# Patient Record
Sex: MALE | Race: WHITE | Employment: OTHER | URBAN - METROPOLITAN AREA
[De-identification: names, ages, dates, MRNs, and addresses within clinical notes are randomized per-mention and may not be internally consistent; named-entity substitution may affect disease eponyms.]

---

## 2017-02-10 ENCOUNTER — HOSPITAL ENCOUNTER (OUTPATIENT)
Dept: PAIN MANAGEMENT | Age: 57
Discharge: HOME | End: 2017-02-10
Attending: ANESTHESIOLOGY | Admitting: ANESTHESIOLOGY
Payer: MEDICARE

## 2017-02-10 VITALS
SYSTOLIC BLOOD PRESSURE: 147 MMHG | TEMPERATURE: 97.9 F | DIASTOLIC BLOOD PRESSURE: 78 MMHG | RESPIRATION RATE: 16 BRPM | HEART RATE: 81 BPM

## 2017-02-10 DIAGNOSIS — R52 PAIN DISORDER: ICD-10-CM

## 2017-02-10 DIAGNOSIS — R52 PAIN: ICD-10-CM

## 2017-02-10 DIAGNOSIS — M51.26 DISPLACEMENT OF LUMBAR INTERVERTEBRAL DISC WITHOUT MYELOPATHY: ICD-10-CM

## 2017-02-10 DIAGNOSIS — M48.02 CERVICAL STENOSIS OF SPINE: ICD-10-CM

## 2017-02-10 DIAGNOSIS — M54.2 CERVICALGIA: ICD-10-CM

## 2017-02-10 PROCEDURE — 99214 OFFICE O/P EST MOD 30 MIN: CPT

## 2017-02-10 RX ORDER — OXYCODONE HYDROCHLORIDE 5 MG/1
5 TABLET ORAL EVERY 8 HOURS PRN
Qty: 90 TABLET | Refills: 0 | Status: SHIPPED | OUTPATIENT
Start: 2017-02-12 | End: 2017-03-07 | Stop reason: SDUPTHER

## 2017-02-10 RX ORDER — MORPHINE SULFATE 30 MG/1
30 TABLET, FILM COATED, EXTENDED RELEASE ORAL EVERY 12 HOURS SCHEDULED
Qty: 60 TABLET | Refills: 0 | Status: SHIPPED | OUTPATIENT
Start: 2017-02-12 | End: 2017-03-07 | Stop reason: SDUPTHER

## 2017-02-10 RX ORDER — METRONIDAZOLE 500 MG/1
500 TABLET ORAL 3 TIMES DAILY
COMMUNITY
End: 2017-04-25 | Stop reason: ALTCHOICE

## 2017-02-10 RX ORDER — TIZANIDINE 4 MG/1
4 TABLET ORAL 2 TIMES DAILY PRN
Qty: 60 TABLET | Refills: 0 | Status: SHIPPED | OUTPATIENT
Start: 2017-02-12 | End: 2017-03-07 | Stop reason: SDUPTHER

## 2017-02-10 RX ORDER — CIPROFLOXACIN 500 MG/1
500 TABLET, FILM COATED ORAL 2 TIMES DAILY
COMMUNITY
End: 2017-07-06 | Stop reason: ALTCHOICE

## 2017-02-10 RX ORDER — GABAPENTIN 800 MG/1
800 TABLET ORAL EVERY 8 HOURS SCHEDULED
Qty: 90 TABLET | Refills: 0 | Status: SHIPPED | OUTPATIENT
Start: 2017-02-12 | End: 2017-03-07 | Stop reason: SDUPTHER

## 2017-02-10 ASSESSMENT — PAIN DESCRIPTION - ORIENTATION: ORIENTATION: RIGHT;LEFT

## 2017-02-10 ASSESSMENT — PAIN SCALES - GENERAL: PAINLEVEL_OUTOF10: 7

## 2017-02-10 ASSESSMENT — PAIN DESCRIPTION - LOCATION: LOCATION: BACK;HIP;SHOULDER;NECK

## 2017-02-10 ASSESSMENT — PAIN DESCRIPTION - PROGRESSION: CLINICAL_PROGRESSION: GRADUALLY WORSENING

## 2017-02-22 ENCOUNTER — HOSPITAL ENCOUNTER (OUTPATIENT)
Dept: NEUROLOGY | Age: 57
Discharge: HOME | End: 2017-02-22
Attending: FAMILY MEDICINE | Admitting: FAMILY MEDICINE
Payer: MEDICARE

## 2017-02-22 DIAGNOSIS — M25.532 LEFT WRIST PAIN: ICD-10-CM

## 2017-02-22 PROCEDURE — 95908 NRV CNDJ TST 3-4 STUDIES: CPT | Performed by: PHYSICAL MEDICINE & REHABILITATION

## 2017-02-22 PROCEDURE — 95886 MUSC TEST DONE W/N TEST COMP: CPT | Performed by: PHYSICAL MEDICINE & REHABILITATION

## 2017-03-01 DIAGNOSIS — G56.02 CARPAL TUNNEL SYNDROME OF LEFT WRIST: Primary | ICD-10-CM

## 2017-03-07 ENCOUNTER — HOSPITAL ENCOUNTER (OUTPATIENT)
Dept: PAIN MANAGEMENT | Age: 57
Discharge: HOME | End: 2017-03-07
Payer: MEDICARE

## 2017-03-07 ENCOUNTER — TELEPHONE (OUTPATIENT)
Dept: FAMILY MEDICINE CLINIC | Facility: CLINIC | Age: 57
End: 2017-03-07

## 2017-03-07 VITALS
TEMPERATURE: 97.8 F | SYSTOLIC BLOOD PRESSURE: 126 MMHG | DIASTOLIC BLOOD PRESSURE: 80 MMHG | RESPIRATION RATE: 16 BRPM | HEART RATE: 82 BPM

## 2017-03-07 DIAGNOSIS — M54.2 CERVICALGIA: ICD-10-CM

## 2017-03-07 DIAGNOSIS — M51.26 DISPLACEMENT OF LUMBAR INTERVERTEBRAL DISC WITHOUT MYELOPATHY: ICD-10-CM

## 2017-03-07 DIAGNOSIS — M48.02 CERVICAL STENOSIS OF SPINE: Primary | ICD-10-CM

## 2017-03-07 DIAGNOSIS — R52 PAIN DISORDER: ICD-10-CM

## 2017-03-07 DIAGNOSIS — M48.02 CERVICAL STENOSIS OF SPINE: ICD-10-CM

## 2017-03-07 DIAGNOSIS — R52 PAIN: ICD-10-CM

## 2017-03-07 PROCEDURE — 99214 OFFICE O/P EST MOD 30 MIN: CPT

## 2017-03-07 RX ORDER — TIZANIDINE 4 MG/1
4 TABLET ORAL 2 TIMES DAILY PRN
Qty: 60 TABLET | Refills: 0 | Status: SHIPPED | OUTPATIENT
Start: 2017-03-14 | End: 2017-04-11 | Stop reason: SDUPTHER

## 2017-03-07 RX ORDER — MORPHINE SULFATE 30 MG/1
30 TABLET, FILM COATED, EXTENDED RELEASE ORAL EVERY 12 HOURS SCHEDULED
Qty: 60 TABLET | Refills: 0 | Status: SHIPPED | OUTPATIENT
Start: 2017-03-14 | End: 2017-04-11 | Stop reason: SDUPTHER

## 2017-03-07 RX ORDER — OXYCODONE HYDROCHLORIDE 5 MG/1
5 TABLET ORAL EVERY 8 HOURS PRN
Qty: 90 TABLET | Refills: 0 | Status: SHIPPED | OUTPATIENT
Start: 2017-03-14 | End: 2017-04-11 | Stop reason: SDUPTHER

## 2017-03-07 RX ORDER — GABAPENTIN 800 MG/1
800 TABLET ORAL EVERY 8 HOURS SCHEDULED
Qty: 90 TABLET | Refills: 0 | Status: SHIPPED | OUTPATIENT
Start: 2017-03-14 | End: 2017-04-11 | Stop reason: SDUPTHER

## 2017-03-07 ASSESSMENT — PAIN DESCRIPTION - ORIENTATION: ORIENTATION: RIGHT;LEFT;POSTERIOR

## 2017-03-07 ASSESSMENT — PAIN DESCRIPTION - FREQUENCY: FREQUENCY: CONTINUOUS

## 2017-03-07 ASSESSMENT — PAIN DESCRIPTION - PAIN TYPE: TYPE: CHRONIC PAIN

## 2017-03-07 ASSESSMENT — PAIN SCALES - GENERAL: PAINLEVEL_OUTOF10: 7

## 2017-03-23 ENCOUNTER — OFFICE VISIT (OUTPATIENT)
Dept: ORTHOPEDIC SURGERY | Age: 57
End: 2017-03-23
Payer: MEDICARE

## 2017-03-23 VITALS — WEIGHT: 215 LBS | HEIGHT: 69 IN | BODY MASS INDEX: 31.84 KG/M2

## 2017-03-23 DIAGNOSIS — G56.02 CARPAL TUNNEL SYNDROME OF LEFT WRIST: ICD-10-CM

## 2017-03-23 DIAGNOSIS — M48.02 CERVICAL STENOSIS OF SPINE: Primary | ICD-10-CM

## 2017-03-23 DIAGNOSIS — M54.2 CERVICALGIA: ICD-10-CM

## 2017-03-23 DIAGNOSIS — M54.12 CERVICAL RADICULOPATHY: ICD-10-CM

## 2017-03-23 PROCEDURE — G8419 CALC BMI OUT NRM PARAM NOF/U: HCPCS | Performed by: ORTHOPAEDIC SURGERY

## 2017-03-23 PROCEDURE — G8427 DOCREV CUR MEDS BY ELIG CLIN: HCPCS | Performed by: ORTHOPAEDIC SURGERY

## 2017-03-23 PROCEDURE — G8484 FLU IMMUNIZE NO ADMIN: HCPCS | Performed by: ORTHOPAEDIC SURGERY

## 2017-03-23 PROCEDURE — 99203 OFFICE O/P NEW LOW 30 MIN: CPT | Performed by: ORTHOPAEDIC SURGERY

## 2017-03-23 PROCEDURE — 4004F PT TOBACCO SCREEN RCVD TLK: CPT | Performed by: ORTHOPAEDIC SURGERY

## 2017-03-23 PROCEDURE — 3017F COLORECTAL CA SCREEN DOC REV: CPT | Performed by: ORTHOPAEDIC SURGERY

## 2017-04-11 ENCOUNTER — HOSPITAL ENCOUNTER (OUTPATIENT)
Dept: PAIN MANAGEMENT | Age: 57
Discharge: HOME | End: 2017-04-11
Payer: MEDICARE

## 2017-04-11 VITALS
SYSTOLIC BLOOD PRESSURE: 128 MMHG | HEART RATE: 69 BPM | RESPIRATION RATE: 16 BRPM | TEMPERATURE: 97.8 F | DIASTOLIC BLOOD PRESSURE: 77 MMHG

## 2017-04-11 DIAGNOSIS — R52 PAIN: ICD-10-CM

## 2017-04-11 DIAGNOSIS — M54.2 CERVICALGIA: ICD-10-CM

## 2017-04-11 DIAGNOSIS — M51.26 DISPLACEMENT OF LUMBAR INTERVERTEBRAL DISC WITHOUT MYELOPATHY: ICD-10-CM

## 2017-04-11 DIAGNOSIS — M48.02 CERVICAL STENOSIS OF SPINE: ICD-10-CM

## 2017-04-11 DIAGNOSIS — R52 PAIN DISORDER: ICD-10-CM

## 2017-04-11 PROCEDURE — 99214 OFFICE O/P EST MOD 30 MIN: CPT

## 2017-04-11 PROCEDURE — 99213 OFFICE O/P EST LOW 20 MIN: CPT

## 2017-04-11 RX ORDER — GABAPENTIN 800 MG/1
800 TABLET ORAL EVERY 8 HOURS SCHEDULED
Qty: 90 TABLET | Refills: 0 | Status: SHIPPED | OUTPATIENT
Start: 2017-04-13 | End: 2017-05-09 | Stop reason: SDUPTHER

## 2017-04-11 RX ORDER — TIZANIDINE 4 MG/1
4 TABLET ORAL 2 TIMES DAILY PRN
Qty: 60 TABLET | Refills: 0 | Status: SHIPPED | OUTPATIENT
Start: 2017-04-13 | End: 2017-05-09 | Stop reason: SDUPTHER

## 2017-04-11 RX ORDER — MORPHINE SULFATE 30 MG/1
30 TABLET, FILM COATED, EXTENDED RELEASE ORAL EVERY 12 HOURS SCHEDULED
Qty: 60 TABLET | Refills: 0 | Status: SHIPPED | OUTPATIENT
Start: 2017-04-13 | End: 2017-05-09 | Stop reason: SDUPTHER

## 2017-04-11 RX ORDER — OXYCODONE HYDROCHLORIDE 5 MG/1
5 TABLET ORAL EVERY 8 HOURS PRN
Qty: 90 TABLET | Refills: 0 | Status: SHIPPED | OUTPATIENT
Start: 2017-04-13 | End: 2017-05-09 | Stop reason: SDUPTHER

## 2017-04-11 ASSESSMENT — PAIN DESCRIPTION - FREQUENCY: FREQUENCY: CONTINUOUS

## 2017-04-11 ASSESSMENT — ENCOUNTER SYMPTOMS
SHORTNESS OF BREATH: 0
COUGH: 0
CONSTIPATION: 0

## 2017-04-11 ASSESSMENT — PAIN DESCRIPTION - DESCRIPTORS: DESCRIPTORS: ACHING;NUMBNESS;CONSTANT

## 2017-04-11 ASSESSMENT — PAIN DESCRIPTION - PROGRESSION: CLINICAL_PROGRESSION: GRADUALLY WORSENING

## 2017-04-11 ASSESSMENT — PAIN SCALES - GENERAL: PAINLEVEL_OUTOF10: 6

## 2017-04-11 ASSESSMENT — PAIN DESCRIPTION - ORIENTATION: ORIENTATION: RIGHT;LEFT

## 2017-04-11 ASSESSMENT — PAIN DESCRIPTION - PAIN TYPE: TYPE: CHRONIC PAIN

## 2017-04-11 ASSESSMENT — PAIN DESCRIPTION - LOCATION: LOCATION: BACK;NECK;HAND

## 2017-04-25 ENCOUNTER — OFFICE VISIT (OUTPATIENT)
Dept: FAMILY MEDICINE CLINIC | Age: 57
End: 2017-04-25
Payer: MEDICARE

## 2017-04-25 VITALS
OXYGEN SATURATION: 98 % | WEIGHT: 211 LBS | HEART RATE: 72 BPM | BODY MASS INDEX: 31.25 KG/M2 | RESPIRATION RATE: 19 BRPM | HEIGHT: 69 IN | TEMPERATURE: 98.4 F | DIASTOLIC BLOOD PRESSURE: 68 MMHG | SYSTOLIC BLOOD PRESSURE: 128 MMHG

## 2017-04-25 DIAGNOSIS — F33.40 RECURRENT MAJOR DEPRESSIVE DISORDER, IN REMISSION (HCC): ICD-10-CM

## 2017-04-25 DIAGNOSIS — Z23 NEED FOR 23-POLYVALENT PNEUMOCOCCAL POLYSACCHARIDE VACCINE: ICD-10-CM

## 2017-04-25 DIAGNOSIS — G56.02 CARPAL TUNNEL SYNDROME OF LEFT WRIST: ICD-10-CM

## 2017-04-25 DIAGNOSIS — M48.02 CERVICAL STENOSIS OF SPINE: Primary | ICD-10-CM

## 2017-04-25 DIAGNOSIS — J44.9 CHRONIC OBSTRUCTIVE PULMONARY DISEASE, UNSPECIFIED COPD TYPE (HCC): ICD-10-CM

## 2017-04-25 PROCEDURE — G0009 ADMIN PNEUMOCOCCAL VACCINE: HCPCS | Performed by: FAMILY MEDICINE

## 2017-04-25 PROCEDURE — G8926 SPIRO NO PERF OR DOC: HCPCS | Performed by: FAMILY MEDICINE

## 2017-04-25 PROCEDURE — 90732 PPSV23 VACC 2 YRS+ SUBQ/IM: CPT | Performed by: FAMILY MEDICINE

## 2017-04-25 PROCEDURE — G8417 CALC BMI ABV UP PARAM F/U: HCPCS | Performed by: FAMILY MEDICINE

## 2017-04-25 PROCEDURE — 3017F COLORECTAL CA SCREEN DOC REV: CPT | Performed by: FAMILY MEDICINE

## 2017-04-25 PROCEDURE — 4004F PT TOBACCO SCREEN RCVD TLK: CPT | Performed by: FAMILY MEDICINE

## 2017-04-25 PROCEDURE — 3023F SPIROM DOC REV: CPT | Performed by: FAMILY MEDICINE

## 2017-04-25 PROCEDURE — 99214 OFFICE O/P EST MOD 30 MIN: CPT | Performed by: FAMILY MEDICINE

## 2017-04-25 PROCEDURE — G8427 DOCREV CUR MEDS BY ELIG CLIN: HCPCS | Performed by: FAMILY MEDICINE

## 2017-04-25 ASSESSMENT — ENCOUNTER SYMPTOMS
COUGH: 0
SHORTNESS OF BREATH: 0
VOMITING: 0
NAUSEA: 0

## 2017-05-09 ENCOUNTER — HOSPITAL ENCOUNTER (OUTPATIENT)
Dept: PAIN MANAGEMENT | Age: 57
Discharge: HOME | End: 2017-05-09
Payer: MEDICARE

## 2017-05-09 VITALS
RESPIRATION RATE: 18 BRPM | TEMPERATURE: 98.4 F | DIASTOLIC BLOOD PRESSURE: 71 MMHG | SYSTOLIC BLOOD PRESSURE: 114 MMHG | HEART RATE: 79 BPM

## 2017-05-09 DIAGNOSIS — R52 PAIN: ICD-10-CM

## 2017-05-09 DIAGNOSIS — M51.26 DISPLACEMENT OF LUMBAR INTERVERTEBRAL DISC WITHOUT MYELOPATHY: ICD-10-CM

## 2017-05-09 DIAGNOSIS — M48.02 CERVICAL STENOSIS OF SPINE: ICD-10-CM

## 2017-05-09 DIAGNOSIS — M54.2 CERVICALGIA: ICD-10-CM

## 2017-05-09 DIAGNOSIS — R52 PAIN DISORDER: ICD-10-CM

## 2017-05-09 PROCEDURE — 80307 DRUG TEST PRSMV CHEM ANLYZR: CPT

## 2017-05-09 PROCEDURE — G0480 DRUG TEST DEF 1-7 CLASSES: HCPCS

## 2017-05-09 PROCEDURE — 99213 OFFICE O/P EST LOW 20 MIN: CPT

## 2017-05-09 RX ORDER — MORPHINE SULFATE 30 MG/1
30 TABLET, FILM COATED, EXTENDED RELEASE ORAL EVERY 12 HOURS SCHEDULED
Qty: 60 TABLET | Refills: 0 | Status: SHIPPED | OUTPATIENT
Start: 2017-05-13 | End: 2017-06-09 | Stop reason: SDUPTHER

## 2017-05-09 RX ORDER — OXYCODONE HYDROCHLORIDE 5 MG/1
5 TABLET ORAL EVERY 8 HOURS PRN
Qty: 90 TABLET | Refills: 0 | Status: SHIPPED | OUTPATIENT
Start: 2017-05-13 | End: 2017-06-09 | Stop reason: SDUPTHER

## 2017-05-09 RX ORDER — TIZANIDINE 4 MG/1
4 TABLET ORAL 2 TIMES DAILY PRN
Qty: 60 TABLET | Refills: 0 | Status: SHIPPED | OUTPATIENT
Start: 2017-05-13 | End: 2017-06-09 | Stop reason: SDUPTHER

## 2017-05-09 RX ORDER — GABAPENTIN 800 MG/1
800 TABLET ORAL EVERY 8 HOURS SCHEDULED
Qty: 90 TABLET | Refills: 0 | Status: SHIPPED | OUTPATIENT
Start: 2017-05-13 | End: 2017-06-09 | Stop reason: SDUPTHER

## 2017-05-09 ASSESSMENT — PAIN DESCRIPTION - LOCATION: LOCATION: BACK;NECK;HAND

## 2017-05-09 ASSESSMENT — ENCOUNTER SYMPTOMS
SHORTNESS OF BREATH: 0
CONSTIPATION: 0
COUGH: 0

## 2017-05-09 ASSESSMENT — PAIN DESCRIPTION - ORIENTATION: ORIENTATION: RIGHT;LEFT

## 2017-05-09 ASSESSMENT — PAIN DESCRIPTION - FREQUENCY: FREQUENCY: CONTINUOUS

## 2017-05-09 ASSESSMENT — PAIN DESCRIPTION - ONSET: ONSET: ON-GOING

## 2017-05-09 ASSESSMENT — PAIN SCALES - GENERAL: PAINLEVEL_OUTOF10: 6

## 2017-05-09 ASSESSMENT — PAIN DESCRIPTION - PAIN TYPE: TYPE: CHRONIC PAIN

## 2017-05-09 ASSESSMENT — PAIN DESCRIPTION - PROGRESSION: CLINICAL_PROGRESSION: GRADUALLY WORSENING

## 2017-05-12 LAB
6-ACETYLMORPHINE, UR: NOT DETECTED
7-AMINOCLONAZEPAM, URINE: NOT DETECTED
ALPHA-OH-ALPRAZ, URINE: NOT DETECTED
ALPRAZOLAM, URINE: NOT DETECTED
AMPHETAMINES, URINE: NOT DETECTED
BARBITURATES, URINE: NOT DETECTED
BENZOYLECGONINE, UR: NOT DETECTED
BUPRENORPHINE URINE: NOT DETECTED
CARISOPRODOL, UR: NOT DETECTED
CLONAZEPAM, URINE: NOT DETECTED
CODEINE, URINE: NOT DETECTED
CREATININE URINE: 193.8 MG/DL (ref 20–400)
DIAZEPAM, URINE: NOT DETECTED
EER PAIN MGT DRUG PANEL, HIGH RES/EMIT U: NORMAL
ETHYL GLUCURONIDE UR: NOT DETECTED
FENTANYL URINE: NOT DETECTED
HYDROCODONE, URINE: NOT DETECTED
HYDROMORPHONE, URINE: NOT DETECTED
LORAZEPAM, URINE: NOT DETECTED
MARIJUANA METAB, UR: NOT DETECTED
MDA, UR: NOT DETECTED
MDEA, EVE, UR: NOT DETECTED
MDMA URINE: NOT DETECTED
MEPERIDINE METAB, UR: NOT DETECTED
METHADONE, URINE: NOT DETECTED
METHAMPHETAMINE, URINE: NOT DETECTED
METHYLPHENIDATE: NOT DETECTED
MIDAZOLAM, URINE: NOT DETECTED
MORPHINE URINE: PRESENT
NORBUPRENORPHINE, URINE: NOT DETECTED
NORDIAZEPAM, URINE: NOT DETECTED
NORFENTANYL, URINE: NOT DETECTED
NORHYDROCODONE, URINE: NOT DETECTED
NOROXYCODONE, URINE: PRESENT
NOROXYMORPHONE, URINE: NOT DETECTED
OXAZEPAM, URINE: NOT DETECTED
OXYCODONE URINE: PRESENT
OXYMORPHONE, URINE: PRESENT
PAIN MGT DRUG PANEL, HI RES, UR: NORMAL
PCP,URINE: NOT DETECTED
PHENTERMINE, UR: NOT DETECTED
PROPOXYPHENE, URINE: NOT DETECTED
TAPENTADOL, URINE: NOT DETECTED
TAPENTADOL-O-SULFATE, URINE: NOT DETECTED
TEMAZEPAM, URINE: NOT DETECTED
TRAMADOL, URINE: NOT DETECTED
ZOLPIDEM, URINE: NOT DETECTED

## 2017-05-13 LAB — GABAPENTIN QNT URINE: >200 UG/ML

## 2017-06-09 ENCOUNTER — HOSPITAL ENCOUNTER (OUTPATIENT)
Dept: PAIN MANAGEMENT | Age: 57
Discharge: HOME | End: 2017-06-09
Payer: MEDICARE

## 2017-06-09 VITALS
RESPIRATION RATE: 18 BRPM | WEIGHT: 220 LBS | DIASTOLIC BLOOD PRESSURE: 80 MMHG | TEMPERATURE: 97.9 F | HEIGHT: 69 IN | BODY MASS INDEX: 32.58 KG/M2 | SYSTOLIC BLOOD PRESSURE: 115 MMHG | HEART RATE: 98 BPM

## 2017-06-09 DIAGNOSIS — M48.02 CERVICAL STENOSIS OF SPINE: ICD-10-CM

## 2017-06-09 DIAGNOSIS — R52 PAIN DISORDER: ICD-10-CM

## 2017-06-09 DIAGNOSIS — M54.2 CERVICALGIA: ICD-10-CM

## 2017-06-09 DIAGNOSIS — R52 PAIN: ICD-10-CM

## 2017-06-09 DIAGNOSIS — M51.26 DISPLACEMENT OF LUMBAR INTERVERTEBRAL DISC WITHOUT MYELOPATHY: ICD-10-CM

## 2017-06-09 PROCEDURE — 99214 OFFICE O/P EST MOD 30 MIN: CPT

## 2017-06-09 RX ORDER — TIZANIDINE 4 MG/1
4 TABLET ORAL 2 TIMES DAILY PRN
Qty: 60 TABLET | Refills: 0 | Status: SHIPPED | OUTPATIENT
Start: 2017-06-12 | End: 2017-07-06 | Stop reason: SDUPTHER

## 2017-06-09 RX ORDER — MORPHINE SULFATE 30 MG/1
30 TABLET, FILM COATED, EXTENDED RELEASE ORAL EVERY 12 HOURS SCHEDULED
Qty: 60 TABLET | Refills: 0 | Status: SHIPPED | OUTPATIENT
Start: 2017-06-12 | End: 2017-07-06 | Stop reason: SDUPTHER

## 2017-06-09 RX ORDER — OXYCODONE HYDROCHLORIDE 5 MG/1
5 TABLET ORAL EVERY 8 HOURS PRN
Qty: 90 TABLET | Refills: 0 | Status: SHIPPED | OUTPATIENT
Start: 2017-06-12 | End: 2017-07-06 | Stop reason: SDUPTHER

## 2017-06-09 RX ORDER — GABAPENTIN 800 MG/1
800 TABLET ORAL EVERY 8 HOURS SCHEDULED
Qty: 90 TABLET | Refills: 0 | Status: SHIPPED | OUTPATIENT
Start: 2017-06-12 | End: 2017-07-06 | Stop reason: SDUPTHER

## 2017-06-09 ASSESSMENT — ENCOUNTER SYMPTOMS
BACK PAIN: 1
SHORTNESS OF BREATH: 0
CONSTIPATION: 0
COUGH: 0

## 2017-06-09 ASSESSMENT — PAIN DESCRIPTION - PROGRESSION: CLINICAL_PROGRESSION: NOT CHANGED

## 2017-06-09 ASSESSMENT — PAIN DESCRIPTION - LOCATION: LOCATION: BACK;BUTTOCKS;GROIN

## 2017-06-09 ASSESSMENT — PAIN DESCRIPTION - PAIN TYPE: TYPE: CHRONIC PAIN

## 2017-06-09 ASSESSMENT — PAIN DESCRIPTION - FREQUENCY: FREQUENCY: CONTINUOUS

## 2017-06-09 ASSESSMENT — PAIN DESCRIPTION - ORIENTATION: ORIENTATION: LEFT

## 2017-06-09 ASSESSMENT — PAIN SCALES - GENERAL: PAINLEVEL_OUTOF10: 6

## 2017-07-06 ENCOUNTER — HOSPITAL ENCOUNTER (OUTPATIENT)
Dept: PAIN MANAGEMENT | Age: 57
Discharge: HOME | End: 2017-07-06
Payer: MEDICARE

## 2017-07-06 VITALS
TEMPERATURE: 98 F | WEIGHT: 220 LBS | SYSTOLIC BLOOD PRESSURE: 136 MMHG | HEIGHT: 69 IN | BODY MASS INDEX: 32.58 KG/M2 | RESPIRATION RATE: 18 BRPM | DIASTOLIC BLOOD PRESSURE: 65 MMHG | HEART RATE: 73 BPM

## 2017-07-06 DIAGNOSIS — F32.A MILD DEPRESSION: ICD-10-CM

## 2017-07-06 DIAGNOSIS — M51.26 DISPLACEMENT OF LUMBAR INTERVERTEBRAL DISC WITHOUT MYELOPATHY: ICD-10-CM

## 2017-07-06 DIAGNOSIS — R52 PAIN: ICD-10-CM

## 2017-07-06 DIAGNOSIS — M54.2 CERVICALGIA: ICD-10-CM

## 2017-07-06 DIAGNOSIS — M48.02 CERVICAL STENOSIS OF SPINE: ICD-10-CM

## 2017-07-06 DIAGNOSIS — R52 PAIN DISORDER: ICD-10-CM

## 2017-07-06 PROCEDURE — 99214 OFFICE O/P EST MOD 30 MIN: CPT

## 2017-07-06 RX ORDER — OXYCODONE HYDROCHLORIDE 5 MG/1
5 TABLET ORAL EVERY 8 HOURS PRN
Qty: 90 TABLET | Refills: 0 | Status: SHIPPED | OUTPATIENT
Start: 2017-07-12 | End: 2017-08-07 | Stop reason: SDUPTHER

## 2017-07-06 RX ORDER — TIZANIDINE 4 MG/1
4 TABLET ORAL 2 TIMES DAILY PRN
Qty: 60 TABLET | Refills: 0 | Status: SHIPPED | OUTPATIENT
Start: 2017-07-12 | End: 2017-08-07 | Stop reason: SDUPTHER

## 2017-07-06 RX ORDER — MORPHINE SULFATE 30 MG/1
30 TABLET, FILM COATED, EXTENDED RELEASE ORAL EVERY 12 HOURS SCHEDULED
Qty: 60 TABLET | Refills: 0 | Status: SHIPPED | OUTPATIENT
Start: 2017-07-12 | End: 2017-08-07 | Stop reason: SDUPTHER

## 2017-07-06 RX ORDER — GABAPENTIN 800 MG/1
800 TABLET ORAL EVERY 8 HOURS SCHEDULED
Qty: 90 TABLET | Refills: 0 | Status: SHIPPED | OUTPATIENT
Start: 2017-07-12 | End: 2017-08-07 | Stop reason: SDUPTHER

## 2017-07-06 ASSESSMENT — PAIN DESCRIPTION - FREQUENCY: FREQUENCY: CONTINUOUS

## 2017-07-06 ASSESSMENT — PAIN DESCRIPTION - ONSET: ONSET: ON-GOING

## 2017-07-06 ASSESSMENT — PAIN DESCRIPTION - PAIN TYPE: TYPE: CHRONIC PAIN

## 2017-07-06 ASSESSMENT — PAIN DESCRIPTION - LOCATION: LOCATION: BACK;NECK;SHOULDER;ARM

## 2017-07-06 ASSESSMENT — PAIN DESCRIPTION - DESCRIPTORS: DESCRIPTORS: CONSTANT;ACHING;RADIATING

## 2017-07-06 ASSESSMENT — PAIN DESCRIPTION - PROGRESSION: CLINICAL_PROGRESSION: GRADUALLY IMPROVING

## 2017-07-06 ASSESSMENT — PAIN SCALES - GENERAL: PAINLEVEL_OUTOF10: 7

## 2017-07-06 ASSESSMENT — PAIN DESCRIPTION - ORIENTATION: ORIENTATION: POSTERIOR;LEFT

## 2017-07-26 RX ORDER — SIMVASTATIN 40 MG
40 TABLET ORAL EVERY EVENING
Qty: 30 TABLET | Refills: 3 | Status: SHIPPED | OUTPATIENT
Start: 2017-07-26 | End: 2017-12-09 | Stop reason: SDUPTHER

## 2017-08-02 ENCOUNTER — HOSPITAL ENCOUNTER (OUTPATIENT)
Dept: INTERVENTIONAL RADIOLOGY/VASCULAR | Age: 57
Discharge: HOME | End: 2017-08-02
Payer: MEDICARE

## 2017-08-02 ENCOUNTER — HOSPITAL ENCOUNTER (OUTPATIENT)
Dept: CT IMAGING | Age: 57
Discharge: HOME | End: 2017-08-02
Payer: MEDICARE

## 2017-08-02 VITALS
WEIGHT: 200 LBS | TEMPERATURE: 98.3 F | BODY MASS INDEX: 29.62 KG/M2 | HEIGHT: 69 IN | SYSTOLIC BLOOD PRESSURE: 146 MMHG | OXYGEN SATURATION: 58 % | DIASTOLIC BLOOD PRESSURE: 76 MMHG | RESPIRATION RATE: 18 BRPM | HEART RATE: 70 BPM

## 2017-08-02 DIAGNOSIS — M54.2 NECK PAIN: ICD-10-CM

## 2017-08-02 DIAGNOSIS — G95.9 CERVICAL MYELOPATHY (HCC): ICD-10-CM

## 2017-08-02 PROCEDURE — 94760 N-INVAS EAR/PLS OXIMETRY 1: CPT

## 2017-08-02 PROCEDURE — 7100000031 HC ASPR PHASE II RECOVERY - ADDTL 15 MIN

## 2017-08-02 PROCEDURE — 2500000003 HC RX 250 WO HCPCS: Performed by: RADIOLOGY

## 2017-08-02 PROCEDURE — 7100000030 HC ASPR PHASE II RECOVERY - FIRST 15 MIN

## 2017-08-02 PROCEDURE — 6360000004 HC RX CONTRAST MEDICATION: Performed by: ORTHOPAEDIC SURGERY

## 2017-08-02 PROCEDURE — 72126 CT NECK SPINE W/DYE: CPT

## 2017-08-02 PROCEDURE — 6360000004 HC RX CONTRAST MEDICATION

## 2017-08-02 PROCEDURE — 72240 MYELOGRAPHY NECK SPINE: CPT | Performed by: RADIOLOGY

## 2017-08-02 RX ORDER — SODIUM CHLORIDE 9 MG/ML
INJECTION, SOLUTION INTRAVENOUS CONTINUOUS
Status: DISCONTINUED | OUTPATIENT
Start: 2017-08-02 | End: 2017-08-05 | Stop reason: HOSPADM

## 2017-08-02 RX ORDER — LIDOCAINE HYDROCHLORIDE 10 MG/ML
INJECTION, SOLUTION EPIDURAL; INFILTRATION; INTRACAUDAL; PERINEURAL
Status: COMPLETED | OUTPATIENT
Start: 2017-08-02 | End: 2017-08-02

## 2017-08-02 RX ORDER — ACETAMINOPHEN 325 MG/1
650 TABLET ORAL EVERY 4 HOURS PRN
Status: DISCONTINUED | OUTPATIENT
Start: 2017-08-02 | End: 2017-08-05 | Stop reason: HOSPADM

## 2017-08-02 ASSESSMENT — PAIN DESCRIPTION - ORIENTATION: ORIENTATION: LEFT

## 2017-08-02 ASSESSMENT — PAIN DESCRIPTION - DESCRIPTORS
DESCRIPTORS: CONSTANT;ACHING
DESCRIPTORS: CONSTANT;ACHING
DESCRIPTORS: SHARP;ACHING

## 2017-08-02 ASSESSMENT — PAIN DESCRIPTION - ONSET
ONSET: ON-GOING
ONSET: ON-GOING

## 2017-08-02 ASSESSMENT — PAIN DESCRIPTION - PROGRESSION
CLINICAL_PROGRESSION: GRADUALLY IMPROVING
CLINICAL_PROGRESSION: NOT CHANGED
CLINICAL_PROGRESSION: NOT CHANGED

## 2017-08-02 ASSESSMENT — PAIN DESCRIPTION - LOCATION
LOCATION: NECK;BACK
LOCATION: BACK;NECK

## 2017-08-02 ASSESSMENT — PAIN SCALES - GENERAL
PAINLEVEL_OUTOF10: 5
PAINLEVEL_OUTOF10: 7

## 2017-08-02 ASSESSMENT — PAIN DESCRIPTION - FREQUENCY
FREQUENCY: CONTINUOUS

## 2017-08-02 ASSESSMENT — PAIN - FUNCTIONAL ASSESSMENT: PAIN_FUNCTIONAL_ASSESSMENT: 0-10

## 2017-08-02 ASSESSMENT — PAIN DESCRIPTION - PAIN TYPE
TYPE: CHRONIC PAIN
TYPE: CHRONIC PAIN

## 2017-08-03 PROCEDURE — 6360000004 HC RX CONTRAST MEDICATION: Performed by: ORTHOPAEDIC SURGERY

## 2017-08-03 RX ADMIN — IOHEXOL 20 ML: 180 INJECTION INTRAVENOUS at 11:55

## 2017-08-07 ENCOUNTER — HOSPITAL ENCOUNTER (OUTPATIENT)
Dept: PAIN MANAGEMENT | Age: 57
Discharge: HOME | End: 2017-08-07
Payer: MEDICARE

## 2017-08-07 VITALS
TEMPERATURE: 98 F | HEART RATE: 74 BPM | DIASTOLIC BLOOD PRESSURE: 62 MMHG | RESPIRATION RATE: 20 BRPM | SYSTOLIC BLOOD PRESSURE: 99 MMHG

## 2017-08-07 DIAGNOSIS — Z51.81 MEDICATION MONITORING ENCOUNTER: Primary | ICD-10-CM

## 2017-08-07 DIAGNOSIS — M48.02 CERVICAL STENOSIS OF SPINE: ICD-10-CM

## 2017-08-07 DIAGNOSIS — M51.26 DISPLACEMENT OF LUMBAR INTERVERTEBRAL DISC WITHOUT MYELOPATHY: ICD-10-CM

## 2017-08-07 DIAGNOSIS — M54.2 CERVICALGIA: ICD-10-CM

## 2017-08-07 PROCEDURE — 99214 OFFICE O/P EST MOD 30 MIN: CPT

## 2017-08-07 RX ORDER — MORPHINE SULFATE 30 MG/1
30 TABLET, FILM COATED, EXTENDED RELEASE ORAL EVERY 12 HOURS SCHEDULED
Qty: 60 TABLET | Refills: 0 | Status: SHIPPED | OUTPATIENT
Start: 2017-08-11 | End: 2017-09-07 | Stop reason: SDUPTHER

## 2017-08-07 RX ORDER — OXYCODONE HYDROCHLORIDE 5 MG/1
5 TABLET ORAL EVERY 8 HOURS PRN
Qty: 90 TABLET | Refills: 0 | Status: SHIPPED | OUTPATIENT
Start: 2017-08-11 | End: 2017-09-07 | Stop reason: SDUPTHER

## 2017-08-07 RX ORDER — TIZANIDINE 4 MG/1
4 TABLET ORAL 2 TIMES DAILY PRN
Qty: 60 TABLET | Refills: 0 | Status: SHIPPED | OUTPATIENT
Start: 2017-08-11 | End: 2017-09-07 | Stop reason: SDUPTHER

## 2017-08-07 RX ORDER — GABAPENTIN 800 MG/1
800 TABLET ORAL EVERY 8 HOURS SCHEDULED
Qty: 90 TABLET | Refills: 0 | Status: SHIPPED | OUTPATIENT
Start: 2017-08-11 | End: 2017-09-07 | Stop reason: SDUPTHER

## 2017-08-07 ASSESSMENT — PAIN DESCRIPTION - DESCRIPTORS: DESCRIPTORS: CONSTANT;ACHING

## 2017-08-07 ASSESSMENT — ENCOUNTER SYMPTOMS
SHORTNESS OF BREATH: 0
COUGH: 0
BACK PAIN: 1
CONSTIPATION: 0

## 2017-08-07 ASSESSMENT — PAIN DESCRIPTION - LOCATION: LOCATION: BACK;NECK

## 2017-08-07 ASSESSMENT — PAIN DESCRIPTION - PROGRESSION: CLINICAL_PROGRESSION: NOT CHANGED

## 2017-08-07 ASSESSMENT — PAIN DESCRIPTION - ORIENTATION: ORIENTATION: LOWER

## 2017-08-07 ASSESSMENT — PAIN DESCRIPTION - ONSET: ONSET: ON-GOING

## 2017-08-07 ASSESSMENT — PAIN DESCRIPTION - PAIN TYPE: TYPE: CHRONIC PAIN

## 2017-08-07 ASSESSMENT — PAIN DESCRIPTION - FREQUENCY: FREQUENCY: CONTINUOUS

## 2017-08-07 ASSESSMENT — PAIN SCALES - GENERAL: PAINLEVEL_OUTOF10: 5

## 2017-09-07 ENCOUNTER — HOSPITAL ENCOUNTER (OUTPATIENT)
Dept: PAIN MANAGEMENT | Age: 57
Discharge: HOME | End: 2017-09-07
Payer: MEDICARE

## 2017-09-07 VITALS
DIASTOLIC BLOOD PRESSURE: 75 MMHG | HEART RATE: 77 BPM | TEMPERATURE: 98.5 F | HEIGHT: 69 IN | BODY MASS INDEX: 29.03 KG/M2 | SYSTOLIC BLOOD PRESSURE: 128 MMHG | RESPIRATION RATE: 16 BRPM | WEIGHT: 196 LBS

## 2017-09-07 DIAGNOSIS — K59.03 THERAPEUTIC OPIOID INDUCED CONSTIPATION: ICD-10-CM

## 2017-09-07 DIAGNOSIS — M51.36 DDD (DEGENERATIVE DISC DISEASE), LUMBAR: ICD-10-CM

## 2017-09-07 DIAGNOSIS — T40.2X5A THERAPEUTIC OPIOID INDUCED CONSTIPATION: ICD-10-CM

## 2017-09-07 DIAGNOSIS — M54.12 CERVICAL RADICULOPATHY: ICD-10-CM

## 2017-09-07 DIAGNOSIS — M51.26 DISPLACEMENT OF LUMBAR INTERVERTEBRAL DISC WITHOUT MYELOPATHY: ICD-10-CM

## 2017-09-07 DIAGNOSIS — Z51.81 MEDICATION MONITORING ENCOUNTER: Primary | ICD-10-CM

## 2017-09-07 DIAGNOSIS — M48.02 CERVICAL STENOSIS OF SPINE: ICD-10-CM

## 2017-09-07 PROBLEM — M51.369 DDD (DEGENERATIVE DISC DISEASE), LUMBAR: Status: ACTIVE | Noted: 2017-09-07

## 2017-09-07 PROCEDURE — 99214 OFFICE O/P EST MOD 30 MIN: CPT | Performed by: NURSE PRACTITIONER

## 2017-09-07 PROCEDURE — 99214 OFFICE O/P EST MOD 30 MIN: CPT

## 2017-09-07 RX ORDER — DOCUSATE SODIUM 100 MG/1
100 CAPSULE, LIQUID FILLED ORAL DAILY
Qty: 30 CAPSULE | Refills: 2 | Status: SHIPPED | OUTPATIENT
Start: 2017-09-07 | End: 2018-03-07 | Stop reason: SDUPTHER

## 2017-09-07 RX ORDER — OXYCODONE HYDROCHLORIDE 5 MG/1
5 TABLET ORAL EVERY 8 HOURS PRN
Qty: 90 TABLET | Refills: 0 | Status: SHIPPED | OUTPATIENT
Start: 2017-09-10 | End: 2017-10-04 | Stop reason: SDUPTHER

## 2017-09-07 RX ORDER — GABAPENTIN 800 MG/1
800 TABLET ORAL EVERY 8 HOURS SCHEDULED
Qty: 90 TABLET | Refills: 0 | Status: SHIPPED | OUTPATIENT
Start: 2017-09-10 | End: 2017-10-04 | Stop reason: SDUPTHER

## 2017-09-07 RX ORDER — TIZANIDINE 4 MG/1
4 TABLET ORAL 2 TIMES DAILY PRN
Qty: 60 TABLET | Refills: 0 | Status: SHIPPED | OUTPATIENT
Start: 2017-09-10 | End: 2017-10-04 | Stop reason: SDUPTHER

## 2017-09-07 RX ORDER — MORPHINE SULFATE 30 MG/1
30 TABLET, FILM COATED, EXTENDED RELEASE ORAL EVERY 12 HOURS SCHEDULED
Qty: 60 TABLET | Refills: 0 | Status: SHIPPED | OUTPATIENT
Start: 2017-09-10 | End: 2017-10-04 | Stop reason: SDUPTHER

## 2017-09-07 ASSESSMENT — PAIN DESCRIPTION - PROGRESSION: CLINICAL_PROGRESSION: NOT CHANGED

## 2017-09-07 ASSESSMENT — PAIN DESCRIPTION - LOCATION: LOCATION: NECK;ARM;SHOULDER

## 2017-09-07 ASSESSMENT — ENCOUNTER SYMPTOMS
BACK PAIN: 1
CONSTIPATION: 1
COUGH: 0
SHORTNESS OF BREATH: 0

## 2017-09-07 ASSESSMENT — PAIN SCALES - GENERAL: PAINLEVEL_OUTOF10: 5

## 2017-09-07 ASSESSMENT — PAIN DESCRIPTION - PAIN TYPE: TYPE: CHRONIC PAIN

## 2017-09-07 ASSESSMENT — PAIN DESCRIPTION - ORIENTATION: ORIENTATION: MID;LEFT

## 2017-09-07 ASSESSMENT — PAIN DESCRIPTION - DESCRIPTORS: DESCRIPTORS: CONSTANT;ACHING;NUMBNESS;TINGLING

## 2017-10-04 ENCOUNTER — HOSPITAL ENCOUNTER (OUTPATIENT)
Dept: PAIN MANAGEMENT | Age: 57
Discharge: HOME | End: 2017-10-04
Payer: MEDICARE

## 2017-10-04 VITALS
RESPIRATION RATE: 12 BRPM | HEART RATE: 80 BPM | SYSTOLIC BLOOD PRESSURE: 115 MMHG | TEMPERATURE: 98.2 F | DIASTOLIC BLOOD PRESSURE: 60 MMHG

## 2017-10-04 DIAGNOSIS — M51.26 DISPLACEMENT OF LUMBAR INTERVERTEBRAL DISC WITHOUT MYELOPATHY: ICD-10-CM

## 2017-10-04 DIAGNOSIS — M48.02 CERVICAL STENOSIS OF SPINE: ICD-10-CM

## 2017-10-04 PROCEDURE — 99215 OFFICE O/P EST HI 40 MIN: CPT

## 2017-10-04 PROCEDURE — 99213 OFFICE O/P EST LOW 20 MIN: CPT | Performed by: ANESTHESIOLOGY

## 2017-10-04 PROCEDURE — 80307 DRUG TEST PRSMV CHEM ANLYZR: CPT

## 2017-10-04 RX ORDER — OXYCODONE HYDROCHLORIDE 5 MG/1
5 TABLET ORAL EVERY 8 HOURS PRN
Qty: 90 TABLET | Refills: 0 | Status: SHIPPED | OUTPATIENT
Start: 2017-10-10 | End: 2017-11-08 | Stop reason: SDUPTHER

## 2017-10-04 RX ORDER — TIZANIDINE 4 MG/1
4 TABLET ORAL 2 TIMES DAILY PRN
Qty: 60 TABLET | Refills: 0 | Status: SHIPPED | OUTPATIENT
Start: 2017-10-10 | End: 2017-11-08 | Stop reason: SDUPTHER

## 2017-10-04 RX ORDER — MORPHINE SULFATE 30 MG/1
30 TABLET, FILM COATED, EXTENDED RELEASE ORAL EVERY 12 HOURS SCHEDULED
Qty: 60 TABLET | Refills: 0 | Status: SHIPPED | OUTPATIENT
Start: 2017-10-10 | End: 2017-11-08 | Stop reason: SDUPTHER

## 2017-10-04 RX ORDER — GABAPENTIN 800 MG/1
800 TABLET ORAL EVERY 8 HOURS SCHEDULED
Qty: 90 TABLET | Refills: 0 | Status: SHIPPED | OUTPATIENT
Start: 2017-10-10 | End: 2017-11-08 | Stop reason: SDUPTHER

## 2017-10-04 ASSESSMENT — PAIN DESCRIPTION - ORIENTATION: ORIENTATION: LEFT;RIGHT

## 2017-10-04 ASSESSMENT — PAIN SCALES - GENERAL: PAINLEVEL_OUTOF10: 6

## 2017-10-04 ASSESSMENT — PAIN DESCRIPTION - PAIN TYPE: TYPE: CHRONIC PAIN

## 2017-10-04 ASSESSMENT — PAIN DESCRIPTION - PROGRESSION: CLINICAL_PROGRESSION: GRADUALLY WORSENING

## 2017-10-04 ASSESSMENT — PAIN DESCRIPTION - LOCATION: LOCATION: NECK

## 2017-10-04 NOTE — PROGRESS NOTES
Patient is here today to review medication contract. The patient is a [facility] patient : No     The [facility] allowed levels are: n/a     Pain Assessment  Pain Assessment: 0-10  Pain Level: 6  Pain Type: Chronic pain  Pain Location: Neck  Pain Orientation: Left, Right (left greater than right )  Pain Descriptors: Constant, Burning, Aching, Numbness, Tingling  Clinical Progression: Gradually worsening  Patient's Stated Pain Goal: 3  Pain Intervention(s): Medication (see eMar), Rest (moist heat)    DORYS  Today  Yes    OARRS today  Yes     Result of OARRS - no meds from other souces     Morphine equivalent dose as reported on OARRS: 72.5    Pill count today   Yes  Comments appropriate     Last dose taken : today       ER visits related to Pain? No  Date of ER visit   N/A    Other Hospitalization  No    Reason for Hospitalization      Medication Side Effects:  Constipation  No      Sedation  No    Urinary Retention  No  Other Medication Side Effect     Are you on a Bowel Regime  :  Diet  No   Medication No    Are you having any of these Emotional Issues? normal    Are you seeing a Psychiatrist or Psychologist  Yes green status due April 2018    Have you ever had thoughts of hurting yourself  No    Outpatient Prescriptions Marked as Taking for the 10/4/17 encounter [facility] Encounter) with STV PAIN FLUORO RM 1   Medication Sig Dispense Refill    oxyCODONE (ROXICODONE) 5 MG immediate release tablet Take 1 tablet by mouth every 8 hours as needed for Pain  Take 1 tablet by mouth every 8 hours as needed. Earliest Fill Date: 9/10/17 90 tablet 0    morphine (MS CONTIN) 30 MG extended release tablet Take 1 tablet by mouth every 12 hours .  Earliest Fill Date: 9/10/17 60 tablet 0    gabapentin (NEURONTIN) 800 MG tablet Take 1 tablet by mouth every 8 hours 90 tablet 0    tiZANidine (ZANAFLEX) 4 MG tablet Take 1 tablet by mouth 2 times daily as needed (for muscle spasms) Break break day time tablet in half and take two times a day,

## 2017-10-07 LAB
6-ACETYLMORPHINE, UR: NOT DETECTED
7-AMINOCLONAZEPAM, URINE: NOT DETECTED
ALPHA-OH-ALPRAZ, URINE: NOT DETECTED
ALPRAZOLAM, URINE: NOT DETECTED
AMPHETAMINES, URINE: NOT DETECTED
BARBITURATES, URINE: NOT DETECTED
BENZOYLECGONINE, UR: NOT DETECTED
BUPRENORPHINE URINE: NOT DETECTED
CARISOPRODOL, UR: NOT DETECTED
CLONAZEPAM, URINE: NOT DETECTED
CODEINE, URINE: NOT DETECTED
CREATININE URINE: 93.7 MG/DL (ref 20–400)
DIAZEPAM, URINE: NOT DETECTED
EER PAIN MGT DRUG PANEL, HIGH RES/EMIT U: NORMAL
ETHYL GLUCURONIDE UR: PRESENT
FENTANYL URINE: NOT DETECTED
HYDROCODONE, URINE: NOT DETECTED
HYDROMORPHONE, URINE: NOT DETECTED
LORAZEPAM, URINE: NOT DETECTED
MARIJUANA METAB, UR: NOT DETECTED
MDA, UR: NOT DETECTED
MDEA, EVE, UR: NOT DETECTED
MDMA URINE: NOT DETECTED
MEPERIDINE METAB, UR: NOT DETECTED
METHADONE, URINE: NOT DETECTED
METHAMPHETAMINE, URINE: NOT DETECTED
METHYLPHENIDATE: NOT DETECTED
MIDAZOLAM, URINE: NOT DETECTED
MORPHINE URINE: PRESENT
NORBUPRENORPHINE, URINE: NOT DETECTED
NORDIAZEPAM, URINE: NOT DETECTED
NORFENTANYL, URINE: NOT DETECTED
NORHYDROCODONE, URINE: NOT DETECTED
NOROXYCODONE, URINE: PRESENT
NOROXYMORPHONE, URINE: NOT DETECTED
OXAZEPAM, URINE: NOT DETECTED
OXYCODONE URINE: PRESENT
OXYMORPHONE, URINE: NOT DETECTED
PAIN MGT DRUG PANEL, HI RES, UR: NORMAL
PCP,URINE: NOT DETECTED
PHENTERMINE, UR: NOT DETECTED
PROPOXYPHENE, URINE: NOT DETECTED
TAPENTADOL, URINE: NOT DETECTED
TAPENTADOL-O-SULFATE, URINE: NOT DETECTED
TEMAZEPAM, URINE: NOT DETECTED
TRAMADOL, URINE: NOT DETECTED
ZOLPIDEM, URINE: NOT DETECTED

## 2017-10-13 ENCOUNTER — HOSPITAL ENCOUNTER (OUTPATIENT)
Dept: PAIN MANAGEMENT | Age: 57
Discharge: HOME | End: 2017-10-13
Payer: MEDICARE

## 2017-10-13 VITALS
TEMPERATURE: 98.3 F | RESPIRATION RATE: 14 BRPM | HEART RATE: 84 BPM | SYSTOLIC BLOOD PRESSURE: 118 MMHG | DIASTOLIC BLOOD PRESSURE: 63 MMHG

## 2017-10-13 DIAGNOSIS — M51.36 DDD (DEGENERATIVE DISC DISEASE), LUMBAR: ICD-10-CM

## 2017-10-13 DIAGNOSIS — F32.A MILD DEPRESSION: ICD-10-CM

## 2017-10-13 PROCEDURE — 99214 OFFICE O/P EST MOD 30 MIN: CPT

## 2017-10-13 PROCEDURE — 99213 OFFICE O/P EST LOW 20 MIN: CPT | Performed by: PAIN MEDICINE

## 2017-10-13 RX ORDER — IBUPROFEN 200 MG
200 TABLET ORAL PRN
COMMUNITY
End: 2019-02-05 | Stop reason: ALTCHOICE

## 2017-10-13 ASSESSMENT — ENCOUNTER SYMPTOMS: VISUAL CHANGE: 1

## 2017-11-08 ENCOUNTER — HOSPITAL ENCOUNTER (OUTPATIENT)
Dept: PAIN MANAGEMENT | Age: 57
Discharge: HOME | End: 2017-11-08
Payer: MEDICARE

## 2017-11-08 DIAGNOSIS — M54.12 RADICULOPATHY OF CERVICAL SPINE: ICD-10-CM

## 2017-11-08 DIAGNOSIS — M51.26 DISPLACEMENT OF LUMBAR INTERVERTEBRAL DISC WITHOUT MYELOPATHY: ICD-10-CM

## 2017-11-08 DIAGNOSIS — Z51.81 MEDICATION MONITORING ENCOUNTER: ICD-10-CM

## 2017-11-08 DIAGNOSIS — M48.02 CERVICAL STENOSIS OF SPINE: ICD-10-CM

## 2017-11-08 DIAGNOSIS — M54.12 CERVICAL RADICULOPATHY: Primary | ICD-10-CM

## 2017-11-08 PROCEDURE — 99214 OFFICE O/P EST MOD 30 MIN: CPT

## 2017-11-08 PROCEDURE — 99213 OFFICE O/P EST LOW 20 MIN: CPT | Performed by: NURSE PRACTITIONER

## 2017-11-08 RX ORDER — MORPHINE SULFATE 30 MG/1
30 TABLET, FILM COATED, EXTENDED RELEASE ORAL EVERY 12 HOURS SCHEDULED
Qty: 60 TABLET | Refills: 0 | Status: SHIPPED | OUTPATIENT
Start: 2017-11-09 | End: 2017-12-07 | Stop reason: SDUPTHER

## 2017-11-08 RX ORDER — OXYCODONE HYDROCHLORIDE 5 MG/1
5 TABLET ORAL EVERY 8 HOURS PRN
Qty: 90 TABLET | Refills: 0 | Status: SHIPPED | OUTPATIENT
Start: 2017-11-09 | End: 2017-12-07 | Stop reason: SDUPTHER

## 2017-11-08 RX ORDER — GABAPENTIN 800 MG/1
800 TABLET ORAL EVERY 8 HOURS SCHEDULED
Qty: 90 TABLET | Refills: 0 | Status: SHIPPED | OUTPATIENT
Start: 2017-11-09 | End: 2017-12-07 | Stop reason: SDUPTHER

## 2017-11-08 RX ORDER — TIZANIDINE 4 MG/1
4 TABLET ORAL 2 TIMES DAILY PRN
Qty: 60 TABLET | Refills: 0 | Status: SHIPPED | OUTPATIENT
Start: 2017-11-09 | End: 2017-12-07 | Stop reason: SDUPTHER

## 2017-11-08 ASSESSMENT — ENCOUNTER SYMPTOMS
BACK PAIN: 1
SHORTNESS OF BREATH: 0
COUGH: 0
CONSTIPATION: 0

## 2017-11-08 NOTE — PROGRESS NOTES
Patient is here today to review medication contract. Chief Complaint: neck pain    PMH: Patient complains of neck pain that radiates to arms bilaterally down to fingertips. He has had this pain for many years and it is worsening over time. He had cervical spine surgery in 2012 and the pain has only worsened since that time. He saw Dr Sharron Garcia with no surgery recommended for neck, but told could benefit from CTR. He was evaluated at the pain clinic for cervical injections but states he was told that due to the scar tissue from previous surgery, the injections may not be effective.  Patient then saw Dr. Marcy Warner after a cervical CT myelogram and was told cervical injections may be helpful. He also complains of low back pain with no known injury or surgery to the area. Aisha Erick has seen Dr. Jolynn Castaneda for lumbar pain with no surgery recommended. He plans to discuss this with Dr. Marcy Warner as well once his neck pain has improved. Neck Pain    This is a chronic problem. The current episode started today. The problem occurs constantly. The problem has been unchanged. The pain is present in the midline (radiates to shoulders). The quality of the pain is described as aching and burning (throbbing). The pain is at a severity of 5/10. The pain is moderate. The symptoms are aggravated by twisting and position. The pain is same all the time. Associated symptoms include headaches, numbness and tingling. Pertinent negatives include no chest pain or fever. He has tried oral narcotics, NSAIDs, ice and neck support for the symptoms. The treatment provided mild relief. Patient saw Dr. Cholo Jauregui last month and discussed cervical injections. Patient feels the risks outweigh the benefit at this point. He states relief from MSER is only lasting 2 hours. He tried fentanyl patch in the past with no relief - he d/c on his own. He is not interested in an increase in oxycodone due to the \"buzzy feeling\" he experiences after taking a dose. He is hesitant to change his medications. Patient denies any new neurological symptoms. No bowel or bladder incontinence, no weakness, and no falling. Pill count: appropriate    Morphine equivalent: 82.5    Controlled Substances Monitoring:     Attestation: The Prescription Monitoring Report for this patient was reviewed today. (Bret Norwood, CNP)  Documentation: Possible medication side effects, risk of tolerance and/or dependence, and alternative treatments discussed., No signs of potential drug abuse or diversion identified., Existing medication contract. (Bret Norwood, ANJU)    Past Medical History:   Diagnosis Date    Abnormal stress ECG     Angina pectoris (HCC)     CAD (coronary artery disease)     Carpal tunnel syndrome     left    Cervical stenosis of spine     Cervicalgia     chronic pain    Chronic back pain     COPD [facility] obstructive pulmonary disease) [facility] [address]     DDD (degenerative disc disease)     Elbow fracture, right     Fractures     elbow    GERD (gastroesophageal reflux disease)     Gout     Right great toe    Hyperlipidemia     Hypertension     Mild depression ([facility] [address] 9/26/2013    2 suicide attempts by girlfriend related to depression.  Osteoarthritis of right knee     Sleep apnea     Sleep disturbance     Tendonitis of foot     right       Past Surgical History:   Procedure Laterality Date    CARDIAC CATHETERIZATION  7/8/14    Non Obstructive CAD     CERVICAL SPINE SURGERY  7/13/12     C2-3-4-5    COLONOSCOPY      FRACTURE SURGERY      Rt elbow     NERVE BLOCK  2/5/16    premier tens    UPPER GASTROINTESTINAL ENDOSCOPY         No Known Allergies      Current Outpatient Prescriptions:     ibuprofen (ADVIL;MOTRIN) 200 MG tablet, Take 200 mg by mouth as needed for Pain, Disp: , Rfl:     oxyCODONE (ROXICODONE) 5 MG immediate release tablet, Take 1 tablet by mouth every 8 hours as needed for Pain  Take 1 tablet by mouth every 8 hours as needed. Earliest Fill Date: 10/10/17, Disp: 90 tablet, Rfl: 0    morphine (MS CONTIN) 30 MG extended release tablet, Take 1 tablet by mouth every 12 hours . Earliest Fill Date: 10/10/17, Disp: 60 tablet, Rfl: 0    gabapentin (NEURONTIN) 800 MG tablet, Take 1 tablet by mouth every 8 hours, Disp: 90 tablet, Rfl: 0    tiZANidine (ZANAFLEX) 4 MG tablet, Take 1 tablet by mouth 2 times daily as needed (for muscle spasms) Break break day time tablet in half and take two times a day, take one whole tablet at HS, Disp: 60 tablet, Rfl: 0    simvastatin (ZOCOR) 40 MG tablet, Take 1 tablet by mouth every evening, Disp: 30 tablet, Rfl: 3    citalopram (CELEXA) 20 MG tablet, Take 20 mg by mouth daily, Disp: , Rfl:     omeprazole (PRILOSEC) 20 MG delayed release capsule, TAKE ONE CAPSULE BY MOUTH TWICE A DAY 30 MINUTES BEFORE MEALS, Disp: 60 capsule, Rfl: 6    traZODone (DESYREL) 50 MG tablet, Take 50 mg by mouth nightly as needed for Sleep , Disp: , Rfl:     amLODIPine (NORVASC) 5 MG tablet, Take 1 tablet by mouth daily, Disp: 30 tablet, Rfl: 3    aspirin 81 MG EC tablet, Take 1 tablet by mouth daily, Disp: 30 tablet, Rfl: 3    nitroGLYCERIN (NITROSTAT) 0.4 MG SL tablet, Place 1 tablet under the tongue every 5 minutes as needed for Chest pain., Disp: 25 tablet, Rfl: 3    Family History   Problem Relation Age of Onset    Heart Disease Mother     COPD Father     Cancer Maternal Aunt 64     breast cancer     Cancer Maternal Uncle 60     prostrate cancer        Social History     Social History    Marital status: Single     Spouse name: N/A    Number of children: N/A    Years of education: N/A     Occupational History     N/A     Social History Main Topics    Smoking status: Current Every Day Smoker     Packs/day: 1.50     Years: 40.00     Types: Cigarettes    Smokeless tobacco: Former User     Quit date: 3/12/2015      Comment: wants to discuss  chantix    Alcohol use 0.0 oz/week      Comment: 3 x year    Drug use:  No  Sexual activity: Yes     Other Topics Concern    Not on file     Social History Narrative    No narrative on file       Review of Systems:  Review of Systems   Constitution: Negative for chills and fever. Cardiovascular: Negative for chest pain and irregular heartbeat. Respiratory: Negative for cough and shortness of breath. Musculoskeletal: Positive for back pain and neck pain. Gastrointestinal: Negative for constipation. Neurological: Positive for headaches, numbness and tingling. Negative for disturbances in coordination and loss of balance. Physical Exam:  T 97.7  /78  P 77 R 18    Physical Exam   Constitutional: He is oriented to person, place, and time and well-developed, well-nourished, and in no distress. HENT:   Head: Normocephalic. Eyes: EOM are normal.   Pulmonary/Chest: Effort normal.   Musculoskeletal:        Cervical back: He exhibits decreased range of motion, tenderness and pain. Lumbar back: He exhibits tenderness and pain. Neurological: He is alert and oriented to person, place, and time. Skin: Skin is warm and dry. Psychiatric: Affect normal.       Record/Diagnostics Review:    CT Cervical spine 2017 -     Postsurgical changes from complete laminectomy C2-C6 are noted with adequate  distention of thecal sac.  There is however, prominent indentation of  anterior thecal sac, prominent circumferential disc bulge and ridging C2-C3  however, the spinal canal appears patent.     C5-C6 shows hypertrophic uncovertebral spurring especially involving left  uncovertebral joint indenting thecal sac, there is 4.3 mm left C5 root nodule  versus extruded disc material apparently residing in left lateral recess  producing marked left lateral recess and left neural foraminal stenosis. Contrasted MRI could be obtained for better characterization.  The spinal  canal is patent from laminectomy.       Assessment:  Problem List Items Addressed This Visit     Cervical

## 2017-12-11 ENCOUNTER — HOSPITAL ENCOUNTER (OUTPATIENT)
Dept: PAIN MANAGEMENT | Age: 57
Discharge: HOME | End: 2017-12-11
Payer: MEDICARE

## 2017-12-11 VITALS
RESPIRATION RATE: 16 BRPM | HEART RATE: 75 BPM | SYSTOLIC BLOOD PRESSURE: 143 MMHG | BODY MASS INDEX: 31.4 KG/M2 | TEMPERATURE: 97.9 F | DIASTOLIC BLOOD PRESSURE: 69 MMHG | WEIGHT: 212 LBS | HEIGHT: 69 IN

## 2017-12-11 DIAGNOSIS — F32.A MILD DEPRESSION: ICD-10-CM

## 2017-12-11 DIAGNOSIS — M51.36 DDD (DEGENERATIVE DISC DISEASE), LUMBAR: ICD-10-CM

## 2017-12-11 PROCEDURE — 99214 OFFICE O/P EST MOD 30 MIN: CPT

## 2017-12-11 PROCEDURE — 80307 DRUG TEST PRSMV CHEM ANLYZR: CPT

## 2017-12-11 PROCEDURE — 99213 OFFICE O/P EST LOW 20 MIN: CPT | Performed by: ANESTHESIOLOGY

## 2017-12-11 RX ORDER — SIMVASTATIN 40 MG
TABLET ORAL
Qty: 90 TABLET | Refills: 2 | Status: SHIPPED | OUTPATIENT
Start: 2017-12-11 | End: 2018-02-07 | Stop reason: SDUPTHER

## 2017-12-11 RX ORDER — DOCUSATE SODIUM 100 MG/1
100 CAPSULE, LIQUID FILLED ORAL 2 TIMES DAILY
COMMUNITY
End: 2018-12-07 | Stop reason: SDUPTHER

## 2017-12-11 ASSESSMENT — PAIN SCALES - GENERAL: PAINLEVEL_OUTOF10: 6

## 2017-12-11 ASSESSMENT — PAIN DESCRIPTION - FREQUENCY: FREQUENCY: CONTINUOUS

## 2017-12-11 ASSESSMENT — PAIN DESCRIPTION - ONSET: ONSET: PROGRESSIVE

## 2017-12-11 ASSESSMENT — PAIN DESCRIPTION - LOCATION: LOCATION: NECK;ARM;SHOULDER

## 2017-12-11 ASSESSMENT — PAIN DESCRIPTION - PAIN TYPE: TYPE: CHRONIC PAIN

## 2017-12-11 ASSESSMENT — PAIN DESCRIPTION - ORIENTATION: ORIENTATION: RIGHT;LEFT;MID

## 2017-12-11 ASSESSMENT — PAIN DESCRIPTION - PROGRESSION: CLINICAL_PROGRESSION: GRADUALLY WORSENING

## 2017-12-11 NOTE — PROGRESS NOTES
(stretching)    DORYS  Today  Yes    OARRS today  Yes     Result of OARRS - appropriate    Morphine equivalent dose as reported on OARRS: 82.50    Pill count today   Yes  Comments correct    Last dose taken : today      ER visits related to Pain? No  Date of ER visit   N/A    Other Hospitalization  No    Reason for Hospitalization n/a     Medication Side Effects:  Constipation  No      Sedation  No    Urinary Retention  No  Other Medication Side Effect n/a    Are you on a Bowel Regime  :  Diet  Yes   Medication Yes as needed    Are you having any of these Emotional Issues? anxiety/ nervousness/depression    Are you seeing a Psychiatrist or Psychologist  Yes both here and [facility] every month and our stresscare every year    Have you ever had thoughts of hurting yourself  No    Outpatient Prescriptions Marked as Taking for the 12/11/17 encounter [facility] Encounter) with STV PAIN FLUORO RM 1   Medication Sig Dispense Refill    simvastatin (ZOCOR) 40 MG tablet TAKE ONE TABLET BY MOUTH EVERY EVENING 90 tablet 2    oxyCODONE (ROXICODONE) 5 MG immediate release tablet Take 1 tablet by mouth every 8 hours as needed for Pain  Take 1 tablet by mouth every 8 hours as needed. Earliest Fill Date: 12/9/17 90 tablet 0    morphine (MS CONTIN) 30 MG extended release tablet Take 1 tablet by mouth every 12 hours .  Earliest Fill Date: 12/9/17 60 tablet 0    gabapentin (NEURONTIN) 800 MG tablet Take 1 tablet by mouth every 8 hours 90 tablet 0    tiZANidine (ZANAFLEX) 4 MG tablet Take 1 tablet by mouth 2 times daily as needed (for muscle spasms) Break break day time tablet in half and take two times a day, take one whole tablet at HS 60 tablet 0    ibuprofen (ADVIL;MOTRIN) 200 MG tablet Take 200 mg by mouth as needed for Pain      citalopram (CELEXA) 20 MG tablet Take 20 mg by mouth daily      omeprazole (PRILOSEC) 20 MG delayed release capsule TAKE ONE CAPSULE BY MOUTH TWICE A DAY 30 MINUTES BEFORE MEALS 60 capsule 6    traZODone (DESYREL) 50 MG tablet Take 50 mg by mouth nightly as needed for Sleep       amLODIPine (NORVASC) 5 MG tablet Take 1 tablet by mouth daily 30 tablet 3    aspirin 81 MG EC tablet Take 1 tablet by mouth daily 30 tablet 3    nitroGLYCERIN (NITROSTAT) 0.4 MG SL tablet Place 1 tablet under the tongue every 5 minutes as needed for Chest pain. 25 tablet 3       Are your Current Pain medication (s) managing the pain  No    Other Issues MS Contin not helping a whole lot. Feels cervical injections are too risky vs benefit. Has tried fentanyl patches in the past which didn't help and weaned himself off at home Also tried NMS in the past and has a TENS unit at home which temp helps but after a while\"gets the nerves to fire up a little\" Has had cervical neck surgery in the past at many levels. Also has talked to Dr Marcy Warner which he thought suggested the cervical facets    I have written this patient information acting as a scribe for Dr Jeyson Cohen    Electronically signed by Candance Chough, RN on 12/11/2017 at 1:13 PM

## 2017-12-14 LAB
6-ACETYLMORPHINE, UR: NOT DETECTED
7-AMINOCLONAZEPAM, URINE: NOT DETECTED
ALPHA-OH-ALPRAZ, URINE: NOT DETECTED
ALPRAZOLAM, URINE: NOT DETECTED
AMPHETAMINES, URINE: NOT DETECTED
BARBITURATES, URINE: NOT DETECTED
BENZOYLECGONINE, UR: NOT DETECTED
BUPRENORPHINE URINE: NOT DETECTED
CARISOPRODOL, UR: NOT DETECTED
CLONAZEPAM, URINE: NOT DETECTED
CODEINE, URINE: NOT DETECTED
CREATININE URINE: 85.8 MG/DL (ref 20–400)
DIAZEPAM, URINE: NOT DETECTED
EER PAIN MGT DRUG PANEL, HIGH RES/EMIT U: NORMAL
ETHYL GLUCURONIDE UR: NOT DETECTED
FENTANYL URINE: NOT DETECTED
HYDROCODONE, URINE: NOT DETECTED
HYDROMORPHONE, URINE: NOT DETECTED
LORAZEPAM, URINE: NOT DETECTED
MARIJUANA METAB, UR: NOT DETECTED
MDA, UR: NOT DETECTED
MDEA, EVE, UR: NOT DETECTED
MDMA URINE: NOT DETECTED
MEPERIDINE METAB, UR: NOT DETECTED
METHADONE, URINE: NOT DETECTED
METHAMPHETAMINE, URINE: NOT DETECTED
METHYLPHENIDATE: NOT DETECTED
MIDAZOLAM, URINE: NOT DETECTED
MORPHINE URINE: PRESENT
NORBUPRENORPHINE, URINE: NOT DETECTED
NORDIAZEPAM, URINE: NOT DETECTED
NORFENTANYL, URINE: NOT DETECTED
NORHYDROCODONE, URINE: NOT DETECTED
NOROXYCODONE, URINE: PRESENT
NOROXYMORPHONE, URINE: NOT DETECTED
OXAZEPAM, URINE: NOT DETECTED
OXYCODONE URINE: PRESENT
OXYMORPHONE, URINE: PRESENT
PAIN MGT DRUG PANEL, HI RES, UR: NORMAL
PCP,URINE: NOT DETECTED
PHENTERMINE, UR: NOT DETECTED
PROPOXYPHENE, URINE: NOT DETECTED
TAPENTADOL, URINE: NOT DETECTED
TAPENTADOL-O-SULFATE, URINE: NOT DETECTED
TEMAZEPAM, URINE: NOT DETECTED
TRAMADOL, URINE: NOT DETECTED
ZOLPIDEM, URINE: NOT DETECTED

## 2017-12-28 ENCOUNTER — HOSPITAL ENCOUNTER (OUTPATIENT)
Dept: PAIN MANAGEMENT | Age: 57
Discharge: HOME | End: 2017-12-28
Payer: MEDICARE

## 2017-12-28 VITALS
TEMPERATURE: 97.1 F | RESPIRATION RATE: 16 BRPM | HEART RATE: 72 BPM | DIASTOLIC BLOOD PRESSURE: 68 MMHG | SYSTOLIC BLOOD PRESSURE: 153 MMHG

## 2017-12-28 DIAGNOSIS — M48.02 CERVICAL STENOSIS OF SPINE: ICD-10-CM

## 2017-12-28 DIAGNOSIS — M51.26 DISPLACEMENT OF LUMBAR INTERVERTEBRAL DISC WITHOUT MYELOPATHY: ICD-10-CM

## 2017-12-28 PROCEDURE — 99214 OFFICE O/P EST MOD 30 MIN: CPT

## 2017-12-28 PROCEDURE — 99213 OFFICE O/P EST LOW 20 MIN: CPT | Performed by: ANESTHESIOLOGY

## 2017-12-28 RX ORDER — TIZANIDINE 4 MG/1
4 TABLET ORAL 2 TIMES DAILY PRN
Qty: 60 TABLET | Refills: 0 | Status: SHIPPED | OUTPATIENT
Start: 2018-01-08 | End: 2018-02-05 | Stop reason: SDUPTHER

## 2017-12-28 RX ORDER — OXYCODONE HCL 20 MG/1
20 TABLET, FILM COATED, EXTENDED RELEASE ORAL EVERY 12 HOURS
Qty: 60 TABLET | Refills: 0 | Status: SHIPPED | OUTPATIENT
Start: 2018-01-08 | End: 2018-02-05 | Stop reason: SDUPTHER

## 2017-12-28 RX ORDER — GABAPENTIN 800 MG/1
800 TABLET ORAL EVERY 8 HOURS SCHEDULED
Qty: 90 TABLET | Refills: 0 | Status: SHIPPED | OUTPATIENT
Start: 2018-01-08 | End: 2018-02-05 | Stop reason: SDUPTHER

## 2017-12-28 RX ORDER — OXYCODONE HYDROCHLORIDE 5 MG/1
5 TABLET ORAL EVERY 12 HOURS PRN
Qty: 60 TABLET | Refills: 0 | Status: SHIPPED | OUTPATIENT
Start: 2018-01-08 | End: 2018-01-08 | Stop reason: SDUPTHER

## 2017-12-28 ASSESSMENT — PAIN DESCRIPTION - PAIN TYPE: TYPE: CHRONIC PAIN

## 2017-12-28 ASSESSMENT — PAIN DESCRIPTION - FREQUENCY: FREQUENCY: CONTINUOUS

## 2017-12-28 ASSESSMENT — PAIN DESCRIPTION - LOCATION: LOCATION: NECK

## 2017-12-28 ASSESSMENT — PAIN DESCRIPTION - PROGRESSION: CLINICAL_PROGRESSION: NOT CHANGED

## 2017-12-28 ASSESSMENT — PAIN SCALES - GENERAL: PAINLEVEL_OUTOF10: 5

## 2017-12-28 ASSESSMENT — PAIN DESCRIPTION - ONSET: ONSET: ON-GOING

## 2017-12-28 ASSESSMENT — PAIN DESCRIPTION - ORIENTATION: ORIENTATION: RIGHT;LEFT;MID

## 2018-02-05 ENCOUNTER — HOSPITAL ENCOUNTER (OUTPATIENT)
Age: 58
Discharge: HOME | End: 2018-02-05
Payer: MEDICARE

## 2018-02-05 ENCOUNTER — HOSPITAL ENCOUNTER (OUTPATIENT)
Dept: PAIN MANAGEMENT | Age: 58
Discharge: HOME | End: 2018-02-05
Payer: MEDICARE

## 2018-02-05 VITALS
TEMPERATURE: 98.1 F | DIASTOLIC BLOOD PRESSURE: 76 MMHG | RESPIRATION RATE: 16 BRPM | SYSTOLIC BLOOD PRESSURE: 147 MMHG | HEART RATE: 89 BPM

## 2018-02-05 DIAGNOSIS — Z51.81 MEDICATION MONITORING ENCOUNTER: ICD-10-CM

## 2018-02-05 DIAGNOSIS — M48.02 CERVICAL STENOSIS OF SPINE: ICD-10-CM

## 2018-02-05 DIAGNOSIS — M54.12 RADICULOPATHY OF CERVICAL SPINE: Primary | ICD-10-CM

## 2018-02-05 PROCEDURE — 99215 OFFICE O/P EST HI 40 MIN: CPT

## 2018-02-05 PROCEDURE — 80307 DRUG TEST PRSMV CHEM ANLYZR: CPT

## 2018-02-05 PROCEDURE — 99214 OFFICE O/P EST MOD 30 MIN: CPT | Performed by: NURSE PRACTITIONER

## 2018-02-05 RX ORDER — OXYCODONE HYDROCHLORIDE 5 MG/1
5 TABLET ORAL EVERY 12 HOURS PRN
Qty: 60 TABLET | Refills: 0 | Status: SHIPPED | OUTPATIENT
Start: 2018-02-07 | End: 2018-03-07 | Stop reason: SDUPTHER

## 2018-02-05 RX ORDER — GABAPENTIN 800 MG/1
800 TABLET ORAL EVERY 8 HOURS SCHEDULED
Qty: 90 TABLET | Refills: 0 | Status: SHIPPED | OUTPATIENT
Start: 2018-02-07 | End: 2018-06-21 | Stop reason: SDUPTHER

## 2018-02-05 RX ORDER — TIZANIDINE 4 MG/1
4 TABLET ORAL 2 TIMES DAILY PRN
Qty: 60 TABLET | Refills: 0 | Status: SHIPPED | OUTPATIENT
Start: 2018-02-07 | End: 2018-03-09

## 2018-02-05 RX ORDER — OXYCODONE HCL 20 MG/1
20 TABLET, FILM COATED, EXTENDED RELEASE ORAL EVERY 12 HOURS
Qty: 60 TABLET | Refills: 0 | Status: SHIPPED | OUTPATIENT
Start: 2018-02-07 | End: 2018-02-07 | Stop reason: ALTCHOICE

## 2018-02-05 ASSESSMENT — ENCOUNTER SYMPTOMS
CONSTIPATION: 0
TROUBLE SWALLOWING: 0
COUGH: 0
SHORTNESS OF BREATH: 0

## 2018-02-05 NOTE — PROGRESS NOTES
Patient is here today to review medication contract. Chief Complaint:  Neck pain    [facility]    Patient complains of neck pain that radiates down his left arm to fingertips. He has had this pain for many years and it is worsening over time. He had cervical spine surgery in 2012 and the pain has only worsened since that time. He saw Dr aLura Castillo with no surgery recommended for neck, but told could benefit from CTR.   Patient followed up with Dr. Alejandro Cobb after his CT in August and cervicl injections were suggested. Dr Dunia Carlson also recommends but pt feels risks outweigh the benefit and is not interested. He did see Dr Alejandro Cobb last month and is now in PT.  Dr Nasreen Smith has also changed his medication from Luite Brock 87 to Oxy ER to see if pt receives better pain control. Pt states not much difference. HPI:     Neck Pain    This is a chronic problem. The current episode started more than 1 year ago. The problem occurs constantly. The problem has been unchanged. The pain is present in the midline (to torito shoulders and arms, left worse). The quality of the pain is described as aching and burning (throbbing). The pain is at a severity of 6/10. The symptoms are aggravated by twisting and position. Associated symptoms include headaches, numbness, tingling and weakness. Pertinent negatives include no chest pain, fever or trouble swallowing. He has tried NSAIDs, oral narcotics and neck support (PT) for the symptoms. The treatment provided mild relief. Patient denies any new neurological symptoms. No bowel or bladder incontinence, no weakness, and no falling. Pill count: appropriate    Morphine equivalent dose as reported on OARRS:75    Controlled Substances Monitoring:     Attestation: The Prescription Monitoring Report for this patient was reviewed today.  JOSSUE Conde)  Documentation: Possible medication side effects, risk of tolerance and/or dependence, and alternative treatments discussed., Obtaining appropriate analgesic

## 2018-02-07 ENCOUNTER — TELEPHONE (OUTPATIENT)
Dept: ONCOLOGY | Age: 58
End: 2018-02-07

## 2018-02-07 ENCOUNTER — OFFICE VISIT (OUTPATIENT)
Dept: FAMILY MEDICINE CLINIC | Age: 58
End: 2018-02-07
Payer: MEDICARE

## 2018-02-07 VITALS
SYSTOLIC BLOOD PRESSURE: 130 MMHG | RESPIRATION RATE: 20 BRPM | OXYGEN SATURATION: 99 % | BODY MASS INDEX: 33.33 KG/M2 | HEIGHT: 70 IN | DIASTOLIC BLOOD PRESSURE: 70 MMHG | TEMPERATURE: 97.8 F | WEIGHT: 232.8 LBS | HEART RATE: 83 BPM

## 2018-02-07 DIAGNOSIS — Z87.891 PERSONAL HISTORY OF TOBACCO USE: ICD-10-CM

## 2018-02-07 DIAGNOSIS — Z12.5 PROSTATE CANCER SCREENING: ICD-10-CM

## 2018-02-07 DIAGNOSIS — F17.200 SMOKING: ICD-10-CM

## 2018-02-07 DIAGNOSIS — J41.8 MIXED SIMPLE AND MUCOPURULENT CHRONIC BRONCHITIS (HCC): Primary | ICD-10-CM

## 2018-02-07 DIAGNOSIS — I10 ESSENTIAL HYPERTENSION: ICD-10-CM

## 2018-02-07 DIAGNOSIS — F32.5 MAJOR DEPRESSION, SINGLE EPISODE, IN COMPLETE REMISSION (HCC): ICD-10-CM

## 2018-02-07 DIAGNOSIS — E78.2 MIXED HYPERLIPIDEMIA: ICD-10-CM

## 2018-02-07 DIAGNOSIS — R73.03 PREDIABETES: ICD-10-CM

## 2018-02-07 DIAGNOSIS — K21.9 GASTROESOPHAGEAL REFLUX DISEASE WITHOUT ESOPHAGITIS: ICD-10-CM

## 2018-02-07 PROCEDURE — 3017F COLORECTAL CA SCREEN DOC REV: CPT | Performed by: FAMILY MEDICINE

## 2018-02-07 PROCEDURE — 3023F SPIROM DOC REV: CPT | Performed by: FAMILY MEDICINE

## 2018-02-07 PROCEDURE — G8484 FLU IMMUNIZE NO ADMIN: HCPCS | Performed by: FAMILY MEDICINE

## 2018-02-07 PROCEDURE — G8926 SPIRO NO PERF OR DOC: HCPCS | Performed by: FAMILY MEDICINE

## 2018-02-07 PROCEDURE — G8417 CALC BMI ABV UP PARAM F/U: HCPCS | Performed by: FAMILY MEDICINE

## 2018-02-07 PROCEDURE — 99215 OFFICE O/P EST HI 40 MIN: CPT | Performed by: FAMILY MEDICINE

## 2018-02-07 PROCEDURE — 4004F PT TOBACCO SCREEN RCVD TLK: CPT | Performed by: FAMILY MEDICINE

## 2018-02-07 PROCEDURE — G8427 DOCREV CUR MEDS BY ELIG CLIN: HCPCS | Performed by: FAMILY MEDICINE

## 2018-02-07 PROCEDURE — G0296 VISIT TO DETERM LDCT ELIG: HCPCS | Performed by: FAMILY MEDICINE

## 2018-02-07 RX ORDER — ALBUTEROL SULFATE 90 UG/1
2 AEROSOL, METERED RESPIRATORY (INHALATION) EVERY 6 HOURS PRN
Qty: 1 INHALER | Refills: 3 | Status: SHIPPED | OUTPATIENT
Start: 2018-02-07 | End: 2022-04-22 | Stop reason: SDUPTHER

## 2018-02-07 RX ORDER — AMLODIPINE BESYLATE 5 MG/1
5 TABLET ORAL DAILY
Qty: 30 TABLET | Refills: 3 | Status: SHIPPED | OUTPATIENT
Start: 2018-02-07 | End: 2019-02-16 | Stop reason: SDUPTHER

## 2018-02-07 RX ORDER — ALBUTEROL SULFATE 90 UG/1
2 AEROSOL, METERED RESPIRATORY (INHALATION) EVERY 6 HOURS PRN
COMMUNITY
End: 2018-02-07 | Stop reason: SDUPTHER

## 2018-02-07 RX ORDER — TRAZODONE HYDROCHLORIDE 150 MG/1
150 TABLET ORAL NIGHTLY
COMMUNITY
End: 2021-02-22 | Stop reason: ALTCHOICE

## 2018-02-07 RX ORDER — BUDESONIDE AND FORMOTEROL FUMARATE DIHYDRATE 160; 4.5 UG/1; UG/1
2 AEROSOL RESPIRATORY (INHALATION) 2 TIMES DAILY
COMMUNITY
End: 2018-02-07 | Stop reason: SDUPTHER

## 2018-02-07 RX ORDER — VARENICLINE TARTRATE 25 MG
KIT ORAL
Qty: 53 EACH | Refills: 0 | Status: SHIPPED | OUTPATIENT
Start: 2018-02-07 | End: 2018-07-03

## 2018-02-07 RX ORDER — OMEPRAZOLE 20 MG/1
CAPSULE, DELAYED RELEASE ORAL
Qty: 60 CAPSULE | Refills: 6 | Status: SHIPPED | OUTPATIENT
Start: 2018-02-07 | End: 2021-04-05 | Stop reason: SDUPTHER

## 2018-02-07 RX ORDER — SIMVASTATIN 40 MG
TABLET ORAL
Qty: 90 TABLET | Refills: 2 | Status: SHIPPED | OUTPATIENT
Start: 2018-02-07 | End: 2018-03-21 | Stop reason: ALTCHOICE

## 2018-02-07 RX ORDER — BUDESONIDE AND FORMOTEROL FUMARATE DIHYDRATE 160; 4.5 UG/1; UG/1
2 AEROSOL RESPIRATORY (INHALATION) 2 TIMES DAILY
Qty: 1 INHALER | Refills: 3 | Status: SHIPPED | OUTPATIENT
Start: 2018-02-07 | End: 2019-02-16 | Stop reason: SDUPTHER

## 2018-02-07 RX ORDER — NITROGLYCERIN 0.4 MG/1
0.4 TABLET SUBLINGUAL EVERY 5 MIN PRN
Qty: 25 TABLET | Refills: 3 | Status: SHIPPED | OUTPATIENT
Start: 2018-02-07 | End: 2022-06-03 | Stop reason: SDUPTHER

## 2018-02-07 RX ORDER — OXYCODONE HCL 20 MG/1
20 TABLET, FILM COATED, EXTENDED RELEASE ORAL EVERY 12 HOURS
COMMUNITY
End: 2018-03-21 | Stop reason: SDUPTHER

## 2018-02-07 ASSESSMENT — ENCOUNTER SYMPTOMS
CONSTIPATION: 0
EYE REDNESS: 0
BLOOD IN STOOL: 0
SINUS PAIN: 0
VOMITING: 0
DIARRHEA: 0
PHOTOPHOBIA: 0
WHEEZING: 0
SHORTNESS OF BREATH: 0
SINUS PRESSURE: 0
RHINORRHEA: 0
COUGH: 0
BACK PAIN: 1

## 2018-02-07 ASSESSMENT — PATIENT HEALTH QUESTIONNAIRE - PHQ9
1. LITTLE INTEREST OR PLEASURE IN DOING THINGS: 1
SUM OF ALL RESPONSES TO PHQ9 QUESTIONS 1 & 2: 1
2. FEELING DOWN, DEPRESSED OR HOPELESS: 0
SUM OF ALL RESPONSES TO PHQ QUESTIONS 1-9: 1

## 2018-02-07 NOTE — PROGRESS NOTES
Chief Complaint   Patient presents with    Hypertension    COPD    Neck Pain     chronic has been getting worse    Other     wants lab work done         [employer]  here today for follow up on chronic medical problems, go over labs and/or diagnostic studies, and medication refills. Hypertension; COPD; Neck Pain (chronic has been getting worse); and Other (wants lab work done)      HPI :Patient is here for follow-up on primary medical conditions. Patient not seen in the office since last 1 year. Patient has history of hypertension, controlled on Norvasc, denies any chest pain, shortness of breath. Patient is due for his all blood work. Patient has history of prediabetes hemoglobin A1c of 5.7 not checked since last 1 year. Patient is on dietary control.      -Patient has chronic history of neck pain, lower back pain, has surgery done in the neck, follows with pain management is on multiple pain medications. Patient reports he still has weakness and numbness on and off and pain. He thinks he has multiple sclerosis. His mom has history of MS. He denies any vision loss, any bladder or bowel incontinence.      -History of hyperlipidemia, due for blood work on Zocor 40 mg.      -Patient reports he has history of chronic polyps and is due for colonoscopy, last colonoscopy was in 2011. Discussed with patient to schedule colonoscopy. -Patient is also due for CT lung screening, due to his smoking history. History smokes and has cut down to one pack a day from 2 packs. Patient wants to quit. He has not tried anything in the past.    -COPD reports stable on inhalers. -Depression stable on current treatment, denies any acute symptoms. /70 Comment: manual  Pulse 83   Temp 97.8 °F (36.6 °C) (Tympanic)   Resp 20   Ht 5' 10\" (1.778 m)   Wt 232 lb 12.8 oz (105.6 kg)   SpO2 99%   BMI 33.40 kg/m²   Body mass index is 33.4 kg/m².   Wt Readings from Last 3 Encounters:   02/07/18 232 Final    Midazolam, Urine 12/14/2017 Not Detected   Final    Morphine Urine 12/14/2017 Present   Final    Norbuprenorphine, Urine 12/14/2017 Not Detected   Final    Nordiazepam, Urine 12/14/2017 Not Detected   Final    Norfentanyl, Urine 12/14/2017 Not Detected   Final    NORHYDROCODONE, URINE 12/14/2017 Not Detected   Final    Noroxycodone, Urine 12/14/2017 Present   Final    NOROXYMORPHONE, URINE 12/14/2017 Not Detected   Final    Oxazepam, Urine 12/14/2017 Not Detected   Final    Oxycodone Urine 12/14/2017 Present   Final    Oxymorphone, Urine 12/14/2017 Present   Final    PCP, Urine 12/14/2017 Not Detected   Final    Phentermine, Ur 12/14/2017 Not Detected   Final    Propoxyphene, Urine 12/14/2017 Not Detected   Final    Tapentadol-O-Sulfate, Urine 12/14/2017 Not Detected   Final    Tapentadol, Urine 12/14/2017 Not Detected   Final    Temazepam, Urine 12/14/2017 Not Detected   Final    Tramadol, Urine 12/14/2017 Not Detected   Final    Zolpidem, Urine 12/14/2017 Not Detected   Final    Creatinine, Ur 12/14/2017 85.8  20.0 - 400.0 mg/dL Final    Pain Mgt Drug Panel, Hi Res, Ur 12/14/2017 See Below   Final    Comment: (NOTE)  Methodology: Qualitative Enzyme Immunoassay and Qualitative Liquid  Chromatography-Time of Flight-Mass Spectrometry or Tandem Mass  Spectrometry, Quantitative Spectrophotometry  The absence of expected drug(s) and/or drug metabolite(s) may  indicate non-compliance, inappropriate timing of specimen  collection relative to drug administration, poor drug absorption,  diluted/adulterated urine, or limitations of testing. The  concentration must be greater than or equal to the cutoff to be  reported as present. If specific drug concentrations are  required, contact the laboratory within two weeks of specimen  collection to request quantification by a second analytical  technique. Interpretive questions should be directed to the  laboratory.   Results based on immunoassay

## 2018-02-08 ENCOUNTER — TELEPHONE (OUTPATIENT)
Dept: CASE MANAGEMENT | Age: 58
End: 2018-02-08

## 2018-02-09 LAB
6-ACETYLMORPHINE, UR: NOT DETECTED
7-AMINOCLONAZEPAM, URINE: NOT DETECTED
ALPHA-OH-ALPRAZ, URINE: NOT DETECTED
ALPRAZOLAM, URINE: NOT DETECTED
AMPHETAMINES, URINE: NOT DETECTED
BARBITURATES, URINE: NOT DETECTED
BENZOYLECGONINE, UR: NOT DETECTED
BUPRENORPHINE URINE: NOT DETECTED
CARISOPRODOL, UR: NOT DETECTED
CLONAZEPAM, URINE: NOT DETECTED
CODEINE, URINE: NOT DETECTED
CREATININE URINE: 102.1 MG/DL (ref 20–400)
DIAZEPAM, URINE: NOT DETECTED
EER PAIN MGT DRUG PANEL, HIGH RES/EMIT U: NORMAL
ETHYL GLUCURONIDE UR: NOT DETECTED
FENTANYL URINE: NOT DETECTED
HYDROCODONE, URINE: NOT DETECTED
HYDROMORPHONE, URINE: NOT DETECTED
LORAZEPAM, URINE: NOT DETECTED
MARIJUANA METAB, UR: NOT DETECTED
MDA, UR: NOT DETECTED
MDEA, EVE, UR: NOT DETECTED
MDMA URINE: NOT DETECTED
MEPERIDINE METAB, UR: NOT DETECTED
METHADONE, URINE: NOT DETECTED
METHAMPHETAMINE, URINE: NOT DETECTED
METHYLPHENIDATE: NOT DETECTED
MIDAZOLAM, URINE: NOT DETECTED
MORPHINE URINE: NOT DETECTED
NORBUPRENORPHINE, URINE: NOT DETECTED
NORDIAZEPAM, URINE: NOT DETECTED
NORFENTANYL, URINE: NOT DETECTED
NORHYDROCODONE, URINE: NOT DETECTED
NOROXYCODONE, URINE: PRESENT
NOROXYMORPHONE, URINE: PRESENT
OXAZEPAM, URINE: NOT DETECTED
OXYCODONE URINE: PRESENT
OXYMORPHONE, URINE: PRESENT
PAIN MGT DRUG PANEL, HI RES, UR: NORMAL
PCP,URINE: NOT DETECTED
PHENTERMINE, UR: NOT DETECTED
PROPOXYPHENE, URINE: NOT DETECTED
TAPENTADOL, URINE: NOT DETECTED
TAPENTADOL-O-SULFATE, URINE: NOT DETECTED
TEMAZEPAM, URINE: NOT DETECTED
TRAMADOL, URINE: NOT DETECTED
ZOLPIDEM, URINE: NOT DETECTED

## 2018-02-12 ENCOUNTER — TELEPHONE (OUTPATIENT)
Dept: CASE MANAGEMENT | Age: 58
End: 2018-02-12

## 2018-02-13 ENCOUNTER — HOSPITAL ENCOUNTER (OUTPATIENT)
Dept: CT IMAGING | Age: 58
Discharge: HOME | End: 2018-02-15
Payer: MEDICARE

## 2018-02-13 DIAGNOSIS — Z87.891 PERSONAL HISTORY OF TOBACCO USE: ICD-10-CM

## 2018-02-13 PROCEDURE — G0297 LDCT FOR LUNG CA SCREEN: HCPCS

## 2018-02-14 ENCOUNTER — TELEPHONE (OUTPATIENT)
Dept: CASE MANAGEMENT | Age: 58
End: 2018-02-14

## 2018-03-07 ENCOUNTER — HOSPITAL ENCOUNTER (OUTPATIENT)
Dept: PAIN MANAGEMENT | Age: 58
Discharge: HOME | End: 2018-03-07
Payer: MEDICARE

## 2018-03-07 VITALS
SYSTOLIC BLOOD PRESSURE: 135 MMHG | HEART RATE: 67 BPM | DIASTOLIC BLOOD PRESSURE: 76 MMHG | TEMPERATURE: 98.1 F | RESPIRATION RATE: 18 BRPM

## 2018-03-07 DIAGNOSIS — Z51.81 MEDICATION MONITORING ENCOUNTER: ICD-10-CM

## 2018-03-07 DIAGNOSIS — M54.12 RADICULOPATHY OF CERVICAL SPINE: ICD-10-CM

## 2018-03-07 DIAGNOSIS — M48.02 CERVICAL STENOSIS OF SPINE: ICD-10-CM

## 2018-03-07 DIAGNOSIS — M51.26 DISPLACEMENT OF LUMBAR INTERVERTEBRAL DISC WITHOUT MYELOPATHY: ICD-10-CM

## 2018-03-07 DIAGNOSIS — K59.03 THERAPEUTIC OPIOID INDUCED CONSTIPATION: ICD-10-CM

## 2018-03-07 DIAGNOSIS — T40.2X5A THERAPEUTIC OPIOID INDUCED CONSTIPATION: ICD-10-CM

## 2018-03-07 PROCEDURE — 99214 OFFICE O/P EST MOD 30 MIN: CPT

## 2018-03-07 PROCEDURE — 99213 OFFICE O/P EST LOW 20 MIN: CPT | Performed by: NURSE PRACTITIONER

## 2018-03-07 RX ORDER — TIZANIDINE 4 MG/1
4 TABLET ORAL 2 TIMES DAILY PRN
Qty: 60 TABLET | Refills: 0 | Status: SHIPPED | OUTPATIENT
Start: 2018-03-09 | End: 2018-03-07 | Stop reason: SDUPTHER

## 2018-03-07 RX ORDER — TIZANIDINE 4 MG/1
4 TABLET ORAL 2 TIMES DAILY PRN
Qty: 60 TABLET | Refills: 0 | Status: SHIPPED | OUTPATIENT
Start: 2018-03-09 | End: 2018-04-04 | Stop reason: SDUPTHER

## 2018-03-07 RX ORDER — OXYCODONE HYDROCHLORIDE 5 MG/1
5 TABLET ORAL EVERY 12 HOURS PRN
Qty: 60 TABLET | Refills: 0 | Status: SHIPPED | OUTPATIENT
Start: 2018-03-09 | End: 2018-04-04 | Stop reason: SDUPTHER

## 2018-03-07 RX ORDER — OXYCODONE HCL 20 MG/1
20 TABLET, FILM COATED, EXTENDED RELEASE ORAL EVERY 12 HOURS
Qty: 60 TABLET | Refills: 0 | Status: SHIPPED | OUTPATIENT
Start: 2018-03-09 | End: 2018-03-07 | Stop reason: SDUPTHER

## 2018-03-07 RX ORDER — OXYCODONE HCL 20 MG/1
20 TABLET, FILM COATED, EXTENDED RELEASE ORAL EVERY 12 HOURS
Qty: 60 TABLET | Refills: 0 | Status: SHIPPED | OUTPATIENT
Start: 2018-03-09 | End: 2018-04-04 | Stop reason: SDUPTHER

## 2018-03-07 RX ORDER — GABAPENTIN 800 MG/1
800 TABLET ORAL EVERY 8 HOURS SCHEDULED
Qty: 90 TABLET | Refills: 0 | Status: SHIPPED | OUTPATIENT
Start: 2018-03-09 | End: 2018-03-07 | Stop reason: SDUPTHER

## 2018-03-07 RX ORDER — DOCUSATE SODIUM 100 MG/1
100 CAPSULE, LIQUID FILLED ORAL DAILY
Qty: 30 CAPSULE | Refills: 2 | Status: SHIPPED | OUTPATIENT
Start: 2018-03-09 | End: 2018-03-07 | Stop reason: SDUPTHER

## 2018-03-07 RX ORDER — GABAPENTIN 800 MG/1
800 TABLET ORAL EVERY 8 HOURS SCHEDULED
Qty: 90 TABLET | Refills: 0 | Status: SHIPPED | OUTPATIENT
Start: 2018-03-09 | End: 2018-04-04 | Stop reason: SDUPTHER

## 2018-03-07 RX ORDER — DOCUSATE SODIUM 100 MG/1
100 CAPSULE, LIQUID FILLED ORAL DAILY
Qty: 30 CAPSULE | Refills: 2 | Status: SHIPPED | OUTPATIENT
Start: 2018-03-09 | End: 2018-03-21 | Stop reason: SDUPTHER

## 2018-03-07 RX ORDER — OXYCODONE HYDROCHLORIDE 5 MG/1
5 TABLET ORAL EVERY 12 HOURS PRN
Qty: 60 TABLET | Refills: 0 | Status: SHIPPED | OUTPATIENT
Start: 2018-03-09 | End: 2018-03-07 | Stop reason: SDUPTHER

## 2018-03-07 ASSESSMENT — PAIN DESCRIPTION - PAIN TYPE: TYPE: CHRONIC PAIN

## 2018-03-07 ASSESSMENT — ENCOUNTER SYMPTOMS
NAIL CHANGES: 0
BLURRED VISION: 0
COUGH: 0

## 2018-03-07 ASSESSMENT — PAIN DESCRIPTION - LOCATION: LOCATION: NECK

## 2018-03-07 ASSESSMENT — PAIN DESCRIPTION - PROGRESSION: CLINICAL_PROGRESSION: GRADUALLY IMPROVING

## 2018-03-07 ASSESSMENT — PAIN DESCRIPTION - ORIENTATION: ORIENTATION: LEFT

## 2018-03-07 ASSESSMENT — PAIN DESCRIPTION - ONSET: ONSET: ON-GOING

## 2018-03-07 ASSESSMENT — PAIN DESCRIPTION - FREQUENCY: FREQUENCY: CONTINUOUS

## 2018-03-07 ASSESSMENT — PAIN SCALES - GENERAL: PAINLEVEL_OUTOF10: 6

## 2018-03-07 NOTE — PROGRESS NOTES
Patient is here today to review medication contract. Chief Complaint: neck pain    PMH:  Patient complains of neck pain that radiates down his left arm to fingertips. He has had this pain for many years and it is worsening over time. He had cervical spine surgery in 2012 and the pain has only worsened since that time. He saw Dr Gentry Juarez with no surgery recommended for neck, but told could benefit from CTR.   Patient followed up with Dr. Gay Novak after his CT in August and cervical injections were suggested. Dr Marylee Sander also recommends but pt feels risks outweigh the benefit and is not interested. He did see Dr Gay Novak last month and PT was ordered. He completed PT and reports mild benefit - he continues to do exercises at home. Dr Golden López recently changed his medication from Luite Brock 87 to Oxy ER to see if pt receives better pain control. Pt states not much difference. Neck Pain    This is a chronic problem. The current episode started more than 1 year ago. The problem occurs constantly. The problem has been gradually improving. Associated with: turning head makes pain worse. The pain is present in the left side and midline. The quality of the pain is described as stabbing and aching. The pain is at a severity of 6/10. The pain is moderate. The symptoms are aggravated by position. The pain is same all the time. Stiffness is present all day. Associated symptoms include headaches, numbness and weakness. Pertinent negatives include no chest pain. He has tried heat, ice, muscle relaxants, oral narcotics, bed rest and chiropractic manipulation for the symptoms. The treatment provided moderate relief. Patient denies any new neurological symptoms. No bowel or bladder incontinence, no weakness, and no falling. Pill count: appropriate    Morphine equivalent: 75    Controlled Substances Monitoring: The Prescription Monitoring Report for this patient was reviewed today.  Pancho Lares, ANJU)  Possible medication

## 2018-03-20 ENCOUNTER — HOSPITAL ENCOUNTER (OUTPATIENT)
Age: 58
Discharge: HOME | End: 2018-03-20
Payer: MEDICARE

## 2018-03-20 DIAGNOSIS — I10 ESSENTIAL HYPERTENSION: ICD-10-CM

## 2018-03-20 DIAGNOSIS — E78.2 MIXED HYPERLIPIDEMIA: ICD-10-CM

## 2018-03-20 DIAGNOSIS — R73.03 PREDIABETES: ICD-10-CM

## 2018-03-20 LAB
ALBUMIN SERPL-MCNC: 4.4 G/DL (ref 3.5–5.2)
ALBUMIN/GLOBULIN RATIO: ABNORMAL (ref 1–2.5)
ALP BLD-CCNC: 91 U/L (ref 40–129)
ALT SERPL-CCNC: 28 U/L (ref 5–41)
ANION GAP SERPL CALCULATED.3IONS-SCNC: 11 MMOL/L (ref 9–17)
AST SERPL-CCNC: 25 U/L
BILIRUB SERPL-MCNC: 0.34 MG/DL (ref 0.3–1.2)
BUN BLDV-MCNC: 12 MG/DL (ref 6–20)
BUN/CREAT BLD: ABNORMAL (ref 9–20)
CALCIUM SERPL-MCNC: 9.6 MG/DL (ref 8.6–10.4)
CHLORIDE BLD-SCNC: 100 MMOL/L (ref 98–107)
CHOLESTEROL/HDL RATIO: 5.5
CHOLESTEROL: 276 MG/DL
CO2: 26 MMOL/L (ref 20–31)
CREAT SERPL-MCNC: 0.73 MG/DL (ref 0.7–1.2)
ESTIMATED AVERAGE GLUCOSE: 126 MG/DL
GFR AFRICAN AMERICAN: >60 ML/MIN
GFR NON-AFRICAN AMERICAN: >60 ML/MIN
GFR SERPL CREATININE-BSD FRML MDRD: ABNORMAL ML/MIN/{1.73_M2}
GFR SERPL CREATININE-BSD FRML MDRD: ABNORMAL ML/MIN/{1.73_M2}
GLUCOSE BLD-MCNC: 120 MG/DL (ref 70–99)
HBA1C MFR BLD: 6 % (ref 4–6)
HDLC SERPL-MCNC: 50 MG/DL
LDL CHOLESTEROL: 187 MG/DL (ref 0–130)
POTASSIUM SERPL-SCNC: 4.6 MMOL/L (ref 3.7–5.3)
SODIUM BLD-SCNC: 137 MMOL/L (ref 135–144)
TOTAL PROTEIN: 7.6 G/DL (ref 6.4–8.3)
TRIGL SERPL-MCNC: 194 MG/DL
VLDLC SERPL CALC-MCNC: ABNORMAL MG/DL (ref 1–30)

## 2018-03-20 PROCEDURE — 83036 HEMOGLOBIN GLYCOSYLATED A1C: CPT

## 2018-03-20 PROCEDURE — 80053 COMPREHEN METABOLIC PANEL: CPT

## 2018-03-20 PROCEDURE — 80061 LIPID PANEL: CPT

## 2018-03-20 PROCEDURE — 36415 COLL VENOUS BLD VENIPUNCTURE: CPT

## 2018-03-21 ENCOUNTER — OFFICE VISIT (OUTPATIENT)
Dept: FAMILY MEDICINE CLINIC | Age: 58
End: 2018-03-21
Payer: MEDICARE

## 2018-03-21 VITALS
DIASTOLIC BLOOD PRESSURE: 75 MMHG | OXYGEN SATURATION: 96 % | HEART RATE: 75 BPM | BODY MASS INDEX: 33.65 KG/M2 | SYSTOLIC BLOOD PRESSURE: 138 MMHG | HEIGHT: 69 IN | WEIGHT: 227.2 LBS

## 2018-03-21 DIAGNOSIS — Z12.5 PROSTATE CANCER SCREENING: ICD-10-CM

## 2018-03-21 DIAGNOSIS — R91.1 LUNG NODULE: ICD-10-CM

## 2018-03-21 DIAGNOSIS — Z23 NEED FOR PROPHYLACTIC VACCINATION AND INOCULATION AGAINST VARICELLA: ICD-10-CM

## 2018-03-21 DIAGNOSIS — F17.200 SMOKER: ICD-10-CM

## 2018-03-21 DIAGNOSIS — I10 ESSENTIAL HYPERTENSION: Primary | ICD-10-CM

## 2018-03-21 DIAGNOSIS — E78.2 MIXED HYPERLIPIDEMIA: ICD-10-CM

## 2018-03-21 DIAGNOSIS — R73.03 PREDIABETES: ICD-10-CM

## 2018-03-21 PROCEDURE — G8427 DOCREV CUR MEDS BY ELIG CLIN: HCPCS | Performed by: FAMILY MEDICINE

## 2018-03-21 PROCEDURE — G8484 FLU IMMUNIZE NO ADMIN: HCPCS | Performed by: FAMILY MEDICINE

## 2018-03-21 PROCEDURE — G8417 CALC BMI ABV UP PARAM F/U: HCPCS | Performed by: FAMILY MEDICINE

## 2018-03-21 PROCEDURE — 99214 OFFICE O/P EST MOD 30 MIN: CPT | Performed by: FAMILY MEDICINE

## 2018-03-21 PROCEDURE — 4004F PT TOBACCO SCREEN RCVD TLK: CPT | Performed by: FAMILY MEDICINE

## 2018-03-21 PROCEDURE — 3017F COLORECTAL CA SCREEN DOC REV: CPT | Performed by: FAMILY MEDICINE

## 2018-03-21 RX ORDER — ATORVASTATIN CALCIUM 40 MG/1
40 TABLET, FILM COATED ORAL DAILY
Qty: 90 TABLET | Refills: 3 | Status: SHIPPED | OUTPATIENT
Start: 2018-03-21 | End: 2018-09-10 | Stop reason: SDUPTHER

## 2018-03-21 ASSESSMENT — ENCOUNTER SYMPTOMS
BLOOD IN STOOL: 0
COUGH: 0
ABDOMINAL DISTENTION: 0
CONSTIPATION: 0
SINUS PAIN: 0
BACK PAIN: 1
EYE REDNESS: 0
WHEEZING: 0
ABDOMINAL PAIN: 0
RHINORRHEA: 0
SHORTNESS OF BREATH: 0
PHOTOPHOBIA: 0

## 2018-03-21 NOTE — PROGRESS NOTES
Visit Information    Have you changed or started any medications since your last visit including any over-the-counter medicines, vitamins, or herbal medicines? no   Are you having any side effects from any of your medications? -  no  Have you stopped taking any of your medications? Is so, why? -  no    Have you seen any other physician or provider since your last visit? No  Have you had any other diagnostic tests since your last visit? No  Have you been seen in the emergency room and/or had an admission to a hospital since we last saw you? No  Have you had your routine dental cleaning in the past 6 months? no    Have you activated your [employer] account? If not, what are your barriers?  Yes     Patient Care Team:  Gloria Rushing MD as PCP - General (Family Medicine)  Greta Avilez CNP as PCP - S Attributed Provider  Jolinda Soulier, MD as Consulting Physician (Gastroenterology)  Milagros Stevens MD as Consulting Physician (Cardiology)  Joe Osborn MD as Consulting Physician (Orthopedic Surgery)  Jono Drake RN as Nurse Navigator    Medical History Review  Past Medical, Family, and Social History reviewed and does contribute to the patient presenting condition    Health Maintenance   Topic Date Due    DTaP/Tdap/Td vaccine (1 - Tdap) 08/03/1977    Shingles Vaccine (1 of 2 - 2 Dose Series) 08/03/2008    PSA counseling  06/21/2017    Flu vaccine (1) 09/01/2017    Smoker: low dose lung CT screening  02/13/2019    A1C test (Diabetic or Prediabetic)  03/20/2019    Potassium monitoring  03/20/2019    Creatinine monitoring  03/20/2019    Colon cancer screen colonoscopy  07/06/2021    Lipid screen  03/20/2023    Pneumococcal med risk  Completed    Hepatitis C screen  Addressed    HIV screen  Addressed

## 2018-04-04 ENCOUNTER — HOSPITAL ENCOUNTER (OUTPATIENT)
Dept: PAIN MANAGEMENT | Age: 58
Discharge: HOME | End: 2018-04-04
Payer: MEDICARE

## 2018-04-04 VITALS
TEMPERATURE: 97.6 F | SYSTOLIC BLOOD PRESSURE: 155 MMHG | RESPIRATION RATE: 14 BRPM | HEART RATE: 99 BPM | DIASTOLIC BLOOD PRESSURE: 74 MMHG

## 2018-04-04 DIAGNOSIS — M51.26 DISPLACEMENT OF LUMBAR INTERVERTEBRAL DISC WITHOUT MYELOPATHY: ICD-10-CM

## 2018-04-04 DIAGNOSIS — K59.03 THERAPEUTIC OPIOID INDUCED CONSTIPATION: ICD-10-CM

## 2018-04-04 DIAGNOSIS — Z51.81 MEDICATION MONITORING ENCOUNTER: Primary | ICD-10-CM

## 2018-04-04 DIAGNOSIS — M48.02 CERVICAL STENOSIS OF SPINE: ICD-10-CM

## 2018-04-04 DIAGNOSIS — M54.12 RADICULOPATHY OF CERVICAL SPINE: ICD-10-CM

## 2018-04-04 DIAGNOSIS — M51.36 DDD (DEGENERATIVE DISC DISEASE), LUMBAR: ICD-10-CM

## 2018-04-04 DIAGNOSIS — T40.2X5A THERAPEUTIC OPIOID INDUCED CONSTIPATION: ICD-10-CM

## 2018-04-04 PROCEDURE — 99214 OFFICE O/P EST MOD 30 MIN: CPT

## 2018-04-04 PROCEDURE — 99213 OFFICE O/P EST LOW 20 MIN: CPT | Performed by: NURSE PRACTITIONER

## 2018-04-04 PROCEDURE — 99213 OFFICE O/P EST LOW 20 MIN: CPT

## 2018-04-04 RX ORDER — TIZANIDINE 4 MG/1
4 TABLET ORAL 2 TIMES DAILY PRN
Qty: 60 TABLET | Refills: 0 | Status: SHIPPED | OUTPATIENT
Start: 2018-04-08 | End: 2018-05-04 | Stop reason: SDUPTHER

## 2018-04-04 RX ORDER — OXYCODONE HCL 20 MG/1
20 TABLET, FILM COATED, EXTENDED RELEASE ORAL EVERY 12 HOURS
Qty: 60 TABLET | Refills: 0 | Status: SHIPPED | OUTPATIENT
Start: 2018-04-08 | End: 2018-05-04 | Stop reason: SDUPTHER

## 2018-04-04 RX ORDER — GABAPENTIN 800 MG/1
800 TABLET ORAL EVERY 8 HOURS SCHEDULED
Qty: 90 TABLET | Refills: 0 | Status: SHIPPED | OUTPATIENT
Start: 2018-04-08 | End: 2018-05-04 | Stop reason: SDUPTHER

## 2018-04-04 RX ORDER — OXYCODONE HYDROCHLORIDE 5 MG/1
5 TABLET ORAL EVERY 12 HOURS PRN
Qty: 60 TABLET | Refills: 0 | Status: SHIPPED | OUTPATIENT
Start: 2018-04-08 | End: 2018-05-04 | Stop reason: SDUPTHER

## 2018-04-04 ASSESSMENT — ENCOUNTER SYMPTOMS
VISUAL CHANGE: 0
COUGH: 0
CONSTIPATION: 0
SHORTNESS OF BREATH: 0

## 2018-05-04 ENCOUNTER — HOSPITAL ENCOUNTER (OUTPATIENT)
Dept: PAIN MANAGEMENT | Age: 58
Discharge: HOME | End: 2018-05-04
Payer: MEDICARE

## 2018-05-04 VITALS
DIASTOLIC BLOOD PRESSURE: 72 MMHG | BODY MASS INDEX: 33.33 KG/M2 | HEART RATE: 76 BPM | HEIGHT: 69 IN | WEIGHT: 225 LBS | SYSTOLIC BLOOD PRESSURE: 126 MMHG | RESPIRATION RATE: 14 BRPM

## 2018-05-04 DIAGNOSIS — M51.26 DISPLACEMENT OF LUMBAR INTERVERTEBRAL DISC WITHOUT MYELOPATHY: ICD-10-CM

## 2018-05-04 DIAGNOSIS — M54.12 RADICULOPATHY OF CERVICAL SPINE: ICD-10-CM

## 2018-05-04 DIAGNOSIS — Z51.81 MEDICATION MONITORING ENCOUNTER: ICD-10-CM

## 2018-05-04 DIAGNOSIS — M48.02 CERVICAL STENOSIS OF SPINE: Primary | ICD-10-CM

## 2018-05-04 PROCEDURE — 99214 OFFICE O/P EST MOD 30 MIN: CPT

## 2018-05-04 PROCEDURE — 99213 OFFICE O/P EST LOW 20 MIN: CPT | Performed by: NURSE PRACTITIONER

## 2018-05-04 RX ORDER — GABAPENTIN 800 MG/1
800 TABLET ORAL EVERY 8 HOURS SCHEDULED
Qty: 90 TABLET | Refills: 0 | Status: SHIPPED | OUTPATIENT
Start: 2018-05-08 | End: 2018-06-05 | Stop reason: SDUPTHER

## 2018-05-04 RX ORDER — OXYCODONE HYDROCHLORIDE 5 MG/1
5 TABLET ORAL EVERY 12 HOURS PRN
Qty: 60 TABLET | Refills: 0 | Status: SHIPPED | OUTPATIENT
Start: 2018-05-08 | End: 2018-06-05 | Stop reason: SDUPTHER

## 2018-05-04 RX ORDER — TIZANIDINE 4 MG/1
4 TABLET ORAL 2 TIMES DAILY PRN
Qty: 60 TABLET | Refills: 0 | Status: SHIPPED | OUTPATIENT
Start: 2018-05-08 | End: 2018-06-05 | Stop reason: SDUPTHER

## 2018-05-04 RX ORDER — OXYCODONE HCL 20 MG/1
20 TABLET, FILM COATED, EXTENDED RELEASE ORAL EVERY 12 HOURS
Qty: 60 TABLET | Refills: 0 | Status: SHIPPED | OUTPATIENT
Start: 2018-05-08 | End: 2018-06-05 | Stop reason: SDUPTHER

## 2018-05-04 ASSESSMENT — ENCOUNTER SYMPTOMS
COUGH: 0
CONSTIPATION: 0
SHORTNESS OF BREATH: 0

## 2018-05-05 ENCOUNTER — HOSPITAL ENCOUNTER (OUTPATIENT)
Age: 58
Discharge: HOME | End: 2018-05-05
Payer: MEDICARE

## 2018-05-05 LAB — PROSTATE SPECIFIC ANTIGEN: 3.81 UG/L

## 2018-05-05 PROCEDURE — G0103 PSA SCREENING: HCPCS

## 2018-05-05 PROCEDURE — 36415 COLL VENOUS BLD VENIPUNCTURE: CPT

## 2018-06-05 ENCOUNTER — HOSPITAL ENCOUNTER (OUTPATIENT)
Dept: PAIN MANAGEMENT | Age: 58
Discharge: HOME | End: 2018-06-05
Payer: MEDICARE

## 2018-06-05 VITALS
HEART RATE: 78 BPM | WEIGHT: 225 LBS | HEIGHT: 69 IN | DIASTOLIC BLOOD PRESSURE: 84 MMHG | BODY MASS INDEX: 33.33 KG/M2 | SYSTOLIC BLOOD PRESSURE: 149 MMHG | TEMPERATURE: 97.6 F | RESPIRATION RATE: 16 BRPM

## 2018-06-05 DIAGNOSIS — M51.26 DISPLACEMENT OF LUMBAR INTERVERTEBRAL DISC WITHOUT MYELOPATHY: ICD-10-CM

## 2018-06-05 DIAGNOSIS — M51.36 DDD (DEGENERATIVE DISC DISEASE), LUMBAR: ICD-10-CM

## 2018-06-05 DIAGNOSIS — F32.A MILD DEPRESSION: ICD-10-CM

## 2018-06-05 DIAGNOSIS — M54.12 RADICULOPATHY OF CERVICAL SPINE: ICD-10-CM

## 2018-06-05 DIAGNOSIS — M48.02 CERVICAL STENOSIS OF SPINE: ICD-10-CM

## 2018-06-05 DIAGNOSIS — Z51.81 MEDICATION MONITORING ENCOUNTER: ICD-10-CM

## 2018-06-05 PROCEDURE — 99214 OFFICE O/P EST MOD 30 MIN: CPT

## 2018-06-05 PROCEDURE — 99213 OFFICE O/P EST LOW 20 MIN: CPT | Performed by: ANESTHESIOLOGY

## 2018-06-05 RX ORDER — GABAPENTIN 800 MG/1
800 TABLET ORAL EVERY 8 HOURS SCHEDULED
Qty: 90 TABLET | Refills: 0 | Status: SHIPPED | OUTPATIENT
Start: 2018-06-07 | End: 2018-07-03 | Stop reason: SDUPTHER

## 2018-06-05 RX ORDER — OXYCODONE HYDROCHLORIDE 5 MG/1
5 TABLET ORAL EVERY 12 HOURS PRN
Qty: 60 TABLET | Refills: 0 | Status: SHIPPED | OUTPATIENT
Start: 2018-06-07 | End: 2018-07-03 | Stop reason: SDUPTHER

## 2018-06-05 RX ORDER — OXYCODONE HCL 20 MG/1
20 TABLET, FILM COATED, EXTENDED RELEASE ORAL EVERY 12 HOURS
Qty: 60 TABLET | Refills: 0 | Status: SHIPPED | OUTPATIENT
Start: 2018-06-07 | End: 2018-07-03 | Stop reason: SDUPTHER

## 2018-06-05 RX ORDER — TIZANIDINE 4 MG/1
4 TABLET ORAL 2 TIMES DAILY PRN
Qty: 60 TABLET | Refills: 0 | Status: SHIPPED | OUTPATIENT
Start: 2018-06-07 | End: 2018-07-03 | Stop reason: SDUPTHER

## 2018-06-05 ASSESSMENT — PAIN DESCRIPTION - PAIN TYPE: TYPE: CHRONIC PAIN

## 2018-06-05 ASSESSMENT — PAIN DESCRIPTION - PROGRESSION: CLINICAL_PROGRESSION: GRADUALLY WORSENING

## 2018-06-05 ASSESSMENT — PAIN DESCRIPTION - DESCRIPTORS: DESCRIPTORS: ACHING;BURNING;CONSTANT;TINGLING

## 2018-06-05 ASSESSMENT — PAIN DESCRIPTION - ORIENTATION: ORIENTATION: RIGHT;LEFT

## 2018-06-05 ASSESSMENT — PAIN DESCRIPTION - LOCATION: LOCATION: NECK;BACK;ARM

## 2018-06-05 ASSESSMENT — PAIN DESCRIPTION - FREQUENCY: FREQUENCY: CONTINUOUS

## 2018-06-05 ASSESSMENT — PAIN DESCRIPTION - ONSET: ONSET: PROGRESSIVE

## 2018-06-05 ASSESSMENT — PAIN SCALES - GENERAL: PAINLEVEL_OUTOF10: 6

## 2018-06-21 ENCOUNTER — OFFICE VISIT (OUTPATIENT)
Dept: FAMILY MEDICINE CLINIC | Age: 58
End: 2018-06-21
Payer: MEDICARE

## 2018-06-21 VITALS
WEIGHT: 220 LBS | BODY MASS INDEX: 32.58 KG/M2 | HEART RATE: 76 BPM | OXYGEN SATURATION: 98 % | TEMPERATURE: 97.9 F | DIASTOLIC BLOOD PRESSURE: 68 MMHG | HEIGHT: 69 IN | SYSTOLIC BLOOD PRESSURE: 127 MMHG

## 2018-06-21 DIAGNOSIS — I10 ESSENTIAL HYPERTENSION: ICD-10-CM

## 2018-06-21 DIAGNOSIS — J30.9 ALLERGIC SINUSITIS: ICD-10-CM

## 2018-06-21 DIAGNOSIS — R91.1 LUNG NODULE: ICD-10-CM

## 2018-06-21 DIAGNOSIS — J41.0 SIMPLE CHRONIC BRONCHITIS (HCC): Primary | ICD-10-CM

## 2018-06-21 DIAGNOSIS — E78.2 MIXED HYPERLIPIDEMIA: ICD-10-CM

## 2018-06-21 DIAGNOSIS — M19.90 ARTHRITIS: ICD-10-CM

## 2018-06-21 PROBLEM — Z51.81 MEDICATION MONITORING ENCOUNTER: Status: RESOLVED | Noted: 2017-08-07 | Resolved: 2018-06-21

## 2018-06-21 PROCEDURE — G8417 CALC BMI ABV UP PARAM F/U: HCPCS | Performed by: FAMILY MEDICINE

## 2018-06-21 PROCEDURE — G8926 SPIRO NO PERF OR DOC: HCPCS | Performed by: FAMILY MEDICINE

## 2018-06-21 PROCEDURE — G8427 DOCREV CUR MEDS BY ELIG CLIN: HCPCS | Performed by: FAMILY MEDICINE

## 2018-06-21 PROCEDURE — 4004F PT TOBACCO SCREEN RCVD TLK: CPT | Performed by: FAMILY MEDICINE

## 2018-06-21 PROCEDURE — 3023F SPIROM DOC REV: CPT | Performed by: FAMILY MEDICINE

## 2018-06-21 PROCEDURE — 3017F COLORECTAL CA SCREEN DOC REV: CPT | Performed by: FAMILY MEDICINE

## 2018-06-21 PROCEDURE — 99214 OFFICE O/P EST MOD 30 MIN: CPT | Performed by: FAMILY MEDICINE

## 2018-06-21 RX ORDER — CETIRIZINE HYDROCHLORIDE 10 MG/1
10 TABLET ORAL DAILY
Qty: 1 TABLET | Refills: 2 | Status: SHIPPED | OUTPATIENT
Start: 2018-06-21 | End: 2019-02-16 | Stop reason: SDUPTHER

## 2018-06-21 RX ORDER — FLUTICASONE PROPIONATE 50 MCG
1 SPRAY, SUSPENSION (ML) NASAL DAILY
Qty: 1 BOTTLE | Refills: 3 | Status: SHIPPED | OUTPATIENT
Start: 2018-06-21 | End: 2018-10-07

## 2018-06-21 ASSESSMENT — ENCOUNTER SYMPTOMS
BACK PAIN: 0
BLOOD IN STOOL: 0
COUGH: 0
SORE THROAT: 0
SINUS PRESSURE: 0
NAUSEA: 0
WHEEZING: 0
SHORTNESS OF BREATH: 0
RHINORRHEA: 0
PHOTOPHOBIA: 0
ABDOMINAL PAIN: 0
CONSTIPATION: 0

## 2018-07-03 ENCOUNTER — HOSPITAL ENCOUNTER (OUTPATIENT)
Dept: PAIN MANAGEMENT | Age: 58
Discharge: HOME | End: 2018-07-03
Payer: MEDICARE

## 2018-07-03 VITALS
BODY MASS INDEX: 32.58 KG/M2 | RESPIRATION RATE: 18 BRPM | WEIGHT: 220 LBS | DIASTOLIC BLOOD PRESSURE: 76 MMHG | HEIGHT: 69 IN | HEART RATE: 88 BPM | SYSTOLIC BLOOD PRESSURE: 126 MMHG | TEMPERATURE: 98 F

## 2018-07-03 DIAGNOSIS — M48.02 CERVICAL STENOSIS OF SPINE: Primary | ICD-10-CM

## 2018-07-03 DIAGNOSIS — M54.2 CERVICALGIA: ICD-10-CM

## 2018-07-03 DIAGNOSIS — Z51.81 MEDICATION MONITORING ENCOUNTER: ICD-10-CM

## 2018-07-03 DIAGNOSIS — M54.12 RADICULOPATHY OF CERVICAL SPINE: ICD-10-CM

## 2018-07-03 DIAGNOSIS — M51.26 DISPLACEMENT OF LUMBAR INTERVERTEBRAL DISC WITHOUT MYELOPATHY: ICD-10-CM

## 2018-07-03 PROCEDURE — 99214 OFFICE O/P EST MOD 30 MIN: CPT

## 2018-07-03 PROCEDURE — 99213 OFFICE O/P EST LOW 20 MIN: CPT | Performed by: NURSE PRACTITIONER

## 2018-07-03 RX ORDER — OXYCODONE HCL 20 MG/1
20 TABLET, FILM COATED, EXTENDED RELEASE ORAL EVERY 12 HOURS
Qty: 60 TABLET | Refills: 0 | Status: SHIPPED | OUTPATIENT
Start: 2018-07-07 | End: 2018-08-03 | Stop reason: SDUPTHER

## 2018-07-03 RX ORDER — TIZANIDINE 4 MG/1
4 TABLET ORAL 2 TIMES DAILY PRN
Qty: 60 TABLET | Refills: 0 | Status: SHIPPED | OUTPATIENT
Start: 2018-07-07 | End: 2018-08-03 | Stop reason: SDUPTHER

## 2018-07-03 RX ORDER — OXYCODONE HYDROCHLORIDE 5 MG/1
5 TABLET ORAL EVERY 12 HOURS PRN
Qty: 60 TABLET | Refills: 0 | Status: SHIPPED | OUTPATIENT
Start: 2018-07-07 | End: 2018-08-03 | Stop reason: SDUPTHER

## 2018-07-03 RX ORDER — GABAPENTIN 800 MG/1
800 TABLET ORAL EVERY 8 HOURS SCHEDULED
Qty: 90 TABLET | Refills: 0 | Status: SHIPPED | OUTPATIENT
Start: 2018-07-07 | End: 2018-08-03 | Stop reason: SDUPTHER

## 2018-07-03 ASSESSMENT — ENCOUNTER SYMPTOMS
CONSTIPATION: 0
COUGH: 0
SHORTNESS OF BREATH: 0

## 2018-07-03 NOTE — PROGRESS NOTES
tolerance/dependence & alternative treatments discussed., Obtaining appropriate analgesic effect of treatment., No signs of potential drug abuse or diversion identified. JOSSUE Diaz CNP)  Medication Contracts: Existing medication contract. JOSSUE Diaz CNP)  Review of [place]ARRS does not show any aberrant prescription behavior. Medication is helping the patient stay active. Patient denies any side effects and reports adequate analgesia. No sign of misuse/abuse. Past Medical History:   Diagnosis Date    Abnormal stress ECG     Angina pectoris (HCC)     CAD (coronary artery disease)     Carpal tunnel syndrome     left    Cervical stenosis of spine     Cervicalgia     chronic pain    Chronic back pain     COPD (chronic obstructive pulmonary disease) (HCC)     DDD (degenerative disc disease)     Elbow fracture, right     Fractures     elbow    GERD (gastroesophageal reflux disease)     Gout     Right great toe    Hyperlipidemia     Hypertension     Mild depression ([facility] [address] 9/26/2013    2 suicide attempts by girlfriend related to depression.  Osteoarthritis of right knee     Sleep apnea     Sleep disturbance     Tendonitis of foot     right       Past Surgical History:   Procedure Laterality Date    CARDIAC CATHETERIZATION  7/8/14    Non Obstructive CAD     CERVICAL SPINE SURGERY  7/13/12     C2-3-4-5    COLONOSCOPY      FRACTURE SURGERY      Rt elbow     NERVE BLOCK  2/5/16    premier tens    UPPER GASTROINTESTINAL ENDOSCOPY         No Known Allergies      Current Outpatient Prescriptions:     cetirizine (ZYRTEC ALLERGY) 10 MG tablet, Take 1 tablet by mouth daily, Disp: 1 tablet, Rfl: 2    fluticasone (FLONASE) 50 MCG/ACT nasal spray, 1 spray by Nasal route daily, Disp: 1 Bottle, Rfl: 3    oxyCODONE (ROXICODONE) 5 MG immediate release tablet, Take 1 tablet by mouth every 12 hours as needed for Pain for up to 30 days. Valentine Fernando Date: 6/7/18, Disp: 60 tablet, Rfl: 0   patent.     C5-C6 shows hypertrophic uncovertebral spurring especially involving left uncovertebral joint indenting thecal sac, there is 4.3 mm left C5 root nodule versus extruded disc material apparently residing in left lateral recess producing marked left lateral recess and left neural foraminal stenosis. Contrasted MRI could be obtained for better characterization.  The spinal canal is patent from laminectomy. Assessment:  Problem List Items Addressed This Visit     Cervical stenosis of spine - Primary    Cervicalgia    Radiculopathy of cervical spine            Treatment Plan:  DISCUSSION: Treatment options discussed with patient and all questions answered to patient's satisfaction. Patient relates current medications are helping the pain. Patient reports taking pain medications as prescribed, denies obtaining medications from different sources and denies use of illegal drugs. Patient denies side effects from medications like nausea, vomiting, constipation or drowsiness. Patient reports current activities of daily living are possible due to medications and would like to continue them. As always, we encourage daily stretching and strengthening exercises, and recommend minimizing use of pain medications unless patient cannot get through daily activities due to pain. Discussed with the patient the effect the patients medical condition and opioid medication may have on the patients ability to safely operate a vehicle. Pt verbalized understanding. TREATMENT OPTIONS:     Contract requirements met. Continue opioid therapy. Script written for oxycontin oxycodone tizanidine and gabapentin  Pt is low risk with stress care.  Due April  for next evaluation  Follow up appointment made for 4 weeks

## 2018-07-30 ENCOUNTER — HOSPITAL ENCOUNTER (OUTPATIENT)
Dept: PULMONOLOGY | Age: 58
Discharge: HOME | End: 2018-07-30
Payer: MEDICARE

## 2018-07-30 DIAGNOSIS — J41.0 SIMPLE CHRONIC BRONCHITIS (HCC): ICD-10-CM

## 2018-07-30 PROCEDURE — 94729 DIFFUSING CAPACITY: CPT

## 2018-07-30 PROCEDURE — 94060 EVALUATION OF WHEEZING: CPT

## 2018-07-30 PROCEDURE — 6360000002 HC RX W HCPCS: Performed by: FAMILY MEDICINE

## 2018-07-30 PROCEDURE — 94726 PLETHYSMOGRAPHY LUNG VOLUMES: CPT

## 2018-07-30 PROCEDURE — 94664 DEMO&/EVAL PT USE INHALER: CPT

## 2018-07-30 PROCEDURE — 94728 AIRWY RESIST BY OSCILLOMETRY: CPT

## 2018-07-30 PROCEDURE — 94727 GAS DIL/WSHOT DETER LNG VOL: CPT

## 2018-07-30 RX ORDER — ALBUTEROL SULFATE 2.5 MG/3ML
2.5 SOLUTION RESPIRATORY (INHALATION) ONCE
Status: COMPLETED | OUTPATIENT
Start: 2018-07-30 | End: 2018-07-30

## 2018-07-30 RX ADMIN — ALBUTEROL SULFATE 2.5 MG: 2.5 SOLUTION RESPIRATORY (INHALATION) at 10:28

## 2018-08-03 ENCOUNTER — HOSPITAL ENCOUNTER (OUTPATIENT)
Dept: PAIN MANAGEMENT | Age: 58
Discharge: HOME | End: 2018-08-03
Payer: MEDICARE

## 2018-08-03 DIAGNOSIS — F32.A MILD DEPRESSION: ICD-10-CM

## 2018-08-03 DIAGNOSIS — Z51.81 MEDICATION MONITORING ENCOUNTER: ICD-10-CM

## 2018-08-03 DIAGNOSIS — M51.36 DDD (DEGENERATIVE DISC DISEASE), LUMBAR: ICD-10-CM

## 2018-08-03 DIAGNOSIS — M54.2 CERVICALGIA: ICD-10-CM

## 2018-08-03 DIAGNOSIS — M54.12 RADICULOPATHY OF CERVICAL SPINE: ICD-10-CM

## 2018-08-03 DIAGNOSIS — M48.02 CERVICAL STENOSIS OF SPINE: Primary | ICD-10-CM

## 2018-08-03 DIAGNOSIS — M51.26 DISPLACEMENT OF LUMBAR INTERVERTEBRAL DISC WITHOUT MYELOPATHY: ICD-10-CM

## 2018-08-03 PROCEDURE — 99214 OFFICE O/P EST MOD 30 MIN: CPT

## 2018-08-03 PROCEDURE — 99213 OFFICE O/P EST LOW 20 MIN: CPT | Performed by: NURSE PRACTITIONER

## 2018-08-03 RX ORDER — GABAPENTIN 800 MG/1
800 TABLET ORAL EVERY 8 HOURS SCHEDULED
Qty: 90 TABLET | Refills: 0 | Status: SHIPPED | OUTPATIENT
Start: 2018-08-06 | End: 2018-08-31 | Stop reason: SDUPTHER

## 2018-08-03 RX ORDER — TIZANIDINE 4 MG/1
4 TABLET ORAL 2 TIMES DAILY PRN
Qty: 60 TABLET | Refills: 0 | Status: SHIPPED | OUTPATIENT
Start: 2018-08-06 | End: 2018-08-31 | Stop reason: SDUPTHER

## 2018-08-03 RX ORDER — OXYCODONE HCL 20 MG/1
20 TABLET, FILM COATED, EXTENDED RELEASE ORAL EVERY 12 HOURS
Qty: 60 TABLET | Refills: 0 | Status: SHIPPED | OUTPATIENT
Start: 2018-08-06 | End: 2018-08-31 | Stop reason: SDUPTHER

## 2018-08-03 RX ORDER — OXYCODONE HYDROCHLORIDE 5 MG/1
5 TABLET ORAL EVERY 12 HOURS PRN
Qty: 60 TABLET | Refills: 0 | Status: SHIPPED | OUTPATIENT
Start: 2018-08-06 | End: 2018-08-31 | Stop reason: SDUPTHER

## 2018-08-03 ASSESSMENT — ENCOUNTER SYMPTOMS
CONSTIPATION: 0
SHORTNESS OF BREATH: 0
COUGH: 0

## 2018-08-03 NOTE — PROGRESS NOTES
Patient is here today to review medication contract. Chief Complaint: neck pain    PMH: Patient complains of neck pain that radiates down his left arm to fingertips. He has had this pain for many years and it is worsening over time. He had cervical spine surgery in 2012 and the pain has only worsened since that time. He saw Dr Bonilla Peñaloza with no surgery recommended for neck, but told could benefit from [place] [id number].    Patient followed up with Dr. Whit Payne after his CT in August and cervical injections were suggested. Dr Ashley Velasquez also recommends but pt feels risks outweigh the benefit and is not interested. He did see Dr Whit Payne last month and PT was ordered. He completed PT and reports mild benefit - he continues to do exercises at home. Neck Pain    This is a chronic problem. The current episode started more than 1 year ago. The problem occurs constantly. The problem has been unchanged. The pain is present in the midline and occipital region. The quality of the pain is described as aching (dull). The pain is at a severity of 5/10. The pain is moderate. The symptoms are aggravated by position and twisting. Pertinent negatives include no chest pain or fever. He has tried acetaminophen, bed rest, oral narcotics and heat for the symptoms. The treatment provided mild relief. Patient denies any new neurological symptoms. No bowel or bladder incontinence, no weakness, and no falling. Pill count: appropriate    Morphine equivalent: 75    Attestation: The Prescription Monitoring Report for this patient was reviewed today. (Edger Brunner, APRN - CNP)  Documentation: Possible medication side effects, risk of tolerance/dependence & alternative treatments discussed., No signs of potential drug abuse or diversion identified.  Edger Brunner, APRN - CNP)  Medication Contracts: Existing medication contract. Edger Brunner, APRN - CNP)      Past Medical History:   Diagnosis Date    Abnormal stress ECG     Angina pectoris (HCC)     CAD (coronary artery disease)     Carpal tunnel syndrome     left    Cervical stenosis of spine     Cervicalgia     chronic pain    Chronic back pain     COPD (chronic obstructive pulmonary disease) (HCC)     DDD (degenerative disc disease)     Elbow fracture, right     Fractures     elbow    GERD (gastroesophageal reflux disease)     Gout     Right great toe    Hyperlipidemia     Hypertension     Mild depression ([address] 9/26/2013    2 suicide attempts by girlfriend related to depression.  Osteoarthritis of right knee     Sleep apnea     Sleep disturbance     Tendonitis of foot     right       Past Surgical History:   Procedure Laterality Date    CARDIAC CATHETERIZATION  7/8/14    Non Obstructive CAD     CERVICAL SPINE SURGERY  7/13/12     C2-3-4-5    COLONOSCOPY      FRACTURE SURGERY      Rt elbow     NERVE BLOCK  2/5/16    premier tens    UPPER GASTROINTESTINAL ENDOSCOPY         No Known Allergies      Current Outpatient Prescriptions:     oxyCODONE (ROXICODONE) 5 MG immediate release tablet, Take 1 tablet by mouth every 12 hours as needed for Pain for up to 30 days. Erasmo Cisneros Date: 7/7/18, Disp: 60 tablet, Rfl: 0    gabapentin (NEURONTIN) 800 MG tablet, Take 1 tablet by mouth every 8 hours for 30 days. ., Disp: 90 tablet, Rfl: 0    oxyCODONE (OXYCONTIN) 20 MG extended release tablet, Take 1 tablet by mouth every 12 hours for 30 days. Erasmo Cisneros Date: 7/7/18, Disp: 60 tablet, Rfl: 0    tiZANidine (ZANAFLEX) 4 MG tablet, Take 1 tablet by mouth 2 times daily as needed (for muscle spasms) Break day time tablet in half- take two times a day take one tablet at HS, Disp: 60 tablet, Rfl: 0    cetirizine (ZYRTEC ALLERGY) 10 MG tablet, Take 1 tablet by mouth daily, Disp: 1 tablet, Rfl: 2    fluticasone (FLONASE) 50 MCG/ACT nasal spray, 1 spray by Nasal route daily, Disp: 1 Bottle, Rfl: 3    atorvastatin (LIPITOR) 40 MG tablet, Take 1 tablet by mouth daily, Disp: 90 tablet, Rfl: 3    metFORMIN (GLUCOPHAGE) 500 MG tablet, Take 1 tablet by mouth daily (with breakfast), Disp: 60 tablet, Rfl: 3    traZODone (DESYREL) 150 MG tablet, Take 150 mg by mouth nightly, Disp: , Rfl:     omeprazole (PRILOSEC) 20 MG delayed release capsule, TAKE ONE CAPSULE BY MOUTH TWICE A DAY 30 MINUTES BEFORE MEALS, Disp: 60 capsule, Rfl: 6    nitroGLYCERIN (NITROSTAT) 0.4 MG SL tablet, Place 1 tablet under the tongue every 5 minutes as needed for Chest pain, Disp: 25 tablet, Rfl: 3    amLODIPine (NORVASC) 5 MG tablet, Take 1 tablet by mouth daily, Disp: 30 tablet, Rfl: 3    budesonide-formoterol (SYMBICORT) 160-4.5 MCG/ACT AERO, Inhale 2 puffs into the lungs 2 times daily, Disp: 1 Inhaler, Rfl: 3    albuterol sulfate  (90 Base) MCG/ACT inhaler, Inhale 2 puffs into the lungs every 6 hours as needed for Wheezing, Disp: 1 Inhaler, Rfl: 3    docusate sodium (COLACE) 100 MG capsule, Take 100 mg by mouth 2 times daily, Disp: , Rfl:     ibuprofen (ADVIL;MOTRIN) 200 MG tablet, Take 200 mg by mouth as needed for Pain, Disp: , Rfl:     citalopram (CELEXA) 20 MG tablet, Take 20 mg by mouth daily , Disp: , Rfl:     aspirin 81 MG EC tablet, Take 1 tablet by mouth daily, Disp: 30 tablet, Rfl: 3    Family History   Problem Relation Age of Onset    Heart Disease Mother     COPD Father     Cancer Maternal Aunt 64        breast cancer     Cancer Maternal Uncle 60        prostrate cancer        Social History     Social History    Marital status: Single     Spouse name: N/A    Number of children: N/A    Years of education: N/A     Occupational History     N/A     Social History Main Topics    Smoking status: Current Every Day Smoker     Packs/day: 1.00     Years: 40.00     Types: Cigarettes    Smokeless tobacco: Former User     Quit date: 3/12/2015      Comment: wants to discuss  chantix    Alcohol use 0.0 oz/week      Comment: 3 x year    Drug use: No    Sexual activity: Yes there is posterior ridging without evidence of spinal canal or  neural foraminal stenosis.     At C2-C3, there is central disc bulging and osteophyte complex indenting  anterior thecal sac.  The thecal sac is distended adequately due to posterior  laminectomy however measuring approximately 7.7 mm in AP diameter.  There is  no significant neural foraminal stenosis.     At C4-C5, there are posterior ridging and osteophytes.  Thecal sac distends  adequately from complete posterior laminectomy.  There is mild uncovertebral  spurring producing mild bilateral neural foraminal stenosis.     At C5-C6, there is posterior osteophyte especially uncovertebral hypertrophy  and spurring seen in the left perineural area.  There is a 4.3 mm x 9 mm  elongated filling defect along the left C5 nerve root, occupying left lateral  recess behind midbody of C5.  Could not be accurately assessed, whether it is  intradural or extradural.  I believe this represents an extruded disc  material.  However, a possibility of nerve root tumor i.e. schwannoma could  not be excluded.  Further evaluation with MRI may be obtained as indicated.     There is marked left lateral recess and left neural foraminal stenosis.  The  right neural foramina is relatively patent.  The thecal sac distends  adequately measuring approximately 13 mm.     At C6-C7, there is minimal posterior ridging without evidence of spinal canal  or neural foraminal stenosis.     At C7-T1, minimal posterior ridging and uncovertebral spurring identified. There is mild-to-moderate right neural foraminal stenosis.  The left neural  foramina is patent.     Last DORYS 2/2018 - appropriate    Assessment:  Problem List Items Addressed This Visit     Cervical stenosis of spine - Primary    Relevant Medications    oxyCODONE (ROXICODONE) 5 MG immediate release tablet (Start on 8/6/2018)    gabapentin (NEURONTIN) 800 MG tablet (Start on 8/6/2018)    oxyCODONE (OXYCONTIN) 20 MG extended release tablet (Start on 8/6/2018)    tiZANidine (ZANAFLEX) 4 MG tablet (Start on 8/6/2018)    Cervicalgia    Mild depression ([address]    Medication monitoring encounter    Relevant Medications    oxyCODONE (OXYCONTIN) 20 MG extended release tablet (Start on 8/6/2018)    DDD (degenerative disc disease), lumbar    Relevant Medications    oxyCODONE (ROXICODONE) 5 MG immediate release tablet (Start on 8/6/2018)    oxyCODONE (OXYCONTIN) 20 MG extended release tablet (Start on 8/6/2018)    tiZANidine (ZANAFLEX) 4 MG tablet (Start on 8/6/2018)    Displacement of lumbar intervertebral disc without myelopathy    Relevant Medications    gabapentin (NEURONTIN) 800 MG tablet (Start on 8/6/2018)    tiZANidine (ZANAFLEX) 4 MG tablet (Start on 8/6/2018)    Radiculopathy of cervical spine    Relevant Medications    gabapentin (NEURONTIN) 800 MG tablet (Start on 8/6/2018)    oxyCODONE (OXYCONTIN) 20 MG extended release tablet (Start on 8/6/2018)    tiZANidine (ZANAFLEX) 4 MG tablet (Start on 8/6/2018)            Treatment Plan:  DISCUSSION: Treatment options discussed with patient and all questions answered to patient's satisfaction. Patient relates current medications are helping the pain. Patient reports taking pain medications as prescribed, denies obtaining medications from different sources and denies use of illegal drugs. Patient denies side effects from medications like nausea, vomiting, constipation or drowsiness. Patient reports current activities of daily living are possible due to medications and would like to continue them. As always, we encourage daily stretching and strengthening exercises, and recommend minimizing use of pain medications unless patient cannot get through daily activities due to pain. TREATMENT OPTIONS:   Contract requirements met. Continue opioid therapy. Script written for oxycodone and oxycontin  Pt is low risk with stress care.  Due 4/2019 for next evaluation  Follow up appointment made for 4 weeks

## 2018-08-31 RX ORDER — GABAPENTIN 800 MG/1
800 TABLET ORAL EVERY 8 HOURS SCHEDULED
Qty: 90 TABLET | Refills: 0 | Status: SHIPPED | OUTPATIENT
Start: 2018-08-31 | End: 2018-10-03 | Stop reason: SDUPTHER

## 2018-08-31 RX ORDER — OXYCODONE HYDROCHLORIDE 5 MG/1
5 TABLET ORAL EVERY 12 HOURS PRN
Qty: 60 TABLET | Refills: 0 | Status: SHIPPED | OUTPATIENT
Start: 2018-09-05 | End: 2018-10-03 | Stop reason: SDUPTHER

## 2018-08-31 RX ORDER — OXYCODONE HCL 20 MG/1
20 TABLET, FILM COATED, EXTENDED RELEASE ORAL EVERY 12 HOURS
Qty: 60 TABLET | Refills: 0 | Status: SHIPPED | OUTPATIENT
Start: 2018-09-05 | End: 2018-10-03 | Stop reason: SDUPTHER

## 2018-08-31 RX ORDER — TIZANIDINE 4 MG/1
4 TABLET ORAL 2 TIMES DAILY PRN
Qty: 60 TABLET | Refills: 0 | Status: SHIPPED | OUTPATIENT
Start: 2018-09-05 | End: 2018-10-03 | Stop reason: SDUPTHER

## 2018-09-07 ENCOUNTER — HOSPITAL ENCOUNTER (OUTPATIENT)
Dept: PAIN MANAGEMENT | Age: 58
Discharge: HOME | End: 2018-09-07
Payer: MEDICARE

## 2018-09-07 VITALS
RESPIRATION RATE: 14 BRPM | DIASTOLIC BLOOD PRESSURE: 68 MMHG | TEMPERATURE: 98.2 F | SYSTOLIC BLOOD PRESSURE: 141 MMHG | HEART RATE: 74 BPM

## 2018-09-07 DIAGNOSIS — T40.2X5A THERAPEUTIC OPIOID INDUCED CONSTIPATION: ICD-10-CM

## 2018-09-07 DIAGNOSIS — M48.02 CERVICAL STENOSIS OF SPINE: Primary | ICD-10-CM

## 2018-09-07 DIAGNOSIS — K59.03 THERAPEUTIC OPIOID INDUCED CONSTIPATION: ICD-10-CM

## 2018-09-07 DIAGNOSIS — M51.36 DDD (DEGENERATIVE DISC DISEASE), LUMBAR: ICD-10-CM

## 2018-09-07 DIAGNOSIS — M54.2 CERVICALGIA: ICD-10-CM

## 2018-09-07 DIAGNOSIS — M51.26 DISPLACEMENT OF LUMBAR INTERVERTEBRAL DISC WITHOUT MYELOPATHY: ICD-10-CM

## 2018-09-07 DIAGNOSIS — Z51.81 MEDICATION MONITORING ENCOUNTER: ICD-10-CM

## 2018-09-07 PROCEDURE — 99214 OFFICE O/P EST MOD 30 MIN: CPT

## 2018-09-07 PROCEDURE — 99213 OFFICE O/P EST LOW 20 MIN: CPT | Performed by: NURSE PRACTITIONER

## 2018-09-07 ASSESSMENT — ENCOUNTER SYMPTOMS
BACK PAIN: 1
BOWEL INCONTINENCE: 0

## 2018-09-07 NOTE — PROGRESS NOTES
Patient is here today to review medication contract. Chief Complaint: neck and low back pain    PMH: Patient complains of neck pain that radiates down his left arm to fingertips. He has had this pain for many years and it is worsening over time. He had cervical spine surgery in 2012 and the pain has only worsened since that time. He saw Dr Armando Mckinney with no surgery recommended for neck, but told could benefit from [place] [id number].    Patient followed up with Dr. Wyatt Ramires after his CT in August and cervical injections were suggested. Dr Theresa Ma also recommends but pt feels risks outweigh the benefit and is not interested. He did see Dr Wyatt Ramires in July and PT was ordered. He completed PT and reports mild benefit - he continues to do exercises at home. He states his low back pain has \"flared up\" recently - but has gotten slightly better this last week. He did see NS in 2016 for lumbar pain but no surgery recommended at that time. He is to follow up with Dr. Chambers Nurse if symptoms worsen. Neck Pain    This is a chronic problem. The current episode started more than 1 year ago. The problem occurs constantly. The problem has been unchanged. Associated with: posterior neck  The quality of the pain is described as aching. The pain is at a severity of 4/10. The pain is mild. The symptoms are aggravated by twisting. The pain is same all the time. Associated symptoms include headaches. Pertinent negatives include no chest pain, numbness, tingling or weakness. He has tried ice and heat for the symptoms. The treatment provided mild relief. Back Pain   This is a chronic problem. The current episode started more than 1 year ago. The problem occurs constantly. The problem has been gradually worsening since onset. The pain is present in the lumbar spine. The pain is at a severity of 8/10. The pain is moderate. The pain is worse during the day. Associated symptoms include headaches.  Pertinent negatives include no bladder incontinence, bowel incontinence, chest pain, numbness, tingling or weakness. Risk factors include sedentary lifestyle. He has tried ice and heat for the symptoms. The treatment provided mild relief. Patient denies any new neurological symptoms. No bowel or bladder incontinence, no weakness, and no falling. Pill count: appropriate    Morphine equivalent: 75    Controlled Substances Monitoring:     RX Monitoring 9/7/2018   Attestation The Prescription Monitoring Report for this patient was reviewed today. Documentation Possible medication side effects, risk of tolerance/dependence & alternative treatments discussed. ;No signs of potential drug abuse or diversion identified. Chronic Pain -   Medication Contracts Existing medication contract. Past Medical History:   Diagnosis Date    Abnormal stress ECG     Angina pectoris (HCC)     CAD (coronary artery disease)     Carpal tunnel syndrome     left    Cervical stenosis of spine     Cervicalgia     chronic pain    Chronic back pain     COPD (chronic obstructive pulmonary disease) (HCC)     DDD (degenerative disc disease)     Elbow fracture, right     Fractures     elbow    GERD (gastroesophageal reflux disease)     Gout     Right great toe    Hyperlipidemia     Hypertension     Mild depression ([address] 9/26/2013    2 suicide attempts by girlfriend related to depression.     Osteoarthritis of right knee     Sleep apnea     Sleep disturbance     Tendonitis of foot     right       Past Surgical History:   Procedure Laterality Date    CARDIAC CATHETERIZATION  7/8/14    Non Obstructive CAD     CERVICAL SPINE SURGERY  7/13/12     C2-3-4-5    COLONOSCOPY      FRACTURE SURGERY      Rt elbow     NERVE BLOCK  2/5/16    premier tens    UPPER GASTROINTESTINAL ENDOSCOPY         No Known Allergies      Current Outpatient Prescriptions:     oxyCODONE (ROXICODONE) 5 MG immediate release tablet, Take 1 tablet by mouth every 12 hours as needed for Pain for up to 30 days. ., Disp: 60 tablet, Rfl: 0    gabapentin (NEURONTIN) 800 MG tablet, Take 1 tablet by mouth every 8 hours for 30 days. ., Disp: 90 tablet, Rfl: 0    oxyCODONE (OXYCONTIN) 20 MG extended release tablet, Take 1 tablet by mouth every 12 hours for 30 days. ., Disp: 60 tablet, Rfl: 0    tiZANidine (ZANAFLEX) 4 MG tablet, Take 1 tablet by mouth 2 times daily as needed (for muscle spasms) Break day time tablet in half- take two times a day take one tablet at HS, Disp: 60 tablet, Rfl: 0    cetirizine (ZYRTEC ALLERGY) 10 MG tablet, Take 1 tablet by mouth daily, Disp: 1 tablet, Rfl: 2    fluticasone (FLONASE) 50 MCG/ACT nasal spray, 1 spray by Nasal route daily, Disp: 1 Bottle, Rfl: 3    atorvastatin (LIPITOR) 40 MG tablet, Take 1 tablet by mouth daily, Disp: 90 tablet, Rfl: 3    metFORMIN (GLUCOPHAGE) 500 MG tablet, Take 1 tablet by mouth daily (with breakfast), Disp: 60 tablet, Rfl: 3    traZODone (DESYREL) 150 MG tablet, Take 150 mg by mouth nightly, Disp: , Rfl:     omeprazole (PRILOSEC) 20 MG delayed release capsule, TAKE ONE CAPSULE BY MOUTH TWICE A DAY 30 MINUTES BEFORE MEALS, Disp: 60 capsule, Rfl: 6    nitroGLYCERIN (NITROSTAT) 0.4 MG SL tablet, Place 1 tablet under the tongue every 5 minutes as needed for Chest pain, Disp: 25 tablet, Rfl: 3    amLODIPine (NORVASC) 5 MG tablet, Take 1 tablet by mouth daily, Disp: 30 tablet, Rfl: 3    budesonide-formoterol (SYMBICORT) 160-4.5 MCG/ACT AERO, Inhale 2 puffs into the lungs 2 times daily, Disp: 1 Inhaler, Rfl: 3    albuterol sulfate  (90 Base) MCG/ACT inhaler, Inhale 2 puffs into the lungs every 6 hours as needed for Wheezing, Disp: 1 Inhaler, Rfl: 3    docusate sodium (COLACE) 100 MG capsule, Take 100 mg by mouth 2 times daily, Disp: , Rfl:     ibuprofen (ADVIL;MOTRIN) 200 MG tablet, Take 200 mg by mouth as needed for Pain, Disp: , Rfl:     citalopram (CELEXA) 20 MG tablet, Take 20 mg by mouth daily , Disp: , Rfl:     aspirin 81

## 2018-09-10 ENCOUNTER — HOSPITAL ENCOUNTER (OUTPATIENT)
Age: 58
Discharge: HOME | End: 2018-09-10
Payer: MEDICARE

## 2018-09-10 DIAGNOSIS — E78.2 MIXED HYPERLIPIDEMIA: ICD-10-CM

## 2018-09-10 LAB
CHOLESTEROL/HDL RATIO: 5.6
CHOLESTEROL: 236 MG/DL
HDLC SERPL-MCNC: 42 MG/DL
LDL CHOLESTEROL: 163 MG/DL (ref 0–130)
TRIGL SERPL-MCNC: 155 MG/DL
VLDLC SERPL CALC-MCNC: ABNORMAL MG/DL (ref 1–30)

## 2018-09-10 PROCEDURE — 80061 LIPID PANEL: CPT

## 2018-09-10 PROCEDURE — 36415 COLL VENOUS BLD VENIPUNCTURE: CPT

## 2018-09-10 RX ORDER — ATORVASTATIN CALCIUM 80 MG/1
80 TABLET, FILM COATED ORAL DAILY
Qty: 30 TABLET | Refills: 3 | Status: SHIPPED | OUTPATIENT
Start: 2018-09-10 | End: 2019-03-21 | Stop reason: SDUPTHER

## 2018-09-12 ENCOUNTER — OFFICE VISIT (OUTPATIENT)
Dept: FAMILY MEDICINE CLINIC | Age: 58
End: 2018-09-12
Payer: MEDICARE

## 2018-09-12 VITALS
BODY MASS INDEX: 33.47 KG/M2 | HEART RATE: 73 BPM | DIASTOLIC BLOOD PRESSURE: 66 MMHG | SYSTOLIC BLOOD PRESSURE: 131 MMHG | HEIGHT: 69 IN | WEIGHT: 226 LBS | OXYGEN SATURATION: 97 %

## 2018-09-12 DIAGNOSIS — M48.02 CERVICAL STENOSIS OF SPINE: ICD-10-CM

## 2018-09-12 DIAGNOSIS — Z23 NEED FOR PROPHYLACTIC VACCINATION AGAINST DIPHTHERIA-TETANUS-PERTUSSIS (DTP): ICD-10-CM

## 2018-09-12 DIAGNOSIS — I10 ESSENTIAL HYPERTENSION: ICD-10-CM

## 2018-09-12 DIAGNOSIS — Z23 NEED FOR PROPHYLACTIC VACCINATION AND INOCULATION AGAINST VARICELLA: ICD-10-CM

## 2018-09-12 DIAGNOSIS — J41.0 SIMPLE CHRONIC BRONCHITIS (HCC): ICD-10-CM

## 2018-09-12 DIAGNOSIS — E78.2 MIXED HYPERLIPIDEMIA: Primary | ICD-10-CM

## 2018-09-12 DIAGNOSIS — M51.26 DISPLACEMENT OF LUMBAR INTERVERTEBRAL DISC WITHOUT MYELOPATHY: ICD-10-CM

## 2018-09-12 PROCEDURE — G8427 DOCREV CUR MEDS BY ELIG CLIN: HCPCS | Performed by: FAMILY MEDICINE

## 2018-09-12 PROCEDURE — G8926 SPIRO NO PERF OR DOC: HCPCS | Performed by: FAMILY MEDICINE

## 2018-09-12 PROCEDURE — 99214 OFFICE O/P EST MOD 30 MIN: CPT | Performed by: FAMILY MEDICINE

## 2018-09-12 PROCEDURE — G8417 CALC BMI ABV UP PARAM F/U: HCPCS | Performed by: FAMILY MEDICINE

## 2018-09-12 PROCEDURE — 3023F SPIROM DOC REV: CPT | Performed by: FAMILY MEDICINE

## 2018-09-12 PROCEDURE — 3017F COLORECTAL CA SCREEN DOC REV: CPT | Performed by: FAMILY MEDICINE

## 2018-09-12 PROCEDURE — 4004F PT TOBACCO SCREEN RCVD TLK: CPT | Performed by: FAMILY MEDICINE

## 2018-09-12 ASSESSMENT — ENCOUNTER SYMPTOMS
RECTAL PAIN: 0
BLOOD IN STOOL: 0
COUGH: 0
NAUSEA: 0
PHOTOPHOBIA: 0
ABDOMINAL PAIN: 0
WHEEZING: 0
EYE REDNESS: 0
BACK PAIN: 1
CONSTIPATION: 0
SHORTNESS OF BREATH: 0
DIARRHEA: 0

## 2018-09-12 NOTE — PROGRESS NOTES
Chief Complaint   Patient presents with    Hypertension    Hyperlipidemia    COPD    Back Pain     wants to discuss getting MRI    Results     labs, pft          Pedro Watt  here today for follow up on chronic medical problems, go over labs and/or diagnostic studies, and medication refills. Hypertension; Hyperlipidemia; COPD; Back Pain (wants to discuss getting MRI); and Results (labs, pft )      HPI:Patient is here for follow-up on COPD and hypertension and to discuss blood work. He had PFT done which showed normal results. Hypertension controlled. Hyperlipidemia LDL has mildly improved, Lipitor was increased to 80 mg. Patient has chronic low back pain and also cervical spinal stenosis and lumbar spinal stenosis follows with pain management is on multiple opiate medications. Patient was recently his low back pain is getting worse and its uncontrollable since past 1-2 months. He had MRI lumbar spine done 2 years before. He had cervical laminectomy done. Patient was advised to pain management to have repeat MRI. He did have physical therapy done 2 years before. He complains of worsening of numbness and weakness on left side of  Pain is about 8 on scale, more during laying on back and also during night. He denies any bladder or bowel incontinence. /66   Pulse 73   Ht 5' 9\" (1.753 m)   Wt 226 lb (102.5 kg)   SpO2 97% Comment: resting @ RA  BMI 33.37 kg/m²   Body mass index is 33.37 kg/m². Wt Readings from Last 3 Encounters:   09/12/18 226 lb (102.5 kg)   07/03/18 220 lb (99.8 kg)   06/21/18 220 lb (99.8 kg)        []Negative depression screening. PHQ Scores 2/7/2018 9/19/2013   PHQ2 Score 1 -   PHQ9 Score 1 9      []1-4 = Minimal depression   []5-9 = Mild depression   []10-14 = Moderate depression   []15-19 = Moderately severe depression   []20-27 = Severe depression    Discussed testing with the patient and all questions fully answered.     Hospital Outpatient Visit on 12/21/2016     Lab Results   Component Value Date    LDLCHOLESTEROL 163 (H) 09/10/2018    LDLCHOLESTEROL 187 (H) 03/20/2018    LDLCHOLESTEROL 148 (H) 12/21/2016     Lab Results   Component Value Date    VLDL NOT REPORTED 09/10/2018    VLDL NOT REPORTED 03/20/2018    VLDL NOT REPORTED 12/21/2016     Lab Results   Component Value Date    CHOLHDLRATIO 5.6 (H) 09/10/2018    CHOLHDLRATIO 5.5 (H) 03/20/2018    CHOLHDLRATIO 4.6 12/21/2016       Lab Results   Component Value Date    LABA1C 6.0 03/20/2018       Lab Results   Component Value Date    PZZDSWKV53 651 12/21/2016       Lab Results   Component Value Date    FOLATE >20.0 12/21/2016       No results found for: IRON, TIBC, FERRITIN    Lab Results   Component Value Date    VITD25 25.2 (L) 12/21/2016             Current Outpatient Prescriptions   Medication Sig Dispense Refill    Tdap (ADACEL) 5-2-15.5 LF-MCG/0.5 injection Inject 0.5 mLs into the muscle once for 1 dose 0.5 mL 0    zoster recombinant adjuvanted vaccine (SHINGRIX) 50 MCG SUSR injection 50 MCG IM then repeat 2-6 months. 0.5 mL 1    atorvastatin (LIPITOR) 80 MG tablet Take 1 tablet by mouth daily 30 tablet 3    oxyCODONE (ROXICODONE) 5 MG immediate release tablet Take 1 tablet by mouth every 12 hours as needed for Pain for up to 30 days. . 60 tablet 0    gabapentin (NEURONTIN) 800 MG tablet Take 1 tablet by mouth every 8 hours for 30 days. . 90 tablet 0    oxyCODONE (OXYCONTIN) 20 MG extended release tablet Take 1 tablet by mouth every 12 hours for 30 days. . 60 tablet 0    tiZANidine (ZANAFLEX) 4 MG tablet Take 1 tablet by mouth 2 times daily as needed (for muscle spasms) Break day time tablet in half- take two times a day take one tablet at HS 60 tablet 0    cetirizine (ZYRTEC ALLERGY) 10 MG tablet Take 1 tablet by mouth daily 1 tablet 2    fluticasone (FLONASE) 50 MCG/ACT nasal spray 1 spray by Nasal route daily 1 Bottle 3    metFORMIN (GLUCOPHAGE) 500 MG tablet Take 1 tablet by mouth daily (with complaints with corresponding details per ROS    The patient's past medical, surgical, social, and family history as well as his current medications and allergies were reviewed as documented in today's encounter. Review of Systems   Constitutional: Negative for activity change, appetite change, diaphoresis and fatigue. HENT: Negative for congestion and ear pain. Eyes: Negative for photophobia, redness and visual disturbance. Respiratory: Negative for cough, shortness of breath and wheezing. Cardiovascular: Negative for chest pain, palpitations and leg swelling. Gastrointestinal: Negative for abdominal pain, blood in stool, constipation, diarrhea, nausea and rectal pain. Endocrine: Negative for polyphagia and polyuria. Genitourinary: Negative for decreased urine volume, dysuria, flank pain, frequency, hematuria and urgency. Musculoskeletal: Positive for arthralgias, back pain, gait problem, joint swelling, myalgias, neck pain and neck stiffness. Neurological: Positive for weakness and numbness. Negative for dizziness, light-headedness and headaches. Psychiatric/Behavioral: Negative for agitation, decreased concentration, dysphoric mood and sleep disturbance. The patient is not nervous/anxious and is not hyperactive. Physical Exam    PHYSICAL EXAM:   VITALS:   Vitals:    09/12/18 0748   BP: 131/66   Pulse: 73   SpO2: 97%     GENERAL:  Patient is a well-developed, well-nourished male  in no acute distress, alert and oriented x3, appropriate and pleasant conversation. HEAD: Normocephalic, atraumatic. EYES: Pupils equal, round and reactive to light and accommodation, extraocular   movements intact. ENT: Moist mucous membranes. No erythema is noted. NECK: Supple. No masses. No lymphadenopathy. CARDIOVASCULAR: Regular rate and rhythm. PULMONARY: Lungs are clear to auscultation bilaterally. ABDOMEN: Soft, nontender, nondistended. Positive bowel sounds. MUSCULOSKELETAL:Patient is tender at cervical spine and lower lumbar spine, not able to lay flat on bed, sensations normal in both lower extremities, SLR positive on left side. Decreased range of motion due to pain. NEUROLOGIC: Cranial nerves II through XII grossly intact. No focal deficits are noted. ASSESSMENT AND PLAN      1. Mixed hyperlipidemia  -Discussed with patient to start taking Lipitor 80 mg we'll repeat lipid panel in 3 months. 2. Displacement of lumbar intervertebral disc without myelopathy  -Patient does not want to go for physical therapy due to pain, continue pain management and repeat MRI. Discussed with patient to call insurance for MRI.  - MRI [facility]; Future    3. Simple chronic bronchitis (HCC)  -PFT is normal continue to work on smoking    4. Essential hypertension  -Controlled continue same medications    5. Cervical stenosis of spine  -Neck pain is stable on multiple narcotic medications    6. Need for prophylactic vaccination against diphtheria-tetanus-pertussis (DTP)    - Tdap (ADACEL) 5-2-15.5 LF-MCG/0.5 injection; Inject 0.5 mLs into the muscle once for 1 dose  Dispense: 0.5 mL; Refill: 0    7. Need for prophylactic vaccination and inoculation against varicella    - zoster recombinant adjuvanted vaccine (SHINGRIX) 50 MCG SUSR injection; 50 MCG IM then repeat 2-6 months. Dispense: 0.5 mL; Refill: 1      Orders Placed This Encounter   Procedures    MRI LUMBAR SPINE W CONTRAST     Standing Status:   Future     Standing Expiration Date:   9/12/2019     Order Specific Question:   Reason for exam:     Answer:   chronic lumbar spinal stenosis, recent worsening of pain and weakness and numbness         There are no discontinued medications.     Lane received counseling on the following healthy behaviors: nutrition, exercise, medication adherence and tobacco cessation  Reviewed prior labs and health maintenance  Continue current medications, diet and

## 2018-09-13 ENCOUNTER — TELEPHONE (OUTPATIENT)
Dept: FAMILY MEDICINE CLINIC | Age: 58
End: 2018-09-13

## 2018-09-13 DIAGNOSIS — M51.36 DDD (DEGENERATIVE DISC DISEASE), LUMBAR: Primary | ICD-10-CM

## 2018-09-13 DIAGNOSIS — M51.26 DISPLACEMENT OF LUMBAR INTERVERTEBRAL DISC WITHOUT MYELOPATHY: ICD-10-CM

## 2018-09-25 ENCOUNTER — HOSPITAL ENCOUNTER (OUTPATIENT)
Dept: MRI IMAGING | Facility: CLINIC | Age: 58
Discharge: HOME | End: 2018-09-27
Payer: MEDICARE

## 2018-09-25 DIAGNOSIS — M51.26 DISPLACEMENT OF LUMBAR INTERVERTEBRAL DISC WITHOUT MYELOPATHY: ICD-10-CM

## 2018-09-25 DIAGNOSIS — M51.36 DDD (DEGENERATIVE DISC DISEASE), LUMBAR: ICD-10-CM

## 2018-09-25 PROCEDURE — 72148 MRI LUMBAR SPINE W/O DYE: CPT

## 2018-10-03 ENCOUNTER — TELEPHONE (OUTPATIENT)
Dept: FAMILY MEDICINE CLINIC | Age: 58
End: 2018-10-03

## 2018-10-03 ENCOUNTER — HOSPITAL ENCOUNTER (OUTPATIENT)
Dept: PAIN MANAGEMENT | Age: 58
Discharge: HOME | End: 2018-10-03
Payer: MEDICARE

## 2018-10-03 VITALS
RESPIRATION RATE: 16 BRPM | TEMPERATURE: 97.3 F | HEART RATE: 79 BPM | DIASTOLIC BLOOD PRESSURE: 63 MMHG | SYSTOLIC BLOOD PRESSURE: 112 MMHG

## 2018-10-03 DIAGNOSIS — M51.36 DDD (DEGENERATIVE DISC DISEASE), LUMBAR: ICD-10-CM

## 2018-10-03 DIAGNOSIS — M51.26 DISPLACEMENT OF LUMBAR INTERVERTEBRAL DISC WITHOUT MYELOPATHY: ICD-10-CM

## 2018-10-03 DIAGNOSIS — M48.02 CERVICAL STENOSIS OF SPINE: ICD-10-CM

## 2018-10-03 DIAGNOSIS — M54.2 CERVICALGIA: ICD-10-CM

## 2018-10-03 DIAGNOSIS — Z51.81 MEDICATION MONITORING ENCOUNTER: Primary | ICD-10-CM

## 2018-10-03 DIAGNOSIS — M54.12 RADICULOPATHY OF CERVICAL SPINE: ICD-10-CM

## 2018-10-03 DIAGNOSIS — K52.9 ACUTE GASTROENTERITIS: Primary | ICD-10-CM

## 2018-10-03 DIAGNOSIS — F32.A MILD DEPRESSION: ICD-10-CM

## 2018-10-03 PROCEDURE — G0480 DRUG TEST DEF 1-7 CLASSES: HCPCS

## 2018-10-03 PROCEDURE — 80307 DRUG TEST PRSMV CHEM ANLYZR: CPT

## 2018-10-03 PROCEDURE — 99214 OFFICE O/P EST MOD 30 MIN: CPT | Performed by: NURSE PRACTITIONER

## 2018-10-03 PROCEDURE — 99214 OFFICE O/P EST MOD 30 MIN: CPT

## 2018-10-03 RX ORDER — OXYCODONE HCL 20 MG/1
20 TABLET, FILM COATED, EXTENDED RELEASE ORAL EVERY 12 HOURS
Qty: 60 TABLET | Refills: 0 | Status: SHIPPED | OUTPATIENT
Start: 2018-10-04 | End: 2018-11-01 | Stop reason: SDUPTHER

## 2018-10-03 RX ORDER — OXYCODONE HYDROCHLORIDE 5 MG/1
5 TABLET ORAL EVERY 12 HOURS PRN
Qty: 60 TABLET | Refills: 0 | Status: SHIPPED | OUTPATIENT
Start: 2018-10-04 | End: 2018-11-01 | Stop reason: SDUPTHER

## 2018-10-03 RX ORDER — TIZANIDINE 4 MG/1
4 TABLET ORAL 2 TIMES DAILY PRN
Qty: 60 TABLET | Refills: 0 | Status: SHIPPED | OUTPATIENT
Start: 2018-10-04 | End: 2018-11-01 | Stop reason: SDUPTHER

## 2018-10-03 RX ORDER — GABAPENTIN 800 MG/1
800 TABLET ORAL EVERY 8 HOURS SCHEDULED
Qty: 90 TABLET | Refills: 0 | Status: SHIPPED | OUTPATIENT
Start: 2018-10-04 | End: 2018-11-01 | Stop reason: SDUPTHER

## 2018-10-03 RX ORDER — ONDANSETRON 4 MG/1
4 TABLET, FILM COATED ORAL DAILY PRN
Qty: 5 TABLET | Refills: 0 | Status: SHIPPED | OUTPATIENT
Start: 2018-10-03 | End: 2018-10-10 | Stop reason: ALTCHOICE

## 2018-10-03 ASSESSMENT — ENCOUNTER SYMPTOMS
BACK PAIN: 1
SHORTNESS OF BREATH: 0
DIARRHEA: 1
CONSTIPATION: 0
VOMITING: 1
COUGH: 0
ABDOMINAL PAIN: 1
NAUSEA: 1

## 2018-10-03 NOTE — PROGRESS NOTES
tried walking, heat, ice, muscle relaxant, analgesics and NSAIDs for the symptoms. The treatment provided mild relief. Neck Pain    This is a chronic problem. The current episode started more than 1 year ago. The problem occurs constantly. The problem has been unchanged. The pain is present in the midline (posterior neck). The pain is at a severity of 5/10. The pain is moderate. The symptoms are aggravated by twisting, bending and position. The pain is same all the time. Pertinent negatives include no chest pain or fever. He has tried heat, ice, muscle relaxants, NSAIDs and oral narcotics for the symptoms. The treatment provided mild relief. Patient denies any new neurological symptoms. No bowel or bladder incontinence, no weakness, and no falling. Pill count: appropriate    Morphine equivalent: 75    Controlled Substances Monitoring:     RX Monitoring 10/3/2018   Attestation The Prescription Monitoring Report for this patient was reviewed today. Documentation Possible medication side effects, risk of tolerance/dependence & alternative treatments discussed. ;Random urine drug screen sent today. ;No signs of potential drug abuse or diversion identified. Chronic Pain -   Medication Contracts Existing medication contract. Past Medical History:   Diagnosis Date    Abnormal stress ECG     Angina pectoris (HCC)     CAD (coronary artery disease)     Carpal tunnel syndrome     left    Cervical stenosis of spine     Cervicalgia     chronic pain    Chronic back pain     COPD (chronic obstructive pulmonary disease) (HCC)     DDD (degenerative disc disease)     Elbow fracture, right     Fractures     elbow    GERD (gastroesophageal reflux disease)     Gout     Right great toe    Hyperlipidemia     Hypertension     Mild depression ([address] 9/26/2013    2 suicide attempts by girlfriend related to depression.     Osteoarthritis of right knee     Sleep apnea     Sleep disturbance     Tendonitis of foot     right       Past Surgical History:   Procedure Laterality Date    CARDIAC CATHETERIZATION  7/8/14    Non Obstructive CAD     CERVICAL SPINE SURGERY  7/13/12     C2-3-4-5    COLONOSCOPY      FRACTURE SURGERY      Rt elbow     NERVE BLOCK  2/5/16    premier tens    UPPER GASTROINTESTINAL ENDOSCOPY         No Known Allergies      Current Outpatient Prescriptions:     zoster recombinant adjuvanted vaccine (SHINGRIX) 50 MCG SUSR injection, 50 MCG IM then repeat 2-6 months., Disp: 0.5 mL, Rfl: 1    atorvastatin (LIPITOR) 80 MG tablet, Take 1 tablet by mouth daily, Disp: 30 tablet, Rfl: 3    oxyCODONE (ROXICODONE) 5 MG immediate release tablet, Take 1 tablet by mouth every 12 hours as needed for Pain for up to 30 days. ., Disp: 60 tablet, Rfl: 0    gabapentin (NEURONTIN) 800 MG tablet, Take 1 tablet by mouth every 8 hours for 30 days. ., Disp: 90 tablet, Rfl: 0    oxyCODONE (OXYCONTIN) 20 MG extended release tablet, Take 1 tablet by mouth every 12 hours for 30 days. ., Disp: 60 tablet, Rfl: 0    tiZANidine (ZANAFLEX) 4 MG tablet, Take 1 tablet by mouth 2 times daily as needed (for muscle spasms) Break day time tablet in half- take two times a day take one tablet at HS, Disp: 60 tablet, Rfl: 0    cetirizine (ZYRTEC ALLERGY) 10 MG tablet, Take 1 tablet by mouth daily, Disp: 1 tablet, Rfl: 2    fluticasone (FLONASE) 50 MCG/ACT nasal spray, 1 spray by Nasal route daily, Disp: 1 Bottle, Rfl: 3    metFORMIN (GLUCOPHAGE) 500 MG tablet, Take 1 tablet by mouth daily (with breakfast), Disp: 60 tablet, Rfl: 3    traZODone (DESYREL) 150 MG tablet, Take 150 mg by mouth nightly, Disp: , Rfl:     omeprazole (PRILOSEC) 20 MG delayed release capsule, TAKE ONE CAPSULE BY MOUTH TWICE A DAY 30 MINUTES BEFORE MEALS, Disp: 60 capsule, Rfl: 6    nitroGLYCERIN (NITROSTAT) 0.4 MG SL tablet, Place 1 tablet under the tongue every 5 minutes as needed for Chest pain, Disp: 25 tablet, Rfl: 3    amLODIPine (NORVASC) 5 MG tablet,

## 2018-10-06 LAB

## 2018-10-07 ENCOUNTER — HOSPITAL ENCOUNTER (EMERGENCY)
Age: 58
Discharge: HOME | End: 2018-10-07
Attending: EMERGENCY MEDICINE
Payer: MEDICARE

## 2018-10-07 ENCOUNTER — APPOINTMENT (OUTPATIENT)
Dept: CT IMAGING | Age: 58
End: 2018-10-07
Payer: MEDICARE

## 2018-10-07 VITALS
HEIGHT: 69 IN | RESPIRATION RATE: 14 BRPM | TEMPERATURE: 98.2 F | OXYGEN SATURATION: 96 % | HEART RATE: 71 BPM | SYSTOLIC BLOOD PRESSURE: 125 MMHG | DIASTOLIC BLOOD PRESSURE: 63 MMHG | BODY MASS INDEX: 33.47 KG/M2 | WEIGHT: 226 LBS

## 2018-10-07 DIAGNOSIS — J01.90 ACUTE NON-RECURRENT SINUSITIS, UNSPECIFIED LOCATION: ICD-10-CM

## 2018-10-07 DIAGNOSIS — R51.9 ACUTE NONINTRACTABLE HEADACHE, UNSPECIFIED HEADACHE TYPE: Primary | ICD-10-CM

## 2018-10-07 LAB
ABSOLUTE EOS #: 0.2 K/UL (ref 0–0.4)
ABSOLUTE IMMATURE GRANULOCYTE: ABNORMAL K/UL (ref 0–0.3)
ABSOLUTE LYMPH #: 2.4 K/UL (ref 1–4.8)
ABSOLUTE MONO #: 0.6 K/UL (ref 0.1–1.3)
ALBUMIN SERPL-MCNC: 4.2 G/DL (ref 3.5–5.2)
ALBUMIN/GLOBULIN RATIO: ABNORMAL (ref 1–2.5)
ALP BLD-CCNC: 90 U/L (ref 40–129)
ALT SERPL-CCNC: 34 U/L (ref 5–41)
ANION GAP SERPL CALCULATED.3IONS-SCNC: 10 MMOL/L (ref 9–17)
AST SERPL-CCNC: 25 U/L
BASOPHILS # BLD: 1 % (ref 0–2)
BASOPHILS ABSOLUTE: 0 K/UL (ref 0–0.2)
BILIRUB SERPL-MCNC: 0.21 MG/DL (ref 0.3–1.2)
BUN BLDV-MCNC: 13 MG/DL (ref 8–23)
BUN/CREAT BLD: ABNORMAL (ref 9–20)
C-REACTIVE PROTEIN: 25.5 MG/L (ref 0–5)
CALCIUM SERPL-MCNC: 9.6 MG/DL (ref 8.6–10.4)
CHLORIDE BLD-SCNC: 101 MMOL/L (ref 98–107)
CO2: 24 MMOL/L (ref 20–31)
CREAT SERPL-MCNC: 0.74 MG/DL (ref 0.7–1.2)
DIFFERENTIAL TYPE: ABNORMAL
EOSINOPHILS RELATIVE PERCENT: 2 % (ref 0–4)
GFR AFRICAN AMERICAN: >60 ML/MIN
GFR NON-AFRICAN AMERICAN: >60 ML/MIN
GFR SERPL CREATININE-BSD FRML MDRD: ABNORMAL ML/MIN/{1.73_M2}
GFR SERPL CREATININE-BSD FRML MDRD: ABNORMAL ML/MIN/{1.73_M2}
GLUCOSE BLD-MCNC: 125 MG/DL (ref 70–99)
HCT VFR BLD CALC: 40.2 % (ref 41–53)
HEMOGLOBIN: 13.7 G/DL (ref 13.5–17.5)
IMMATURE GRANULOCYTES: ABNORMAL %
INR BLD: 1
LYMPHOCYTES # BLD: 31 % (ref 24–44)
MCH RBC QN AUTO: 29 PG (ref 26–34)
MCHC RBC AUTO-ENTMCNC: 34.1 G/DL (ref 31–37)
MCV RBC AUTO: 84.9 FL (ref 80–100)
MONOCYTES # BLD: 8 % (ref 1–7)
NRBC AUTOMATED: ABNORMAL PER 100 WBC
PARTIAL THROMBOPLASTIN TIME: 28.8 SEC (ref 23–31)
PDW BLD-RTO: 16 % (ref 11.5–14.9)
PLATELET # BLD: 235 K/UL (ref 150–450)
PLATELET ESTIMATE: ABNORMAL
PMV BLD AUTO: 7.1 FL (ref 6–12)
POTASSIUM SERPL-SCNC: 4.2 MMOL/L (ref 3.7–5.3)
PROTHROMBIN TIME: 10.2 SEC (ref 9.7–12)
RBC # BLD: 4.74 M/UL (ref 4.5–5.9)
RBC # BLD: ABNORMAL 10*6/UL
SEDIMENTATION RATE, ERYTHROCYTE: 17 MM (ref 0–15)
SEG NEUTROPHILS: 58 % (ref 36–66)
SEGMENTED NEUTROPHILS ABSOLUTE COUNT: 4.8 K/UL (ref 1.3–9.1)
SODIUM BLD-SCNC: 135 MMOL/L (ref 135–144)
TOTAL PROTEIN: 7.1 G/DL (ref 6.4–8.3)
WBC # BLD: 8 K/UL (ref 3.5–11)
WBC # BLD: ABNORMAL 10*3/UL

## 2018-10-07 PROCEDURE — 80053 COMPREHEN METABOLIC PANEL: CPT

## 2018-10-07 PROCEDURE — 99284 EMERGENCY DEPT VISIT MOD MDM: CPT

## 2018-10-07 PROCEDURE — 85730 THROMBOPLASTIN TIME PARTIAL: CPT

## 2018-10-07 PROCEDURE — 86140 C-REACTIVE PROTEIN: CPT

## 2018-10-07 PROCEDURE — 36415 COLL VENOUS BLD VENIPUNCTURE: CPT

## 2018-10-07 PROCEDURE — 85651 RBC SED RATE NONAUTOMATED: CPT

## 2018-10-07 PROCEDURE — 2580000003 HC RX 258: Performed by: EMERGENCY MEDICINE

## 2018-10-07 PROCEDURE — 6360000002 HC RX W HCPCS: Performed by: EMERGENCY MEDICINE

## 2018-10-07 PROCEDURE — 70450 CT HEAD/BRAIN W/O DYE: CPT

## 2018-10-07 PROCEDURE — 96375 TX/PRO/DX INJ NEW DRUG ADDON: CPT

## 2018-10-07 PROCEDURE — 6370000000 HC RX 637 (ALT 250 FOR IP): Performed by: EMERGENCY MEDICINE

## 2018-10-07 PROCEDURE — 85025 COMPLETE CBC W/AUTO DIFF WBC: CPT

## 2018-10-07 PROCEDURE — 85610 PROTHROMBIN TIME: CPT

## 2018-10-07 PROCEDURE — 96374 THER/PROPH/DIAG INJ IV PUSH: CPT

## 2018-10-07 RX ORDER — FLUTICASONE PROPIONATE 50 MCG
2 SPRAY, SUSPENSION (ML) NASAL DAILY
Qty: 1 BOTTLE | Refills: 0 | Status: SHIPPED | OUTPATIENT
Start: 2018-10-07 | End: 2019-04-04

## 2018-10-07 RX ORDER — AMOXICILLIN AND CLAVULANATE POTASSIUM 875; 125 MG/1; MG/1
1 TABLET, FILM COATED ORAL ONCE
Status: COMPLETED | OUTPATIENT
Start: 2018-10-07 | End: 2018-10-07

## 2018-10-07 RX ORDER — PROPARACAINE HYDROCHLORIDE 5 MG/ML
1 SOLUTION/ DROPS OPHTHALMIC ONCE
Status: COMPLETED | OUTPATIENT
Start: 2018-10-07 | End: 2018-10-07

## 2018-10-07 RX ORDER — DIPHENHYDRAMINE HCL 25 MG
50 TABLET ORAL ONCE
Status: COMPLETED | OUTPATIENT
Start: 2018-10-07 | End: 2018-10-07

## 2018-10-07 RX ORDER — AMOXICILLIN AND CLAVULANATE POTASSIUM 875; 125 MG/1; MG/1
1 TABLET, FILM COATED ORAL 2 TIMES DAILY
Qty: 20 TABLET | Refills: 0 | Status: SHIPPED | OUTPATIENT
Start: 2018-10-07 | End: 2018-10-17

## 2018-10-07 RX ORDER — KETOROLAC TROMETHAMINE 30 MG/ML
30 INJECTION, SOLUTION INTRAMUSCULAR; INTRAVENOUS ONCE
Status: COMPLETED | OUTPATIENT
Start: 2018-10-07 | End: 2018-10-07

## 2018-10-07 RX ORDER — 0.9 % SODIUM CHLORIDE 0.9 %
1000 INTRAVENOUS SOLUTION INTRAVENOUS ONCE
Status: COMPLETED | OUTPATIENT
Start: 2018-10-07 | End: 2018-10-07

## 2018-10-07 RX ADMIN — FLUORESCEIN SODIUM 1 MG: 1 STRIP OPHTHALMIC at 16:25

## 2018-10-07 RX ADMIN — SODIUM CHLORIDE 1000 ML: 9 INJECTION, SOLUTION INTRAVENOUS at 16:35

## 2018-10-07 RX ADMIN — KETOROLAC TROMETHAMINE 30 MG: 30 INJECTION, SOLUTION INTRAMUSCULAR at 16:25

## 2018-10-07 RX ADMIN — PROPARACAINE HYDROCHLORIDE 1 DROP: 5 SOLUTION/ DROPS OPHTHALMIC at 16:25

## 2018-10-07 RX ADMIN — DIPHENHYDRAMINE HCL 50 MG: 25 TABLET ORAL at 16:25

## 2018-10-07 RX ADMIN — AMOXICILLIN AND CLAVULANATE POTASSIUM 1 TABLET: 875; 125 TABLET, FILM COATED ORAL at 18:25

## 2018-10-07 RX ADMIN — PROCHLORPERAZINE EDISYLATE 10 MG: 5 INJECTION INTRAMUSCULAR; INTRAVENOUS at 17:24

## 2018-10-07 ASSESSMENT — VISUAL ACUITY
OU: 20/20
OS: 20/20
OD: 20/40

## 2018-10-07 ASSESSMENT — ENCOUNTER SYMPTOMS
GASTROINTESTINAL NEGATIVE: 1
RESPIRATORY NEGATIVE: 1
COUGH: 0
EYES NEGATIVE: 1
SHORTNESS OF BREATH: 0
BACK PAIN: 0
ABDOMINAL PAIN: 0

## 2018-10-07 ASSESSMENT — PAIN DESCRIPTION - LOCATION: LOCATION: HEAD

## 2018-10-07 ASSESSMENT — PAIN SCALES - GENERAL
PAINLEVEL_OUTOF10: 6
PAINLEVEL_OUTOF10: 7
PAINLEVEL_OUTOF10: 5

## 2018-10-07 ASSESSMENT — PAIN DESCRIPTION - DESCRIPTORS: DESCRIPTORS: CONSTANT

## 2018-10-07 NOTE — ED PROVIDER NOTES
pain    Chronic back pain     [facility]     DDD (degenerative disc disease)     Elbow fracture, right     Fractures     elbow    GERD (gastroesophageal reflux disease)     Gout     Right great toe    Hyperlipidemia     Hypertension     Mild depression ([address] 9/26/2013    2 suicide attempts by girlfriend related to depression.  Osteoarthritis of right knee     Sleep apnea     Sleep disturbance     Tendonitis of foot     right     SURGICAL HISTORY       Past Surgical History:   Procedure Laterality Date    CARDIAC CATHETERIZATION  7/8/14    Non Obstructive CAD     CERVICAL SPINE SURGERY  7/13/12     C2-3-4-5    COLONOSCOPY      FRACTURE SURGERY      Rt elbow     NERVE BLOCK  2/5/16    premier tens    UPPER GASTROINTESTINAL ENDOSCOPY       CURRENT MEDICATIONS       Previous Medications    ALBUTEROL SULFATE  (90 BASE) MCG/ACT INHALER    Inhale 2 puffs into the lungs every 6 hours as needed for Wheezing    AMLODIPINE (NORVASC) 5 MG TABLET    Take 1 tablet by mouth daily    ASPIRIN 81 MG EC TABLET    Take 1 tablet by mouth daily    ATORVASTATIN (LIPITOR) 80 MG TABLET    Take 1 tablet by mouth daily    BUDESONIDE-FORMOTEROL (SYMBICORT) 160-4.5 MCG/ACT AERO    Inhale 2 puffs into the lungs 2 times daily    CETIRIZINE (ZYRTEC ALLERGY) 10 MG TABLET    Take 1 tablet by mouth daily    CITALOPRAM (CELEXA) 20 MG TABLET    Take 20 mg by mouth daily     DOCUSATE SODIUM (COLACE) 100 MG CAPSULE    Take 100 mg by mouth 2 times daily    GABAPENTIN (NEURONTIN) 800 MG TABLET    Take 1 tablet by mouth every 8 hours for 30 days. .    IBUPROFEN (ADVIL;MOTRIN) 200 MG TABLET    Take 200 mg by mouth as needed for Pain    METFORMIN (GLUCOPHAGE) 500 MG TABLET    Take 1 tablet by mouth daily (with breakfast)    NITROGLYCERIN (NITROSTAT) 0.4 MG SL TABLET    Place 1 tablet under the tongue every 5 minutes as needed for Chest pain    OMEPRAZOLE (PRILOSEC) 20 MG DELAYED RELEASE supple. Cardiovascular: Normal rate, regular rhythm and normal heart sounds. No murmur heard. Pulmonary/Chest: Effort normal and breath sounds normal.   Abdominal: Soft. There is no tenderness. Musculoskeletal: He exhibits no deformity. Neurological: He is alert and oriented to person, place, and time. He has normal strength. No cranial nerve deficit or sensory deficit. Coordination normal. GCS eye subscore is 4. GCS verbal subscore is 5. GCS motor subscore is 6. Skin: Skin is warm and dry. No rash noted. He is not diaphoretic. Nursing note and vitals reviewed. MEDICAL DECISION MAKING:     Visual acuity 20/20 on left. CT negative except for sinusitis. Labs nonacute except for mild elevation of crp and sed rate. Overall, I do not think this is clinically consistent with temporal arteritis. Eye exam is benign, no evidence of infection, injury, angle closure crisis, scleritis or other concerning pathology. Possible migraine or cluster headache. Given migraine cocktail and put on o2. No evidence of hemorrhage or acute changes on ct. I do not suspect ich, meningitis, or other emergent pathology. On reeval, pt feeling much better. We discussed results and disposition. Pt expressed understanding of results and plan. We will hold further workup such as lp, biopsy, etc.  Pt will follow up with his doctor tomorrow. I did provide strict return instructions at the bedside. Otherwise, I will discharge the pt now with nasal steroids and augmentin for the sinusitis. Overall, consistent with cluster headache vs migraine vs sinusitis.      CRITICAL CARE:       PROCEDURES:    Procedures    DIAGNOSTIC RESULTS   EKG: All EKG's are interpreted by the Emergency Department Physician who either signs or Co-signs this chart in the absence of a cardiologist.      RADIOLOGY:All plain film, CT, MRI, and formal ultrasound images (except ED bedside ultrasound) are read by the radiologist, see reports below, Refill:  0    fluticasone (FLONASE) 50 MCG/ACT nasal spray     Si sprays by Each Nare route daily     Dispense:  1 Bottle     Refill:  0     CONSULTS:  None    FINAL IMPRESSION      1. Acute nonintractable headache, unspecified headache type    2. Acute non-recurrent sinusitis, unspecified location          DISPOSITION/PLAN   DISPOSITION Decision To Discharge 10/07/2018 05:59:13 PM      PATIENT REFERRED TO:  Renetta Tellez MD  [address] [facility] [address]  [address] [address]    Call in 1 day      DISCHARGE MEDICATIONS:  New Prescriptions    AMOXICILLIN-CLAVULANATE (AUGMENTIN) 875-125 MG PER TABLET    Take 1 tablet by mouth 2 times daily for 10 days    FLUTICASONE (FLONASE) 50 MCG/ACT NASAL SPRAY    2 sprays by Each Nare route daily     Mark Cramer MD  Attending Emergency Physician  Node1 voice recognition software used in portions of this document.                     Daisy Nelson MD  10/07/18 2611

## 2018-10-07 NOTE — ED NOTES
Pt placed on NRB mask per Dr. Karlos Rivera verbal order for possible cluster migraine.      Renea Low RN  10/07/18 6698

## 2018-10-08 NOTE — PROGRESS NOTES
DORYS negative for prescribed opiates. Patient reported 5 days of N/V/D before appointment. Please repeat DORYS in the near future. Historically compliant with medication contract.

## 2018-10-09 ENCOUNTER — TELEPHONE (OUTPATIENT)
Dept: FAMILY MEDICINE CLINIC | Age: 58
End: 2018-10-09

## 2018-10-10 ENCOUNTER — OFFICE VISIT (OUTPATIENT)
Dept: FAMILY MEDICINE CLINIC | Age: 58
End: 2018-10-10
Payer: MEDICARE

## 2018-10-10 VITALS
HEIGHT: 69 IN | SYSTOLIC BLOOD PRESSURE: 136 MMHG | WEIGHT: 224.6 LBS | DIASTOLIC BLOOD PRESSURE: 77 MMHG | OXYGEN SATURATION: 97 % | HEART RATE: 83 BPM | BODY MASS INDEX: 33.27 KG/M2

## 2018-10-10 DIAGNOSIS — M48.061 SPINAL STENOSIS OF LUMBAR REGION WITHOUT NEUROGENIC CLAUDICATION: Primary | ICD-10-CM

## 2018-10-10 DIAGNOSIS — E78.2 MIXED HYPERLIPIDEMIA: ICD-10-CM

## 2018-10-10 DIAGNOSIS — R51.9 SINUS HEADACHE: ICD-10-CM

## 2018-10-10 DIAGNOSIS — R73.03 PREDIABETES: ICD-10-CM

## 2018-10-10 DIAGNOSIS — J30.9 ALLERGIC SINUSITIS: ICD-10-CM

## 2018-10-10 LAB
CODEINE, URINE: <20 NG/ML
HBA1C MFR BLD: 5.8 %
HYDROCODONE, URINE CONFIRMATION: <20 NG/ML
HYDROMORPHONE, URINE CONFIRMATION: <20 NG/ML
MORPHINE, URINE CONFIRMATION: <20 NG/ML
NORHYDROCODONE, URINE: <20 NG/ML
NOROXYCODONE, URINE: <20 NG/ML
NOROXYMORPHONE, URINE: <20 NG/ML
OPIATE, 6-AM URINE: <10 NG/ML
OXYCODONE, URINE CONFIRMATION: <20 NG/ML
OXYMORPHONE, URINE CONFIRMATION: <20 NG/ML

## 2018-10-10 PROCEDURE — G8427 DOCREV CUR MEDS BY ELIG CLIN: HCPCS | Performed by: FAMILY MEDICINE

## 2018-10-10 PROCEDURE — G8484 FLU IMMUNIZE NO ADMIN: HCPCS | Performed by: FAMILY MEDICINE

## 2018-10-10 PROCEDURE — 3017F COLORECTAL CA SCREEN DOC REV: CPT | Performed by: FAMILY MEDICINE

## 2018-10-10 PROCEDURE — G8417 CALC BMI ABV UP PARAM F/U: HCPCS | Performed by: FAMILY MEDICINE

## 2018-10-10 PROCEDURE — 83036 HEMOGLOBIN GLYCOSYLATED A1C: CPT | Performed by: FAMILY MEDICINE

## 2018-10-10 PROCEDURE — G8510 SCR DEP NEG, NO PLAN REQD: HCPCS | Performed by: FAMILY MEDICINE

## 2018-10-10 PROCEDURE — 4004F PT TOBACCO SCREEN RCVD TLK: CPT | Performed by: FAMILY MEDICINE

## 2018-10-10 PROCEDURE — 99214 OFFICE O/P EST MOD 30 MIN: CPT | Performed by: FAMILY MEDICINE

## 2018-10-10 RX ORDER — AZELASTINE HYDROCHLORIDE 137 UG/1
1 SPRAY, METERED NASAL DAILY
Qty: 1 BOTTLE | Refills: 0 | Status: SHIPPED | OUTPATIENT
Start: 2018-10-10 | End: 2019-04-04

## 2018-10-10 ASSESSMENT — PATIENT HEALTH QUESTIONNAIRE - PHQ9
SUM OF ALL RESPONSES TO PHQ9 QUESTIONS 1 & 2: 0
2. FEELING DOWN, DEPRESSED OR HOPELESS: 0
SUM OF ALL RESPONSES TO PHQ QUESTIONS 1-9: 0
1. LITTLE INTEREST OR PLEASURE IN DOING THINGS: 0
SUM OF ALL RESPONSES TO PHQ QUESTIONS 1-9: 0

## 2018-11-01 ENCOUNTER — HOSPITAL ENCOUNTER (OUTPATIENT)
Age: 58
Discharge: HOME | End: 2018-11-01
Payer: MEDICARE

## 2018-11-01 ENCOUNTER — HOSPITAL ENCOUNTER (OUTPATIENT)
Dept: PAIN MANAGEMENT | Age: 58
Discharge: HOME | End: 2018-11-01
Payer: MEDICARE

## 2018-11-01 VITALS
RESPIRATION RATE: 16 BRPM | DIASTOLIC BLOOD PRESSURE: 74 MMHG | TEMPERATURE: 97.5 F | SYSTOLIC BLOOD PRESSURE: 144 MMHG | BODY MASS INDEX: 33.18 KG/M2 | HEIGHT: 69 IN | HEART RATE: 74 BPM | WEIGHT: 224 LBS

## 2018-11-01 DIAGNOSIS — M48.02 CERVICAL STENOSIS OF SPINE: ICD-10-CM

## 2018-11-01 DIAGNOSIS — M48.061 SPINAL STENOSIS OF LUMBAR REGION WITHOUT NEUROGENIC CLAUDICATION: ICD-10-CM

## 2018-11-01 DIAGNOSIS — M51.36 DDD (DEGENERATIVE DISC DISEASE), LUMBAR: ICD-10-CM

## 2018-11-01 DIAGNOSIS — M51.26 DISPLACEMENT OF LUMBAR INTERVERTEBRAL DISC WITHOUT MYELOPATHY: ICD-10-CM

## 2018-11-01 DIAGNOSIS — M54.5 CHRONIC BILATERAL LOW BACK PAIN, WITH SCIATICA PRESENCE UNSPECIFIED: Primary | ICD-10-CM

## 2018-11-01 DIAGNOSIS — G89.29 CHRONIC BILATERAL LOW BACK PAIN, WITH SCIATICA PRESENCE UNSPECIFIED: ICD-10-CM

## 2018-11-01 DIAGNOSIS — G89.29 CHRONIC BILATERAL LOW BACK PAIN, WITH SCIATICA PRESENCE UNSPECIFIED: Primary | ICD-10-CM

## 2018-11-01 DIAGNOSIS — M54.5 CHRONIC BILATERAL LOW BACK PAIN, WITH SCIATICA PRESENCE UNSPECIFIED: ICD-10-CM

## 2018-11-01 DIAGNOSIS — Z51.81 MEDICATION MONITORING ENCOUNTER: ICD-10-CM

## 2018-11-01 DIAGNOSIS — M54.12 RADICULOPATHY OF CERVICAL SPINE: ICD-10-CM

## 2018-11-01 DIAGNOSIS — F32.A MILD DEPRESSION: ICD-10-CM

## 2018-11-01 LAB
C-REACTIVE PROTEIN: 2.3 MG/L (ref 0–5)
HCT VFR BLD CALC: 40.8 % (ref 41–53)
HEMOGLOBIN: 13.5 G/DL (ref 13.5–17.5)
MCH RBC QN AUTO: 28.2 PG (ref 26–34)
MCHC RBC AUTO-ENTMCNC: 33 G/DL (ref 31–37)
MCV RBC AUTO: 85.4 FL (ref 80–100)
NRBC AUTOMATED: ABNORMAL PER 100 WBC
PDW BLD-RTO: 15.6 % (ref 11.5–14.9)
PLATELET # BLD: 259 K/UL (ref 150–450)
PMV BLD AUTO: 6.9 FL (ref 6–12)
RBC # BLD: 4.78 M/UL (ref 4.5–5.9)
SEDIMENTATION RATE, ERYTHROCYTE: 5 MM (ref 0–15)
WBC # BLD: 8 K/UL (ref 3.5–11)

## 2018-11-01 PROCEDURE — 86140 C-REACTIVE PROTEIN: CPT

## 2018-11-01 PROCEDURE — 99215 OFFICE O/P EST HI 40 MIN: CPT

## 2018-11-01 PROCEDURE — 36415 COLL VENOUS BLD VENIPUNCTURE: CPT

## 2018-11-01 PROCEDURE — 80307 DRUG TEST PRSMV CHEM ANLYZR: CPT

## 2018-11-01 PROCEDURE — 99214 OFFICE O/P EST MOD 30 MIN: CPT | Performed by: PAIN MEDICINE

## 2018-11-01 PROCEDURE — 85027 COMPLETE CBC AUTOMATED: CPT

## 2018-11-01 PROCEDURE — 85651 RBC SED RATE NONAUTOMATED: CPT

## 2018-11-01 RX ORDER — OXYCODONE HCL 20 MG/1
20 TABLET, FILM COATED, EXTENDED RELEASE ORAL EVERY 12 HOURS
Qty: 28 TABLET | Refills: 0 | Status: SHIPPED | OUTPATIENT
Start: 2018-11-03 | End: 2018-11-01 | Stop reason: SDUPTHER

## 2018-11-01 RX ORDER — OXYCODONE HYDROCHLORIDE 5 MG/1
5 TABLET ORAL EVERY 12 HOURS PRN
Qty: 14 TABLET | Refills: 0 | Status: SHIPPED | OUTPATIENT
Start: 2018-11-03 | End: 2018-11-01 | Stop reason: SDUPTHER

## 2018-11-01 RX ORDER — OXYCODONE HYDROCHLORIDE 5 MG/1
5 TABLET ORAL EVERY 12 HOURS PRN
Qty: 14 TABLET | Refills: 0 | Status: SHIPPED | OUTPATIENT
Start: 2018-11-03 | End: 2018-11-08 | Stop reason: SDUPTHER

## 2018-11-01 RX ORDER — OXYCODONE HYDROCHLORIDE 5 MG/1
5 TABLET ORAL EVERY 12 HOURS PRN
Qty: 28 TABLET | Refills: 0 | Status: SHIPPED | OUTPATIENT
Start: 2018-11-03 | End: 2018-11-01 | Stop reason: SDUPTHER

## 2018-11-01 RX ORDER — OXYCODONE HCL 20 MG/1
20 TABLET, FILM COATED, EXTENDED RELEASE ORAL EVERY 12 HOURS
Qty: 14 TABLET | Refills: 0 | Status: SHIPPED | OUTPATIENT
Start: 2018-11-03 | End: 2018-11-01 | Stop reason: SDUPTHER

## 2018-11-01 RX ORDER — TIZANIDINE 4 MG/1
4 TABLET ORAL 2 TIMES DAILY PRN
Qty: 28 TABLET | Refills: 0 | Status: SHIPPED | OUTPATIENT
Start: 2018-11-03 | End: 2018-11-08 | Stop reason: SDUPTHER

## 2018-11-01 RX ORDER — OXYCODONE HCL 20 MG/1
20 TABLET, FILM COATED, EXTENDED RELEASE ORAL EVERY 12 HOURS
Qty: 14 TABLET | Refills: 0 | Status: SHIPPED | OUTPATIENT
Start: 2018-11-03 | End: 2018-11-08 | Stop reason: ALTCHOICE

## 2018-11-01 RX ORDER — GABAPENTIN 800 MG/1
800 TABLET ORAL EVERY 8 HOURS SCHEDULED
Qty: 42 TABLET | Refills: 0 | Status: SHIPPED | OUTPATIENT
Start: 2018-11-03 | End: 2018-11-08 | Stop reason: SDUPTHER

## 2018-11-01 ASSESSMENT — ENCOUNTER SYMPTOMS
BOWEL INCONTINENCE: 0
BACK PAIN: 1

## 2018-11-04 LAB
6-ACETYLMORPHINE, UR: NOT DETECTED
6-ACETYLMORPHINE, UR: NOT DETECTED
7-AMINOCLONAZEPAM, URINE: NOT DETECTED
7-AMINOCLONAZEPAM, URINE: NOT DETECTED
ALPHA-OH-ALPRAZ, URINE: NOT DETECTED
ALPHA-OH-ALPRAZ, URINE: NOT DETECTED
ALPRAZOLAM, URINE: NOT DETECTED
ALPRAZOLAM, URINE: NOT DETECTED
AMPHETAMINES, URINE: NOT DETECTED
AMPHETAMINES, URINE: NOT DETECTED
BARBITURATES, URINE: NOT DETECTED
BARBITURATES, URINE: NOT DETECTED
BENZOYLECGONINE, UR: NOT DETECTED
BENZOYLECGONINE, UR: NOT DETECTED
BUPRENORPHINE URINE: NOT DETECTED
BUPRENORPHINE URINE: NOT DETECTED
CARISOPRODOL, UR: NOT DETECTED
CARISOPRODOL, UR: NOT DETECTED
CLONAZEPAM, URINE: NOT DETECTED
CLONAZEPAM, URINE: NOT DETECTED
CODEINE, URINE: NOT DETECTED
CODEINE, URINE: NOT DETECTED
CREATININE URINE: 119 MG/DL (ref 20–400)
CREATININE URINE: 170.3 MG/DL (ref 20–400)
DIAZEPAM, URINE: NOT DETECTED
DIAZEPAM, URINE: NOT DETECTED
EER PAIN MGT DRUG PANEL, HIGH RES/EMIT U: NORMAL
EER PAIN MGT DRUG PANEL, HIGH RES/EMIT U: NORMAL
ETHYL GLUCURONIDE UR: NOT DETECTED
ETHYL GLUCURONIDE UR: NOT DETECTED
FENTANYL URINE: NOT DETECTED
FENTANYL URINE: NOT DETECTED
HYDROCODONE, URINE: NOT DETECTED
HYDROCODONE, URINE: NOT DETECTED
HYDROMORPHONE, URINE: NOT DETECTED
HYDROMORPHONE, URINE: NOT DETECTED
LORAZEPAM, URINE: NOT DETECTED
LORAZEPAM, URINE: NOT DETECTED
MARIJUANA METAB, UR: NOT DETECTED
MARIJUANA METAB, UR: NOT DETECTED
MDA, UR: NOT DETECTED
MDA, UR: NOT DETECTED
MDEA, EVE, UR: NOT DETECTED
MDEA, EVE, UR: NOT DETECTED
MDMA URINE: NOT DETECTED
MDMA URINE: NOT DETECTED
MEPERIDINE METAB, UR: NOT DETECTED
MEPERIDINE METAB, UR: NOT DETECTED
METHADONE, URINE: NOT DETECTED
METHADONE, URINE: NOT DETECTED
METHAMPHETAMINE, URINE: NOT DETECTED
METHAMPHETAMINE, URINE: NOT DETECTED
METHYLPHENIDATE: NOT DETECTED
METHYLPHENIDATE: NOT DETECTED
MIDAZOLAM, URINE: NOT DETECTED
MIDAZOLAM, URINE: NOT DETECTED
MORPHINE URINE: NOT DETECTED
MORPHINE URINE: NOT DETECTED
NORBUPRENORPHINE, URINE: NOT DETECTED
NORBUPRENORPHINE, URINE: NOT DETECTED
NORDIAZEPAM, URINE: NOT DETECTED
NORDIAZEPAM, URINE: NOT DETECTED
NORFENTANYL, URINE: NOT DETECTED
NORFENTANYL, URINE: NOT DETECTED
NORHYDROCODONE, URINE: NOT DETECTED
NORHYDROCODONE, URINE: NOT DETECTED
NOROXYCODONE, URINE: PRESENT
NOROXYCODONE, URINE: PRESENT
NOROXYMORPHONE, URINE: NOT DETECTED
NOROXYMORPHONE, URINE: PRESENT
OXAZEPAM, URINE: NOT DETECTED
OXAZEPAM, URINE: NOT DETECTED
OXYCODONE URINE: PRESENT
OXYCODONE URINE: PRESENT
OXYMORPHONE, URINE: PRESENT
OXYMORPHONE, URINE: PRESENT
PAIN MGT DRUG PANEL, HI RES, UR: NORMAL
PAIN MGT DRUG PANEL, HI RES, UR: NORMAL
PCP,URINE: NOT DETECTED
PCP,URINE: NOT DETECTED
PHENTERMINE, UR: NOT DETECTED
PHENTERMINE, UR: NOT DETECTED
PROPOXYPHENE, URINE: NOT DETECTED
PROPOXYPHENE, URINE: NOT DETECTED
TAPENTADOL, URINE: NOT DETECTED
TAPENTADOL, URINE: NOT DETECTED
TAPENTADOL-O-SULFATE, URINE: NOT DETECTED
TAPENTADOL-O-SULFATE, URINE: NOT DETECTED
TEMAZEPAM, URINE: NOT DETECTED
TEMAZEPAM, URINE: NOT DETECTED
TRAMADOL, URINE: NOT DETECTED
TRAMADOL, URINE: NOT DETECTED
ZOLPIDEM, URINE: NOT DETECTED
ZOLPIDEM, URINE: NOT DETECTED

## 2018-11-08 ENCOUNTER — HOSPITAL ENCOUNTER (OUTPATIENT)
Dept: PAIN MANAGEMENT | Age: 58
Discharge: HOME | End: 2018-11-08
Payer: MEDICARE

## 2018-11-08 VITALS
SYSTOLIC BLOOD PRESSURE: 136 MMHG | HEART RATE: 83 BPM | HEIGHT: 69 IN | BODY MASS INDEX: 33.18 KG/M2 | RESPIRATION RATE: 16 BRPM | TEMPERATURE: 98.5 F | WEIGHT: 224 LBS | DIASTOLIC BLOOD PRESSURE: 76 MMHG

## 2018-11-08 DIAGNOSIS — F32.A MILD DEPRESSION: ICD-10-CM

## 2018-11-08 DIAGNOSIS — M51.26 DISPLACEMENT OF LUMBAR INTERVERTEBRAL DISC WITHOUT MYELOPATHY: ICD-10-CM

## 2018-11-08 DIAGNOSIS — M48.02 CERVICAL STENOSIS OF SPINE: ICD-10-CM

## 2018-11-08 DIAGNOSIS — M51.36 DDD (DEGENERATIVE DISC DISEASE), LUMBAR: ICD-10-CM

## 2018-11-08 PROCEDURE — 99214 OFFICE O/P EST MOD 30 MIN: CPT | Performed by: PAIN MEDICINE

## 2018-11-08 PROCEDURE — 99214 OFFICE O/P EST MOD 30 MIN: CPT

## 2018-11-08 RX ORDER — OXYCODONE HCL 10 MG/1
10 TABLET, FILM COATED, EXTENDED RELEASE ORAL 2 TIMES DAILY
Qty: 60 TABLET | Refills: 0 | Status: SHIPPED | OUTPATIENT
Start: 2018-11-08 | End: 2018-12-07 | Stop reason: SDUPTHER

## 2018-11-08 RX ORDER — OXYCODONE HYDROCHLORIDE 5 MG/1
5 TABLET ORAL EVERY 12 HOURS PRN
Qty: 60 TABLET | Refills: 0 | Status: SHIPPED | OUTPATIENT
Start: 2018-11-08 | End: 2018-12-07 | Stop reason: SDUPTHER

## 2018-11-08 RX ORDER — GABAPENTIN 800 MG/1
800 TABLET ORAL EVERY 8 HOURS SCHEDULED
Qty: 90 TABLET | Refills: 0 | Status: SHIPPED | OUTPATIENT
Start: 2018-11-08 | End: 2018-12-07 | Stop reason: SDUPTHER

## 2018-11-08 RX ORDER — TIZANIDINE 4 MG/1
4 TABLET ORAL 2 TIMES DAILY PRN
Qty: 60 TABLET | Refills: 0 | Status: SHIPPED | OUTPATIENT
Start: 2018-11-08 | End: 2018-12-07 | Stop reason: SDUPTHER

## 2018-11-08 ASSESSMENT — ENCOUNTER SYMPTOMS
TROUBLE SWALLOWING: 0
ABDOMINAL PAIN: 0
BACK PAIN: 1
BOWEL INCONTINENCE: 0
VISUAL CHANGE: 0

## 2018-11-08 NOTE — PROGRESS NOTES
neurological symptoms. Nobowel or bladder incontinence, no weakness, and no falling. Review of OARRS does not show any aberrant prescription behavior. Medication is helping the patient stay active. Patient denies any side effects and reports adequate analgesia. No sign ofmisuse/abuse. Past Medical History:   Diagnosis Date    Abnormal stress ECG     Angina pectoris (HCC)     CAD (coronary artery disease)     Carpal tunnel syndrome     left    Cervical stenosis of spine     Cervicalgia     chronic pain    Chronic back pain     COPD (chronic obstructive pulmonary disease) (HCC)     DDD (degenerative disc disease)     Elbow fracture, right     Fractures     elbow    GERD (gastroesophageal reflux disease)     Gout     Right great toe    Hyperlipidemia     Hypertension     Mild depression ([facility] 9/26/2013    2 suicide attempts by girlfriend related to depression.  Osteoarthritis of right knee     Sinus headache 10/10/2018    Sleep apnea     Sleep disturbance     Tendonitis of foot     right       Past Surgical History:   Procedure Laterality Date    CARDIAC CATHETERIZATION  7/8/14    Non Obstructive CAD     CERVICAL SPINE SURGERY  7/13/12     C2-3-4-5    COLONOSCOPY      FRACTURE SURGERY      Rt elbow     NERVE BLOCK  2/5/16    premier tens    UPPER GASTROINTESTINAL ENDOSCOPY         No Known Allergies      Current Outpatient Prescriptions:     tiZANidine (ZANAFLEX) 4 MG tablet, Take 1 tablet by mouth 2 times daily as needed (for muscle spasms) Break day time tablet in half- take two times a day take one tablet at HS, Disp: 28 tablet, Rfl: 0    gabapentin (NEURONTIN) 800 MG tablet, Take 1 tablet by mouth every 8 hours for 14 days. ., Disp: 42 tablet, Rfl: 0    oxyCODONE (ROXICODONE) 5 MG immediate release tablet, Take 1 tablet by mouth every 12 hours as needed for Pain for up to 7 days.  FILL 11/03/2018., Disp: 14 tablet, Rfl: 0    oxyCODONE (OXYCONTIN) 20 MG extended release tablet, Maternal Aunt 56        breast cancer     Cancer Maternal Uncle 60        prostrate cancer        Social History     Social History    Marital status: Single     Spouse name: N/A    Number of children: N/A    Years of education: N/A     Occupational History     N/A     Social History Main Topics    Smoking status: Current Every Day Smoker     Packs/day: 1.00     Years: 40.00     Types: Cigarettes    Smokeless tobacco: Former User     Quit date: 3/12/2015      Comment: wants to discuss  chantix    Alcohol use 0.0 oz/week      Comment: 3 x year    Drug use: No    Sexual activity: Yes     Partners: Female     Other Topics Concern    Not on file     Social History Narrative    No narrative on file       Review of Systems:  Review of Systems   Constitution: Positive for weakness. Negative for fever. HENT: Negative for trouble swallowing. Cardiovascular: Negative for chest pain and syncope. Musculoskeletal: Positive for back pain and neck pain. Gastrointestinal: Negative for abdominal pain and bowel incontinence. Genitourinary: Negative for bladder incontinence. Neurological: Positive for headaches, numbness and tingling. Psychiatric/Behavioral: The patient is nervous/anxious. Physical Exam:  /76   Pulse 83   Temp 98.5 °F (36.9 °C) (Oral)   Resp 16   Ht 5' 9\" (1.753 m)   Wt 224 lb (101.6 kg)   BMI 33.08 kg/m²     Physical Exam   Constitutional: He is oriented to person, place, and time. Musculoskeletal:        Lumbar back: He exhibits tenderness. He exhibits no edema and no deformity. Neurological: He is alert and oriented to person, place, and time. He has normal strength and normal reflexes. Gait normal.   Vitals reviewed.       Record/Diagnostics Review:    As above, I did review the imaging    Assessment:  Lumbar spondylosis  Cervical postlaminectomy syndrome  Chronic opioid therapy      Treatment Plan:  DISCUSSION: Treatment options discussed with patient and

## 2018-12-07 ENCOUNTER — HOSPITAL ENCOUNTER (OUTPATIENT)
Dept: PAIN MANAGEMENT | Age: 58
Discharge: HOME | End: 2018-12-07
Payer: MEDICARE

## 2018-12-07 DIAGNOSIS — M51.26 DISPLACEMENT OF LUMBAR INTERVERTEBRAL DISC WITHOUT MYELOPATHY: ICD-10-CM

## 2018-12-07 DIAGNOSIS — M48.02 CERVICAL STENOSIS OF SPINE: ICD-10-CM

## 2018-12-07 DIAGNOSIS — M54.2 CERVICALGIA: ICD-10-CM

## 2018-12-07 DIAGNOSIS — K59.03 THERAPEUTIC OPIOID INDUCED CONSTIPATION: ICD-10-CM

## 2018-12-07 DIAGNOSIS — M51.36 DDD (DEGENERATIVE DISC DISEASE), LUMBAR: Primary | ICD-10-CM

## 2018-12-07 DIAGNOSIS — Z51.81 MEDICATION MONITORING ENCOUNTER: ICD-10-CM

## 2018-12-07 DIAGNOSIS — T40.2X5A THERAPEUTIC OPIOID INDUCED CONSTIPATION: ICD-10-CM

## 2018-12-07 DIAGNOSIS — M48.061 SPINAL STENOSIS OF LUMBAR REGION WITHOUT NEUROGENIC CLAUDICATION: ICD-10-CM

## 2018-12-07 PROCEDURE — 99213 OFFICE O/P EST LOW 20 MIN: CPT | Performed by: NURSE PRACTITIONER

## 2018-12-07 PROCEDURE — 99214 OFFICE O/P EST MOD 30 MIN: CPT

## 2018-12-07 RX ORDER — OXYCODONE HCL 10 MG/1
10 TABLET, FILM COATED, EXTENDED RELEASE ORAL 2 TIMES DAILY
Qty: 60 TABLET | Refills: 0 | Status: SHIPPED | OUTPATIENT
Start: 2018-12-09 | End: 2019-01-04 | Stop reason: SDUPTHER

## 2018-12-07 RX ORDER — DOCUSATE SODIUM 100 MG/1
100 CAPSULE, LIQUID FILLED ORAL 2 TIMES DAILY
Qty: 60 CAPSULE | Refills: 2 | Status: SHIPPED | OUTPATIENT
Start: 2018-12-07 | End: 2019-01-06

## 2018-12-07 RX ORDER — GABAPENTIN 800 MG/1
800 TABLET ORAL EVERY 8 HOURS SCHEDULED
Qty: 57 TABLET | Refills: 0 | Status: SHIPPED | OUTPATIENT
Start: 2018-12-19 | End: 2019-01-04 | Stop reason: SDUPTHER

## 2018-12-07 RX ORDER — OXYCODONE HYDROCHLORIDE 5 MG/1
5 TABLET ORAL EVERY 12 HOURS PRN
Qty: 60 TABLET | Refills: 0 | Status: SHIPPED | OUTPATIENT
Start: 2018-12-09 | End: 2019-01-04 | Stop reason: SDUPTHER

## 2018-12-07 RX ORDER — TIZANIDINE 4 MG/1
4 TABLET ORAL 2 TIMES DAILY PRN
Qty: 60 TABLET | Refills: 0 | Status: SHIPPED | OUTPATIENT
Start: 2018-12-09 | End: 2019-01-04 | Stop reason: SDUPTHER

## 2018-12-07 ASSESSMENT — ENCOUNTER SYMPTOMS
SHORTNESS OF BREATH: 0
COUGH: 0
CONSTIPATION: 0
BACK PAIN: 1

## 2018-12-07 NOTE — PROGRESS NOTES
Tendonitis of foot     right       Past Surgical History:   Procedure Laterality Date    CARDIAC CATHETERIZATION  7/8/14    Non Obstructive CAD     CERVICAL SPINE SURGERY  7/13/12     C2-3-4-5    COLONOSCOPY      FRACTURE SURGERY      Rt elbow     NERVE BLOCK  2/5/16    premier tens    UPPER GASTROINTESTINAL ENDOSCOPY         No Known Allergies      Current Outpatient Prescriptions:     oxyCODONE (ROXICODONE) 5 MG immediate release tablet, Take 1 tablet by mouth every 12 hours as needed for Pain for up to 30 days. Fill11/10/2018., Disp: 60 tablet, Rfl: 0    oxyCODONE (OXYCONTIN) 10 MG extended release tablet, Take 1 tablet by mouth 2 times daily for 30 days. Intended supply: 30 days. , Disp: 60 tablet, Rfl: 0    gabapentin (NEURONTIN) 800 MG tablet, Take 1 tablet by mouth every 8 hours for 30 days. ., Disp: 90 tablet, Rfl: 0    tiZANidine (ZANAFLEX) 4 MG tablet, Take 1 tablet by mouth 2 times daily as needed (for muscle spasms) Break day time tablet in half- take two times a day take one tablet at HS, Disp: 60 tablet, Rfl: 0    Azelastine HCl 137 MCG/SPRAY SOLN, 1 spray by Nasal route daily for 3 days, Disp: 1 Bottle, Rfl: 0    fluticasone (FLONASE) 50 MCG/ACT nasal spray, 2 sprays by Each Nare route daily, Disp: 1 Bottle, Rfl: 0    zoster recombinant adjuvanted vaccine (SHINGRIX) 50 MCG SUSR injection, 50 MCG IM then repeat 2-6 months., Disp: 0.5 mL, Rfl: 1    atorvastatin (LIPITOR) 80 MG tablet, Take 1 tablet by mouth daily, Disp: 30 tablet, Rfl: 3    cetirizine (ZYRTEC ALLERGY) 10 MG tablet, Take 1 tablet by mouth daily, Disp: 1 tablet, Rfl: 2    metFORMIN (GLUCOPHAGE) 500 MG tablet, Take 1 tablet by mouth daily (with breakfast), Disp: 60 tablet, Rfl: 3    traZODone (DESYREL) 150 MG tablet, Take 150 mg by mouth nightly, Disp: , Rfl:     omeprazole (PRILOSEC) 20 MG delayed release capsule, TAKE ONE CAPSULE BY MOUTH TWICE A DAY 30 MINUTES BEFORE MEALS, Disp: 60 capsule, Rfl: 6    nitroGLYCERIN

## 2019-01-04 ENCOUNTER — HOSPITAL ENCOUNTER (OUTPATIENT)
Dept: PAIN MANAGEMENT | Age: 59
Discharge: HOME | End: 2019-01-04
Payer: MEDICARE

## 2019-01-04 VITALS
SYSTOLIC BLOOD PRESSURE: 139 MMHG | TEMPERATURE: 97.8 F | HEART RATE: 88 BPM | RESPIRATION RATE: 18 BRPM | DIASTOLIC BLOOD PRESSURE: 67 MMHG

## 2019-01-04 DIAGNOSIS — F32.A MILD DEPRESSION: ICD-10-CM

## 2019-01-04 DIAGNOSIS — M51.26 DISPLACEMENT OF LUMBAR INTERVERTEBRAL DISC WITHOUT MYELOPATHY: Primary | ICD-10-CM

## 2019-01-04 DIAGNOSIS — M48.02 CERVICAL STENOSIS OF SPINE: ICD-10-CM

## 2019-01-04 DIAGNOSIS — M48.061 SPINAL STENOSIS OF LUMBAR REGION WITHOUT NEUROGENIC CLAUDICATION: ICD-10-CM

## 2019-01-04 DIAGNOSIS — Z51.81 MEDICATION MONITORING ENCOUNTER: ICD-10-CM

## 2019-01-04 DIAGNOSIS — M51.36 DDD (DEGENERATIVE DISC DISEASE), LUMBAR: ICD-10-CM

## 2019-01-04 PROCEDURE — 99213 OFFICE O/P EST LOW 20 MIN: CPT | Performed by: NURSE PRACTITIONER

## 2019-01-04 PROCEDURE — 99214 OFFICE O/P EST MOD 30 MIN: CPT

## 2019-01-04 RX ORDER — OXYCODONE HCL 10 MG/1
10 TABLET, FILM COATED, EXTENDED RELEASE ORAL 2 TIMES DAILY
Qty: 60 TABLET | Refills: 0 | Status: SHIPPED | OUTPATIENT
Start: 2019-01-08 | End: 2019-02-05 | Stop reason: SDUPTHER

## 2019-01-04 RX ORDER — TIZANIDINE 4 MG/1
4 TABLET ORAL 2 TIMES DAILY PRN
Qty: 60 TABLET | Refills: 0 | Status: SHIPPED | OUTPATIENT
Start: 2019-01-08 | End: 2019-02-05 | Stop reason: SDUPTHER

## 2019-01-04 RX ORDER — OXYCODONE HYDROCHLORIDE 5 MG/1
5 TABLET ORAL EVERY 12 HOURS PRN
Qty: 60 TABLET | Refills: 0 | Status: SHIPPED | OUTPATIENT
Start: 2019-01-08 | End: 2019-02-05 | Stop reason: SDUPTHER

## 2019-01-04 RX ORDER — GABAPENTIN 800 MG/1
800 TABLET ORAL EVERY 8 HOURS SCHEDULED
Qty: 90 TABLET | Refills: 0 | Status: SHIPPED | OUTPATIENT
Start: 2019-01-08 | End: 2019-02-05 | Stop reason: SDUPTHER

## 2019-01-04 ASSESSMENT — ENCOUNTER SYMPTOMS
BACK PAIN: 1
CONSTIPATION: 0
SHORTNESS OF BREATH: 0
COUGH: 0

## 2019-01-10 ENCOUNTER — OFFICE VISIT (OUTPATIENT)
Dept: FAMILY MEDICINE CLINIC | Age: 59
End: 2019-01-10
Payer: MEDICARE

## 2019-01-10 VITALS
OXYGEN SATURATION: 94 % | WEIGHT: 235.8 LBS | DIASTOLIC BLOOD PRESSURE: 65 MMHG | BODY MASS INDEX: 34.93 KG/M2 | HEART RATE: 74 BPM | HEIGHT: 69 IN | SYSTOLIC BLOOD PRESSURE: 120 MMHG

## 2019-01-10 DIAGNOSIS — R91.1 LUNG NODULE: ICD-10-CM

## 2019-01-10 DIAGNOSIS — Z23 NEED FOR PROPHYLACTIC VACCINATION AND INOCULATION AGAINST INFLUENZA: ICD-10-CM

## 2019-01-10 DIAGNOSIS — J41.0 SIMPLE CHRONIC BRONCHITIS (HCC): ICD-10-CM

## 2019-01-10 DIAGNOSIS — E78.2 MIXED HYPERLIPIDEMIA: ICD-10-CM

## 2019-01-10 DIAGNOSIS — I10 ESSENTIAL HYPERTENSION: Primary | ICD-10-CM

## 2019-01-10 DIAGNOSIS — F32.A MILD DEPRESSION: ICD-10-CM

## 2019-01-10 DIAGNOSIS — F32.5 MAJOR DEPRESSION, SINGLE EPISODE, IN COMPLETE REMISSION (HCC): ICD-10-CM

## 2019-01-10 DIAGNOSIS — R73.03 PREDIABETES: ICD-10-CM

## 2019-01-10 DIAGNOSIS — Z87.891 PERSONAL HISTORY OF TOBACCO USE: ICD-10-CM

## 2019-01-10 PROBLEM — R51.9 SINUS HEADACHE: Status: RESOLVED | Noted: 2018-10-10 | Resolved: 2019-01-10

## 2019-01-10 LAB — HBA1C MFR BLD: 4.5 %

## 2019-01-10 PROCEDURE — 4004F PT TOBACCO SCREEN RCVD TLK: CPT | Performed by: FAMILY MEDICINE

## 2019-01-10 PROCEDURE — 3017F COLORECTAL CA SCREEN DOC REV: CPT | Performed by: FAMILY MEDICINE

## 2019-01-10 PROCEDURE — G8926 SPIRO NO PERF OR DOC: HCPCS | Performed by: FAMILY MEDICINE

## 2019-01-10 PROCEDURE — G0296 VISIT TO DETERM LDCT ELIG: HCPCS | Performed by: FAMILY MEDICINE

## 2019-01-10 PROCEDURE — 99214 OFFICE O/P EST MOD 30 MIN: CPT | Performed by: FAMILY MEDICINE

## 2019-01-10 PROCEDURE — G8427 DOCREV CUR MEDS BY ELIG CLIN: HCPCS | Performed by: FAMILY MEDICINE

## 2019-01-10 PROCEDURE — G8417 CALC BMI ABV UP PARAM F/U: HCPCS | Performed by: FAMILY MEDICINE

## 2019-01-10 PROCEDURE — 90686 IIV4 VACC NO PRSV 0.5 ML IM: CPT | Performed by: FAMILY MEDICINE

## 2019-01-10 PROCEDURE — 3023F SPIROM DOC REV: CPT | Performed by: FAMILY MEDICINE

## 2019-01-10 PROCEDURE — G0008 ADMIN INFLUENZA VIRUS VAC: HCPCS | Performed by: FAMILY MEDICINE

## 2019-01-10 PROCEDURE — G8482 FLU IMMUNIZE ORDER/ADMIN: HCPCS | Performed by: FAMILY MEDICINE

## 2019-01-10 PROCEDURE — 83036 HEMOGLOBIN GLYCOSYLATED A1C: CPT | Performed by: FAMILY MEDICINE

## 2019-01-10 ASSESSMENT — ENCOUNTER SYMPTOMS
COUGH: 0
ABDOMINAL PAIN: 0
SHORTNESS OF BREATH: 0
CONSTIPATION: 0
BLOOD IN STOOL: 0
ABDOMINAL DISTENTION: 0
DIARRHEA: 0
BACK PAIN: 1
SINUS PRESSURE: 0
RHINORRHEA: 0
SINUS PAIN: 0
CHEST TIGHTNESS: 0
WHEEZING: 0

## 2019-01-18 ENCOUNTER — TELEPHONE (OUTPATIENT)
Dept: ONCOLOGY | Age: 59
End: 2019-01-18

## 2019-02-05 ENCOUNTER — HOSPITAL ENCOUNTER (OUTPATIENT)
Dept: PAIN MANAGEMENT | Age: 59
Discharge: HOME | End: 2019-02-05
Payer: MEDICARE

## 2019-02-05 VITALS
DIASTOLIC BLOOD PRESSURE: 71 MMHG | TEMPERATURE: 98.1 F | HEART RATE: 87 BPM | RESPIRATION RATE: 16 BRPM | SYSTOLIC BLOOD PRESSURE: 141 MMHG

## 2019-02-05 DIAGNOSIS — M51.26 DISPLACEMENT OF LUMBAR INTERVERTEBRAL DISC WITHOUT MYELOPATHY: ICD-10-CM

## 2019-02-05 DIAGNOSIS — F32.A MILD DEPRESSION: ICD-10-CM

## 2019-02-05 DIAGNOSIS — M48.061 SPINAL STENOSIS OF LUMBAR REGION WITHOUT NEUROGENIC CLAUDICATION: ICD-10-CM

## 2019-02-05 DIAGNOSIS — M48.02 CERVICAL STENOSIS OF SPINE: ICD-10-CM

## 2019-02-05 DIAGNOSIS — M51.36 DDD (DEGENERATIVE DISC DISEASE), LUMBAR: ICD-10-CM

## 2019-02-05 DIAGNOSIS — M54.2 CERVICALGIA: Primary | ICD-10-CM

## 2019-02-05 DIAGNOSIS — Z51.81 MEDICATION MONITORING ENCOUNTER: ICD-10-CM

## 2019-02-05 PROCEDURE — 99213 OFFICE O/P EST LOW 20 MIN: CPT | Performed by: NURSE PRACTITIONER

## 2019-02-05 PROCEDURE — 99214 OFFICE O/P EST MOD 30 MIN: CPT

## 2019-02-05 RX ORDER — GABAPENTIN 800 MG/1
800 TABLET ORAL EVERY 8 HOURS SCHEDULED
Qty: 90 TABLET | Refills: 0 | Status: SHIPPED | OUTPATIENT
Start: 2019-02-07 | End: 2019-03-05 | Stop reason: SDUPTHER

## 2019-02-05 RX ORDER — TIZANIDINE 4 MG/1
4 TABLET ORAL 2 TIMES DAILY PRN
Qty: 40 TABLET | Refills: 0 | Status: SHIPPED | OUTPATIENT
Start: 2019-02-07 | End: 2019-02-27

## 2019-02-05 RX ORDER — OXYCODONE HCL 10 MG/1
10 TABLET, FILM COATED, EXTENDED RELEASE ORAL 2 TIMES DAILY
Qty: 60 TABLET | Refills: 0 | Status: SHIPPED | OUTPATIENT
Start: 2019-02-07 | End: 2019-02-07 | Stop reason: SDUPTHER

## 2019-02-05 RX ORDER — OXYCODONE HYDROCHLORIDE 5 MG/1
5 TABLET ORAL EVERY 12 HOURS PRN
Qty: 60 TABLET | Refills: 0 | Status: SHIPPED | OUTPATIENT
Start: 2019-02-07 | End: 2019-02-07 | Stop reason: SDUPTHER

## 2019-02-05 RX ORDER — DICLOFENAC SODIUM 75 MG/1
75 TABLET, DELAYED RELEASE ORAL 2 TIMES DAILY
COMMUNITY
Start: 2019-01-22 | End: 2022-02-24 | Stop reason: ALTCHOICE

## 2019-02-05 ASSESSMENT — ENCOUNTER SYMPTOMS
BACK PAIN: 1
CONSTIPATION: 0
COUGH: 0
SHORTNESS OF BREATH: 0

## 2019-02-07 DIAGNOSIS — M51.36 DDD (DEGENERATIVE DISC DISEASE), LUMBAR: ICD-10-CM

## 2019-02-07 DIAGNOSIS — M48.02 CERVICAL STENOSIS OF SPINE: ICD-10-CM

## 2019-02-07 RX ORDER — OXYCODONE HYDROCHLORIDE 5 MG/1
5 TABLET ORAL EVERY 12 HOURS PRN
Qty: 60 TABLET | Refills: 0 | OUTPATIENT
Start: 2019-02-07 | End: 2019-03-05 | Stop reason: SDUPTHER

## 2019-02-07 RX ORDER — OXYCODONE HYDROCHLORIDE 5 MG/1
5 TABLET ORAL EVERY 12 HOURS PRN
Qty: 60 TABLET | Refills: 0 | OUTPATIENT
Start: 2019-02-07 | End: 2019-02-07 | Stop reason: SDUPTHER

## 2019-02-07 RX ORDER — OXYCODONE HCL 10 MG/1
10 TABLET, FILM COATED, EXTENDED RELEASE ORAL 2 TIMES DAILY
Qty: 60 TABLET | Refills: 0 | Status: SHIPPED | OUTPATIENT
Start: 2019-02-07 | End: 2019-02-21 | Stop reason: SDUPTHER

## 2019-02-16 DIAGNOSIS — J41.8 MIXED SIMPLE AND MUCOPURULENT CHRONIC BRONCHITIS (HCC): ICD-10-CM

## 2019-02-16 DIAGNOSIS — I10 ESSENTIAL HYPERTENSION: ICD-10-CM

## 2019-02-16 DIAGNOSIS — J30.9 ALLERGIC SINUSITIS: ICD-10-CM

## 2019-02-18 RX ORDER — AMLODIPINE BESYLATE 5 MG/1
TABLET ORAL
Qty: 30 TABLET | Refills: 2 | Status: SHIPPED | OUTPATIENT
Start: 2019-02-18 | End: 2019-05-02 | Stop reason: ALTCHOICE

## 2019-02-18 RX ORDER — CETIRIZINE HYDROCHLORIDE 10 MG/1
TABLET ORAL
Qty: 30 TABLET | Refills: 1 | Status: SHIPPED | OUTPATIENT
Start: 2019-02-18 | End: 2022-02-24

## 2019-02-18 RX ORDER — BUDESONIDE AND FORMOTEROL FUMARATE DIHYDRATE 160; 4.5 UG/1; UG/1
AEROSOL RESPIRATORY (INHALATION)
Qty: 1 INHALER | Refills: 2 | Status: SHIPPED | OUTPATIENT
Start: 2019-02-18 | End: 2019-05-21 | Stop reason: SDUPTHER

## 2019-02-21 DIAGNOSIS — M51.36 DDD (DEGENERATIVE DISC DISEASE), LUMBAR: ICD-10-CM

## 2019-02-21 RX ORDER — OXYCODONE HCL 10 MG/1
10 TABLET, FILM COATED, EXTENDED RELEASE ORAL 2 TIMES DAILY
Qty: 60 TABLET | Refills: 0 | Status: SHIPPED | OUTPATIENT
Start: 2019-03-03 | End: 2019-03-05 | Stop reason: ALTCHOICE

## 2019-03-05 ENCOUNTER — HOSPITAL ENCOUNTER (OUTPATIENT)
Dept: PAIN MANAGEMENT | Age: 59
Discharge: HOME | End: 2019-03-05
Payer: MEDICARE

## 2019-03-05 VITALS
HEART RATE: 91 BPM | SYSTOLIC BLOOD PRESSURE: 140 MMHG | TEMPERATURE: 97.9 F | RESPIRATION RATE: 14 BRPM | DIASTOLIC BLOOD PRESSURE: 71 MMHG

## 2019-03-05 DIAGNOSIS — M48.02 CERVICAL STENOSIS OF SPINE: Primary | ICD-10-CM

## 2019-03-05 DIAGNOSIS — Z51.81 MEDICATION MONITORING ENCOUNTER: ICD-10-CM

## 2019-03-05 DIAGNOSIS — M51.26 DISPLACEMENT OF LUMBAR INTERVERTEBRAL DISC WITHOUT MYELOPATHY: ICD-10-CM

## 2019-03-05 DIAGNOSIS — M51.36 DDD (DEGENERATIVE DISC DISEASE), LUMBAR: ICD-10-CM

## 2019-03-05 DIAGNOSIS — M48.061 SPINAL STENOSIS OF LUMBAR REGION WITHOUT NEUROGENIC CLAUDICATION: ICD-10-CM

## 2019-03-05 PROCEDURE — 99214 OFFICE O/P EST MOD 30 MIN: CPT

## 2019-03-05 PROCEDURE — 99214 OFFICE O/P EST MOD 30 MIN: CPT | Performed by: NURSE PRACTITIONER

## 2019-03-05 RX ORDER — TIZANIDINE 4 MG/1
4 TABLET ORAL 2 TIMES DAILY PRN
Qty: 60 TABLET | Refills: 0 | Status: SHIPPED | OUTPATIENT
Start: 2019-03-05 | End: 2019-04-04 | Stop reason: SDUPTHER

## 2019-03-05 RX ORDER — MORPHINE SULFATE 15 MG/1
15 TABLET, FILM COATED, EXTENDED RELEASE ORAL 2 TIMES DAILY
Qty: 60 TABLET | Refills: 0 | Status: SHIPPED | OUTPATIENT
Start: 2019-03-05 | End: 2019-04-04 | Stop reason: SINTOL

## 2019-03-05 RX ORDER — GABAPENTIN 800 MG/1
800 TABLET ORAL EVERY 8 HOURS SCHEDULED
Qty: 90 TABLET | Refills: 0 | Status: SHIPPED | OUTPATIENT
Start: 2019-03-05 | End: 2019-04-04 | Stop reason: SDUPTHER

## 2019-03-05 RX ORDER — OXYCODONE HYDROCHLORIDE 5 MG/1
5 TABLET ORAL EVERY 12 HOURS PRN
Qty: 60 TABLET | Refills: 0 | Status: SHIPPED | OUTPATIENT
Start: 2019-03-05 | End: 2019-04-04 | Stop reason: SDUPTHER

## 2019-03-05 ASSESSMENT — ENCOUNTER SYMPTOMS
COUGH: 0
BOWEL INCONTINENCE: 0
BACK PAIN: 1
CONSTIPATION: 0
SHORTNESS OF BREATH: 0

## 2019-03-21 DIAGNOSIS — R73.03 PREDIABETES: ICD-10-CM

## 2019-03-21 DIAGNOSIS — E78.2 MIXED HYPERLIPIDEMIA: ICD-10-CM

## 2019-03-21 RX ORDER — ATORVASTATIN CALCIUM 80 MG/1
TABLET, FILM COATED ORAL
Qty: 90 TABLET | Refills: 2 | Status: SHIPPED
Start: 2019-03-21 | End: 2020-11-02

## 2019-04-02 ENCOUNTER — HOSPITAL ENCOUNTER (OUTPATIENT)
Dept: PAIN MANAGEMENT | Age: 59
Discharge: HOME | End: 2019-04-02
Payer: MEDICARE

## 2019-04-02 VITALS
SYSTOLIC BLOOD PRESSURE: 142 MMHG | RESPIRATION RATE: 14 BRPM | DIASTOLIC BLOOD PRESSURE: 83 MMHG | HEART RATE: 91 BPM | TEMPERATURE: 97.7 F

## 2019-04-02 DIAGNOSIS — M48.02 CERVICAL STENOSIS OF SPINE: ICD-10-CM

## 2019-04-02 DIAGNOSIS — M48.061 SPINAL STENOSIS OF LUMBAR REGION WITHOUT NEUROGENIC CLAUDICATION: ICD-10-CM

## 2019-04-02 DIAGNOSIS — M54.12 RADICULOPATHY OF CERVICAL SPINE: ICD-10-CM

## 2019-04-02 DIAGNOSIS — M51.26 DISPLACEMENT OF LUMBAR INTERVERTEBRAL DISC WITHOUT MYELOPATHY: ICD-10-CM

## 2019-04-02 DIAGNOSIS — M54.2 CERVICALGIA: ICD-10-CM

## 2019-04-02 DIAGNOSIS — M51.36 DDD (DEGENERATIVE DISC DISEASE), LUMBAR: ICD-10-CM

## 2019-04-02 DIAGNOSIS — Z51.81 MEDICATION MONITORING ENCOUNTER: Primary | ICD-10-CM

## 2019-04-02 PROCEDURE — 99213 OFFICE O/P EST LOW 20 MIN: CPT | Performed by: NURSE PRACTITIONER

## 2019-04-02 PROCEDURE — 80307 DRUG TEST PRSMV CHEM ANLYZR: CPT

## 2019-04-02 PROCEDURE — 99215 OFFICE O/P EST HI 40 MIN: CPT

## 2019-04-02 ASSESSMENT — ENCOUNTER SYMPTOMS
SHORTNESS OF BREATH: 0
BACK PAIN: 1
CONSTIPATION: 0
BOWEL INCONTINENCE: 0
COUGH: 0

## 2019-04-02 NOTE — PROGRESS NOTES
Patient is here today to review medication contract. Chief Complaint: neck and back pain    PMH: Patient complains of neck pain that radiates down his left arm to fingertips. He has had this pain for many years and it is worsening over time. He had cervical spine surgery in 2012 and the pain has only worsened since that time. He saw Dr Caridad Salgado with no surgery recommended for neck, but told could benefit from [place] [id number].    Patient followed up with Dr. Karyn Ulloa after his CT in August and cervical injections were suggested. Dr Librado Forde also recommends but pt feels risks outweigh the benefit and is not interested. He did see Dr Karyn Ulloa in [place] PT was ordered. He completed PT and reports mild benefit - he continues to do exercises at home. [facility] in 2016 for lumbar pain but no surgery recommended at that time. He is to follow up with Dr. Meño Wetzel if symptoms worsen. Diclofenac started by his rheumatologist recently. He was having insurance issues with the oxycontin and wanted to d/c. Paulina MARCUM spoke with Dr. Librado Forde about this at previous visit and he recommended MSER 15 mg. This was started at last visit and pt is complaining of constipation, itching and headache. He would like to d/c morphine. Will schedule OV with Dr. Librado Forde to discuss med changes. Pt is interested in diagnostic lumbar facet injections - wants to schedule after med issue is resolved. Back Pain   This is a chronic problem. The current episode started more than 1 year ago. The problem occurs constantly. The pain is present in the lumbar spine. The quality of the pain is described as aching. The pain is at a severity of 6/10. The pain is moderate. Exacerbated by: walking. Associated symptoms include headaches, numbness and tingling. Pertinent negatives include no bladder incontinence, bowel incontinence, chest pain, fever or weakness. Risk factors include sedentary lifestyle.  He has tried ice, NSAIDs, muscle relaxant and heat for the symptoms. The treatment provided mild relief. Neck Pain    This is a chronic problem. The current episode started more than 1 year ago. The problem occurs constantly. The problem has been gradually worsening. The pain is present in the anterior neck, right side and left side. The quality of the pain is described as aching and stabbing. The pain is at a severity of 6/10. The pain is moderate. The symptoms are aggravated by twisting. Associated symptoms include headaches, numbness and tingling. Pertinent negatives include no chest pain, fever or weakness. He has tried NSAIDs, muscle relaxants, ice and heat for the symptoms. The treatment provided mild relief. Patient denies any new neurological symptoms. No bowel or bladder incontinence, no weakness, and no falling. Pill count: appropriate    Morphine equivalent: 45    Controlled Substances Monitoring:     RX Monitoring 4/2/2019   Attestation The Prescription Monitoring Report for this patient was reviewed today. Acute Pain Prescriptions -   Chronic Pain Routine Monitoring Possible medication side effects, risk of tolerance/dependence & alternative treatments discussed. ;No signs of potential drug abuse or diversion identified: otherwise, see note documentation   Chronic Pain > 80 MEDD Obtained or confirmed a written medication contract was on file. Past Medical History:   Diagnosis Date    Abnormal stress ECG     Angina pectoris (HCC)     CAD (coronary artery disease)     Carpal tunnel syndrome     left    Cervical stenosis of spine     Cervicalgia     chronic pain    Chronic back pain     COPD (chronic obstructive pulmonary disease) (HCC)     DDD (degenerative disc disease)     Elbow fracture, right     Fractures     elbow    GERD (gastroesophageal reflux disease)     Gout     Right great toe    Hyperlipidemia     Hypertension     Mild depression ([address] 9/26/2013    2 suicide attempts by girlfriend related to depression.     Osteoarthritis of right knee     Sinus headache 10/10/2018    Sleep apnea     Sleep disturbance     Tendonitis of foot     right       Past Surgical History:   Procedure Laterality Date    CARDIAC CATHETERIZATION  7/8/14    Non Obstructive CAD     CERVICAL SPINE SURGERY  7/13/12     C2-3-4-5    COLONOSCOPY      FRACTURE SURGERY      Rt elbow     NERVE BLOCK  2/5/16    premier tens    UPPER GASTROINTESTINAL ENDOSCOPY         No Known Allergies      Current Outpatient Medications:     metFORMIN (GLUCOPHAGE) 500 MG tablet, TAKE ONE TABLET BY MOUTH DAILY WITH BREAKFAST, Disp: 60 tablet, Rfl: 2    atorvastatin (LIPITOR) 80 MG tablet, TAKE ONE TABLET BY MOUTH DAILY, Disp: 90 tablet, Rfl: 2    oxyCODONE (ROXICODONE) 5 MG immediate release tablet, Take 1 tablet by mouth every 12 hours as needed for Pain for up to 30 days. , Disp: 60 tablet, Rfl: 0    morphine (MS CONTIN) 15 MG extended release tablet, Take 1 tablet by mouth 2 times daily for 30 days. , Disp: 60 tablet, Rfl: 0    gabapentin (NEURONTIN) 800 MG tablet, Take 1 tablet by mouth every 8 hours for 30 days. , Disp: 90 tablet, Rfl: 0    tiZANidine (ZANAFLEX) 4 MG tablet, Take 1 tablet by mouth 2 times daily as needed (for muscle spasms), Disp: 60 tablet, Rfl: 0    cetirizine (ZYRTEC) 10 MG tablet, TAKE ONE TABLET BY MOUTH DAILY, Disp: 30 tablet, Rfl: 1    SYMBICORT 160-4.5 MCG/ACT AERO, INHALE TWO PUFFS BY MOUTH TWICE A DAY, Disp: 1 Inhaler, Rfl: 2    diclofenac (VOLTAREN) 75 MG EC tablet, Take 75 mg by mouth 2 times daily, Disp: , Rfl:     fluticasone (FLONASE) 50 MCG/ACT nasal spray, 2 sprays by Each Nare route daily, Disp: 1 Bottle, Rfl: 0    zoster recombinant adjuvanted vaccine (SHINGRIX) 50 MCG SUSR injection, 50 MCG IM then repeat 2-6 months., Disp: 0.5 mL, Rfl: 1    traZODone (DESYREL) 150 MG tablet, Take 150 mg by mouth nightly, Disp: , Rfl:     omeprazole (PRILOSEC) 20 MG delayed release capsule, TAKE ONE CAPSULE BY MOUTH TWICE A DAY 30 MINUTES BEFORE MEALS, Disp: 60 capsule, Rfl: 6    nitroGLYCERIN (NITROSTAT) 0.4 MG SL tablet, Place 1 tablet under the tongue every 5 minutes as needed for Chest pain, Disp: 25 tablet, Rfl: 3    albuterol sulfate  (90 Base) MCG/ACT inhaler, Inhale 2 puffs into the lungs every 6 hours as needed for Wheezing, Disp: 1 Inhaler, Rfl: 3    citalopram (CELEXA) 20 MG tablet, Take 20 mg by mouth daily , Disp: , Rfl:     aspirin 81 MG EC tablet, Take 1 tablet by mouth daily, Disp: 30 tablet, Rfl: 3    amLODIPine (NORVASC) 5 MG tablet, TAKE ONE TABLET BY MOUTH DAILY, Disp: 30 tablet, Rfl: 2    Azelastine HCl 137 MCG/SPRAY SOLN, 1 spray by Nasal route daily for 3 days, Disp: 1 Bottle, Rfl: 0    Family History   Problem Relation Age of Onset    Heart Disease Mother     COPD Father     Cancer Maternal Aunt 64        breast cancer     Cancer Maternal Uncle 60        prostrate cancer        Social History     Socioeconomic History    Marital status: Single     Spouse name: Not on file    Number of children: Not on file    Years of education: Not on file    Highest education level: Not on file   Occupational History     Employer: N/A   Social Needs    Financial resource strain: Not on file    Food insecurity:     Worry: Not on file     Inability: Not on file    Transportation needs:     Medical: Not on file     Non-medical: Not on file   Tobacco Use    Smoking status: Current Every Day Smoker     Packs/day: 1.00     Years: 40.00     Pack years: 40.00     Types: Cigarettes    Smokeless tobacco: Former User     Quit date: 3/12/2015    Tobacco comment: wants to discuss  chantix   Substance and Sexual Activity    Alcohol use:  Yes     Alcohol/week: 0.0 oz     Comment: 3 x year    Drug use: No    Sexual activity: Yes     Partners: Female   Lifestyle    Physical activity:     Days per week: Not on file     Minutes per session: Not on file    Stress: Not on file   Relationships    Social connections: Talks on phone: Not on file     Gets together: Not on file     Attends Protestant service: Not on file     Active member of club or organization: Not on file     Attends meetings of clubs or organizations: Not on file     Relationship status: Not on file    Intimate partner violence:     Fear of current or ex partner: Not on file     Emotionally abused: Not on file     Physically abused: Not on file     Forced sexual activity: Not on file   Other Topics Concern    Not on file   Social History Narrative    Not on file       Review of Systems:  Review of Systems   Constitution: Negative for chills and fever. Cardiovascular: Negative for chest pain and irregular heartbeat. Respiratory: Negative for cough and shortness of breath. Musculoskeletal: Positive for back pain and neck pain. Gastrointestinal: Negative for bowel incontinence and constipation. Genitourinary: Negative for bladder incontinence. Neurological: Positive for headaches, numbness and tingling. Negative for disturbances in coordination, loss of balance and weakness. Physical Exam:  BP (!) 142/83   Pulse 91   Temp 97.7 °F (36.5 °C)   Resp 14     Physical Exam   Constitutional: He is oriented to person, place, and time. HENT:   Head: Normocephalic. Eyes: EOM are normal.   Neck: Normal range of motion. Pulmonary/Chest: Effort normal.   Musculoskeletal: Normal range of motion. Cervical back: He exhibits tenderness and pain. Lumbar back: He exhibits tenderness and pain. Neurological: He is alert and oriented to person, place, and time. Skin: Skin is warm and dry.        Record/Diagnostics Review:    Last niru 11/2019 and was appropriate     MRI Lumbar 2018 -         FINDINGS:  BONES/ALIGNMENT:     The lumbar vertebral body heights are unchanged.  Chronic T12 compression  fracture appears unchanged from previous examination.  No new vertebral body  height loss is identified.  No evidence of significant spondylolisthesis. Unchanged T2 signal abnormality within the L2-L3 intervertebral disc space,  without evidence of adjacent endplate edema, or endplate destruction.  This  is likely degenerative.     SPINAL CORD:     The conus terminates normally.  There is a thin fatty filum terminale.     SOFT TISSUES:     The visualized paraspinal structures are unremarkable.     L1-L2: Mild broad disc bulge. No significant spinal canal stenosis, or neural  foraminal stenosis.     L2-L3: Mild broad disc bulge. No significant spinal canal stenosis or neural  foraminal stenosis.     L3-L4: Posterior disc osteophyte complex with central disc protrusion and  bilateral facet degenerative changes.  Moderate spinal canal stenosis.  No  significant neural foraminal stenosis.     L4-L5: Broad disc bulge with bilateral facet degenerative changes.  Mild  spinal canal stenosis.  No significant neural foraminal stenosis.     L5-S1: Broad disc bulge, with bilateral facet degenerative changes.  Small  central disc protrusion with annular fissure.  No significant spinal canal  stenosis.  Mild bilateral neural foraminal stenosis.       CT Cervical 2018 -   FINDINGS:  BONES/ALIGNMENT: The examination demonstrated slight reversal of cervical  lordosis which could be positional. Maria R Yovani is evidence of multilevel  degenerative disc disease associated with the spondylitic changes especially  at mid cervical spine including C4-C5, C5-C6.  There has been laminectomy  from C2-C3 through C6-C7.  The disc spaces are narrowed with anterior  bridging osteophyte at mid cervical spine.  The spinal cord has normal  thickness.  There is indentation of anterior thecal sac along the antral left  lateral aspect of thecal sac at C4-C5 likely from extruded disc material.  Vertebral artery calcification seen on the left side.  There is no  paravertebral soft tissue abnormalities identified.     At C2-C3, there is posterior ridging without evidence of spinal canal or  neural foraminal stenosis.     At C2-C3, there is central disc bulging and osteophyte complex indenting  anterior thecal sac.  The thecal sac is distended adequately due to posterior  laminectomy however measuring approximately 7.7 mm in AP diameter.  There is  no significant neural foraminal stenosis.     At C4-C5, there are posterior ridging and osteophytes.  Thecal sac distends  adequately from complete posterior laminectomy.  There is mild uncovertebral  spurring producing mild bilateral neural foraminal stenosis.     At C5-C6, there is posterior osteophyte especially uncovertebral hypertrophy  and spurring seen in the left perineural area.  There is a 4.3 mm x 9 mm  elongated filling defect along the left C5 nerve root, occupying left lateral  recess behind midbody of C5.  Could not be accurately assessed, whether it is  intradural or extradural.  I believe this represents an extruded disc  material.  However, a possibility of nerve root tumor i.e. schwannoma could  not be excluded.  Further evaluation with MRI may be obtained as indicated.     There is marked left lateral recess and left neural foraminal stenosis.  The  right neural foramina is relatively patent.  The thecal sac distends  adequately measuring approximately 13 mm.     At C6-C7, there is minimal posterior ridging without evidence of spinal canal  or neural foraminal stenosis.     At C7-T1, minimal posterior ridging and uncovertebral spurring identified.   There is mild-to-moderate right neural foraminal stenosis.  The left neural  foramina is patent.        Impression  Postsurgical changes from complete laminectomy C2-C6 are noted with adequate  distention of thecal sac.  There is however, prominent indentation of  anterior thecal sac, prominent circumferential disc bulge and ridging C2-C3  however, the spinal canal appears patent.     C5-C6 shows hypertrophic uncovertebral spurring especially involving left  uncovertebral joint indenting thecal sac, there is 4.3 mm left C5 root nodule  versus extruded disc material apparently residing in left lateral recess  producing marked left lateral recess and left neural foraminal stenosis. Contrasted MRI could be obtained for better characterization.  The spinal  canal is patent from laminectomy.     For better details of the individual levels, refer above. Assessment:  Problem List Items Addressed This Visit     Cervical stenosis of spine    Cervicalgia    Medication monitoring encounter - Primary    Relevant Orders    Pain Management Drug Screen    DDD (degenerative disc disease), lumbar    Displacement of lumbar intervertebral disc without myelopathy    Radiculopathy of cervical spine    Spinal stenosis of lumbar region without neurogenic claudication             Treatment Plan:  Patient relates current medications are helping the pain. Patient reports taking pain medications as prescribed, denies obtaining medications from different sources and denies use of illegal drugs. Patient denies side effects from medications like nausea, vomiting, constipation or drowsiness. Patient reports current activities of daily living are possible due to medications and would like to continue them. As always, we encourage daily stretching and strengthening exercises, and recommend minimizing use of pain medications unless patient cannot get through daily activities due to pain. Contract requirements met.   OV with Dr. Fredis Morillo to discuss med change  Schedule diagnostic lumbar facet injections at next contract update  Follow up appointment made for 4 weeks

## 2019-04-04 ENCOUNTER — HOSPITAL ENCOUNTER (OUTPATIENT)
Dept: PAIN MANAGEMENT | Age: 59
Discharge: HOME | End: 2019-04-04
Payer: MEDICARE

## 2019-04-04 VITALS
HEART RATE: 96 BPM | TEMPERATURE: 98.1 F | DIASTOLIC BLOOD PRESSURE: 73 MMHG | SYSTOLIC BLOOD PRESSURE: 140 MMHG | RESPIRATION RATE: 14 BRPM

## 2019-04-04 DIAGNOSIS — M51.36 DDD (DEGENERATIVE DISC DISEASE), LUMBAR: ICD-10-CM

## 2019-04-04 DIAGNOSIS — M51.26 DISPLACEMENT OF LUMBAR INTERVERTEBRAL DISC WITHOUT MYELOPATHY: ICD-10-CM

## 2019-04-04 DIAGNOSIS — M48.02 CERVICAL STENOSIS OF SPINE: ICD-10-CM

## 2019-04-04 PROCEDURE — 99214 OFFICE O/P EST MOD 30 MIN: CPT | Performed by: PAIN MEDICINE

## 2019-04-04 PROCEDURE — 99214 OFFICE O/P EST MOD 30 MIN: CPT

## 2019-04-04 RX ORDER — TIZANIDINE 4 MG/1
4 TABLET ORAL 2 TIMES DAILY PRN
Qty: 60 TABLET | Refills: 0 | Status: SHIPPED | OUTPATIENT
Start: 2019-04-04 | End: 2019-05-02 | Stop reason: SDUPTHER

## 2019-04-04 RX ORDER — GABAPENTIN 800 MG/1
800 TABLET ORAL EVERY 8 HOURS SCHEDULED
Qty: 90 TABLET | Refills: 0 | Status: SHIPPED | OUTPATIENT
Start: 2019-04-04 | End: 2019-05-02 | Stop reason: SDUPTHER

## 2019-04-04 RX ORDER — OXYCODONE HYDROCHLORIDE 5 MG/1
5 TABLET ORAL EVERY 6 HOURS PRN
Qty: 120 TABLET | Refills: 0 | Status: SHIPPED | OUTPATIENT
Start: 2019-04-04 | End: 2019-05-02 | Stop reason: SDUPTHER

## 2019-04-04 ASSESSMENT — ENCOUNTER SYMPTOMS
BACK PAIN: 1
ABDOMINAL PAIN: 0

## 2019-04-04 ASSESSMENT — PAIN DESCRIPTION - DESCRIPTORS: DESCRIPTORS: ACHING;CONSTANT

## 2019-04-04 ASSESSMENT — PAIN SCALES - GENERAL: PAINLEVEL_OUTOF10: 5

## 2019-04-04 ASSESSMENT — PAIN DESCRIPTION - ONSET: ONSET: ON-GOING

## 2019-04-04 ASSESSMENT — PAIN DESCRIPTION - FREQUENCY: FREQUENCY: CONTINUOUS

## 2019-04-04 ASSESSMENT — PAIN - FUNCTIONAL ASSESSMENT: PAIN_FUNCTIONAL_ASSESSMENT: PREVENTS OR INTERFERES SOME ACTIVE ACTIVITIES AND ADLS

## 2019-04-04 ASSESSMENT — PAIN DESCRIPTION - PROGRESSION: CLINICAL_PROGRESSION: NOT CHANGED

## 2019-04-04 ASSESSMENT — PAIN DESCRIPTION - LOCATION: LOCATION: NECK;BACK

## 2019-04-04 NOTE — PROGRESS NOTES
HPI:     Neck Pain    The pain is at a severity of 5/10. The pain is moderate. The symptoms are aggravated by twisting and position. The pain is worse during the day. Stiffness is present in the morning. Pertinent negatives include no chest pain. He has tried oral narcotics and heat for the symptoms. The treatment provided mild relief. Back Pain   This is a chronic problem. The current episode started more than 1 year ago. The problem occurs constantly. The problem is unchanged. The pain is present in the lumbar spine (radiates to hips also). The quality of the pain is described as aching. The pain radiates to the left knee. The pain is at a severity of 5/10. The pain is moderate. The pain is worse during the day. The symptoms are aggravated by position and standing. Stiffness is present all day. Pertinent negatives include no abdominal pain, bladder incontinence or chest pain. Risk factors include lack of exercise and obesity. He has tried analgesics, heat and ice for the symptoms. The treatment provided mild relief. Chronic neck as well as back pain. He has had previous posterior cervical decompression. Continues to have lingering back pain. MRI with multilevel degenerative changes. He is opioid dependent for pain control. We have been trying different opioid regimens and trying to lower his dose and he's been having side effects with the morphine. Oxycodone with some benefit. Patient denies any new neurological symptoms. Nobowel or bladder incontinence, no weakness, and no falling. Review of OARRS does not show any aberrant prescription behavior.      Past Medical History:   Diagnosis Date    Abnormal stress ECG     Angina pectoris (HCC)     CAD (coronary artery disease)     Carpal tunnel syndrome     left    Cervical stenosis of spine     Cervicalgia     chronic pain    Chronic back pain     COPD (chronic obstructive pulmonary disease) (HCC)     DDD (degenerative disc disease)     Elbow fracture, right     Fractures     elbow    GERD (gastroesophageal reflux disease)     Gout     Right great toe    Hyperlipidemia     Hypertension     Mild depression ([facility] [address] 9/26/2013    2 suicide attempts by girlfriend related to depression.  Osteoarthritis of right knee     Sinus headache 10/10/2018    Sleep apnea     Sleep disturbance     Tendonitis of foot     right       Past Surgical History:   Procedure Laterality Date    CARDIAC CATHETERIZATION  7/8/14    Non Obstructive CAD     CERVICAL SPINE SURGERY  7/13/12     C2-3-4-5    COLONOSCOPY      FRACTURE SURGERY      Rt elbow     NERVE BLOCK  2/5/16    premier tens    UPPER GASTROINTESTINAL ENDOSCOPY         No Known Allergies      Current Outpatient Medications:     metFORMIN (GLUCOPHAGE) 500 MG tablet, TAKE ONE TABLET BY MOUTH DAILY WITH BREAKFAST, Disp: 60 tablet, Rfl: 2    atorvastatin (LIPITOR) 80 MG tablet, TAKE ONE TABLET BY MOUTH DAILY, Disp: 90 tablet, Rfl: 2    oxyCODONE (ROXICODONE) 5 MG immediate release tablet, Take 1 tablet by mouth every 12 hours as needed for Pain for up to 30 days. , Disp: 60 tablet, Rfl: 0    morphine (MS CONTIN) 15 MG extended release tablet, Take 1 tablet by mouth 2 times daily for 30 days. , Disp: 60 tablet, Rfl: 0    gabapentin (NEURONTIN) 800 MG tablet, Take 1 tablet by mouth every 8 hours for 30 days. , Disp: 90 tablet, Rfl: 0    tiZANidine (ZANAFLEX) 4 MG tablet, Take 1 tablet by mouth 2 times daily as needed (for muscle spasms), Disp: 60 tablet, Rfl: 0    cetirizine (ZYRTEC) 10 MG tablet, TAKE ONE TABLET BY MOUTH DAILY, Disp: 30 tablet, Rfl: 1    SYMBICORT 160-4.5 MCG/ACT AERO, INHALE TWO PUFFS BY MOUTH TWICE A DAY, Disp: 1 Inhaler, Rfl: 2    amLODIPine (NORVASC) 5 MG tablet, TAKE ONE TABLET BY MOUTH DAILY, Disp: 30 tablet, Rfl: 2    diclofenac (VOLTAREN) 75 MG EC tablet, Take 75 mg by mouth 2 times daily, Disp: , Rfl:     zoster recombinant adjuvanted vaccine (SHINGRIX) 50 MCG SUSR file   Relationships    Social connections:     Talks on phone: Not on file     Gets together: Not on file     Attends Islam service: Not on file     Active member of club or organization: Not on file     Attends meetings of clubs or organizations: Not on file     Relationship status: Not on file    Intimate partner violence:     Fear of current or ex partner: Not on file     Emotionally abused: Not on file     Physically abused: Not on file     Forced sexual activity: Not on file   Other Topics Concern    Not on file   Social History Narrative    Not on file       Review of Systems:  Review of Systems   HENT: Negative for congestion. Eyes: Negative for visual disturbance. Cardiovascular: Negative for chest pain. Musculoskeletal: Positive for back pain and neck pain. Gastrointestinal: Negative for abdominal pain. Genitourinary: Negative for bladder incontinence. Physical Exam:  BP (!) 140/73   Pulse 96   Temp 98.1 °F (36.7 °C)   Resp 14     Physical Exam   Constitutional: He is oriented to person, place, and time. Musculoskeletal:        Lumbar back: He exhibits tenderness. He exhibits no edema and no deformity. Neurological: He is alert and oriented to person, place, and time. He has normal strength. Gait abnormal.   Vitals reviewed. tenderness over the SI joints  Positive Stephanie bilaterally    Record/Diagnostics Review:    As above, I did review the imaging    Assessment:  Sacroiliitis  Lumbar spondylosis  Chronic opioid therapy      Treatment Plan:  DISCUSSION: Treatment options discussed with patient and allquestions answered to patient's satisfaction. OARRS Review: Reviewed and acceptable for medications prescribed. TREATMENT OPTIONS:     Discussed the importance of continued physical therapy and exercise program as well.   Axial low back pain the worst of complaints, has failed conservative measures and the pain continues, pain over the bilateral sacroiliac joints with positive provocative test, would benefit from bilateral sacroiliac joint injections for both diagnostic and therapeutic purposes. Consider diagnostic facet blocks in the future as well. In regards to his medication management he has been having side effects with the morphine, we'll discontinue this and will change his regimen to oxycodone 5 mg up to 4 times a day as needed for pain. Morphine equivalent dose 30. Goal to continue to lower his opioid regimen. Leonidas Pratt M.D. I have reviewed the chief complaint and history of present illness (including ROS and PFSH) and vital documentation by my staff and I agree with their documentation and have added where applicable.

## 2019-04-06 LAB
6-ACETYLMORPHINE, UR: NOT DETECTED
7-AMINOCLONAZEPAM, URINE: NOT DETECTED
ALPHA-OH-ALPRAZ, URINE: NOT DETECTED
ALPRAZOLAM, URINE: NOT DETECTED
AMPHETAMINES, URINE: NOT DETECTED
BARBITURATES, URINE: NOT DETECTED
BENZOYLECGONINE, UR: NOT DETECTED
BUPRENORPHINE URINE: NOT DETECTED
CARISOPRODOL, UR: NOT DETECTED
CLONAZEPAM, URINE: NOT DETECTED
CODEINE, URINE: NOT DETECTED
CREATININE URINE: 151 MG/DL (ref 20–400)
DIAZEPAM, URINE: NOT DETECTED
EER PAIN MGT DRUG PANEL, HIGH RES/EMIT U: NORMAL
ETHYL GLUCURONIDE UR: NOT DETECTED
FENTANYL URINE: NOT DETECTED
HYDROCODONE, URINE: NOT DETECTED
HYDROMORPHONE, URINE: NOT DETECTED
LORAZEPAM, URINE: NOT DETECTED
MARIJUANA METAB, UR: NOT DETECTED
MDA, UR: NOT DETECTED
MDEA, EVE, UR: NOT DETECTED
MDMA URINE: NOT DETECTED
MEPERIDINE METAB, UR: NOT DETECTED
METHADONE, URINE: NOT DETECTED
METHAMPHETAMINE, URINE: NOT DETECTED
METHYLPHENIDATE: NOT DETECTED
MIDAZOLAM, URINE: NOT DETECTED
MORPHINE URINE: PRESENT
NORBUPRENORPHINE, URINE: NOT DETECTED
NORDIAZEPAM, URINE: NOT DETECTED
NORFENTANYL, URINE: NOT DETECTED
NORHYDROCODONE, URINE: NOT DETECTED
NOROXYCODONE, URINE: PRESENT
NOROXYMORPHONE, URINE: PRESENT
OXAZEPAM, URINE: NOT DETECTED
OXYCODONE URINE: PRESENT
OXYMORPHONE, URINE: NOT DETECTED
PAIN MGT DRUG PANEL, HI RES, UR: NORMAL
PCP,URINE: NOT DETECTED
PHENTERMINE, UR: NOT DETECTED
PROPOXYPHENE, URINE: NOT DETECTED
TAPENTADOL, URINE: NOT DETECTED
TAPENTADOL-O-SULFATE, URINE: NOT DETECTED
TEMAZEPAM, URINE: NOT DETECTED
TRAMADOL, URINE: NOT DETECTED
ZOLPIDEM, URINE: NOT DETECTED

## 2019-05-02 ENCOUNTER — HOSPITAL ENCOUNTER (OUTPATIENT)
Dept: PAIN MANAGEMENT | Age: 59
Discharge: HOME | End: 2019-05-02
Payer: MEDICARE

## 2019-05-02 ENCOUNTER — OFFICE VISIT (OUTPATIENT)
Dept: FAMILY MEDICINE CLINIC | Age: 59
End: 2019-05-02
Payer: MEDICARE

## 2019-05-02 VITALS
HEART RATE: 79 BPM | WEIGHT: 233.6 LBS | HEIGHT: 69 IN | DIASTOLIC BLOOD PRESSURE: 72 MMHG | BODY MASS INDEX: 34.6 KG/M2 | SYSTOLIC BLOOD PRESSURE: 130 MMHG | TEMPERATURE: 98.3 F

## 2019-05-02 VITALS
TEMPERATURE: 97.9 F | HEIGHT: 69 IN | DIASTOLIC BLOOD PRESSURE: 84 MMHG | HEART RATE: 90 BPM | BODY MASS INDEX: 33.18 KG/M2 | RESPIRATION RATE: 16 BRPM | WEIGHT: 224 LBS | SYSTOLIC BLOOD PRESSURE: 141 MMHG

## 2019-05-02 DIAGNOSIS — F17.200 SMOKING: ICD-10-CM

## 2019-05-02 DIAGNOSIS — Z51.81 MEDICATION MONITORING ENCOUNTER: ICD-10-CM

## 2019-05-02 DIAGNOSIS — M48.061 SPINAL STENOSIS OF LUMBAR REGION WITHOUT NEUROGENIC CLAUDICATION: Primary | ICD-10-CM

## 2019-05-02 DIAGNOSIS — I10 ESSENTIAL HYPERTENSION: ICD-10-CM

## 2019-05-02 DIAGNOSIS — M48.02 CERVICAL STENOSIS OF SPINE: Primary | ICD-10-CM

## 2019-05-02 DIAGNOSIS — M51.36 DDD (DEGENERATIVE DISC DISEASE), LUMBAR: ICD-10-CM

## 2019-05-02 DIAGNOSIS — M51.26 DISPLACEMENT OF LUMBAR INTERVERTEBRAL DISC WITHOUT MYELOPATHY: ICD-10-CM

## 2019-05-02 DIAGNOSIS — E78.2 MIXED HYPERLIPIDEMIA: ICD-10-CM

## 2019-05-02 DIAGNOSIS — R73.03 PREDIABETES: ICD-10-CM

## 2019-05-02 DIAGNOSIS — M48.02 CERVICAL STENOSIS OF SPINE: ICD-10-CM

## 2019-05-02 DIAGNOSIS — M54.2 CERVICALGIA: ICD-10-CM

## 2019-05-02 PROCEDURE — 4004F PT TOBACCO SCREEN RCVD TLK: CPT | Performed by: FAMILY MEDICINE

## 2019-05-02 PROCEDURE — 3017F COLORECTAL CA SCREEN DOC REV: CPT | Performed by: FAMILY MEDICINE

## 2019-05-02 PROCEDURE — G8427 DOCREV CUR MEDS BY ELIG CLIN: HCPCS | Performed by: FAMILY MEDICINE

## 2019-05-02 PROCEDURE — G8417 CALC BMI ABV UP PARAM F/U: HCPCS | Performed by: FAMILY MEDICINE

## 2019-05-02 PROCEDURE — 99213 OFFICE O/P EST LOW 20 MIN: CPT | Performed by: FAMILY MEDICINE

## 2019-05-02 PROCEDURE — 99214 OFFICE O/P EST MOD 30 MIN: CPT

## 2019-05-02 PROCEDURE — 99213 OFFICE O/P EST LOW 20 MIN: CPT | Performed by: NURSE PRACTITIONER

## 2019-05-02 RX ORDER — GABAPENTIN 800 MG/1
800 TABLET ORAL EVERY 8 HOURS SCHEDULED
Qty: 90 TABLET | Refills: 0 | Status: SHIPPED | OUTPATIENT
Start: 2019-05-02 | End: 2019-05-30 | Stop reason: SDUPTHER

## 2019-05-02 RX ORDER — OXYCODONE HYDROCHLORIDE 5 MG/1
5 TABLET ORAL EVERY 6 HOURS PRN
Qty: 120 TABLET | Refills: 0 | Status: SHIPPED | OUTPATIENT
Start: 2019-05-04 | End: 2019-05-30 | Stop reason: SDUPTHER

## 2019-05-02 RX ORDER — TIZANIDINE 4 MG/1
4 TABLET ORAL 2 TIMES DAILY PRN
Qty: 60 TABLET | Refills: 0 | Status: SHIPPED | OUTPATIENT
Start: 2019-05-02 | End: 2019-05-30 | Stop reason: SDUPTHER

## 2019-05-02 ASSESSMENT — ENCOUNTER SYMPTOMS
CONSTIPATION: 0
SHORTNESS OF BREATH: 0
COUGH: 0
BACK PAIN: 1

## 2019-05-02 NOTE — PROGRESS NOTES
He appears well-developed and well-nourished. Rigid posture, standing majority of time of office visit for comfort. Visual inspection of the posterior cervical spine shows well healed surgical midline scar from approximately C5 down to T1. Rigid motion with spinal movements noted and extreme calf tightness noted at level of knee and quadriceps. HENT:   Head: Normocephalic and atraumatic. Cardiovascular: Normal rate and regular rhythm. Pulmonary/Chest: Effort normal and breath sounds normal.   Skin: Skin is warm and dry. Psychiatric: He has a normal mood and affect. His behavior is normal.   Nursing note and vitals reviewed. Vitals:    05/02/19 1016   BP: 130/72   Pulse: 79   Temp: 98.3 °F (36.8 °C)           ASSESSMENT AND PLAN      Diagnosis Orders   1. Cervical stenosis of spine   - Under pain management   2. Smoking  - Discussed options to treat, patient declined at this time   3. Essential hypertension  - Patient states he has stopped taking Norvasc for blood pressure control. I urged him to consider the possibility that he may need it in the future. At this time he will hold the medication formally, as per the record, and we will reassess his blood pressure in 4 to 6 weeks. 4. Prediabetes  - Patient is noncompliant with Metformin, have told him based on last A1 C reading he can hold them for the time being and we will reassess in three months   5. Mixed hyperlipidemia  - Have strongly urged and advise patient to continue atorvastatin 80 mg daily for cholesterol maintenance. Recheck four - six weeks. Health maintenance reviewed and up-to-date.       Electronicallysigned by Gildardo Cortez DO on 5/2/2019 at 10:58 AM

## 2019-05-02 NOTE — PROGRESS NOTES
Patient is here today to review medication contract. Chief Complaint:  Neck and back pain    [facility]      Patient complains of neck pain that radiates down his left arm to fingertips. He has had this pain for many years and it is worsening over time. He had cervical spine surgery in 2012 and the pain has only worsened since that time. He saw Dr Luigi Yanes with no surgery recommended for neck, but told could benefit from [id number].    Patient followed up with Dr. Darcie Bashir after his CT last August and cervical injections were suggested. Jose Navarrete He completed PT and reports mild benefit - he continues to do exercises at home. [facility] did see NS in 2016 for lumbar pain but no surgery recommended at that time. He is to follow up with Dr. Jose Norwood if symptoms worsen. Diclofenac started by his rheumatologist recently. Pt is scheduled for bilat Si injections later this week. Trying to control his pain with only oxycodone QID. The ER was discontinued per pt request and he did not tolerate MS. MED is 30 - down from 45      HPI:     Back Pain   This is a chronic problem. The current episode started more than 1 year ago. The problem occurs constantly. The problem has been gradually improving since onset. The pain is present in the sacro-iliac, lumbar spine and gluteal. The quality of the pain is described as stabbing and aching. The pain does not radiate. The pain is at a severity of 6/10. The pain is moderate. The pain is the same all the time. The symptoms are aggravated by bending, twisting, sitting, position and lying down. Stiffness is present at night. Associated symptoms include numbness and paresthesias. Pertinent negatives include no chest pain or fever. He has tried ice, NSAIDs, muscle relaxant, analgesics and home exercises for the symptoms. The treatment provided mild relief. Neck Pain    This is a chronic problem. The current episode started more than 1 year ago. The problem occurs constantly.  The problem has been gradually worsening. The pain is associated with nothing. The pain is present in the midline, left side and occipital region. The quality of the pain is described as aching. The pain is at a severity of 5/10. The pain is moderate. The symptoms are aggravated by sneezing, coughing, bending and twisting. The pain is worse during the night. Stiffness is present in the morning. Associated symptoms include numbness. Pertinent negatives include no chest pain or fever. He has tried heat, muscle relaxants, NSAIDs and home exercises for the symptoms. The treatment provided moderate relief. Patient denies any new neurological symptoms. No bowel or bladder incontinence, no weakness, and no falling. Pill count: not appropriate short 5 tabs warning given    Morphine equivalent:30     Attestation: The Prescription Monitoring Report for this patient was reviewed today. JOSSUE Salcedo CNP)  Chronic Pain Routine Monitoring: Possible medication side effects, risk of tolerance/dependence & alternative treatments discussed., Obtaining appropriate analgesic effect of treatment., No signs of potential drug abuse or diversion identified: otherwise, see note documentation JOSSUE Salcedo CNP)  Acute Pain Prescriptions: Severe pain not adequately treated with lower dose. JOSSUE Salcedo CNP)      Past Medical History:   Diagnosis Date    Abnormal stress ECG     Angina pectoris (HCC)     CAD (coronary artery disease)     Carpal tunnel syndrome     left    Cervical stenosis of spine     Cervicalgia     chronic pain    Chronic back pain     COPD (chronic obstructive pulmonary disease) (HCC)     DDD (degenerative disc disease)     Elbow fracture, right     Fractures     elbow    GERD (gastroesophageal reflux disease)     Gout     Right great toe    Hyperlipidemia     Hypertension     Mild depression ([address] 9/26/2013    2 suicide attempts by girlfriend related to depression.     Osteoarthritis of right knee     Sinus headache 10/10/2018    Sleep apnea     Sleep disturbance     Tendonitis of foot     right       Past Surgical History:   Procedure Laterality Date    CARDIAC CATHETERIZATION  7/8/14    Non Obstructive CAD     CERVICAL SPINE SURGERY  7/13/12     C2-3-4-5    COLONOSCOPY      FRACTURE SURGERY      Rt elbow     NERVE BLOCK  2/5/16    premier tens    UPPER GASTROINTESTINAL ENDOSCOPY         No Known Allergies      Current Outpatient Medications:     [START ON 5/4/2019] oxyCODONE (ROXICODONE) 5 MG immediate release tablet, Take 1 tablet by mouth every 6 hours as needed for Pain (pain) for up to 30 days. , Disp: 120 tablet, Rfl: 0    gabapentin (NEURONTIN) 800 MG tablet, Take 1 tablet by mouth every 8 hours for 30 days. , Disp: 90 tablet, Rfl: 0    tiZANidine (ZANAFLEX) 4 MG tablet, Take 1 tablet by mouth 2 times daily as needed (for muscle spasms), Disp: 60 tablet, Rfl: 0    metFORMIN (GLUCOPHAGE) 500 MG tablet, TAKE ONE TABLET BY MOUTH DAILY WITH BREAKFAST, Disp: 60 tablet, Rfl: 2    atorvastatin (LIPITOR) 80 MG tablet, TAKE ONE TABLET BY MOUTH DAILY, Disp: 90 tablet, Rfl: 2    cetirizine (ZYRTEC) 10 MG tablet, TAKE ONE TABLET BY MOUTH DAILY, Disp: 30 tablet, Rfl: 1    SYMBICORT 160-4.5 MCG/ACT AERO, INHALE TWO PUFFS BY MOUTH TWICE A DAY, Disp: 1 Inhaler, Rfl: 2    amLODIPine (NORVASC) 5 MG tablet, TAKE ONE TABLET BY MOUTH DAILY, Disp: 30 tablet, Rfl: 2    diclofenac (VOLTAREN) 75 MG EC tablet, Take 75 mg by mouth 2 times daily, Disp: , Rfl:     zoster recombinant adjuvanted vaccine (SHINGRIX) 50 MCG SUSR injection, 50 MCG IM then repeat 2-6 months., Disp: 0.5 mL, Rfl: 1    traZODone (DESYREL) 150 MG tablet, Take 150 mg by mouth nightly, Disp: , Rfl:     omeprazole (PRILOSEC) 20 MG delayed release capsule, TAKE ONE CAPSULE BY MOUTH TWICE A DAY 30 MINUTES BEFORE MEALS, Disp: 60 capsule, Rfl: 6    nitroGLYCERIN (NITROSTAT) 0.4 MG SL tablet, Place 1 tablet under the tongue every 5 minutes as needed abused: Not on file     Physically abused: Not on file     Forced sexual activity: Not on file   Other Topics Concern    Not on file   Social History Narrative    Not on file       Review of Systems:  Review of Systems   Constitution: Negative for chills and fever. Cardiovascular: Negative for chest pain. Respiratory: Negative for cough and shortness of breath. Musculoskeletal: Positive for arthritis, back pain, muscle cramps, muscle weakness, neck pain and stiffness. Negative for falls. Gastrointestinal: Negative for constipation. Neurological: Positive for numbness and paresthesias. Physical Exam:  BP (!) 141/84   Pulse 90   Temp 97.9 °F (36.6 °C) (Oral)   Resp 16   Ht 5' 9\" (1.753 m)   Wt 224 lb (101.6 kg)   BMI 33.08 kg/m²     Physical Exam   Constitutional: He is oriented to person, place, and time. Cardiovascular: Normal rate. Pulmonary/Chest: Effort normal.   Musculoskeletal:        Cervical back: He exhibits decreased range of motion and tenderness. Lumbar back: He exhibits tenderness. Neurological: He is alert and oriented to person, place, and time. Skin: Skin is warm and dry. Record/Diagnostics Review:    Last niru April and was appropriate     MRI Lumbar 2018 -         FINDINGS:  BONES/ALIGNMENT:     The lumbar vertebral body heights are unchanged.  Chronic T12 compression  fracture appears unchanged from previous examination.  No new vertebral body  height loss is identified.  No evidence of significant spondylolisthesis. Unchanged T2 signal abnormality within the L2-L3 intervertebral disc space,  without evidence of adjacent endplate edema, or endplate destruction.  This  is likely degenerative.     SPINAL CORD:     The conus terminates normally.  There is a thin fatty filum terminale.     SOFT TISSUES:     The visualized paraspinal structures are unremarkable.     L1-L2: Mild broad disc bulge.  No significant spinal canal stenosis, or neural  foraminal stenosis.     L2-L3: Mild broad disc bulge. No significant spinal canal stenosis or neural  foraminal stenosis.     L3-L4: Posterior disc osteophyte complex with central disc protrusion and  bilateral facet degenerative changes.  Moderate spinal canal stenosis.  No  significant neural foraminal stenosis.     L4-L5: Broad disc bulge with bilateral facet degenerative changes.  Mild  spinal canal stenosis.  No significant neural foraminal stenosis.     L5-S1: Broad disc bulge, with bilateral facet degenerative changes.  Small  central disc protrusion with annular fissure.  No significant spinal canal  stenosis.  Mild bilateral neural foraminal stenosis.       Assessment:  Problem List Items Addressed This Visit     Cervical stenosis of spine    Cervicalgia    Medication monitoring encounter    DDD (degenerative disc disease), lumbar    Relevant Medications    oxyCODONE (ROXICODONE) 5 MG immediate release tablet (Start on 5/4/2019)    gabapentin (NEURONTIN) 800 MG tablet    tiZANidine (ZANAFLEX) 4 MG tablet    Displacement of lumbar intervertebral disc without myelopathy    Relevant Medications    gabapentin (NEURONTIN) 800 MG tablet    tiZANidine (ZANAFLEX) 4 MG tablet    Spinal stenosis of lumbar region without neurogenic claudication - Primary             Treatment Plan:  Patient relates current medications are helping the pain. Patient reports taking pain medications as prescribed, denies obtaining medications from different sources and denies use of illegal drugs. Patient denies side effects from medications like nausea, vomiting, constipation or drowsiness. Patient reports current activities of daily living are possible due to medications and would like to continue them. As always, we encourage daily stretching and strengthening exercises, and recommend minimizing use of pain medications unless patient cannot get through daily activities due to pain. Contract requirements met. Continue opioid therapy. Script written for oxycodone gabapentin and tizandine  Pt to have Bilat SI injection this week  He is working on diet changes and weight loss to help decrease his pain  Does follow with rheumatologist q 3 months  Follow up appointment made for 4 weeks

## 2019-05-03 ASSESSMENT — ENCOUNTER SYMPTOMS
ABDOMINAL PAIN: 0
CHEST TIGHTNESS: 0
COUGH: 1
BACK PAIN: 0
SHORTNESS OF BREATH: 0

## 2019-05-09 ENCOUNTER — ANESTHESIA EVENT (OUTPATIENT)
Dept: OPERATING ROOM | Facility: CLINIC | Age: 59
End: 2019-05-09
Payer: MEDICARE

## 2019-05-09 ENCOUNTER — HOSPITAL ENCOUNTER (OUTPATIENT)
Dept: GENERAL RADIOLOGY | Age: 59
Discharge: HOME | End: 2019-05-11
Payer: MEDICARE

## 2019-05-09 ENCOUNTER — HOSPITAL ENCOUNTER (OUTPATIENT)
Facility: CLINIC | Age: 59
Setting detail: OUTPATIENT SURGERY
Discharge: HOME | End: 2019-05-09
Attending: PAIN MEDICINE | Admitting: PAIN MEDICINE
Payer: MEDICARE

## 2019-05-09 ENCOUNTER — ANESTHESIA (OUTPATIENT)
Dept: OPERATING ROOM | Facility: CLINIC | Age: 59
End: 2019-05-09
Payer: MEDICARE

## 2019-05-09 VITALS
SYSTOLIC BLOOD PRESSURE: 121 MMHG | WEIGHT: 236 LBS | HEART RATE: 84 BPM | TEMPERATURE: 97.3 F | HEIGHT: 70 IN | BODY MASS INDEX: 33.79 KG/M2 | OXYGEN SATURATION: 94 % | DIASTOLIC BLOOD PRESSURE: 63 MMHG | RESPIRATION RATE: 16 BRPM

## 2019-05-09 VITALS
RESPIRATION RATE: 25 BRPM | OXYGEN SATURATION: 93 % | DIASTOLIC BLOOD PRESSURE: 104 MMHG | SYSTOLIC BLOOD PRESSURE: 174 MMHG

## 2019-05-09 DIAGNOSIS — R52 PAIN: ICD-10-CM

## 2019-05-09 PROCEDURE — 3209999900 FLUORO FOR SURGICAL PROCEDURES

## 2019-05-09 PROCEDURE — 2709999900 HC NON-CHARGEABLE SUPPLY: Performed by: PAIN MEDICINE

## 2019-05-09 PROCEDURE — 7100000010 HC PHASE II RECOVERY - FIRST 15 MIN: Performed by: PAIN MEDICINE

## 2019-05-09 PROCEDURE — 2500000003 HC RX 250 WO HCPCS: Performed by: PAIN MEDICINE

## 2019-05-09 PROCEDURE — 7100000011 HC PHASE II RECOVERY - ADDTL 15 MIN: Performed by: PAIN MEDICINE

## 2019-05-09 PROCEDURE — 27096 INJECT SACROILIAC JOINT: CPT | Performed by: PAIN MEDICINE

## 2019-05-09 PROCEDURE — 6360000002 HC RX W HCPCS: Performed by: PAIN MEDICINE

## 2019-05-09 PROCEDURE — 3600000002 HC SURGERY LEVEL 2 BASE: Performed by: PAIN MEDICINE

## 2019-05-09 PROCEDURE — 6360000002 HC RX W HCPCS: Performed by: NURSE ANESTHETIST, CERTIFIED REGISTERED

## 2019-05-09 PROCEDURE — 3700000000 HC ANESTHESIA ATTENDED CARE: Performed by: PAIN MEDICINE

## 2019-05-09 RX ORDER — FENTANYL CITRATE 50 UG/ML
INJECTION, SOLUTION INTRAMUSCULAR; INTRAVENOUS PRN
Status: DISCONTINUED | OUTPATIENT
Start: 2019-05-09 | End: 2019-05-09 | Stop reason: SDUPTHER

## 2019-05-09 RX ORDER — DEXAMETHASONE SODIUM PHOSPHATE 10 MG/ML
INJECTION INTRAMUSCULAR; INTRAVENOUS PRN
Status: DISCONTINUED | OUTPATIENT
Start: 2019-05-09 | End: 2019-05-09 | Stop reason: ALTCHOICE

## 2019-05-09 RX ORDER — SODIUM CHLORIDE 0.9 % (FLUSH) 0.9 %
10 SYRINGE (ML) INJECTION EVERY 12 HOURS SCHEDULED
Status: CANCELLED | OUTPATIENT
Start: 2019-05-09

## 2019-05-09 RX ORDER — BUPIVACAINE HYDROCHLORIDE 2.5 MG/ML
INJECTION, SOLUTION EPIDURAL; INFILTRATION; INTRACAUDAL PRN
Status: DISCONTINUED | OUTPATIENT
Start: 2019-05-09 | End: 2019-05-09 | Stop reason: ALTCHOICE

## 2019-05-09 RX ORDER — SODIUM CHLORIDE 0.9 % (FLUSH) 0.9 %
10 SYRINGE (ML) INJECTION PRN
Status: CANCELLED | OUTPATIENT
Start: 2019-05-09

## 2019-05-09 RX ORDER — MIDAZOLAM HYDROCHLORIDE 1 MG/ML
INJECTION INTRAMUSCULAR; INTRAVENOUS PRN
Status: DISCONTINUED | OUTPATIENT
Start: 2019-05-09 | End: 2019-05-09 | Stop reason: SDUPTHER

## 2019-05-09 RX ADMIN — MIDAZOLAM HYDROCHLORIDE 2 MG: 1 INJECTION, SOLUTION INTRAMUSCULAR; INTRAVENOUS at 12:18

## 2019-05-09 RX ADMIN — FENTANYL CITRATE 100 MCG: 50 INJECTION INTRAMUSCULAR; INTRAVENOUS at 12:18

## 2019-05-09 ASSESSMENT — PAIN DESCRIPTION - DESCRIPTORS
DESCRIPTORS: ACHING
DESCRIPTORS: ACHING;CONSTANT;STABBING

## 2019-05-09 ASSESSMENT — PULMONARY FUNCTION TESTS
PIF_VALUE: 0
PIF_VALUE: 1
PIF_VALUE: 0
PIF_VALUE: 0

## 2019-05-09 ASSESSMENT — PAIN DESCRIPTION - FREQUENCY: FREQUENCY: INTERMITTENT

## 2019-05-09 ASSESSMENT — PAIN DESCRIPTION - LOCATION: LOCATION: BACK

## 2019-05-09 ASSESSMENT — PAIN - FUNCTIONAL ASSESSMENT: PAIN_FUNCTIONAL_ASSESSMENT: 0-10

## 2019-05-09 ASSESSMENT — PAIN DESCRIPTION - ONSET: ONSET: ON-GOING

## 2019-05-09 ASSESSMENT — PAIN DESCRIPTION - PROGRESSION: CLINICAL_PROGRESSION: GRADUALLY IMPROVING

## 2019-05-09 ASSESSMENT — PAIN DESCRIPTION - PAIN TYPE: TYPE: CHRONIC PAIN

## 2019-05-09 ASSESSMENT — PAIN SCALES - GENERAL: PAINLEVEL_OUTOF10: 2

## 2019-05-09 NOTE — H&P
Pain Pre-Op H&P Note    Kylah Fillers Hefzy    HPI: Jim Zhang  presents with low back pain, here for SI joint injections. Past Medical History:   Diagnosis Date    Abnormal stress ECG     Angina pectoris (HCC)     CAD (coronary artery disease)     Carpal tunnel syndrome     left    Cervical stenosis of spine     Cervicalgia     chronic pain    Chronic back pain     COPD (chronic obstructive pulmonary disease) (HCC)     DDD (degenerative disc disease)     Elbow fracture, right     Fractures     elbow    GERD (gastroesophageal reflux disease)     Gout     Right great toe    Hyperlipidemia     Hypertension     Mild depression ([address] 9/26/2013    2 suicide attempts by girlfriend related to depression.  Osteoarthritis of right knee     Sinus headache 10/10/2018    Sleep apnea     Sleep disturbance     Tendonitis of foot     right       Past Surgical History:   Procedure Laterality Date    CARDIAC CATHETERIZATION  7/8/14    Non Obstructive CAD     CERVICAL SPINE SURGERY  7/13/12     C2-3-4-5    COLONOSCOPY      FRACTURE SURGERY      Rt elbow     NERVE BLOCK  2/5/16    prem[facility] tens    UPPER GASTROINTESTINAL ENDOSCOPY         No Known Allergies    No current facility-administered medications for this encounter. Social History     Tobacco Use    Smoking status: Current Every Day Smoker     Packs/day: 1.00     Years: 40.00     Pack years: 40.00     Types: Cigarettes    Smokeless tobacco: Former User     Quit date: 3/12/2015    Tobacco comment: wants to discuss  chantix   Substance Use Topics    Alcohol use: Yes     Alcohol/week: 0.0 oz     Comment: 3 x year       Review of Systems:   Focused review of systems was performed, and negative as pertinent to diagnosis, except as stated in HPI.     Physical Exam:     /75   Pulse 85   Temp 97.7 °F (36.5 °C) (Temporal)   Resp 15   Ht 5' 10\" (1.778 m)   Wt 236 lb (107 kg)   SpO2 98%   BMI 33.86 kg/m²     Physical Exam Constitutional: He is oriented to person, place, and time. Musculoskeletal:        Lumbar back: He exhibits tenderness. He exhibits no edema and no deformity. Neurological: He is alert and oriented to person, place, and time. He has normal strength. Gait normal.   Vitals reviewed. Patient's current physical status, medications, medical history, and HPI have been reviewed and updated as appropriate on this date: 05/09/19    Risk/Benefit(s): The risks, benefits, alternatives, and potential complications havebeen discussed with the patient/family and informed consent has been obtained for the procedure/sedation.     Diagnosis:   Sacroiliitis      Plan: Bilateral SI joint injections        [address]

## 2019-05-09 NOTE — ANESTHESIA PRE PROCEDURE
Department of Anesthesiology  Preprocedure Note       Name:  Jolynn Caballero   Age:  59 y.o.  :  1960                                          MRN:  [id number]         Date:  2019      Surgeon: George Jensen):  Dorna Phoenix, MD    Procedure: INJECTION SPINAL (Bilateral )    Medications prior to admission:   Prior to Admission medications    Medication Sig Start Date End Date Taking? Authorizing Provider   oxyCODONE (ROXICODONE) 5 MG immediate release tablet Take 1 tablet by mouth every 6 hours as needed for Pain (pain) for up to 30 days. 5/4/19 6/3/19 Yes JOSSUE Meyer CNP   gabapentin (NEURONTIN) 800 MG tablet Take 1 tablet by mouth every 8 hours for 30 days.  19 Yes JOSSUE Meyer CNP   tiZANidine (ZANAFLEX) 4 MG tablet Take 1 tablet by mouth 2 times daily as needed (for muscle spasms) 19 Yes JOSSUE Meyer CNP   atorvastatin (LIPITOR) 80 MG tablet TAKE ONE TABLET BY MOUTH DAILY 3/21/19  Yes Marlon Kearns MD   cetirizine (ZYRTEC) 10 MG tablet TAKE ONE TABLET BY MOUTH DAILY 19  Yes Marlon Kearns MD   SYMBICORT 160-4.5 MCG/ACT AERO INHALE TWO PUFFS BY MOUTH TWICE A DAY 19  Yes Marlon Kearns MD   diclofenac (VOLTAREN) 75 MG EC tablet Take 75 mg by mouth 2 times daily 19  Yes Historical Provider, MD   traZODone (DESYREL) 150 MG tablet Take 150 mg by mouth nightly   Yes Historical Provider, MD   omeprazole (PRILOSEC) 20 MG delayed release capsule TAKE ONE CAPSULE BY MOUTH TWICE A DAY 30 MINUTES BEFORE MEALS 18  Yes Marlon Kearns MD   nitroGLYCERIN (NITROSTAT) 0.4 MG SL tablet Place 1 tablet under the tongue every 5 minutes as needed for Chest pain 18  Yes Marlon Kearns MD   albuterol sulfate  (90 Base) MCG/ACT inhaler Inhale 2 puffs into the lungs every 6 hours as needed for Wheezing 18  Yes Marlon Kearns MD   citalopram (CELEXA) 20 MG tablet Take 20 mg by mouth daily    Yes Historical Provider, MD   aspirin 81 MG EC tablet Take 1 tablet  Tendonitis of foot     right       Past Surgical History:        Procedure Laterality Date    CARDIAC CATHETERIZATION  7/8/14    Non Obstructive CAD     CERVICAL SPINE SURGERY  7/13/12     C2-3-4-5    COLONOSCOPY      FRACTURE SURGERY      Rt elbow     NERVE BLOCK  2/5/16    premier tens    UPPER GASTROINTESTINAL ENDOSCOPY         Social History:    Social History     Tobacco Use    Smoking status: Current Every Day Smoker     Packs/day: 1.00     Years: 40.00     Pack years: 40.00     Types: Cigarettes    Smokeless tobacco: Former User     Quit date: 3/12/2015    Tobacco comment: wants to discuss  chantix   Substance Use Topics    Alcohol use: Yes     Alcohol/week: 0.0 oz     Comment: 3 x year                                Ready to quit: Not Answered  Counseling given: Not Answered  Comment: wants to discuss  chantix      Vital Signs (Current):   Vitals:    05/09/19 1114   BP: 131/75   Pulse: 85   Resp: 15   Temp: 97.7 °F (36.5 °C)   TempSrc: Temporal   SpO2: 98%   Weight: 236 lb (107 kg)   Height: 5' 10\" (1.778 m)                                              BP Readings from Last 3 Encounters:   05/09/19 131/75   05/02/19 (!) 141/84   05/02/19 130/72       NPO Status: Time of last liquid consumption: 2030                        Time of last solid consumption: 2020                        Date of last liquid consumption: 05/08/19                        Date of last solid food consumption: 05/08/19    BMI:   Wt Readings from Last 3 Encounters:   05/09/19 236 lb (107 kg)   05/02/19 224 lb (101.6 kg)   05/02/19 233 lb 9.6 oz (106 kg)     Body mass index is 33.86 kg/m².     CBC:   Lab Results   Component Value Date    WBC 8.0 11/01/2018    RBC 4.78 11/01/2018    RBC 4.75 02/10/2012    HGB 13.5 11/01/2018    HCT 40.8 11/01/2018    MCV 85.4 11/01/2018    RDW 15.6 11/01/2018     11/01/2018     02/10/2012       CMP:   Lab Results   Component Value Date     10/07/2018    K 4.2 10/07/2018  10/07/2018    CO2 24 10/07/2018    BUN 13 10/07/2018    CREATININE 0.74 10/07/2018    GFRAA >60 10/07/2018    LABGLOM >60 10/07/2018    GLUCOSE 125 10/07/2018    GLUCOSE 99 02/10/2012    PROT 7.1 10/07/2018    CALCIUM 9.6 10/07/2018    BILITOT 0.21 10/07/2018    ALKPHOS 90 10/07/2018    AST 25 10/07/2018    ALT 34 10/07/2018       POC Tests: No results for input(s): POCGLU, POCNA, POCK, POCCL, POCBUN, POCHEMO, POCHCT in the last 72 hours. Coags:   Lab Results   Component Value Date    PROTIME 10.2 10/07/2018    INR 1.0 10/07/2018    APTT 28.8 10/07/2018       HCG (If Applicable): No results found for: PREGTESTUR, PREGSERUM, HCG, HCGQUANT     ABGs: No results found for: PHART, PO2ART, JGK5WQU, XRP5RUA, BEART, D7DBCYJA     Type & Screen (If Applicable):  No results found for: LABABO, LABRH    Anesthesia Evaluation    Airway: Mallampati: III     Neck ROM: full   Dental: normal exam         Pulmonary:normal exam    (+) COPD:  sleep apnea:                             Cardiovascular:    (+) hypertension:, CAD: non-obstructive,     (-)  angina    ECG reviewed  Rhythm: regular      Stress test reviewed             ROS comment:  NORMAL LEFT MAIN & LAD   LCX OSTIAL 50%   RCS MID 20-30%   LVEF 50%. Neuro/Psych:   (+) neuromuscular disease:, headaches:, psychiatric history:            GI/Hepatic/Renal:   (+) GERD:,           Endo/Other:    (+) : arthritis:., .                 Abdominal:   (+) obese,         Vascular: negative vascular ROS. Anesthesia Plan      MAC     ASA 3       Induction: intravenous. Anesthetic plan and risks discussed with patient. Plan discussed with CRNA.                   Curt Willson MD   5/9/2019

## 2019-05-09 NOTE — OP NOTE
Sacro-Iliac Joint Injection:  SURGEON: Meredith Antony    PRE-OP DIAGNOSIS: Sacroiliitis (M46.1)    POST-OP DIAGNOSIS: Same. Procedure performed: Bilateral Sacroiliac Joint Injection. Physician confirmed and marked the surgical site. EBL: minimal    ASA: 3                  MP: 2    CONSENT: Patient has undergone the educational process with this procedure, is aware and fully understands the risks involved: potential damage to any and all body organs including possible bleeding, infection, and nerve injury, allergic reaction and headache. Patient also understands that the procedure will be undertaken in a safe, controlled and monitored setting. Patient recognizes that the benefits may include relief from pain and reduction in the oral use of medications. Patient agreed to proceed. PREP: The patient's back was prepped with chloroprep and draped appropriately. 5ml of 0.5% lidocaine was used to anesthetize the skin and subcutaneous tissue. PROCEDURE NOTE: A 22 gauge 3.5 inch spinal needle was advanced to the  Bilateral SI joint under fluoroscopic guidance. Aspiration was negative for blood, CSF and producing pain. 2ml of  0.25% bupivacaine was mixed with 5mg Dexamethasone and slowly injected into the joint(s). The needle was withdrawn by the physician and the nurse applied a sterile dressing. The patient tolerated the procedure well. No complications occurred. Patient transferred to the recovery room in satisfactory condition. Appropriate written discharge instructions given to the patient.       [address] [place] [address]

## 2019-05-21 DIAGNOSIS — J41.8 MIXED SIMPLE AND MUCOPURULENT CHRONIC BRONCHITIS (HCC): ICD-10-CM

## 2019-05-21 RX ORDER — BUDESONIDE AND FORMOTEROL FUMARATE DIHYDRATE 160; 4.5 UG/1; UG/1
AEROSOL RESPIRATORY (INHALATION)
Qty: 1 INHALER | Refills: 5 | Status: SHIPPED | OUTPATIENT
Start: 2019-05-21 | End: 2021-04-05 | Stop reason: SDUPTHER

## 2019-05-30 RX ORDER — GABAPENTIN 800 MG/1
800 TABLET ORAL EVERY 8 HOURS SCHEDULED
Qty: 90 TABLET | Refills: 0 | Status: SHIPPED | OUTPATIENT
Start: 2019-05-30 | End: 2019-07-02 | Stop reason: SDUPTHER

## 2019-05-30 RX ORDER — OXYCODONE HYDROCHLORIDE 5 MG/1
5 TABLET ORAL EVERY 6 HOURS PRN
Qty: 28 TABLET | Refills: 0 | Status: SHIPPED | OUTPATIENT
Start: 2019-06-03 | End: 2019-06-04 | Stop reason: SDUPTHER

## 2019-05-30 RX ORDER — TIZANIDINE 4 MG/1
4 TABLET ORAL 2 TIMES DAILY PRN
Qty: 60 TABLET | Refills: 0 | Status: SHIPPED | OUTPATIENT
Start: 2019-05-30 | End: 2019-07-11 | Stop reason: SDUPTHER

## 2019-06-04 ENCOUNTER — HOSPITAL ENCOUNTER (OUTPATIENT)
Dept: PAIN MANAGEMENT | Age: 59
Discharge: HOME | End: 2019-06-04
Payer: MEDICARE

## 2019-06-04 VITALS — TEMPERATURE: 98.2 F | HEART RATE: 91 BPM | SYSTOLIC BLOOD PRESSURE: 188 MMHG | DIASTOLIC BLOOD PRESSURE: 90 MMHG

## 2019-06-04 DIAGNOSIS — M54.2 CERVICALGIA: ICD-10-CM

## 2019-06-04 DIAGNOSIS — M48.061 SPINAL STENOSIS OF LUMBAR REGION WITHOUT NEUROGENIC CLAUDICATION: ICD-10-CM

## 2019-06-04 DIAGNOSIS — Z51.81 MEDICATION MONITORING ENCOUNTER: ICD-10-CM

## 2019-06-04 DIAGNOSIS — M51.36 DDD (DEGENERATIVE DISC DISEASE), LUMBAR: ICD-10-CM

## 2019-06-04 DIAGNOSIS — M51.26 DISPLACEMENT OF LUMBAR INTERVERTEBRAL DISC WITHOUT MYELOPATHY: ICD-10-CM

## 2019-06-04 DIAGNOSIS — M48.02 CERVICAL STENOSIS OF SPINE: Primary | ICD-10-CM

## 2019-06-04 PROCEDURE — 99214 OFFICE O/P EST MOD 30 MIN: CPT

## 2019-06-04 PROCEDURE — 99214 OFFICE O/P EST MOD 30 MIN: CPT | Performed by: NURSE PRACTITIONER

## 2019-06-04 RX ORDER — OXYCODONE HYDROCHLORIDE 5 MG/1
5 TABLET ORAL EVERY 6 HOURS PRN
Qty: 120 TABLET | Refills: 0 | Status: SHIPPED | OUTPATIENT
Start: 2019-06-10 | End: 2019-07-02 | Stop reason: SDUPTHER

## 2019-06-04 ASSESSMENT — ENCOUNTER SYMPTOMS
BACK PAIN: 1
COUGH: 0
ABDOMINAL PAIN: 1
CONSTIPATION: 0
SHORTNESS OF BREATH: 0

## 2019-06-04 NOTE — PROGRESS NOTES
Patient is here today to review medication contract. Chief Complaint:  Neck and back pain    [facility]     Patient complains of neck pain that radiates down his left arm to fingertips. He has had this pain for many years and it is worsening over time. He had cervical spine surgery in 2012 and the pain has only worsened since that time. He saw Dr Laure Hardin with no surgery recommended for neck, but told could benefit from [id number].    Patient followed up with Dr. Denny Correa after his CT last August and cervical injections were suggested. Torres Pablo He completed PT and reports mild benefit - he continues to do exercises at home. Eve Zamora did see NS in 2016 for lumbar pain but no surgery recommended at that time. He is to follow up with Dr. Yeni Locke if symptoms worsen. Diclofenac started by his rheumatologist recently. Pt had bilat Si injections 5/9/19 and reports 80% relief for 8 days and would like to repeat. Back Pain   This is a chronic problem. The current episode started more than 1 year ago. The problem occurs constantly. The problem has been gradually worsening since onset. The pain is present in the lumbar spine, gluteal and sacro-iliac. The quality of the pain is described as stabbing and aching. The pain radiates to the left thigh. The pain is at a severity of 8/10. The pain is moderate. The pain is the same all the time. The symptoms are aggravated by twisting, bending, sitting, standing, lying down and position. Associated symptoms include abdominal pain, headaches, pelvic pain and tingling. Pertinent negatives include no chest pain or fever. Risk factors include obesity and lack of exercise. He has tried muscle relaxant, analgesics and ice for the symptoms. The treatment provided mild relief. Neck Pain    This is a chronic problem. The current episode started more than 1 year ago. The problem occurs constantly. The problem has been unchanged.  The pain is present in the left side, occipital region, right side and midline. The quality of the pain is described as aching. The pain is at a severity of 6/10. The pain is moderate. The symptoms are aggravated by twisting, position, sneezing, coughing and bending. The pain is same all the time. Stiffness is present all day. Associated symptoms include headaches and tingling. Pertinent negatives include no chest pain or fever. He has tried muscle relaxants, heat, oral narcotics and NSAIDs for the symptoms. The treatment provided mild relief. Patient denies any new neurological symptoms. No bowel or bladder incontinence, no weakness, and no falling. Pill count: appropriate    Morphine equivalent: 30    Attestation: The Prescription Monitoring Report for this patient was reviewed today. JOSSUE Plaza CNP)  Chronic Pain Routine Monitoring: Possible medication side effects, risk of tolerance/dependence & alternative treatments discussed., Obtaining appropriate analgesic effect of treatment., No signs of potential drug abuse or diversion identified: otherwise, see note documentation JOSSUE Plaza CNP)      Past Medical History:   Diagnosis Date    Abnormal stress ECG     Angina pectoris ([address]     CAD (coronary artery disease)     Carpal tunnel syndrome     left    Cervical stenosis of spine     Cervicalgia     chronic pain    Chronic back pain     COPD (chronic obstructive pulmonary disease) ([address]     DDD (degenerative disc disease)     Elbow fracture, right     Fractures     elbow    GERD (gastroesophageal reflux disease)     Gout     Right great toe    Hyperlipidemia     Hypertension     Mild depression ([address] 9/26/2013    2 suicide attempts by girlfriend related to depression.     Osteoarthritis of right knee     Sinus headache 10/10/2018    Sleep apnea     Sleep disturbance     Tendonitis of foot     right       Past Surgical History:   Procedure Laterality Date    CARDIAC CATHETERIZATION  7/8/14    Non Obstructive CAD     CERVICAL SPINE SURGERY  7/13/12     C2-3-4-5    COLONOSCOPY      FRACTURE SURGERY      Rt elbow     HC INJECT OTHER PERPHRL NERV Bilateral 5/9/2019    INJECTION SPINAL performed by Melva German MD at [facility]  2/5/16    [facility]    UPPER GASTROINTESTINAL ENDOSCOPY         No Known Allergies      Current Outpatient Medications:     [START ON 6/10/2019] oxyCODONE (ROXICODONE) 5 MG immediate release tablet, Take 1 tablet by mouth every 6 hours as needed for Pain (pain) for up to 30 days. , Disp: 120 tablet, Rfl: 0    gabapentin (NEURONTIN) 800 MG tablet, Take 1 tablet by mouth every 8 hours for 30 days. , Disp: 90 tablet, Rfl: 0    tiZANidine (ZANAFLEX) 4 MG tablet, Take 1 tablet by mouth 2 times daily as needed (for muscle spasms), Disp: 60 tablet, Rfl: 0    SYMBICORT 160-4.5 MCG/ACT AERO, INHALE TWO PUFFS BY MOUTH TWICE A DAY, Disp: 1 Inhaler, Rfl: 5    atorvastatin (LIPITOR) 80 MG tablet, TAKE ONE TABLET BY MOUTH DAILY, Disp: 90 tablet, Rfl: 2    cetirizine (ZYRTEC) 10 MG tablet, TAKE ONE TABLET BY MOUTH DAILY, Disp: 30 tablet, Rfl: 1    diclofenac (VOLTAREN) 75 MG EC tablet, Take 75 mg by mouth 2 times daily, Disp: , Rfl:     zoster recombinant adjuvanted vaccine (SHINGRIX) 50 MCG SUSR injection, 50 MCG IM then repeat 2-6 months., Disp: 0.5 mL, Rfl: 1    traZODone (DESYREL) 150 MG tablet, Take 150 mg by mouth nightly, Disp: , Rfl:     omeprazole (PRILOSEC) 20 MG delayed release capsule, TAKE ONE CAPSULE BY MOUTH TWICE A DAY 30 MINUTES BEFORE MEALS, Disp: 60 capsule, Rfl: 6    albuterol sulfate  (90 Base) MCG/ACT inhaler, Inhale 2 puffs into the lungs every 6 hours as needed for Wheezing, Disp: 1 Inhaler, Rfl: 3    citalopram (CELEXA) 20 MG tablet, Take 20 mg by mouth daily , Disp: , Rfl:     aspirin 81 MG EC tablet, Take 1 tablet by mouth daily, Disp: 30 tablet, Rfl: 3    nitroGLYCERIN (NITROSTAT) 0.4 MG SL tablet, Place 1 tablet under the tongue every 5 minutes as needed for Chest pain, Disp: 25 tablet, Rfl: 3    Family History   Problem Relation Age of Onset    Heart Disease Mother     COPD Father     Cancer Maternal Aunt 64        breast cancer     Cancer Maternal Uncle 60        prostrate cancer        Social History     Socioeconomic History    Marital status: Single     Spouse name: Not on file    Number of children: Not on file    Years of education: Not on file    Highest education level: Not on file   Occupational History     Employer: N/A   Social Needs    Financial resource strain: Not on file    Food insecurity:     Worry: Not on file     Inability: Not on file    Transportation needs:     Medical: Not on file     Non-medical: Not on file   Tobacco Use    Smoking status: Current Every Day Smoker     Packs/day: 1.00     Years: 40.00     Pack years: 40.00     Types: Cigarettes    Smokeless tobacco: Former User     Quit date: 3/12/2015    Tobacco comment: wants to discuss  chantix   Substance and Sexual Activity    Alcohol use: Yes     Alcohol/week: 0.0 oz     Comment: 3 x year    Drug use: No    Sexual activity: Yes     Partners: Female   Lifestyle    Physical activity:     Days per week: Not on file     Minutes per session: Not on file    Stress: Not on file   Relationships    Social connections:     Talks on phone: Not on file     Gets together: Not on file     Attends Hindu service: Not on file     Active member of club or organization: Not on file     Attends meetings of clubs or organizations: Not on file     Relationship status: Not on file    Intimate partner violence:     Fear of current or ex partner: Not on file     Emotionally abused: Not on file     Physically abused: Not on file     Forced sexual activity: Not on file   Other Topics Concern    Not on file   Social History Narrative    Not on file       Review of Systems:  Review of Systems   Constitution: Negative for chills and fever. Cardiovascular: Negative for chest pain.

## 2019-06-11 ENCOUNTER — TELEPHONE (OUTPATIENT)
Dept: PAIN MANAGEMENT | Age: 59
End: 2019-06-11

## 2019-06-11 DIAGNOSIS — M48.061 SPINAL STENOSIS OF LUMBAR REGION WITHOUT NEUROGENIC CLAUDICATION: ICD-10-CM

## 2019-06-11 DIAGNOSIS — M51.26 DISPLACEMENT OF LUMBAR INTERVERTEBRAL DISC WITHOUT MYELOPATHY: Primary | ICD-10-CM

## 2019-06-11 NOTE — PLAN OF CARE
Called pt on 6/11/19 at 1:30 pm. Pt reports his back pain has become severe. He did stop into the pain clinic earlier but was not seen by the medical staff. He left a note for NP to call him. He states it is taking him 20 minutes to walk 20 feet. He is unable to put his pants and socks on by himself due to the pain. He denies weakness in his legs. He denies falling. He denies bladder and bowel incontinence. Will order lumbar MRI. Patient advised to go to ER in pain worsens or he develops weakness in his legs or has trouble controlling his bladder or bowel. He verbalizes understanding and will schedule the MRI today.

## 2019-06-20 ENCOUNTER — HOSPITAL ENCOUNTER (OUTPATIENT)
Dept: MRI IMAGING | Facility: CLINIC | Age: 59
Discharge: HOME | End: 2019-06-22
Payer: MEDICARE

## 2019-06-20 DIAGNOSIS — M51.36 DDD (DEGENERATIVE DISC DISEASE), LUMBAR: ICD-10-CM

## 2019-06-20 DIAGNOSIS — M48.061 SPINAL STENOSIS OF LUMBAR REGION WITHOUT NEUROGENIC CLAUDICATION: ICD-10-CM

## 2019-06-20 DIAGNOSIS — M51.26 DISPLACEMENT OF LUMBAR INTERVERTEBRAL DISC WITHOUT MYELOPATHY: ICD-10-CM

## 2019-06-20 PROCEDURE — 72148 MRI LUMBAR SPINE W/O DYE: CPT

## 2019-07-02 DIAGNOSIS — M51.26 DISPLACEMENT OF LUMBAR INTERVERTEBRAL DISC WITHOUT MYELOPATHY: ICD-10-CM

## 2019-07-02 DIAGNOSIS — M51.36 DDD (DEGENERATIVE DISC DISEASE), LUMBAR: ICD-10-CM

## 2019-07-02 RX ORDER — GABAPENTIN 800 MG/1
800 TABLET ORAL EVERY 8 HOURS SCHEDULED
Qty: 90 TABLET | Refills: 0 | Status: SHIPPED | OUTPATIENT
Start: 2019-07-10 | End: 2019-08-08 | Stop reason: SDUPTHER

## 2019-07-02 RX ORDER — OXYCODONE HYDROCHLORIDE 5 MG/1
5 TABLET ORAL EVERY 6 HOURS PRN
Qty: 120 TABLET | Refills: 0 | Status: SHIPPED | OUTPATIENT
Start: 2019-07-10 | End: 2019-08-08 | Stop reason: SDUPTHER

## 2019-07-10 ENCOUNTER — TELEPHONE (OUTPATIENT)
Dept: PAIN MANAGEMENT | Age: 59
End: 2019-07-10

## 2019-07-11 DIAGNOSIS — M51.26 DISPLACEMENT OF LUMBAR INTERVERTEBRAL DISC WITHOUT MYELOPATHY: ICD-10-CM

## 2019-07-11 RX ORDER — TIZANIDINE 4 MG/1
4 TABLET ORAL 2 TIMES DAILY PRN
Qty: 60 TABLET | Refills: 0 | Status: SHIPPED | OUTPATIENT
Start: 2019-07-11 | End: 2019-08-08 | Stop reason: SDUPTHER

## 2019-07-21 ENCOUNTER — APPOINTMENT (OUTPATIENT)
Dept: GENERAL RADIOLOGY | Age: 59
End: 2019-07-21
Payer: MEDICARE

## 2019-07-21 ENCOUNTER — HOSPITAL ENCOUNTER (EMERGENCY)
Age: 59
Discharge: HOME | End: 2019-07-21
Attending: EMERGENCY MEDICINE
Payer: MEDICARE

## 2019-07-21 VITALS
WEIGHT: 242 LBS | BODY MASS INDEX: 35.84 KG/M2 | OXYGEN SATURATION: 97 % | RESPIRATION RATE: 20 BRPM | HEIGHT: 69 IN | SYSTOLIC BLOOD PRESSURE: 136 MMHG | DIASTOLIC BLOOD PRESSURE: 71 MMHG | TEMPERATURE: 97.8 F | HEART RATE: 82 BPM

## 2019-07-21 DIAGNOSIS — R07.9 CHEST PAIN, UNSPECIFIED TYPE: Primary | ICD-10-CM

## 2019-07-21 LAB
ABSOLUTE EOS #: 0.2 K/UL (ref 0–0.4)
ABSOLUTE IMMATURE GRANULOCYTE: ABNORMAL K/UL (ref 0–0.3)
ABSOLUTE LYMPH #: 2.6 K/UL (ref 1–4.8)
ABSOLUTE MONO #: 0.4 K/UL (ref 0.1–1.3)
ALBUMIN SERPL-MCNC: 4 G/DL (ref 3.5–5.2)
ALBUMIN/GLOBULIN RATIO: ABNORMAL (ref 1–2.5)
ALP BLD-CCNC: 96 U/L (ref 40–129)
ALT SERPL-CCNC: 23 U/L (ref 5–41)
ANION GAP SERPL CALCULATED.3IONS-SCNC: 12 MMOL/L (ref 9–17)
AST SERPL-CCNC: 18 U/L
BASOPHILS # BLD: 1 % (ref 0–2)
BASOPHILS ABSOLUTE: 0.1 K/UL (ref 0–0.2)
BILIRUB SERPL-MCNC: 0.16 MG/DL (ref 0.3–1.2)
BNP INTERPRETATION: NORMAL
BUN BLDV-MCNC: 11 MG/DL (ref 8–23)
BUN/CREAT BLD: ABNORMAL (ref 9–20)
CALCIUM SERPL-MCNC: 9.3 MG/DL (ref 8.6–10.4)
CHLORIDE BLD-SCNC: 102 MMOL/L (ref 98–107)
CO2: 24 MMOL/L (ref 20–31)
CREAT SERPL-MCNC: 0.79 MG/DL (ref 0.7–1.2)
DIFFERENTIAL TYPE: ABNORMAL
EOSINOPHILS RELATIVE PERCENT: 3 % (ref 0–4)
GFR AFRICAN AMERICAN: >60 ML/MIN
GFR NON-AFRICAN AMERICAN: >60 ML/MIN
GFR SERPL CREATININE-BSD FRML MDRD: ABNORMAL ML/MIN/{1.73_M2}
GFR SERPL CREATININE-BSD FRML MDRD: ABNORMAL ML/MIN/{1.73_M2}
GLUCOSE BLD-MCNC: 111 MG/DL (ref 70–99)
HCT VFR BLD CALC: 41.8 % (ref 41–53)
HEMOGLOBIN: 14.1 G/DL (ref 13.5–17.5)
IMMATURE GRANULOCYTES: ABNORMAL %
INR BLD: 0.9
LYMPHOCYTES # BLD: 41 % (ref 24–44)
MCH RBC QN AUTO: 28.6 PG (ref 26–34)
MCHC RBC AUTO-ENTMCNC: 33.6 G/DL (ref 31–37)
MCV RBC AUTO: 85.1 FL (ref 80–100)
MONOCYTES # BLD: 7 % (ref 1–7)
NRBC AUTOMATED: ABNORMAL PER 100 WBC
PARTIAL THROMBOPLASTIN TIME: 33.3 SEC (ref 24–36)
PDW BLD-RTO: 15 % (ref 11.5–14.9)
PLATELET # BLD: 257 K/UL (ref 150–450)
PLATELET ESTIMATE: ABNORMAL
PMV BLD AUTO: 7 FL (ref 6–12)
POTASSIUM SERPL-SCNC: 3.9 MMOL/L (ref 3.7–5.3)
PRO-BNP: 32 PG/ML
PROTHROMBIN TIME: 12.3 SEC (ref 11.8–14.6)
RBC # BLD: 4.92 M/UL (ref 4.5–5.9)
RBC # BLD: ABNORMAL 10*6/UL
SEG NEUTROPHILS: 48 % (ref 36–66)
SEGMENTED NEUTROPHILS ABSOLUTE COUNT: 3.1 K/UL (ref 1.3–9.1)
SODIUM BLD-SCNC: 138 MMOL/L (ref 135–144)
TOTAL PROTEIN: 7.2 G/DL (ref 6.4–8.3)
TROPONIN INTERP: NORMAL
TROPONIN INTERP: NORMAL
TROPONIN T: NORMAL NG/ML
TROPONIN T: NORMAL NG/ML
TROPONIN, HIGH SENSITIVITY: 9 NG/L (ref 0–22)
TROPONIN, HIGH SENSITIVITY: 9 NG/L (ref 0–22)
WBC # BLD: 6.4 K/UL (ref 3.5–11)
WBC # BLD: ABNORMAL 10*3/UL

## 2019-07-21 PROCEDURE — 6370000000 HC RX 637 (ALT 250 FOR IP): Performed by: EMERGENCY MEDICINE

## 2019-07-21 PROCEDURE — 84484 ASSAY OF TROPONIN QUANT: CPT

## 2019-07-21 PROCEDURE — 80053 COMPREHEN METABOLIC PANEL: CPT

## 2019-07-21 PROCEDURE — 83880 ASSAY OF NATRIURETIC PEPTIDE: CPT

## 2019-07-21 PROCEDURE — 71045 X-RAY EXAM CHEST 1 VIEW: CPT

## 2019-07-21 PROCEDURE — 93005 ELECTROCARDIOGRAM TRACING: CPT | Performed by: EMERGENCY MEDICINE

## 2019-07-21 PROCEDURE — 99285 EMERGENCY DEPT VISIT HI MDM: CPT

## 2019-07-21 PROCEDURE — 36415 COLL VENOUS BLD VENIPUNCTURE: CPT

## 2019-07-21 PROCEDURE — 85025 COMPLETE CBC W/AUTO DIFF WBC: CPT

## 2019-07-21 PROCEDURE — 85610 PROTHROMBIN TIME: CPT

## 2019-07-21 PROCEDURE — 85730 THROMBOPLASTIN TIME PARTIAL: CPT

## 2019-07-21 RX ORDER — OXYCODONE HYDROCHLORIDE AND ACETAMINOPHEN 5; 325 MG/1; MG/1
1 TABLET ORAL ONCE
Status: COMPLETED | OUTPATIENT
Start: 2019-07-21 | End: 2019-07-21

## 2019-07-21 RX ADMIN — OXYCODONE HYDROCHLORIDE AND ACETAMINOPHEN 1 TABLET: 5; 325 TABLET ORAL at 21:56

## 2019-07-21 ASSESSMENT — PAIN SCALES - GENERAL
PAINLEVEL_OUTOF10: 2
PAINLEVEL_OUTOF10: 3

## 2019-07-22 ENCOUNTER — TELEPHONE (OUTPATIENT)
Dept: FAMILY MEDICINE CLINIC | Age: 59
End: 2019-07-22

## 2019-07-22 LAB
EKG ATRIAL RATE: 82 BPM
EKG P AXIS: 44 DEGREES
EKG P-R INTERVAL: 156 MS
EKG Q-T INTERVAL: 366 MS
EKG QRS DURATION: 98 MS
EKG QTC CALCULATION (BAZETT): 427 MS
EKG R AXIS: 52 DEGREES
EKG T AXIS: 56 DEGREES
EKG VENTRICULAR RATE: 82 BPM

## 2019-07-22 PROCEDURE — 93010 ELECTROCARDIOGRAM REPORT: CPT | Performed by: INTERNAL MEDICINE

## 2019-07-22 NOTE — ED PROVIDER NOTES
OXYCODONE (ROXICODONE) 5 MG IMMEDIATE RELEASE TABLET    Take 1 tablet by mouth every 6 hours as needed for Pain (pain) for up to 30 days. SYMBICORT 160-4.5 MCG/ACT AERO    INHALE TWO PUFFS BY MOUTH TWICE A DAY    TIZANIDINE (ZANAFLEX) 4 MG TABLET    Take 1 tablet by mouth 2 times daily as needed (for muscle spasms)    TRAZODONE (DESYREL) 150 MG TABLET    Take 150 mg by mouth nightly    ZOSTER RECOMBINANT ADJUVANTED VACCINE (SHINGRIX) 50 MCG SUSR INJECTION    50 MCG IM then repeat 2-6 months. ALLERGIES     has No Known Allergies. FAMILY HISTORY     He indicated that his mother is . He indicated that his father is . He indicated that the status of his maternal aunt is unknown. He indicated that the status of his maternal uncle is unknown.      SOCIAL HISTORY       Social History     Tobacco Use    Smoking status: Current Every Day Smoker     Packs/day: 1.50     Years: 40.00     Pack years: 60.00     Types: Cigarettes    Smokeless tobacco: Former User     Quit date: 3/12/2015    Tobacco comment: wants to discuss  chantix   Substance Use Topics    Alcohol use: Not Currently     Alcohol/week: 0.0 standard drinks     Comment: 3 x year    Drug use: No     PHYSICAL EXAM     INITIAL VITALS: /71   Pulse 82   Temp 97.8 °F (36.6 °C) (Oral)   Resp 20   Ht 5' 9\" (1.753 m)   Wt 242 lb (109.8 kg)   SpO2 97%   BMI 35.74 kg/m²    Physical Exam    MEDICAL DECISION MAKING:            Labs Reviewed   CBC WITH AUTO DIFFERENTIAL - Abnormal; Notable for the following components:       Result Value    RDW 15.0 (*)     All other components within normal limits   COMPREHENSIVE METABOLIC PANEL W/ REFLEX TO MG FOR LOW K - Abnormal; Notable for the following components:    Glucose 111 (*)     Total Bilirubin 0.16 (*)     All other components within normal limits   BRAIN NATRIURETIC PEPTIDE   PROTIME-INR   APTT   TROPONIN   TROPONIN     EMERGENCY DEPARTMENTCOURSE:         Vitals:    Vitals:    19 2026 07/21/19 2044 07/21/19 2100 07/21/19 2215   BP: (!) 161/84  117/61 136/71   Pulse: 86  82 82   Resp: 18  17 20   Temp:  97.8 °F (36.6 °C)     TempSrc:  Oral     SpO2: 98%  98% 97%   Weight:       Height:           The patient was given the following medications while in the emergency department:  Orders Placed This Encounter   Medications    oxyCODONE-acetaminophen (PERCOCET) 5-325 MG per tablet 1 tablet     CONSULTS:  None    FINAL IMPRESSION      1. Chest pain, unspecified type          DISPOSITION/PLAN   DISPOSITION Decision To Discharge 07/21/2019 10:30:28 PM      PATIENT REFERRED TO:  No follow-up provider specified.   DISCHARGE MEDICATIONS:  New Prescriptions    No medications on file     Ortiz Terry MD  Attending Emergency Physician                   Leena Asencio MD  07/21/19 [id number]

## 2019-08-08 ENCOUNTER — OFFICE VISIT (OUTPATIENT)
Dept: PAIN MANAGEMENT | Age: 59
End: 2019-08-08
Payer: MEDICARE

## 2019-08-08 VITALS
HEART RATE: 97 BPM | DIASTOLIC BLOOD PRESSURE: 76 MMHG | BODY MASS INDEX: 35.4 KG/M2 | WEIGHT: 239 LBS | SYSTOLIC BLOOD PRESSURE: 124 MMHG | HEIGHT: 69 IN

## 2019-08-08 DIAGNOSIS — M54.2 CERVICALGIA: ICD-10-CM

## 2019-08-08 DIAGNOSIS — M48.02 CERVICAL STENOSIS OF SPINE: Primary | ICD-10-CM

## 2019-08-08 DIAGNOSIS — Z51.81 MEDICATION MONITORING ENCOUNTER: ICD-10-CM

## 2019-08-08 DIAGNOSIS — M51.36 DDD (DEGENERATIVE DISC DISEASE), LUMBAR: ICD-10-CM

## 2019-08-08 DIAGNOSIS — M51.26 DISPLACEMENT OF LUMBAR INTERVERTEBRAL DISC WITHOUT MYELOPATHY: ICD-10-CM

## 2019-08-08 DIAGNOSIS — T40.2X5A THERAPEUTIC OPIOID INDUCED CONSTIPATION: ICD-10-CM

## 2019-08-08 DIAGNOSIS — K59.03 THERAPEUTIC OPIOID INDUCED CONSTIPATION: ICD-10-CM

## 2019-08-08 DIAGNOSIS — M54.12 RADICULOPATHY OF CERVICAL SPINE: ICD-10-CM

## 2019-08-08 DIAGNOSIS — M48.061 SPINAL STENOSIS OF LUMBAR REGION WITHOUT NEUROGENIC CLAUDICATION: ICD-10-CM

## 2019-08-08 PROCEDURE — 99214 OFFICE O/P EST MOD 30 MIN: CPT | Performed by: NURSE PRACTITIONER

## 2019-08-08 RX ORDER — OXYCODONE HYDROCHLORIDE 5 MG/1
5 TABLET ORAL EVERY 6 HOURS PRN
Qty: 120 TABLET | Refills: 0 | Status: SHIPPED | OUTPATIENT
Start: 2019-08-08 | End: 2019-09-03 | Stop reason: SDUPTHER

## 2019-08-08 RX ORDER — TIZANIDINE 4 MG/1
4 TABLET ORAL 2 TIMES DAILY PRN
Qty: 60 TABLET | Refills: 0 | Status: SHIPPED | OUTPATIENT
Start: 2019-08-08 | End: 2019-09-03 | Stop reason: SDUPTHER

## 2019-08-08 RX ORDER — GABAPENTIN 800 MG/1
800 TABLET ORAL EVERY 8 HOURS SCHEDULED
Qty: 90 TABLET | Refills: 0 | Status: SHIPPED | OUTPATIENT
Start: 2019-08-08 | End: 2019-09-03 | Stop reason: SDUPTHER

## 2019-08-08 ASSESSMENT — ENCOUNTER SYMPTOMS
SHORTNESS OF BREATH: 0
BACK PAIN: 1
CONSTIPATION: 0
COUGH: 0

## 2019-08-15 ENCOUNTER — APPOINTMENT (OUTPATIENT)
Dept: GENERAL RADIOLOGY | Age: 59
End: 2019-08-15
Attending: PAIN MEDICINE
Payer: MEDICARE

## 2019-08-15 ENCOUNTER — HOSPITAL ENCOUNTER (OUTPATIENT)
Facility: CLINIC | Age: 59
Setting detail: OUTPATIENT SURGERY
Discharge: HOME | End: 2019-08-15
Attending: PAIN MEDICINE | Admitting: PAIN MEDICINE
Payer: MEDICARE

## 2019-08-15 ENCOUNTER — ANESTHESIA (OUTPATIENT)
Dept: OPERATING ROOM | Facility: CLINIC | Age: 59
End: 2019-08-15
Payer: MEDICARE

## 2019-08-15 ENCOUNTER — ANESTHESIA EVENT (OUTPATIENT)
Dept: OPERATING ROOM | Facility: CLINIC | Age: 59
End: 2019-08-15
Payer: MEDICARE

## 2019-08-15 VITALS
TEMPERATURE: 97.7 F | WEIGHT: 240 LBS | OXYGEN SATURATION: 94 % | HEART RATE: 81 BPM | DIASTOLIC BLOOD PRESSURE: 77 MMHG | HEIGHT: 69 IN | RESPIRATION RATE: 13 BRPM | BODY MASS INDEX: 35.55 KG/M2 | SYSTOLIC BLOOD PRESSURE: 130 MMHG

## 2019-08-15 VITALS — DIASTOLIC BLOOD PRESSURE: 99 MMHG | SYSTOLIC BLOOD PRESSURE: 171 MMHG | OXYGEN SATURATION: 95 %

## 2019-08-15 PROCEDURE — 6360000002 HC RX W HCPCS: Performed by: NURSE ANESTHETIST, CERTIFIED REGISTERED

## 2019-08-15 PROCEDURE — 3209999900 FLUORO FOR SURGICAL PROCEDURES

## 2019-08-15 PROCEDURE — 7100000011 HC PHASE II RECOVERY - ADDTL 15 MIN: Performed by: PAIN MEDICINE

## 2019-08-15 PROCEDURE — 3700000000 HC ANESTHESIA ATTENDED CARE: Performed by: PAIN MEDICINE

## 2019-08-15 PROCEDURE — 7100000010 HC PHASE II RECOVERY - FIRST 15 MIN: Performed by: PAIN MEDICINE

## 2019-08-15 PROCEDURE — 3600000002 HC SURGERY LEVEL 2 BASE: Performed by: PAIN MEDICINE

## 2019-08-15 PROCEDURE — 27096 INJECT SACROILIAC JOINT: CPT | Performed by: PAIN MEDICINE

## 2019-08-15 PROCEDURE — 2709999900 HC NON-CHARGEABLE SUPPLY: Performed by: PAIN MEDICINE

## 2019-08-15 RX ORDER — MIDAZOLAM HYDROCHLORIDE 1 MG/ML
2 INJECTION INTRAMUSCULAR; INTRAVENOUS
Status: CANCELLED | OUTPATIENT
Start: 2019-08-15 | End: 2019-08-15

## 2019-08-15 RX ORDER — FENTANYL CITRATE 50 UG/ML
25 INJECTION, SOLUTION INTRAMUSCULAR; INTRAVENOUS EVERY 5 MIN PRN
Status: DISCONTINUED | OUTPATIENT
Start: 2019-08-15 | End: 2019-08-15 | Stop reason: HOSPADM

## 2019-08-15 RX ORDER — SODIUM CHLORIDE 0.9 % (FLUSH) 0.9 %
10 SYRINGE (ML) INJECTION EVERY 12 HOURS SCHEDULED
Status: CANCELLED | OUTPATIENT
Start: 2019-08-15

## 2019-08-15 RX ORDER — SODIUM CHLORIDE 0.9 % (FLUSH) 0.9 %
10 SYRINGE (ML) INJECTION PRN
Status: CANCELLED | OUTPATIENT
Start: 2019-08-15

## 2019-08-15 RX ORDER — PROPOFOL 10 MG/ML
INJECTION, EMULSION INTRAVENOUS PRN
Status: DISCONTINUED | OUTPATIENT
Start: 2019-08-15 | End: 2019-08-15 | Stop reason: SDUPTHER

## 2019-08-15 RX ORDER — ONDANSETRON 2 MG/ML
4 INJECTION INTRAMUSCULAR; INTRAVENOUS ONCE
Status: CANCELLED | OUTPATIENT
Start: 2019-08-15 | End: 2019-08-15

## 2019-08-15 RX ADMIN — PROPOFOL 30 MG: 10 INJECTION, EMULSION INTRAVENOUS at 12:39

## 2019-08-15 RX ADMIN — PROPOFOL 20 MG: 10 INJECTION, EMULSION INTRAVENOUS at 12:41

## 2019-08-15 ASSESSMENT — PAIN - FUNCTIONAL ASSESSMENT: PAIN_FUNCTIONAL_ASSESSMENT: 0-10

## 2019-08-15 ASSESSMENT — PULMONARY FUNCTION TESTS
PIF_VALUE: 0
PIF_VALUE: 1
PIF_VALUE: 0

## 2019-08-15 ASSESSMENT — PAIN SCALES - GENERAL: PAINLEVEL_OUTOF10: 2

## 2019-08-15 ASSESSMENT — PAIN DESCRIPTION - LOCATION: LOCATION: NECK

## 2019-08-15 NOTE — H&P
mild relief. Neck Pain    This is a chronic problem. The current episode started more than 1 year ago. The problem occurs constantly. The problem has been unchanged. The pain is present in the midline, left side and right side. The quality of the pain is described as aching. The pain is at a severity of 6/10. The pain is moderate. The symptoms are aggravated by position and twisting. Associated symptoms include weakness. Pertinent negatives include no chest pain or fever. He has tried oral narcotics and home exercises for the symptoms. The treatment provided mild relief.      Patient denies any new neurological symptoms. No bowel or bladder incontinence, no weakness, and no falling.     Pill count: appropriate     Morphine equivalent: 30         Controlled Substance Monitoring:     Acute and Chronic Pain Monitoring:   RX Monitoring 8/8/2019   Attestation -   Acute Pain Prescriptions -   Periodic Controlled Substance Monitoring Possible medication side effects, risk of tolerance/dependence & alternative treatments discussed. ;No signs of potential drug abuse or diversion identified. ;Assessed functional status. ;Obtaining appropriate analgesic effect of treatment. Chronic Pain > 80 MEDD -               Past Medical History   Past Medical History:   Diagnosis Date    Abnormal stress ECG      Angina pectoris (HCC)      CAD (coronary artery disease)      Carpal tunnel syndrome       left    Cervical stenosis of spine      Cervicalgia       chronic pain    Chronic back pain      COPD (chronic obstructive pulmonary disease) (HCC)      DDD (degenerative disc disease)      Elbow fracture, right      Fractures       elbow    GERD (gastroesophageal reflux disease)      Gout       Right great toe    Hyperlipidemia      Hypertension      Mild depression ([address] 9/26/2013     2 suicide attempts by girlfriend related to depression.     Osteoarthritis of right knee      Sinus headache 10/10/2018    Sleep apnea    Sleep disturbance      Tendonitis of foot       right            Past Surgical History         Past Surgical History:   Procedure Laterality Date    CARDIAC CATHETERIZATION   7/8/14     Non Obstructive CAD     CERVICAL SPINE SURGERY   7/13/12      C2-3-4-5    COLONOSCOPY        FRACTURE SURGERY         Rt elbow     HC INJECT OTHER PERPHRL NERV Bilateral 5/9/2019     INJECTION SPINAL performed by [facility]ia [facility] at [facility]   2/5/16     [facility]    UPPER GASTROINTESTINAL ENDOSCOPY                No Known Allergies       Current Medication      Current Outpatient Medications:     tiZANidine (ZANAFLEX) 4 MG tablet, Take 1 tablet by mouth 2 times daily as needed (for muscle spasms), Disp: 60 tablet, Rfl: 0    oxyCODONE (ROXICODONE) 5 MG immediate release tablet, Take 1 tablet by mouth every 6 hours as needed for Pain (pain) for up to 30 days. , Disp: 120 tablet, Rfl: 0    gabapentin (NEURONTIN) 800 MG tablet, Take 1 tablet by mouth every 8 hours for 30 days. , Disp: 90 tablet, Rfl: 0    SYMBICORT 160-4.5 MCG/ACT AERO, INHALE TWO PUFFS BY MOUTH TWICE A DAY, Disp: 1 Inhaler, Rfl: 5    atorvastatin (LIPITOR) 80 MG tablet, TAKE ONE TABLET BY MOUTH DAILY, Disp: 90 tablet, Rfl: 2    cetirizine (ZYRTEC) 10 MG tablet, TAKE ONE TABLET BY MOUTH DAILY, Disp: 30 tablet, Rfl: 1    diclofenac (VOLTAREN) 75 MG EC tablet, Take 75 mg by mouth 2 times daily, Disp: , Rfl:     zoster recombinant adjuvanted vaccine (SHINGRIX) 50 MCG SUSR injection, 50 MCG IM then repeat 2-6 months., Disp: 0.5 mL, Rfl: 1    traZODone (DESYREL) 150 MG tablet, Take 150 mg by mouth nightly, Disp: , Rfl:     omeprazole (PRILOSEC) 20 MG delayed release capsule, TAKE ONE CAPSULE BY MOUTH TWICE A DAY 30 MINUTES BEFORE MEALS, Disp: 60 capsule, Rfl: 6    nitroGLYCERIN (NITROSTAT) 0.4 MG SL tablet, Place 1 tablet under the tongue every 5 minutes as needed for Chest pain, Disp: 25 tablet, Rfl: 3    albuterol sulfate  (90 Base) MCG/ACT inhaler, Inhale 2 puffs into the lungs every 6 hours as needed for Wheezing, Disp: 1 Inhaler, Rfl: 3    citalopram (CELEXA) 20 MG tablet, Take 20 mg by mouth daily , Disp: , Rfl:     aspirin 81 MG EC tablet, Take 1 tablet by mouth daily, Disp: 30 tablet, Rfl: 3        Family History         Family History   Problem Relation Age of Onset    Heart Disease Mother      COPD Father      Cancer Maternal Aunt 64         breast cancer     Cancer Maternal Uncle 60         prostrate cancer             Social History               Socioeconomic History    Marital status: Single       Spouse name: Not on file    Number of children: Not on file    Years of education: Not on file    Highest education level: Not on file   Occupational History       Employer: N/A   Social Needs    Financial resource strain: Not on file    Food insecurity:       Worry: Not on file       Inability: Not on file    Transportation needs:       Medical: Not on file       Non-medical: Not on file   Tobacco Use    Smoking status: Current Every Day Smoker       Packs/day: 1.50       Years: 40.00       Pack years: 60.00       Types: Cigarettes    Smokeless tobacco: Former User       Quit date: 3/12/2015    Tobacco comment: wants to discuss  chantix   Substance and Sexual Activity    Alcohol use: Not Currently       Alcohol/week: 0.0 standard drinks       Comment: 3 x year    Drug use: No    Sexual activity: Yes       Partners: Female   Lifestyle    Physical activity:       Days per week: Not on file       Minutes per session: Not on file    Stress: Not on file   Relationships    Social connections:       Talks on phone: Not on file       Gets together: Not on file       Attends Druze service: Not on file       Active member of club or organization: Not on file       Attends meetings of clubs or organizations: Not on file       Relationship status: Not on file    Intimate partner violence:

## 2019-08-15 NOTE — OP NOTE
Sacro-Iliac Joint Injection:  SURGEON: Meredith Antony    PRE-OP DIAGNOSIS: Sacroiliitis (M46.1)    POST-OP DIAGNOSIS: Same. Procedure performed: Bilateral Sacroiliac Joint Injection. Physician confirmed and marked the surgical site. ASA: 3                  MP: 2    EBL: minimal    CONSENT: Patient has undergone the educational process with this procedure, is aware and fully understands the risks involved: potential damage to any and all body organs including possible bleeding, infection, and nerve injury, allergic reaction and headache. Patient also understands that the procedure will be undertaken in a safe, controlled and monitored setting. Patient recognizes that the benefits may include relief from pain and reduction in the oral use of medications. Patient agreed to proceed. PREP: The patient's back was prepped with chloroprep and draped appropriately. 5ml of 0.5% lidocaine was used to anesthetize the skin and subcutaneous tissue. PROCEDURE NOTE: A 22 gauge 3.5 inch spinal needle was advanced to the  Bilateral SI joint under fluoroscopic guidance. Aspiration was negative for blood, CSF and producing pain. 2ml of  0.25% bupivacaine was mixed with 5mg Dexamethasone and slowly injected into the joint(s). The needle was withdrawn by the physician and the nurse applied a sterile dressing. The patient tolerated the procedure well. No complications occurred. Patient transferred to the recovery room in satisfactory condition. Appropriate written discharge instructions given to the patient.       [address] [place] [address]

## 2019-08-15 NOTE — ANESTHESIA PRE PROCEDURE
10/10/2018    Sleep apnea     Sleep disturbance     Tendonitis of foot     right       Past Surgical History:        Procedure Laterality Date    CARDIAC CATHETERIZATION  7/8/14    Non Obstructive CAD     CERVICAL SPINE SURGERY  7/13/12     C2-3-4-5    COLONOSCOPY      FRACTURE SURGERY      Rt elbow     HC INJECT OTHER PERPHRL NERV Bilateral 5/9/2019    INJECTION SPINAL performed by [facility] Lara MD at [facility]  2/5/16    [facility]    UPPER GASTROINTESTINAL ENDOSCOPY         Social History:    Social History     Tobacco Use    Smoking status: Current Every Day Smoker     Packs/day: 1.50     Years: 40.00     Pack years: 60.00     Types: Cigarettes    Smokeless tobacco: Former User     Quit date: 3/12/2015    Tobacco comment: wants to discuss  chantix   Substance Use Topics    Alcohol use: Not Currently     Alcohol/week: 0.0 standard drinks     Comment: 3 x year                                Ready to quit: Not Answered  Counseling given: Not Answered  Comment: wants to discuss  chantix      Vital Signs (Current): There were no vitals filed for this visit.                                            BP Readings from Last 3 Encounters:   08/15/19 136/74   08/08/19 124/76   07/21/19 136/71       NPO Status:                                                                                 BMI:   Wt Readings from Last 3 Encounters:   08/15/19 240 lb (108.9 kg)   08/08/19 239 lb (108.4 kg)   07/21/19 242 lb (109.8 kg)     There is no height or weight on file to calculate BMI.    CBC:   Lab Results   Component Value Date    WBC 6.4 07/21/2019    RBC 4.92 07/21/2019    RBC 4.75 02/10/2012    HGB 14.1 07/21/2019    HCT 41.8 07/21/2019    MCV 85.1 07/21/2019    RDW 15.0 07/21/2019     07/21/2019     02/10/2012       CMP:   Lab Results   Component Value Date     07/21/2019    K 3.9 07/21/2019     07/21/2019    CO2 24 07/21/2019    BUN 11 07/21/2019

## 2019-09-03 DIAGNOSIS — M51.26 DISPLACEMENT OF LUMBAR INTERVERTEBRAL DISC WITHOUT MYELOPATHY: ICD-10-CM

## 2019-09-03 DIAGNOSIS — M51.36 DDD (DEGENERATIVE DISC DISEASE), LUMBAR: ICD-10-CM

## 2019-09-03 RX ORDER — GABAPENTIN 800 MG/1
800 TABLET ORAL EVERY 8 HOURS SCHEDULED
Qty: 90 TABLET | Refills: 0 | Status: SHIPPED | OUTPATIENT
Start: 2019-09-07 | End: 2019-10-01 | Stop reason: SDUPTHER

## 2019-09-03 RX ORDER — TIZANIDINE 4 MG/1
4 TABLET ORAL 2 TIMES DAILY PRN
Qty: 60 TABLET | Refills: 0 | Status: SHIPPED | OUTPATIENT
Start: 2019-09-07 | End: 2019-10-01 | Stop reason: SDUPTHER

## 2019-09-03 RX ORDER — OXYCODONE HYDROCHLORIDE 5 MG/1
5 TABLET ORAL EVERY 6 HOURS PRN
Qty: 120 TABLET | Refills: 0 | Status: SHIPPED | OUTPATIENT
Start: 2019-09-07 | End: 2019-10-01 | Stop reason: SDUPTHER

## 2019-10-01 ENCOUNTER — OFFICE VISIT (OUTPATIENT)
Dept: PAIN MANAGEMENT | Age: 59
End: 2019-10-01
Payer: MEDICARE

## 2019-10-01 ENCOUNTER — HOSPITAL ENCOUNTER (OUTPATIENT)
Age: 59
Setting detail: SPECIMEN
Discharge: HOME | End: 2019-10-01
Payer: MEDICARE

## 2019-10-01 VITALS
SYSTOLIC BLOOD PRESSURE: 151 MMHG | WEIGHT: 232.8 LBS | DIASTOLIC BLOOD PRESSURE: 86 MMHG | BODY MASS INDEX: 34.38 KG/M2 | HEART RATE: 82 BPM

## 2019-10-01 DIAGNOSIS — Z51.81 MEDICATION MONITORING ENCOUNTER: ICD-10-CM

## 2019-10-01 DIAGNOSIS — T40.2X5A THERAPEUTIC OPIOID INDUCED CONSTIPATION: ICD-10-CM

## 2019-10-01 DIAGNOSIS — M51.26 DISPLACEMENT OF LUMBAR INTERVERTEBRAL DISC WITHOUT MYELOPATHY: ICD-10-CM

## 2019-10-01 DIAGNOSIS — K59.03 THERAPEUTIC OPIOID INDUCED CONSTIPATION: ICD-10-CM

## 2019-10-01 DIAGNOSIS — M51.36 DDD (DEGENERATIVE DISC DISEASE), LUMBAR: ICD-10-CM

## 2019-10-01 DIAGNOSIS — M54.12 RADICULOPATHY OF CERVICAL SPINE: ICD-10-CM

## 2019-10-01 DIAGNOSIS — M51.36 DDD (DEGENERATIVE DISC DISEASE), LUMBAR: Primary | ICD-10-CM

## 2019-10-01 PROCEDURE — 99214 OFFICE O/P EST MOD 30 MIN: CPT | Performed by: NURSE PRACTITIONER

## 2019-10-01 RX ORDER — GABAPENTIN 800 MG/1
800 TABLET ORAL EVERY 8 HOURS SCHEDULED
Qty: 90 TABLET | Refills: 0 | Status: SHIPPED | OUTPATIENT
Start: 2019-10-04 | End: 2019-10-31 | Stop reason: SDUPTHER

## 2019-10-01 RX ORDER — TIZANIDINE 4 MG/1
4 TABLET ORAL 2 TIMES DAILY PRN
Qty: 60 TABLET | Refills: 0 | Status: SHIPPED | OUTPATIENT
Start: 2019-10-04 | End: 2019-10-31 | Stop reason: SDUPTHER

## 2019-10-01 RX ORDER — OXYCODONE HYDROCHLORIDE 5 MG/1
5 TABLET ORAL EVERY 6 HOURS PRN
Qty: 120 TABLET | Refills: 0 | Status: SHIPPED | OUTPATIENT
Start: 2019-10-04 | End: 2019-10-31 | Stop reason: SDUPTHER

## 2019-10-01 ASSESSMENT — ENCOUNTER SYMPTOMS
BACK PAIN: 1
CONSTIPATION: 0
COUGH: 0
SHORTNESS OF BREATH: 0

## 2019-10-23 LAB
6-ACETYLMORPHINE, UR: NOT DETECTED
7-AMINOCLONAZEPAM, URINE: NOT DETECTED
ALPHA-OH-ALPRAZ, URINE: NOT DETECTED
ALPRAZOLAM, URINE: NOT DETECTED
AMPHETAMINES, URINE: NOT DETECTED
BARBITURATES, URINE: NOT DETECTED
BENZOYLECGONINE, UR: NOT DETECTED
BUPRENORPHINE URINE: NOT DETECTED
CARISOPRODOL, UR: NOT DETECTED
CLONAZEPAM, URINE: NOT DETECTED
CODEINE, URINE: NOT DETECTED
CREATININE URINE: 199.4 MG/DL (ref 20–400)
DIAZEPAM, URINE: NOT DETECTED
DRUGS EXPECTED, UR: NORMAL
EER HI RES INTERP UR: NORMAL
ETHYL GLUCURONIDE UR: NOT DETECTED
FENTANYL URINE: NOT DETECTED
HYDROCODONE, URINE: NOT DETECTED
HYDROMORPHONE, URINE: NOT DETECTED
LORAZEPAM, URINE: NOT DETECTED
MARIJUANA METAB, UR: NOT DETECTED
MDA, UR: NOT DETECTED
MDEA, EVE, UR: NOT DETECTED
MDMA URINE: NOT DETECTED
MEPERIDINE METAB, UR: NOT DETECTED
METHADONE, URINE: NOT DETECTED
METHAMPHETAMINE, URINE: NOT DETECTED
METHYLPHENIDATE: NOT DETECTED
MIDAZOLAM, URINE: NOT DETECTED
MORPHINE URINE: NOT DETECTED
NORBUPRENORPHINE, URINE: NOT DETECTED
NORDIAZEPAM, URINE: NOT DETECTED
NORFENTANYL, URINE: NOT DETECTED
NORHYDROCODONE, URINE: NOT DETECTED
NOROXYCODONE, URINE: PRESENT
NOROXYMORPHONE, URINE: PRESENT
OXAZEPAM, URINE: NOT DETECTED
OXYCODONE URINE: PRESENT
OXYMORPHONE, URINE: NOT DETECTED
PAIN MANAGEMENT DRUG PANEL INTERP, URINE: NORMAL
PAIN MGT DRUG PANEL, HI RES, UR: NORMAL
PCP,URINE: NOT DETECTED
PHENTERMINE, UR: NOT DETECTED
PROPOXYPHENE, URINE: NOT DETECTED
TAPENTADOL, URINE: NOT DETECTED
TAPENTADOL-O-SULFATE, URINE: NOT DETECTED
TEMAZEPAM, URINE: NOT DETECTED
TRAMADOL, URINE: NOT DETECTED
ZOLPIDEM, URINE: NOT DETECTED

## 2019-10-29 ENCOUNTER — TELEPHONE (OUTPATIENT)
Dept: PAIN MANAGEMENT | Age: 59
End: 2019-10-29

## 2019-10-31 RX ORDER — OXYCODONE HYDROCHLORIDE 5 MG/1
5 TABLET ORAL EVERY 6 HOURS PRN
Qty: 12 TABLET | Refills: 0 | Status: SHIPPED | OUTPATIENT
Start: 2019-11-04 | End: 2019-11-07

## 2019-10-31 RX ORDER — GABAPENTIN 800 MG/1
800 TABLET ORAL EVERY 8 HOURS SCHEDULED
Qty: 9 TABLET | Refills: 0 | Status: SHIPPED | OUTPATIENT
Start: 2019-11-04 | End: 2019-12-03 | Stop reason: SDUPTHER

## 2019-10-31 RX ORDER — TIZANIDINE 4 MG/1
4 TABLET ORAL 2 TIMES DAILY PRN
Qty: 6 TABLET | Refills: 0 | Status: SHIPPED | OUTPATIENT
Start: 2019-11-04 | End: 2019-11-07

## 2019-11-07 ENCOUNTER — OFFICE VISIT (OUTPATIENT)
Dept: PAIN MANAGEMENT | Age: 59
End: 2019-11-07
Payer: MEDICARE

## 2019-11-07 VITALS
SYSTOLIC BLOOD PRESSURE: 142 MMHG | WEIGHT: 239 LBS | DIASTOLIC BLOOD PRESSURE: 78 MMHG | HEIGHT: 70 IN | HEART RATE: 72 BPM | BODY MASS INDEX: 34.22 KG/M2

## 2019-11-07 DIAGNOSIS — Z51.81 MEDICATION MONITORING ENCOUNTER: ICD-10-CM

## 2019-11-07 DIAGNOSIS — M51.26 DISPLACEMENT OF LUMBAR INTERVERTEBRAL DISC WITHOUT MYELOPATHY: ICD-10-CM

## 2019-11-07 DIAGNOSIS — M54.12 RADICULOPATHY OF CERVICAL SPINE: ICD-10-CM

## 2019-11-07 DIAGNOSIS — M51.36 DDD (DEGENERATIVE DISC DISEASE), LUMBAR: Primary | ICD-10-CM

## 2019-11-07 PROCEDURE — 99213 OFFICE O/P EST LOW 20 MIN: CPT | Performed by: NURSE PRACTITIONER

## 2019-11-07 RX ORDER — TIZANIDINE 4 MG/1
4 TABLET ORAL 2 TIMES DAILY PRN
Qty: 60 TABLET | Refills: 0 | Status: SHIPPED | OUTPATIENT
Start: 2019-11-07 | End: 2019-12-03 | Stop reason: SDUPTHER

## 2019-11-07 RX ORDER — OXYCODONE HYDROCHLORIDE 5 MG/1
5 TABLET ORAL EVERY 6 HOURS PRN
Qty: 120 TABLET | Refills: 0 | Status: SHIPPED | OUTPATIENT
Start: 2019-11-07 | End: 2019-12-03 | Stop reason: SDUPTHER

## 2019-11-07 RX ORDER — GABAPENTIN 800 MG/1
800 TABLET ORAL EVERY 8 HOURS SCHEDULED
Qty: 90 TABLET | Refills: 0 | Status: SHIPPED | OUTPATIENT
Start: 2019-11-07 | End: 2019-12-03

## 2019-11-07 ASSESSMENT — ENCOUNTER SYMPTOMS
SHORTNESS OF BREATH: 0
COUGH: 0
BACK PAIN: 1
CONSTIPATION: 0

## 2019-11-18 ENCOUNTER — TELEPHONE (OUTPATIENT)
Dept: FAMILY MEDICINE CLINIC | Age: 59
End: 2019-11-18

## 2019-12-03 ENCOUNTER — OFFICE VISIT (OUTPATIENT)
Dept: PAIN MANAGEMENT | Age: 59
End: 2019-12-03
Payer: MEDICARE

## 2019-12-03 VITALS
SYSTOLIC BLOOD PRESSURE: 138 MMHG | DIASTOLIC BLOOD PRESSURE: 72 MMHG | HEIGHT: 70 IN | HEART RATE: 90 BPM | BODY MASS INDEX: 34.36 KG/M2 | WEIGHT: 240 LBS

## 2019-12-03 DIAGNOSIS — T40.2X5A THERAPEUTIC OPIOID INDUCED CONSTIPATION: ICD-10-CM

## 2019-12-03 DIAGNOSIS — K59.03 THERAPEUTIC OPIOID INDUCED CONSTIPATION: ICD-10-CM

## 2019-12-03 DIAGNOSIS — M51.36 DDD (DEGENERATIVE DISC DISEASE), LUMBAR: Primary | ICD-10-CM

## 2019-12-03 DIAGNOSIS — Z51.81 MEDICATION MONITORING ENCOUNTER: ICD-10-CM

## 2019-12-03 DIAGNOSIS — M54.12 RADICULOPATHY OF CERVICAL SPINE: ICD-10-CM

## 2019-12-03 DIAGNOSIS — M51.26 DISPLACEMENT OF LUMBAR INTERVERTEBRAL DISC WITHOUT MYELOPATHY: ICD-10-CM

## 2019-12-03 PROCEDURE — 99213 OFFICE O/P EST LOW 20 MIN: CPT | Performed by: NURSE PRACTITIONER

## 2019-12-03 RX ORDER — GABAPENTIN 800 MG/1
800 TABLET ORAL EVERY 8 HOURS SCHEDULED
Qty: 9 TABLET | Refills: 0 | Status: SHIPPED | OUTPATIENT
Start: 2019-12-07 | End: 2019-12-10 | Stop reason: SDUPTHER

## 2019-12-03 RX ORDER — TIZANIDINE 4 MG/1
4 TABLET ORAL 2 TIMES DAILY PRN
Qty: 60 TABLET | Refills: 0 | Status: SHIPPED | OUTPATIENT
Start: 2019-12-07 | End: 2020-01-02 | Stop reason: SDUPTHER

## 2019-12-03 RX ORDER — OXYCODONE HYDROCHLORIDE 5 MG/1
5 TABLET ORAL EVERY 6 HOURS PRN
Qty: 120 TABLET | Refills: 0 | Status: SHIPPED | OUTPATIENT
Start: 2019-12-07 | End: 2020-01-02 | Stop reason: SDUPTHER

## 2019-12-03 ASSESSMENT — ENCOUNTER SYMPTOMS
CONSTIPATION: 0
SHORTNESS OF BREATH: 0
COUGH: 0
BACK PAIN: 1

## 2019-12-09 DIAGNOSIS — M51.26 DISPLACEMENT OF LUMBAR INTERVERTEBRAL DISC WITHOUT MYELOPATHY: ICD-10-CM

## 2019-12-09 DIAGNOSIS — M51.36 DDD (DEGENERATIVE DISC DISEASE), LUMBAR: ICD-10-CM

## 2019-12-10 DIAGNOSIS — M51.36 DDD (DEGENERATIVE DISC DISEASE), LUMBAR: ICD-10-CM

## 2019-12-10 DIAGNOSIS — M51.26 DISPLACEMENT OF LUMBAR INTERVERTEBRAL DISC WITHOUT MYELOPATHY: ICD-10-CM

## 2019-12-10 RX ORDER — GABAPENTIN 800 MG/1
800 TABLET ORAL EVERY 8 HOURS SCHEDULED
Qty: 90 TABLET | Refills: 0 | Status: SHIPPED | OUTPATIENT
Start: 2019-12-10 | End: 2019-12-10 | Stop reason: SDUPTHER

## 2019-12-10 RX ORDER — GABAPENTIN 800 MG/1
800 TABLET ORAL EVERY 8 HOURS SCHEDULED
Qty: 81 TABLET | Refills: 0 | OUTPATIENT
Start: 2019-12-10 | End: 2020-01-06

## 2020-01-03 RX ORDER — GABAPENTIN 800 MG/1
800 TABLET ORAL EVERY 8 HOURS
Qty: 15 TABLET | Refills: 0 | OUTPATIENT
Start: 2020-01-03 | End: 2020-01-08

## 2020-01-03 RX ORDER — TIZANIDINE 4 MG/1
4 TABLET ORAL 2 TIMES DAILY PRN
Qty: 10 TABLET | Refills: 0 | Status: SHIPPED | OUTPATIENT
Start: 2020-01-03 | End: 2020-01-08

## 2020-01-03 RX ORDER — OXYCODONE HYDROCHLORIDE 5 MG/1
5 TABLET ORAL EVERY 6 HOURS PRN
Qty: 16 TABLET | Refills: 0 | Status: SHIPPED | OUTPATIENT
Start: 2020-01-06 | End: 2020-01-10

## 2020-01-10 ENCOUNTER — OFFICE VISIT (OUTPATIENT)
Dept: PAIN MANAGEMENT | Age: 60
End: 2020-01-10
Payer: MEDICARE

## 2020-01-10 VITALS
DIASTOLIC BLOOD PRESSURE: 78 MMHG | HEIGHT: 69 IN | SYSTOLIC BLOOD PRESSURE: 148 MMHG | WEIGHT: 243 LBS | BODY MASS INDEX: 35.99 KG/M2 | HEART RATE: 81 BPM

## 2020-01-10 PROCEDURE — 99214 OFFICE O/P EST MOD 30 MIN: CPT | Performed by: PAIN MEDICINE

## 2020-01-10 RX ORDER — OXYCODONE HYDROCHLORIDE 5 MG/1
5 TABLET ORAL EVERY 6 HOURS PRN
Qty: 120 TABLET | Refills: 0 | Status: SHIPPED | OUTPATIENT
Start: 2020-01-10 | End: 2020-02-05 | Stop reason: SDUPTHER

## 2020-01-10 RX ORDER — GABAPENTIN 800 MG/1
800 TABLET ORAL EVERY 8 HOURS SCHEDULED
Qty: 90 TABLET | Refills: 0 | Status: SHIPPED | OUTPATIENT
Start: 2020-01-10 | End: 2020-02-05 | Stop reason: SDUPTHER

## 2020-01-10 RX ORDER — TIZANIDINE 4 MG/1
4 TABLET ORAL 2 TIMES DAILY PRN
Qty: 60 TABLET | Refills: 0 | Status: SHIPPED | OUTPATIENT
Start: 2020-01-10 | End: 2020-02-05 | Stop reason: SDUPTHER

## 2020-01-10 ASSESSMENT — ENCOUNTER SYMPTOMS
WHEEZING: 1
BACK PAIN: 1
COUGH: 1
ABDOMINAL PAIN: 1
TROUBLE SWALLOWING: 0
PHOTOPHOBIA: 0
VISUAL CHANGE: 0

## 2020-01-10 NOTE — PROGRESS NOTES
HPI: Back Pain   This is a chronic problem. The current episode started more than 1 year ago. The problem occurs constantly. The problem has been gradually improving since onset. The pain is present in the lumbar spine. The quality of the pain is described as aching and stabbing. The pain radiates to the left thigh. The pain is at a severity of 3/10. The pain is mild. The pain is the same all the time. The symptoms are aggravated by twisting, standing and sitting. Stiffness is present in the morning. Associated symptoms include abdominal pain, leg pain, numbness and tingling. Pertinent negatives include no bladder incontinence, chest pain, fever, headaches, paresis, weakness or weight loss. He has tried home exercises, chiropractic manipulation, heat, muscle relaxant, walking and ice for the symptoms. The treatment provided mild relief. Neck Pain    This is a chronic problem. The current episode started more than 1 year ago. The problem occurs constantly. The problem has been gradually worsening. The pain is associated with an unknown factor. The pain is present in the left side. The quality of the pain is described as stabbing and aching. The pain is at a severity of 6/10. The pain is moderate. The symptoms are aggravated by coughing, sneezing, twisting, position and bending. The pain is same all the time. Stiffness is present all day. Associated symptoms include leg pain, numbness and tingling. Pertinent negatives include no chest pain, fever, headaches, pain with swallowing, paresis, photophobia, syncope, trouble swallowing, visual change, weakness or weight loss. He has tried heat, muscle relaxants, oral narcotics, home exercises and chiropractic manipulation for the symptoms. Multiple pain complaints. At this point low back is the worst of his complaints but MRI multilevel degenerative changes. Physical therapy in the past of benefit. He is opioid dependent for pain control.   Oxycodone up to 4 times a day.  Morphine: Dose 30. He has had SI joint  x2 with excellent temporary benefit. Patient denies any new neurological symptoms. Nobowel or bladder incontinence, no weakness, and no falling. Review of OARRS does not show any aberrant prescription behavior. Medication is helping the patient stay active. Patient denies any side effects and reports adequate analgesia. No sign of misuse/abuse. Past Medical History:   Diagnosis Date    Abnormal stress ECG     Angina pectoris (HCC)     CAD (coronary artery disease)     Carpal tunnel syndrome     left    Cervical stenosis of spine     Cervicalgia     chronic pain    Chronic back pain     COPD (chronic obstructive pulmonary disease) (HCC)     DDD (degenerative disc disease)     Elbow fracture, right     Fractures     elbow    GERD (gastroesophageal reflux disease)     Gout     Right great toe    Hyperlipidemia     Hypertension     Mild depression ([facility] [address] 9/26/2013    2 suicide attempts by girlfriend related to depression.     Osteoarthritis of right knee     Sinus headache 10/10/2018    Sleep apnea     Sleep disturbance     Tendonitis of foot     right       Past Surgical History:   Procedure Laterality Date    CARDIAC CATHETERIZATION  7/8/14    Non Obstructive CAD     CERVICAL SPINE SURGERY  7/13/12     C2-3-4-5    COLONOSCOPY      FRACTURE SURGERY      Rt elbow     HC INJECT OTHER PERPHRL NERV Bilateral 5/9/2019    INJECTION SPINAL performed by Nahun Hernandez MD at [address] [place] [address] Bilateral 8/15/2019    SACROILIAC JOINT INJECTION performed by Nahun Hernandez MD at [facility]  2/5/16    premier tens    OTHER SURGICAL HISTORY  08/15/2019    sacroiliac joint injection    UPPER GASTROINTESTINAL ENDOSCOPY         No Known Allergies      Current Outpatient Medications:     oxyCODONE (ROXICODONE) 5 MG immediate release tablet, Take 1 tablet by mouth every 6 hours as environmental allergies. Physical Exam:  BP (!) 148/78   Pulse 81   Ht 5' 9\" (1.753 m)   Wt 243 lb (110.2 kg)   BMI 35.88 kg/m²     Physical Exam  Vitals signs reviewed. Musculoskeletal:      Lumbar back: He exhibits tenderness. He exhibits no edema and no deformity. Neurological:      Mental Status: He is alert and oriented to person, place, and time. Gait: Gait normal.      Deep Tendon Reflexes: Reflexes are normal and symmetric. Record/Diagnostics Review:    As above, I did review the imaging    Orders:    Orders Placed This Encounter   Medications    oxyCODONE (ROXICODONE) 5 MG immediate release tablet     Sig: Take 1 tablet by mouth every 6 hours as needed for Pain (pain) for up to 30 days. Dispense:  120 tablet     Refill:  0     Reduce doses taken as pain becomes manageable    tiZANidine (ZANAFLEX) 4 MG tablet     Sig: Take 1 tablet by mouth 2 times daily as needed (for muscle spasms)     Dispense:  60 tablet     Refill:  0    gabapentin (NEURONTIN) 800 MG tablet     Sig: Take 1 tablet by mouth every 8 hours for 30 days. Dispense:  90 tablet     Refill:  0       Assessment:  1. Lumbosacral spondylosis without myelopathy    2. DDD (degenerative disc disease), lumbar    3. Displacement of lumbar intervertebral disc without myelopathy    4. Sacroiliitis (HCC)    5. Other chronic pain    6. Encounter for long-term opiate analgesic use        Treatment Plan:  DISCUSSION: Treatment options discussed with patient and all questions answered to patient's satisfaction. OARRS Review: Reviewed and acceptable for medications prescribed. TREATMENT OPTIONS:   Discussed the importance of continued physical therapy and exercise program as well. Has had excellent temporary benefit with SI joint injection x2, I think it is reasonable to go ahead with sacroiliac joint radiofrequency ablation for attempted longer-term benefit.   Continue current medication management, has been stable and

## 2020-01-31 ENCOUNTER — ANESTHESIA EVENT (OUTPATIENT)
Dept: OPERATING ROOM | Age: 60
End: 2020-01-31
Payer: MEDICARE

## 2020-02-05 ENCOUNTER — OFFICE VISIT (OUTPATIENT)
Dept: PAIN MANAGEMENT | Age: 60
End: 2020-02-05
Payer: MEDICARE

## 2020-02-05 VITALS
HEART RATE: 103 BPM | WEIGHT: 237 LBS | SYSTOLIC BLOOD PRESSURE: 111 MMHG | DIASTOLIC BLOOD PRESSURE: 75 MMHG | HEIGHT: 70 IN | BODY MASS INDEX: 33.93 KG/M2

## 2020-02-05 PROCEDURE — 99213 OFFICE O/P EST LOW 20 MIN: CPT | Performed by: NURSE PRACTITIONER

## 2020-02-05 RX ORDER — OXYCODONE HYDROCHLORIDE 5 MG/1
5 TABLET ORAL EVERY 6 HOURS PRN
Qty: 120 TABLET | Refills: 0 | Status: SHIPPED | OUTPATIENT
Start: 2020-02-09 | End: 2020-03-04 | Stop reason: SDUPTHER

## 2020-02-05 RX ORDER — GABAPENTIN 800 MG/1
800 TABLET ORAL EVERY 8 HOURS SCHEDULED
Qty: 90 TABLET | Refills: 0 | Status: SHIPPED | OUTPATIENT
Start: 2020-02-09 | End: 2020-03-04 | Stop reason: SDUPTHER

## 2020-02-05 RX ORDER — OXYCODONE HYDROCHLORIDE 5 MG/1
5 TABLET ORAL EVERY 6 HOURS PRN
Qty: 120 TABLET | Refills: 0 | Status: SHIPPED | OUTPATIENT
Start: 2020-02-09 | End: 2020-02-05 | Stop reason: SDUPTHER

## 2020-02-05 RX ORDER — TIZANIDINE 4 MG/1
4 TABLET ORAL 2 TIMES DAILY PRN
Qty: 60 TABLET | Refills: 0 | Status: SHIPPED | OUTPATIENT
Start: 2020-02-09 | End: 2020-03-04 | Stop reason: SDUPTHER

## 2020-02-05 ASSESSMENT — ENCOUNTER SYMPTOMS
BACK PAIN: 1
COUGH: 0
SHORTNESS OF BREATH: 0
CONSTIPATION: 0

## 2020-02-05 NOTE — PROGRESS NOTES
Patient is here today to review medication contract. Chief Complaint: back and neck pain    PMH: Patient complains of neck pain that radiates down his left arm to fingertips. He has had this pain for many years and it is worsening over time. He had cervical spine surgery in 2012 and the pain has only worsened since that time. He saw Dr Low Cuevas with no surgery recommended for neck, but told could benefit from [place] [id number].    Patient followed up with Dr. Nyasia Hameed after his CT last August and cervical injections were suggested. He did see NS in 2016 for lumbar pain but no surgery recommended at that time. Diclofenac started by his rheumatologist. Pt had bilat SI injections 5/9/19 and reported 80% relief for 8 days. Repeated 8/15/19 with 80% relief. He is scheduled for RFA Friday. Back Pain   This is a chronic problem. The current episode started more than 1 year ago. The problem occurs constantly. The problem is unchanged. The pain is present in the lumbar spine. The quality of the pain is described as aching. Radiates to: into left hip. The pain is at a severity of 3/10. The pain is mild. The symptoms are aggravated by position and standing (walking). Associated symptoms include numbness. Pertinent negatives include no chest pain or fever. He has tried analgesics and bed rest for the symptoms. The treatment provided mild relief. Neck Pain    This is a chronic problem. The current episode started more than 1 year ago. The problem occurs constantly. The problem has been unchanged. The pain is present in the left side, midline and right side. The pain is at a severity of 5/10. The pain is moderate. The symptoms are aggravated by position and twisting. Associated symptoms include numbness. Pertinent negatives include no chest pain or fever. He has tried oral narcotics for the symptoms. The treatment provided mild relief. Patient denies any new neurological symptoms.  No bowel or bladder incontinence, no weakness, and no falling. Pill count: appropriate    Morphine equivalent: 30     Periodic Controlled Substance Monitoring: Possible medication side effects, risk of tolerance/dependence & alternative treatments discussed., No signs of potential drug abuse or diversion identified. , Assessed functional status., Obtaining appropriate analgesic effect of treatment. (Catarina Lundborg, APRN - CNP)      Past Medical History:   Diagnosis Date    Abnormal stress ECG     Angina pectoris (HCC)     CAD (coronary artery disease)     Carpal tunnel syndrome     left    Cervical stenosis of spine     Cervicalgia     chronic pain    Chronic back pain     COPD (chronic obstructive pulmonary disease) ([facility] [address]     DDD (degenerative disc disease)     Elbow fracture, right     Fractures     elbow    GERD (gastroesophageal reflux disease)     Gout     Right great toe    Hyperlipidemia     Hypertension     Mild depression ([facility] [address] 9/26/2013    2 suicide attempts by girlfriend related to depression.     Osteoarthritis of right knee     Sinus headache 10/10/2018    Sleep apnea     Sleep disturbance     Tendonitis of foot     right       Past Surgical History:   Procedure Laterality Date    CARDIAC CATHETERIZATION  7/8/14    Non Obstructive CAD     CERVICAL SPINE SURGERY  7/13/12     C2-3-4-5    COLONOSCOPY      FRACTURE SURGERY      Rt elbow     HC INJECT OTHER PERPHRL NERV Bilateral 5/9/2019    INJECTION SPINAL performed by Mirta Linares MD at [address] [place] [address] Bilateral 8/15/2019    SACROILIAC JOINT INJECTION performed by Mirta Linares MD at [facility]  2/5/16    premier tens    OTHER SURGICAL HISTORY  08/15/2019    sacroiliac joint injection    UPPER GASTROINTESTINAL ENDOSCOPY         No Known Allergies      Current Outpatient Medications:     [START ON 2/9/2020] oxyCODONE (ROXICODONE) 5 MG immediate release tablet, Take 1 tablet Not on file    Food insecurity:     Worry: Not on file     Inability: Not on file    Transportation needs:     Medical: Not on file     Non-medical: Not on file   Tobacco Use    Smoking status: Current Every Day Smoker     Packs/day: 1.50     Years: 40.00     Pack years: 60.00     Types: Cigarettes    Smokeless tobacco: Former User     Quit date: 3/12/2015    Tobacco comment: wants to discuss  chantix   Substance and Sexual Activity    Alcohol use: Not Currently     Alcohol/week: 0.0 standard drinks     Comment: 3 x year    Drug use: No    Sexual activity: Yes     Partners: Female   Lifestyle    Physical activity:     Days per week: Not on file     Minutes per session: Not on file    Stress: Not on file   Relationships    Social connections:     Talks on phone: Not on file     Gets together: Not on file     Attends Religion service: Not on file     Active member of club or organization: Not on file     Attends meetings of clubs or organizations: Not on file     Relationship status: Not on file    Intimate partner violence:     Fear of current or ex partner: Not on file     Emotionally abused: Not on file     Physically abused: Not on file     Forced sexual activity: Not on file   Other Topics Concern    Not on file   Social History Narrative    Not on file       Review of Systems:  Review of Systems   Constitution: Negative for chills and fever. Cardiovascular: Negative for chest pain and palpitations. Respiratory: Negative for cough and shortness of breath. Musculoskeletal: Positive for back pain and neck pain. Gastrointestinal: Negative for constipation. Neurological: Positive for numbness. Negative for disturbances in coordination and loss of balance. Physical Exam:  /75   Pulse 103   Ht 5' 10\" (1.778 m)   Wt 237 lb (107.5 kg)   BMI 34.01 kg/m²     Physical Exam  HENT:      Head: Normocephalic. Neck:      Musculoskeletal: Normal range of motion.    Pulmonary: Effort: Pulmonary effort is normal.   Musculoskeletal: Normal range of motion. Cervical back: He exhibits tenderness and pain. Lumbar back: He exhibits tenderness and pain. Skin:     General: Skin is warm and dry. Neurological:      Mental Status: He is alert and oriented to person, place, and time. Record/Diagnostics Review:    Last niru 10/2019 and was appropriate     Assessment:  Problem List Items Addressed This Visit     Medication monitoring encounter    DDD (degenerative disc disease), lumbar - Primary    Relevant Medications    oxyCODONE (ROXICODONE) 5 MG immediate release tablet (Start on 2/9/2020)    tiZANidine (ZANAFLEX) 4 MG tablet (Start on 2/9/2020)    gabapentin (NEURONTIN) 800 MG tablet (Start on 2/9/2020)    Displacement of lumbar intervertebral disc without myelopathy    Relevant Medications    tiZANidine (ZANAFLEX) 4 MG tablet (Start on 2/9/2020)    gabapentin (NEURONTIN) 800 MG tablet (Start on 2/9/2020)    Spinal stenosis of lumbar region without neurogenic claudication             Treatment Plan:  Patient relates current medications are helping the pain. Patient reports taking pain medications as prescribed, denies obtaining medications from different sources and denies use of illegal drugs. Patient denies side effects from medications like nausea, vomiting, constipation or drowsiness. Patient reports current activities of daily living are possible due to medications and would like to continue them. As always, we encourage daily stretching and strengthening exercises, and recommend minimizing use of pain medications unless patient cannot get through daily activities due to pain. Contract requirements met. Continue opioid therapy.  Script written for oxycodone  Follow up appointment made for 4 weeks

## 2020-02-07 ENCOUNTER — APPOINTMENT (OUTPATIENT)
Dept: GENERAL RADIOLOGY | Age: 60
End: 2020-02-07
Attending: PAIN MEDICINE
Payer: MEDICARE

## 2020-02-07 ENCOUNTER — HOSPITAL ENCOUNTER (OUTPATIENT)
Age: 60
Setting detail: OUTPATIENT SURGERY
Discharge: HOME | End: 2020-02-07
Attending: PAIN MEDICINE | Admitting: PAIN MEDICINE
Payer: MEDICARE

## 2020-02-07 ENCOUNTER — ANESTHESIA (OUTPATIENT)
Dept: OPERATING ROOM | Age: 60
End: 2020-02-07
Payer: MEDICARE

## 2020-02-07 VITALS
SYSTOLIC BLOOD PRESSURE: 138 MMHG | RESPIRATION RATE: 12 BRPM | DIASTOLIC BLOOD PRESSURE: 87 MMHG | WEIGHT: 239 LBS | HEART RATE: 81 BPM | HEIGHT: 69 IN | BODY MASS INDEX: 35.4 KG/M2 | TEMPERATURE: 98.5 F | OXYGEN SATURATION: 97 %

## 2020-02-07 VITALS
SYSTOLIC BLOOD PRESSURE: 174 MMHG | DIASTOLIC BLOOD PRESSURE: 79 MMHG | RESPIRATION RATE: 20 BRPM | OXYGEN SATURATION: 100 %

## 2020-02-07 PROCEDURE — 3600000012 HC SURGERY LEVEL 2 ADDTL 15MIN: Performed by: PAIN MEDICINE

## 2020-02-07 PROCEDURE — 3700000000 HC ANESTHESIA ATTENDED CARE: Performed by: PAIN MEDICINE

## 2020-02-07 PROCEDURE — 2709999900 HC NON-CHARGEABLE SUPPLY: Performed by: PAIN MEDICINE

## 2020-02-07 PROCEDURE — 3600000002 HC SURGERY LEVEL 2 BASE: Performed by: PAIN MEDICINE

## 2020-02-07 PROCEDURE — 3209999900 FLUORO FOR SURGICAL PROCEDURES

## 2020-02-07 PROCEDURE — 7100000011 HC PHASE II RECOVERY - ADDTL 15 MIN: Performed by: PAIN MEDICINE

## 2020-02-07 PROCEDURE — 2500000003 HC RX 250 WO HCPCS: Performed by: PAIN MEDICINE

## 2020-02-07 PROCEDURE — 6360000002 HC RX W HCPCS: Performed by: ANESTHESIOLOGY

## 2020-02-07 PROCEDURE — 64625 RF ABLTJ NRV NRVTG SI JT: CPT | Performed by: PAIN MEDICINE

## 2020-02-07 PROCEDURE — 3700000001 HC ADD 15 MINUTES (ANESTHESIA): Performed by: PAIN MEDICINE

## 2020-02-07 PROCEDURE — 7100000010 HC PHASE II RECOVERY - FIRST 15 MIN: Performed by: PAIN MEDICINE

## 2020-02-07 RX ORDER — FENTANYL CITRATE 50 UG/ML
INJECTION, SOLUTION INTRAMUSCULAR; INTRAVENOUS PRN
Status: DISCONTINUED | OUTPATIENT
Start: 2020-02-07 | End: 2020-02-07 | Stop reason: SDUPTHER

## 2020-02-07 RX ORDER — ONDANSETRON 2 MG/ML
4 INJECTION INTRAMUSCULAR; INTRAVENOUS
Status: DISCONTINUED | OUTPATIENT
Start: 2020-02-07 | End: 2020-02-07 | Stop reason: HOSPADM

## 2020-02-07 RX ORDER — ACETAMINOPHEN 325 MG/1
650 TABLET ORAL NIGHTLY
Status: ON HOLD | COMMUNITY
End: 2022-09-17 | Stop reason: SDUPTHER

## 2020-02-07 RX ORDER — FENTANYL CITRATE 50 UG/ML
50 INJECTION, SOLUTION INTRAMUSCULAR; INTRAVENOUS EVERY 5 MIN PRN
Status: DISCONTINUED | OUTPATIENT
Start: 2020-02-07 | End: 2020-02-07 | Stop reason: HOSPADM

## 2020-02-07 RX ORDER — MIDAZOLAM HYDROCHLORIDE 1 MG/ML
INJECTION INTRAMUSCULAR; INTRAVENOUS PRN
Status: DISCONTINUED | OUTPATIENT
Start: 2020-02-07 | End: 2020-02-07 | Stop reason: SDUPTHER

## 2020-02-07 RX ORDER — FENTANYL CITRATE 50 UG/ML
25 INJECTION, SOLUTION INTRAMUSCULAR; INTRAVENOUS EVERY 5 MIN PRN
Status: DISCONTINUED | OUTPATIENT
Start: 2020-02-07 | End: 2020-02-07 | Stop reason: HOSPADM

## 2020-02-07 RX ORDER — METOCLOPRAMIDE HYDROCHLORIDE 5 MG/ML
10 INJECTION INTRAMUSCULAR; INTRAVENOUS
Status: DISCONTINUED | OUTPATIENT
Start: 2020-02-07 | End: 2020-02-07 | Stop reason: HOSPADM

## 2020-02-07 RX ORDER — SODIUM CHLORIDE 0.9 % (FLUSH) 0.9 %
10 SYRINGE (ML) INJECTION EVERY 12 HOURS SCHEDULED
Status: DISCONTINUED | OUTPATIENT
Start: 2020-02-07 | End: 2020-02-07 | Stop reason: HOSPADM

## 2020-02-07 RX ORDER — PROPOFOL 10 MG/ML
INJECTION, EMULSION INTRAVENOUS PRN
Status: DISCONTINUED | OUTPATIENT
Start: 2020-02-07 | End: 2020-02-07 | Stop reason: SDUPTHER

## 2020-02-07 RX ORDER — HYDRALAZINE HYDROCHLORIDE 20 MG/ML
5 INJECTION INTRAMUSCULAR; INTRAVENOUS EVERY 10 MIN PRN
Status: DISCONTINUED | OUTPATIENT
Start: 2020-02-07 | End: 2020-02-07 | Stop reason: HOSPADM

## 2020-02-07 RX ORDER — BUPIVACAINE HYDROCHLORIDE 2.5 MG/ML
INJECTION, SOLUTION EPIDURAL; INFILTRATION; INTRACAUDAL PRN
Status: DISCONTINUED | OUTPATIENT
Start: 2020-02-07 | End: 2020-02-07 | Stop reason: ALTCHOICE

## 2020-02-07 RX ORDER — SODIUM CHLORIDE 0.9 % (FLUSH) 0.9 %
10 SYRINGE (ML) INJECTION PRN
Status: DISCONTINUED | OUTPATIENT
Start: 2020-02-07 | End: 2020-02-07 | Stop reason: HOSPADM

## 2020-02-07 RX ADMIN — FENTANYL CITRATE 100 MCG: 50 INJECTION INTRAMUSCULAR; INTRAVENOUS at 15:22

## 2020-02-07 RX ADMIN — MIDAZOLAM HYDROCHLORIDE 2 MG: 1 INJECTION, SOLUTION INTRAMUSCULAR; INTRAVENOUS at 15:22

## 2020-02-07 RX ADMIN — PROPOFOL 20 MG: 10 INJECTION, EMULSION INTRAVENOUS at 15:31

## 2020-02-07 RX ADMIN — PROPOFOL 30 MG: 10 INJECTION, EMULSION INTRAVENOUS at 15:26

## 2020-02-07 ASSESSMENT — PULMONARY FUNCTION TESTS
PIF_VALUE: 0

## 2020-02-07 ASSESSMENT — COPD QUESTIONNAIRES: CAT_SEVERITY: MODERATE

## 2020-02-07 ASSESSMENT — LIFESTYLE VARIABLES: SMOKING_STATUS: 1

## 2020-02-07 ASSESSMENT — PAIN SCALES - GENERAL
PAINLEVEL_OUTOF10: 0
PAINLEVEL_OUTOF10: 0

## 2020-02-07 ASSESSMENT — PAIN DESCRIPTION - DESCRIPTORS: DESCRIPTORS: ACHING

## 2020-02-07 ASSESSMENT — PAIN - FUNCTIONAL ASSESSMENT: PAIN_FUNCTIONAL_ASSESSMENT: 0-10

## 2020-02-07 NOTE — ANESTHESIA PRE PROCEDURE
Department of Anesthesiology  Preprocedure Note       Name:  Sunni Tavera   Age:  62 y.o.  :  1960                                          MRN:  [id number]         Date:  2020      Surgeon: Natali Echeverria):  Thais Camejo MD    Procedure: NERVE RADIOFREQUENCY ABLATION - SACROILIAC (Left )    Medications prior to admission:   Prior to Admission medications    Medication Sig Start Date End Date Taking? Authorizing Provider   tiZANidine (ZANAFLEX) 4 MG tablet Take 1 tablet by mouth 2 times daily as needed (for muscle spasms) 2/9/20 3/10/20  JOSSUE Paula CNP   gabapentin (NEURONTIN) 800 MG tablet Take 1 tablet by mouth every 8 hours for 30 days. 2/9/20 3/10/20  JOSSUE Paula CNP   oxyCODONE (ROXICODONE) 5 MG immediate release tablet Take 1 tablet by mouth every 6 hours as needed for Pain (pain) for up to 30 days.  2/9/20 3/10/20  JOSSUE Paula CNP   SYMBICORT 160-4.5 MCG/ACT AERO INHALE TWO PUFFS BY MOUTH TWICE A DAY 19   Amanda Cardenas MD   atorvastatin (LIPITOR) 80 MG tablet TAKE ONE TABLET BY MOUTH DAILY 3/21/19   Amanda Cardenas MD   cetirizine (ZYRTEC) 10 MG tablet TAKE ONE TABLET BY MOUTH DAILY 19   Amanda Cardenas MD   diclofenac (VOLTAREN) 75 MG EC tablet Take 75 mg by mouth 2 times daily 19   Historical Provider, MD   traZODone (DESYREL) 150 MG tablet Take 150 mg by mouth nightly    Historical Provider, MD   omeprazole (PRILOSEC) 20 MG delayed release capsule TAKE ONE CAPSULE BY MOUTH TWICE A DAY 30 MINUTES BEFORE MEALS 18   Amanda Cardenas MD   nitroGLYCERIN (NITROSTAT) 0.4 MG SL tablet Place 1 tablet under the tongue every 5 minutes as needed for Chest pain 18   Amanda Cardenas MD   albuterol sulfate  (90 Base) MCG/ACT inhaler Inhale 2 puffs into the lungs every 6 hours as needed for Wheezing 18   Amanda Cardenas MD   citalopram (CELEXA) 20 MG tablet Take 20 mg by mouth daily     Historical Provider, MD   aspirin 81 MG EC tablet Take 1 tablet by mouth daily 3/9/16   Michelle Severin, MD       Current medications:    No current facility-administered medications for this encounter. Allergies:  No Known Allergies    Problem List:    Patient Active Problem List   Diagnosis Code    Cervical stenosis of spine M48.02    Cervicalgia M54.2    COPD (chronic obstructive pulmonary disease) ([facility]) J44.9    Smoker F17.200    Mild depression ([facility]) F32.0    GERD (gastroesophageal reflux disease) K21.9    Mixed hyperlipidemia E78.2    Carpal tunnel syndrome of left wrist G56.02    Medication monitoring encounter Z51.81    DDD (degenerative disc disease), lumbar M51.36    Displacement of lumbar intervertebral disc without myelopathy M51.26    Therapeutic opioid induced constipation K59.03, T40.2X5A    Radiculopathy of cervical spine M54.12    Essential hypertension I10    Major depression, single episode, in complete remission ([facility] [address] F32.5    Prediabetes R73.03    Lung nodule R91.1    Allergic sinusitis J30.9    Arthritis M19.90    Spinal stenosis of lumbar region without neurogenic claudication M48.061       Past Medical History:        Diagnosis Date    Abnormal stress ECG     Angina pectoris ([facility])     CAD (coronary artery disease)     Carpal tunnel syndrome     left    Cervical stenosis of spine     Cervicalgia     chronic pain    Chronic back pain     COPD (chronic obstructive pulmonary disease) ([facility])     DDD (degenerative disc disease)     Elbow fracture, right     Fractures     elbow    GERD (gastroesophageal reflux disease)     Gout     Right great toe    Hyperlipidemia     Hypertension     Mild depression ([facility] [address] 9/26/2013    2 suicide attempts by girlfriend related to depression.     Osteoarthritis of right knee     Sinus headache 10/10/2018    Sleep apnea     Sleep disturbance     Tendonitis of foot     right       Past Surgical History:        Procedure Laterality Date    CARDIAC CATHETERIZATION CO2 24 07/21/2019    BUN 11 07/21/2019    CREATININE 0.79 07/21/2019    GFRAA >60 07/21/2019    LABGLOM >60 07/21/2019    GLUCOSE 111 07/21/2019    GLUCOSE 99 02/10/2012    PROT 7.2 07/21/2019    CALCIUM 9.3 07/21/2019    BILITOT 0.16 07/21/2019    ALKPHOS 96 07/21/2019    AST 18 07/21/2019    ALT 23 07/21/2019       POC Tests: No results for input(s): POCGLU, POCNA, POCK, POCCL, POCBUN, POCHEMO, POCHCT in the last 72 hours. Coags:   Lab Results   Component Value Date    PROTIME 12.3 07/21/2019    INR 0.9 07/21/2019    APTT 33.3 07/21/2019       HCG (If Applicable): No results found for: PREGTESTUR, PREGSERUM, HCG, HCGQUANT     ABGs: No results found for: PHART, PO2ART, MYD1POP, UCY3PWU, BEART, Z5DCCNPD     Type & Screen (If Applicable):  No results found for: [facility]    Anesthesia Evaluation  Patient summary reviewed and Nursing notes reviewed  Airway: Mallampati: III  TM distance: >3 FB   Neck ROM: limited  Mouth opening: > = 3 FB Dental:    (+) caps      Pulmonary: breath sounds clear to auscultation  (+) COPD: moderate,  sleep apnea:  current smoker          Patient smoked on day of surgery. ROS comment: H/o tobacco abuse 60py   Cardiovascular:  Exercise tolerance: poor (<4 METS),   (+) hypertension: moderate, CAD: non-obstructive, hyperlipidemia      ECG reviewed  Rhythm: regular  Rate: normal           Beta Blocker:  Not on Beta Blocker         Neuro/Psych:   (+) psychiatric history: stable with treatmentdepression/anxiety              ROS comment: Spina stenosis - cervical/lumbar with neuropathy GI/Hepatic/Renal:   (+) GERD: well controlled,           Endo/Other:    (+) : arthritis: OA., .                 Abdominal:           Vascular: negative vascular ROS. Anesthesia Plan      MAC     ASA 3       Induction: intravenous. Anesthetic plan and risks discussed with patient. Plan discussed with CRNA.     Attending anesthesiologist reviewed and

## 2020-02-07 NOTE — ANESTHESIA POSTPROCEDURE EVALUATION
POST- ANESTHESIA EVALUATION       Pt Name: Sammi Hoffman  MRN: [id number]  YOB: 1960  Date of evaluation: 2/7/2020  Time:  3:43 PM      BP (!) 168/95   Pulse 91   Temp 97.3 °F (36.3 °C) (Temporal)   Resp 18   Ht 5' 9\" (1.753 m)   Wt 239 lb (108.4 kg)   SpO2 97%   BMI 35.29 kg/m²      Consciousness Level  Awake  Cardiopulmonary Status  Stable  Pain Adequately Treated YES  Nausea / Vomiting  NO  Adequate Hydration  YES  Anesthesia Related Complications NONE      Electronically signed by Dasia Chen MD on 2/7/2020 at 3:43 PM       Department of Anesthesiology  Postprocedure Note    Patient: Sammi Hoffman  MRN: [id number]  YOB: 1960  Date of evaluation: 2/7/2020  Time:  3:43 PM     Procedure Summary     Date:  02/07/20 Room / Location:  [address] [place] [address] / [address] [place] [address]    Anesthesia Start:  3097 Anesthesia Stop:  7640    Procedure:  [address] (Left ) Diagnosis:  (SPONDYLOSIS WITHOUT MYELOPATHY)    Surgeon:  Jero Escobedo MD Responsible Provider:  Dasia Chen MD    Anesthesia Type:  MAC ASA Status:  3          Anesthesia Type: MAC    Hilary Phase I:      Hilary Phase II:      Last vitals: Reviewed and per EMR flowsheets.        Anesthesia Post Evaluation

## 2020-02-07 NOTE — OP NOTE
guidance. Sensory and motor testing once again performed and appropriate as above. This was also confirmed in AP and lateral views. Aspiration was negative. After administration of 1ml of 0.25% bupivacaine at each site to anesthetize the area radiofrequency ablation was delivered at 80° temperature for 90 seconds. The patient tolerated the procedure well, there where no complications noted the needles were withdrawn. Vital signs remained stable. The patient was then taken to the recovery room in satisfactory conditions. Discharge and follow-up instructions were given to the patient.     [address] [place] [address]

## 2020-02-07 NOTE — H&P
H&P Update    Patient's History and Physical from January 10, 2020 was reviewed. Patient examined. There has been no change. Andrew Castrejon        HPI: Back Pain   This is a chronic problem. The current episode started more than 1 year ago. The problem occurs constantly. The problem has been gradually improving since onset. The pain is present in the lumbar spine. The quality of the pain is described as aching and stabbing. The pain radiates to the left thigh. The pain is at a severity of 3/10. The pain is mild. The pain is the same all the time. The symptoms are aggravated by twisting, standing and sitting. Stiffness is present in the morning. Associated symptoms include abdominal pain, leg pain, numbness and tingling. Pertinent negatives include no bladder incontinence, chest pain, fever, headaches, paresis, weakness or weight loss. He has tried home exercises, chiropractic manipulation, heat, muscle relaxant, walking and ice for the symptoms. The treatment provided mild relief. Neck Pain    This is a chronic problem. The current episode started more than 1 year ago. The problem occurs constantly. The problem has been gradually worsening. The pain is associated with an unknown factor. The pain is present in the left side. The quality of the pain is described as stabbing and aching. The pain is at a severity of 6/10. The pain is moderate. The symptoms are aggravated by coughing, sneezing, twisting, position and bending. The pain is same all the time. Stiffness is present all day. Associated symptoms include leg pain, numbness and tingling. Pertinent negatives include no chest pain, fever, headaches, pain with swallowing, paresis, photophobia, syncope, trouble swallowing, visual change, weakness or weight loss. He has tried heat, muscle relaxants, oral narcotics, home exercises and chiropractic manipulation for the symptoms.      Multiple pain complaints.   At this point low back is the worst of his complaints but MRI multilevel degenerative changes. Physical therapy in the past of benefit. He is opioid dependent for pain control. Oxycodone up to 4 times a day. Morphine: Dose 30. He has had SI joint  x2 with excellent temporary benefit.     Patient denies any new neurological symptoms. Nobowel or bladder incontinence, no weakness, and no falling. Review of OARRS does not show any aberrant prescription behavior. Medication is helping the patient stay active. Patient denies any side effects and reports adequate analgesia. No sign of misuse/abuse.     Past Medical History        Past Medical History:   Diagnosis Date    Abnormal stress ECG      Angina pectoris (HCC)      CAD (coronary artery disease)      Carpal tunnel syndrome       left    Cervical stenosis of spine      Cervicalgia       chronic pain    Chronic back pain      COPD (chronic obstructive pulmonary disease) (HCC)      DDD (degenerative disc disease)      Elbow fracture, right      Fractures       elbow    GERD (gastroesophageal reflux disease)      Gout       Right great toe    Hyperlipidemia      Hypertension      Mild depression ([address] 9/26/2013     2 suicide attempts by girlfriend related to depression.     Osteoarthritis of right knee      Sinus headache 10/10/2018    Sleep apnea      Sleep disturbance      Tendonitis of foot       right            Past Surgical History         Past Surgical History:   Procedure Laterality Date    CARDIAC CATHETERIZATION   7/8/14     Non Obstructive CAD     CERVICAL SPINE SURGERY   7/13/12      C2-3-4-5    COLONOSCOPY        FRACTURE SURGERY         Rt elbow     HC INJECT OTHER PERPHRL NERV Bilateral 5/9/2019     INJECTION SPINAL performed by Laya Sinclair MD at [address] [place] [address] Bilateral 8/15/2019     SACROILIAC JOINT INJECTION performed by Laya Sinclair MD at [facility]   2/5/16     [facility]   [facility] OTHER SURGICAL HISTORY   08/15/2019     sacroiliac joint injection    UPPER GASTROINTESTINAL ENDOSCOPY                No Known Allergies       Current Medication      Current Outpatient Medications:     oxyCODONE (ROXICODONE) 5 MG immediate release tablet, Take 1 tablet by mouth every 6 hours as needed for Pain (pain) for up to 30 days. , Disp: 120 tablet, Rfl: 0    tiZANidine (ZANAFLEX) 4 MG tablet, Take 1 tablet by mouth 2 times daily as needed (for muscle spasms), Disp: 60 tablet, Rfl: 0    gabapentin (NEURONTIN) 800 MG tablet, Take 1 tablet by mouth every 8 hours for 30 days. , Disp: 90 tablet, Rfl: 0    SYMBICORT 160-4.5 MCG/ACT AERO, INHALE TWO PUFFS BY MOUTH TWICE A DAY, Disp: 1 Inhaler, Rfl: 5    atorvastatin (LIPITOR) 80 MG tablet, TAKE ONE TABLET BY MOUTH DAILY, Disp: 90 tablet, Rfl: 2    cetirizine (ZYRTEC) 10 MG tablet, TAKE ONE TABLET BY MOUTH DAILY, Disp: 30 tablet, Rfl: 1    diclofenac (VOLTAREN) 75 MG EC tablet, Take 75 mg by mouth 2 times daily, Disp: , Rfl:     traZODone (DESYREL) 150 MG tablet, Take 150 mg by mouth nightly, Disp: , Rfl:     omeprazole (PRILOSEC) 20 MG delayed release capsule, TAKE ONE CAPSULE BY MOUTH TWICE A DAY 30 MINUTES BEFORE MEALS, Disp: 60 capsule, Rfl: 6    nitroGLYCERIN (NITROSTAT) 0.4 MG SL tablet, Place 1 tablet under the tongue every 5 minutes as needed for Chest pain, Disp: 25 tablet, Rfl: 3    albuterol sulfate  (90 Base) MCG/ACT inhaler, Inhale 2 puffs into the lungs every 6 hours as needed for Wheezing, Disp: 1 Inhaler, Rfl: 3    citalopram (CELEXA) 20 MG tablet, Take 20 mg by mouth daily , Disp: , Rfl:     aspirin 81 MG EC tablet, Take 1 tablet by mouth daily, Disp: 30 tablet, Rfl: 3        Family History         Family History   Problem Relation Age of Onset    Heart Disease Mother      COPD Father      Cancer Maternal Aunt 64         breast cancer     Cancer Maternal Uncle 61         prostrate cancer             Social History answered to patient's satisfaction.     OARRS Review: Reviewed and acceptable for medications prescribed. TREATMENT OPTIONS:   Discussed the importance of continued physical therapy and exercise program as well. Has had excellent temporary benefit with SI joint injection x2, I think it is reasonable to go ahead with sacroiliac joint radiofrequency ablation for attempted longer-term benefit. Continue current medication management, has been stable and compliant. Due to the high risk nature of this patient's pain medication close monitoring is required. Medication risk and benefits discussed.   UDS today        Alonso Barajas M.D.

## 2020-03-04 ENCOUNTER — OFFICE VISIT (OUTPATIENT)
Dept: PAIN MANAGEMENT | Age: 60
End: 2020-03-04
Payer: MEDICARE

## 2020-03-04 VITALS
HEIGHT: 69 IN | HEART RATE: 99 BPM | WEIGHT: 239 LBS | SYSTOLIC BLOOD PRESSURE: 139 MMHG | DIASTOLIC BLOOD PRESSURE: 80 MMHG | BODY MASS INDEX: 35.4 KG/M2

## 2020-03-04 PROCEDURE — 99213 OFFICE O/P EST LOW 20 MIN: CPT | Performed by: NURSE PRACTITIONER

## 2020-03-04 RX ORDER — TIZANIDINE 4 MG/1
4 TABLET ORAL 2 TIMES DAILY PRN
Qty: 60 TABLET | Refills: 0 | Status: SHIPPED | OUTPATIENT
Start: 2020-03-10 | End: 2020-04-07 | Stop reason: SDUPTHER

## 2020-03-04 RX ORDER — OXYCODONE HYDROCHLORIDE 5 MG/1
5 TABLET ORAL EVERY 6 HOURS PRN
Qty: 120 TABLET | Refills: 0 | Status: SHIPPED | OUTPATIENT
Start: 2020-03-10 | End: 2020-04-07 | Stop reason: SDUPTHER

## 2020-03-04 RX ORDER — GABAPENTIN 800 MG/1
800 TABLET ORAL EVERY 8 HOURS SCHEDULED
Qty: 90 TABLET | Refills: 0 | Status: SHIPPED | OUTPATIENT
Start: 2020-03-10 | End: 2020-04-07 | Stop reason: SDUPTHER

## 2020-03-04 ASSESSMENT — ENCOUNTER SYMPTOMS
CONSTIPATION: 0
BACK PAIN: 1
COUGH: 0
SHORTNESS OF BREATH: 0

## 2020-03-04 NOTE — PROGRESS NOTES
provided mild relief. Patient denies any new neurological symptoms. No bowel or bladder incontinence, no weakness, and no falling. Pill count: appropriate    Morphine equivalent: 30    Periodic Controlled Substance Monitoring: Possible medication side effects, risk of tolerance/dependence & alternative treatments discussed., No signs of potential drug abuse or diversion identified. , Assessed functional status., Obtaining appropriate analgesic effect of treatment. (Mame Lesli, APRN - CNP)      Past Medical History:   Diagnosis Date    Abnormal stress ECG     Angina pectoris (HCC)     CAD (coronary artery disease)     Carpal tunnel syndrome     left    Cervical stenosis of spine     Cervicalgia     chronic pain    Chronic back pain     [facility]) [facility]     DDD (degenerative disc disease)     Elbow fracture, right     Fractures     elbow    GERD (gastroesophageal reflux disease)     Gout     Right great toe    Hyperlipidemia     Hypertension     Mild depression ([facility] 9/26/2013    2 suicide attempts by girlfriend related to depression.     Osteoarthritis of right knee     Sinus headache 10/10/2018    Sleep apnea     Sleep disturbance     Smoker     Tendonitis of foot     right       Past Surgical History:   Procedure Laterality Date    CARDIAC CATHETERIZATION  7/8/14    Non Obstructive CAD     CERVICAL SPINE SURGERY  7/13/12     C2-3-4-5    COLONOSCOPY      FRACTURE SURGERY      Rt elbow     HC INJECT OTHER PERPHRL NERV Bilateral 5/9/2019    INJECTION SPINAL performed by Ayesha Caceres MD at [address] [place] [address] Bilateral 8/15/2019    SACROILIAC JOINT INJECTION performed by Ayesha Caceres MD at [facility]  2/5/16    [facility]    NERVE BLOCK Left 02/07/2020    RFA left side    NERVE BLOCK Left 02/07/2020     NERVE RADIOFREQUENCY ABLATION - SACROILIAC (Left )    Mother     COPD Father     Cancer Maternal Aunt 64        breast cancer     Cancer Maternal Uncle 60        prostrate cancer        Social History     Socioeconomic History    Marital status: Single     Spouse name: Not on file    Number of children: Not on file    Years of education: Not on file    Highest education level: Not on file   Occupational History     Employer: N/A   Social Needs    Financial resource strain: Not on file    Food insecurity:     Worry: Not on file     Inability: Not on file    Transportation needs:     Medical: Not on file     Non-medical: Not on file   Tobacco Use    Smoking status: Current Every Day Smoker     Packs/day: 1.50     Years: 40.00     Pack years: 60.00     Types: Cigarettes    Smokeless tobacco: Former User     Quit date: 3/12/2015    Tobacco comment: wants to discuss  chantix   Substance and Sexual Activity    Alcohol use: Not Currently     Alcohol/week: 0.0 standard drinks     Comment: 3 x year    Drug use: No    Sexual activity: Yes     Partners: Female   Lifestyle    Physical activity:     Days per week: Not on file     Minutes per session: Not on file    Stress: Not on file   Relationships    Social connections:     Talks on phone: Not on file     Gets together: Not on file     Attends [employer]ism service: Not on file     Active member of club or organization: Not on file     Attends meetings of clubs or organizations: Not on file     Relationship status: Not on file    Intimate partner violence:     Fear of current or ex partner: Not on file     Emotionally abused: Not on file     Physically abused: Not on file     Forced sexual activity: Not on file   Other Topics Concern    Not on file   Social History Narrative    Not on file       Review of Systems:  Review of Systems   Constitution: Negative for chills and fever. Cardiovascular: Negative for chest pain and palpitations. Respiratory: Negative for cough and shortness of breath. Musculoskeletal: Positive for back pain and neck pain. Gastrointestinal: Negative for constipation. Neurological: Positive for headaches. Negative for disturbances in coordination and loss of balance. Physical Exam:  /80   Pulse 99   Ht 5' 9\" (1.753 m)   Wt 239 lb (108.4 kg)   BMI 35.29 kg/m²     Physical Exam  HENT:      Head: Normocephalic. Neck:      Musculoskeletal: Normal range of motion. Pulmonary:      Effort: Pulmonary effort is normal.   Musculoskeletal: Normal range of motion. Cervical back: He exhibits tenderness and pain. Lumbar back: He exhibits tenderness and pain. Skin:     General: Skin is warm and dry. Neurological:      Mental Status: He is alert and oriented to person, place, and time. Record/Diagnostics Review:    Last niru 10/2019 and was appropriate     Assessment:  Problem List Items Addressed This Visit     Medication monitoring encounter    DDD (degenerative disc disease), lumbar - Primary    Relevant Medications    oxyCODONE (ROXICODONE) 5 MG immediate release tablet (Start on 3/10/2020)    gabapentin (NEURONTIN) 800 MG tablet (Start on 3/10/2020)    tiZANidine (ZANAFLEX) 4 MG tablet (Start on 3/10/2020)    Displacement of lumbar intervertebral disc without myelopathy    Relevant Medications    gabapentin (NEURONTIN) 800 MG tablet (Start on 3/10/2020)    tiZANidine (ZANAFLEX) 4 MG tablet (Start on 3/10/2020)    Radiculopathy of cervical spine    Relevant Medications    gabapentin (NEURONTIN) 800 MG tablet (Start on 3/10/2020)    tiZANidine (ZANAFLEX) 4 MG tablet (Start on 3/10/2020)    Spinal stenosis of lumbar region without neurogenic claudication           Treatment Plan:  Patient relates current medications are helping the pain. Patient reports taking pain medications as prescribed, denies obtaining medications from different sources and denies use of illegal drugs.  Patient denies side effects from medications like nausea, vomiting,

## 2020-04-07 ENCOUNTER — TELEMEDICINE (OUTPATIENT)
Dept: PAIN MANAGEMENT | Age: 60
End: 2020-04-07
Payer: MEDICARE

## 2020-04-07 VITALS — WEIGHT: 238 LBS | HEIGHT: 70 IN | BODY MASS INDEX: 34.07 KG/M2

## 2020-04-07 PROCEDURE — 99213 OFFICE O/P EST LOW 20 MIN: CPT | Performed by: NURSE PRACTITIONER

## 2020-04-07 RX ORDER — OXYCODONE HYDROCHLORIDE 5 MG/1
5 TABLET ORAL EVERY 6 HOURS PRN
Qty: 120 TABLET | Refills: 0 | Status: SHIPPED | OUTPATIENT
Start: 2020-04-09 | End: 2020-05-05 | Stop reason: SDUPTHER

## 2020-04-07 RX ORDER — GABAPENTIN 800 MG/1
800 TABLET ORAL EVERY 8 HOURS SCHEDULED
Qty: 90 TABLET | Refills: 2 | Status: SHIPPED | OUTPATIENT
Start: 2020-04-09 | End: 2020-05-05 | Stop reason: SDUPTHER

## 2020-04-07 RX ORDER — TIZANIDINE 4 MG/1
4 TABLET ORAL 2 TIMES DAILY PRN
Qty: 60 TABLET | Refills: 2 | Status: SHIPPED | OUTPATIENT
Start: 2020-04-07 | End: 2020-05-05 | Stop reason: SDUPTHER

## 2020-04-07 ASSESSMENT — ENCOUNTER SYMPTOMS
BACK PAIN: 1
COUGH: 0
SHORTNESS OF BREATH: 0
CONSTIPATION: 0

## 2020-04-07 NOTE — PROGRESS NOTES
Patient is completing a video visit to review medication contract. Chief Complaint: neck and back pain    PMH: Patient complains of neck pain that radiates down his left arm to fingertips. He has had this pain for many years and it is worsening over time. He had cervical spine surgery in 2012 and the pain has only worsened since that time. He saw Dr Annabel Gooden with no surgery recommended for neck, but told could benefit from [place] [id number].    Patient followed up with Dr. Veronica Phan after his CT last August and cervical injections were suggested. He did see NS in 2016 for lumbar pain but no surgery recommended at that time. Diclofenac started by his rheumatologist. He had Left SI Joint Radiofrequency Ablation via denervation of Left dorsal ramus L5 and lateral branches S1 S2 S3 on 2/7/20 and reports significant relief. He states he has not had any shooting pain since the RFA. Neck Pain    This is a chronic problem. The current episode started more than 1 year ago. The problem occurs constantly. The problem has been unchanged. The pain is present in the left side, midline and right side. The quality of the pain is described as aching. The pain is at a severity of 4/10. The pain is mild. The symptoms are aggravated by position and twisting. Pertinent negatives include no chest pain or fever. He has tried bed rest and oral narcotics for the symptoms. The treatment provided mild relief. Back Pain   This is a chronic problem. The current episode started more than 1 year ago. The problem occurs constantly. The problem is unchanged. The pain is present in the lumbar spine. The quality of the pain is described as aching. The pain does not radiate. The pain is at a severity of 3/10. The pain is moderate. The symptoms are aggravated by position and standing. Pertinent negatives include no chest pain or fever. He has tried analgesics and bed rest for the symptoms. The treatment provided mild relief.         Patient denies any History   Problem Relation Age of Onset    Heart Disease Mother     COPD Father     Cancer Maternal Aunt 64        breast cancer     Cancer Maternal Uncle 60        prostrate cancer        Social History     Socioeconomic History    Marital status: Single     Spouse name: Not on file    Number of children: Not on file    Years of education: Not on file    Highest education level: Not on file   Occupational History     Employer: N/A   Social Needs    Financial resource strain: Not on file    Food insecurity     Worry: Not on file     Inability: Not on file    Transportation needs     Medical: Not on file     Non-medical: Not on file   Tobacco Use    Smoking status: Current Every Day Smoker     Packs/day: 1.50     Years: 40.00     Pack years: 60.00     Types: Cigarettes    Smokeless tobacco: Former User     Quit date: 3/12/2015    Tobacco comment: wants to discuss  chantix   Substance and Sexual Activity    Alcohol use: Not Currently     Alcohol/week: 0.0 standard drinks     Comment: 3 x year    Drug use: No    Sexual activity: Yes     Partners: Female   Lifestyle    Physical activity     Days per week: Not on file     Minutes per session: Not on file    Stress: Not on file   Relationships    Social connections     Talks on phone: Not on file     Gets together: Not on file     Attends Religion service: Not on file     Active member of club or organization: Not on file     Attends meetings of clubs or organizations: Not on file     Relationship status: Not on file    Intimate partner violence     Fear of current or ex partner: Not on file     Emotionally abused: Not on file     Physically abused: Not on file     Forced sexual activity: Not on file   Other Topics Concern    Not on file   Social History Narrative    Not on file       Review of Systems:  Review of Systems   Constitution: Negative for chills and fever. Cardiovascular: Negative for chest pain and palpitations.    Respiratory: Negative for cough and shortness of breath. Musculoskeletal: Positive for back pain and neck pain. Gastrointestinal: Negative for constipation. Neurological: Negative for disturbances in coordination and loss of balance. Physical Exam:  Ht 5' 10\" (1.778 m)   Wt 238 lb (108 kg)   BMI 34.15 kg/m²     Physical Exam  Constitutional:       Appearance: Normal appearance. HENT:      Head: Normocephalic. Pulmonary:      Effort: Pulmonary effort is normal.   Neurological:      Mental Status: He is alert. Psychiatric:         Mood and Affect: Mood normal.         Behavior: Behavior normal.         Record/Diagnostics Review:    Last niru 10/2019 and was appropriate     Assessment:  Problem List Items Addressed This Visit     Cervical stenosis of spine    Cervicalgia - Primary    Medication monitoring encounter    DDD (degenerative disc disease), lumbar    Relevant Medications    tiZANidine (ZANAFLEX) 4 MG tablet    oxyCODONE (ROXICODONE) 5 MG immediate release tablet (Start on 4/9/2020)    gabapentin (NEURONTIN) 800 MG tablet (Start on 4/9/2020)    Displacement of lumbar intervertebral disc without myelopathy    Relevant Medications    tiZANidine (ZANAFLEX) 4 MG tablet    gabapentin (NEURONTIN) 800 MG tablet (Start on 4/9/2020)    Spinal stenosis of lumbar region without neurogenic claudication             Treatment Plan:  Patient relates current medications are helping the pain. Patient reports taking pain medications as prescribed, denies obtaining medications from different sources and denies use of illegal drugs. Patient denies side effects from medications like nausea, vomiting, constipation or drowsiness. Patient reports current activities of daily living are possible due to medications and would like to continue them.      As always, we encourage daily stretching and strengthening exercises, and recommend minimizing use of pain medications unless patient cannot get through daily activities due to pain.     Contract requirements met. Continue opioid therapy. Script written for oxycodone  Follow up appointment made for 4 weeks    Chevy Craig is a 62 y.o. male being evaluated by a Virtual Visit (video visit) encounter to address concerns as mentioned above. A caregiver was present when appropriate. Due to this being a TeleHealth encounter (During [facility] public health emergency), evaluation of the following organ systems was limited: Vitals/Constitutional/EENT/Resp/CV/GI//MS/Neuro/Skin/Heme-Lymph-Imm. Pursuant to the emergency declaration under the [address] [place] [address], [address] [place] [address] authority and the [employer] and [employer] Act, this Virtual Visit was conducted with patient's (and/or legal guardian's) consent, to reduce the patient's risk of exposure to COVID-19 and provide necessary medical care. The patient (and/or legal guardian) has also been advised to contact this office for worsening conditions or problems, and seek emergency medical treatment and/or call 911 if deemed necessary. Services were provided through a video synchronous discussion virtually to substitute for in-person clinic visit. Patient and provider were located at their individual homes. --JOSSUE Palacio CNP on 4/7/2020 at 9:12 AM    An electronic signature was used to authenticate this note.

## 2020-04-15 ENCOUNTER — TELEPHONE (OUTPATIENT)
Dept: FAMILY MEDICINE CLINIC | Age: 60
End: 2020-04-15

## 2020-04-15 NOTE — TELEPHONE ENCOUNTER
Left message for patient to call office to schedule a AWV via video. Patient will need to update email to send link for mychart visit.

## 2020-05-05 ENCOUNTER — TELEMEDICINE (OUTPATIENT)
Dept: PAIN MANAGEMENT | Age: 60
End: 2020-05-05
Payer: MEDICARE

## 2020-05-05 PROCEDURE — 99213 OFFICE O/P EST LOW 20 MIN: CPT | Performed by: NURSE PRACTITIONER

## 2020-05-05 RX ORDER — TIZANIDINE 4 MG/1
4 TABLET ORAL 2 TIMES DAILY PRN
Qty: 60 TABLET | Refills: 2 | Status: SHIPPED | OUTPATIENT
Start: 2020-05-09 | End: 2020-05-28 | Stop reason: SDUPTHER

## 2020-05-05 RX ORDER — OXYCODONE HYDROCHLORIDE 5 MG/1
5 TABLET ORAL EVERY 6 HOURS PRN
Qty: 120 TABLET | Refills: 0 | Status: SHIPPED | OUTPATIENT
Start: 2020-05-09 | End: 2020-05-28 | Stop reason: SDUPTHER

## 2020-05-05 RX ORDER — GABAPENTIN 800 MG/1
800 TABLET ORAL EVERY 8 HOURS SCHEDULED
Qty: 90 TABLET | Refills: 2 | Status: SHIPPED | OUTPATIENT
Start: 2020-05-09 | End: 2020-05-28 | Stop reason: SDUPTHER

## 2020-05-05 ASSESSMENT — ENCOUNTER SYMPTOMS
CONSTIPATION: 0
COUGH: 0
BACK PAIN: 1
SHORTNESS OF BREATH: 0

## 2020-05-05 NOTE — PROGRESS NOTES
Patient completed a video visit today to review medication contract. Chief Complaint: neck and back pain    PMH: Patient complains of neck pain that radiates down his left arm to fingertips. He has had this pain for many years and it is worsening over time. He had cervical spine surgery in 2012 and the pain has only worsened since that time. He saw Dr Jarrett Cobian with no surgery recommended for neck, but told could benefit from [place] [id number].    Patient followed up with Dr. Otila Lesches after his CT last August and cervical injections were suggested. He did see NS in 2016 for lumbar pain but no surgery recommended at that time. Diclofenac started by his rheumatologist. He had Left SI Joint Radiofrequency Ablation via denervation of Left dorsal ramus L5 and lateral branches S1 S2 S3 on 2/7/20 and reports significant relief. He states he has not had any shooting pain since the RFA.           Back Pain   This is a chronic problem. The current episode started more than 1 year ago. The problem occurs constantly. The problem is unchanged. The pain is present in the lumbar spine. The quality of the pain is described as aching. The pain does not radiate. The pain is at a severity of 3/10. The pain is mild. The symptoms are aggravated by position and standing (walking). Associated symptoms include headaches. Pertinent negatives include no chest pain or fever. He has tried analgesics and bed rest for the symptoms. The treatment provided mild relief. Neck Pain    This is a chronic problem. The current episode started more than 1 year ago. The problem occurs constantly. The problem has been unchanged. The pain is present in the midline. The quality of the pain is described as aching. The pain is at a severity of 5/10. The symptoms are aggravated by position and twisting. Associated symptoms include headaches. Pertinent negatives include no chest pain or fever. He has tried oral narcotics, bed rest and heat for the symptoms.  The treatment

## 2020-05-27 ASSESSMENT — ENCOUNTER SYMPTOMS: BOWEL INCONTINENCE: 0

## 2020-05-27 NOTE — PROGRESS NOTES
 Elbow fracture, right     Fractures     elbow    GERD (gastroesophageal reflux disease)     Gout     Right great toe    Hyperlipidemia     Hypertension     Mild depression ([facility] 9/26/2013    2 suicide attempts by girlfriend related to depression.  Osteoarthritis of right knee     Sinus headache 10/10/2018    Sleep apnea     Sleep disturbance     Smoker     Tendonitis of foot     right       Past Surgical History:   Procedure Laterality Date    CARDIAC CATHETERIZATION  7/8/14    Non Obstructive CAD     CERVICAL SPINE SURGERY  7/13/12     C2-3-4-5    COLONOSCOPY      FRACTURE SURGERY      Rt elbow     HC INJECT OTHER PERPHRL NERV Bilateral 5/9/2019    INJECTION SPINAL performed by Dara Burnett MD at [address] [place] [address] Bilateral 8/15/2019    SACROILIAC JOINT INJECTION performed by [facility]ett MD at [facility]  2/5/16    premier tens    NERVE BLOCK Left 02/07/2020    RFA left side    NERVE BLOCK Left 02/07/2020     NERVE RADIOFREQUENCY ABLATION - SACROILIAC (Left )    OTHER SURGICAL HISTORY  08/15/2019    sacroiliac joint injection    PAIN MANAGEMENT PROCEDURE Left 2/7/2020    NERVE RADIOFREQUENCY ABLATION - SACROILIAC performed by Dara Burnett MD at [address] [place] [address]         No Known Allergies      Current Outpatient Medications:     tiZANidine (ZANAFLEX) 4 MG tablet, Take 1 tablet by mouth 2 times daily as needed (for muscle spasms), Disp: 60 tablet, Rfl: 2    oxyCODONE (ROXICODONE) 5 MG immediate release tablet, Take 1 tablet by mouth every 6 hours as needed for Pain (pain) for up to 30 days. , Disp: 120 tablet, Rfl: 0    gabapentin (NEURONTIN) 800 MG tablet, Take 1 tablet by mouth every 8 hours for 30 days. , Disp: 90 tablet, Rfl: 2    acetaminophen (TYLENOL) 325 MG tablet, Take 650 mg by mouth nightly, Disp: , Rfl:     SYMBICORT 160-4.5 MCG/ACT AERO, INHALE TWO

## 2020-05-28 ENCOUNTER — VIRTUAL VISIT (OUTPATIENT)
Dept: PAIN MANAGEMENT | Age: 60
End: 2020-05-28
Payer: MEDICARE

## 2020-05-28 VITALS — WEIGHT: 238 LBS | BODY MASS INDEX: 35.25 KG/M2 | HEIGHT: 69 IN

## 2020-05-28 PROCEDURE — 99213 OFFICE O/P EST LOW 20 MIN: CPT | Performed by: PAIN MEDICINE

## 2020-05-28 RX ORDER — TIZANIDINE 4 MG/1
4 TABLET ORAL 2 TIMES DAILY PRN
Qty: 60 TABLET | Refills: 2 | Status: SHIPPED | OUTPATIENT
Start: 2020-05-28 | End: 2020-06-25 | Stop reason: SDUPTHER

## 2020-05-28 RX ORDER — GABAPENTIN 800 MG/1
800 TABLET ORAL EVERY 8 HOURS SCHEDULED
Qty: 90 TABLET | Refills: 2 | Status: SHIPPED | OUTPATIENT
Start: 2020-05-28 | End: 2020-06-25 | Stop reason: SDUPTHER

## 2020-05-28 RX ORDER — OXYCODONE HYDROCHLORIDE 5 MG/1
5 TABLET ORAL EVERY 6 HOURS PRN
Qty: 120 TABLET | Refills: 0 | Status: SHIPPED | OUTPATIENT
Start: 2020-05-28 | End: 2020-06-25 | Stop reason: SDUPTHER

## 2020-05-29 ENCOUNTER — HOSPITAL ENCOUNTER (OUTPATIENT)
Age: 60
Setting detail: SPECIMEN
Discharge: HOME | End: 2020-05-29
Payer: MEDICARE

## 2020-06-24 ASSESSMENT — ENCOUNTER SYMPTOMS
ABDOMINAL PAIN: 0
SHORTNESS OF BREATH: 1
BOWEL INCONTINENCE: 0
COUGH: 1
BACK PAIN: 1
SORE THROAT: 1
WHEEZING: 1
EYES NEGATIVE: 1

## 2020-06-24 NOTE — PROGRESS NOTES
suicide attempts by girlfriend related to depression.  Osteoarthritis of right knee     Sinus headache 10/10/2018    Sleep apnea     Sleep disturbance     Smoker     Tendonitis of foot     right       Past Surgical History:   Procedure Laterality Date    CARDIAC CATHETERIZATION  7/8/14    Non Obstructive CAD     CERVICAL SPINE SURGERY  7/13/12     C2-3-4-5    COLONOSCOPY      FRACTURE SURGERY      Rt elbow     HC INJECT OTHER PERPHRL NERV Bilateral 5/9/2019    INJECTION SPINAL performed by Kristy Betts MD at [address] [place] [address] Bilateral 8/15/2019    SACROILIAC JOINT INJECTION performed by Kristy Betts MD at [facility]  2/5/16    premier tens    NERVE BLOCK Left 02/07/2020    RFA left side    NERVE BLOCK Left 02/07/2020     NERVE RADIOFREQUENCY ABLATION - SACROILIAC (Left )    OTHER SURGICAL HISTORY  08/15/2019    sacroiliac joint injection    PAIN MANAGEMENT PROCEDURE Left 2/7/2020    NERVE RADIOFREQUENCY ABLATION - SACROILIAC performed by Kristy Betts MD at [facility]         No Known Allergies      Current Outpatient Medications:     tiZANidine (ZANAFLEX) 4 MG tablet, Take 1 tablet by mouth 2 times daily as needed (for muscle spasms), Disp: 60 tablet, Rfl: 2    oxyCODONE (ROXICODONE) 5 MG immediate release tablet, Take 1 tablet by mouth every 6 hours as needed for Pain (pain) for up to 30 days. , Disp: 120 tablet, Rfl: 0    gabapentin (NEURONTIN) 800 MG tablet, Take 1 tablet by mouth every 8 hours for 30 days. , Disp: 90 tablet, Rfl: 2    acetaminophen (TYLENOL) 325 MG tablet, Take 650 mg by mouth nightly, Disp: , Rfl:     SYMBICORT 160-4.5 MCG/ACT AERO, INHALE TWO PUFFS BY MOUTH TWICE A DAY, Disp: 1 Inhaler, Rfl: 5    atorvastatin (LIPITOR) 80 MG tablet, TAKE ONE TABLET BY MOUTH DAILY, Disp: 90 tablet, Rfl: 2    cetirizine (ZYRTEC) 10 MG tablet, TAKE ONE TABLET BY

## 2020-06-25 ENCOUNTER — VIRTUAL VISIT (OUTPATIENT)
Dept: PAIN MANAGEMENT | Age: 60
End: 2020-06-25
Payer: MEDICARE

## 2020-06-25 PROCEDURE — 99213 OFFICE O/P EST LOW 20 MIN: CPT | Performed by: PAIN MEDICINE

## 2020-06-25 RX ORDER — TIZANIDINE 4 MG/1
4 TABLET ORAL 2 TIMES DAILY PRN
Qty: 60 TABLET | Refills: 2 | Status: SHIPPED | OUTPATIENT
Start: 2020-06-25 | End: 2020-07-23 | Stop reason: SDUPTHER

## 2020-06-25 RX ORDER — OXYCODONE HYDROCHLORIDE 5 MG/1
5 TABLET ORAL EVERY 6 HOURS PRN
Qty: 120 TABLET | Refills: 0 | Status: SHIPPED | OUTPATIENT
Start: 2020-06-25 | End: 2020-07-23 | Stop reason: SDUPTHER

## 2020-06-25 RX ORDER — GABAPENTIN 800 MG/1
800 TABLET ORAL EVERY 8 HOURS SCHEDULED
Qty: 90 TABLET | Refills: 2 | Status: SHIPPED | OUTPATIENT
Start: 2020-06-25 | End: 2020-07-23 | Stop reason: SDUPTHER

## 2020-07-13 ENCOUNTER — HOSPITAL ENCOUNTER (OUTPATIENT)
Age: 60
Setting detail: SPECIMEN
Discharge: HOME | End: 2020-07-13
Payer: MEDICARE

## 2020-07-13 LAB
AMPHETAMINE SCREEN URINE: NEGATIVE
BARBITURATE SCREEN URINE: NEGATIVE
BENZODIAZEPINE SCREEN, URINE: NEGATIVE
BUPRENORPHINE URINE: ABNORMAL
CANNABINOID SCREEN URINE: NEGATIVE
COCAINE METABOLITE, URINE: NEGATIVE
MDMA URINE: ABNORMAL
METHADONE SCREEN, URINE: NEGATIVE
METHAMPHETAMINE, URINE: ABNORMAL
OPIATES, URINE: NEGATIVE
OXYCODONE SCREEN URINE: POSITIVE
PHENCYCLIDINE, URINE: NEGATIVE
PROPOXYPHENE, URINE: ABNORMAL
TEST INFORMATION: ABNORMAL
TRICYCLIC ANTIDEPRESSANTS, UR: ABNORMAL

## 2020-07-23 ENCOUNTER — VIRTUAL VISIT (OUTPATIENT)
Dept: PAIN MANAGEMENT | Age: 60
End: 2020-07-23
Payer: MEDICARE

## 2020-07-23 PROCEDURE — 99213 OFFICE O/P EST LOW 20 MIN: CPT | Performed by: NURSE PRACTITIONER

## 2020-07-23 RX ORDER — TIZANIDINE 4 MG/1
4 TABLET ORAL 2 TIMES DAILY PRN
Qty: 60 TABLET | Refills: 2 | Status: SHIPPED | OUTPATIENT
Start: 2020-07-23 | End: 2020-08-20 | Stop reason: SDUPTHER

## 2020-07-23 RX ORDER — GABAPENTIN 800 MG/1
800 TABLET ORAL EVERY 8 HOURS SCHEDULED
Qty: 90 TABLET | Refills: 2 | Status: SHIPPED | OUTPATIENT
Start: 2020-07-23 | End: 2020-08-20 | Stop reason: SDUPTHER

## 2020-07-23 RX ORDER — OXYCODONE HYDROCHLORIDE 5 MG/1
5 TABLET ORAL EVERY 6 HOURS PRN
Qty: 120 TABLET | Refills: 0 | Status: SHIPPED | OUTPATIENT
Start: 2020-08-04 | End: 2020-08-20 | Stop reason: SDUPTHER

## 2020-07-23 ASSESSMENT — ENCOUNTER SYMPTOMS
ABDOMINAL PAIN: 0
COUGH: 1
BACK PAIN: 1
BOWEL INCONTINENCE: 0

## 2020-07-23 NOTE — PROGRESS NOTES
Patient completed a video visit today to review medication contract. PMH: Patient complains of neck pain that radiates down his left arm to fingertips. He has had this pain for many years and it is worsening over time. He had cervical spine surgery in 2012 and the pain has only worsened since that time. He saw Dr Jose Eubanks with no surgery recommended for neck, but told could benefit from [place] [id number].    Patient followed up with Dr. Yvon Vargas after his CT last August and cervical injections were suggested. He did see NS in 2016 for lumbar pain but no surgery recommended at that time. Diclofenac started by his rheumatologist. He had Left SI Joint Radiofrequency Ablation via denervation of Left dorsal ramus L5 and lateral branches S1 S2 S3 on 2/7/20 and reports significant relief. He states he has not had any shooting pain since the RFA.            HPI: Back Pain   This is a chronic problem. The current episode started more than 1 year ago. The problem occurs constantly. The problem is unchanged. The pain is present in the lumbar spine. The quality of the pain is described as aching. The pain is at a severity of 6/10. The pain is moderate. The pain is the same all the time. The symptoms are aggravated by bending, standing, sitting, twisting and position. Stiffness is present all day. Associated symptoms include headaches, tingling and weakness. Pertinent negatives include no abdominal pain, bladder incontinence, bowel incontinence, chest pain, dysuria, fever, leg pain, numbness, paresis, paresthesias, pelvic pain, perianal numbness or weight loss. He has tried ice, heat and muscle relaxant for the symptoms. The treatment provided mild relief. Patient denies any new neurological symptoms. Nobowel or bladder incontinence, no weakness, and no falling. Review of [place] does not show any aberrant prescription behavior. Medication is helping the patient stay active.  Patient denies any side effects and reports adequate analgesia. No sign of misuse/abuse. Controlled Substance Monitoring:    Acute and Chronic Pain Monitoring:   RX Monitoring 7/23/2020   Attestation -   Acute Pain Prescriptions -   Periodic Controlled Substance Monitoring Possible medication side effects, risk of tolerance/dependence & alternative treatments discussed. ;No signs of potential drug abuse or diversion identified.;Obtaining appropriate analgesic effect of treatment. Chronic Pain > 80 MEDD -         Past Medical History:   Diagnosis Date    Abnormal stress ECG     Angina pectoris (HCC)     CAD (coronary artery disease)     Carpal tunnel syndrome     left    Cervical stenosis of spine     Cervicalgia     chronic pain    Chronic back pain     COPD (chronic obstructive pulmonary disease) (HCC)     DDD (degenerative disc disease)     Elbow fracture, right     Fractures     elbow    GERD (gastroesophageal reflux disease)     Gout     Right great toe    Hyperlipidemia     Hypertension     Mild depression ([address] 9/26/2013    2 suicide attempts by girlfriend related to depression.     Osteoarthritis of right knee     Sinus headache 10/10/2018    Sleep apnea     Sleep disturbance     Smoker     Tendonitis of foot     right       Past Surgical History:   Procedure Laterality Date    CARDIAC CATHETERIZATION  7/8/14    Non Obstructive CAD     CERVICAL SPINE SURGERY  7/13/12     C2-3-4-5    COLONOSCOPY      FRACTURE SURGERY      Rt elbow     HC INJECT OTHER PERPHRL NERV Bilateral 5/9/2019    INJECTION SPINAL performed by Mayito Velásquez MD at [address] [place] [address] Bilateral 8/15/2019    SACROILIAC JOINT INJECTION performed by Mayito Velásquez MD at [facility]  2/5/16    premier tens    NERVE BLOCK Left 02/07/2020    RFA left side    NERVE BLOCK Left 02/07/2020     NERVE RADIOFREQUENCY ABLATION - SACROILIAC (Left )    OTHER SURGICAL HISTORY  08/15/2019    sacroiliac joint injection    PAIN MANAGEMENT PROCEDURE Left 2/7/2020    NERVE RADIOFREQUENCY ABLATION - SACROILIAC performed by Eduardo Oro MD at [address] [facility]         No Known Allergies      Current Outpatient Medications:     tiZANidine (ZANAFLEX) 4 MG tablet, Take 1 tablet by mouth 2 times daily as needed (for muscle spasms), Disp: 60 tablet, Rfl: 2    oxyCODONE (ROXICODONE) 5 MG immediate release tablet, Take 1 tablet by mouth every 6 hours as needed for Pain (pain) for up to 30 days. , Disp: 120 tablet, Rfl: 0    gabapentin (NEURONTIN) 800 MG tablet, Take 1 tablet by mouth every 8 hours for 30 days. , Disp: 90 tablet, Rfl: 2    acetaminophen (TYLENOL) 325 MG tablet, Take 650 mg by mouth nightly, Disp: , Rfl:     SYMBICORT 160-4.5 MCG/ACT AERO, INHALE TWO PUFFS BY MOUTH TWICE A DAY, Disp: 1 Inhaler, Rfl: 5    atorvastatin (LIPITOR) 80 MG tablet, TAKE ONE TABLET BY MOUTH DAILY, Disp: 90 tablet, Rfl: 2    cetirizine (ZYRTEC) 10 MG tablet, TAKE ONE TABLET BY MOUTH DAILY, Disp: 30 tablet, Rfl: 1    diclofenac (VOLTAREN) 75 MG EC tablet, Take 75 mg by mouth 2 times daily, Disp: , Rfl:     traZODone (DESYREL) 150 MG tablet, Take 150 mg by mouth nightly, Disp: , Rfl:     omeprazole (PRILOSEC) 20 MG delayed release capsule, TAKE ONE CAPSULE BY MOUTH TWICE A DAY 30 MINUTES BEFORE MEALS, Disp: 60 capsule, Rfl: 6    nitroGLYCERIN (NITROSTAT) 0.4 MG SL tablet, Place 1 tablet under the tongue every 5 minutes as needed for Chest pain, Disp: 25 tablet, Rfl: 3    albuterol sulfate  (90 Base) MCG/ACT inhaler, Inhale 2 puffs into the lungs every 6 hours as needed for Wheezing, Disp: 1 Inhaler, Rfl: 3    citalopram (CELEXA) 20 MG tablet, Take 20 mg by mouth daily , Disp: , Rfl:     aspirin 81 MG EC tablet, Take 1 tablet by mouth daily, Disp: 30 tablet, Rfl: 3    Family History   Problem Relation Age of Onset    Heart Disease Mother     COPD Father     Cancer Maternal Aunt 56        breast cancer     Cancer Maternal Uncle 60        prostrate cancer        Social History     Socioeconomic History    Marital status: Single     Spouse name: Not on file    Number of children: Not on file    Years of education: Not on file    Highest education level: Not on file   Occupational History     Employer: N/A   Social Needs    Financial resource strain: Not on file    Food insecurity     Worry: Not on file     Inability: Not on file    Transportation needs     Medical: Not on file     Non-medical: Not on file   Tobacco Use    Smoking status: Current Every Day Smoker     Packs/day: 1.50     Years: 40.00     Pack years: 60.00     Types: Cigarettes    Smokeless tobacco: Former User     Quit date: 3/12/2015    Tobacco comment: wants to discuss  chantix   Substance and Sexual Activity    Alcohol use: Not Currently     Alcohol/week: 0.0 standard drinks     Comment: 3 x year    Drug use: No    Sexual activity: Yes     Partners: Female   Lifestyle    Physical activity     Days per week: Not on file     Minutes per session: Not on file    Stress: Not on file   Relationships    Social connections     Talks on phone: Not on file     Gets together: Not on file     Attends Gnosticism service: Not on file     Active member of club or organization: Not on file     Attends meetings of clubs or organizations: Not on file     Relationship status: Not on file    Intimate partner violence     Fear of current or ex partner: Not on file     Emotionally abused: Not on file     Physically abused: Not on file     Forced sexual activity: Not on file   Other Topics Concern    Not on file   Social History Narrative    Not on file       Review of Systems:  Review of Systems   Constitution: Negative for fever and weight loss. Cardiovascular: Negative for chest pain. Respiratory: Positive for cough. Musculoskeletal: Positive for back pain and neck pain.    Gastrointestinal: Negative for abdominal pain and bowel incontinence. Genitourinary: Negative for bladder incontinence, dysuria and pelvic pain. Neurological: Positive for headaches, tingling and weakness. Negative for numbness and paresthesias. Physical Exam:  There were no vitals taken for this visit. Physical Exam  HENT:      Head: Normocephalic. Pulmonary:      Effort: Pulmonary effort is normal.   Neurological:      Mental Status: He is alert. Psychiatric:         Mood and Affect: Mood normal.         Behavior: Behavior normal.         Record/Diagnostics Review:    Last UDS 7/2020 and appropriate      Assessment:  1. Cervical stenosis of spine    2. DDD (degenerative disc disease), lumbar    3. Lumbosacral spondylosis without myelopathy    4. Cervicalgia    5. Medication monitoring encounter    6. Therapeutic opioid induced constipation    7. Displacement of lumbar intervertebral disc without myelopathy    8. Spinal stenosis of lumbar region without neurogenic claudication        Treatment Plan:  DISCUSSION: Treatment options discussed with patient and all questions answered to patient's satisfaction. TREATMENT OPTIONS:   Patient relates current medications are helping the pain. Patient reports taking pain medications as prescribed, denies obtaining medications from different sources and denies use of illegal drugs. Patient denies side effects from medications like nausea, vomiting, constipation or drowsiness. Patient reports current activities of daily living are possible due to medications and would like to continue them. As always, we encourage daily stretching and strengthening exercises, and recommend minimizing use of pain medications unless patient cannot get through daily activities due to pain. Discussed with the patient the effect the patients medical condition and opioid medication may have on the patients ability to safely operate a vehicle. Pt verbalized understanding. Contract requirements met.   Continue opioid therapy. Script written for oxycodone  Discussed different treatment options including continued conservative care such as physical therapy, chiropractic care, acupuncture. Discussed different interventional options such as epidural steroids or medial branch blocks. Also discussed surgical evaluation. Wishes to continue current mgmt. Concerned about covid risk.     Follow up appointment made for 4 weeks

## 2020-08-20 ENCOUNTER — VIRTUAL VISIT (OUTPATIENT)
Dept: PAIN MANAGEMENT | Age: 60
End: 2020-08-20
Payer: MEDICARE

## 2020-08-20 PROCEDURE — 99213 OFFICE O/P EST LOW 20 MIN: CPT | Performed by: NURSE PRACTITIONER

## 2020-08-20 RX ORDER — TIZANIDINE 4 MG/1
4 TABLET ORAL 2 TIMES DAILY PRN
Qty: 60 TABLET | Refills: 2 | Status: SHIPPED | OUTPATIENT
Start: 2020-08-20 | End: 2020-10-01 | Stop reason: SDUPTHER

## 2020-08-20 RX ORDER — OXYCODONE HYDROCHLORIDE 5 MG/1
5 TABLET ORAL EVERY 6 HOURS PRN
Qty: 120 TABLET | Refills: 0 | Status: SHIPPED | OUTPATIENT
Start: 2020-09-03 | End: 2020-10-01 | Stop reason: SDUPTHER

## 2020-08-20 RX ORDER — GABAPENTIN 800 MG/1
800 TABLET ORAL EVERY 8 HOURS SCHEDULED
Qty: 90 TABLET | Refills: 2 | Status: SHIPPED | OUTPATIENT
Start: 2020-08-20 | End: 2020-10-01 | Stop reason: SDUPTHER

## 2020-08-20 ASSESSMENT — ENCOUNTER SYMPTOMS
CONSTIPATION: 0
COUGH: 0
BACK PAIN: 1
SHORTNESS OF BREATH: 0

## 2020-08-20 NOTE — PROGRESS NOTES
----- Message from ERLIN Dougherty sent at 1/5/2017 10:30 AM CST -----  Rosa -   Please call patient an tell him that the radiologist did see an issue near his knee cap and would recommend that he sees orthopedic doctor.  I already put the service to ortho in.    Thanks,   ERLIN Dougherty     Patient completed a video visit today to review medication contract. Chief Complaint: neck and back pain    [facility] Patient complains of neck pain that radiates down his left arm to fingertips. He has had this pain for many years and it is worsening over time. He had cervical spine surgery in 2012 and the pain has only worsened since that time. He saw Dr Cristina Hernandez with no surgery recommended for neck, but told could benefit from CTR. Patient followed up with Dr. Mathew Tavarez after his CT last August and cervical injections were suggested. He is not a good candidate for cervical injections due to previous neck surgery. He did see NS in 2016 for lumbar pain but no surgery recommended at that time. Diclofenac started by his rheumatologist. He had Left SI Joint Radiofrequency Ablation via denervation of Left dorsal ramus L5 and lateral branches S1 S2 S3 on 2/7/20 and reports significant relief. He states he has not had any shooting pain since the RFA.           Back Pain   This is a chronic problem. The current episode started more than 1 year ago. The problem occurs constantly. The problem is unchanged. The pain is present in the lumbar spine. The quality of the pain is described as aching. The pain does not radiate. The pain is at a severity of 3/10. The pain is mild. The symptoms are aggravated by position and standing (walking). Associated symptoms include headaches. Pertinent negatives include no chest pain, fever, numbness or tingling. He has tried analgesics and bed rest (RFA) for the symptoms. The treatment provided moderate relief. Neck Pain    This is a chronic problem. The current episode started more than 1 year ago. The problem occurs constantly. The problem has been unchanged. The pain is present in the left side, midline and right side. The quality of the pain is described as aching. The pain is at a severity of 4/10. The pain is mild. The symptoms are aggravated by position and twisting.  Associated Bilateral 8/15/2019    SACROILIAC JOINT INJECTION performed by Daiana Valdes MD at [facility]  2/5/16    premier tens    NERVE BLOCK Left 02/07/2020    RFA left side    NERVE BLOCK Left 02/07/2020     NERVE RADIOFREQUENCY ABLATION - SACROILIAC (Left )    OTHER SURGICAL HISTORY  08/15/2019    sacroiliac joint injection    PAIN MANAGEMENT PROCEDURE Left 2/7/2020    NERVE RADIOFREQUENCY ABLATION - SACROILIAC performed by Daiana Valdes MD at [address] [place] [address]         No Known Allergies      Current Outpatient Medications:     oxyCODONE (ROXICODONE) 5 MG immediate release tablet, Take 1 tablet by mouth every 6 hours as needed for Pain (pain) for up to 30 days. , Disp: 120 tablet, Rfl: 0    tiZANidine (ZANAFLEX) 4 MG tablet, Take 1 tablet by mouth 2 times daily as needed (for muscle spasms), Disp: 60 tablet, Rfl: 2    gabapentin (NEURONTIN) 800 MG tablet, Take 1 tablet by mouth every 8 hours for 30 days. , Disp: 90 tablet, Rfl: 2    acetaminophen (TYLENOL) 325 MG tablet, Take 650 mg by mouth nightly, Disp: , Rfl:     SYMBICORT 160-4.5 MCG/ACT AERO, INHALE TWO PUFFS BY MOUTH TWICE A DAY, Disp: 1 Inhaler, Rfl: 5    atorvastatin (LIPITOR) 80 MG tablet, TAKE ONE TABLET BY MOUTH DAILY, Disp: 90 tablet, Rfl: 2    cetirizine (ZYRTEC) 10 MG tablet, TAKE ONE TABLET BY MOUTH DAILY, Disp: 30 tablet, Rfl: 1    diclofenac (VOLTAREN) 75 MG EC tablet, Take 75 mg by mouth 2 times daily, Disp: , Rfl:     traZODone (DESYREL) 150 MG tablet, Take 150 mg by mouth nightly, Disp: , Rfl:     omeprazole (PRILOSEC) 20 MG delayed release capsule, TAKE ONE CAPSULE BY MOUTH TWICE A DAY 30 MINUTES BEFORE MEALS, Disp: 60 capsule, Rfl: 6    nitroGLYCERIN (NITROSTAT) 0.4 MG SL tablet, Place 1 tablet under the tongue every 5 minutes as needed for Chest pain, Disp: 25 tablet, Rfl: 3    albuterol sulfate  (90 Base) MCG/ACT inhaler, Inhale 2 puffs into the lungs every 6 hours as needed for Wheezing, Disp: 1 Inhaler, Rfl: 3    citalopram (CELEXA) 20 MG tablet, Take 20 mg by mouth daily , Disp: , Rfl:     aspirin 81 MG EC tablet, Take 1 tablet by mouth daily, Disp: 30 tablet, Rfl: 3    Family History   Problem Relation Age of Onset    Heart Disease Mother     COPD Father     Cancer Maternal Aunt 64        breast cancer     Cancer Maternal Uncle 60        prostrate cancer        Social History     Socioeconomic History    Marital status: Single     Spouse name: Not on file    Number of children: Not on file    Years of education: Not on file    Highest education level: Not on file   Occupational History     Employer: N/A   Social Needs    Financial resource strain: Not on file    Food insecurity     Worry: Not on file     Inability: Not on file    Transportation needs     Medical: Not on file     Non-medical: Not on file   Tobacco Use    Smoking status: Current Every Day Smoker     Packs/day: 1.50     Years: 40.00     Pack years: 60.00     Types: Cigarettes    Smokeless tobacco: Former User     Quit date: 3/12/2015    Tobacco comment: wants to discuss  chantix   Substance and Sexual Activity    Alcohol use: Not Currently     Alcohol/week: 0.0 standard drinks     Comment: 3 x year    Drug use: No    Sexual activity: Yes     Partners: Female   Lifestyle    Physical activity     Days per week: Not on file     Minutes per session: Not on file    Stress: Not on file   Relationships    Social connections     Talks on phone: Not on file     Gets together: Not on file     Attends Sabianism service: Not on file     Active member of club or organization: Not on file     Attends meetings of clubs or organizations: Not on file     Relationship status: Not on file    Intimate partner violence     Fear of current or ex partner: Not on file     Emotionally abused: Not on file     Physically abused: Not on file     Forced sexual activity: Not on file   Other Topics Concern    Not on file   Social History Narrative    Not on file       Review of Systems:  Review of Systems   Constitution: Negative for chills and fever. Cardiovascular: Negative for chest pain and palpitations. Respiratory: Negative for cough and shortness of breath. Musculoskeletal: Positive for back pain and neck pain. Gastrointestinal: Negative for constipation. Neurological: Positive for headaches. Negative for disturbances in coordination, loss of balance, numbness and tingling. Physical Exam:  There were no vitals taken for this visit. Physical Exam  HENT:      Head: Normocephalic. Pulmonary:      Effort: Pulmonary effort is normal.   Neurological:      Mental Status: He is alert. Psychiatric:         Mood and Affect: Mood normal.         Behavior: Behavior normal.         Record/Diagnostics Review:    Last niru 7/2020 and was appropriate     Assessment:  Problem List Items Addressed This Visit     Cervical stenosis of spine    Medication monitoring encounter    DDD (degenerative disc disease), lumbar    Relevant Medications    oxyCODONE (ROXICODONE) 5 MG immediate release tablet (Start on 9/3/2020)    gabapentin (NEURONTIN) 800 MG tablet    tiZANidine (ZANAFLEX) 4 MG tablet    Displacement of lumbar intervertebral disc without myelopathy    Relevant Medications    gabapentin (NEURONTIN) 800 MG tablet    tiZANidine (ZANAFLEX) 4 MG tablet    Radiculopathy of cervical spine - Primary    Relevant Medications    gabapentin (NEURONTIN) 800 MG tablet    tiZANidine (ZANAFLEX) 4 MG tablet      Other Visit Diagnoses     Lumbosacral spondylosis without myelopathy        Relevant Medications    oxyCODONE (ROXICODONE) 5 MG immediate release tablet (Start on 9/3/2020)    gabapentin (NEURONTIN) 800 MG tablet    tiZANidine (ZANAFLEX) 4 MG tablet             Treatment Plan:  Patient relates current medications are helping the pain.  Patient reports taking pain medications as prescribed, denies obtaining medications from different sources and denies use of illegal drugs. Patient denies side effects from medications like nausea, vomiting, constipation or drowsiness. Patient reports current activities of daily living are possible due to medications and would like to continue them. As always, we encourage daily stretching and strengthening exercises, and recommend minimizing use of pain medications unless patient cannot get through daily activities due to pain. Contract requirements met. Continue opioid therapy. Script written for oxycodone  Discussed different treatment options including continued conservative care such as physical therapy, chiropractic care, acupuncture. Discussed different interventional options such as repeating lumbar RFA  Also discussed surgical evaluation. Wishes to continue current mgmt. Concerned about covid risk  Follow up appointment made for 4 weeks    Kris Waterman is a 56 y.o. male being evaluated by a Virtual Visit (video visit) encounter to address concerns as mentioned above. A caregiver was present when appropriate. Due to this being a TeleHealth encounter (During BLKIP-34 public health emergency), evaluation of the following organ systems was limited: Vitals/Constitutional/EENT/Resp/CV/GI//MS/Neuro/Skin/Heme-Lymph-Imm. Pursuant to the emergency declaration under the [facility], [address] [place] [address] authority and the [employer] Act, this Virtual Visit was conducted with patient's (and/or legal guardian's) consent, to reduce the patient's risk of exposure to COVID-19 and provide necessary medical care. The patient (and/or legal guardian) has also been advised to contact this office for worsening conditions or problems, and seek emergency medical treatment and/or call 911 if deemed necessary.      Services were provided through a video synchronous discussion virtually to substitute for

## 2020-10-01 ENCOUNTER — VIRTUAL VISIT (OUTPATIENT)
Dept: PAIN MANAGEMENT | Age: 60
End: 2020-10-01
Payer: MEDICARE

## 2020-10-01 PROCEDURE — 99213 OFFICE O/P EST LOW 20 MIN: CPT | Performed by: NURSE PRACTITIONER

## 2020-10-01 RX ORDER — TIZANIDINE 4 MG/1
4 TABLET ORAL 2 TIMES DAILY PRN
Qty: 60 TABLET | Refills: 2 | Status: SHIPPED | OUTPATIENT
Start: 2020-10-01 | End: 2020-11-02 | Stop reason: SDUPTHER

## 2020-10-01 RX ORDER — OXYCODONE HYDROCHLORIDE 5 MG/1
5 TABLET ORAL EVERY 6 HOURS PRN
Qty: 120 TABLET | Refills: 0 | Status: SHIPPED | OUTPATIENT
Start: 2020-10-03 | End: 2020-11-02 | Stop reason: SDUPTHER

## 2020-10-01 RX ORDER — GABAPENTIN 800 MG/1
800 TABLET ORAL EVERY 8 HOURS SCHEDULED
Qty: 90 TABLET | Refills: 2 | Status: SHIPPED | OUTPATIENT
Start: 2020-10-01 | End: 2020-11-02 | Stop reason: SDUPTHER

## 2020-10-01 ASSESSMENT — ENCOUNTER SYMPTOMS
BACK PAIN: 1
SHORTNESS OF BREATH: 0
CONSTIPATION: 0
COUGH: 0

## 2020-10-01 NOTE — PROGRESS NOTES
Patient completed a video visit today to review medication contract. Chief Complaint: neck and back pain    [facility]  Patient complains of neck pain that radiates down his left arm to fingertips. He has had this pain for many years and it is worsening over time. He had cervical spine surgery in 2012 and the pain has only worsened since that time. He saw Dr Rasheed Silverio with no surgery recommended for neck, but told could benefit from CTR. Patient followed up with Dr. David Quinteros after his CT last August and cervical injections were suggested. He is not a good candidate for cervical injections due to previous neck surgery. He did see NS in 2016 for lumbar pain but no surgery recommended at that time. Diclofenac started by his rheumatologist. He had Left SI Joint Radiofrequency Ablation via denervation of Left dorsal ramus L5 and lateral branches S1 S2 S3 on 2/7/20 and reports significant relief. He states he has not had any shooting pain since the RFA.            Back Pain   This is a chronic problem. The current episode started more than 1 year ago. The problem occurs constantly. The problem is unchanged. The pain is present in the lumbar spine. The quality of the pain is described as aching. The pain does not radiate. The pain is at a severity of 6/10. The pain is moderate. The symptoms are aggravated by position and standing (walking). Associated symptoms include headaches and weakness. Pertinent negatives include no chest pain, fever, numbness, paresthesias or tingling. He has tried analgesics and bed rest for the symptoms. The treatment provided mild relief. Neck Pain    This is a chronic problem. The current episode started more than 1 year ago. The problem occurs constantly. The problem has been unchanged. The pain is present in the left side, midline and right side. The quality of the pain is described as aching. The pain is at a severity of 5/10. The pain is moderate.  The symptoms are aggravated by position and twisting. Associated symptoms include headaches and weakness. Pertinent negatives include no chest pain, fever, numbness or tingling. He has tried bed rest and oral narcotics for the symptoms. The treatment provided mild relief. Patient denies any new neurological symptoms. No bowel or bladder incontinence, no weakness, and no falling. Pill count: appropriate due 10/3    Morphine equivalent: 30    Controlled Substance Monitoring:    Acute and Chronic Pain Monitoring:   RX Monitoring 10/1/2020   Attestation -   Acute Pain Prescriptions -   Periodic Controlled Substance Monitoring Possible medication side effects, risk of tolerance/dependence & alternative treatments discussed. ;No signs of potential drug abuse or diversion identified.;Obtaining appropriate analgesic effect of treatment. Chronic Pain > 80 MEDD -           Past Medical History:   Diagnosis Date    Abnormal stress ECG     Angina pectoris (HCC)     CAD (coronary artery disease)     Carpal tunnel syndrome     left    Cervical stenosis of spine     Cervicalgia     chronic pain    Chronic back pain     COPD (chronic obstructive pulmonary disease) (HCC)     DDD (degenerative disc disease)     Elbow fracture, right     Fractures     elbow    GERD (gastroesophageal reflux disease)     Gout     Right great toe    Hyperlipidemia     Hypertension     Mild depression ([address] 9/26/2013    2 suicide attempts by girlfriend related to depression.     Osteoarthritis of right knee     Sinus headache 10/10/2018    Sleep apnea     Sleep disturbance     Smoker     Tendonitis of foot     right       Past Surgical History:   Procedure Laterality Date    CARDIAC CATHETERIZATION  7/8/14    Non Obstructive CAD     CERVICAL SPINE SURGERY  7/13/12     C2-3-4-5    COLONOSCOPY      FRACTURE SURGERY      Rt elbow     HC INJECT OTHER PERPHRL NERV Bilateral 5/9/2019    INJECTION SPINAL performed by Kimberly Chavarria MD at [address] [place] [address] INJECTION PROCEDURE FOR SACROILIAC JOINT Bilateral 8/15/2019    SACROILIAC JOINT INJECTION performed by Jarred Bautista MD at [facility]  2/5/16    premier tens    NERVE BLOCK Left 02/07/2020    RFA left side    NERVE BLOCK Left 02/07/2020     NERVE RADIOFREQUENCY ABLATION - SACROILIAC (Left )    OTHER SURGICAL HISTORY  08/15/2019    sacroiliac joint injection    PAIN MANAGEMENT PROCEDURE Left 2/7/2020    NERVE RADIOFREQUENCY ABLATION - SACROILIAC performed by Jarred Bautista MD at [address] [place] [address]         No Known Allergies      Current Outpatient Medications:     oxyCODONE (ROXICODONE) 5 MG immediate release tablet, Take 1 tablet by mouth every 6 hours as needed for Pain (pain) for up to 30 days. , Disp: 120 tablet, Rfl: 0    gabapentin (NEURONTIN) 800 MG tablet, Take 1 tablet by mouth every 8 hours for 30 days. , Disp: 90 tablet, Rfl: 2    acetaminophen (TYLENOL) 325 MG tablet, Take 650 mg by mouth nightly, Disp: , Rfl:     SYMBICORT 160-4.5 MCG/ACT AERO, INHALE TWO PUFFS BY MOUTH TWICE A DAY, Disp: 1 Inhaler, Rfl: 5    atorvastatin (LIPITOR) 80 MG tablet, TAKE ONE TABLET BY MOUTH DAILY, Disp: 90 tablet, Rfl: 2    cetirizine (ZYRTEC) 10 MG tablet, TAKE ONE TABLET BY MOUTH DAILY, Disp: 30 tablet, Rfl: 1    diclofenac (VOLTAREN) 75 MG EC tablet, Take 75 mg by mouth 2 times daily, Disp: , Rfl:     traZODone (DESYREL) 150 MG tablet, Take 150 mg by mouth nightly, Disp: , Rfl:     omeprazole (PRILOSEC) 20 MG delayed release capsule, TAKE ONE CAPSULE BY MOUTH TWICE A DAY 30 MINUTES BEFORE MEALS, Disp: 60 capsule, Rfl: 6    nitroGLYCERIN (NITROSTAT) 0.4 MG SL tablet, Place 1 tablet under the tongue every 5 minutes as needed for Chest pain, Disp: 25 tablet, Rfl: 3    albuterol sulfate  (90 Base) MCG/ACT inhaler, Inhale 2 puffs into the lungs every 6 hours as needed for Wheezing, Disp: 1 Inhaler, Rfl: 3    citalopram (CELEXA) 20 MG tablet, Take 20 mg by mouth daily , Disp: , Rfl:     aspirin 81 MG EC tablet, Take 1 tablet by mouth daily, Disp: 30 tablet, Rfl: 3    Family History   Problem Relation Age of Onset    Heart Disease Mother     COPD Father     Cancer Maternal Aunt 64        breast cancer     Cancer Maternal Uncle 60        prostrate cancer        Social History     Socioeconomic History    Marital status: Single     Spouse name: Not on file    Number of children: Not on file    Years of education: Not on file    Highest education level: Not on file   Occupational History     Employer: N/A   Social Needs    Financial resource strain: Not on file    Food insecurity     Worry: Not on file     Inability: Not on file    Transportation needs     Medical: Not on file     Non-medical: Not on file   Tobacco Use    Smoking status: Current Every Day Smoker     Packs/day: 1.50     Years: 40.00     Pack years: 60.00     Types: Cigarettes    Smokeless tobacco: Former User     Quit date: 3/12/2015    Tobacco comment: wants to discuss  chantix   Substance and Sexual Activity    Alcohol use: Not Currently     Alcohol/week: 0.0 standard drinks     Comment: 3 x year    Drug use: No    Sexual activity: Yes     Partners: Female   Lifestyle    Physical activity     Days per week: Not on file     Minutes per session: Not on file    Stress: Not on file   Relationships    Social connections     Talks on phone: Not on file     Gets together: Not on file     Attends [employer] service: Not on file     Active member of club or organization: Not on file     Attends meetings of clubs or organizations: Not on file     Relationship status: Not on file    Intimate partner violence     Fear of current or ex partner: Not on file     Emotionally abused: Not on file     Physically abused: Not on file     Forced sexual activity: Not on file   Other Topics Concern    Not on file   Social History Narrative    Not on file       Review of Systems:  Review of Systems   Constitution: Negative for chills and fever. Cardiovascular: Negative for chest pain and palpitations. Respiratory: Negative for cough and shortness of breath. Musculoskeletal: Positive for back pain and neck pain. Gastrointestinal: Negative for constipation. Neurological: Positive for headaches and weakness. Negative for disturbances in coordination, loss of balance, numbness, paresthesias and tingling. Physical Exam:  There were no vitals taken for this visit. Physical Exam  HENT:      Head: Normocephalic. Pulmonary:      Effort: Pulmonary effort is normal.   Neurological:      Mental Status: He is alert. Psychiatric:         Mood and Affect: Mood normal.         Behavior: Behavior normal.         Record/Diagnostics Review:    Last niru 7/2020 and was appropriate     Assessment:  Problem List Items Addressed This Visit     Cervical stenosis of spine    Cervicalgia    Medication monitoring encounter    DDD (degenerative disc disease), lumbar    Relevant Medications    oxyCODONE (ROXICODONE) 5 MG immediate release tablet (Start on 10/3/2020)    gabapentin (NEURONTIN) 800 MG tablet    tiZANidine (ZANAFLEX) 4 MG tablet    Displacement of lumbar intervertebral disc without myelopathy    Relevant Medications    gabapentin (NEURONTIN) 800 MG tablet    tiZANidine (ZANAFLEX) 4 MG tablet    Radiculopathy of cervical spine - Primary    Relevant Medications    gabapentin (NEURONTIN) 800 MG tablet    tiZANidine (ZANAFLEX) 4 MG tablet    Spinal stenosis of lumbar region without neurogenic claudication      Other Visit Diagnoses     Lumbosacral spondylosis without myelopathy        Relevant Medications    oxyCODONE (ROXICODONE) 5 MG immediate release tablet (Start on 10/3/2020)    gabapentin (NEURONTIN) 800 MG tablet    tiZANidine (ZANAFLEX) 4 MG tablet             Treatment Plan:  Patient relates current medications are helping the pain.  Patient reports taking pain medications as prescribed, denies obtaining medications from different sources and denies use of illegal drugs. Patient denies side effects from medications like nausea, vomiting, constipation or drowsiness. Patient reports current activities of daily living are possible due to medications and would like to continue them. As always, we encourage daily stretching and strengthening exercises, and recommend minimizing use of pain medications unless patient cannot get through daily activities due to pain. Contract requirements met. Continue opioid therapy. Script written for oxycodone  Discussed different treatment options including continued conservative care such as physical therapy, chiropractic care, acupuncture. Discussed different interventional options such as repeating SI RFA - he would like to hold off a couple more months  Also discussed surgical evaluation. Follow up appointment made for 4 weeks    Debra Olivares is a 56 y.o. male being evaluated by a Virtual Visit (video visit) encounter to address concerns as mentioned above. A caregiver was present when appropriate. Due to this being a TeleHealth encounter (During [place] [address] public [facility] emergency), evaluation of the following organ systems was limited: Vitals/Constitutional/EENT/Resp/CV/GI//MS/Neuro/Skin/Heme-Lymph-Imm. Pursuant to the emergency declaration under the [facility], [address] [place] [address] authority and the [employer] and [employer] Act, this Virtual Visit was conducted with patient's (and/or legal guardian's) consent, to reduce the patient's risk of exposure to COVID-19 and provide necessary medical care. The patient (and/or legal guardian) has also been advised to contact this office for worsening conditions or problems, and seek emergency medical treatment and/or call 911 if deemed necessary.      Patient identification was verified at the start of the visit: Yes    Total time spent for this encounter: Not billed by time    Services were provided through a video synchronous discussion virtually to substitute for in-person clinic visit. Patient and provider were located at their individual homes. --JOSSUE Yu CNP on 10/1/2020 at 9:09 AM    An electronic signature was used to authenticate this note.

## 2020-10-30 ENCOUNTER — TELEMEDICINE (OUTPATIENT)
Dept: FAMILY MEDICINE CLINIC | Age: 60
End: 2020-10-30
Payer: MEDICARE

## 2020-10-30 PROBLEM — E66.01 MORBIDLY OBESE (HCC): Status: ACTIVE | Noted: 2020-10-30

## 2020-10-30 PROCEDURE — 99442 PR PHYS/QHP TELEPHONE EVALUATION 11-20 MIN: CPT | Performed by: FAMILY MEDICINE

## 2020-10-30 NOTE — PROGRESS NOTES
Discuss Labs (needs labs)    Video Visit    Consent: patient has previously opted for and currently agrees to a TeleHealth encounter, in place of a face-to-face ambulatory visit and is informed that the encounter will be billed as such accordingly. Location:     Patient / off site location: [facility] Peterson   - Location: home     Physician: Danielle Aranda. Jared Johnson DO - Location:  office    Length of time: 14 minutes      Discuss - FM  Labs - wants to update       There were no vitals taken for this visit. Review of Systems      Physical Exam      ASSESSMENT AND PLAN      Diagnosis Orders   1. Mixed hyperlipidemia  Lipid Panel    Basic Metabolic Panel    Hepatic Function Panel   2. Stenosis of left carotid artery  [address]76 - HI Duplex Scan Extracranial, Lg   3. Simple chronic bronchitis ([address]     4. Major depression, single episode, in complete remission ([address]     5. Morbidly obese ([facility]     6. Encounter for colorectal cancer screening  Cologuard (For External Results Only)   7. TSH (thyroid-stimulating hormone deficiency)  TSH With Reflex Ft4   8. Anemia, unspecified type  CBC With Auto Differential   9. Other fatigue  TSH With Reflex Ft4    Basic Metabolic Panel     Complete test as noted above. Interest in steroids (advised to contact Lawanda Paulson to discuss this.)  Health maintenance recommended.     Electronicallysigned by Margaret Bang DO on 10/30/2020 at 3:50 PM

## 2020-11-16 ENCOUNTER — TELEPHONE (OUTPATIENT)
Dept: FAMILY MEDICINE CLINIC | Age: 60
End: 2020-11-16

## 2020-11-16 NOTE — TELEPHONE ENCOUNTER
[facility] scheduling calling. Carotid scan was ordered on 10/30/20. Please reorder using [id number]. Previous order was for in-office imaging.

## 2020-11-17 ENCOUNTER — HOSPITAL ENCOUNTER (OUTPATIENT)
Age: 60
Discharge: HOME | End: 2020-11-17
Payer: MEDICARE

## 2020-11-17 LAB
ABSOLUTE EOS #: 0.2 K/UL (ref 0–0.4)
ABSOLUTE IMMATURE GRANULOCYTE: NORMAL K/UL (ref 0–0.3)
ABSOLUTE LYMPH #: 2.4 K/UL (ref 1–4.8)
ABSOLUTE MONO #: 0.4 K/UL (ref 0.1–1.3)
ALBUMIN SERPL-MCNC: 4.2 G/DL (ref 3.5–5.2)
ALBUMIN/GLOBULIN RATIO: ABNORMAL (ref 1–2.5)
ALP BLD-CCNC: 99 U/L (ref 40–129)
ALT SERPL-CCNC: 21 U/L (ref 5–41)
ANION GAP SERPL CALCULATED.3IONS-SCNC: 10 MMOL/L (ref 9–17)
AST SERPL-CCNC: 21 U/L
BASOPHILS # BLD: 1 % (ref 0–2)
BASOPHILS ABSOLUTE: 0 K/UL (ref 0–0.2)
BILIRUB SERPL-MCNC: 0.26 MG/DL (ref 0.3–1.2)
BILIRUBIN DIRECT: <0.08 MG/DL
BILIRUBIN, INDIRECT: ABNORMAL MG/DL (ref 0–1)
BUN BLDV-MCNC: 9 MG/DL (ref 8–23)
BUN/CREAT BLD: ABNORMAL (ref 9–20)
CALCIUM SERPL-MCNC: 10 MG/DL (ref 8.6–10.4)
CHLORIDE BLD-SCNC: 102 MMOL/L (ref 98–107)
CHOLESTEROL/HDL RATIO: 7.1
CHOLESTEROL: 261 MG/DL
CO2: 28 MMOL/L (ref 20–31)
CREAT SERPL-MCNC: 0.77 MG/DL (ref 0.7–1.2)
DIFFERENTIAL TYPE: NORMAL
EOSINOPHILS RELATIVE PERCENT: 3 % (ref 0–4)
GFR AFRICAN AMERICAN: >60 ML/MIN
GFR NON-AFRICAN AMERICAN: >60 ML/MIN
GFR SERPL CREATININE-BSD FRML MDRD: ABNORMAL ML/MIN/{1.73_M2}
GFR SERPL CREATININE-BSD FRML MDRD: ABNORMAL ML/MIN/{1.73_M2}
GLOBULIN: ABNORMAL G/DL (ref 1.5–3.8)
GLUCOSE BLD-MCNC: 126 MG/DL (ref 70–99)
HCT VFR BLD CALC: 42.7 % (ref 41–53)
HDLC SERPL-MCNC: 37 MG/DL
HEMOGLOBIN: 14.4 G/DL (ref 13.5–17.5)
IMMATURE GRANULOCYTES: NORMAL %
LDL CHOLESTEROL: 169 MG/DL (ref 0–130)
LYMPHOCYTES # BLD: 35 % (ref 24–44)
MCH RBC QN AUTO: 29 PG (ref 26–34)
MCHC RBC AUTO-ENTMCNC: 33.8 G/DL (ref 31–37)
MCV RBC AUTO: 85.9 FL (ref 80–100)
MONOCYTES # BLD: 6 % (ref 1–7)
NRBC AUTOMATED: NORMAL PER 100 WBC
PDW BLD-RTO: 14.9 % (ref 11.5–14.9)
PLATELET # BLD: 310 K/UL (ref 150–450)
PLATELET ESTIMATE: NORMAL
PMV BLD AUTO: 6.5 FL (ref 6–12)
POTASSIUM SERPL-SCNC: 5.2 MMOL/L (ref 3.7–5.3)
RBC # BLD: 4.97 M/UL (ref 4.5–5.9)
RBC # BLD: NORMAL 10*6/UL
SEG NEUTROPHILS: 55 % (ref 36–66)
SEGMENTED NEUTROPHILS ABSOLUTE COUNT: 3.9 K/UL (ref 1.3–9.1)
SODIUM BLD-SCNC: 140 MMOL/L (ref 135–144)
TOTAL PROTEIN: 7.1 G/DL (ref 6.4–8.3)
TRIGL SERPL-MCNC: 275 MG/DL
TSH SERPL DL<=0.05 MIU/L-ACNC: 0.87 MIU/L (ref 0.3–5)
VLDLC SERPL CALC-MCNC: ABNORMAL MG/DL (ref 1–30)
WBC # BLD: 7 K/UL (ref 3.5–11)
WBC # BLD: NORMAL 10*3/UL

## 2020-11-17 PROCEDURE — 80048 BASIC METABOLIC PNL TOTAL CA: CPT

## 2020-11-17 PROCEDURE — 80307 DRUG TEST PRSMV CHEM ANLYZR: CPT

## 2020-11-17 PROCEDURE — 80076 HEPATIC FUNCTION PANEL: CPT

## 2020-11-17 PROCEDURE — 84443 ASSAY THYROID STIM HORMONE: CPT

## 2020-11-17 PROCEDURE — 36415 COLL VENOUS BLD VENIPUNCTURE: CPT

## 2020-11-17 PROCEDURE — 85025 COMPLETE CBC W/AUTO DIFF WBC: CPT

## 2020-11-17 PROCEDURE — 80061 LIPID PANEL: CPT

## 2020-11-20 LAB
6-ACETYLMORPHINE, UR: NOT DETECTED
7-AMINOCLONAZEPAM, URINE: NOT DETECTED
ALPHA-OH-ALPRAZ, URINE: NOT DETECTED
ALPRAZOLAM, URINE: NOT DETECTED
AMPHETAMINES, URINE: NOT DETECTED
BARBITURATES, URINE: NOT DETECTED
BENZOYLECGONINE, UR: NOT DETECTED
BUPRENORPHINE URINE: NOT DETECTED
CARISOPRODOL, UR: NOT DETECTED
CLONAZEPAM, URINE: NOT DETECTED
CODEINE, URINE: NOT DETECTED
CREATININE URINE: 125.1 MG/DL (ref 20–400)
DIAZEPAM, URINE: NOT DETECTED
DRUGS EXPECTED, UR: NORMAL
EER HI RES INTERP UR: NORMAL
ETHYL GLUCURONIDE UR: NOT DETECTED
FENTANYL URINE: NOT DETECTED
HYDROCODONE, URINE: NOT DETECTED
HYDROMORPHONE, URINE: NOT DETECTED
LORAZEPAM, URINE: NOT DETECTED
MARIJUANA METAB, UR: PRESENT
MDA, UR: NOT DETECTED
MDEA, EVE, UR: NOT DETECTED
MDMA URINE: NOT DETECTED
MEPERIDINE METAB, UR: NOT DETECTED
METHADONE, URINE: NOT DETECTED
METHAMPHETAMINE, URINE: NOT DETECTED
METHYLPHENIDATE: NOT DETECTED
MIDAZOLAM, URINE: NOT DETECTED
MORPHINE URINE: NOT DETECTED
NORBUPRENORPHINE, URINE: NOT DETECTED
NORDIAZEPAM, URINE: NOT DETECTED
NORFENTANYL, URINE: NOT DETECTED
NORHYDROCODONE, URINE: NOT DETECTED
NOROXYCODONE, URINE: PRESENT
NOROXYMORPHONE, URINE: NOT DETECTED
OXAZEPAM, URINE: NOT DETECTED
OXYCODONE URINE: PRESENT
OXYMORPHONE, URINE: NOT DETECTED
PAIN MANAGEMENT DRUG PANEL INTERP, URINE: NORMAL
PAIN MGT DRUG PANEL, HI RES, UR: NORMAL
PCP,URINE: NOT DETECTED
PHENTERMINE, UR: NOT DETECTED
PROPOXYPHENE, URINE: NOT DETECTED
TAPENTADOL, URINE: NOT DETECTED
TAPENTADOL-O-SULFATE, URINE: NOT DETECTED
TEMAZEPAM, URINE: NOT DETECTED
TRAMADOL, URINE: NOT DETECTED
ZOLPIDEM, URINE: NOT DETECTED

## 2020-11-23 ENCOUNTER — HOSPITAL ENCOUNTER (OUTPATIENT)
Dept: VASCULAR LAB | Age: 60
Discharge: HOME | End: 2020-11-23
Payer: MEDICARE

## 2020-11-23 ENCOUNTER — TELEPHONE (OUTPATIENT)
Dept: VASCULAR SURGERY | Age: 60
End: 2020-11-23

## 2020-11-23 ENCOUNTER — TELEPHONE (OUTPATIENT)
Dept: FAMILY MEDICINE CLINIC | Age: 60
End: 2020-11-23

## 2020-11-23 PROCEDURE — 93880 EXTRACRANIAL BILAT STUDY: CPT

## 2020-11-23 NOTE — TELEPHONE ENCOUNTER
Call from [facility]:    80-90 % stenosis - RCA  50-69 % - LCA    Plan - Vascular Sx Consult

## 2020-11-23 NOTE — PROGRESS NOTES
Left message of significant carotid stenosis with Ling at Dr. Naman Martinez office asking that he please review study ASAP.

## 2020-11-23 NOTE — TELEPHONE ENCOUNTER
----- Message from Anna Carney MD sent at 11/23/2020 12:13 PM EST -----  Regarding: RE: carotid results/concern  Its ok, sounds like he is asymptomatic and either I or some vascular surgeon can evaluate it in the office  ----- Message -----  From: Nina Palmer MA  Sent: 11/23/2020  11:48 AM EST  To: Anna Carney MD, #  Subject: carotid results/concern                          Jeannette Rey from [address] [place] [address] at [address] [place] [address] called about the patient's carotid duplex results. She said the ordering office told her to send the patient home and they will call the patient later but Jeannette Rey didn't feel comfortable with that and wanted you to look at the results. She said left velocities 540/204 and right was really low. Please advise.     Jeannette Rey: [phone]

## 2020-11-23 NOTE — TELEPHONE ENCOUNTER
PC from [facility] stating she has abnormal results and wishes to speak to pt PCP, informed that MD is not in clinic asked to LM in chart, voiced she would rather speak to MD due to findings, voiced ok    Writer asked attendings in office if they would feel comfortable taking call due to pt being a [facility] pt, advised writer to give Jude Strickland office contact info to her in order for it to be addressed by correct department     Writer gave contact info to Celso who voiced thanks     No additional needs at this time

## 2020-11-30 ENCOUNTER — VIRTUAL VISIT (OUTPATIENT)
Dept: PAIN MANAGEMENT | Age: 60
End: 2020-11-30
Payer: MEDICARE

## 2020-11-30 PROCEDURE — 99214 OFFICE O/P EST MOD 30 MIN: CPT | Performed by: NURSE PRACTITIONER

## 2020-11-30 RX ORDER — OXYCODONE HYDROCHLORIDE 5 MG/1
5 TABLET ORAL EVERY 6 HOURS PRN
Qty: 120 TABLET | Refills: 0 | Status: CANCELLED | OUTPATIENT
Start: 2020-11-30 | End: 2020-12-30

## 2020-11-30 RX ORDER — GABAPENTIN 800 MG/1
800 TABLET ORAL EVERY 8 HOURS SCHEDULED
Qty: 90 TABLET | Refills: 2 | Status: SHIPPED | OUTPATIENT
Start: 2020-11-30 | End: 2020-12-21 | Stop reason: SDUPTHER

## 2020-11-30 RX ORDER — TIZANIDINE 4 MG/1
4 TABLET ORAL 2 TIMES DAILY PRN
Qty: 60 TABLET | Refills: 2 | Status: SHIPPED | OUTPATIENT
Start: 2020-11-30 | End: 2021-01-18 | Stop reason: SDUPTHER

## 2020-11-30 ASSESSMENT — ENCOUNTER SYMPTOMS
BACK PAIN: 1
COUGH: 0
CONSTIPATION: 0
SHORTNESS OF BREATH: 0

## 2020-11-30 NOTE — PROGRESS NOTES
This is a chronic problem. The current episode started more than 1 year ago. The problem occurs constantly. The problem is unchanged. The pain is present in the lumbar spine. The quality of the pain is described as aching and stabbing. Radiates to: into hips. The pain is at a severity of 6/10. The pain is moderate. The symptoms are aggravated by position. Associated symptoms include headaches. Pertinent negatives include no chest pain or fever. He has tried analgesics and bed rest for the symptoms. The treatment provided mild relief. Patient denies any new neurological symptoms. No bowel or bladder incontinence, no weakness, and no falling. Pill count: appropriate due 12/2    Morphine equivalent: 30    Controlled Substance Monitoring:    Acute and Chronic Pain Monitoring:   RX Monitoring 11/30/2020   Attestation -   Acute Pain Prescriptions -   Periodic Controlled Substance Monitoring Possible medication side effects, risk of tolerance/dependence & alternative treatments discussed. ;No signs of potential drug abuse or diversion identified.;Obtaining appropriate analgesic effect of treatment. Chronic Pain > 80 MEDD -           Past Medical History:   Diagnosis Date    Abnormal stress ECG     Angina pectoris (HCC)     CAD (coronary artery disease)     Carpal tunnel syndrome     left    Cervical stenosis of spine     Cervicalgia     chronic pain    Chronic back pain     COPD (chronic obstructive pulmonary disease) (HCC)     DDD (degenerative disc disease)     Elbow fracture, right     Fractures     elbow    GERD (gastroesophageal reflux disease)     Gout     Right great toe    Hyperlipidemia     Hypertension     Mild depression ([address] 9/26/2013    2 suicide attempts by girlfriend related to depression.     Morbidly obese ([address] 10/30/2020    Osteoarthritis of right knee     Sinus headache 10/10/2018    Sleep apnea     Sleep disturbance     Smoker     Tendonitis of foot     right   omeprazole (PRILOSEC) 20 MG delayed release capsule, TAKE ONE CAPSULE BY MOUTH TWICE A DAY 30 MINUTES BEFORE MEALS, Disp: 60 capsule, Rfl: 6    nitroGLYCERIN (NITROSTAT) 0.4 MG SL tablet, Place 1 tablet under the tongue every 5 minutes as needed for Chest pain, Disp: 25 tablet, Rfl: 3    albuterol sulfate  (90 Base) MCG/ACT inhaler, Inhale 2 puffs into the lungs every 6 hours as needed for Wheezing, Disp: 1 Inhaler, Rfl: 3    aspirin 81 MG EC tablet, Take 1 tablet by mouth daily, Disp: 30 tablet, Rfl: 3    Family History   Problem Relation Age of Onset    Heart Disease Mother     COPD Father     Cancer Maternal Aunt 64        breast cancer     Cancer Maternal Uncle 60        prostrate cancer        Social History     Socioeconomic History    Marital status: Single     Spouse name: Not on file    Number of children: Not on file    Years of education: Not on file    Highest education level: Not on file   Occupational History     Employer: N/A   Social Needs    Financial resource strain: Not on file    Food insecurity     Worry: Not on file     Inability: Not on file    Transportation needs     Medical: Not on file     Non-medical: Not on file   Tobacco Use    Smoking status: Current Every Day Smoker     Packs/day: 1.50     Years: 40.00     Pack years: 60.00     Types: Cigarettes    Smokeless tobacco: Former User     Quit date: 3/12/2015    Tobacco comment: wants to discuss  chantix   Substance and Sexual Activity    Alcohol use: Not Currently     Alcohol/week: 0.0 standard drinks     Comment: 3 x year    Drug use: No    Sexual activity: Yes     Partners: Female   Lifestyle    Physical activity     Days per week: Not on file     Minutes per session: Not on file    Stress: Not on file   Relationships    Social connections     Talks on phone: Not on file     Gets together: Not on file     Attends [employer] service: Not on file     Active member of club or organization: Not on file Attends meetings of clubs or organizations: Not on file     Relationship status: Not on file    Intimate partner violence     Fear of current or ex partner: Not on file     Emotionally abused: Not on file     Physically abused: Not on file     Forced sexual activity: Not on file   Other Topics Concern    Not on file   Social History Narrative    Not on file       Review of Systems:  Review of Systems   Constitution: Negative for chills and fever. Cardiovascular: Negative for chest pain and palpitations. Respiratory: Negative for cough and shortness of breath. Musculoskeletal: Positive for back pain and neck pain. Gastrointestinal: Negative for constipation. Neurological: Positive for headaches. Negative for disturbances in coordination and loss of balance. Physical Exam:  There were no vitals taken for this visit. Physical Exam  HENT:      Head: Normocephalic. Pulmonary:      Effort: Pulmonary effort is normal.   Neurological:      Mental Status: He is alert.    Psychiatric:         Mood and Affect: Mood normal.         Behavior: Behavior normal.         Record/Diagnostics Review:    Last niru 11/20/2020 and was NOT appropriate - +THC    Assessment:  Problem List Items Addressed This Visit     Cervical stenosis of spine - Primary    Cervicalgia    Medication monitoring encounter    DDD (degenerative disc disease), lumbar    Displacement of lumbar intervertebral disc without myelopathy    Spinal stenosis of lumbar region without neurogenic claudication      Other Visit Diagnoses     Lumbosacral spondylosis without myelopathy                 Treatment Plan:  UDS not appropriate - +THC and patient admits to using relative's medical marijuana  He would like to repeat SI joint RFA - would like to schedule after he sees vascular surgeon  Will touch base with Dr. Tyler Rice regarding medication contract  Follow up in four weeks Yudy Amaya is a 56 y.o. male being evaluated by a Virtual Visit (video visit) encounter to address concerns as mentioned above. A caregiver was present when appropriate. Due to this being a TeleHealth encounter (During [facility]-05 public health emergency), evaluation of the following organ systems was limited: Vitals/Constitutional/EENT/Resp/CV/GI//MS/Neuro/Skin/Heme-Lymph-Imm. Pursuant to the emergency declaration under the [address] [place] [address] and the [employer] and [employer] Act, this Virtual Visit was conducted with patient's (and/or legal guardian's) consent, to reduce the patient's risk of exposure to COVID-19 and provide necessary medical care. The patient (and/or legal guardian) has also been advised to contact this office for worsening conditions or problems, and seek emergency medical treatment and/or call 911 if deemed necessary. Patient identification was verified at the start of the visit: Yes    Total time spent for this encounter: Not billed by time    Services were provided through a video synchronous discussion virtually to substitute for in-person clinic visit. Patient and provider were located at their individual homes. --JOSSUE Wilder CNP on 11/30/2020 at 3:32 PM    An electronic signature was used to authenticate this note.

## 2020-12-01 ENCOUNTER — TELEPHONE (OUTPATIENT)
Dept: PAIN MANAGEMENT | Age: 60
End: 2020-12-01

## 2020-12-01 NOTE — TELEPHONE ENCOUNTER
Patient called stating that he would like to speak to you regarding his medication, had a VV yesterday 11/30.

## 2020-12-07 ENCOUNTER — TELEMEDICINE (OUTPATIENT)
Dept: FAMILY MEDICINE CLINIC | Age: 60
End: 2020-12-07
Payer: MEDICARE

## 2020-12-07 PROCEDURE — 99442 PR PHYS/QHP TELEPHONE EVALUATION 11-20 MIN: CPT | Performed by: FAMILY MEDICINE

## 2020-12-07 RX ORDER — DULOXETIN HYDROCHLORIDE 60 MG/1
60 CAPSULE, DELAYED RELEASE ORAL
COMMUNITY
Start: 2020-11-24 | End: 2022-09-14

## 2020-12-07 RX ORDER — SIMVASTATIN 80 MG
80 TABLET ORAL EVERY EVENING
Qty: 30 TABLET | Refills: 11 | Status: SHIPPED | OUTPATIENT
Start: 2020-12-07 | End: 2021-04-05 | Stop reason: SDUPTHER

## 2020-12-07 ASSESSMENT — ENCOUNTER SYMPTOMS
CHEST TIGHTNESS: 0
ABDOMINAL PAIN: 0
CONSTIPATION: 0
COUGH: 0
SHORTNESS OF BREATH: 0
BACK PAIN: 1

## 2020-12-07 NOTE — PROGRESS NOTES
Discuss Labs    Telephone Visit (Audio Only)    Consent: patient has previously opted for and currently agrees to a TeleHealth (Telephone Visit) encounter, in place of a face-to-face ambulatory visit and is informed that the encounter will be billed as such accordingly. Location:     Patient / off site location: [place]   - Location: home     Physician: Luann Navarro. Carry Rings, DO - Location: [facility] office    Length of time: 11 minutes    Ivet Hernandez is a 69-year-old male who is opting for a Tele visit rather than a face-to-face visit at this time due to Covid. Chart has been reviewed his chart has been reviewed and he is providing me details today regarding his recent pain management visit with his pain medicine care physician    We are discussing the results of his recent carotid Doppler scan. Results are on file. He is aware that he has significant stenosis in the right common carotid measuring approximately 80 to 99%    Has appt with Dr Judy Lemos - Vascular specialist to discuss goals of care whether they become interventional or strictly medical at this time. Still see's Pain [facility] does admit that he opted to use marijuana while using opioid medications and this was discovered on a routine urine drug screen. [facility] states he was aware that he would probably get identified as per the results of the drug screen but did not want top hide it. Reported MJ (medical) and Pain Mgr. decided to stop opioids, willing to only  Rx gabapentin and tizanidine. PN reviewed - 12/03/2020 -       CHOL - 261 (November 17, 2020)  Patient agrees to address hyperlipidemia. There were no vitals taken for this visit. Review of Systems   Constitutional: Positive for fatigue. Negative for activity change. Respiratory: Negative for cough, chest tightness and shortness of breath. Cardiovascular: Negative for chest pain. Gastrointestinal: Negative for abdominal pain and constipation.    Musculoskeletal: Positive for arthralgias, back pain, gait problem, myalgias, neck pain and neck stiffness. Skin: Negative for rash. Physical Exam    TV - none done    ASSESSMENT AND PLAN      Diagnosis Orders   1. Mixed hyperlipidemia  simvastatin (ZOCOR) 80 MG tablet   2. Carotid artery calcification, bilateral  simvastatin (ZOCOR) 80 MG tablet       Martir chol /lipids  in 3 mo  Rec-  flu vax - received one already [employer])  Get bp chked when able to - home monitor is fine    Follow up with myself for primary care in 3 to 6 months.     Electronicallysigned by Nathaly Steven DO on 12/7/2020 at 11:35 AM

## 2020-12-14 ENCOUNTER — INITIAL CONSULT (OUTPATIENT)
Dept: VASCULAR SURGERY | Age: 60
End: 2020-12-14
Payer: MEDICARE

## 2020-12-14 VITALS
SYSTOLIC BLOOD PRESSURE: 160 MMHG | OXYGEN SATURATION: 99 % | HEIGHT: 69 IN | DIASTOLIC BLOOD PRESSURE: 84 MMHG | RESPIRATION RATE: 18 BRPM | BODY MASS INDEX: 36.73 KG/M2 | TEMPERATURE: 98.1 F | HEART RATE: 81 BPM | WEIGHT: 248 LBS

## 2020-12-14 PROCEDURE — G8427 DOCREV CUR MEDS BY ELIG CLIN: HCPCS | Performed by: SURGERY

## 2020-12-14 PROCEDURE — 99204 OFFICE O/P NEW MOD 45 MIN: CPT | Performed by: SURGERY

## 2020-12-14 PROCEDURE — G8482 FLU IMMUNIZE ORDER/ADMIN: HCPCS | Performed by: SURGERY

## 2020-12-14 PROCEDURE — G8417 CALC BMI ABV UP PARAM F/U: HCPCS | Performed by: SURGERY

## 2020-12-14 ASSESSMENT — ENCOUNTER SYMPTOMS
CHEST TIGHTNESS: 0
ALLERGIC/IMMUNOLOGIC NEGATIVE: 1
COLOR CHANGE: 0
COUGH: 1
ABDOMINAL PAIN: 0
SHORTNESS OF BREATH: 1

## 2020-12-14 NOTE — PROGRESS NOTES
SURGERY  7/13/12     C2-3-4-5    COLONOSCOPY      FRACTURE SURGERY      Rt elbow     HC INJECT OTHER PERPHRL NERV Bilateral 5/9/2019    INJECTION SPINAL performed by Radha Delatorre MD at [facility] Bilateral 8/15/2019    SACROILIAC JOINT INJECTION performed by Radha Delatorre MD at [facility]  2/5/16    premier tens    NERVE BLOCK Left 02/07/2020    RFA left side    NERVE BLOCK Left 02/07/2020     NERVE RADIOFREQUENCY ABLATION - SACROILIAC (Left )    OTHER SURGICAL HISTORY  08/15/2019    sacroiliac joint injection    PAIN MANAGEMENT PROCEDURE Left 2/7/2020    NERVE RADIOFREQUENCY ABLATION - SACROILIAC performed by Radha Delatorre MD at [facility]         Family History:     Family History   Problem Relation Age of Onset    Heart Disease Mother     COPD Father     Cancer Maternal Aunt 64        breast cancer     Cancer Maternal Uncle 60        prostrate cancer        Allergies:       Patient has no known allergies. Medications:      Current Outpatient Medications   Medication Sig Dispense Refill    DULoxetine (CYMBALTA) 60 MG extended release capsule Take 60 mg by mouth      simvastatin (ZOCOR) 80 MG tablet Take 1 tablet by mouth every evening 30 tablet 11    gabapentin (NEURONTIN) 800 MG tablet Take 1 tablet by mouth every 8 hours for 30 days.  90 tablet 2    tiZANidine (ZANAFLEX) 4 MG tablet Take 1 tablet by mouth 2 times daily as needed (for muscle spasms) 60 tablet 2    acetaminophen (TYLENOL) 325 MG tablet Take 650 mg by mouth nightly      SYMBICORT 160-4.5 MCG/ACT AERO INHALE TWO PUFFS BY MOUTH TWICE A DAY 1 Inhaler 5    cetirizine (ZYRTEC) 10 MG tablet TAKE ONE TABLET BY MOUTH DAILY 30 tablet 1    diclofenac (VOLTAREN) 75 MG EC tablet Take 75 mg by mouth 2 times daily      traZODone (DESYREL) 150 MG tablet Take 150 mg by mouth nightly      omeprazole (PRILOSEC) 20 MG delayed release capsule TAKE ONE CAPSULE BY MOUTH TWICE A DAY 30 MINUTES BEFORE MEALS 60 capsule 6    nitroGLYCERIN (NITROSTAT) 0.4 MG SL tablet Place 1 tablet under the tongue every 5 minutes as needed for Chest pain 25 tablet 3    albuterol sulfate  (90 Base) MCG/ACT inhaler Inhale 2 puffs into the lungs every 6 hours as needed for Wheezing 1 Inhaler 3    aspirin 81 MG EC tablet Take 1 tablet by mouth daily 30 tablet 3     No current facility-administered medications for this visit. Social History:     Tobacco:    reports that he has been smoking cigarettes. He has a 60.00 pack-year smoking history. He quit smokeless tobacco use about 5 years ago. Alcohol:      reports previous alcohol use. Drug Use:  reports no history of drug use. Review of Systems:     Review of Systems   Constitutional: Negative for chills and fever. HENT: Negative for congestion. Eyes: Negative for visual disturbance. Respiratory: Positive for cough and shortness of breath. Negative for chest tightness. Cardiovascular: Negative for chest pain and leg swelling. Gastrointestinal: Negative for abdominal pain. Endocrine: Negative. Genitourinary: Negative. Musculoskeletal: Positive for arthralgias, neck pain and neck stiffness. Skin: Negative for color change and wound. Allergic/Immunologic: Negative. Neurological: Positive for numbness and headaches. Negative for facial asymmetry, speech difficulty and weakness. Hematological: Negative. Psychiatric/Behavioral: Negative. Physical Exam:     Vitals:  BP (!) 160/84 (Site: Left Upper Arm, Position: Sitting, Cuff Size: Large Adult)   Pulse 81   Temp 98.1 °F (36.7 °C) (Temporal)   Resp 18   Ht 5' 9\" (1.753 m)   Wt 248 lb (112.5 kg)   SpO2 99%   BMI 36.62 kg/m²     Physical Exam  Constitutional:       Appearance: He is well-developed and well-groomed. Eyes:      Extraocular Movements: Extraocular movements intact. Conjunctiva/sclera: Conjunctivae normal.   Neck:      Musculoskeletal: Decreased range of motion. Pain with movement present. Vascular: Carotid bruit present. Cardiovascular:      Rate and Rhythm: Normal rate and regular rhythm. Pulses:           Carotid pulses are on the right side with bruit. Radial pulses are 2+ on the right side and 2+ on the left side. Pulmonary:      Effort: Pulmonary effort is normal. No respiratory distress. Abdominal:      Palpations: Abdomen is soft. Tenderness: There is no abdominal tenderness. Musculoskeletal:      Right lower leg: No edema. Left lower leg: No edema. Skin:     General: Skin is warm. Capillary Refill: Capillary refill takes less than 2 seconds. Neurological:      Mental Status: He is alert and oriented to person, place, and time. GCS: GCS eye subscore is 4. GCS verbal subscore is 5. GCS motor subscore is 6. Sensory: Sensation is intact. Motor: Motor function is intact. Psychiatric:         Mood and Affect: Mood normal.         Speech: Speech normal.         Behavior: Behavior normal.       Imaging/Labs:         Assessment and Plan:     Bilateral carotid artery stenosis, asymptomatic (right 80-99%, left 50-69%)  · Continue optimal medical therapy with aspirin and statins  · BP needs to be controlled much better for optimization  · Needs to stop smoking as well 1-2 packs a day is too much and he knows this, he will work on this as well  · Daily exercise to improve overall cardiovascular health  · Will hold off on intervention at this time until he is better medically optimized  · Will repeat carotid duplex in 6 months for re-evaluation  · Educated on symptoms of TIA and stroke, and advised to seek medical attention immediately    Optimal medical management is an important part of overall treatment of all patients with carotid bifurcation disease, regardless of the degree of stenosis or the plan for intervention. This therapy is directed both at the reduction of stroke and overall cardiovascular events, including cardiovascular-related mortality. Clinical guidelines issued by the American Heart Association and the American Stroke Association recommends:    I. Lowering blood pressure to a target 140/90 mm Hg by lifestyle interventions and antihypertensive treatment is recommended in individuals who have hypertension with asymptomatic carotid atherosclerosis. II. Glucose control to nearly normoglycemic levels (target hemoglobin A1C 7%) is recommended among diabetic patients to reduce microvascular complications and, with lesser certainty, macrovascular complications other than stroke. III. Patients with known atherosclerosis have demonstrated reduced stroke rates when treated with lipid-lowering therapy. And continued low dose statin will help stabilize plaque and decrease the inflammatory response of atherosclerosis. IV. Smoking nearly doubles the risk of stroke and with cessation there is a reduction in the risk of stroke. V. Antiplatelet therapy in asymptomatic patients with carotid atherosclerosis is recommended to reduce overall cardiovascular morbidity although it has not been shown to be effective in the primary prevention of stroke. Electronically signed by Tatiana Bangura MD on 12/14/20 at 10:57 AM [address]  Office: [phone]  Cell: [phone]  Email: [email or web address]

## 2020-12-16 ENCOUNTER — TELEPHONE (OUTPATIENT)
Dept: FAMILY MEDICINE CLINIC | Age: 60
End: 2020-12-16

## 2020-12-16 NOTE — TELEPHONE ENCOUNTER
Patient would like a call back from PCP or clinical about a blood pressure medication. He saw Edenilson Devries, and Dr. Chapin Smith said he was going to talk to PCP about the medication. Wondering status of this. Please advise.

## 2020-12-17 RX ORDER — AMLODIPINE BESYLATE 5 MG/1
5 TABLET ORAL DAILY
Qty: 30 TABLET | Refills: 5 | Status: SHIPPED | OUTPATIENT
Start: 2020-12-17 | End: 2021-04-05 | Stop reason: SDUPTHER

## 2020-12-17 NOTE — TELEPHONE ENCOUNTER
Patient does self monitoring of BP  1st BP: 183/77  2nd BP: 185/78  3rd BP: 179/77  He did take medication this AM

## 2020-12-17 NOTE — TELEPHONE ENCOUNTER
BP medication initiation - recorded home readings are elevated to Stage 2-3. Initiation of HTN Rx and need to continue home monitoring.

## 2020-12-21 ENCOUNTER — VIRTUAL VISIT (OUTPATIENT)
Dept: PAIN MANAGEMENT | Age: 60
End: 2020-12-21
Payer: MEDICARE

## 2020-12-21 PROCEDURE — 99213 OFFICE O/P EST LOW 20 MIN: CPT | Performed by: NURSE PRACTITIONER

## 2020-12-21 RX ORDER — GABAPENTIN 800 MG/1
800 TABLET ORAL EVERY 8 HOURS SCHEDULED
Qty: 90 TABLET | Refills: 2 | Status: SHIPPED | OUTPATIENT
Start: 2020-12-21 | End: 2021-01-28 | Stop reason: SDUPTHER

## 2020-12-21 ASSESSMENT — ENCOUNTER SYMPTOMS
RESPIRATORY NEGATIVE: 1
BOWEL INCONTINENCE: 0
BACK PAIN: 1

## 2020-12-21 NOTE — PROGRESS NOTES
Patient completed a video visit today to review medication contract. Chief Complaint: neck and back pain    [facility] Patient complains of neck pain that radiates down his left arm to fingertips. He has had this pain for many years and it is worsening over time. He had cervical spine surgery in 2012 and the pain has only worsened since that time. He saw Dr Flora Hernandez with no surgery recommended for neck, but told could benefit from CTR. Patient followed up with Dr. Devonda Severin after his CT last August and cervical injections were suggested. He is not a good candidate for cervical injections due to previous neck surgery. He did see NS in 2016 for lumbar pain but no surgery recommended at that time. Diclofenac started by his rheumatologist. He had Left SI Joint Radiofrequency Ablation via denervation of Left dorsal ramus L5 and lateral branches S1 S2 S3 on 2/7/20 and reports significant relief. He states he has not had any shooting pain since the RFA.        UDS was positive for THC. Patient admits to using relative's \"medical marijuana\". Medication contract d/c last visit.     Recent carotid study showed significant stenosis of right carotid artery - he is following with vascular surgeon. HPI:     Back Pain  This is a chronic problem. The current episode started more than 1 year ago. The problem occurs constantly. The problem has been gradually worsening since onset. The pain is present in the lumbar spine and gluteal. The quality of the pain is described as aching. The pain does not radiate. The pain is at a severity of 4/10. The pain is mild. The pain is the same all the time. The symptoms are aggravated by bending, stress and standing. Stiffness is present all day. Associated symptoms include numbness and tingling. Pertinent negatives include no bladder incontinence, bowel incontinence, chest pain or weakness. The treatment provided moderate relief.    Neck Pain This is a chronic problem. The current episode started more than 1 year ago. The problem occurs constantly. The problem has been unchanged. The pain is present in the left side, midline and right side. The quality of the pain is described as aching and shooting. The pain is at a severity of 6/10. The pain is moderate. The symptoms are aggravated by position and twisting. Associated symptoms include numbness and tingling. Pertinent negatives include no chest pain or weakness. He has tried bed rest for the symptoms. The treatment provided mild relief. Patient denies any new neurological symptoms. No bowel or bladder incontinence, no weakness, and no falling. Past Medical History:   Diagnosis Date    Abnormal stress ECG     Angina pectoris (HCC)     CAD (coronary artery disease)     Carpal tunnel syndrome     left    Cervical stenosis of spine     Cervicalgia     chronic pain    Chronic back pain     COPD (chronic obstructive pulmonary disease) (HCC)     DDD (degenerative disc disease)     Elbow fracture, right     Fractures     elbow    GERD (gastroesophageal reflux disease)     Gout     Right great toe    Hyperlipidemia     Hypertension     Mild depression ([address] 9/26/2013    2 suicide attempts by girlfriend related to depression.     Morbidly obese ([address] 10/30/2020    Osteoarthritis of right knee     Sinus headache 10/10/2018    Sleep apnea     Sleep disturbance     Smoker     Tendonitis of foot     right       Past Surgical History:   Procedure Laterality Date    CARDIAC CATHETERIZATION  7/8/14    Non Obstructive CAD     CERVICAL SPINE SURGERY  7/13/12     C2-3-4-5    COLONOSCOPY      FRACTURE SURGERY      Rt elbow     HC INJECT OTHER PERPHRL NERV Bilateral 5/9/2019    INJECTION SPINAL performed by Javier Gonzalez MD at [facility] JOINT Bilateral 8/15/2019   albuterol sulfate  (90 Base) MCG/ACT inhaler, Inhale 2 puffs into the lungs every 6 hours as needed for Wheezing, Disp: 1 Inhaler, Rfl: 3    aspirin 81 MG EC tablet, Take 1 tablet by mouth daily, Disp: 30 tablet, Rfl: 3    Family History   Problem Relation Age of Onset    Heart Disease Mother     COPD Father     Cancer Maternal Aunt 64        breast cancer     Cancer Maternal Uncle 60        prostrate cancer        Social History     Socioeconomic History    Marital status: Single     Spouse name: Not on file    Number of children: Not on file    Years of education: Not on file    Highest education level: Not on file   Occupational History     Employer: N/A   Social Needs    Financial resource strain: Not on file    Food insecurity     Worry: Not on file     Inability: Not on file    Transportation needs     Medical: Not on file     Non-medical: Not on file   Tobacco Use    Smoking status: Current Every Day Smoker     Packs/day: 1.50     Years: 40.00     Pack years: 60.00     Types: Cigarettes    Smokeless tobacco: Former User     Quit date: 3/12/2015    Tobacco comment: wants to discuss  chantix   Substance and Sexual Activity    Alcohol use: Not Currently     Alcohol/week: 0.0 standard drinks     Comment: 3 x year    Drug use: No    Sexual activity: Yes     Partners: Female   Lifestyle    Physical activity     Days per week: Not on file     Minutes per session: Not on file    Stress: Not on file   Relationships    Social connections     Talks on phone: Not on file     Gets together: Not on file     Attends Congregational service: Not on file     Active member of club or organization: Not on file     Attends meetings of clubs or organizations: Not on file     Relationship status: Not on file    Intimate partner violence     Fear of current or ex partner: Not on file     Emotionally abused: Not on file     Physically abused: Not on file     Forced sexual activity: Not on file Other Topics Concern    Not on file   Social History Narrative    Not on file       Review of Systems:  Review of Systems   Constitution: Negative. Cardiovascular: Negative. Negative for chest pain and palpitations. Respiratory: Negative. Musculoskeletal: Positive for back pain and neck pain. Gastrointestinal: Negative for bowel incontinence. Genitourinary: Negative for bladder incontinence. Neurological: Positive for numbness and tingling. Negative for disturbances in coordination, loss of balance and weakness. Physical Exam:  There were no vitals taken for this visit. Physical Exam  HENT:      Head: Normocephalic. Pulmonary:      Effort: Pulmonary effort is normal.   Neurological:      Mental Status: He is alert. Psychiatric:         Mood and Affect: Mood normal.         Behavior: Behavior normal.         Record/Diagnostics Review:    1. Degenerative changes contribute to moderate spinal canal stenosis at   L3-L4.  Mild spinal canal stenosis at L1-L2 and L4-L5. 2. Scattered neural foraminal narrowing is seen as above. 3. No spondylolisthesis. 4. There is unchanged fluid signal within the L2-L3 disc space. Assessment:  Problem List Items Addressed This Visit     Cervical stenosis of spine - Primary    Cervicalgia    DDD (degenerative disc disease), lumbar    Relevant Medications    gabapentin (NEURONTIN) 800 MG tablet    Displacement of lumbar intervertebral disc without myelopathy    Relevant Medications    gabapentin (NEURONTIN) 800 MG tablet    Spinal stenosis of lumbar region without neurogenic claudication      Other Visit Diagnoses     Lumbosacral spondylosis without myelopathy        Relevant Medications    gabapentin (NEURONTIN) 800 MG tablet             Treatment Plan:  Increased neck pain following d/c of medication contract. States he cannot have injections in the neck - tried in the past but too difficult due to previous surgeries.   Continue gabapentin

## 2021-01-18 ENCOUNTER — VIRTUAL VISIT (OUTPATIENT)
Dept: PAIN MANAGEMENT | Age: 61
End: 2021-01-18
Payer: MEDICARE

## 2021-01-18 DIAGNOSIS — Z51.81 MEDICATION MONITORING ENCOUNTER: ICD-10-CM

## 2021-01-18 DIAGNOSIS — M48.061 SPINAL STENOSIS OF LUMBAR REGION WITHOUT NEUROGENIC CLAUDICATION: ICD-10-CM

## 2021-01-18 DIAGNOSIS — M54.2 CERVICALGIA: ICD-10-CM

## 2021-01-18 DIAGNOSIS — M51.26 DISPLACEMENT OF LUMBAR INTERVERTEBRAL DISC WITHOUT MYELOPATHY: ICD-10-CM

## 2021-01-18 DIAGNOSIS — M47.817 LUMBOSACRAL SPONDYLOSIS WITHOUT MYELOPATHY: ICD-10-CM

## 2021-01-18 DIAGNOSIS — M51.36 DDD (DEGENERATIVE DISC DISEASE), LUMBAR: ICD-10-CM

## 2021-01-18 DIAGNOSIS — M48.02 CERVICAL STENOSIS OF SPINE: Primary | ICD-10-CM

## 2021-01-18 DIAGNOSIS — M54.12 RADICULOPATHY OF CERVICAL SPINE: ICD-10-CM

## 2021-01-18 PROCEDURE — 99213 OFFICE O/P EST LOW 20 MIN: CPT | Performed by: NURSE PRACTITIONER

## 2021-01-18 RX ORDER — TIZANIDINE 4 MG/1
4 TABLET ORAL 2 TIMES DAILY PRN
Qty: 60 TABLET | Refills: 2 | Status: SHIPPED | OUTPATIENT
Start: 2021-01-18 | End: 2021-01-28 | Stop reason: SDUPTHER

## 2021-01-18 ASSESSMENT — ENCOUNTER SYMPTOMS
BOWEL INCONTINENCE: 0
CONSTIPATION: 0
BACK PAIN: 1
RESPIRATORY NEGATIVE: 1

## 2021-01-18 NOTE — PROGRESS NOTES
Patient completed a video visit today     Chief Complaint: neck and back pain    [facility]   Patient complains of neck pain that radiates down his left arm to fingertips. He has had this pain for many years and it is worsening over time. He had cervical spine surgery in 2012 and the pain has only worsened since that time. He saw Dr Ely Grier with no surgery recommended for neck, but told could benefit from CTR. Patient followed up with Dr. Krista Kasper after his CT last August and cervical injections were suggested. He is not a good candidate for cervical injections due to previous neck surgery. He did see NS in 2016 for lumbar pain but no surgery recommended at that time. Diclofenac started by his rheumatologist. He had Left SI Joint Radiofrequency Ablation via denervation of Left dorsal ramus L5 and lateral branches S1 S2 S3 on 2/7/20 and reports significant relief. He states he has not had any shooting pain since the RFA.        UDS was positive for THC. Patient admits to using relative's \"medical marijuana\". Medication contract d/c 12/2020     Recent carotid study showed significant stenosis of right carotid artery - he is following with vascular surgeon. Back Pain  This is a chronic problem. The current episode started more than 1 year ago. The problem occurs constantly. The problem has been gradually worsening since onset. The pain is present in the lumbar spine. The quality of the pain is described as burning and stabbing. The pain radiates to the left foot, left thigh and left knee. The pain is at a severity of 5/10. The pain is moderate. The pain is the same all the time. The symptoms are aggravated by twisting and bending. Stiffness is present all day. Associated symptoms include headaches and numbness. Pertinent negatives include no bladder incontinence, bowel incontinence, chest pain, tingling or weakness. The treatment provided moderate relief.    Neck Pain This is a chronic problem. The current episode started more than 1 year ago. The problem occurs constantly. The problem has been unchanged. The pain is present in the left side, midline and right side. The quality of the pain is described as aching and stabbing. The pain is at a severity of 7/10. The pain is moderate. The symptoms are aggravated by position and twisting. Associated symptoms include headaches and numbness. Pertinent negatives include no chest pain, tingling or weakness. He has tried bed rest for the symptoms. The treatment provided mild relief. Patient denies any new neurological symptoms. No bowel or bladder incontinence, no weakness, and no falling. Past Medical History:   Diagnosis Date    Abnormal stress ECG     Angina pectoris (HCC)     CAD (coronary artery disease)     Carpal tunnel syndrome     left    Cervical stenosis of spine     Cervicalgia     chronic pain    Chronic back pain     COPD (chronic obstructive pulmonary disease) (HCC)     DDD (degenerative disc disease)     Elbow fracture, right     Fractures     elbow    GERD (gastroesophageal reflux disease)     Gout     Right great toe    Hyperlipidemia     Hypertension     Mild depression ([address] 9/26/2013    2 suicide attempts by girlfriend related to depression.     Morbidly obese ([address] 10/30/2020    Osteoarthritis of right knee     Sinus headache 10/10/2018    Sleep apnea     Sleep disturbance     Smoker     Tendonitis of foot     right       Past Surgical History:   Procedure Laterality Date    CARDIAC CATHETERIZATION  7/8/14    Non Obstructive CAD     CERVICAL SPINE SURGERY  7/13/12     C2-3-4-5    COLONOSCOPY      FRACTURE SURGERY      Rt elbow     HC INJECT OTHER PERPHRL NERV Bilateral 5/9/2019    INJECTION SPINAL performed by Jacqueline Aleman MD at [facility] JOINT Bilateral 8/15/2019 SACROILIAC JOINT INJECTION performed by Caren Shen MD at [facility]  2/5/16    premier tens    NERVE BLOCK Left 02/07/2020    RFA left side    NERVE BLOCK Left 02/07/2020     NERVE RADIOFREQUENCY ABLATION - SACROILIAC (Left )    OTHER SURGICAL HISTORY  08/15/2019    sacroiliac joint injection    PAIN MANAGEMENT PROCEDURE Left 2/7/2020    NERVE RADIOFREQUENCY ABLATION - SACROILIAC performed by Caren Shen MD at [address] [place] [address]         No Known Allergies      Current Outpatient Medications:     gabapentin (NEURONTIN) 800 MG tablet, Take 1 tablet by mouth every 8 hours for 30 days. , Disp: 90 tablet, Rfl: 2    amLODIPine (NORVASC) 5 MG tablet, Take 1 tablet by mouth daily, Disp: 30 tablet, Rfl: 5    DULoxetine (CYMBALTA) 60 MG extended release capsule, Take 60 mg by mouth, Disp: , Rfl:     simvastatin (ZOCOR) 80 MG tablet, Take 1 tablet by mouth every evening, Disp: 30 tablet, Rfl: 11    acetaminophen (TYLENOL) 325 MG tablet, Take 650 mg by mouth nightly, Disp: , Rfl:     SYMBICORT 160-4.5 MCG/ACT AERO, INHALE TWO PUFFS BY MOUTH TWICE A DAY, Disp: 1 Inhaler, Rfl: 5    cetirizine (ZYRTEC) 10 MG tablet, TAKE ONE TABLET BY MOUTH DAILY, Disp: 30 tablet, Rfl: 1    diclofenac (VOLTAREN) 75 MG EC tablet, Take 75 mg by mouth 2 times daily, Disp: , Rfl:     traZODone (DESYREL) 150 MG tablet, Take 150 mg by mouth nightly, Disp: , Rfl:     omeprazole (PRILOSEC) 20 MG delayed release capsule, TAKE ONE CAPSULE BY MOUTH TWICE A DAY 30 MINUTES BEFORE MEALS, Disp: 60 capsule, Rfl: 6    nitroGLYCERIN (NITROSTAT) 0.4 MG SL tablet, Place 1 tablet under the tongue every 5 minutes as needed for Chest pain, Disp: 25 tablet, Rfl: 3    albuterol sulfate  (90 Base) MCG/ACT inhaler, Inhale 2 puffs into the lungs every 6 hours as needed for Wheezing, Disp: 1 Inhaler, Rfl: 3   aspirin 81 MG EC tablet, Take 1 tablet by mouth daily, Disp: 30 tablet, Rfl: 3    Family History   Problem Relation Age of Onset    Heart Disease Mother     COPD Father     Cancer Maternal Aunt 64        breast cancer     Cancer Maternal Uncle 60        prostrate cancer        Social History     Socioeconomic History    Marital status: Single     Spouse name: Not on file    Number of children: Not on file    Years of education: Not on file    Highest education level: Not on file   Occupational History     Employer: N/A   Social Needs    Financial resource strain: Not on file    Food insecurity     Worry: Not on file     Inability: Not on file    Transportation needs     Medical: Not on file     Non-medical: Not on file   Tobacco Use    Smoking status: Current Every Day Smoker     Packs/day: 1.50     Years: 40.00     Pack years: 60.00     Types: Cigarettes    Smokeless tobacco: Former User     Quit date: 3/12/2015    Tobacco comment: wants to discuss  chantix   Substance and Sexual Activity    Alcohol use: Not Currently     Alcohol/week: 0.0 standard drinks     Comment: 3 x year    Drug use: No    Sexual activity: Yes     Partners: Female   Lifestyle    Physical activity     Days per week: Not on file     Minutes per session: Not on file    Stress: Not on file   Relationships    Social connections     Talks on phone: Not on file     Gets together: Not on file     Attends Faith service: Not on file     Active member of club or organization: Not on file     Attends meetings of clubs or organizations: Not on file     Relationship status: Not on file    Intimate partner violence     Fear of current or ex partner: Not on file     Emotionally abused: Not on file     Physically abused: Not on file     Forced sexual activity: Not on file   Other Topics Concern    Not on file   Social History Narrative    Not on file       Review of Systems:  Review of Systems   Constitution: Negative. Cardiovascular: Negative for chest pain and palpitations. Respiratory: Negative. Musculoskeletal: Positive for back pain and neck pain. Gastrointestinal: Negative for bowel incontinence and constipation. Genitourinary: Negative for bladder incontinence. Neurological: Positive for headaches and numbness. Negative for disturbances in coordination, loss of balance, tingling and weakness. Physical Exam:  There were no vitals taken for this visit. Physical Exam  HENT:      Head: Normocephalic. Pulmonary:      Effort: Pulmonary effort is normal.   Neurological:      Mental Status: He is alert. Psychiatric:         Mood and Affect: Mood normal.         Behavior: Behavior normal.         Record/Diagnostics Review:    Last niru 11/2020 and was appropriate     Assessment:  Problem List Items Addressed This Visit     Cervical stenosis of spine - Primary    Cervicalgia    Medication monitoring encounter    DDD (degenerative disc disease), lumbar    Relevant Medications    tiZANidine (ZANAFLEX) 4 MG tablet    Displacement of lumbar intervertebral disc without myelopathy    Relevant Medications    tiZANidine (ZANAFLEX) 4 MG tablet    Radiculopathy of cervical spine    Relevant Medications    tiZANidine (ZANAFLEX) 4 MG tablet    Spinal stenosis of lumbar region without neurogenic claudication      Other Visit Diagnoses     Lumbosacral spondylosis without myelopathy        Relevant Medications    tiZANidine (ZANAFLEX) 4 MG tablet             Treatment Plan:    Excellent benefit from left SI joint RFA in the past.  Pain over SI joint that radiates into left thigh has returned to baseline and he is requesting to repeat procedure. Left SI Joint Radiofrequency Ablation x1  Pre-procedural instructions are reviewed. Continue zanaflex and gabapentin.   Follow up 4 weeks Ciara Rodrigues is a 56 y.o. male being evaluated by a Virtual Visit (video visit) encounter to address concerns as mentioned above. A caregiver was present when appropriate. Due to this being a TeleHealth encounter (During [facility]QQ[facility]-86 public health emergency), evaluation of the following organ systems was limited: Vitals/Constitutional/EENT/Resp/CV/GI//MS/Neuro/Skin/Heme-Lymph-Imm. Pursuant to the emergency declaration under the [address] [place] [address] and the [employer] and [employer] Act, this Virtual Visit was conducted with patient's (and/or legal guardian's) consent, to reduce the patient's risk of exposure to COVID-19 and provide necessary medical care. The patient (and/or legal guardian) has also been advised to contact this office for worsening conditions or problems, and seek emergency medical treatment and/or call 911 if deemed necessary. Patient identification was verified at the start of the visit: Yes    Total time spent for this encounter: Not billed by time    Services were provided through a video synchronous discussion virtually to substitute for in-person clinic visit. Patient and provider were located at their individual homes. --JOSSUE Walden CNP on 1/18/2021 at 8:32 AM    An electronic signature was used to authenticate this note.

## 2021-01-28 ENCOUNTER — VIRTUAL VISIT (OUTPATIENT)
Dept: PAIN MANAGEMENT | Age: 61
End: 2021-01-28
Payer: MEDICARE

## 2021-01-28 DIAGNOSIS — M51.36 DDD (DEGENERATIVE DISC DISEASE), LUMBAR: ICD-10-CM

## 2021-01-28 DIAGNOSIS — M46.1 SACROILIITIS (HCC): Primary | ICD-10-CM

## 2021-01-28 DIAGNOSIS — G89.29 OTHER CHRONIC PAIN: ICD-10-CM

## 2021-01-28 DIAGNOSIS — M47.817 LUMBOSACRAL SPONDYLOSIS WITHOUT MYELOPATHY: ICD-10-CM

## 2021-01-28 PROCEDURE — 3017F COLORECTAL CA SCREEN DOC REV: CPT | Performed by: PAIN MEDICINE

## 2021-01-28 PROCEDURE — G8428 CUR MEDS NOT DOCUMENT: HCPCS | Performed by: PAIN MEDICINE

## 2021-01-28 PROCEDURE — 99214 OFFICE O/P EST MOD 30 MIN: CPT | Performed by: PAIN MEDICINE

## 2021-01-28 RX ORDER — GABAPENTIN 800 MG/1
800 TABLET ORAL EVERY 8 HOURS SCHEDULED
Qty: 90 TABLET | Refills: 2 | Status: SHIPPED | OUTPATIENT
Start: 2021-01-28 | End: 2021-04-19 | Stop reason: SDUPTHER

## 2021-01-28 RX ORDER — TIZANIDINE 4 MG/1
4 TABLET ORAL 2 TIMES DAILY PRN
Qty: 60 TABLET | Refills: 2 | Status: SHIPPED | OUTPATIENT
Start: 2021-01-28 | End: 2021-04-19 | Stop reason: SDUPTHER

## 2021-01-28 ASSESSMENT — ENCOUNTER SYMPTOMS
BACK PAIN: 1
BOWEL INCONTINENCE: 0
COUGH: 0

## 2021-01-28 NOTE — PROGRESS NOTES
HPI:     Back Pain  This is a chronic problem. The current episode started more than 1 year ago. The problem occurs constantly. The problem has been gradually worsening since onset. The pain is present in the lumbar spine and sacro-iliac. The quality of the pain is described as aching. The pain is moderate. The pain is the same all the time. The symptoms are aggravated by position. Stiffness is present all day. Pertinent negatives include no bowel incontinence, chest pain or fever. Chronic low back pain. MRI with multilevel degenerative changes. He has done physical therapy in the past with benefit. Previous sacroiliac joint radiofrequency ablation with 80% improvement for more than 6 months. Feels the pain is starting to return. Was opioid dependent but did have urine drug screen positive for marijuana. Opioid contract was discontinued. He is on gabapentin and muscle relaxant with some benefit. Patient denies any new neurological symptoms. Nobowel or bladder incontinence, no weakness, and no falling. Review of OARRS does not show any aberrant prescription behavior. Medication is helping the patient stay active. Patient denies any side effects and reports adequate analgesia. No sign of misuse/abuse. Past Medical History:   Diagnosis Date    Abnormal stress ECG     Angina pectoris (HCC)     CAD (coronary artery disease)     Carpal tunnel syndrome     left    Cervical stenosis of spine     Cervicalgia     chronic pain    Chronic back pain     COPD (chronic obstructive pulmonary disease) (HCC)     DDD (degenerative disc disease)     Elbow fracture, right     Fractures     elbow    GERD (gastroesophageal reflux disease)     Gout     Right great toe    Hyperlipidemia     Hypertension     Mild depression ([address] 9/26/2013    2 suicide attempts by girlfriend related to depression.     Morbidly obese ([address] 10/30/2020    Osteoarthritis of right knee     Sinus headache 10/10/2018  Sleep apnea     Sleep disturbance     Smoker     Tendonitis of foot     right       Past Surgical History:   Procedure Laterality Date    CARDIAC CATHETERIZATION  7/8/14    Non Obstructive CAD     CERVICAL SPINE SURGERY  7/13/12     C2-3-4-5    COLONOSCOPY      FRACTURE SURGERY      Rt elbow     HC INJECT OTHER PERPHRL NERV Bilateral 5/9/2019    INJECTION SPINAL performed by Xavier Reed MD at [facility] Bilateral 8/15/2019    SACROILIAC JOINT INJECTION performed by [facility] MD at [facility]  2/5/16    premier tens    NERVE BLOCK Left 02/07/2020    RFA left side    NERVE BLOCK Left 02/07/2020     NERVE RADIOFREQUENCY ABLATION - SACROILIAC (Left )    OTHER SURGICAL HISTORY  08/15/2019    sacroiliac joint injection    PAIN MANAGEMENT PROCEDURE Left 2/7/2020    NERVE RADIOFREQUENCY ABLATION - SACROILIAC performed by Xavier Reed MD at [address] [place] [address]         No Known Allergies      Current Outpatient Medications:     tiZANidine (ZANAFLEX) 4 MG tablet, Take 1 tablet by mouth 2 times daily as needed (for muscle spasms), Disp: 60 tablet, Rfl: 2    gabapentin (NEURONTIN) 800 MG tablet, Take 1 tablet by mouth every 8 hours for 30 days. , Disp: 90 tablet, Rfl: 2    amLODIPine (NORVASC) 5 MG tablet, Take 1 tablet by mouth daily, Disp: 30 tablet, Rfl: 5    DULoxetine (CYMBALTA) 60 MG extended release capsule, Take 60 mg by mouth, Disp: , Rfl:     simvastatin (ZOCOR) 80 MG tablet, Take 1 tablet by mouth every evening, Disp: 30 tablet, Rfl: 11    acetaminophen (TYLENOL) 325 MG tablet, Take 650 mg by mouth nightly, Disp: , Rfl:     SYMBICORT 160-4.5 MCG/ACT AERO, INHALE TWO PUFFS BY MOUTH TWICE A DAY, Disp: 1 Inhaler, Rfl: 5    cetirizine (ZYRTEC) 10 MG tablet, TAKE ONE TABLET BY MOUTH DAILY, Disp: 30 tablet, Rfl: 1   diclofenac (VOLTAREN) 75 MG EC tablet, Take 75 mg by mouth 2 times daily, Disp: , Rfl:     traZODone (DESYREL) 150 MG tablet, Take 150 mg by mouth nightly, Disp: , Rfl:     omeprazole (PRILOSEC) 20 MG delayed release capsule, TAKE ONE CAPSULE BY MOUTH TWICE A DAY 30 MINUTES BEFORE MEALS, Disp: 60 capsule, Rfl: 6    nitroGLYCERIN (NITROSTAT) 0.4 MG SL tablet, Place 1 tablet under the tongue every 5 minutes as needed for Chest pain, Disp: 25 tablet, Rfl: 3    albuterol sulfate  (90 Base) MCG/ACT inhaler, Inhale 2 puffs into the lungs every 6 hours as needed for Wheezing, Disp: 1 Inhaler, Rfl: 3    aspirin 81 MG EC tablet, Take 1 tablet by mouth daily, Disp: 30 tablet, Rfl: 3    Family History   Problem Relation Age of Onset    Heart Disease Mother     COPD Father     Cancer Maternal Aunt 64        breast cancer     Cancer Maternal Uncle 60        prostrate cancer        Social History     Socioeconomic History    Marital status: Single     Spouse name: Not on file    Number of children: Not on file    Years of education: Not on file    Highest education level: Not on file   Occupational History     Employer: N/A   Social Needs    Financial resource strain: Not on file    Food insecurity     Worry: Not on file     Inability: Not on file    Transportation needs     Medical: Not on file     Non-medical: Not on file   Tobacco Use    Smoking status: Current Every Day Smoker     Packs/day: 1.50     Years: 40.00     Pack years: 60.00     Types: Cigarettes    Smokeless tobacco: Former User     Quit date: 3/12/2015    Tobacco comment: wants to discuss  chantix   Substance and Sexual Activity    Alcohol use: Not Currently     Alcohol/week: 0.0 standard drinks     Comment: 3 x year    Drug use: No    Sexual activity: Yes     Partners: Female   Lifestyle    Physical activity     Days per week: Not on file     Minutes per session: Not on file    Stress: Not on file   Relationships Worsening low back pain    Discussed different treatment options including continued conservative care such as physical therapy, chiropractic care, acupuncture. Discussed different interventional options such as epidural steroids or medial branch blocks. Also discussed neuromodulation in the form of spinal cord stimulation. Also discussed surgical evaluation. New Neurontin and Zanaflex. 80% improvement for more than 6 months with previous SI RFA. He is requesting repeat, I think that is quite reasonable, will go ahead with left SI RFA followed by right side after that. Is opioid dependent however had a urine drug screen positive for marijuana and opioid contract was discontinued. He is requesting to restart this, will review, states that was his only violation. Samm Melendez M.D. Amee Burgess is a 56 y.o. male being evaluated by a Virtual Visit (video visit) encounter to address concerns as mentioned above. A caregiver was present when appropriate. Due to this being a TeleHealth encounter (During [facility]- public health emergency), evaluation of the following organ systems was limited: Vitals/Constitutional/EENT/Resp/CV/GI//MS/Neuro/Skin/Heme-Lymph-Imm. Pursuant to the emergency declaration under the [facility], [address] [place] [address] [facility] authority and the [employer] and [employer] Act, this Virtual Visit was conducted with patient's (and/or legal guardian's) consent, to reduce the patient's risk of exposure to COVID-19 and provide necessary medical care. The patient (and/or legal guardian) has also been advised to contact this office for worsening conditions or problems, and seek emergency medical treatment and/or call 911 if deemed necessary.      Patient identification was verified at the start of the visit: Yes    Total time spent for this encounter: Not billed by time Services were provided through a video synchronous discussion virtually to substitute for in-person clinic visit. Patient and provider were located at their individual homes. --Denver Gave, MD on 1/28/2021 at 8:38 AM    An electronic signature was used to authenticate this note. I have reviewed the chief complaint and history of present illness (including ROS and PFSH) and vital documentation by my staff and I agree with their documentation and have added where applicable.

## 2021-02-04 DIAGNOSIS — M51.36 DDD (DEGENERATIVE DISC DISEASE), LUMBAR: ICD-10-CM

## 2021-02-04 NOTE — TELEPHONE ENCOUNTER
Patient had VV appt. With Dr Ari Merlos on 1/28/21 . He was told that Dr Ari Merlos would talk to you about pain medication for him. Patient states his in a lot of pain . Can you please review and let patient know?  Thank you

## 2021-02-08 ENCOUNTER — HOSPITAL ENCOUNTER (OUTPATIENT)
Dept: LAB | Age: 61
Setting detail: SPECIMEN
Discharge: HOME | End: 2021-02-08
Payer: MEDICARE

## 2021-02-08 DIAGNOSIS — Z20.822 COVID-19 RULED OUT BY LABORATORY TESTING: Primary | ICD-10-CM

## 2021-02-08 PROCEDURE — U0005 INFEC AGEN DETEC AMPLI PROBE: HCPCS

## 2021-02-08 PROCEDURE — U0003 INFECTIOUS AGENT DETECTION BY NUCLEIC ACID (DNA OR RNA); SEVERE ACUTE RESPIRATORY SYNDROME CORONAVIRUS 2 (SARS-COV-2) (CORONAVIRUS DISEASE [COVID-19]), AMPLIFIED PROBE TECHNIQUE, MAKING USE OF HIGH THROUGHPUT TECHNOLOGIES AS DESCRIBED BY CMS-2020-01-R: HCPCS

## 2021-02-09 LAB
SARS-COV-2, RAPID: NORMAL
SARS-COV-2: NORMAL
SARS-COV-2: NOT DETECTED
SOURCE: NORMAL

## 2021-02-09 RX ORDER — OXYCODONE HYDROCHLORIDE 5 MG/1
5 TABLET ORAL EVERY 6 HOURS PRN
Qty: 120 TABLET | Refills: 0 | Status: CANCELLED | OUTPATIENT
Start: 2021-02-09 | End: 2021-03-11

## 2021-02-09 NOTE — PROGRESS NOTES
[facility] is requesting a refill on the following medications:   Requested Prescriptions     Pending Prescriptions Disp Refills    oxyCODONE (ROXICODONE) 5 MG immediate release tablet 120 tablet 0     Sig: Take 1 tablet by mouth every 6 hours as needed for Pain (pain) for up to 30 days. Last OV 1/28/21    Future Appointments   Date Time Provider Lucie Su   2/15/2021  9:20 AM SCHEDULE, [facility] COVID SCREENING Shayy Peña [address]   2/22/2021  8:00 AM JOSSUE Garcia - CNP [facility]   6/14/2021  9:00 AM Vivienne Middleton MD [facility]       OARRS report sent to Dr. Villa France through alternative route for review.

## 2021-02-10 ENCOUNTER — ANESTHESIA EVENT (OUTPATIENT)
Dept: OPERATING ROOM | Age: 61
End: 2021-02-10
Payer: MEDICARE

## 2021-02-12 ENCOUNTER — ANESTHESIA (OUTPATIENT)
Dept: OPERATING ROOM | Age: 61
End: 2021-02-12
Payer: MEDICARE

## 2021-02-12 ENCOUNTER — APPOINTMENT (OUTPATIENT)
Dept: GENERAL RADIOLOGY | Age: 61
End: 2021-02-12
Attending: PAIN MEDICINE
Payer: MEDICARE

## 2021-02-12 ENCOUNTER — HOSPITAL ENCOUNTER (OUTPATIENT)
Age: 61
Setting detail: OUTPATIENT SURGERY
Discharge: HOME | End: 2021-02-12
Attending: PAIN MEDICINE | Admitting: PAIN MEDICINE
Payer: MEDICARE

## 2021-02-12 VITALS
HEIGHT: 69 IN | TEMPERATURE: 98.2 F | HEART RATE: 90 BPM | WEIGHT: 255 LBS | BODY MASS INDEX: 37.77 KG/M2 | DIASTOLIC BLOOD PRESSURE: 70 MMHG | RESPIRATION RATE: 16 BRPM | OXYGEN SATURATION: 95 % | SYSTOLIC BLOOD PRESSURE: 142 MMHG

## 2021-02-12 VITALS
SYSTOLIC BLOOD PRESSURE: 168 MMHG | OXYGEN SATURATION: 99 % | RESPIRATION RATE: 15 BRPM | DIASTOLIC BLOOD PRESSURE: 82 MMHG

## 2021-02-12 PROCEDURE — U0003 INFECTIOUS AGENT DETECTION BY NUCLEIC ACID (DNA OR RNA); SEVERE ACUTE RESPIRATORY SYNDROME CORONAVIRUS 2 (SARS-COV-2) (CORONAVIRUS DISEASE [COVID-19]), AMPLIFIED PROBE TECHNIQUE, MAKING USE OF HIGH THROUGHPUT TECHNOLOGIES AS DESCRIBED BY CMS-2020-01-R: HCPCS

## 2021-02-12 PROCEDURE — 7100000010 HC PHASE II RECOVERY - FIRST 15 MIN: Performed by: PAIN MEDICINE

## 2021-02-12 PROCEDURE — 3600000012 HC SURGERY LEVEL 2 ADDTL 15MIN: Performed by: PAIN MEDICINE

## 2021-02-12 PROCEDURE — 64625 RF ABLTJ NRV NRVTG SI JT: CPT | Performed by: PAIN MEDICINE

## 2021-02-12 PROCEDURE — 2500000003 HC RX 250 WO HCPCS: Performed by: PAIN MEDICINE

## 2021-02-12 PROCEDURE — 6360000002 HC RX W HCPCS: Performed by: NURSE ANESTHETIST, CERTIFIED REGISTERED

## 2021-02-12 PROCEDURE — 3700000001 HC ADD 15 MINUTES (ANESTHESIA): Performed by: PAIN MEDICINE

## 2021-02-12 PROCEDURE — 2580000003 HC RX 258: Performed by: ANESTHESIOLOGY

## 2021-02-12 PROCEDURE — 3600000002 HC SURGERY LEVEL 2 BASE: Performed by: PAIN MEDICINE

## 2021-02-12 PROCEDURE — 3209999900 FLUORO FOR SURGICAL PROCEDURES

## 2021-02-12 PROCEDURE — 2709999900 HC NON-CHARGEABLE SUPPLY: Performed by: PAIN MEDICINE

## 2021-02-12 PROCEDURE — 7100000011 HC PHASE II RECOVERY - ADDTL 15 MIN: Performed by: PAIN MEDICINE

## 2021-02-12 PROCEDURE — U0005 INFEC AGEN DETEC AMPLI PROBE: HCPCS

## 2021-02-12 PROCEDURE — 3700000000 HC ANESTHESIA ATTENDED CARE: Performed by: PAIN MEDICINE

## 2021-02-12 RX ORDER — BUPIVACAINE HYDROCHLORIDE 2.5 MG/ML
INJECTION, SOLUTION EPIDURAL; INFILTRATION; INTRACAUDAL PRN
Status: DISCONTINUED | OUTPATIENT
Start: 2021-02-12 | End: 2021-02-12 | Stop reason: ALTCHOICE

## 2021-02-12 RX ORDER — MEPERIDINE HYDROCHLORIDE 50 MG/ML
12.5 INJECTION INTRAMUSCULAR; INTRAVENOUS; SUBCUTANEOUS EVERY 5 MIN PRN
Status: DISCONTINUED | OUTPATIENT
Start: 2021-02-12 | End: 2021-02-12 | Stop reason: HOSPADM

## 2021-02-12 RX ORDER — FENTANYL CITRATE 50 UG/ML
25 INJECTION, SOLUTION INTRAMUSCULAR; INTRAVENOUS EVERY 5 MIN PRN
Status: DISCONTINUED | OUTPATIENT
Start: 2021-02-12 | End: 2021-02-12 | Stop reason: HOSPADM

## 2021-02-12 RX ORDER — MIDAZOLAM HYDROCHLORIDE 1 MG/ML
INJECTION INTRAMUSCULAR; INTRAVENOUS PRN
Status: DISCONTINUED | OUTPATIENT
Start: 2021-02-12 | End: 2021-02-12 | Stop reason: SDUPTHER

## 2021-02-12 RX ORDER — ONDANSETRON 2 MG/ML
4 INJECTION INTRAMUSCULAR; INTRAVENOUS
Status: DISCONTINUED | OUTPATIENT
Start: 2021-02-12 | End: 2021-02-12 | Stop reason: HOSPADM

## 2021-02-12 RX ORDER — HYDRALAZINE HYDROCHLORIDE 20 MG/ML
5 INJECTION INTRAMUSCULAR; INTRAVENOUS EVERY 10 MIN PRN
Status: DISCONTINUED | OUTPATIENT
Start: 2021-02-12 | End: 2021-02-12 | Stop reason: HOSPADM

## 2021-02-12 RX ORDER — SODIUM CHLORIDE 0.9 % (FLUSH) 0.9 %
10 SYRINGE (ML) INJECTION EVERY 12 HOURS SCHEDULED
Status: DISCONTINUED | OUTPATIENT
Start: 2021-02-12 | End: 2021-02-12 | Stop reason: HOSPADM

## 2021-02-12 RX ORDER — SODIUM CHLORIDE 9 MG/ML
INJECTION, SOLUTION INTRAVENOUS CONTINUOUS
Status: DISCONTINUED | OUTPATIENT
Start: 2021-02-12 | End: 2021-02-12 | Stop reason: HOSPADM

## 2021-02-12 RX ORDER — FENTANYL CITRATE 50 UG/ML
INJECTION, SOLUTION INTRAMUSCULAR; INTRAVENOUS PRN
Status: DISCONTINUED | OUTPATIENT
Start: 2021-02-12 | End: 2021-02-12 | Stop reason: SDUPTHER

## 2021-02-12 RX ORDER — MORPHINE SULFATE 1 MG/ML
1 INJECTION, SOLUTION EPIDURAL; INTRATHECAL; INTRAVENOUS EVERY 5 MIN PRN
Status: DISCONTINUED | OUTPATIENT
Start: 2021-02-12 | End: 2021-02-12 | Stop reason: HOSPADM

## 2021-02-12 RX ORDER — DIPHENHYDRAMINE HYDROCHLORIDE 50 MG/ML
12.5 INJECTION INTRAMUSCULAR; INTRAVENOUS
Status: DISCONTINUED | OUTPATIENT
Start: 2021-02-12 | End: 2021-02-12 | Stop reason: HOSPADM

## 2021-02-12 RX ORDER — HYDROCODONE BITARTRATE AND ACETAMINOPHEN 5; 325 MG/1; MG/1
1 TABLET ORAL PRN
Status: DISCONTINUED | OUTPATIENT
Start: 2021-02-12 | End: 2021-02-12 | Stop reason: HOSPADM

## 2021-02-12 RX ORDER — SODIUM CHLORIDE 0.9 % (FLUSH) 0.9 %
10 SYRINGE (ML) INJECTION PRN
Status: DISCONTINUED | OUTPATIENT
Start: 2021-02-12 | End: 2021-02-12 | Stop reason: HOSPADM

## 2021-02-12 RX ORDER — HYDROCODONE BITARTRATE AND ACETAMINOPHEN 5; 325 MG/1; MG/1
2 TABLET ORAL PRN
Status: DISCONTINUED | OUTPATIENT
Start: 2021-02-12 | End: 2021-02-12 | Stop reason: HOSPADM

## 2021-02-12 RX ORDER — OXYCODONE HYDROCHLORIDE 5 MG/1
5 TABLET ORAL EVERY 6 HOURS PRN
Qty: 120 TABLET | Refills: 0 | OUTPATIENT
Start: 2021-02-12 | End: 2021-03-14

## 2021-02-12 RX ORDER — SODIUM CHLORIDE, SODIUM LACTATE, POTASSIUM CHLORIDE, CALCIUM CHLORIDE 600; 310; 30; 20 MG/100ML; MG/100ML; MG/100ML; MG/100ML
INJECTION, SOLUTION INTRAVENOUS CONTINUOUS
Status: DISCONTINUED | OUTPATIENT
Start: 2021-02-12 | End: 2021-02-12 | Stop reason: HOSPADM

## 2021-02-12 RX ORDER — PROMETHAZINE HYDROCHLORIDE 25 MG/ML
6.25 INJECTION, SOLUTION INTRAMUSCULAR; INTRAVENOUS
Status: DISCONTINUED | OUTPATIENT
Start: 2021-02-12 | End: 2021-02-12 | Stop reason: HOSPADM

## 2021-02-12 RX ADMIN — FENTANYL CITRATE 100 MCG: 50 INJECTION, SOLUTION INTRAMUSCULAR; INTRAVENOUS at 14:46

## 2021-02-12 RX ADMIN — MIDAZOLAM HYDROCHLORIDE 2 MG: 1 INJECTION, SOLUTION INTRAMUSCULAR; INTRAVENOUS at 14:46

## 2021-02-12 RX ADMIN — Medication 5 ML: at 14:46

## 2021-02-12 ASSESSMENT — COPD QUESTIONNAIRES: CAT_SEVERITY: NO INTERVAL CHANGE

## 2021-02-12 ASSESSMENT — PULMONARY FUNCTION TESTS
PIF_VALUE: 1

## 2021-02-12 ASSESSMENT — LIFESTYLE VARIABLES: SMOKING_STATUS: 1

## 2021-02-12 NOTE — ANESTHESIA POSTPROCEDURE EVALUATION
POST- ANESTHESIA EVALUATION       Pt Name: Fer Barreto  MRN: [id number]  YOB: 1960  Date of evaluation: 2/12/2021  Time:  3:06 PM      /69   Pulse 94   Temp 98.2 °F (36.8 °C) (Infrared)   Resp 16   Ht 5' 9\" (1.753 m)   Wt 255 lb (115.7 kg)   SpO2 95%   BMI 37.66 kg/m²      Consciousness Level  Awake  Cardiopulmonary Status  Stable  Pain Adequately Treated YES  Nausea / Vomiting  NO  Adequate Hydration  YES  Anesthesia Related Complications NONE      Electronically signed by Kelle Magallon MD on 2/12/2021 at 3:06 PM       Department of Anesthesiology  Postprocedure Note    Patient: Fer Barreto  MRN: [id number]  YOB: 1960  Date of evaluation: 2/12/2021  Time:  3:06 PM     Procedure Summary     Date: 02/12/21 Room / Location: [address] [place] [address] / [address] [place] [address]    Anesthesia Start: 3412 Anesthesia Stop: 2119    Procedure: NERVE RADIOFREQUENCY ABLATION SI (Left ) Diagnosis: (M46.1)    Surgeons: Vincenzo Quiles MD Responsible Provider: Kelle Magallon MD    Anesthesia Type: MAC ASA Status: 3          Anesthesia Type: MAC    Hilary Phase I: Hilary Score: 10    Hilary Phase II:      Last vitals: Reviewed and per EMR flowsheets.        Anesthesia Post Evaluation

## 2021-02-12 NOTE — ANESTHESIA PRE PROCEDURE
Department of Anesthesiology  Preprocedure Note       Name:  Christina Sierra   Age:  56 y.o.  :  1960                                          MRN:  [id number]         Date:  2021      Surgeon: Cornelio Ramírez):  José Souza MD    Procedure: Procedure(s):  NERVE RADIOFREQUENCY ABLATION SI    Medications prior to admission:   Prior to Admission medications    Medication Sig Start Date End Date Taking? Authorizing Provider   tiZANidine (ZANAFLEX) 4 MG tablet Take 1 tablet by mouth 2 times daily as needed (for muscle spasms) 21 Yes José Souza MD   gabapentin (NEURONTIN) 800 MG tablet Take 1 tablet by mouth every 8 hours for 30 days.  21 Yes José Souza MD   amLODIPine (NORVASC) 5 MG tablet Take 1 tablet by mouth daily 20  Yes Dia Carvajal DO   DULoxetine (CYMBALTA) 60 MG extended release capsule Take 60 mg by mouth 20  Yes Historical Provider, MD   simvastatin (ZOCOR) 80 MG tablet Take 1 tablet by mouth every evening 20  Yes Dia Carvajal DO   acetaminophen (TYLENOL) 325 MG tablet Take 650 mg by mouth nightly   Yes Historical Provider, MD   cetirizine (ZYRTEC) 10 MG tablet TAKE ONE TABLET BY MOUTH DAILY 19  Yes Beny Garcia MD   traZODone (DESYREL) 150 MG tablet Take 150 mg by mouth nightly   Yes Historical Provider, MD   SYMBICORT 160-4.5 MCG/ACT AERO INHALE TWO PUFFS BY MOUTH TWICE A DAY 19   Beny Garcia MD   diclofenac (VOLTAREN) 75 MG EC tablet Take 75 mg by mouth 2 times daily 19   Historical Provider, MD   omeprazole (PRILOSEC) 20 MG delayed release capsule TAKE ONE CAPSULE BY MOUTH TWICE A DAY 30 MINUTES BEFORE MEALS 18   Beny Garcia MD   nitroGLYCERIN (NITROSTAT) 0.4 MG SL tablet Place 1 tablet under the tongue every 5 minutes as needed for Chest pain 18   Beny Garcia MD   albuterol sulfate  (90 Base) MCG/ACT inhaler Inhale 2 puffs into the lungs every 6 hours as needed for Wheezing 18 Bunny Vargas MD   aspirin 81 MG EC tablet Take 1 tablet by mouth daily 3/9/16   Jones Dunbar MD       Current medications:    No current facility-administered medications for this encounter. Allergies:  No Known Allergies    Problem List:    Patient Active Problem List   Diagnosis Code    Cervical stenosis of spine M48.02    Cervicalgia M54.2    COPD (chronic obstructive pulmonary disease) ([facility] J44.9    Smoker F17.200    Mild depression ([facility]) F32.0    GERD (gastroesophageal reflux disease) K21.9    Mixed hyperlipidemia E78.2    Carpal tunnel syndrome of left wrist G56.02    Medication monitoring encounter Z51.81    DDD (degenerative disc disease), lumbar M51.36    Displacement of lumbar intervertebral disc without myelopathy M51.26    Therapeutic opioid induced constipation K59.03, T40.2X5A    Radiculopathy of cervical spine M54.12    Essential hypertension I10    Major depression, single episode, in complete remission ([address] F32.5    Prediabetes R73.03    Lung nodule R91.1    Allergic sinusitis J30.9    Arthritis M19.90    Spinal stenosis of lumbar region without neurogenic claudication M48.061    Morbidly obese ([address] E66.01       Past Medical History:        Diagnosis Date    Abnormal stress ECG     Angina pectoris ([facility])     CAD (coronary artery disease)     Carpal tunnel syndrome     left    Cervical stenosis of spine     Cervicalgia     chronic pain    Chronic back pain     COPD (chronic obstructive pulmonary disease) ([facility])     DDD (degenerative disc disease)     Elbow fracture, right     Fractures     elbow    GERD (gastroesophageal reflux disease)     Gout     Right great toe    Hyperlipidemia     Hypertension     Mild depression ([address] 9/26/2013    2 suicide attempts by girlfriend related to depression.     Morbidly obese ([address] 10/30/2020    Osteoarthritis of right knee     Sinus headache 10/10/2018    Sleep apnea     Sleep disturbance     Smoker     Tendonitis of foot     right       Past Surgical History:        Procedure Laterality Date    CARDIAC CATHETERIZATION  7/8/14    Non Obstructive CAD     CERVICAL SPINE SURGERY  7/13/12     C2-3-4-5    COLONOSCOPY      FRACTURE SURGERY      Rt elbow     HC INJECT OTHER PERPHRL NERV Bilateral 5/9/2019    INJECTION SPINAL performed by Annika Mas MD at [address] [place] [address] Bilateral 8/15/2019    SACROILIAC JOINT INJECTION performed by Annika Mas MD at [facility]  2/5/16    premier tens    NERVE BLOCK Left 02/07/2020    RFA left side    NERVE BLOCK Left 02/07/2020     NERVE RADIOFREQUENCY ABLATION - SACROILIAC (Left )    OTHER SURGICAL HISTORY  08/15/2019    sacroiliac joint injection    PAIN MANAGEMENT PROCEDURE Left 2/7/2020    NERVE RADIOFREQUENCY ABLATION - SACROILIAC performed by Annika Mas MD at [address] [place] [address]         Social History:    Social History     Tobacco Use    Smoking status: Current Every Day Smoker     Packs/day: 1.50     Years: 40.00     Pack years: 60.00     Types: Cigarettes    Smokeless tobacco: Former User     Quit date: 3/12/2015    Tobacco comment: wants to discuss  chantix   Substance Use Topics    Alcohol use: Not Currently     Alcohol/week: 0.0 standard drinks     Comment: 3 x year                                Ready to quit: Not Answered  Counseling given: Not Answered  Comment: wants to discuss  chantix      Vital Signs (Current): There were no vitals filed for this visit.                                            BP Readings from Last 3 Encounters:   12/14/20 (!) 160/84   03/04/20 139/80   02/07/20 (!) 174/79       NPO Status:                                                                                 BMI:   Wt Readings from Last 3 Encounters:   12/14/20 248 lb (112.5 kg)   05/28/20 238 lb (108 kg)   04/07/20 238 lb (108 kg)     There is no height or weight on file to calculate BMI.    CBC:   Lab Results   Component Value Date    WBC 7.0 11/17/2020    RBC 4.97 11/17/2020    RBC 4.75 02/10/2012    HGB 14.4 11/17/2020    HCT 42.7 11/17/2020    MCV 85.9 11/17/2020    RDW 14.9 11/17/2020     11/17/2020     02/10/2012       CMP:   Lab Results   Component Value Date     11/17/2020    K 5.2 11/17/2020     11/17/2020    CO2 28 11/17/2020    BUN 9 11/17/2020    CREATININE 0.77 11/17/2020    GFRAA >60 11/17/2020    LABGLOM >60 11/17/2020    GLUCOSE 126 11/17/2020    GLUCOSE 99 02/10/2012    PROT 7.1 11/17/2020    CALCIUM 10.0 11/17/2020    BILITOT 0.26 11/17/2020    ALKPHOS 99 11/17/2020    AST 21 11/17/2020    ALT 21 11/17/2020       POC Tests: No results for input(s): POCGLU, POCNA, POCK, POCCL, POCBUN, POCHEMO, POCHCT in the last 72 hours.     Coags:   Lab Results   Component Value Date    PROTIME 12.3 07/21/2019    INR 0.9 07/21/2019    APTT 33.3 07/21/2019       HCG (If Applicable): No results found for: PREGTESTUR, PREGSERUM, HCG, HCGQUANT     ABGs: No results found for: PHART, PO2ART, SMQ4SQZ, NPW0HNO, BEART, P9LCRZGH     Type & Screen (If Applicable):  No results found for: LABABO, LABRH    Drug/Infectious Status (If Applicable):  No results found for: HIV, HEPCAB    COVID-19 Screening (If Applicable):   Lab Results   Component Value Date    COVID19 Not Detected 02/08/2021         Anesthesia Evaluation  Patient summary reviewed and Nursing notes reviewed no history of anesthetic complications:   Airway: Mallampati: III  TM distance: >3 FB   Neck ROM: full  Mouth opening: > = 3 FB Dental: normal exam         Pulmonary:normal exam  breath sounds clear to auscultation  (+) COPD: no interval change,  sleep apnea:  current smoker                           Cardiovascular:    (+) hypertension: no interval change, angina: no interval change, CAD: no interval change, hyperlipidemia        Rhythm: regular  Rate: normal Neuro/Psych:   (+) neuromuscular disease:, headaches:, psychiatric history: stable with treatmentdepression/anxiety              ROS comment: Cervical stenosis of spine GI/Hepatic/Renal:   (+) GERD: no interval change,          ROS comment: Severe obesity. Endo/Other:    (+) : arthritis: OA., .                 Abdominal:   (+) obese,     Abdomen: soft. Vascular: negative vascular ROS. Anesthesia Plan      MAC     ASA 3             Anesthetic plan and risks discussed with patient. Plan discussed with CRNA.                   Annmarie Ellis MD   2/12/2021

## 2021-02-12 NOTE — OP NOTE
Sacroiliac Radiofrequency Ablation:  SURGEON: Meredith Antony    PRE-OP DIAGNOSIS: Sacroiliitis (M46.1), Lumbosacral Spondylosis (m47.817), Low Back Pain (M54.5)    POST-OP DIAGNOSIS: Same. PRECEDURE PRERFORMED: Left SI Joint Radiofrequency Ablation via denervation of left dorsal ramus L5 and lateral branches S1 S2 S3. EBL: minimal    INDICATION: Satisfactory relief with SI joint injections/previous SI joint RFA presents with recurrent pain. INFORMED CONSENT: The risks, benefits and options were explained to the patient, including the risks of injection, nerve damage resulting in weakness of the lower extremities or increased pain, no relief, worse back pain, infection, or other risks. The patient understood the risks and consented to the procedure. The patient was counseled at length about the risks of irasema Covid-19 during their perioperative period and any recovery window from their procedure. The patient was made aware that irasema Covid-19  may worsen their prognosis for recovering from their procedure  and lend to a higher morbidity and/or mortality risk. All material risks, benefits, and reasonable alternatives including postponing the procedure were discussed. The patient does wish to proceed with the procedure at this time. SEDATION: Yes, the patient received IV sedation and Monitors including pulse oximetry, blood pressure cuff, and other necessary monitors were applied. The patients level of consciousness and physiological status were monitored throughout the case. PREP: Timeout was performed. The patient was placed prone and sacrum prepped with chloroprep and draped appropriately. 10ml of 0.5% lidocaine was used to anesthetize the skin and subcutaneous tissue. PROCEDURE NOTE:  A 20 gauge x 10 cm x 10 mm active tip  Needle was advanced to the appropriate location of the dorsal ramus of L5 on the Left under fluoro guidance.  Afterwards 20-gauge, 10 cm, 10 mm active tip

## 2021-02-12 NOTE — H&P
Pain Pre-Op H&P Note    Vanessa Antony    HPI: Rafael Ferrell  presents with back pain. Past Medical History:   Diagnosis Date    Abnormal stress ECG     Angina pectoris (HCC)     CAD (coronary artery disease)     Carpal tunnel syndrome     left    Cervical stenosis of spine     Cervicalgia     chronic pain    Chronic back pain     COPD (chronic obstructive pulmonary disease) (HCC)     DDD (degenerative disc disease)     Elbow fracture, right     Fractures     elbow    GERD (gastroesophageal reflux disease)     Gout     Right great toe    Hyperlipidemia     Hypertension     Mild depression ([address] 9/26/2013    2 suicide attempts by girlfriend related to depression.     Morbidly obese ([facility] 10/30/2020    Osteoarthritis of right knee     Sinus headache 10/10/2018    Sleep apnea     Sleep disturbance     Smoker     Tendonitis of foot     right       Past Surgical History:   Procedure Laterality Date    CARDIAC CATHETERIZATION  7/8/14    Non Obstructive CAD     CERVICAL SPINE SURGERY  7/13/12     C2-3-4-5    COLONOSCOPY      FRACTURE SURGERY      Rt elbow     HC INJECT OTHER PERPHRL NERV Bilateral 5/9/2019    INJECTION SPINAL performed by Josefa Rubinstein, MD at [address] [place] [address] Bilateral 8/15/2019    SACROILIAC JOINT INJECTION performed by Josefa [facility]instein, MD at [facility]  2/5/16    premier tens    NERVE BLOCK Left 02/07/2020    RFA left side    NERVE BLOCK Left 02/07/2020     NERVE RADIOFREQUENCY ABLATION - SACROILIAC (Left )    OTHER SURGICAL HISTORY  08/15/2019    sacroiliac joint injection    PAIN MANAGEMENT PROCEDURE Left 2/7/2020    NERVE RADIOFREQUENCY ABLATION - SACROILIAC performed by Josefa Rubinstein, MD at [address] [place] [address]         Family History   Problem Relation Age of Onset    Heart Disease Mother     COPD Father     Cancer Maternal Aunt 64        breast cancer     Cancer Maternal Uncle 60        prostrate cancer        No Known Allergies    No current facility-administered medications for this encounter. Social History     Tobacco Use    Smoking status: Current Every Day Smoker     Packs/day: 1.50     Years: 40.00     Pack years: 60.00     Types: Cigarettes    Smokeless tobacco: Former User     Quit date: 3/12/2015    Tobacco comment: wants to discuss  chantix   Substance Use Topics    Alcohol use: Not Currently     Alcohol/week: 0.0 standard drinks     Comment: 3 x year       Review of Systems:   Focused review of systems was performed, and negative as pertinent to diagnosis, except as stated in HPI. Physical Exam  Constitutional:       Appearance: Normal appearance. Pulmonary:      Effort: Pulmonary effort is normal.   Neurological:      Mental Status: He is alert. Psychiatric:         Attention and Perception: Attention and perception normal.         Mood and Affect: Mood and affect normal.         Patient's current physical status, medications, medical history, and HPI have been reviewed and updated as appropriate on this date: 02/12/21    Risk/Benefit(s): The risks, benefits, alternatives, and potential complications have been discussed with the patient/family and informed consent has been obtained for the procedure/sedation.     Diagnosis:   M46.1      Plan: Arabella 24

## 2021-02-15 ENCOUNTER — HOSPITAL ENCOUNTER (OUTPATIENT)
Dept: LAB | Age: 61
Setting detail: SPECIMEN
Discharge: HOME | End: 2021-02-15
Payer: MEDICARE

## 2021-02-15 DIAGNOSIS — Z01.818 PRE-OP TESTING: Primary | ICD-10-CM

## 2021-02-16 LAB
SARS-COV-2: NORMAL
SARS-COV-2: NOT DETECTED
SOURCE: NORMAL

## 2021-02-18 ENCOUNTER — ANESTHESIA EVENT (OUTPATIENT)
Dept: OPERATING ROOM | Age: 61
End: 2021-02-18
Payer: MEDICARE

## 2021-02-19 ENCOUNTER — ANESTHESIA (OUTPATIENT)
Dept: OPERATING ROOM | Age: 61
End: 2021-02-19
Payer: MEDICARE

## 2021-02-19 ENCOUNTER — HOSPITAL ENCOUNTER (OUTPATIENT)
Age: 61
Setting detail: OUTPATIENT SURGERY
Discharge: HOME | End: 2021-02-19
Attending: PAIN MEDICINE | Admitting: PAIN MEDICINE
Payer: MEDICARE

## 2021-02-19 ENCOUNTER — APPOINTMENT (OUTPATIENT)
Dept: GENERAL RADIOLOGY | Age: 61
End: 2021-02-19
Attending: PAIN MEDICINE
Payer: MEDICARE

## 2021-02-19 VITALS
RESPIRATION RATE: 19 BRPM | HEART RATE: 87 BPM | OXYGEN SATURATION: 96 % | TEMPERATURE: 97.9 F | WEIGHT: 259 LBS | DIASTOLIC BLOOD PRESSURE: 87 MMHG | BODY MASS INDEX: 37.08 KG/M2 | HEIGHT: 70 IN | SYSTOLIC BLOOD PRESSURE: 133 MMHG

## 2021-02-19 VITALS
SYSTOLIC BLOOD PRESSURE: 176 MMHG | DIASTOLIC BLOOD PRESSURE: 96 MMHG | OXYGEN SATURATION: 99 % | RESPIRATION RATE: 19 BRPM

## 2021-02-19 PROCEDURE — 2709999900 HC NON-CHARGEABLE SUPPLY: Performed by: PAIN MEDICINE

## 2021-02-19 PROCEDURE — 7100000011 HC PHASE II RECOVERY - ADDTL 15 MIN: Performed by: PAIN MEDICINE

## 2021-02-19 PROCEDURE — 3700000000 HC ANESTHESIA ATTENDED CARE: Performed by: PAIN MEDICINE

## 2021-02-19 PROCEDURE — 3209999900 FLUORO FOR SURGICAL PROCEDURES

## 2021-02-19 PROCEDURE — 64625 RF ABLTJ NRV NRVTG SI JT: CPT | Performed by: PAIN MEDICINE

## 2021-02-19 PROCEDURE — 2500000003 HC RX 250 WO HCPCS: Performed by: PAIN MEDICINE

## 2021-02-19 PROCEDURE — 3700000001 HC ADD 15 MINUTES (ANESTHESIA): Performed by: PAIN MEDICINE

## 2021-02-19 PROCEDURE — 7100000010 HC PHASE II RECOVERY - FIRST 15 MIN: Performed by: PAIN MEDICINE

## 2021-02-19 PROCEDURE — 3600000002 HC SURGERY LEVEL 2 BASE: Performed by: PAIN MEDICINE

## 2021-02-19 PROCEDURE — 6360000002 HC RX W HCPCS: Performed by: NURSE ANESTHETIST, CERTIFIED REGISTERED

## 2021-02-19 PROCEDURE — 3600000012 HC SURGERY LEVEL 2 ADDTL 15MIN: Performed by: PAIN MEDICINE

## 2021-02-19 RX ORDER — MORPHINE SULFATE 2 MG/ML
2 INJECTION, SOLUTION INTRAMUSCULAR; INTRAVENOUS EVERY 5 MIN PRN
Status: DISCONTINUED | OUTPATIENT
Start: 2021-02-19 | End: 2021-02-19 | Stop reason: HOSPADM

## 2021-02-19 RX ORDER — OXYCODONE HYDROCHLORIDE AND ACETAMINOPHEN 5; 325 MG/1; MG/1
2 TABLET ORAL PRN
Status: DISCONTINUED | OUTPATIENT
Start: 2021-02-19 | End: 2021-02-19 | Stop reason: HOSPADM

## 2021-02-19 RX ORDER — SODIUM CHLORIDE, SODIUM LACTATE, POTASSIUM CHLORIDE, CALCIUM CHLORIDE 600; 310; 30; 20 MG/100ML; MG/100ML; MG/100ML; MG/100ML
INJECTION, SOLUTION INTRAVENOUS CONTINUOUS
Status: DISCONTINUED | OUTPATIENT
Start: 2021-02-19 | End: 2021-02-19 | Stop reason: HOSPADM

## 2021-02-19 RX ORDER — OXYCODONE HYDROCHLORIDE AND ACETAMINOPHEN 5; 325 MG/1; MG/1
1 TABLET ORAL PRN
Status: DISCONTINUED | OUTPATIENT
Start: 2021-02-19 | End: 2021-02-19 | Stop reason: HOSPADM

## 2021-02-19 RX ORDER — 0.9 % SODIUM CHLORIDE 0.9 %
500 INTRAVENOUS SOLUTION INTRAVENOUS
Status: DISCONTINUED | OUTPATIENT
Start: 2021-02-19 | End: 2021-02-19 | Stop reason: HOSPADM

## 2021-02-19 RX ORDER — DIPHENHYDRAMINE HYDROCHLORIDE 50 MG/ML
12.5 INJECTION INTRAMUSCULAR; INTRAVENOUS
Status: DISCONTINUED | OUTPATIENT
Start: 2021-02-19 | End: 2021-02-19 | Stop reason: HOSPADM

## 2021-02-19 RX ORDER — BUPIVACAINE HYDROCHLORIDE 2.5 MG/ML
INJECTION, SOLUTION EPIDURAL; INFILTRATION; INTRACAUDAL PRN
Status: DISCONTINUED | OUTPATIENT
Start: 2021-02-19 | End: 2021-02-19 | Stop reason: ALTCHOICE

## 2021-02-19 RX ORDER — SODIUM CHLORIDE 0.9 % (FLUSH) 0.9 %
10 SYRINGE (ML) INJECTION EVERY 12 HOURS SCHEDULED
Status: DISCONTINUED | OUTPATIENT
Start: 2021-02-19 | End: 2021-02-19 | Stop reason: HOSPADM

## 2021-02-19 RX ORDER — LIDOCAINE HYDROCHLORIDE 10 MG/ML
1 INJECTION, SOLUTION EPIDURAL; INFILTRATION; INTRACAUDAL; PERINEURAL
Status: DISCONTINUED | OUTPATIENT
Start: 2021-02-19 | End: 2021-02-19 | Stop reason: HOSPADM

## 2021-02-19 RX ORDER — FENTANYL CITRATE 50 UG/ML
INJECTION, SOLUTION INTRAMUSCULAR; INTRAVENOUS PRN
Status: DISCONTINUED | OUTPATIENT
Start: 2021-02-19 | End: 2021-02-19 | Stop reason: SDUPTHER

## 2021-02-19 RX ORDER — PROMETHAZINE HYDROCHLORIDE 25 MG/ML
12.5 INJECTION, SOLUTION INTRAMUSCULAR; INTRAVENOUS
Status: DISCONTINUED | OUTPATIENT
Start: 2021-02-19 | End: 2021-02-19 | Stop reason: HOSPADM

## 2021-02-19 RX ORDER — ONDANSETRON 2 MG/ML
4 INJECTION INTRAMUSCULAR; INTRAVENOUS
Status: DISCONTINUED | OUTPATIENT
Start: 2021-02-19 | End: 2021-02-19 | Stop reason: HOSPADM

## 2021-02-19 RX ORDER — MEPERIDINE HYDROCHLORIDE 50 MG/ML
12.5 INJECTION INTRAMUSCULAR; INTRAVENOUS; SUBCUTANEOUS EVERY 5 MIN PRN
Status: DISCONTINUED | OUTPATIENT
Start: 2021-02-19 | End: 2021-02-19 | Stop reason: HOSPADM

## 2021-02-19 RX ORDER — LABETALOL 20 MG/4 ML (5 MG/ML) INTRAVENOUS SYRINGE
5 EVERY 10 MIN PRN
Status: DISCONTINUED | OUTPATIENT
Start: 2021-02-19 | End: 2021-02-19 | Stop reason: HOSPADM

## 2021-02-19 RX ORDER — SODIUM CHLORIDE 0.9 % (FLUSH) 0.9 %
10 SYRINGE (ML) INJECTION PRN
Status: DISCONTINUED | OUTPATIENT
Start: 2021-02-19 | End: 2021-02-19 | Stop reason: HOSPADM

## 2021-02-19 RX ORDER — MIDAZOLAM HYDROCHLORIDE 1 MG/ML
INJECTION INTRAMUSCULAR; INTRAVENOUS PRN
Status: DISCONTINUED | OUTPATIENT
Start: 2021-02-19 | End: 2021-02-19 | Stop reason: SDUPTHER

## 2021-02-19 RX ADMIN — MIDAZOLAM HYDROCHLORIDE 2 MG: 1 INJECTION, SOLUTION INTRAMUSCULAR; INTRAVENOUS at 12:40

## 2021-02-19 RX ADMIN — FENTANYL CITRATE 50 MCG: 50 INJECTION, SOLUTION INTRAMUSCULAR; INTRAVENOUS at 12:42

## 2021-02-19 RX ADMIN — FENTANYL CITRATE 50 MCG: 50 INJECTION, SOLUTION INTRAMUSCULAR; INTRAVENOUS at 12:40

## 2021-02-19 ASSESSMENT — PULMONARY FUNCTION TESTS
PIF_VALUE: 0
PIF_VALUE: 1
PIF_VALUE: 0
PIF_VALUE: 1
PIF_VALUE: 0

## 2021-02-19 ASSESSMENT — PAIN SCALES - GENERAL
PAINLEVEL_OUTOF10: 4
PAINLEVEL_OUTOF10: 4

## 2021-02-19 ASSESSMENT — PAIN DESCRIPTION - INTENSITY: RATING_2: 4

## 2021-02-19 ASSESSMENT — LIFESTYLE VARIABLES: SMOKING_STATUS: 1

## 2021-02-19 ASSESSMENT — PAIN - FUNCTIONAL ASSESSMENT: PAIN_FUNCTIONAL_ASSESSMENT: 0-10

## 2021-02-19 NOTE — OP NOTE
S1, S2, S3 foramen under fluoro guidance. Motor testing performed at 2hz up to 3V with no radicular symptoms. This was confirmed in AP, Lateral, and Oblique views. Aspiration was negative. After administration of 1ml of 0.25% bupivacaine at each site to anesthetize the area radiofrequency ablation was delivered at 80° temperature for 90 seconds. The patient tolerated the procedure well, there where no complications noted the needles were withdrawn. Vital signs remained stable. The patient was then taken to the recovery room in satisfactory conditions. Discharge and follow-up instructions were given to the patient.     [address] [place] [address]

## 2021-02-19 NOTE — H&P
Pain Pre-Op H&P Note    Felipe Antony    HPI: Wallace Ocampo  presents with back pain. Past Medical History:   Diagnosis Date    Abnormal stress ECG     Angina pectoris (HCC)     CAD (coronary artery disease)     Carpal tunnel syndrome     left    Cervical stenosis of spine     Cervicalgia     chronic pain    Chronic back pain     COPD (chronic obstructive pulmonary disease) (HCC)     DDD (degenerative disc disease)     Elbow fracture, right     Fractures     elbow    GERD (gastroesophageal reflux disease)     Gout     Right great toe    Hyperlipidemia     Hypertension     Mild depression ([address] 9/26/2013    2 suicide attempts by girlfriend related to depression.     Morbidly obese ([address] 10/30/2020    Osteoarthritis of right knee     Sinus headache 10/10/2018    Sleep apnea     Sleep disturbance     Smoker     Tendonitis of foot     right       Past Surgical History:   Procedure Laterality Date    CARDIAC CATHETERIZATION  7/8/14    Non Obstructive CAD     CERVICAL SPINE SURGERY  7/13/12     C2-3-4-5    COLONOSCOPY      FRACTURE SURGERY      Rt elbow     HC INJECT OTHER PERPHRL NERV Bilateral 5/9/2019    INJECTION SPINAL performed by Chema Vaz MD at [address] [place] [address] Bilateral 8/15/2019    SACROILIAC JOINT INJECTION performed by [facility]ma Vaz MD at [facility]  2/5/16    premier tens    NERVE BLOCK Left 02/07/2020    RFA left side    NERVE BLOCK Left 02/07/2020     NERVE RADIOFREQUENCY ABLATION - SACROILIAC (Left )    NERVE BLOCK  02/12/2021    NERVE RADIOFREQUENCY ABLATION SI (Left     OTHER SURGICAL HISTORY  08/15/2019    sacroiliac joint injection    PAIN MANAGEMENT PROCEDURE Left 2/7/2020    NERVE RADIOFREQUENCY ABLATION - SACROILIAC performed by Chema Vaz MD at [address] [place] [address] Left 2/12/2021    NERVE RADIOFREQUENCY ABLATION SI performed by Felipe Jameson Moreno Navarrete MD at [address] [place] [address]         Family History   Problem Relation Age of Onset    Heart Disease Mother     COPD Father     Cancer Maternal Aunt 64        breast cancer     Cancer Maternal Uncle 60        prostrate cancer        No Known Allergies      Current Facility-Administered Medications:     lactated ringers infusion, , Intravenous, Continuous, Eugene Chase MD    sodium chloride flush 0.9 % injection 10 mL, 10 mL, Intravenous, 2 times per day, Eugene Chase MD    sodium chloride flush 0.9 % injection 10 mL, 10 mL, Intravenous, PRN, Eugene Chase MD    lidocaine PF 1 % injection 1 mL, 1 mL, Intradermal, Once PRN, Eugene Chase MD    bupivacaine (PF) (MARCAINE) 0.25 % injection, , , PRN, Varsha Headley MD, 10 mL at 02/19/21 1121    meperidine (DEMEROL) injection 12.5 mg, 12.5 mg, Intravenous, Q5 Min PRN, Eugene Chase MD    morphine (PF) injection 2 mg, 2 mg, Intravenous, Q5 Min PRN, Eugene Chase MD    HYDROmorphone (DILAUDID) injection 0.5 mg, 0.5 mg, Intravenous, Q5 Min PRN, Eugene Chase MD    HYDROmorphone (DILAUDID) injection 0.25 mg, 0.25 mg, Intravenous, Q5 Min PRN, Eugene Chase MD    HYDROmorphone (DILAUDID) injection 0.5 mg, 0.5 mg, Intravenous, Q5 Min PRN, Eugene Chase MD    oxyCODONE-acetaminophen (PERCOCET) 5-325 MG per tablet 1 tablet, 1 tablet, Oral, PRN **OR** oxyCODONE-acetaminophen (PERCOCET) 5-325 MG per tablet 2 tablet, 2 tablet, Oral, PRN, Eugene Chase MD    ondansetron (ZOFRAN) injection 4 mg, 4 mg, Intravenous, Once PRN, Eugene Chase MD    promethazine (PHENERGAN) injection 12.5 mg, 12.5 mg, Intravenous, Q15 Min PRN, Eugene Chase MD    0.9 % sodium chloride bolus, 500 mL, Intravenous, Once PRN, Eugene Chase MD    diphenhydrAMINE (BENADRYL) injection 12.5 mg, 12.5 mg, Intravenous, Once PRN, Eugene Chase MD    labetalol (NORMODYNE;TRANDATE) injection syringe 5 mg, 5 mg, Intravenous, Q10 Min PRN, Romaine Mallory MD    Social History     Tobacco Use    Smoking status: Current Every Day Smoker     Packs/day: 1.50     Years: 40.00     Pack years: 60.00     Types: Cigarettes    Smokeless tobacco: Former User     Quit date: 3/12/2015    Tobacco comment: wants to discuss  chantix   Substance Use Topics    Alcohol use: Not Currently     Alcohol/week: 0.0 standard drinks     Comment: 3 x year       Review of Systems:   Focused review of systems was performed, and negative as pertinent to diagnosis, except as stated in HPI. Physical Exam  Constitutional:       Appearance: Normal appearance. Pulmonary:      Effort: Pulmonary effort is normal.   Neurological:      Mental Status: He is alert. Psychiatric:         Attention and Perception: Attention and perception normal.         Mood and Affect: Mood and affect normal.         Patient's current physical status, medications, medical history, and HPI have been reviewed and updated as appropriate on this date: 02/19/21    Risk/Benefit(s): The risks, benefits, alternatives, and potential complications have been discussed with the patient/family and informed consent has been obtained for the procedure/sedation.     Diagnosis:   M54.5, G89.29 LOW BACK PAIN  M46.1 SACROILITIS  M47.817 SPONDYLOSIS WITHOUT MYELOPATHY      Plan: LORETTA Velasquez

## 2021-02-19 NOTE — ANESTHESIA PRE PROCEDURE
Department of Anesthesiology  Preprocedure Note       Name:  Sydney Gordon   Age:  56 y.o.  :  1960                                          MRN:  [id number]         Date:  2021      Surgeon: Valeria Moran):  Xavier Reed MD    Procedure: Procedure(s):  NERVE RADIOFREQUENCY ABLATION SI JOINT    Medications prior to admission:   Prior to Admission medications    Medication Sig Start Date End Date Taking? Authorizing Provider   tiZANidine (ZANAFLEX) 4 MG tablet Take 1 tablet by mouth 2 times daily as needed (for muscle spasms) 21 Yes Xavier Reed MD   gabapentin (NEURONTIN) 800 MG tablet Take 1 tablet by mouth every 8 hours for 30 days.  21 Yes Xavier Reed MD   amLODIPine (NORVASC) 5 MG tablet Take 1 tablet by mouth daily 20  Yes Everardo Grief, DO   DULoxetine (CYMBALTA) 60 MG extended release capsule Take 60 mg by mouth 20  Yes Historical Provider, MD   simvastatin (ZOCOR) 80 MG tablet Take 1 tablet by mouth every evening 20  Yes Everardo Beasleyief, DO   acetaminophen (TYLENOL) 325 MG tablet Take 650 mg by mouth nightly   Yes Historical Provider, MD   omeprazole (PRILOSEC) 20 MG delayed release capsule TAKE ONE CAPSULE BY MOUTH TWICE A DAY 30 MINUTES BEFORE MEALS 18  Yes Hoa Watkins MD   aspirin 81 MG EC tablet Take 1 tablet by mouth daily 3/9/16  Yes Kassy Morales MD   SYMBICORT 160-4.5 MCG/ACT AERO INHALE TWO PUFFS BY MOUTH TWICE A DAY 19   Hoa Watkins MD   cetirizine (ZYRTEC) 10 MG tablet TAKE ONE TABLET BY MOUTH DAILY 19   Hoa Watkins MD   diclofenac (VOLTAREN) 75 MG EC tablet Take 75 mg by mouth 2 times daily 19   Historical Provider, MD   traZODone (DESYREL) 150 MG tablet Take 150 mg by mouth nightly    Historical Provider, MD   nitroGLYCERIN (NITROSTAT) 0.4 MG SL tablet Place 1 tablet under the tongue every 5 minutes as needed for Chest pain 18   Hoa Watkins MD   albuterol sulfate  (90 Base) MCG/ACT inhaler Inhale 2 puffs into the lungs every 6 hours as needed for Wheezing 2/7/18   Jayla Garcia MD       Current medications:    Current Facility-Administered Medications   Medication Dose Route Frequency Provider Last Rate Last Admin    lactated ringers infusion   Intravenous Continuous Va Mcgovern MD        sodium chloride flush 0.9 % injection 10 mL  10 mL Intravenous 2 times per day Va Mcgovern MD        sodium chloride flush 0.9 % injection 10 mL  10 mL Intravenous PRN Va Mcgovern MD        lidocaine PF 1 % injection 1 mL  1 mL Intradermal Once PRN Va Mcgovern MD        bupivacaine (PF) (MARCAINE) 0.25 % injection     [address]str[address], MD   10 mL at 02/19/21 1121       Allergies:  No Known Allergies    Problem List:    Patient Active Problem List   Diagnosis Code    Cervical stenosis of spine M48.02    Cervicalgia M54.2    COPD (chronic obstructive pulmonary disease) ([facility] J44.9    Smoker F17.200    Mild depression ([facility]) F32.0    GERD (gastroesophageal reflux disease) K21.9    Mixed hyperlipidemia E78.2    Carpal tunnel syndrome of left wrist G56.02    Medication monitoring encounter Z51.81    DDD (degenerative disc disease), lumbar M51.36    Displacement of lumbar intervertebral disc without myelopathy M51.26    Therapeutic opioid induced constipation K59.03, T40.2X5A    Radiculopathy of cervical spine M54.12    Essential hypertension I10    Major depression, single episode, in complete remission ([facility] [address] F32.5    Prediabetes R73.03    Lung nodule R91.1    Allergic sinusitis J30.9    Arthritis M19.90    Spinal stenosis of lumbar region without neurogenic claudication M48.061    Morbidly obese ([facility] [address] E66.01       Past Medical History:        Diagnosis Date    Abnormal stress ECG     Angina pectoris ([facility])     CAD (coronary artery disease)     Carpal tunnel syndrome     left    Cervical stenosis of spine     Cervicalgia     chronic pain    Tobacco comment: wants to discuss  chantix   Substance Use Topics    Alcohol use: Not Currently     Alcohol/week: 0.0 standard drinks     Comment: 3 x year                                Ready to quit: Not Answered  Counseling given: Not Answered  Comment: wants to discuss  chantix      Vital Signs (Current):   Vitals:    02/19/21 0957   BP: (!) 163/75   Pulse: 85   Resp: 17   Temp: 97.5 °F (36.4 °C)   TempSrc: Temporal   SpO2: 97%   Weight: 259 lb (117.5 kg)   Height: 5' 10\" (1.778 m)                                              BP Readings from Last 3 Encounters:   02/19/21 (!) 163/75   02/12/21 (!) 168/82   02/12/21 (!) 142/70       NPO Status: Time of last liquid consumption: 0430(sip water)                                                 Date of last liquid consumption: 02/19/21                        Date of last solid food consumption: 02/18/21    BMI:   Wt Readings from Last 3 Encounters:   02/19/21 259 lb (117.5 kg)   02/12/21 255 lb (115.7 kg)   12/14/20 248 lb (112.5 kg)     Body mass index is 37.16 kg/m². CBC:   Lab Results   Component Value Date    WBC 7.0 11/17/2020    RBC 4.97 11/17/2020    RBC 4.75 02/10/2012    HGB 14.4 11/17/2020    HCT 42.7 11/17/2020    MCV 85.9 11/17/2020    RDW 14.9 11/17/2020     11/17/2020     02/10/2012       CMP:   Lab Results   Component Value Date     11/17/2020    K 5.2 11/17/2020     11/17/2020    CO2 28 11/17/2020    BUN 9 11/17/2020    CREATININE 0.77 11/17/2020    GFRAA >60 11/17/2020    LABGLOM >60 11/17/2020    GLUCOSE 126 11/17/2020    GLUCOSE 99 02/10/2012    PROT 7.1 11/17/2020    CALCIUM 10.0 11/17/2020    BILITOT 0.26 11/17/2020    ALKPHOS 99 11/17/2020    AST 21 11/17/2020    ALT 21 11/17/2020       POC Tests: No results for input(s): POCGLU, POCNA, POCK, POCCL, POCBUN, POCHEMO, POCHCT in the last 72 hours.     Coags:   Lab Results   Component Value Date    PROTIME 12.3 07/21/2019    INR 0.9 07/21/2019    APTT 33.3 07/21/2019 HCG (If Applicable): No results found for: PREGTESTUR, PREGSERUM, HCG, HCGQUANT     ABGs: No results found for: PHART, PO2ART, KHM4TGI, GZY2GHN, BEART, D5TGKGRD     Type & Screen (If Applicable):  No results found for: LABABO, LABRH    Drug/Infectious Status (If Applicable):  No results found for: HIV, HEPCAB    COVID-19 Screening (If Applicable):   Lab Results   Component Value Date    COVID19 Not Detected 02/15/2021         Anesthesia Evaluation  Patient summary reviewed and Nursing notes reviewed  Airway: Mallampati: IV  TM distance: >3 FB   Neck ROM: full  Comment: -SMALL MOUTH OPENING  Mouth opening: > = 3 FB Dental:      Comment: -MISSING SOME UPPER TEETH BILATERALLY  -UPPER LEFT LOOSE TOOTH    Pulmonary:normal exam    (+) COPD:  sleep apnea: on noncompliant,  asthma: current smoker                          ROS comment: -SMOKES 1 PPD FOR 45 PACK YEARS  -PRODUCTIVE COUGH   Cardiovascular:    (+) hypertension:, CAD:,                   Neuro/Psych:                ROS comment: -S/P NECK SURGERY - POOR NECK MOBILITY  -WEAKNESS BILATERAL ARMS AND LEGS  -NUMBNESS TINGLING BILATERAL ARMS AND LEGS  -CERVICAL DDD  -SPINAL STENOSIS GI/Hepatic/Renal:   (+) GERD: well controlled, morbid obesity          Endo/Other:    (+) : arthritis:., .                  ROS comment: -NPO AFTER MIDNIGHT  -NKDA Abdominal:           Vascular: negative vascular ROS. Anesthesia Plan      MAC     ASA 3       Induction: intravenous. MIPS: Postoperative opioids intended and Prophylactic antiemetics administered. Anesthetic plan and risks discussed with patient. Plan discussed with CRNA.     Attending anesthesiologist reviewed and agrees with Pre Eval content              Pramod Rock MD   2/19/2021

## 2021-02-22 ENCOUNTER — VIRTUAL VISIT (OUTPATIENT)
Dept: PAIN MANAGEMENT | Age: 61
End: 2021-02-22
Payer: MEDICARE

## 2021-02-22 DIAGNOSIS — M54.2 CERVICALGIA: Primary | ICD-10-CM

## 2021-02-22 DIAGNOSIS — M51.36 DDD (DEGENERATIVE DISC DISEASE), LUMBAR: ICD-10-CM

## 2021-02-22 DIAGNOSIS — Z51.81 MEDICATION MONITORING ENCOUNTER: ICD-10-CM

## 2021-02-22 DIAGNOSIS — M48.02 CERVICAL STENOSIS OF SPINE: ICD-10-CM

## 2021-02-22 DIAGNOSIS — M48.061 SPINAL STENOSIS OF LUMBAR REGION WITHOUT NEUROGENIC CLAUDICATION: ICD-10-CM

## 2021-02-22 DIAGNOSIS — M51.26 DISPLACEMENT OF LUMBAR INTERVERTEBRAL DISC WITHOUT MYELOPATHY: ICD-10-CM

## 2021-02-22 DIAGNOSIS — M54.12 RADICULOPATHY OF CERVICAL SPINE: ICD-10-CM

## 2021-02-22 PROCEDURE — 99212 OFFICE O/P EST SF 10 MIN: CPT | Performed by: NURSE PRACTITIONER

## 2021-02-22 RX ORDER — OXYCODONE HYDROCHLORIDE 5 MG/1
5 TABLET ORAL 2 TIMES DAILY PRN
Qty: 60 TABLET | Refills: 0 | Status: SHIPPED | OUTPATIENT
Start: 2021-02-22 | End: 2021-03-15 | Stop reason: SDUPTHER

## 2021-02-22 ASSESSMENT — ENCOUNTER SYMPTOMS
BOWEL INCONTINENCE: 0
VISUAL CHANGE: 0
BACK PAIN: 1
TROUBLE SWALLOWING: 1
PHOTOPHOBIA: 0
SHORTNESS OF BREATH: 1

## 2021-02-22 NOTE — PROGRESS NOTES
Patient completed a video visit today to review medication contract. Chief Complaint   Patient presents with    Back Pain    Follow Up After Procedure         [facility]   Patient complains of neck pain that radiates down his left arm to fingertips. He has had this pain for many years and it is worsening over time. He had cervical spine surgery in 2012 and the pain has only worsened since that time. He saw Dr Macey Marie with no surgery recommended for neck, but told could benefit from CTR. Patient followed up with Dr. Melina Morse after his CT last August and cervical injections were suggested. He is not a good candidate for cervical injections due to previous neck surgery. He did see NS in 2016 for lumbar pain but no surgery recommended at that time. Diclofenac started by his rheumatologist. He had bilateral SI Joint Radiofrequency Ablation earlier this month and reports 50% relief and improving daily. He states he is not getting stabbing pains at this time. Back Pain  This is a chronic problem. The current episode started more than 1 year ago. The problem occurs constantly. The problem is unchanged. The pain is present in the sacro-iliac and lumbar spine. The quality of the pain is described as aching. Radiates to: bilateral legs. The pain is at a severity of 4/10. The pain is mild. The pain is worse during the night. The symptoms are aggravated by position, lying down, coughing, standing, bending, twisting, sitting and stress. Stiffness is present all day. Associated symptoms include headaches, leg pain, numbness and weakness. Pertinent negatives include no bladder incontinence, bowel incontinence, chest pain, dysuria, fever, paresis, tingling or weight loss. He has tried NSAIDs, muscle relaxant and analgesics for the symptoms. The treatment provided mild relief.    Neck Pain This is a chronic problem. The current episode started more than 1 year ago. The problem occurs constantly. The problem has been unchanged. The pain is associated with nothing. The pain is present in the midline, left side, right side and occipital region. The quality of the pain is described as aching. The pain is at a severity of 7/10. The pain is moderate. The symptoms are aggravated by bending and twisting. Associated symptoms include headaches, leg pain, numbness, pain with swallowing, trouble swallowing and weakness. Pertinent negatives include no chest pain, fever, paresis, photophobia, syncope, tingling, visual change or weight loss. He has tried muscle relaxants, NSAIDs, ice, heat, neck support and acetaminophen for the symptoms. The treatment provided mild relief. Patient denies any new neurological symptoms. No bowel or bladder incontinence, no weakness, and no falling. Past Medical History:   Diagnosis Date    Abnormal stress ECG     Angina pectoris ([facility])     CAD (coronary artery disease)     Carpal tunnel syndrome     left    Cervical stenosis of spine     Cervicalgia     chronic pain    Chronic back pain     COPD (chronic obstructive pulmonary disease) (HCC)     DDD (degenerative disc disease)     Elbow fracture, right     Fractures     elbow    GERD (gastroesophageal reflux disease)     Gout     Right great toe    Hyperlipidemia     Hypertension     Mild depression ([address] 9/26/2013    2 suicide attempts by girlfriend related to depression.     Morbidly obese ([address] 10/30/2020    Osteoarthritis of right knee     Sinus headache 10/10/2018    Sleep apnea     Sleep disturbance     Smoker     Tendonitis of foot     right       Past Surgical History:   Procedure Laterality Date    CARDIAC CATHETERIZATION  7/8/14    Non Obstructive CAD     CERVICAL SPINE SURGERY  7/13/12     C2-3-4-5    COLONOSCOPY      FRACTURE SURGERY      Rt elbow  HC INJECT OTHER PERPHRL NERV Bilateral 5/9/2019    INJECTION SPINAL performed by Jane Cortez MD at [address] Bilateral 8/15/2019    SACROILIAC JOINT INJECTION performed by Jane Cortez MD at [facility]  2/5/16    premier tens    NERVE BLOCK Left 02/07/2020    RFA left side    NERVE BLOCK Left 02/07/2020     NERVE RADIOFREQUENCY ABLATION - SACROILIAC (Left )    NERVE BLOCK  02/12/2021    NERVE RADIOFREQUENCY ABLATION SI (Left     NERVE BLOCK Right 02/19/2021    Procedure: NERVE RADIOFREQUENCY ABLATION SI JOINT (Right )    OTHER SURGICAL HISTORY  08/15/2019    sacroiliac joint injection    PAIN MANAGEMENT PROCEDURE Left 2/7/2020    NERVE RADIOFREQUENCY ABLATION - SACROILIAC performed by Jane Cortez MD at [address] [place] [address] Left 2/12/2021    NERVE RADIOFREQUENCY ABLATION SI performed by Jane Cortez MD at [facility]         No Known Allergies      Current Outpatient Medications:     tiZANidine (ZANAFLEX) 4 MG tablet, Take 1 tablet by mouth 2 times daily as needed (for muscle spasms), Disp: 60 tablet, Rfl: 2    gabapentin (NEURONTIN) 800 MG tablet, Take 1 tablet by mouth every 8 hours for 30 days. , Disp: 90 tablet, Rfl: 2    amLODIPine (NORVASC) 5 MG tablet, Take 1 tablet by mouth daily, Disp: 30 tablet, Rfl: 5    DULoxetine (CYMBALTA) 60 MG extended release capsule, Take 60 mg by mouth, Disp: , Rfl:     simvastatin (ZOCOR) 80 MG tablet, Take 1 tablet by mouth every evening, Disp: 30 tablet, Rfl: 11    acetaminophen (TYLENOL) 325 MG tablet, Take 650 mg by mouth nightly, Disp: , Rfl:     SYMBICORT 160-4.5 MCG/ACT AERO, INHALE TWO PUFFS BY MOUTH TWICE A DAY, Disp: 1 Inhaler, Rfl: 5    diclofenac (VOLTAREN) 75 MG EC tablet, Take 75 mg by mouth 2 times daily, Disp: , Rfl:   omeprazole (PRILOSEC) 20 MG delayed release capsule, TAKE ONE CAPSULE BY MOUTH TWICE A DAY 30 MINUTES BEFORE MEALS, Disp: 60 capsule, Rfl: 6    nitroGLYCERIN (NITROSTAT) 0.4 MG SL tablet, Place 1 tablet under the tongue every 5 minutes as needed for Chest pain, Disp: 25 tablet, Rfl: 3    albuterol sulfate  (90 Base) MCG/ACT inhaler, Inhale 2 puffs into the lungs every 6 hours as needed for Wheezing, Disp: 1 Inhaler, Rfl: 3    aspirin 81 MG EC tablet, Take 1 tablet by mouth daily, Disp: 30 tablet, Rfl: 3    cetirizine (ZYRTEC) 10 MG tablet, TAKE ONE TABLET BY MOUTH DAILY (Patient not taking: Reported on 2/22/2021), Disp: 30 tablet, Rfl: 1    Family History   Problem Relation Age of Onset    Heart Disease Mother     COPD Father     Cancer Maternal Aunt 64        breast cancer     Cancer Maternal Uncle 60        prostrate cancer        Social History     Socioeconomic History    Marital status: Single     Spouse name: Not on file    Number of children: Not on file    Years of education: Not on file    Highest education level: Not on file   Occupational History     Employer: N/A   Social Needs    Financial resource strain: Not on file    Food insecurity     Worry: Not on file     Inability: Not on file    Transportation needs     Medical: Not on file     Non-medical: Not on file   Tobacco Use    Smoking status: Current Every Day Smoker     Packs/day: 1.50     Years: 40.00     Pack years: 60.00     Types: Cigarettes    Smokeless tobacco: Former User     Quit date: 3/12/2015    Tobacco comment: wants to discuss  chantix   Substance and Sexual Activity    Alcohol use: Not Currently     Alcohol/week: 0.0 standard drinks     Comment: 3 x year    Drug use: No    Sexual activity: Yes     Partners: Female   Lifestyle    Physical activity     Days per week: Not on file     Minutes per session: Not on file    Stress: Not on file   Relationships    Social connections Talks on phone: Not on file     Gets together: Not on file     Attends [employer] service: Not on file     Active member of club or organization: Not on file     Attends meetings of clubs or organizations: Not on file     Relationship status: Not on file    Intimate partner violence     Fear of current or ex partner: Not on file     Emotionally abused: Not on file     Physically abused: Not on file     Forced sexual activity: Not on file   Other Topics Concern    Not on file   Social History Narrative    Not on file       Review of Systems:  Review of Systems   Constitution: Negative for fever and weight loss. HENT: Positive for trouble swallowing. Eyes: Negative for photophobia. Cardiovascular: Negative for chest pain, palpitations and syncope. Respiratory: Positive for shortness of breath. Musculoskeletal: Positive for back pain, joint pain, muscle weakness and neck pain. Gastrointestinal: Negative for bowel incontinence. Genitourinary: Negative for bladder incontinence and dysuria. Neurological: Positive for dizziness, headaches, numbness and weakness. Negative for disturbances in coordination, loss of balance and tingling. Physical Exam:  There were no vitals taken for this visit. Physical Exam  HENT:      Head: Normocephalic. Pulmonary:      Effort: Pulmonary effort is normal.   Neurological:      Mental Status: He is alert.    Psychiatric:         Mood and Affect: Mood normal.         Behavior: Behavior normal.         Record/Diagnostics Review:    Last niru 11/2020 and was  Not appropriate     Assessment:  Problem List Items Addressed This Visit     Cervical stenosis of spine    Cervicalgia - Primary    Medication monitoring encounter    DDD (degenerative disc disease), lumbar    Displacement of lumbar intervertebral disc without myelopathy    Radiculopathy of cervical spine    Spinal stenosis of lumbar region without neurogenic claudication             Treatment Plan:

## 2021-03-12 ASSESSMENT — ENCOUNTER SYMPTOMS
ABDOMINAL PAIN: 0
PHOTOPHOBIA: 0
VISUAL CHANGE: 0
BOWEL INCONTINENCE: 0
TROUBLE SWALLOWING: 1
BACK PAIN: 1

## 2021-03-12 NOTE — PROGRESS NOTES
Patient completed a video visit today to review medication contract. Chief Complaint   Patient presents with    Back Pain    Neck Pain         [facility] Patient complains of neck pain that radiates down his left arm to fingertips. He has had this pain for many years and it is worsening over time. He had cervical spine surgery in 2012 and the pain has only worsened since that time. He saw Dr Zeyad Kowalski with no surgery recommended for neck, but told could benefit from CTR. Patient followed up with Dr. Judy Barrios after his CT last August and cervical injections were suggested. He is not a good candidate for cervical injections due to previous neck surgery. He did see NS in 2016 for lumbar pain but no surgery recommended at that time. Diclofenac started by his rheumatologist. He had bilateral SI Joint Radiofrequency Ablation 2/2021 and reports 50% relief. He states he is not getting stabbing pains at this time. He is currently sick with cough and sinus pain. Advised to see PCP  - may need Covid screening. Back Pain  This is a chronic problem. The current episode started more than 1 year ago. The problem occurs constantly. The problem has been gradually worsening since onset. The pain is present in the lumbar spine. The quality of the pain is described as aching. The pain radiates to the left thigh, left knee, right knee and right thigh. The pain is at a severity of 8/10. The pain is severe. The pain is worse during the day. The symptoms are aggravated by bending and standing. Stiffness is present all day. Associated symptoms include chest pain, headaches, leg pain, numbness and tingling. Pertinent negatives include no abdominal pain, bladder incontinence, bowel incontinence, dysuria, fever, paresis, paresthesias, pelvic pain, perianal numbness, weakness or weight loss. He has tried heat and ice (injections, PT) for the symptoms. The treatment provided no relief. Neck Pain   This is a chronic problem.  The current episode started more than 1 year ago. The problem occurs constantly. The problem has been gradually worsening. The pain is associated with nothing. The pain is present in the midline. The quality of the pain is described as aching. The pain is at a severity of 7/10. The pain is moderate. The symptoms are aggravated by coughing, sneezing and swallowing. The pain is same all the time. Associated symptoms include chest pain, headaches, leg pain, numbness, pain with swallowing, tingling and trouble swallowing. Pertinent negatives include no fever, paresis, photophobia, syncope, visual change, weakness or weight loss. He has tried heat (PT, injections) for the symptoms. The treatment provided no relief. Patient denies any new neurological symptoms. No bowel or bladder incontinence, no weakness, and no falling. Pill count: appropriate due 3/24    Morphine equivalent: 15    Controlled Substance Monitoring:    Acute and Chronic Pain Monitoring:   RX Monitoring 3/15/2021   Attestation -   Acute Pain Prescriptions -   Periodic Controlled Substance Monitoring Possible medication side effects, risk of tolerance/dependence & alternative treatments discussed. ;No signs of potential drug abuse or diversion identified.;Obtaining appropriate analgesic effect of treatment. Chronic Pain > 80 MEDD -           Past Medical History:   Diagnosis Date    Abnormal stress ECG     Angina pectoris (HCC)     CAD (coronary artery disease)     Carpal tunnel syndrome     left    Cervical stenosis of spine     Cervicalgia     chronic pain    Chronic back pain     COPD (chronic obstructive pulmonary disease) (HCC)     DDD (degenerative disc disease)     Elbow fracture, right     Fractures     elbow    GERD (gastroesophageal reflux disease)     Gout     Right great toe    Hyperlipidemia     Hypertension     Mild depression ([address] 9/26/2013    2 suicide attempts by girlfriend related to depression.     Morbidly obese ([facility] [address] 10/30/2020    Osteoarthritis of right knee     Sinus headache 10/10/2018    Sleep apnea     Sleep disturbance     Smoker     Tendonitis of foot     right       Past Surgical History:   Procedure Laterality Date    CARDIAC CATHETERIZATION  7/8/14    Non Obstructive CAD     CERVICAL SPINE SURGERY  7/13/12     C2-3-4-5    COLONOSCOPY      FRACTURE SURGERY      Rt elbow     HC INJECT OTHER PERPHRL NERV Bilateral 5/9/2019    INJECTION SPINAL performed by Marisol Beasley MD at [address] [place] [address] Bilateral 8/15/2019    SACROILIAC JOINT INJECTION performed by Marisol Beasley MD at [facility]  2/5/16    premier tens    NERVE BLOCK Left 02/07/2020    RFA left side    NERVE BLOCK Left 02/07/2020     NERVE RADIOFREQUENCY ABLATION - SACROILIAC (Left )    NERVE BLOCK  02/12/2021    NERVE RADIOFREQUENCY ABLATION SI (Left     NERVE BLOCK Right 02/19/2021    Procedure: NERVE RADIOFREQUENCY ABLATION SI JOINT (Right )    OTHER SURGICAL HISTORY  08/15/2019    sacroiliac joint injection    PAIN MANAGEMENT PROCEDURE Left 2/7/2020    NERVE RADIOFREQUENCY ABLATION - SACROILIAC performed by Marisol Beasley MD at [address] [place] [address] Left 2/12/2021    NERVE RADIOFREQUENCY ABLATION SI performed by Marisol Beasley MD at [address] [place] [address] Right 2/19/2021    NERVE RADIOFREQUENCY ABLATION SI JOINT performed by Marisol Beasley MD at [address] [place] [address]         No Known Allergies      Current Outpatient Medications:     oxyCODONE (ROXICODONE) 5 MG immediate release tablet, Take 1 tablet by mouth 2 times daily as needed for Pain for up to 30 days. , Disp: 60 tablet, Rfl: 0    gabapentin (NEURONTIN) 800 MG tablet, Take 1 tablet by mouth every 8 hours for 30 days. , Disp: 90 tablet, Rfl: 2    amLODIPine (NORVASC) 5 MG tablet, Take 1 tablet by mouth daily, Disp: 30 tablet, Rfl: 5    DULoxetine (CYMBALTA) 60 MG extended release capsule, Take 60 mg by mouth, Disp: , Rfl:     simvastatin (ZOCOR) 80 MG tablet, Take 1 tablet by mouth every evening, Disp: 30 tablet, Rfl: 11    acetaminophen (TYLENOL) 325 MG tablet, Take 650 mg by mouth nightly, Disp: , Rfl:     SYMBICORT 160-4.5 MCG/ACT AERO, INHALE TWO PUFFS BY MOUTH TWICE A DAY, Disp: 1 Inhaler, Rfl: 5    cetirizine (ZYRTEC) 10 MG tablet, TAKE ONE TABLET BY MOUTH DAILY, Disp: 30 tablet, Rfl: 1    diclofenac (VOLTAREN) 75 MG EC tablet, Take 75 mg by mouth 2 times daily, Disp: , Rfl:     omeprazole (PRILOSEC) 20 MG delayed release capsule, TAKE ONE CAPSULE BY MOUTH TWICE A DAY 30 MINUTES BEFORE MEALS, Disp: 60 capsule, Rfl: 6    albuterol sulfate  (90 Base) MCG/ACT inhaler, Inhale 2 puffs into the lungs every 6 hours as needed for Wheezing, Disp: 1 Inhaler, Rfl: 3    aspirin 81 MG EC tablet, Take 1 tablet by mouth daily, Disp: 30 tablet, Rfl: 3    nitroGLYCERIN (NITROSTAT) 0.4 MG SL tablet, Place 1 tablet under the tongue every 5 minutes as needed for Chest pain (Patient not taking: Reported on 3/12/2021), Disp: 25 tablet, Rfl: 3    Family History   Problem Relation Age of Onset    Heart Disease Mother     COPD Father     Cancer Maternal Aunt 64        breast cancer     Cancer Maternal Uncle 60        prostrate cancer        Social History     Socioeconomic History    Marital status: Single     Spouse name: Not on file    Number of children: Not on file    Years of education: Not on file    Highest education level: Not on file   Occupational History     Employer: N/A   Social Needs    Financial resource strain: Not on file    Food insecurity     Worry: Not on file     Inability: Not on file   [employer] needs     Medical: Not on file     Non-medical: Not on file   Tobacco Use    Smoking status: Current Every Day Smoker     Packs/day: 1.50     Years: 40.00     Pack years: 60.00     Types: Cigarettes    Smokeless tobacco: Former User     Quit date: 3/12/2015    Tobacco comment: wants to discuss  chantix   Substance and Sexual Activity    Alcohol use: Not Currently     Alcohol/week: 0.0 standard drinks     Comment: 3 x year    Drug use: No    Sexual activity: Yes     Partners: Female   Lifestyle    Physical activity     Days per week: Not on file     Minutes per session: Not on file    Stress: Not on file   Relationships    Social connections     Talks on phone: Not on file     Gets together: Not on file     Attends [employer] service: Not on file     Active member of club or organization: Not on file     Attends meetings of clubs or organizations: Not on file     Relationship status: Not on file    Intimate partner violence     Fear of current or ex partner: Not on file     Emotionally abused: Not on file     Physically abused: Not on file     Forced sexual activity: Not on file   Other Topics Concern    Not on file   Social History Narrative    Not on file       Review of Systems:  Review of Systems   Constitution: Negative for fever and weight loss. HENT: Positive for trouble swallowing. Eyes: Negative for photophobia. Cardiovascular: Positive for chest pain. Negative for syncope. Musculoskeletal: Positive for back pain and neck pain. Gastrointestinal: Negative for abdominal pain and bowel incontinence. Genitourinary: Negative for bladder incontinence, dysuria and pelvic pain. Neurological: Positive for headaches, numbness and tingling. Negative for paresthesias and weakness. Physical Exam:  There were no vitals taken for this visit. Physical Exam  Neurological:      Mental Status: He is alert.    Psychiatric:         Mood and Affect: Mood normal.         Record/Diagnostics Review:    Last niru 11/2020 and was appropriate     Assessment:  Problem List Items Addressed This Visit     Cervicalgia    Relevant Medications    oxyCODONE (ROXICODONE) 5 MG immediate release tablet (Start on 3/24/2021)    Medication monitoring encounter - Primary    Relevant Orders    Drug Screen, Pain    DDD (degenerative disc disease), lumbar    Relevant Medications    oxyCODONE (ROXICODONE) 5 MG immediate release tablet (Start on 3/24/2021)    Displacement of lumbar intervertebral disc without myelopathy    Relevant Medications    oxyCODONE (ROXICODONE) 5 MG immediate release tablet (Start on 3/24/2021)    Other Relevant Orders    Drug Screen, Pain    Radiculopathy of cervical spine    Relevant Medications    oxyCODONE (ROXICODONE) 5 MG immediate release tablet (Start on 3/24/2021)             Treatment Plan:  Patient relates current medications are helping the pain. Patient reports taking pain medications as prescribed, denies obtaining medications from different sources and denies use of illegal drugs. Patient denies side effects from medications like nausea, vomiting, constipation or drowsiness. Patient reports current activities of daily living are possible due to medications and would like to continue them. As always, we encourage daily stretching and strengthening exercises, and recommend minimizing use of pain medications unless patient cannot get through daily activities due to pain. Contract requirements met. Continue opioid therapy. Script written for oxycodone  Pt currently has flu symptoms - achy, coughing, sinus pressure - advised to call PCP today  UDS - pt instructed to go to lab  Follow up appointment made for 4 weeks    Susan Saldivar, was evaluated through a synchronous (real-time) audio-video encounter. The patient (or guardian if applicable) is aware that this is a billable service. Verbal consent to proceed has been obtained within the past 12 months. The visit was conducted pursuant to the emergency declaration under the [facility], [address] [place] [address] authority and the [email or web address] General Act. Patient identification was verified, and a caregiver was present when appropriate. The patient was located in a state where the provider was credentialed to provide care. Total time spent for this encounter: 15 minutes    --JOSSUE Gonzalez CNP on 3/15/2021 at 11:21 AM    An electronic signature was used to authenticate this note.

## 2021-03-15 ENCOUNTER — VIRTUAL VISIT (OUTPATIENT)
Dept: PAIN MANAGEMENT | Age: 61
End: 2021-03-15
Payer: MEDICARE

## 2021-03-15 ENCOUNTER — TELEPHONE (OUTPATIENT)
Dept: FAMILY MEDICINE CLINIC | Age: 61
End: 2021-03-15

## 2021-03-15 DIAGNOSIS — M51.26 DISPLACEMENT OF LUMBAR INTERVERTEBRAL DISC WITHOUT MYELOPATHY: ICD-10-CM

## 2021-03-15 DIAGNOSIS — M54.12 RADICULOPATHY OF CERVICAL SPINE: ICD-10-CM

## 2021-03-15 DIAGNOSIS — M51.36 DDD (DEGENERATIVE DISC DISEASE), LUMBAR: ICD-10-CM

## 2021-03-15 DIAGNOSIS — M54.2 CERVICALGIA: ICD-10-CM

## 2021-03-15 DIAGNOSIS — Z51.81 MEDICATION MONITORING ENCOUNTER: Primary | ICD-10-CM

## 2021-03-15 PROCEDURE — 99213 OFFICE O/P EST LOW 20 MIN: CPT | Performed by: NURSE PRACTITIONER

## 2021-03-15 RX ORDER — OXYCODONE HYDROCHLORIDE 5 MG/1
5 TABLET ORAL 2 TIMES DAILY PRN
Qty: 60 TABLET | Refills: 0 | Status: SHIPPED | OUTPATIENT
Start: 2021-03-24 | End: 2021-04-19 | Stop reason: SDUPTHER

## 2021-03-15 NOTE — TELEPHONE ENCOUNTER
Patient stated follow Dr. Mayte Nugent at the [facility] location, he does have a VV on 4-5-2021.

## 2021-03-17 ENCOUNTER — HOSPITAL ENCOUNTER (OUTPATIENT)
Age: 61
Discharge: HOME | End: 2021-03-17
Payer: MEDICARE

## 2021-03-17 DIAGNOSIS — M51.26 DISPLACEMENT OF LUMBAR INTERVERTEBRAL DISC WITHOUT MYELOPATHY: ICD-10-CM

## 2021-03-17 DIAGNOSIS — Z51.81 MEDICATION MONITORING ENCOUNTER: ICD-10-CM

## 2021-03-17 PROCEDURE — 80307 DRUG TEST PRSMV CHEM ANLYZR: CPT

## 2021-03-20 LAB
6-ACETYLMORPHINE, UR: NOT DETECTED
7-AMINOCLONAZEPAM, URINE: NOT DETECTED
ALPHA-OH-ALPRAZ, URINE: NOT DETECTED
ALPHA-OH-MIDAZOLAM, URINE: NOT DETECTED
ALPRAZOLAM, URINE: NOT DETECTED
AMPHETAMINES, URINE: NOT DETECTED
BARBITURATES, URINE: NOT DETECTED
BENZOYLECGONINE, UR: NOT DETECTED
BUPRENORPHINE URINE: NOT DETECTED
CARISOPRODOL, UR: NOT DETECTED
CLONAZEPAM, URINE: NOT DETECTED
CODEINE, URINE: NOT DETECTED
CREATININE URINE: 197.1 MG/DL (ref 20–400)
DIAZEPAM, URINE: NOT DETECTED
DRUGS EXPECTED, UR: NORMAL
EER HI RES INTERP UR: NORMAL
ETHYL GLUCURONIDE UR: NOT DETECTED
FENTANYL URINE: NOT DETECTED
GABAPENTIN: PRESENT
HYDROCODONE, URINE: NOT DETECTED
HYDROMORPHONE, URINE: NOT DETECTED
LORAZEPAM, URINE: NOT DETECTED
MARIJUANA METAB, UR: NOT DETECTED
MDA, UR: NOT DETECTED
MDEA, EVE, UR: NOT DETECTED
MDMA URINE: NOT DETECTED
MEPERIDINE METAB, UR: NOT DETECTED
METHADONE, URINE: NOT DETECTED
METHAMPHETAMINE, URINE: NOT DETECTED
METHYLPHENIDATE: NOT DETECTED
MIDAZOLAM, URINE: NOT DETECTED
MORPHINE URINE: NOT DETECTED
NALOXONE URINE: NOT DETECTED
NORBUPRENORPHINE, URINE: NOT DETECTED
NORDIAZEPAM, URINE: NOT DETECTED
NORFENTANYL, URINE: NOT DETECTED
NORHYDROCODONE, URINE: NOT DETECTED
NOROXYCODONE, URINE: PRESENT
NOROXYMORPHONE, URINE: NOT DETECTED
OXAZEPAM, URINE: NOT DETECTED
OXYCODONE URINE: PRESENT
OXYMORPHONE, URINE: PRESENT
PAIN MANAGEMENT DRUG PANEL INTERP, URINE: NORMAL
PAIN MGT DRUG PANEL, HI RES, UR: NORMAL
PCP,URINE: NOT DETECTED
PHENTERMINE, UR: NOT DETECTED
PREGABALIN: NOT DETECTED
TAPENTADOL, URINE: NOT DETECTED
TAPENTADOL-O-SULFATE, URINE: NOT DETECTED
TEMAZEPAM, URINE: NOT DETECTED
TRAMADOL, URINE: NOT DETECTED
ZOLPIDEM METABOLITE (ZCA), URINE: NOT DETECTED
ZOLPIDEM, URINE: NOT DETECTED

## 2021-04-05 ENCOUNTER — TELEMEDICINE (OUTPATIENT)
Dept: FAMILY MEDICINE CLINIC | Age: 61
End: 2021-04-05
Payer: MEDICARE

## 2021-04-05 DIAGNOSIS — J41.8 MIXED SIMPLE AND MUCOPURULENT CHRONIC BRONCHITIS (HCC): ICD-10-CM

## 2021-04-05 DIAGNOSIS — E78.2 MIXED HYPERLIPIDEMIA: ICD-10-CM

## 2021-04-05 DIAGNOSIS — K21.9 GASTROESOPHAGEAL REFLUX DISEASE WITHOUT ESOPHAGITIS: Primary | ICD-10-CM

## 2021-04-05 DIAGNOSIS — I65.23 CAROTID ARTERY CALCIFICATION, BILATERAL: ICD-10-CM

## 2021-04-05 DIAGNOSIS — J01.11 ACUTE RECURRENT FRONTAL SINUSITIS: ICD-10-CM

## 2021-04-05 DIAGNOSIS — I10 ESSENTIAL HYPERTENSION: ICD-10-CM

## 2021-04-05 PROCEDURE — 99442 PR PHYS/QHP TELEPHONE EVALUATION 11-20 MIN: CPT | Performed by: FAMILY MEDICINE

## 2021-04-05 RX ORDER — BUDESONIDE AND FORMOTEROL FUMARATE DIHYDRATE 160; 4.5 UG/1; UG/1
AEROSOL RESPIRATORY (INHALATION)
Qty: 1 INHALER | Refills: 2 | Status: SHIPPED | OUTPATIENT
Start: 2021-04-05 | End: 2021-07-08

## 2021-04-05 RX ORDER — SIMVASTATIN 80 MG
80 TABLET ORAL EVERY EVENING
Qty: 30 TABLET | Refills: 11 | Status: SHIPPED | OUTPATIENT
Start: 2021-04-05 | End: 2022-04-11

## 2021-04-05 RX ORDER — AMLODIPINE BESYLATE 5 MG/1
5 TABLET ORAL DAILY
Qty: 30 TABLET | Refills: 5 | Status: SHIPPED | OUTPATIENT
Start: 2021-04-05 | End: 2021-12-15

## 2021-04-05 RX ORDER — HYDROXYZINE HYDROCHLORIDE 25 MG/1
25 TABLET, FILM COATED ORAL EVERY 8 HOURS PRN
Qty: 30 TABLET | Refills: 0 | Status: SHIPPED | OUTPATIENT
Start: 2021-04-05 | End: 2021-04-15

## 2021-04-05 RX ORDER — OMEPRAZOLE 20 MG/1
CAPSULE, DELAYED RELEASE ORAL
Qty: 30 CAPSULE | Refills: 3 | Status: SHIPPED | OUTPATIENT
Start: 2021-04-05 | End: 2021-06-07

## 2021-04-05 RX ORDER — FLUTICASONE PROPIONATE 50 MCG
1 SPRAY, SUSPENSION (ML) NASAL DAILY
Qty: 2 BOTTLE | Refills: 1 | Status: SHIPPED | OUTPATIENT
Start: 2021-04-05 | End: 2022-02-24

## 2021-04-05 ASSESSMENT — ENCOUNTER SYMPTOMS
CONSTIPATION: 0
EYE PAIN: 0
VOMITING: 0
SHORTNESS OF BREATH: 0
SORE THROAT: 0
COUGH: 0
RHINORRHEA: 0
BACK PAIN: 0
NAUSEA: 0

## 2021-04-05 NOTE — PROGRESS NOTES
Follow-up (had cold, doing better)    Telephone Visit (Audio Only)    Consent: patient has previously opted for and currently agrees to a TeleHealth (Telephone Visit) encounter, in place of a face-to-face ambulatory visit and is informed that the encounter will be billed as such accordingly. Location:     Patient / off site location: [facility]   - Location: home     Physician: Jayme Castro DO - Location:  office    Length of time: 15 minutes    Hx allergies -Francesca Donovan states lately his sinus congestion has been worsening. He has a longstanding history of sinusitis/rhinitis. He has tried over-the-counter medication and states it is not helping. He would like something prescription at this time. He reports this usually comes out in the springtime. Secondary concern, but he states he has been gaining a lot of weight especially since being in a lockdown due to Covid over the last year. He asked for input regarding weight management strategies and I have advised him I would send him for referral consideration to [facility] weight management but he declined at this time. RX - allergies tx from store  Head and sinus congestion -  Years - spring      Review of Systems   Constitutional: Negative for chills and fever. HENT: Positive for congestion. Negative for postnasal drip, rhinorrhea and sore throat. Eyes: Negative for pain. Respiratory: Negative for cough and shortness of breath. Cardiovascular: Negative for chest pain, palpitations and leg swelling. Gastrointestinal: Negative for constipation, nausea and vomiting. Genitourinary: Negative for dysuria. Musculoskeletal: Negative for back pain and myalgias. Skin: Negative for rash. Neurological: Negative for dizziness and weakness. Hematological: Does not bruise/bleed easily. Psychiatric/Behavioral: Negative for suicidal ideas. The patient is not nervous/anxious.           Physical Exam    No physical examination able to be

## 2021-04-19 ENCOUNTER — VIRTUAL VISIT (OUTPATIENT)
Dept: PAIN MANAGEMENT | Age: 61
End: 2021-04-19
Payer: MEDICARE

## 2021-04-19 DIAGNOSIS — M51.36 DDD (DEGENERATIVE DISC DISEASE), LUMBAR: ICD-10-CM

## 2021-04-19 DIAGNOSIS — M47.817 LUMBOSACRAL SPONDYLOSIS WITHOUT MYELOPATHY: ICD-10-CM

## 2021-04-19 DIAGNOSIS — M54.12 RADICULOPATHY OF CERVICAL SPINE: ICD-10-CM

## 2021-04-19 DIAGNOSIS — M54.2 CERVICALGIA: ICD-10-CM

## 2021-04-19 DIAGNOSIS — M51.26 DISPLACEMENT OF LUMBAR INTERVERTEBRAL DISC WITHOUT MYELOPATHY: ICD-10-CM

## 2021-04-19 DIAGNOSIS — Z79.891 ENCOUNTER FOR LONG-TERM OPIATE ANALGESIC USE: Primary | ICD-10-CM

## 2021-04-19 PROCEDURE — 99422 OL DIG E/M SVC 11-20 MIN: CPT | Performed by: NURSE PRACTITIONER

## 2021-04-19 RX ORDER — OXYCODONE HYDROCHLORIDE 5 MG/1
5 TABLET ORAL 2 TIMES DAILY PRN
Qty: 60 TABLET | Refills: 0 | Status: SHIPPED | OUTPATIENT
Start: 2021-04-23 | End: 2021-05-20 | Stop reason: SDUPTHER

## 2021-04-19 RX ORDER — GABAPENTIN 800 MG/1
800 TABLET ORAL EVERY 8 HOURS SCHEDULED
Qty: 90 TABLET | Refills: 2 | Status: SHIPPED | OUTPATIENT
Start: 2021-04-19 | End: 2021-07-22 | Stop reason: SDUPTHER

## 2021-04-19 RX ORDER — TIZANIDINE 4 MG/1
4 TABLET ORAL 2 TIMES DAILY PRN
Qty: 60 TABLET | Refills: 2 | Status: SHIPPED | OUTPATIENT
Start: 2021-04-19 | End: 2021-07-22 | Stop reason: SDUPTHER

## 2021-04-19 ASSESSMENT — ENCOUNTER SYMPTOMS
CONSTIPATION: 0
BACK PAIN: 1
SHORTNESS OF BREATH: 0
COUGH: 0

## 2021-04-19 NOTE — PROGRESS NOTES
Patient completed a video visit today to review medication contract. Chief Complaint   Patient presents with    Medication Refill   Neck pain  Back pain    [facility]   Patient complains of neck pain that radiates down his left arm to fingertips. He has had this pain for many years and it is worsening over time. He had cervical spine surgery in 2012 and the pain has only worsened since that time. He saw Dr Moriah Chavarria with no surgery recommended for neck, but told could benefit from CTR. Patient followed up with Dr. Teresa Modi after his CT last August and cervical injections were suggested. He is not a good candidate for cervical injections due to previous neck surgery. He did see NS in 2016 for lumbar pain but no surgery recommended at that time. Diclofenac started by his rheumatologist. He had bilateral SI Joint Radiofrequency Ablation 2/2021 and reports 50% relief. He states he is not getting stabbing pains at this time.     Neck Pain   This is a chronic problem. The current episode started more than 1 year ago. The problem occurs constantly. The problem has been gradually worsening. The pain is associated with an unknown factor. The pain is present in the anterior neck, left side and right side. The quality of the pain is described as aching. The pain is at a severity of 6/10. The pain is moderate. Nothing aggravates the symptoms. The pain is worse during the night. Stiffness is present all day. Associated symptoms include numbness, tingling and weakness. Pertinent negatives include no chest pain or fever. He has tried acetaminophen, NSAIDs, oral narcotics, ice, muscle relaxants, neck support, home exercises, heat, bed rest and chiropractic manipulation for the symptoms. The treatment provided no relief. Back Pain  This is a chronic problem. The current episode started more than 1 year ago. The problem occurs constantly. The problem is unchanged. The pain is present in the lumbar spine.  The quality of the pain is described as aching. Radiates to: into hips. The pain is at a severity of 6/10. The pain is moderate. The symptoms are aggravated by position and standing (walking). Associated symptoms include numbness, tingling and weakness. Pertinent negatives include no chest pain or fever. He has tried analgesics and bed rest for the symptoms. The treatment provided mild relief. Patient denies any new neurological symptoms. No bowel or bladder incontinence, no weakness, and no falling. Pill count: appropriate due 4/23    Morphine equivalent: 15    Controlled Substance Monitoring:    Acute and Chronic Pain Monitoring:   RX Monitoring 4/19/2021   Attestation -   Acute Pain Prescriptions -   Periodic Controlled Substance Monitoring Possible medication side effects, risk of tolerance/dependence & alternative treatments discussed. ;No signs of potential drug abuse or diversion identified.;Obtaining appropriate analgesic effect of treatment. Chronic Pain > 80 MEDD -           Past Medical History:   Diagnosis Date    Abnormal stress ECG     Angina pectoris (HCC)     CAD (coronary artery disease)     Carpal tunnel syndrome     left    Cervical stenosis of spine     Cervicalgia     chronic pain    Chronic back pain     COPD (chronic obstructive pulmonary disease) (HCC)     DDD (degenerative disc disease)     Elbow fracture, right     Fractures     elbow    GERD (gastroesophageal reflux disease)     Gout     Right great toe    Hyperlipidemia     Hypertension     Mild depression ([address] 9/26/2013    2 suicide attempts by girlfriend related to depression.     Morbidly obese ([address] 10/30/2020    Osteoarthritis of right knee     Sinus headache 10/10/2018    Sleep apnea     Sleep disturbance     Smoker     Tendonitis of foot     right       Past Surgical History:   Procedure Laterality Date    CARDIAC CATHETERIZATION  7/8/14    Non Obstructive CAD     CERVICAL SPINE SURGERY  7/13/12     C2-3-4-5    COLONOSCOPY  FRACTURE SURGERY      Rt elbow     HC INJECT OTHER PERPHRL NERV Bilateral 5/9/2019    INJECTION SPINAL performed by Michael Jacob MD at [address] [place] [address] Bilateral 8/15/2019    SACROILIAC JOINT INJECTION performed by Michael [facility] MD at [facility]  2/5/16    premier tens    NERVE BLOCK Left 02/07/2020    RFA left side    NERVE BLOCK Left 02/07/2020     NERVE RADIOFREQUENCY ABLATION - SACROILIAC (Left )    NERVE BLOCK  02/12/2021    NERVE RADIOFREQUENCY ABLATION SI (Left     NERVE BLOCK Right 02/19/2021    Procedure: NERVE RADIOFREQUENCY ABLATION SI JOINT (Right )    OTHER SURGICAL HISTORY  08/15/2019    sacroiliac joint injection    PAIN MANAGEMENT PROCEDURE Left 2/7/2020    NERVE RADIOFREQUENCY ABLATION - SACROILIAC performed by Michael Jacob MD at [address] [place] [address] Left 2/12/2021    NERVE RADIOFREQUENCY ABLATION SI performed by Michael Jacob MD at [address] [place] [address] Right 2/19/2021    NERVE RADIOFREQUENCY ABLATION SI JOINT performed by Michael Jacob MD at [address] [place] [address]         No Known Allergies      Current Outpatient Medications:     fluticasone (FLONASE) 50 MCG/ACT nasal spray, 1 spray by Each Nostril route daily, Disp: 2 Bottle, Rfl: 1    amLODIPine (NORVASC) 5 MG tablet, Take 1 tablet by mouth daily, Disp: 30 tablet, Rfl: 5    simvastatin (ZOCOR) 80 MG tablet, Take 1 tablet by mouth every evening, Disp: 30 tablet, Rfl: 11    omeprazole (PRILOSEC) 20 MG delayed release capsule, TAKE ONE CAPSULE BY MOUTH TWICE A DAY 30 MINUTES BEFORE MEALS, Disp: 30 capsule, Rfl: 3    budesonide-formoterol (SYMBICORT) 160-4.5 MCG/ACT AERO, INHALE TWO PUFFS BY MOUTH TWICE A DAY, Disp: 1 Inhaler, Rfl: 2    oxyCODONE (ROXICODONE) 5 MG immediate release tablet, Take 1 tablet by mouth 2 times daily as needed for Pain for up to 30 days. , Disp: 60 tablet, Rfl: 0    gabapentin (NEURONTIN) 800 MG tablet, Take 1 tablet by mouth every 8 hours for 30 days. , Disp: 90 tablet, Rfl: 2    DULoxetine (CYMBALTA) 60 MG extended release capsule, Take 60 mg by mouth, Disp: , Rfl:     acetaminophen (TYLENOL) 325 MG tablet, Take 650 mg by mouth nightly, Disp: , Rfl:     cetirizine (ZYRTEC) 10 MG tablet, TAKE ONE TABLET BY MOUTH DAILY, Disp: 30 tablet, Rfl: 1    diclofenac (VOLTAREN) 75 MG EC tablet, Take 75 mg by mouth 2 times daily, Disp: , Rfl:     nitroGLYCERIN (NITROSTAT) 0.4 MG SL tablet, Place 1 tablet under the tongue every 5 minutes as needed for Chest pain, Disp: 25 tablet, Rfl: 3    albuterol sulfate  (90 Base) MCG/ACT inhaler, Inhale 2 puffs into the lungs every 6 hours as needed for Wheezing, Disp: 1 Inhaler, Rfl: 3    aspirin 81 MG EC tablet, Take 1 tablet by mouth daily, Disp: 30 tablet, Rfl: 3    Family History   Problem Relation Age of Onset    Heart Disease Mother     COPD Father     Cancer Maternal Aunt 64        breast cancer     Cancer Maternal Uncle 60        prostrate cancer        Social History     Socioeconomic History    Marital status: Single     Spouse name: Not on file    Number of children: Not on file    Years of education: Not on file    Highest education level: Not on file   Occupational History     Employer: N/A   Social Needs    Financial resource strain: Not on file    Food insecurity     Worry: Not on file     Inability: Not on file    Transportation needs     Medical: Not on file     Non-medical: Not on file   Tobacco Use    Smoking status: Current Every Day Smoker     Packs/day: 1.50     Years: 40.00     Pack years: 60.00     Types: Cigarettes    Smokeless tobacco: Former User     Quit date: 3/12/2015    Tobacco comment: wants to discuss  chantix   Substance and Sexual Activity    Alcohol use: Not Currently     Alcohol/week: 0.0 standard drinks     Comment: 3 x year    Drug use: No    tiZANidine (ZANAFLEX) 4 MG tablet    Displacement of lumbar intervertebral disc without myelopathy    Relevant Medications    oxyCODONE (ROXICODONE) 5 MG immediate release tablet (Start on 4/23/2021)    gabapentin (NEURONTIN) 800 MG tablet    tiZANidine (ZANAFLEX) 4 MG tablet    Radiculopathy of cervical spine    Relevant Medications    oxyCODONE (ROXICODONE) 5 MG immediate release tablet (Start on 4/23/2021)    gabapentin (NEURONTIN) 800 MG tablet    tiZANidine (ZANAFLEX) 4 MG tablet    Lumbosacral spondylosis without myelopathy    Relevant Medications    oxyCODONE (ROXICODONE) 5 MG immediate release tablet (Start on 4/23/2021)    gabapentin (NEURONTIN) 800 MG tablet    tiZANidine (ZANAFLEX) 4 MG tablet    Encounter for long-term opiate analgesic use - Primary             Treatment Plan:  Patient relates current medications are helping the pain. Patient reports taking pain medications as prescribed, denies obtaining medications from different sources and denies use of illegal drugs. Patient denies side effects from medications like nausea, vomiting, constipation or drowsiness. Patient reports current activities of daily living are possible due to medications and would like to continue them. As always, we encourage daily stretching and strengthening exercises, and recommend minimizing use of pain medications unless patient cannot get through daily activities due to pain. Contract requirements met. Continue opioid therapy. Script written for oxycodone  Discussed different treatment options including continued conservative care such as physical therapy, chiropractic care, acupuncture. Offered to order aquatic therapy but patient states he is still concerned about Covid. He has been vaccinated. Can repeat SI RFA in August 2021 if needed  Follow up appointment made for 4 weeks    Darian Harmon, was evaluated through a synchronous (real-time) audio-video encounter.  The patient (or guardian if applicable) is aware that this is a billable service. Verbal consent to proceed has been obtained within the past 12 months. The visit was conducted pursuant to the emergency declaration under the [address] [place] [address] authority and the [employer] and [place] [employer] Act. Patient identification was verified, and a caregiver was present when appropriate. The patient was located in a state where the provider was credentialed to provide care. Total time spent for this encounter: Not billed by time    --JOSSUE Sales CNP on 4/19/2021 at 9:05 AM    An electronic signature was used to authenticate this note.

## 2021-05-18 ASSESSMENT — ENCOUNTER SYMPTOMS
BACK PAIN: 1
BOWEL INCONTINENCE: 0
TROUBLE SWALLOWING: 1
VISUAL CHANGE: 0

## 2021-05-18 NOTE — PROGRESS NOTES
Patient completed a video visit today to review medication contract. Chief Complaint   Patient presents with    Back Pain    Neck Pain    Medication Refill         [facility] Patient complains of neck pain that radiates down his left arm to fingertips. He has had this pain for many years and it is worsening over time. He had cervical spine surgery in 2012 and the pain has only worsened since that time. He saw Dr Ele Gonzalez with no surgery recommended for neck, but told could benefit from CTR. Patient followed up with Dr. Dany Christiansen after his CT last August and cervical injections were suggested. He is not a good candidate for cervical injections due to previous neck surgery. He did see NS in 2016 for lumbar pain but no surgery recommended at that time. Diclofenac started by his rheumatologist. He had bilateral SI Joint Radiofrequency Ablation 2/2021 and reports 50% relief. He states he is not getting stabbing pains at this time. He is trying to lose weight. Has changed his diet. Back Pain  This is a chronic problem. The current episode started more than 1 year ago. The problem occurs constantly. The problem has been gradually improving since onset. The pain is present in the lumbar spine. The quality of the pain is described as aching and stabbing. The pain radiates to the left thigh, left knee and left foot. The pain is at a severity of 6/10. The symptoms are aggravated by bending and twisting. Associated symptoms include leg pain and weakness. Pertinent negatives include no bladder incontinence, bowel incontinence, chest pain, fever, numbness or tingling. He has tried ice for the symptoms. The treatment provided no relief. Neck Pain   This is a chronic problem. The current episode started more than 1 year ago. The problem occurs constantly. The problem has been unchanged. The pain is associated with nothing. The pain is present in the left side, midline and right side (goes down Left arm).  The quality of the pain is described as aching. The pain is at a severity of 5/10. Associated symptoms include leg pain, pain with swallowing, trouble swallowing and weakness. Pertinent negatives include no chest pain, fever, numbness, tingling or visual change. He has tried ice for the symptoms. The treatment provided no relief. Patient denies any new neurological symptoms. No bowel or bladder incontinence, no weakness, and no falling. Pill count: appropriate due 5/23    Morphine equivalent: 15    Controlled Substance Monitoring:    Acute and Chronic Pain Monitoring:   RX Monitoring 5/20/2021   Attestation -   Acute Pain Prescriptions -   Periodic Controlled Substance Monitoring Possible medication side effects, risk of tolerance/dependence & alternative treatments discussed. ;No signs of potential drug abuse or diversion identified.;Obtaining appropriate analgesic effect of treatment. Chronic Pain > 80 MEDD -           Past Medical History:   Diagnosis Date    Abnormal stress ECG     Angina pectoris (HCC)     CAD (coronary artery disease)     Carpal tunnel syndrome     left    Cervical stenosis of spine     Cervicalgia     chronic pain    Chronic back pain     COPD (chronic obstructive pulmonary disease) (HCC)     DDD (degenerative disc disease)     Elbow fracture, right     Fractures     elbow    GERD (gastroesophageal reflux disease)     Gout     Right great toe    Hyperlipidemia     Hypertension     Mild depression ([address] 9/26/2013    2 suicide attempts by girlfriend related to depression.     Morbidly obese ([address] 10/30/2020    Osteoarthritis of right knee     Sinus headache 10/10/2018    Sleep apnea     Sleep disturbance     Smoker     Tendonitis of foot     right       Past Surgical History:   Procedure Laterality Date    CARDIAC CATHETERIZATION  7/8/14    Non Obstructive CAD     CERVICAL SPINE SURGERY  7/13/12     C2-3-4-5    COLONOSCOPY      FRACTURE SURGERY      Rt elbow     HC INJECT OTHER PERPHRL NERV Bilateral 5/9/2019    INJECTION SPINAL performed by Alexx Guevara MD at [address] Bilateral 8/15/2019    SACROILIAC JOINT INJECTION performed by Alexx Guevara MD at [facility]  2/5/16    premier tens    NERVE BLOCK Left 02/07/2020    RFA left side    NERVE BLOCK Left 02/07/2020     NERVE RADIOFREQUENCY ABLATION - SACROILIAC (Left )    NERVE BLOCK  02/12/2021    NERVE RADIOFREQUENCY ABLATION SI (Left     NERVE BLOCK Right 02/19/2021    Procedure: NERVE RADIOFREQUENCY ABLATION SI JOINT (Right )    OTHER SURGICAL HISTORY  08/15/2019    sacroiliac joint injection    PAIN MANAGEMENT PROCEDURE Left 2/7/2020    NERVE RADIOFREQUENCY ABLATION - SACROILIAC performed by Alexx Guevara MD at [address] [place] [address] Left 2/12/2021    NERVE RADIOFREQUENCY ABLATION SI performed by Alexx Guevara MD at [address] [place] [address] Right 2/19/2021    NERVE RADIOFREQUENCY ABLATION SI JOINT performed by Alexx Guevara MD at [address] [place] [address]         No Known Allergies      Current Outpatient Medications:     oxyCODONE (ROXICODONE) 5 MG immediate release tablet, Take 1 tablet by mouth 2 times daily as needed for Pain for up to 30 days. , Disp: 60 tablet, Rfl: 0    gabapentin (NEURONTIN) 800 MG tablet, Take 1 tablet by mouth every 8 hours for 30 days. , Disp: 90 tablet, Rfl: 2    tiZANidine (ZANAFLEX) 4 MG tablet, Take 1 tablet by mouth 2 times daily as needed (for muscle spasms), Disp: 60 tablet, Rfl: 2    fluticasone (FLONASE) 50 MCG/ACT nasal spray, 1 spray by Each Nostril route daily, Disp: 2 Bottle, Rfl: 1    amLODIPine (NORVASC) 5 MG tablet, Take 1 tablet by mouth daily, Disp: 30 tablet, Rfl: 5    simvastatin (ZOCOR) 80 MG tablet, Take 1 tablet by mouth every evening, Disp: 30 tablet, Rfl: 11    omeprazole (PRILOSEC) 20 MG delayed release capsule, TAKE ONE CAPSULE BY MOUTH TWICE A DAY 30 MINUTES BEFORE MEALS, Disp: 30 capsule, Rfl: 3    budesonide-formoterol (SYMBICORT) 160-4.5 MCG/ACT AERO, INHALE TWO PUFFS BY MOUTH TWICE A DAY, Disp: 1 Inhaler, Rfl: 2    DULoxetine (CYMBALTA) 60 MG extended release capsule, Take 60 mg by mouth, Disp: , Rfl:     acetaminophen (TYLENOL) 325 MG tablet, Take 650 mg by mouth nightly, Disp: , Rfl:     cetirizine (ZYRTEC) 10 MG tablet, TAKE ONE TABLET BY MOUTH DAILY, Disp: 30 tablet, Rfl: 1    diclofenac (VOLTAREN) 75 MG EC tablet, Take 75 mg by mouth 2 times daily, Disp: , Rfl:     nitroGLYCERIN (NITROSTAT) 0.4 MG SL tablet, Place 1 tablet under the tongue every 5 minutes as needed for Chest pain, Disp: 25 tablet, Rfl: 3    albuterol sulfate  (90 Base) MCG/ACT inhaler, Inhale 2 puffs into the lungs every 6 hours as needed for Wheezing, Disp: 1 Inhaler, Rfl: 3    aspirin 81 MG EC tablet, Take 1 tablet by mouth daily, Disp: 30 tablet, Rfl: 3    Family History   Problem Relation Age of Onset    Heart Disease Mother     COPD Father     Cancer Maternal Aunt 64        breast cancer     Cancer Maternal Uncle 60        prostrate cancer        Social History     Socioeconomic History    Marital status: Single     Spouse name: Not on file    Number of children: Not on file    Years of education: Not on file    Highest education level: Not on file   Occupational History     Employer: N/A   Tobacco Use    Smoking status: Current Every Day Smoker     Packs/day: 1.50     Years: 40.00     Pack years: 60.00     Types: Cigarettes    Smokeless tobacco: Former User     Quit date: 3/12/2015    Tobacco comment: wants to discuss  chantix   Vaping Use    Vaping Use: Never used   Substance and Sexual Activity    Alcohol use: Not Currently     Alcohol/week: 0.0 standard drinks     Comment: 3 x year    Drug use: No    Sexual activity: Yes     Partners: Female   Other Topics Concern    Not on file   Social History Narrative    Not on file     Social Determinants of Health     Financial Resource Strain:     Difficulty of Paying Living Expenses:    Food Insecurity:     Worried About Running Out of Food in the Last Year:     [address] in the Last Year:    Transportation Needs:     Lack of Transportation (Medical):  Lack of Transportation (Non-Medical):    Physical Activity:     Days of Exercise per Week:     Minutes of Exercise per Session:    Stress:     Feeling of Stress :    Social Connections:     Frequency of Communication with Friends and Family:     Frequency of Social Gatherings with Friends and Family:     Attends Jew Services:     Active Member of Clubs or Organizations:     Attends Club or Organization Meetings:     Marital Status:    Intimate Partner Violence:     Fear of Current or Ex-Partner:     Emotionally Abused:     Physically Abused:     Sexually Abused:        Review of Systems:  Review of Systems   Constitutional: Negative for fever. HENT: Positive for trouble swallowing. Cardiovascular: Negative for chest pain and palpitations. Musculoskeletal: Positive for back pain and neck pain. Gastrointestinal: Negative for bowel incontinence. Genitourinary: Negative for bladder incontinence. Neurological: Positive for weakness. Negative for disturbances in coordination, loss of balance, numbness and tingling. Physical Exam:  There were no vitals taken for this visit. Physical Exam  HENT:      Head: Normocephalic. Pulmonary:      Effort: Pulmonary effort is normal.   Neurological:      Mental Status: He is alert.    Psychiatric:         Mood and Affect: Mood normal.         Behavior: Behavior normal.         Record/Diagnostics Review:    Last niru 3/2021 and was appropriate     Assessment:  Problem List Items Addressed This Visit     Cervicalgia    Relevant Medications    oxyCODONE (ROXICODONE) 5 MG immediate release tablet (Start on 5/23/2021)    DDD Coronavirus Preparedness and Response Supplemental Appropriations Act. Patient identification was verified, and a caregiver was present when appropriate. The patient was located in a state where the provider was credentialed to provide care. Total time spent for this encounter: Not billed by time    --JOSSUE Schneider CNP on 5/20/2021 at 9:07 AM    An electronic signature was used to authenticate this note.

## 2021-05-20 ENCOUNTER — VIRTUAL VISIT (OUTPATIENT)
Dept: PAIN MANAGEMENT | Age: 61
End: 2021-05-20
Payer: MEDICARE

## 2021-05-20 DIAGNOSIS — M54.12 RADICULOPATHY OF CERVICAL SPINE: ICD-10-CM

## 2021-05-20 DIAGNOSIS — M54.2 CERVICALGIA: ICD-10-CM

## 2021-05-20 DIAGNOSIS — M48.061 SPINAL STENOSIS OF LUMBAR REGION WITHOUT NEUROGENIC CLAUDICATION: Primary | ICD-10-CM

## 2021-05-20 DIAGNOSIS — M51.36 DDD (DEGENERATIVE DISC DISEASE), LUMBAR: ICD-10-CM

## 2021-05-20 DIAGNOSIS — Z79.891 ENCOUNTER FOR LONG-TERM OPIATE ANALGESIC USE: ICD-10-CM

## 2021-05-20 DIAGNOSIS — M51.26 DISPLACEMENT OF LUMBAR INTERVERTEBRAL DISC WITHOUT MYELOPATHY: ICD-10-CM

## 2021-05-20 DIAGNOSIS — M47.817 LUMBOSACRAL SPONDYLOSIS WITHOUT MYELOPATHY: ICD-10-CM

## 2021-05-20 PROCEDURE — 99422 OL DIG E/M SVC 11-20 MIN: CPT | Performed by: NURSE PRACTITIONER

## 2021-05-20 RX ORDER — OXYCODONE HYDROCHLORIDE 5 MG/1
5 TABLET ORAL 2 TIMES DAILY PRN
Qty: 60 TABLET | Refills: 0 | Status: SHIPPED | OUTPATIENT
Start: 2021-05-23 | End: 2021-06-21 | Stop reason: SDUPTHER

## 2021-06-07 DIAGNOSIS — K21.9 GASTROESOPHAGEAL REFLUX DISEASE WITHOUT ESOPHAGITIS: ICD-10-CM

## 2021-06-07 RX ORDER — OMEPRAZOLE 20 MG/1
CAPSULE, DELAYED RELEASE ORAL
Qty: 30 CAPSULE | Refills: 2 | Status: SHIPPED | OUTPATIENT
Start: 2021-06-07 | End: 2021-06-21 | Stop reason: SDUPTHER

## 2021-06-21 ENCOUNTER — TELEPHONE (OUTPATIENT)
Dept: PAIN MANAGEMENT | Age: 61
End: 2021-06-21

## 2021-06-21 DIAGNOSIS — M51.36 DDD (DEGENERATIVE DISC DISEASE), LUMBAR: ICD-10-CM

## 2021-06-21 DIAGNOSIS — K21.9 GASTROESOPHAGEAL REFLUX DISEASE WITHOUT ESOPHAGITIS: ICD-10-CM

## 2021-06-21 DIAGNOSIS — M54.2 CERVICALGIA: ICD-10-CM

## 2021-06-21 DIAGNOSIS — M54.12 RADICULOPATHY OF CERVICAL SPINE: ICD-10-CM

## 2021-06-21 DIAGNOSIS — M51.26 DISPLACEMENT OF LUMBAR INTERVERTEBRAL DISC WITHOUT MYELOPATHY: ICD-10-CM

## 2021-06-21 RX ORDER — OXYCODONE HYDROCHLORIDE 5 MG/1
5 TABLET ORAL 2 TIMES DAILY PRN
Qty: 60 TABLET | Refills: 0 | Status: SHIPPED | OUTPATIENT
Start: 2021-06-22 | End: 2021-07-22 | Stop reason: SDUPTHER

## 2021-06-21 RX ORDER — OMEPRAZOLE 20 MG/1
CAPSULE, DELAYED RELEASE ORAL
Qty: 180 CAPSULE | Refills: 0 | Status: SHIPPED | OUTPATIENT
Start: 2021-06-21 | End: 2022-02-24

## 2021-06-21 NOTE — TELEPHONE ENCOUNTER
Patient left a msg stating that his pain medications are due for a refill, his next appointment is 06/24/2021. Please advise.

## 2021-06-21 NOTE — TELEPHONE ENCOUNTER
Received 90 day supply request from [facility]. Pt requesting 90 day supply of [address] 20 mg to replace current 30 day supply. home

## 2021-06-23 RX ORDER — OXYCODONE HYDROCHLORIDE 5 MG/1
5 TABLET ORAL 2 TIMES DAILY PRN
Qty: 60 TABLET | Refills: 0 | Status: CANCELLED | OUTPATIENT
Start: 2021-06-23 | End: 2021-07-23

## 2021-06-23 ASSESSMENT — ENCOUNTER SYMPTOMS
BACK PAIN: 1
ABDOMINAL PAIN: 0
BOWEL INCONTINENCE: 0

## 2021-06-23 NOTE — PROGRESS NOTES
Patient completed a video visit today to review medication contract. Chief Complaint   Patient presents with    Back Pain    Neck Pain         [facility]  Patient complains of neck pain that radiates down his left arm to fingertips. He has had this pain for many years and it is worsening over time. He had cervical spine surgery in 2012 and the pain has only worsened since that time. He saw Dr Reed Moritz with no surgery recommended for neck, but told could benefit from CTR. Patient followed up with Dr. Twan Mustafa after his CT last August and cervical injections were suggested. He is not a good candidate for cervical injections due to previous neck surgery. He did see NS in 2016 for lumbar pain but no surgery recommended at that time. Diclofenac started by his rheumatologist. He had bilateral SI Joint Radiofrequency Ablation 2/2021 and reports 50% relief. He states he is not getting stabbing pains at this time. He is trying to lose weight. Has changed his diet. Will have another carotid doppler 7/9 and follow up with Dr. En Weldon on 7/14. Back Pain  This is a chronic problem. The current episode started more than 1 year ago. The problem occurs constantly. The problem has been gradually worsening since onset. The pain is present in the lumbar spine. The quality of the pain is described as aching. The pain radiates to the left knee and left thigh. The pain is at a severity of 5/10. The pain is moderate. The pain is the same all the time. The symptoms are aggravated by bending and twisting. Stiffness is present in the morning. Pertinent negatives include no abdominal pain, bladder incontinence, bowel incontinence, chest pain, fever, numbness, tingling or weakness. He has tried analgesics and bed rest (shots) for the symptoms. The treatment provided mild relief. Neck Pain   This is a chronic problem. The current episode started more than 1 year ago. The problem occurs constantly.  The problem has been gradually tablet, Take 1 tablet by mouth every evening, Disp: 30 tablet, Rfl: 11    budesonide-formoterol (SYMBICORT) 160-4.5 MCG/ACT AERO, INHALE TWO PUFFS BY MOUTH TWICE A DAY, Disp: 1 Inhaler, Rfl: 2    DULoxetine (CYMBALTA) 60 MG extended release capsule, Take 60 mg by mouth, Disp: , Rfl:     acetaminophen (TYLENOL) 325 MG tablet, Take 650 mg by mouth nightly, Disp: , Rfl:     cetirizine (ZYRTEC) 10 MG tablet, TAKE ONE TABLET BY MOUTH DAILY, Disp: 30 tablet, Rfl: 1    diclofenac (VOLTAREN) 75 MG EC tablet, Take 75 mg by mouth 2 times daily, Disp: , Rfl:     nitroGLYCERIN (NITROSTAT) 0.4 MG SL tablet, Place 1 tablet under the tongue every 5 minutes as needed for Chest pain, Disp: 25 tablet, Rfl: 3    albuterol sulfate  (90 Base) MCG/ACT inhaler, Inhale 2 puffs into the lungs every 6 hours as needed for Wheezing, Disp: 1 Inhaler, Rfl: 3    aspirin 81 MG EC tablet, Take 1 tablet by mouth daily, Disp: 30 tablet, Rfl: 3    gabapentin (NEURONTIN) 800 MG tablet, Take 1 tablet by mouth every 8 hours for 30 days. , Disp: 90 tablet, Rfl: 2    Family History   Problem Relation Age of Onset    Heart Disease Mother     COPD Father     Cancer Maternal Aunt 64        breast cancer     Cancer Maternal Uncle 60        prostrate cancer        Social History     Socioeconomic History    Marital status: Single     Spouse name: Not on file    Number of children: Not on file    Years of education: Not on file    Highest education level: Not on file   Occupational History     Employer: N/A   Tobacco Use    Smoking status: Current Every Day Smoker     Packs/day: 1.50     Years: 40.00     Pack years: 60.00     Types: Cigarettes    Smokeless tobacco: Former User     Quit date: 3/12/2015    Tobacco comment: wants to discuss  chantix   Vaping Use    Vaping Use: Never used   Substance and Sexual Activity    Alcohol use: Not Currently     Alcohol/week: 0.0 standard drinks     Comment: 3 x year    Drug use: No    Sexual Visit     Cervicalgia    DDD (degenerative disc disease), lumbar    Displacement of lumbar intervertebral disc without myelopathy    Radiculopathy of cervical spine             Treatment Plan:  Patient relates current medications are helping the pain. Patient reports taking pain medications as prescribed, denies obtaining medications from different sources and denies use of illegal drugs. Patient denies side effects from medications like nausea, vomiting, constipation or drowsiness. Patient reports current activities of daily living are possible due to medications and would like to continue them. As always, we encourage daily stretching and strengthening exercises, and recommend minimizing use of pain medications unless patient cannot get through daily activities due to pain. Contract requirements met. Continue opioid therapy. Low back pain gradually worsening  Consider repeating SI joint RFA in August  Follow up appointment made for 4 weeks    Lane Ray, was evaluated through a synchronous (real-time) audio-video encounter. The patient (or guardian if applicable) is aware that this is a billable service. Verbal consent to proceed has been obtained within the past 12 months. The visit was conducted pursuant to the emergency declaration under the [address] [place] [address] authority and the [employer] General Act. Patient identification was verified, and a caregiver was present when appropriate. The patient was located in a state where the provider was credentialed to provide care. Total time spent for this encounter: Not billed by time    --JOSSUE Gee CNP on 6/24/2021 at 12:16 PM    An electronic signature was used to authenticate this note.

## 2021-06-24 ENCOUNTER — TELEMEDICINE (OUTPATIENT)
Dept: PAIN MANAGEMENT | Age: 61
End: 2021-06-24
Payer: MEDICARE

## 2021-06-24 DIAGNOSIS — M54.12 RADICULOPATHY OF CERVICAL SPINE: ICD-10-CM

## 2021-06-24 DIAGNOSIS — M51.36 DDD (DEGENERATIVE DISC DISEASE), LUMBAR: ICD-10-CM

## 2021-06-24 DIAGNOSIS — M54.2 CERVICALGIA: ICD-10-CM

## 2021-06-24 DIAGNOSIS — M51.26 DISPLACEMENT OF LUMBAR INTERVERTEBRAL DISC WITHOUT MYELOPATHY: ICD-10-CM

## 2021-06-24 PROCEDURE — 99213 OFFICE O/P EST LOW 20 MIN: CPT | Performed by: NURSE PRACTITIONER

## 2021-06-24 ASSESSMENT — ENCOUNTER SYMPTOMS
CONSTIPATION: 0
COUGH: 0
SHORTNESS OF BREATH: 0

## 2021-07-06 DIAGNOSIS — J41.8 MIXED SIMPLE AND MUCOPURULENT CHRONIC BRONCHITIS (HCC): ICD-10-CM

## 2021-07-08 RX ORDER — BUDESONIDE AND FORMOTEROL FUMARATE DIHYDRATE 160; 4.5 UG/1; UG/1
AEROSOL RESPIRATORY (INHALATION)
Qty: 10.2 G | Refills: 1 | Status: SHIPPED | OUTPATIENT
Start: 2021-07-08 | End: 2022-09-14

## 2021-07-09 ENCOUNTER — HOSPITAL ENCOUNTER (OUTPATIENT)
Dept: VASCULAR LAB | Age: 61
Discharge: HOME | End: 2021-07-09
Payer: MEDICARE

## 2021-07-09 DIAGNOSIS — I65.23 CAROTID STENOSIS, ASYMPTOMATIC, BILATERAL: ICD-10-CM

## 2021-07-09 PROCEDURE — 93880 EXTRACRANIAL BILAT STUDY: CPT

## 2021-07-12 ENCOUNTER — OFFICE VISIT (OUTPATIENT)
Dept: VASCULAR SURGERY | Age: 61
End: 2021-07-12
Payer: MEDICARE

## 2021-07-12 VITALS
DIASTOLIC BLOOD PRESSURE: 89 MMHG | SYSTOLIC BLOOD PRESSURE: 160 MMHG | RESPIRATION RATE: 18 BRPM | HEIGHT: 69 IN | BODY MASS INDEX: 37.92 KG/M2 | OXYGEN SATURATION: 96 % | WEIGHT: 256 LBS | TEMPERATURE: 98.1 F | HEART RATE: 96 BPM

## 2021-07-12 DIAGNOSIS — I65.23 CAROTID STENOSIS, ASYMPTOMATIC, BILATERAL: Primary | ICD-10-CM

## 2021-07-12 PROCEDURE — G8427 DOCREV CUR MEDS BY ELIG CLIN: HCPCS | Performed by: SURGERY

## 2021-07-12 PROCEDURE — 3017F COLORECTAL CA SCREEN DOC REV: CPT | Performed by: SURGERY

## 2021-07-12 PROCEDURE — G8417 CALC BMI ABV UP PARAM F/U: HCPCS | Performed by: SURGERY

## 2021-07-12 PROCEDURE — 4004F PT TOBACCO SCREEN RCVD TLK: CPT | Performed by: SURGERY

## 2021-07-12 PROCEDURE — 99214 OFFICE O/P EST MOD 30 MIN: CPT | Performed by: SURGERY

## 2021-07-14 ENCOUNTER — VIRTUAL VISIT (OUTPATIENT)
Dept: FAMILY MEDICINE CLINIC | Age: 61
End: 2021-07-14
Payer: MEDICARE

## 2021-07-14 DIAGNOSIS — I65.21 OCCLUSION OF RIGHT CAROTID ARTERY: ICD-10-CM

## 2021-07-14 DIAGNOSIS — I65.22 LEFT CAROTID STENOSIS: ICD-10-CM

## 2021-07-14 DIAGNOSIS — I10 ESSENTIAL HYPERTENSION: Primary | ICD-10-CM

## 2021-07-14 DIAGNOSIS — F32.5 MAJOR DEPRESSION, SINGLE EPISODE, IN COMPLETE REMISSION (HCC): ICD-10-CM

## 2021-07-14 PROCEDURE — 99442 PR PHYS/QHP TELEPHONE EVALUATION 11-20 MIN: CPT | Performed by: FAMILY MEDICINE

## 2021-07-14 NOTE — PROGRESS NOTES
HYPERTENSION visit     BP Readings from Last 3 Encounters:   07/12/21 (!) 160/89   02/19/21 (!) 176/96   02/19/21 133/87       LDL Cholesterol (mg/dL)   Date Value   11/17/2020 169 (H)     HDL (mg/dL)   Date Value   11/17/2020 37 (L)     BUN (mg/dL)   Date Value   11/17/2020 9     CREATININE (mg/dL)   Date Value   11/17/2020 0.77     Glucose (mg/dL)   Date Value   11/17/2020 126 (H)   02/10/2012 99              Have you changed or started any medications since your last visit including any over-the-counter medicines, vitamins, or herbal medicines? no   Have you stopped taking any of your medications? Is so, why? -  no  Are you having any side effects from any of your medications? - no  How often do you miss doses of your medication? rare      Have you seen any other physician or provider since your last visit? yes - [facility]   Have you had any other diagnostic tests since your last visit?  no   Have you been seen in the emergency room and/or had an admission in a hospital since we last saw you?  no   Have you had your routine dental cleaning in the past 6 months?  no     Do you have an active MyChart account? If no, what is the barrier?   Yes    Patient Care Team:  Marnie Verma DO as PCP - General (Family Medicine)  Marnie Verma DO as PCP - [facility] Provider  Yogesh Cisneros MD as Consulting Physician (Gastroenterology)  Gaurav Almeida MD as Consulting Physician (Cardiology)  Mindy Baldwin MD as Consulting Physician (Orthopedic Surgery)  Jesica Sarmiento RN as Nurse Navigator    Medical History Review  Past Medical, Family, and Social History reviewed and does contribute to the patient presenting condition    Health Maintenance   Topic Date Due    DTaP/Tdap/Td vaccine (1 - Tdap) Never done    Low dose CT lung screening  02/13/2019    Annual Wellness Visit (AWV)  Never done    A1C test (Diabetic or Prediabetic)  01/10/2020    Colon cancer screen colonoscopy  08/25/2021    Flu vaccine (1) 09/01/2021    Lipid screen  11/17/2021    Potassium monitoring  11/17/2021    Creatinine monitoring  11/17/2021    Pneumococcal 0-64 years Vaccine (2 of 2 - PPSV23) 08/03/2023    Shingles Vaccine  Completed    COVID-19 Vaccine  Completed    Hepatitis C screen  Addressed    HIV screen  Addressed    Hepatitis A vaccine  Aged Out    Hepatitis B vaccine  Aged Out    Hib vaccine  Aged Out    Meningococcal (ACWY) vaccine  Aged Out

## 2021-07-14 NOTE — PROGRESS NOTES
Hypertension (Follow up) and Gastroesophageal Reflux    Telephone Visit (Audio Only)    Consent: patient has previously opted for and currently agrees to a TeleHealth (Telephone Visit) encounter, in place of a face-to-face ambulatory visit and is informed that the encounter will be billed as such accordingly. Location:     Patient / off site location: [place]   - Location: home     Physician: Valencia Mancia. Antonio Guadarrama DO - Location: [address] [place] [address] office    Length of time: 15 minutes        Check up -     Tobacco quit plan discussed - cold turkey  Seeing Dr Laith Hendricks for vascular issues. Likely to need a procedure done as per vascular in August 23, 2021  Right carotid -      There were no vitals taken for this visit. Review of Systems    neg    Physical Exam    n/a    Summary  Severe 70-99% stenosis of the right internal carotid artery. Moderate 50-69% stenosis of the left internal carotid artery. Patent right vertebral artery with antegrade flow. Left vertebral artery was not visualized. Signature  Electronically signed by Jacoby Harvey(Sonographer) on 07/09/2021 04:00 PM  Electronically signed by Madlyn Kindle Reyes,Arthur(Interpreting physician) on 07/09/2021 09:48 PM  Findings:  Right Impression:  The common carotid artery has an irregular heterogeneous  plaque causing a <50% stenosis based on velocities. The carotid bulb has an irregular calcific plaque causing a  <50% stenosis. The external carotid artery has an irregular heterogeneous  Left Impression:  The common carotid artery has an irregular heterogeneous  plaque causing a <50% stenosis. The carotid bulb has an irregular heterogeneous plaque  causing a <50% stenosis. The external carotid artery has an irregular heterogeneous  Report Status: [facility] [address], [address] [facility] [phone] [address] [facility] [address] [facility] [address] [place]  [place], [address] [place] [address] [phone]  [address] [facility] [address] [facility] [phone]  [place] [address] [phone]  [facility] [address] [place] [address] [phone]  Patient Name:      ASSESSMENT AND PLAN      Diagnosis Orders   1. Essential hypertension     2. Major depression, single episode, in complete remission ([facility] [address]     3. Occlusion of right carotid artery  - under mgt. - Dr. Kristin Gusman         Carotid plaquing - RCA - greater than severe occlusion  No symptoms at this time. Set appointment - TV - Aug 30, afternoon.       Electronicallysigned by Phong Kessler DO on 7/14/2021 at 3:24 PM

## 2021-07-15 ENCOUNTER — TELEPHONE (OUTPATIENT)
Dept: FAMILY MEDICINE CLINIC | Age: 61
End: 2021-07-15

## 2021-07-15 NOTE — TELEPHONE ENCOUNTER
----- Message from Angela Solis sent at 7/14/2021  4:04 PM EDT -----  Subject: Message to Provider    QUESTIONS  Information for Provider? pt states that Patrick Pettit said the appointment on   8/30 @ 245 was supposed to be just a phone call. Pt just wants to confirm   this was put as a call and not an in person appointment.   ---------------------------------------------------------------------------  --------------  Sam DORANTES  What is the best way for the office to contact you? OK to leave message on   voicemail  Preferred Call Back Phone Number? [phone]  ---------------------------------------------------------------------------  --------------  SCRIPT ANSWERS  Relationship to Patient?  Self

## 2021-07-18 ASSESSMENT — ENCOUNTER SYMPTOMS
CHEST TIGHTNESS: 0
COLOR CHANGE: 0
ALLERGIC/IMMUNOLOGIC NEGATIVE: 1
ABDOMINAL PAIN: 0
SHORTNESS OF BREATH: 1

## 2021-07-18 NOTE — PROGRESS NOTES
Division of Vascular Surgery        Follow Up      Chief Complaint:      Carotid stenosis    History of Present Illness:      Sanjana Dave is a 56 y.o. gentleman who presents for follow up regarding his carotid disease. He denies any episodes of unilateral weakness, facial droop, thick/slurred speech or symptoms suggestive of amaurosis fugax. He has chronic numbness/tingling in his hands, has undergone cervical spine surgery and fusion for it many years ago. He has been compliant with his medications, however continues to smoke. Medical History:     Past Medical History:   Diagnosis Date    Abnormal stress ECG     Angina pectoris (HCC)     CAD (coronary artery disease)     Carpal tunnel syndrome     left    Cervical stenosis of spine     Cervicalgia     chronic pain    Chronic back pain     COPD (chronic obstructive pulmonary disease) (HCC)     DDD (degenerative disc disease)     Elbow fracture, right     Fractures     elbow    GERD (gastroesophageal reflux disease)     Gout     Right great toe    Hyperlipidemia     Hypertension     Mild depression ([address] 9/26/2013    2 suicide attempts by girlfriend related to depression.     Morbidly obese ([address] 10/30/2020    Occlusion of right carotid artery 7/14/2021    Osteoarthritis of right knee     Sinus headache 10/10/2018    Sleep apnea     Sleep disturbance     Smoker     Tendonitis of foot     right       Surgical History:     Past Surgical History:   Procedure Laterality Date    CARDIAC CATHETERIZATION  7/8/14    Non Obstructive CAD     CERVICAL SPINE SURGERY  7/13/12     C2-3-4-5    COLONOSCOPY      FRACTURE SURGERY      Rt elbow     HC INJECT OTHER PERPHRL NERV Bilateral 5/9/2019    INJECTION SPINAL performed by Lotus Rhodes MD at [address] [place] [address] Bilateral 8/15/2019    SACROILIAC JOINT INJECTION performed by Lotus Rhodes MD at [facility]  2/5/16 premier tens    NERVE BLOCK Left 02/07/2020    RFA left side    NERVE BLOCK Left 02/07/2020     NERVE RADIOFREQUENCY ABLATION - SACROILIAC (Left )    NERVE BLOCK  02/12/2021    NERVE RADIOFREQUENCY ABLATION SI (Left     NERVE BLOCK Right 02/19/2021    Procedure: NERVE RADIOFREQUENCY ABLATION SI JOINT (Right )    OTHER SURGICAL HISTORY  08/15/2019    sacroiliac joint injection    PAIN MANAGEMENT PROCEDURE Left 2/7/2020    NERVE RADIOFREQUENCY ABLATION - SACROILIAC performed by Dawit Whipple MD at [address] [place] [address] Left 2/12/2021    NERVE RADIOFREQUENCY ABLATION SI performed by Dawit Whipple MD at [address] [place] [address] Right 2/19/2021    NERVE RADIOFREQUENCY ABLATION SI JOINT performed by Dawit Whipple MD at [address] [place] [address]         Family History:     Family History   Problem Relation Age of Onset    Heart Disease Mother     COPD Father     Cancer Maternal Aunt 64        breast cancer     Cancer Maternal Uncle 60        prostrate cancer        Allergies:       Patient has no known allergies. Medications:      Current Outpatient Medications   Medication Sig Dispense Refill    budesonide-formoterol (SYMBICORT) 160-4.5 MCG/ACT AERO INHALE TWO PUFFS BY MOUTH TWICE A DAY 10.2 g 1    omeprazole (PRILOSEC) 20 MG delayed release capsule Take one capsule by mouth twice a day 30 minutes before meals. 180 capsule 0    oxyCODONE (ROXICODONE) 5 MG immediate release tablet Take 1 tablet by mouth 2 times daily as needed for Pain for up to 30 days.  60 tablet 0    fluticasone (FLONASE) 50 MCG/ACT nasal spray 1 spray by Each Nostril route daily 2 Bottle 1    amLODIPine (NORVASC) 5 MG tablet Take 1 tablet by mouth daily 30 tablet 5    simvastatin (ZOCOR) 80 MG tablet Take 1 tablet by mouth every evening 30 tablet 11    DULoxetine (CYMBALTA) 60 MG extended release capsule Take 60 mg by mouth      acetaminophen (TYLENOL) 325 MG tablet Take 650 mg by mouth nightly      cetirizine (ZYRTEC) 10 MG tablet TAKE ONE TABLET BY MOUTH DAILY 30 tablet 1    diclofenac (VOLTAREN) 75 MG EC tablet Take 75 mg by mouth 2 times daily      nitroGLYCERIN (NITROSTAT) 0.4 MG SL tablet Place 1 tablet under the tongue every 5 minutes as needed for Chest pain 25 tablet 3    albuterol sulfate  (90 Base) MCG/ACT inhaler Inhale 2 puffs into the lungs every 6 hours as needed for Wheezing 1 Inhaler 3    aspirin 81 MG EC tablet Take 1 tablet by mouth daily 30 tablet 3    gabapentin (NEURONTIN) 800 MG tablet Take 1 tablet by mouth every 8 hours for 30 days. 90 tablet 2     No current facility-administered medications for this visit. Social History:     Tobacco:    reports that he has been smoking cigarettes. He has a 60.00 pack-year smoking history. He quit smokeless tobacco use about 6 years ago. Alcohol:      reports previous alcohol use. Drug Use:  reports no history of drug use. Review of Systems:     Review of Systems   Constitutional: Negative for chills and fever. HENT: Negative for congestion. Eyes: Negative for visual disturbance. Respiratory: Positive for shortness of breath. Negative for chest tightness. Cardiovascular: Negative for chest pain and leg swelling. Gastrointestinal: Negative for abdominal pain. Endocrine: Negative. Genitourinary: Negative. Musculoskeletal: Negative for arthralgias. Skin: Negative for color change and wound. Allergic/Immunologic: Negative. Neurological: Negative for facial asymmetry, speech difficulty, weakness and numbness. Hematological: Negative. Psychiatric/Behavioral: Negative.       Physical Exam:     Vitals:  BP (!) 160/89 (Site: Left Upper Arm, Position: Sitting, Cuff Size: Large Adult)   Pulse 96   Temp 98.1 °F (36.7 °C) (Temporal)   Resp 18   Ht 5' 9\" (1.753 m)   Wt 256 lb (116.1 kg)   SpO2 96%   BMI 37.80 kg/m²     Physical Exam  Constitutional:       Appearance: He is well-developed and well-groomed. Eyes:      Extraocular Movements: Extraocular movements intact. Conjunctiva/sclera: Conjunctivae normal.   Neck:      Vascular: Carotid bruit present. Cardiovascular:      Rate and Rhythm: Normal rate and regular rhythm. Pulses:           Carotid pulses are on the right side with bruit. Radial pulses are 2+ on the right side and 2+ on the left side. Pulmonary:      Effort: Pulmonary effort is normal. No respiratory distress. Abdominal:      Palpations: Abdomen is soft. Tenderness: There is no abdominal tenderness. Musculoskeletal:      Cervical back: Full passive range of motion without pain. Right lower leg: No swelling or tenderness. No edema. Left lower leg: No swelling or tenderness. No edema. Right foot: Normal capillary refill. No swelling or tenderness. Left foot: Normal capillary refill. No swelling or tenderness. Feet:      Right foot:      Skin integrity: No ulcer or skin breakdown. Left foot:      Skin integrity: No ulcer or skin breakdown. Skin:     General: Skin is warm. Capillary Refill: Capillary refill takes less than 2 seconds. Neurological:      Mental Status: He is alert and oriented to person, place, and time. GCS: GCS eye subscore is 4. GCS verbal subscore is 5. GCS motor subscore is 6. Sensory: Sensation is intact. Motor: Motor function is intact. Psychiatric:         Mood and Affect: Mood normal.         Speech: Speech normal.         Behavior: Behavior normal.         Thought Content:  Thought content normal.       Imaging/Labs:     Carotid duplex (7/9/21) reveals right ICA 70-99% stenosis (384/80), left ICA 50-69% stenosis    Assessment and Plan:     Asymptomatic bilateral carotid artery stenosis  · Continue optimal medical therapy  · Needs to work on smoking cessation  · Daily exercise to improve overall cardiovascular health  Will start him on a walking regimen to improve overall cardiovascular health  Walk 5 days a week for 5 minutes. Once the pain/numbness starts try to walk thru it for 30 seconds to a minute. Then take a break and finish the time allocated. Every week increase by 5 minutes. Goal is to get to 30-45 minutes a day to improve total walking distance. · We discussed surgical endarterectomy briefly and will have him return in 6 weeks to discuss in more detail and try to schedule him for surgery  · This will give him some time to work on smoking cessation    Electronically signed by Sherwin Halsted, MD on 7/18/21 at 5:25 PM EDT      [address] [place] [address],[address]: [phone]  C: [phone]  Email: [email or web address]

## 2021-07-22 ENCOUNTER — VIRTUAL VISIT (OUTPATIENT)
Dept: PAIN MANAGEMENT | Age: 61
End: 2021-07-22
Payer: MEDICARE

## 2021-07-22 DIAGNOSIS — M51.26 DISPLACEMENT OF LUMBAR INTERVERTEBRAL DISC WITHOUT MYELOPATHY: ICD-10-CM

## 2021-07-22 DIAGNOSIS — M54.2 CERVICALGIA: ICD-10-CM

## 2021-07-22 DIAGNOSIS — M47.817 LUMBOSACRAL SPONDYLOSIS WITHOUT MYELOPATHY: ICD-10-CM

## 2021-07-22 DIAGNOSIS — Z79.891 ENCOUNTER FOR LONG-TERM OPIATE ANALGESIC USE: Primary | ICD-10-CM

## 2021-07-22 DIAGNOSIS — M48.02 CERVICAL STENOSIS OF SPINE: ICD-10-CM

## 2021-07-22 DIAGNOSIS — M54.12 RADICULOPATHY OF CERVICAL SPINE: ICD-10-CM

## 2021-07-22 DIAGNOSIS — M51.36 DDD (DEGENERATIVE DISC DISEASE), LUMBAR: ICD-10-CM

## 2021-07-22 DIAGNOSIS — R52 PAIN: ICD-10-CM

## 2021-07-22 PROCEDURE — 99213 OFFICE O/P EST LOW 20 MIN: CPT | Performed by: NURSE PRACTITIONER

## 2021-07-22 RX ORDER — TIZANIDINE 4 MG/1
4 TABLET ORAL 2 TIMES DAILY PRN
Qty: 60 TABLET | Refills: 2 | Status: SHIPPED | OUTPATIENT
Start: 2021-07-22 | End: 2022-02-24 | Stop reason: SDUPTHER

## 2021-07-22 RX ORDER — GABAPENTIN 800 MG/1
800 TABLET ORAL EVERY 8 HOURS SCHEDULED
Qty: 90 TABLET | Refills: 2 | Status: SHIPPED | OUTPATIENT
Start: 2021-07-22 | End: 2021-08-16 | Stop reason: SDUPTHER

## 2021-07-22 RX ORDER — OXYCODONE HYDROCHLORIDE 5 MG/1
5 TABLET ORAL 2 TIMES DAILY PRN
Qty: 60 TABLET | Refills: 0 | Status: SHIPPED | OUTPATIENT
Start: 2021-07-22 | End: 2021-08-16 | Stop reason: SDUPTHER

## 2021-07-22 ASSESSMENT — ENCOUNTER SYMPTOMS
SHORTNESS OF BREATH: 0
CONSTIPATION: 0
BACK PAIN: 1
COUGH: 0

## 2021-07-22 NOTE — PROGRESS NOTES
Patient completed a video visit today to review medication contract. Chief Complaint: neck and back pain    [facility] Patient complains of neck pain that radiates down his left arm to fingertips. He has had this pain for many years and it is worsening over time. He had cervical spine surgery in 2012 and the pain has only worsened since that time. He saw Dr Analia Ochoa with no surgery recommended for neck, but told could benefit from CTR. Patient followed up with Dr. Konrad Calderon after his CT last August and cervical injections were suggested. He is not a good candidate for cervical injections due to previous neck surgery. He did see NS in 2016 for lumbar pain but no surgery recommended at that time. Diclofenac started by his rheumatologist. He had bilateral SI Joint Radiofrequency Ablation 2/2021 and reports 50% relief. He states he is not getting stabbing pains at this time. He is trying to lose weight. Has changed his diet.      Did have another carotid doppler 7/9 and followed up with Dr. Tommy Branham on 7/14. He will most likely have surgery in September. He has cut down on smoking. Neck Pain   This is a chronic problem. The current episode started more than 1 year ago. The problem occurs constantly. The problem has been unchanged. The pain is present in the midline. The quality of the pain is described as aching. The pain is at a severity of 7/10. The pain is moderate. The symptoms are aggravated by position and twisting. Associated symptoms include headaches. Pertinent negatives include no chest pain or fever. He has tried bed rest and oral narcotics for the symptoms. The treatment provided mild relief. Back Pain  This is a chronic problem. The current episode started more than 1 year ago. The problem occurs constantly. The problem is unchanged. The pain is present in the lumbar spine. The quality of the pain is described as aching. Radiates to: into left hip. The pain is at a severity of 5/10.  The pain is moderate. The symptoms are aggravated by position and standing. Associated symptoms include headaches. Pertinent negatives include no chest pain or fever. He has tried analgesics and bed rest for the symptoms. The treatment provided mild relief. Patient denies any new neurological symptoms. No bowel or bladder incontinence, no weakness, and no falling. Pill count: appropriate due today    Morphine equivalent: 15    Controlled Substance Monitoring:    Acute and Chronic Pain Monitoring:   RX Monitoring 7/22/2021   Attestation -   Acute Pain Prescriptions -   Periodic Controlled Substance Monitoring Possible medication side effects, risk of tolerance/dependence & alternative treatments discussed. ;No signs of potential drug abuse or diversion identified.;Obtaining appropriate analgesic effect of treatment. Chronic Pain > 80 MEDD -           Past Medical History:   Diagnosis Date    Abnormal stress ECG     Angina pectoris (HCC)     CAD (coronary artery disease)     Carpal tunnel syndrome     left    Cervical stenosis of spine     Cervicalgia     chronic pain    Chronic back pain     COPD (chronic obstructive pulmonary disease) (HCC)     DDD (degenerative disc disease)     Elbow fracture, right     Fractures     elbow    GERD (gastroesophageal reflux disease)     Gout     Right great toe    Hyperlipidemia     Hypertension     Mild depression ([address] 9/26/2013    2 suicide attempts by girlfriend related to depression.     Morbidly obese ([address] 10/30/2020    Occlusion of right carotid artery 7/14/2021    Osteoarthritis of right knee     Sinus headache 10/10/2018    Sleep apnea     Sleep disturbance     Smoker     Tendonitis of foot     right       Past Surgical History:   Procedure Laterality Date    CARDIAC CATHETERIZATION  7/8/14    Non Obstructive CAD     CERVICAL SPINE SURGERY  7/13/12     C2-3-4-5    COLONOSCOPY      FRACTURE SURGERY      Rt elbow     HC INJECT OTHER PERPHRL NERV Bilateral 5/9/2019    INJECTION SPINAL performed by Irma Barboza MD at [facility] Bilateral 8/15/2019    SACROILIAC JOINT INJECTION performed by Irma Barboza MD at [facility]  2/5/16    premier tens    NERVE BLOCK Left 02/07/2020    RFA left side    NERVE BLOCK Left 02/07/2020     NERVE RADIOFREQUENCY ABLATION - SACROILIAC (Left )    NERVE BLOCK  02/12/2021    NERVE RADIOFREQUENCY ABLATION SI (Left     NERVE BLOCK Right 02/19/2021    Procedure: NERVE RADIOFREQUENCY ABLATION SI JOINT (Right )    OTHER SURGICAL HISTORY  08/15/2019    sacroiliac joint injection    PAIN MANAGEMENT PROCEDURE Left 2/7/2020    NERVE RADIOFREQUENCY ABLATION - SACROILIAC performed by Irma Barboza MD at [address] [place] [address] Left 2/12/2021    NERVE RADIOFREQUENCY ABLATION SI performed by Irma Barboza MD at [address] [place] [address] Right 2/19/2021    NERVE RADIOFREQUENCY ABLATION SI JOINT performed by Irma Barboza MD at [address] [place] [address]         No Known Allergies      Current Outpatient Medications:     budesonide-formoterol (SYMBICORT) 160-4.5 MCG/ACT AERO, INHALE TWO PUFFS BY MOUTH TWICE A DAY, Disp: 10.2 g, Rfl: 1    omeprazole (PRILOSEC) 20 MG delayed release capsule, Take one capsule by mouth twice a day 30 minutes before meals. , Disp: 180 capsule, Rfl: 0    oxyCODONE (ROXICODONE) 5 MG immediate release tablet, Take 1 tablet by mouth 2 times daily as needed for Pain for up to 30 days. , Disp: 60 tablet, Rfl: 0    gabapentin (NEURONTIN) 800 MG tablet, Take 1 tablet by mouth every 8 hours for 30 days. , Disp: 90 tablet, Rfl: 2    fluticasone (FLONASE) 50 MCG/ACT nasal spray, 1 spray by Each Nostril route daily, Disp: 2 Bottle, Rfl: 1    amLODIPine (NORVASC) 5 MG tablet, Take 1 tablet by mouth daily, Disp: 30 tablet, Rfl: 5    simvastatin Food Insecurity:     Worried About Running Out of Food in the Last Year:     [address] in the Last Year:    Transportation Needs:     Lack of Transportation (Medical):  Lack of Transportation (Non-Medical):    Physical Activity:     Days of Exercise per Week:     Minutes of Exercise per Session:    Stress:     Feeling of Stress :    Social Connections:     Frequency of Communication with Friends and Family:     Frequency of Social Gatherings with Friends and Family:     Attends Quaker Services:     Active Member of Clubs or Organizations:     Attends Club or Organization Meetings:     Marital Status:    Intimate Partner Violence:     Fear of Current or Ex-Partner:     Emotionally Abused:     Physically Abused:     Sexually Abused:        Review of Systems:  Review of Systems   Constitutional: Negative for chills and fever. Cardiovascular: Negative for chest pain and palpitations. Respiratory: Negative for cough and shortness of breath. Musculoskeletal: Positive for back pain and neck pain. Gastrointestinal: Negative for constipation. Neurological: Positive for headaches. Negative for disturbances in coordination. Physical Exam:  There were no vitals taken for this visit. Physical Exam  HENT:      Head: Normocephalic. Pulmonary:      Effort: Pulmonary effort is normal.   Neurological:      Mental Status: He is alert.    Psychiatric:         Mood and Affect: Mood normal.         Behavior: Behavior normal.         Record/Diagnostics Review:    Last niru 3/2021 and was appropriate     Assessment:  Problem List Items Addressed This Visit     Cervical stenosis of spine    Relevant Orders    Drug Screen, Pain    Cervicalgia    Relevant Medications    oxyCODONE (ROXICODONE) 5 MG immediate release tablet    DDD (degenerative disc disease), lumbar    Relevant Medications    tiZANidine (ZANAFLEX) 4 MG tablet    oxyCODONE (ROXICODONE) 5 MG immediate release tablet    gabapentin (NEURONTIN) 800 MG tablet    Displacement of lumbar intervertebral disc without myelopathy    Relevant Medications    tiZANidine (ZANAFLEX) 4 MG tablet    oxyCODONE (ROXICODONE) 5 MG immediate release tablet    gabapentin (NEURONTIN) 800 MG tablet    Radiculopathy of cervical spine    Relevant Medications    tiZANidine (ZANAFLEX) 4 MG tablet    oxyCODONE (ROXICODONE) 5 MG immediate release tablet    gabapentin (NEURONTIN) 800 MG tablet    Lumbosacral spondylosis without myelopathy    Relevant Medications    tiZANidine (ZANAFLEX) 4 MG tablet    oxyCODONE (ROXICODONE) 5 MG immediate release tablet    gabapentin (NEURONTIN) 800 MG tablet    Encounter for long-term opiate analgesic use - Primary    Relevant Orders    Drug Screen, Pain      Other Visit Diagnoses     Pain                 Treatment Plan:  Patient relates current medications are helping the pain. Patient reports taking pain medications as prescribed, denies obtaining medications from different sources and denies use of illegal drugs. Patient denies side effects from medications like nausea, vomiting, constipation or drowsiness. Patient reports current activities of daily living are possible due to medications and would like to continue them. As always, we encourage daily stretching and strengthening exercises, and recommend minimizing use of pain medications unless patient cannot get through daily activities due to pain. Contract requirements met. Continue opioid therapy. Script written for oxycodone  Discussed repeating SI joint RFA but he would like to wait until after the carotid endarterectomy he plans to have in September UDS for standard monitoring purposes  Follow up appointment made for 4 weeks    Yosi Zambrano, was evaluated through a synchronous (real-time) audio-video encounter. The patient (or guardian if applicable) is aware that this is a billable service. Verbal consent to proceed has been obtained within the past 12 months.  The visit was conducted pursuant to the emergency declaration under the [facility], [address] [place] [address] authority and the [employer] General Act. Patient identification was verified, and a caregiver was present when appropriate. The patient was located in a state where the provider was credentialed to provide care. Total time spent for this encounter: Not billed by time    --JOSSUE Syed CNP on 7/22/2021 at 3:16 PM    An electronic signature was used to authenticate this note.

## 2021-07-30 ENCOUNTER — HOSPITAL ENCOUNTER (OUTPATIENT)
Age: 61
Discharge: HOME | End: 2021-07-30
Payer: MEDICARE

## 2021-07-30 DIAGNOSIS — Z79.891 ENCOUNTER FOR LONG-TERM OPIATE ANALGESIC USE: ICD-10-CM

## 2021-07-30 DIAGNOSIS — M48.02 CERVICAL STENOSIS OF SPINE: ICD-10-CM

## 2021-07-30 PROCEDURE — 80307 DRUG TEST PRSMV CHEM ANLYZR: CPT

## 2021-08-04 LAB
6-ACETYLMORPHINE, UR: NOT DETECTED
7-AMINOCLONAZEPAM, URINE: NOT DETECTED
ALPHA-OH-ALPRAZ, URINE: NOT DETECTED
ALPHA-OH-MIDAZOLAM, URINE: NOT DETECTED
ALPRAZOLAM, URINE: NOT DETECTED
AMPHETAMINES, URINE: NOT DETECTED
BARBITURATES, URINE: NOT DETECTED
BENZOYLECGONINE, UR: NOT DETECTED
BUPRENORPHINE URINE: NOT DETECTED
CARISOPRODOL, UR: NOT DETECTED
CLONAZEPAM, URINE: NOT DETECTED
CODEINE, URINE: NOT DETECTED
CREATININE URINE: 175.3 MG/DL (ref 20–400)
DIAZEPAM, URINE: NOT DETECTED
DRUGS EXPECTED, UR: NORMAL
EER HI RES INTERP UR: NORMAL
ETHYL GLUCURONIDE UR: NOT DETECTED
FENTANYL URINE: NOT DETECTED
GABAPENTIN: PRESENT
HYDROCODONE, URINE: NOT DETECTED
HYDROMORPHONE, URINE: NOT DETECTED
LORAZEPAM, URINE: NOT DETECTED
MARIJUANA METAB, UR: NOT DETECTED
MDA, UR: NOT DETECTED
MDEA, EVE, UR: NOT DETECTED
MDMA URINE: NOT DETECTED
MEPERIDINE METAB, UR: NOT DETECTED
METHADONE, URINE: NOT DETECTED
METHAMPHETAMINE, URINE: NOT DETECTED
METHYLPHENIDATE: NOT DETECTED
MIDAZOLAM, URINE: NOT DETECTED
MORPHINE URINE: NOT DETECTED
NALOXONE URINE: NOT DETECTED
NORBUPRENORPHINE, URINE: NOT DETECTED
NORDIAZEPAM, URINE: NOT DETECTED
NORFENTANYL, URINE: NOT DETECTED
NORHYDROCODONE, URINE: NOT DETECTED
NOROXYCODONE, URINE: PRESENT
NOROXYMORPHONE, URINE: PRESENT
OXAZEPAM, URINE: NOT DETECTED
OXYCODONE URINE: PRESENT
OXYMORPHONE, URINE: PRESENT
PAIN MANAGEMENT DRUG PANEL INTERP, URINE: NORMAL
PAIN MGT DRUG PANEL, HI RES, UR: NORMAL
PCP,URINE: NOT DETECTED
PHENTERMINE, UR: NOT DETECTED
PREGABALIN: NOT DETECTED
TAPENTADOL, URINE: NOT DETECTED
TAPENTADOL-O-SULFATE, URINE: NOT DETECTED
TEMAZEPAM, URINE: NOT DETECTED
TRAMADOL, URINE: NOT DETECTED
ZOLPIDEM METABOLITE (ZCA), URINE: NOT DETECTED
ZOLPIDEM, URINE: NOT DETECTED

## 2021-08-13 ASSESSMENT — ENCOUNTER SYMPTOMS
BACK PAIN: 1
BOWEL INCONTINENCE: 0

## 2021-08-13 NOTE — PROGRESS NOTES
HPI:     Back Pain  This is a chronic problem. The current episode started more than 1 year ago. The problem occurs constantly. The problem has been gradually improving since onset. The pain is present in the lumbar spine. The quality of the pain is described as aching and stabbing. The pain radiates to the left thigh and left knee. The pain is at a severity of 5/10. The pain is the same all the time. Exacerbated by: walking. Stiffness is present all day. Associated symptoms include leg pain, numbness, tingling and weakness. Pertinent negatives include no bladder incontinence, bowel incontinence, chest pain, fever or headaches. He has tried heat and ice for the symptoms. Neck Pain   This is a chronic problem. The current episode started more than 1 year ago. The problem occurs constantly. The problem has been gradually worsening. The pain is associated with nothing. The pain is present in the left side (down left arm). The quality of the pain is described as aching and burning. The pain is at a severity of 7/10. Exacerbated by: walking. The pain is same all the time. Stiffness is present all day. Associated symptoms include leg pain, numbness, tingling and weakness. Pertinent negatives include no chest pain, fever or headaches. He has tried nothing for the symptoms. Recently quit smoking. Previous cervical decompression. MRI with multilevel degenerative changes present with SI RFA. Opioid dependent for pain control. Being evaluated for carotid stenosis    Patient denies any new neurological symptoms. Nobowel or bladder incontinence, no weakness, and no falling. Review of OARRS does not show any aberrant prescription behavior.      Past Medical History:   Diagnosis Date    Abnormal stress ECG     Angina pectoris (HCC)     CAD (coronary artery disease)     Carpal tunnel syndrome     left    Cervical stenosis of spine     Cervicalgia     chronic pain    Chronic back pain     [facility]) [facility]     DDD (degenerative disc disease)     Elbow fracture, right     Fractures     elbow    GERD (gastroesophageal reflux disease)     Gout     Right great toe    Hyperlipidemia     Hypertension     Mild depression ([facility] 9/26/2013    2 suicide attempts by girlfriend related to depression.     Morbidly obese ([facility] 10/30/2020    Occlusion of right carotid artery 7/14/2021    Osteoarthritis of right knee     Sinus headache 10/10/2018    Sleep apnea     Sleep disturbance     Smoker     Tendonitis of foot     right       Past Surgical History:   Procedure Laterality Date    CARDIAC CATHETERIZATION  7/8/14    Non Obstructive CAD     CERVICAL SPINE SURGERY  7/13/12     C2-3-4-5    COLONOSCOPY      FRACTURE SURGERY      Rt elbow     HC INJECT OTHER PERPHRL NERV Bilateral 5/9/2019    INJECTION SPINAL performed by Gilbert Gonzalez MD at [address] [place] [address] Bilateral 8/15/2019    SACROILIAC JOINT INJECTION performed by Gilbert [facility] MD at [facility]  2/5/16    premier tens    NERVE BLOCK Left 02/07/2020    RFA left side    NERVE BLOCK Left 02/07/2020     NERVE RADIOFREQUENCY ABLATION - SACROILIAC (Left )    NERVE BLOCK  02/12/2021    NERVE RADIOFREQUENCY ABLATION SI (Left     NERVE BLOCK Right 02/19/2021    Procedure: NERVE RADIOFREQUENCY ABLATION SI JOINT (Right )    OTHER SURGICAL HISTORY  08/15/2019    sacroiliac joint injection    PAIN MANAGEMENT PROCEDURE Left 2/7/2020    NERVE RADIOFREQUENCY ABLATION - SACROILIAC performed by Gilbert Gonzalez MD at [address] [place] [address] Left 2/12/2021    NERVE RADIOFREQUENCY ABLATION SI performed by Gilbert Gonzalez MD at [address] [place] [address] Right 2/19/2021    NERVE RADIOFREQUENCY ABLATION SI JOINT performed by Gilbert Gonzalez MD at [address] [place] [address] No Known Allergies      Current Outpatient Medications:     oxyCODONE (ROXICODONE) 5 MG immediate release tablet, Take 1 tablet by mouth 2 times daily as needed for Pain for up to 30 days. , Disp: 60 tablet, Rfl: 0    gabapentin (NEURONTIN) 800 MG tablet, Take 1 tablet by mouth every 8 hours for 30 days. , Disp: 90 tablet, Rfl: 2    tiZANidine (ZANAFLEX) 4 MG tablet, Take 1 tablet by mouth 2 times daily as needed (for muscle spasms), Disp: 60 tablet, Rfl: 2    budesonide-formoterol (SYMBICORT) 160-4.5 MCG/ACT AERO, INHALE TWO PUFFS BY MOUTH TWICE A DAY, Disp: 10.2 g, Rfl: 1    omeprazole (PRILOSEC) 20 MG delayed release capsule, Take one capsule by mouth twice a day 30 minutes before meals. , Disp: 180 capsule, Rfl: 0    fluticasone (FLONASE) 50 MCG/ACT nasal spray, 1 spray by Each Nostril route daily, Disp: 2 Bottle, Rfl: 1    amLODIPine (NORVASC) 5 MG tablet, Take 1 tablet by mouth daily, Disp: 30 tablet, Rfl: 5    simvastatin (ZOCOR) 80 MG tablet, Take 1 tablet by mouth every evening, Disp: 30 tablet, Rfl: 11    DULoxetine (CYMBALTA) 60 MG extended release capsule, Take 60 mg by mouth, Disp: , Rfl:     acetaminophen (TYLENOL) 325 MG tablet, Take 650 mg by mouth nightly, Disp: , Rfl:     cetirizine (ZYRTEC) 10 MG tablet, TAKE ONE TABLET BY MOUTH DAILY, Disp: 30 tablet, Rfl: 1    diclofenac (VOLTAREN) 75 MG EC tablet, Take 75 mg by mouth 2 times daily, Disp: , Rfl:     nitroGLYCERIN (NITROSTAT) 0.4 MG SL tablet, Place 1 tablet under the tongue every 5 minutes as needed for Chest pain, Disp: 25 tablet, Rfl: 3    albuterol sulfate  (90 Base) MCG/ACT inhaler, Inhale 2 puffs into the lungs every 6 hours as needed for Wheezing, Disp: 1 Inhaler, Rfl: 3    aspirin 81 MG EC tablet, Take 1 tablet by mouth daily, Disp: 30 tablet, Rfl: 3    Family History   Problem Relation Age of Onset    Heart Disease Mother     COPD Father     Cancer Maternal Aunt 64        breast cancer     Cancer Maternal Uncle 60        prostrate cancer        Social History     Socioeconomic History    Marital status: Single     Spouse name: Not on file    Number of children: Not on file    Years of education: Not on file    Highest education level: Not on file   Occupational History     Employer: N/A   Tobacco Use    Smoking status: Current Every Day Smoker     Packs/day: 1.50     Years: 40.00     Pack years: 60.00     Types: Cigarettes    Smokeless tobacco: Former User     Quit date: 3/12/2015    Tobacco comment: wants to discuss  chantix   Vaping Use    Vaping Use: Never used   Substance and Sexual Activity    Alcohol use: Not Currently     Alcohol/week: 0.0 standard drinks     Comment: 3 x year    Drug use: No    Sexual activity: Yes     Partners: Female   Other Topics Concern    Not on file   Social History Narrative    Not on file     Social Determinants of Health     Financial Resource Strain:     Difficulty of Paying Living Expenses:    Food Insecurity:     Worried About Running Out of Food in the Last Year:     Ran Out of Food in the Last Year:    Transportation Needs:     Lack of Transportation (Medical):  Lack of Transportation (Non-Medical):    Physical Activity:     Days of Exercise per Week:     Minutes of Exercise per Session:    Stress:     Feeling of Stress :    Social Connections:     Frequency of Communication with Friends and Family:     Frequency of Social Gatherings with Friends and Family:     Attends Hindu Services:     Active Member of Clubs or Organizations:     Attends Club or Organization Meetings:     Marital Status:    Intimate Partner Violence:     Fear of Current or Ex-Partner:     Emotionally Abused:     Physically Abused:     Sexually Abused:        Review of Systems:  Review of Systems   Constitutional: Negative for fever. Cardiovascular: Negative for chest pain. Musculoskeletal: Positive for back pain and neck pain.    Gastrointestinal: Negative for bowel incontinence. Genitourinary: Negative for bladder incontinence. Neurological: Positive for numbness, tingling and weakness. Negative for headaches. Physical Exam:      Physical Exam  Constitutional:       Appearance: Normal appearance. Pulmonary:      Effort: Pulmonary effort is normal.   Neurological:      Mental Status: He is alert. Psychiatric:         Attention and Perception: Attention and perception normal.         Mood and Affect: Mood and affect normal.         Record/Diagnostics Review:    As above, I did review the imaging    Orders:    Orders Placed This Encounter   Medications    oxyCODONE (ROXICODONE) 5 MG immediate release tablet     Sig: Take 1 tablet by mouth 2 times daily as needed for Pain for up to 30 days. Dispense:  60 tablet     Refill:  0     Reduce doses taken as pain becomes manageable    gabapentin (NEURONTIN) 800 MG tablet     Sig: Take 1 tablet by mouth every 8 hours for 30 days. Dispense:  90 tablet     Refill:  2       Assessment:  1. Lumbosacral spondylosis without myelopathy    2. Other chronic pain    3. Encounter for long-term opiate analgesic use    4. Displacement of lumbar intervertebral disc without myelopathy    5. Postlaminectomy syndrome, cervical region        Treatment Plan:  DISCUSSION: Treatment options discussed with patient and all questions answered to patient's satisfaction. OARRS Review: Reviewed and acceptable for medications prescribed. TREATMENT OPTIONS:     Discussed different treatment options including continued conservative care such as physical therapy, chiropractic care, acupuncture. Discussed different interventional options such as epidural steroids or medial branch blocks. Also discussed neuromodulation in the form of spinal cord stimulation. Also discussed surgical evaluation. Continue current medication management, has been stable and compliant.     Has failed conservative measures, including physical therapy and the pain is worsening, I do believe an MRI of the Cervical spine is indicated to further evaluate pathology and guide treatment plan. Consider injection therapies versus surgical evaluation based on imaging results. Repeat SI RFA kolby Vitale M.D. I have reviewed the chief complaint and history of present illness (including ROS and PFSH) and vital documentation by my staff and I agree with their documentation and have added where applicable.

## 2021-08-16 ENCOUNTER — VIRTUAL VISIT (OUTPATIENT)
Dept: PAIN MANAGEMENT | Age: 61
End: 2021-08-16
Payer: MEDICARE

## 2021-08-16 DIAGNOSIS — M51.26 DISPLACEMENT OF LUMBAR INTERVERTEBRAL DISC WITHOUT MYELOPATHY: ICD-10-CM

## 2021-08-16 DIAGNOSIS — M96.1 POSTLAMINECTOMY SYNDROME, CERVICAL REGION: ICD-10-CM

## 2021-08-16 DIAGNOSIS — G89.29 OTHER CHRONIC PAIN: ICD-10-CM

## 2021-08-16 DIAGNOSIS — M47.817 LUMBOSACRAL SPONDYLOSIS WITHOUT MYELOPATHY: Primary | ICD-10-CM

## 2021-08-16 DIAGNOSIS — Z79.891 ENCOUNTER FOR LONG-TERM OPIATE ANALGESIC USE: ICD-10-CM

## 2021-08-16 PROCEDURE — 4004F PT TOBACCO SCREEN RCVD TLK: CPT | Performed by: PAIN MEDICINE

## 2021-08-16 PROCEDURE — G8427 DOCREV CUR MEDS BY ELIG CLIN: HCPCS | Performed by: PAIN MEDICINE

## 2021-08-16 PROCEDURE — 3017F COLORECTAL CA SCREEN DOC REV: CPT | Performed by: PAIN MEDICINE

## 2021-08-16 PROCEDURE — 99214 OFFICE O/P EST MOD 30 MIN: CPT | Performed by: PAIN MEDICINE

## 2021-08-16 PROCEDURE — G8417 CALC BMI ABV UP PARAM F/U: HCPCS | Performed by: PAIN MEDICINE

## 2021-08-16 RX ORDER — GABAPENTIN 800 MG/1
800 TABLET ORAL EVERY 8 HOURS SCHEDULED
Qty: 90 TABLET | Refills: 2 | Status: SHIPPED | OUTPATIENT
Start: 2021-08-16 | End: 2021-09-13 | Stop reason: SDUPTHER

## 2021-08-16 RX ORDER — OXYCODONE HYDROCHLORIDE 5 MG/1
5 TABLET ORAL 2 TIMES DAILY PRN
Qty: 60 TABLET | Refills: 0 | Status: SHIPPED | OUTPATIENT
Start: 2021-08-16 | End: 2021-09-13 | Stop reason: SDUPTHER

## 2021-08-25 ENCOUNTER — TELEPHONE (OUTPATIENT)
Dept: PAIN MANAGEMENT | Age: 61
End: 2021-08-25

## 2021-09-03 ENCOUNTER — TELEPHONE (OUTPATIENT)
Dept: FAMILY MEDICINE CLINIC | Age: 61
End: 2021-09-03

## 2021-09-03 ENCOUNTER — OFFICE VISIT (OUTPATIENT)
Dept: VASCULAR SURGERY | Age: 61
End: 2021-09-03
Payer: MEDICARE

## 2021-09-03 VITALS
DIASTOLIC BLOOD PRESSURE: 67 MMHG | HEIGHT: 69 IN | TEMPERATURE: 96.8 F | WEIGHT: 251 LBS | OXYGEN SATURATION: 97 % | SYSTOLIC BLOOD PRESSURE: 134 MMHG | BODY MASS INDEX: 37.18 KG/M2 | RESPIRATION RATE: 18 BRPM | HEART RATE: 94 BPM

## 2021-09-03 DIAGNOSIS — I65.23 CAROTID STENOSIS, ASYMPTOMATIC, BILATERAL: Primary | ICD-10-CM

## 2021-09-03 PROCEDURE — G8427 DOCREV CUR MEDS BY ELIG CLIN: HCPCS | Performed by: SURGERY

## 2021-09-03 PROCEDURE — 99214 OFFICE O/P EST MOD 30 MIN: CPT | Performed by: SURGERY

## 2021-09-03 PROCEDURE — G8417 CALC BMI ABV UP PARAM F/U: HCPCS | Performed by: SURGERY

## 2021-09-03 PROCEDURE — 4004F PT TOBACCO SCREEN RCVD TLK: CPT | Performed by: SURGERY

## 2021-09-03 PROCEDURE — 3017F COLORECTAL CA SCREEN DOC REV: CPT | Performed by: SURGERY

## 2021-09-03 NOTE — PROGRESS NOTES
Division of Vascular Surgery        Follow Up      Chief Complaint:      Carotid stenosis    History of Present Illness:      Shahida Thomas is a 59 y.o. gentleman who presents with asymptomatic severe bilateral carotid artery stenosis. He denies any unilateral weakness, numbness/tingling, facial droop, thick/slurred speech. He was recently started on aspirin and statins and he has been cutting back on how much he smokes. He has increased his activity as well over the last several weeks. He has chronic numbness/tingling in his hands, has undergone cervical spine surgery and fusion for it many years ago. Medical History:     Past Medical History:   Diagnosis Date    Abnormal stress ECG     Angina pectoris (HCC)     CAD (coronary artery disease)     Carpal tunnel syndrome     left    Cervical stenosis of spine     Cervicalgia     chronic pain    Chronic back pain     COPD (chronic obstructive pulmonary disease) (HCC)     DDD (degenerative disc disease)     Elbow fracture, right     Fractures     elbow    GERD (gastroesophageal reflux disease)     Gout     Right great toe    Hyperlipidemia     Hypertension     Mild depression ([address] 9/26/2013    2 suicide attempts by girlfriend related to depression.     Morbidly obese ([address] 10/30/2020    Occlusion of right carotid artery 7/14/2021    Osteoarthritis of right knee     Sinus headache 10/10/2018    Sleep apnea     Sleep disturbance     Smoker     Tendonitis of foot     right       Surgical History:     Past Surgical History:   Procedure Laterality Date    CARDIAC CATHETERIZATION  7/8/14    Non Obstructive CAD     CERVICAL SPINE SURGERY  7/13/12     C2-3-4-5    COLONOSCOPY      FRACTURE SURGERY      Rt elbow     HC INJECT OTHER PERPHRL NERV Bilateral 5/9/2019    INJECTION SPINAL performed by Rox Villarreal MD at [facility] [address] JOINT Bilateral 8/15/2019    SACROILIAC JOINT INJECTION performed by Daniele Nieves MD at [facility]  2/5/16    premier tens    NERVE BLOCK Left 02/07/2020    RFA left side    NERVE BLOCK Left 02/07/2020     NERVE RADIOFREQUENCY ABLATION - SACROILIAC (Left )    NERVE BLOCK  02/12/2021    NERVE RADIOFREQUENCY ABLATION SI (Left     NERVE BLOCK Right 02/19/2021    Procedure: NERVE RADIOFREQUENCY ABLATION SI JOINT (Right )    OTHER SURGICAL HISTORY  08/15/2019    sacroiliac joint injection    PAIN MANAGEMENT PROCEDURE Left 2/7/2020    NERVE RADIOFREQUENCY ABLATION - SACROILIAC performed by Daniele Nieves MD at [address] [place] [address] Left 2/12/2021    NERVE RADIOFREQUENCY ABLATION SI performed by Daniele Nieves MD at [address] [place] [address] Right 2/19/2021    NERVE RADIOFREQUENCY ABLATION SI JOINT performed by Daniele Nieves MD at [address] [place] [address]         Family History:     Family History   Problem Relation Age of Onset    Heart Disease Mother     COPD Father     Cancer Maternal Aunt 64        breast cancer     Cancer Maternal Uncle 60        prostrate cancer        Allergies:       Patient has no known allergies. Medications:      Current Outpatient Medications   Medication Sig Dispense Refill    oxyCODONE (ROXICODONE) 5 MG immediate release tablet Take 1 tablet by mouth 2 times daily as needed for Pain for up to 30 days. 60 tablet 0    gabapentin (NEURONTIN) 800 MG tablet Take 1 tablet by mouth every 8 hours for 30 days. 90 tablet 2    budesonide-formoterol (SYMBICORT) 160-4.5 MCG/ACT AERO INHALE TWO PUFFS BY MOUTH TWICE A DAY 10.2 g 1    omeprazole (PRILOSEC) 20 MG delayed release capsule Take one capsule by mouth twice a day 30 minutes before meals.  180 capsule 0    fluticasone (FLONASE) 50 MCG/ACT nasal spray 1 spray by Each Nostril route daily 2 Bottle 1    amLODIPine (NORVASC) 5 MG tablet Take 1 tablet by mouth daily 30 tablet 5    simvastatin (ZOCOR) 80 MG tablet Take 1 tablet by mouth every evening 30 tablet 11    DULoxetine (CYMBALTA) 60 MG extended release capsule Take 60 mg by mouth      acetaminophen (TYLENOL) 325 MG tablet Take 650 mg by mouth nightly      cetirizine (ZYRTEC) 10 MG tablet TAKE ONE TABLET BY MOUTH DAILY 30 tablet 1    diclofenac (VOLTAREN) 75 MG EC tablet Take 75 mg by mouth 2 times daily      nitroGLYCERIN (NITROSTAT) 0.4 MG SL tablet Place 1 tablet under the tongue every 5 minutes as needed for Chest pain 25 tablet 3    albuterol sulfate  (90 Base) MCG/ACT inhaler Inhale 2 puffs into the lungs every 6 hours as needed for Wheezing 1 Inhaler 3    aspirin 81 MG EC tablet Take 1 tablet by mouth daily 30 tablet 3     No current facility-administered medications for this visit. Social History:     Tobacco:    reports that he has been smoking cigarettes. He has a 60.00 pack-year smoking history. He quit smokeless tobacco use about 6 years ago. Alcohol:      reports previous alcohol use. Drug Use:  reports no history of drug use. Review of Systems:     Review of Systems   Constitutional: Negative for chills and fever. HENT: Negative for congestion. Eyes: Negative for visual disturbance. Respiratory: Negative for chest tightness and shortness of breath. Cardiovascular: Negative for chest pain and leg swelling. Gastrointestinal: Negative for abdominal pain. Endocrine: Negative. Genitourinary: Negative. Musculoskeletal: Positive for neck pain. Skin: Negative for color change and wound. Allergic/Immunologic: Negative. Neurological: Positive for numbness. Negative for facial asymmetry, speech difficulty and weakness. Hematological: Negative. Psychiatric/Behavioral: Negative.       Physical Exam:     Vitals:  /67 (Site: Right Upper Arm, Position: Sitting, Cuff Size: Large Adult)   Pulse 94   Temp 96.8 °F (36 °C) (Temporal)   Resp 18   Ht 5' 9\" (1.753 m)   Wt 251 lb (113.9 kg)   SpO2 97%   BMI 37.07 kg/m²     Physical Exam  Constitutional:       Appearance: He is well-developed and well-groomed. Eyes:      Extraocular Movements: Extraocular movements intact. Conjunctiva/sclera: Conjunctivae normal.   Neck:      Vascular: No carotid bruit. Cardiovascular:      Rate and Rhythm: Normal rate and regular rhythm. Pulses:           Radial pulses are 2+ on the right side and 2+ on the left side. Pulmonary:      Effort: Pulmonary effort is normal. No respiratory distress. Abdominal:      Palpations: Abdomen is soft. Tenderness: There is no abdominal tenderness. Musculoskeletal:      Cervical back: Full passive range of motion without pain. Right lower leg: No swelling or tenderness. No edema. Left lower leg: No swelling or tenderness. No edema. Right foot: Normal capillary refill. No swelling or tenderness. Left foot: Normal capillary refill. No swelling or tenderness. Feet:      Right foot:      Skin integrity: No ulcer or skin breakdown. Left foot:      Skin integrity: No ulcer or skin breakdown. Skin:     General: Skin is warm. Capillary Refill: Capillary refill takes less than 2 seconds. Neurological:      Mental Status: He is alert and oriented to person, place, and time. GCS: GCS eye subscore is 4. GCS verbal subscore is 5. GCS motor subscore is 6. Sensory: Sensation is intact. Motor: Motor function is intact.    Psychiatric:         Mood and Affect: Mood normal.         Speech: Speech normal.         Behavior: Behavior normal.       Imaging/Labs:     Carotid duplex (7/9/21) reveals right ICA 70-99% stenosis (384/80), left ICA 50-69% stenosis    Assessment and Plan:     Asymptomatic bilateral carotid artery stenosis  · Continue optimal medical therapy with aspirin and statins  · Daily exercise to improve overall cardiovascular health  · Continue to work on completely stooping smoking   · We discussed intervention regarding his carotid disease  · He would like to hold off a bit longer if he can  · Will obtain CTA head and neck on next follow up in 6 months  · Discussed possible stenting due to difficulty that can arise from his cervical fusion    Optimal medical management is an important part of overall treatment of all patients with carotid bifurcation disease, regardless of the degree of stenosis or the plan for intervention. This therapy is directed both at the reduction of stroke and overall cardiovascular events, including cardiovascular-related mortality. Clinical guidelines issued by the American Heart Association and the American Stroke Association recommends:    I. Lowering blood pressure to a target 140/90 mm Hg by lifestyle interventions and antihypertensive treatment is recommended in individuals who have hypertension with asymptomatic carotid atherosclerosis. II. Glucose control to nearly normoglycemic levels (target hemoglobin A1C 7%) is recommended among diabetic patients to reduce microvascular complications and, with lesser certainty, macrovascular complications other than stroke. III. Patients with known atherosclerosis have demonstrated reduced stroke rates when treated with lipid-lowering therapy. And continued low dose statin will help stabilize plaque and decrease the inflammatory response of atherosclerosis. IV. Smoking nearly doubles the risk of stroke and with cessation there is a reduction in the risk of stroke. V. Antiplatelet therapy in asymptomatic patients with carotid atherosclerosis is recommended to reduce overall cardiovascular morbidity although it has not been shown to be effective in the primary prevention of stroke. Electronically signed by Carlos Dee MD on 9/3/21 at 9:53 AM EDT      [address] [place] [address],[address] [facility]: [phone]  C: [phone]  Email: [email or web address]

## 2021-09-07 ENCOUNTER — TELEPHONE (OUTPATIENT)
Dept: VASCULAR SURGERY | Age: 61
End: 2021-09-07

## 2021-09-07 ASSESSMENT — ENCOUNTER SYMPTOMS
CHEST TIGHTNESS: 0
COLOR CHANGE: 0
ABDOMINAL PAIN: 0
ALLERGIC/IMMUNOLOGIC NEGATIVE: 1
SHORTNESS OF BREATH: 0

## 2021-09-07 NOTE — TELEPHONE ENCOUNTER
----- Message from Eli Kapadia MD sent at 9/7/2021 11:28 AM EDT -----  Can we change it to head and neck CT rather then just the neck when he comes for follow up in 6 months. I put the order in.

## 2021-09-09 ENCOUNTER — TELEPHONE (OUTPATIENT)
Dept: FAMILY MEDICINE CLINIC | Age: 61
End: 2021-09-09

## 2021-09-09 NOTE — TELEPHONE ENCOUNTER
Patient is requesting VV appointment for med refills. (8/30/21 appt was rescheduled, and clinic did not have record of 9/3 appointment)    Patient also stated that he got a letter for no shows on 8/30/21 and 9/3/21.  8/30 appointment was canceled due to Dr being our of office. 9/3/21: patient stated that it a vv and nobody called, when he finally got a hold of the office, it was too late due to having another appointment. Patient was upset due to \"I never miss appointment\" and he would like this to be address.

## 2021-09-10 ENCOUNTER — VIRTUAL VISIT (OUTPATIENT)
Dept: FAMILY MEDICINE CLINIC | Age: 61
End: 2021-09-10
Payer: MEDICARE

## 2021-09-10 DIAGNOSIS — Z76.0 MEDICATION REFILL: Primary | ICD-10-CM

## 2021-09-10 PROCEDURE — 3017F COLORECTAL CA SCREEN DOC REV: CPT | Performed by: STUDENT IN AN ORGANIZED HEALTH CARE EDUCATION/TRAINING PROGRAM

## 2021-09-10 PROCEDURE — 99212 OFFICE O/P EST SF 10 MIN: CPT | Performed by: STUDENT IN AN ORGANIZED HEALTH CARE EDUCATION/TRAINING PROGRAM

## 2021-09-10 PROCEDURE — 4004F PT TOBACCO SCREEN RCVD TLK: CPT | Performed by: STUDENT IN AN ORGANIZED HEALTH CARE EDUCATION/TRAINING PROGRAM

## 2021-09-10 PROCEDURE — G8417 CALC BMI ABV UP PARAM F/U: HCPCS | Performed by: STUDENT IN AN ORGANIZED HEALTH CARE EDUCATION/TRAINING PROGRAM

## 2021-09-10 PROCEDURE — G8427 DOCREV CUR MEDS BY ELIG CLIN: HCPCS | Performed by: STUDENT IN AN ORGANIZED HEALTH CARE EDUCATION/TRAINING PROGRAM

## 2021-09-10 NOTE — PROGRESS NOTES
Visit Information    Have you changed or started any medications since your last visit including any over-the-counter medicines, vitamins, or herbal medicines? no   Have you stopped taking any of your medications? Is so, why? -  no  Are you having any side effects from any of your medications? - no    Have you seen any other physician or provider since your last visit? yes - Vascular Surgeon   Have you had any other diagnostic tests since your last visit?  no   Have you been seen in the emergency room and/or had an admission in a hospital since we last saw you?  no   Have you had your routine dental cleaning in the past 6 months?  no     Do you have an active MyChart account? If no, what is the barrier?   Yes    Patient Care Team:  Mia Lovett DO as PCP - General (Family Medicine)  Mia Lovett DO as PCP - [facility]  Diamond Go MD as Consulting Physician (Gastroenterology)  Katty Arias MD as Consulting Physician (Cardiology)  Willi Denise MD as Consulting Physician (Orthopedic Surgery)  Aisha Garcia RN as Nurse Navigator    Medical History Review  Past Medical, Family, and Social History reviewed and does contribute to the patient presenting condition    Health Maintenance   Topic Date Due    DTaP/Tdap/Td vaccine (1 - Tdap) Never done    Low dose CT lung screening  02/13/2019    Annual Wellness Visit (AWV)  Never done    A1C test (Diabetic or Prediabetic)  01/10/2020    Flu vaccine (1) 09/01/2021    Colon cancer screen colonoscopy  08/25/2021    Lipid screen  11/17/2021    Potassium monitoring  11/17/2021    Creatinine monitoring  11/17/2021    Pneumococcal 0-64 years Vaccine (2 of 2 - PPSV23) 08/03/2023    Shingles Vaccine  Completed    COVID-19 Vaccine  Completed    Hepatitis C screen  Addressed    HIV screen  Addressed    Hepatitis A vaccine  Aged Out    Hepatitis B vaccine  Aged Out    Hib vaccine  Aged Out    Meningococcal (ACWY) vaccine  Aged Out

## 2021-09-10 NOTE — PATIENT INSTRUCTIONS
Thank you for letting us take care of you today. We hope all your questions were addressed. If a question was overlooked or something else comes to mind after you return home, please contact a member of your Care Team listed below. Your Care Team at [place] [address] is Team #3  Ja Smith MD (Faculty)  Aurelio Michel MD (Faculty  Jandayanara Pelletier MD (Resident)  Melonie Mckeon (Resident)   Crystal Seymour MD (Resident)  Roseanna Ochoa MD (Resident)  Evie Perkins., VIVEK Mccoy., JEFERSON Myers., Mary Herrera., Yevgeniy Bose ([facility])  Kassy President, [facility] (Clinical Practice Manager)  Susan Lopez [facility] (Clinical Pharmacist)     Office phone number: [phone]    If you need to get in right away due to illness, please be advised we have \"Same Day\" appointments available Monday-Friday. Please call us at [phone] option #3 to schedule your \"Same Day\" appointment.

## 2021-09-13 ENCOUNTER — VIRTUAL VISIT (OUTPATIENT)
Dept: PAIN MANAGEMENT | Age: 61
End: 2021-09-13
Payer: MEDICARE

## 2021-09-13 DIAGNOSIS — M51.26 DISPLACEMENT OF LUMBAR INTERVERTEBRAL DISC WITHOUT MYELOPATHY: ICD-10-CM

## 2021-09-13 DIAGNOSIS — G89.29 OTHER CHRONIC PAIN: ICD-10-CM

## 2021-09-13 DIAGNOSIS — M54.2 CERVICALGIA: Primary | ICD-10-CM

## 2021-09-13 DIAGNOSIS — M46.1 SACROILIITIS (HCC): ICD-10-CM

## 2021-09-13 DIAGNOSIS — M47.817 LUMBOSACRAL SPONDYLOSIS WITHOUT MYELOPATHY: ICD-10-CM

## 2021-09-13 DIAGNOSIS — Z79.891 ENCOUNTER FOR LONG-TERM OPIATE ANALGESIC USE: ICD-10-CM

## 2021-09-13 PROCEDURE — 3017F COLORECTAL CA SCREEN DOC REV: CPT | Performed by: PAIN MEDICINE

## 2021-09-13 PROCEDURE — 99214 OFFICE O/P EST MOD 30 MIN: CPT | Performed by: PAIN MEDICINE

## 2021-09-13 PROCEDURE — G8428 CUR MEDS NOT DOCUMENT: HCPCS | Performed by: PAIN MEDICINE

## 2021-09-13 RX ORDER — OXYCODONE HYDROCHLORIDE 5 MG/1
5 TABLET ORAL 2 TIMES DAILY PRN
Qty: 60 TABLET | Refills: 0 | Status: SHIPPED | OUTPATIENT
Start: 2021-09-13 | End: 2021-10-14 | Stop reason: SDUPTHER

## 2021-09-13 RX ORDER — GABAPENTIN 800 MG/1
800 TABLET ORAL EVERY 8 HOURS SCHEDULED
Qty: 90 TABLET | Refills: 2 | Status: SHIPPED | OUTPATIENT
Start: 2021-09-13 | End: 2021-12-09 | Stop reason: SDUPTHER

## 2021-09-13 ASSESSMENT — ENCOUNTER SYMPTOMS: BACK PAIN: 1

## 2021-09-13 NOTE — PROGRESS NOTES
HPI:     Back Pain  This is a chronic problem. The current episode started more than 1 year ago. The problem occurs constantly. The problem has been gradually worsening since onset. The pain is present in the lumbar spine. The quality of the pain is described as aching. The pain is moderate. The pain is the same all the time. The symptoms are aggravated by position. Stiffness is present all day. Chronic back and neck pain. Has had previous cervical decompression. Continues to have lingering neck pain. In regards to his low back. Imaging with multilevel degenerative changes. He has had SI RFA with more than 50% improvement for 6 months or more. Pain starting to return. Wishes to repeat. He is opioid dependent for pain control. Patient denies any new neurological symptoms. Nobowel or bladder incontinence, no weakness, and no falling. Review of OARRS does not show any aberrant prescription behavior. Medication is helping the patient stay active. Patient denies any side effects and reports adequate analgesia. No sign of misuse/abuse. Past Medical History:   Diagnosis Date    Abnormal stress ECG     Angina pectoris (HCC)     CAD (coronary artery disease)     Carpal tunnel syndrome     left    Cervical stenosis of spine     Cervicalgia     chronic pain    Chronic back pain     COPD (chronic obstructive pulmonary disease) (HCC)     DDD (degenerative disc disease)     Elbow fracture, right     Fractures     elbow    GERD (gastroesophageal reflux disease)     Gout     Right great toe    Hyperlipidemia     Hypertension     Mild depression ([address] 9/26/2013    2 suicide attempts by girlfriend related to depression.     Morbidly obese ([address] 10/30/2020    Occlusion of right carotid artery 7/14/2021    Osteoarthritis of right knee     Sinus headache 10/10/2018    Sleep apnea     Sleep disturbance     Smoker     Tendonitis of foot     right       Past Surgical History:   Procedure Laterality Date    CARDIAC CATHETERIZATION  7/8/14    Non Obstructive CAD     CERVICAL SPINE SURGERY  7/13/12     C2-3-4-5    COLONOSCOPY      FRACTURE SURGERY      Rt elbow     HC INJECT OTHER PERPHRL NERV Bilateral 5/9/2019    INJECTION SPINAL performed by Lexi Lang MD at [address] [place] [address] Bilateral 8/15/2019    SACROILIAC JOINT INJECTION performed by Lexi Lang MD at [facility]  2/5/16    premier tens    NERVE BLOCK Left 02/07/2020    RFA left side    NERVE BLOCK Left 02/07/2020     NERVE RADIOFREQUENCY ABLATION - SACROILIAC (Left )    NERVE BLOCK  02/12/2021    NERVE RADIOFREQUENCY ABLATION SI (Left     NERVE BLOCK Right 02/19/2021    Procedure: NERVE RADIOFREQUENCY ABLATION SI JOINT (Right )    OTHER SURGICAL HISTORY  08/15/2019    sacroiliac joint injection    PAIN MANAGEMENT PROCEDURE Left 2/7/2020    NERVE RADIOFREQUENCY ABLATION - SACROILIAC performed by Lexi Lang MD at [address] [place] [address] Left 2/12/2021    NERVE RADIOFREQUENCY ABLATION SI performed by Lexi Lang MD at [address] [place] [address] Right 2/19/2021    NERVE RADIOFREQUENCY ABLATION SI JOINT performed by Lexi Lang MD at [address] [place] [address]         No Known Allergies      Current Outpatient Medications:     oxyCODONE (ROXICODONE) 5 MG immediate release tablet, Take 1 tablet by mouth 2 times daily as needed for Pain for up to 30 days. , Disp: 60 tablet, Rfl: 0    gabapentin (NEURONTIN) 800 MG tablet, Take 1 tablet by mouth every 8 hours for 30 days. , Disp: 90 tablet, Rfl: 2    budesonide-formoterol (SYMBICORT) 160-4.5 MCG/ACT AERO, INHALE TWO PUFFS BY MOUTH TWICE A DAY, Disp: 10.2 g, Rfl: 1    omeprazole (PRILOSEC) 20 MG delayed release capsule, Take one capsule by mouth twice a day 30 minutes before meals. , Disp: 180 capsule, Rfl: 0   fluticasone (FLONASE) 50 MCG/ACT nasal spray, 1 spray by Each Nostril route daily, Disp: 2 Bottle, Rfl: 1    amLODIPine (NORVASC) 5 MG tablet, Take 1 tablet by mouth daily, Disp: 30 tablet, Rfl: 5    simvastatin (ZOCOR) 80 MG tablet, Take 1 tablet by mouth every evening, Disp: 30 tablet, Rfl: 11    DULoxetine (CYMBALTA) 60 MG extended release capsule, Take 60 mg by mouth, Disp: , Rfl:     acetaminophen (TYLENOL) 325 MG tablet, Take 650 mg by mouth nightly, Disp: , Rfl:     cetirizine (ZYRTEC) 10 MG tablet, TAKE ONE TABLET BY MOUTH DAILY, Disp: 30 tablet, Rfl: 1    diclofenac (VOLTAREN) 75 MG EC tablet, Take 75 mg by mouth 2 times daily, Disp: , Rfl:     nitroGLYCERIN (NITROSTAT) 0.4 MG SL tablet, Place 1 tablet under the tongue every 5 minutes as needed for Chest pain, Disp: 25 tablet, Rfl: 3    albuterol sulfate  (90 Base) MCG/ACT inhaler, Inhale 2 puffs into the lungs every 6 hours as needed for Wheezing, Disp: 1 Inhaler, Rfl: 3    aspirin 81 MG EC tablet, Take 1 tablet by mouth daily, Disp: 30 tablet, Rfl: 3    Family History   Problem Relation Age of Onset    Heart Disease Mother     COPD Father     Cancer Maternal Aunt 64        breast cancer     Cancer Maternal Uncle 60        prostrate cancer        Social History     Socioeconomic History    Marital status: Single     Spouse name: Not on file    Number of children: Not on file    Years of education: Not on file    Highest education level: Not on file   Occupational History     Employer: N/A   Tobacco Use    Smoking status: Current Every Day Smoker     Packs/day: 1.50     Years: 40.00     Pack years: 60.00     Types: Cigarettes    Smokeless tobacco: Former User     Quit date: 3/12/2015    Tobacco comment: wants to discuss  chantix   Vaping Use    Vaping Use: Never used   Substance and Sexual Activity    Alcohol use: Not Currently     Alcohol/week: 0.0 standard drinks     Comment: 3 x year    Drug use: No    Sexual activity: Yes Partners: Female   Other Topics Concern    Not on file   Social History Narrative    Not on file     Social Determinants of Health     Financial Resource Strain:     Difficulty of Paying Living Expenses:    Food Insecurity:     Worried About Running Out of Food in the Last Year:     [address] in the Last Year:    Transportation Needs:     Lack of Transportation (Medical):  Lack of Transportation (Non-Medical):    Physical Activity:     Days of Exercise per Week:     Minutes of Exercise per Session:    Stress:     Feeling of Stress :    Social Connections:     Frequency of Communication with Friends and Family:     Frequency of Social Gatherings with Friends and Family:     Attends [employer] Services:     Active Member of Clubs or Organizations:     Attends Club or Organization Meetings:     Marital Status:    Intimate Partner Violence:     Fear of Current or Ex-Partner:     Emotionally Abused:     Physically Abused:     Sexually Abused:        Review of Systems:  Review of Systems   Musculoskeletal: Positive for back pain. Physical Exam:      Physical Exam  Constitutional:       Appearance: Normal appearance. Pulmonary:      Effort: Pulmonary effort is normal.   Neurological:      Mental Status: He is alert. Psychiatric:         Attention and Perception: Attention and perception normal.         Mood and Affect: Mood and affect normal.         Record/Diagnostics Review:    As above, I did review the imaging    Orders:  Orders Placed This Encounter   Procedures    XR CERVICAL SPINE (2-3 VIEWS)    XR LUMBAR SPINE (2-3 VIEWS)     Orders Placed This Encounter   Medications    oxyCODONE (ROXICODONE) 5 MG immediate release tablet     Sig: Take 1 tablet by mouth 2 times daily as needed for Pain for up to 30 days.      Dispense:  60 tablet     Refill:  0     Reduce doses taken as pain becomes manageable    gabapentin (NEURONTIN) 800 MG tablet     Sig: Take 1 tablet by mouth every 8 hours for 30 days. Dispense:  90 tablet     Refill:  2       Assessment:  1. Cervicalgia    2. Lumbosacral spondylosis without myelopathy    3. Sacroiliitis ([address]    4. Encounter for long-term opiate analgesic use    5. Other chronic pain    6. Displacement of lumbar intervertebral disc without myelopathy        Treatment Plan:  DISCUSSION: Treatment options discussed with patient and all questions answered to patient's satisfaction. OARRS Review: Reviewed and acceptable for medications prescribed. TREATMENT OPTIONS:     Discussed different treatment options including continued conservative care such as physical therapy, chiropractic care, acupuncture. Discussed different interventional options such as epidural steroids or medial branch blocks. Also discussed neuromodulation in the form of spinal cord stimulation. Also discussed surgical evaluation. Due to the high risk nature of this patient's pain medication close monitoring is required. Medication risk and benefits discussed. Continue current medication management, has been stable and compliant. Has done well with previous RFA, more than 50% improvement for 6 months or more, pain starting to return, wishes to repeat, I think it is reasonable. We will go ahead with repeat SI RFA, will start on the right and then go to the left. Adriane Ackerman M.D. I have reviewed the chief complaint and history of present illness (including ROS and PFSH) and vital documentation by my staff and I agree with their documentation and have added where applicable. Maggy Peterson, was evaluated through a synchronous (real-time) audio-video encounter. The patient (or guardian if applicable) is aware that this is a billable service. Verbal consent to proceed has been obtained within the past 12 months.  The visit was conducted pursuant to the emergency declaration under the [facility] waiver authority and the Coronavirus Preparedness and Response Supplemental Appropriations Act. Patient identification was verified, and a caregiver was present when appropriate. The patient was located in a state where the provider was credentialed to provide care. Total time spent for this encounter: Not billed by time    --Leonor Waddell MD on 9/13/2021 at 8:22 AM    An electronic signature was used to authenticate this note.

## 2021-09-27 ENCOUNTER — HOSPITAL ENCOUNTER (OUTPATIENT)
Dept: GENERAL RADIOLOGY | Age: 61
Discharge: HOME | End: 2021-09-29
Payer: MEDICARE

## 2021-09-27 ENCOUNTER — HOSPITAL ENCOUNTER (OUTPATIENT)
Age: 61
Discharge: HOME | End: 2021-09-29
Payer: MEDICARE

## 2021-09-27 DIAGNOSIS — M47.817 LUMBOSACRAL SPONDYLOSIS WITHOUT MYELOPATHY: ICD-10-CM

## 2021-09-27 DIAGNOSIS — M54.2 CERVICALGIA: ICD-10-CM

## 2021-09-27 PROCEDURE — 72100 X-RAY EXAM L-S SPINE 2/3 VWS: CPT

## 2021-09-27 PROCEDURE — 72040 X-RAY EXAM NECK SPINE 2-3 VW: CPT

## 2021-09-29 ENCOUNTER — TELEPHONE (OUTPATIENT)
Dept: FAMILY MEDICINE CLINIC | Age: 61
End: 2021-09-29

## 2021-09-29 NOTE — TELEPHONE ENCOUNTER
Spoke with patient. Patient requesting antibotic for tooth infection. Patient dose not have dentist, but is looking for one. I recommended him calling his insurance for list of dentist covered. He agreed, but still wants use to call something in.

## 2021-09-29 NOTE — TELEPHONE ENCOUNTER
Pt stated he was supposed to have surgery tomorrow, but it has been postponed because of an infection in his tooth. They told him to contact his PCP to get antibiotics in his system for 7 days to clear the infection out before he can have surgery. Pt would like a call as soon as possible.

## 2021-10-08 ENCOUNTER — TELEPHONE (OUTPATIENT)
Dept: FAMILY MEDICINE CLINIC | Age: 61
End: 2021-10-08

## 2021-10-08 DIAGNOSIS — Z12.11 COLON CANCER SCREENING: Primary | ICD-10-CM

## 2021-10-14 ENCOUNTER — TELEPHONE (OUTPATIENT)
Dept: PAIN MANAGEMENT | Age: 61
End: 2021-10-14

## 2021-10-14 ENCOUNTER — VIRTUAL VISIT (OUTPATIENT)
Dept: PAIN MANAGEMENT | Age: 61
End: 2021-10-14
Payer: MEDICARE

## 2021-10-14 DIAGNOSIS — M51.26 DISPLACEMENT OF LUMBAR INTERVERTEBRAL DISC WITHOUT MYELOPATHY: ICD-10-CM

## 2021-10-14 DIAGNOSIS — M51.36 DDD (DEGENERATIVE DISC DISEASE), LUMBAR: ICD-10-CM

## 2021-10-14 DIAGNOSIS — M48.02 CERVICAL STENOSIS OF SPINE: ICD-10-CM

## 2021-10-14 DIAGNOSIS — Z51.81 MEDICATION MONITORING ENCOUNTER: Primary | ICD-10-CM

## 2021-10-14 DIAGNOSIS — M47.817 LUMBOSACRAL SPONDYLOSIS WITHOUT MYELOPATHY: ICD-10-CM

## 2021-10-14 PROCEDURE — 99213 OFFICE O/P EST LOW 20 MIN: CPT | Performed by: NURSE PRACTITIONER

## 2021-10-14 RX ORDER — OXYCODONE HYDROCHLORIDE 5 MG/1
5 TABLET ORAL 2 TIMES DAILY PRN
Qty: 60 TABLET | Refills: 0 | Status: SHIPPED | OUTPATIENT
Start: 2021-10-17 | End: 2021-11-11 | Stop reason: SDUPTHER

## 2021-10-14 ASSESSMENT — ENCOUNTER SYMPTOMS
BACK PAIN: 1
SHORTNESS OF BREATH: 0
COUGH: 0
CONSTIPATION: 0

## 2021-10-14 NOTE — PROGRESS NOTES
Patient completed a video visit today to review medication contract. Chief Complaint: neck and back pain    [facility]   Patient complains of neck pain that radiates down his left arm to fingertips. He has had this pain for many years and it is worsening over time. He had cervical spine surgery in 2012 and the pain has only worsened since that time. He saw Dr Cristal Scott with no surgery recommended for neck, but told could benefit from CTR. Patient followed up with Dr. Vadim Quiles after his CT last August and cervical injections were suggested. He is not a good candidate for cervical injections due to previous neck surgery. He did see NS in 2016 for lumbar pain but no surgery recommended at that time. Diclofenac started by his rheumatologist. He had bilateral SI Joint Radiofrequency Ablation 2/2021 and reports 50% relief. He states he is not getting stabbing pains at this time. He is trying to lose weight. Has changed his diet.      Did have another carotid doppler 7/9 and followed up with Dr. Walt Gtz on 7/14. He does not need surgery at this time - will follow up in six months. He has quit smoking. Neck pain is worsening. MRI was ordered but patient could not complete due to claustrophobia. He did complete XR which shows postoperative and degenerative change cervical spine stable to previous. He is scheduled for SI RFA later this month. Neck Pain   This is a chronic problem. The current episode started more than 1 year ago. The problem occurs constantly. The problem has been unchanged. The pain is present in the midline. The quality of the pain is described as aching. The pain is at a severity of 9/10. The pain is severe. The symptoms are aggravated by position. Pertinent negatives include no chest pain, fever, numbness or tingling. He has tried bed rest and oral narcotics for the symptoms. The treatment provided mild relief. Back Pain  This is a chronic problem.  The current episode started more than 1 year ago. The problem occurs constantly. The problem is unchanged. The pain is present in the lumbar spine. The quality of the pain is described as aching. The pain does not radiate. The pain is moderate. The symptoms are aggravated by position, standing and twisting (walking). Pertinent negatives include no chest pain, fever, numbness, paresthesias or tingling. He has tried analgesics and bed rest (injections, RFA) for the symptoms. The treatment provided moderate relief. Patient denies any new neurological symptoms. No bowel or bladder incontinence, no weakness, and no falling. Pill count: appropriate due 10/17    Morphine equivalent: 15    Controlled Substance Monitoring:    Acute and Chronic Pain Monitoring:   RX Monitoring 10/14/2021   Attestation -   Acute Pain Prescriptions -   Periodic Controlled Substance Monitoring Possible medication side effects, risk of tolerance/dependence & alternative treatments discussed. ;No signs of potential drug abuse or diversion identified.;Obtaining appropriate analgesic effect of treatment. Chronic Pain > 80 MEDD -         Past Medical History:   Diagnosis Date    Abnormal stress ECG     Angina pectoris (HCC)     CAD (coronary artery disease)     Carpal tunnel syndrome     left    Cervical stenosis of spine     Cervicalgia     chronic pain    Chronic back pain     COPD (chronic obstructive pulmonary disease) (HCC)     DDD (degenerative disc disease)     Elbow fracture, right     Fractures     elbow    GERD (gastroesophageal reflux disease)     Gout     Right great toe    Hyperlipidemia     Hypertension     Mild depression ([address] 9/26/2013    2 suicide attempts by girlfriend related to depression.     Morbidly obese ([address] 10/30/2020    Occlusion of right carotid artery 7/14/2021    Osteoarthritis of right knee     Sinus headache 10/10/2018    Sleep apnea     Sleep disturbance     Smoker     Tendonitis of foot     right       Past Surgical History: Procedure Laterality Date    CARDIAC CATHETERIZATION  7/8/14    Non Obstructive CAD     CERVICAL SPINE SURGERY  7/13/12     C2-3-4-5    COLONOSCOPY      FRACTURE SURGERY      Rt elbow     HC INJECT OTHER PERPHRL NERV Bilateral 5/9/2019    INJECTION SPINAL performed by Adama Hinson MD at [address] [place] [address] Bilateral 8/15/2019    SACROILIAC JOINT INJECTION performed by Adama Hinson MD at [facility]  2/5/16    premier tens    NERVE BLOCK Left 02/07/2020    RFA left side    NERVE BLOCK Left 02/07/2020     NERVE RADIOFREQUENCY ABLATION - SACROILIAC (Left )    NERVE BLOCK  02/12/2021    NERVE RADIOFREQUENCY ABLATION SI (Left     NERVE BLOCK Right 02/19/2021    Procedure: NERVE RADIOFREQUENCY ABLATION SI JOINT (Right )    OTHER SURGICAL HISTORY  08/15/2019    sacroiliac joint injection    PAIN MANAGEMENT PROCEDURE Left 2/7/2020    NERVE RADIOFREQUENCY ABLATION - SACROILIAC performed by Adama Hinson MD at [address] [place] [address] Left 2/12/2021    NERVE RADIOFREQUENCY ABLATION SI performed by Adama Hinson MD at [address] [place] [address] Right 2/19/2021    NERVE RADIOFREQUENCY ABLATION SI JOINT performed by Adama Hinson MD at [address] [place] [address]         No Known Allergies      Current Outpatient Medications:     gabapentin (NEURONTIN) 800 MG tablet, Take 1 tablet by mouth every 8 hours for 30 days. , Disp: 90 tablet, Rfl: 2    budesonide-formoterol (SYMBICORT) 160-4.5 MCG/ACT AERO, INHALE TWO PUFFS BY MOUTH TWICE A DAY, Disp: 10.2 g, Rfl: 1    omeprazole (PRILOSEC) 20 MG delayed release capsule, Take one capsule by mouth twice a day 30 minutes before meals. , Disp: 180 capsule, Rfl: 0    fluticasone (FLONASE) 50 MCG/ACT nasal spray, 1 spray by Each Nostril route daily, Disp: 2 Bottle, Rfl: 1    amLODIPine (NORVASC) 5 MG tablet, Take 1 tablet by mouth daily, Disp: 30 tablet, Rfl: 5    simvastatin (ZOCOR) 80 MG tablet, Take 1 tablet by mouth every evening, Disp: 30 tablet, Rfl: 11    DULoxetine (CYMBALTA) 60 MG extended release capsule, Take 60 mg by mouth, Disp: , Rfl:     acetaminophen (TYLENOL) 325 MG tablet, Take 650 mg by mouth nightly, Disp: , Rfl:     cetirizine (ZYRTEC) 10 MG tablet, TAKE ONE TABLET BY MOUTH DAILY, Disp: 30 tablet, Rfl: 1    diclofenac (VOLTAREN) 75 MG EC tablet, Take 75 mg by mouth 2 times daily, Disp: , Rfl:     nitroGLYCERIN (NITROSTAT) 0.4 MG SL tablet, Place 1 tablet under the tongue every 5 minutes as needed for Chest pain, Disp: 25 tablet, Rfl: 3    albuterol sulfate  (90 Base) MCG/ACT inhaler, Inhale 2 puffs into the lungs every 6 hours as needed for Wheezing, Disp: 1 Inhaler, Rfl: 3    aspirin 81 MG EC tablet, Take 1 tablet by mouth daily, Disp: 30 tablet, Rfl: 3    Family History   Problem Relation Age of Onset    Heart Disease Mother     COPD Father     Cancer Maternal Aunt 64        breast cancer     Cancer Maternal Uncle 60        prostrate cancer        Social History     Socioeconomic History    Marital status: Single     Spouse name: Not on file    Number of children: Not on file    Years of education: Not on file    Highest education level: Not on file   Occupational History     Employer: N/A   Tobacco Use    Smoking status: Current Every Day Smoker     Packs/day: 1.50     Years: 40.00     Pack years: 60.00     Types: Cigarettes    Smokeless tobacco: Former User     Quit date: 3/12/2015    Tobacco comment: wants to discuss  chantix   Vaping Use    Vaping Use: Never used   Substance and Sexual Activity    Alcohol use: Not Currently     Alcohol/week: 0.0 standard drinks     Comment: 3 x year    Drug use: No    Sexual activity: Yes     Partners: Female   Other Topics Concern    Not on file   Social History Narrative    Not on file     Social Determinants of Health     Financial Resource Strain:     Difficulty of Paying Living Expenses:    Food Insecurity:     Worried About Running Out of Food in the Last Year:     [address] in the Last Year:    Transportation Needs:     Lack of Transportation (Medical):  Lack of Transportation (Non-Medical):    Physical Activity:     Days of Exercise per Week:     Minutes of Exercise per Session:    Stress:     Feeling of Stress :    Social Connections:     Frequency of Communication with Friends and Family:     Frequency of Social Gatherings with Friends and Family:     Attends [employer]ab[employer] Services:     Active Member of Clubs or Organizations:     Attends Club or Organization Meetings:     Marital Status:    Intimate Partner Violence:     Fear of Current or Ex-Partner:     Emotionally Abused:     Physically Abused:     Sexually Abused:        Review of Systems:  Review of Systems   Constitutional: Negative for chills and fever. Cardiovascular: Negative for chest pain and palpitations. Respiratory: Negative for cough and shortness of breath. Musculoskeletal: Positive for back pain and neck pain. Gastrointestinal: Negative for constipation. Neurological: Negative for disturbances in coordination, loss of balance, numbness, paresthesias and tingling. Physical Exam:  There were no vitals taken for this visit. Physical Exam  HENT:      Head: Normocephalic. Pulmonary:      Effort: Pulmonary effort is normal.   Neurological:      Mental Status: He is alert.    Psychiatric:         Mood and Affect: Mood normal.         Behavior: Behavior normal.         Record/Diagnostics Review:    Last niru 7/2021 and was appropriate     Assessment:  Problem List Items Addressed This Visit     Cervical stenosis of spine    Medication monitoring encounter - Primary    DDD (degenerative disc disease), lumbar    Relevant Medications    oxyCODONE (ROXICODONE) 5 MG immediate release tablet (Start on 10/17/2021)    Displacement of lumbar intervertebral disc without myelopathy    Relevant Medications    oxyCODONE (ROXICODONE) 5 MG immediate release tablet (Start on 10/17/2021)    Lumbosacral spondylosis without myelopathy    Relevant Medications    oxyCODONE (ROXICODONE) 5 MG immediate release tablet (Start on 10/17/2021)             Treatment Plan:  Patient relates current medications are helping the pain. Patient reports taking pain medications as prescribed, denies obtaining medications from different sources and denies use of illegal drugs. Patient denies side effects from medications like nausea, vomiting, constipation or drowsiness. Patient reports current activities of daily living are possible due to medications and would like to continue them. As always, we encourage daily stretching and strengthening exercises, and recommend minimizing use of pain medications unless patient cannot get through daily activities due to pain. Contract requirements met. Continue opioid therapy. Script written for oxycodone  Pt is scheduled for SI RFA next week  He has completed antibiotics for tooth infection  Follow up appointment made for 4 weeks    Amada Vergara, was evaluated through a synchronous (real-time) audio-video encounter. The patient (or guardian if applicable) is aware that this is a billable service. Verbal consent to proceed has been obtained within the past 12 months. The visit was conducted pursuant to the emergency declaration under the [address] [place] [address] authority and the [employer] and [employer] Act. Patient identification was verified, and a caregiver was present when appropriate. The patient was located in a state where the provider was credentialed to provide care.     Total time spent for this encounter: Not billed by time    --JOSSUE Marks CNP on 10/14/2021 at 8:47 AM    An electronic signature was used to authenticate this note.

## 2021-10-14 NOTE — TELEPHONE ENCOUNTER
Called pt to set up next appt, pt did not answer, left msg. **Patinets next appointment is on 11/11/2021 at 8:40 in person with Shon Orellana.

## 2021-10-21 ENCOUNTER — HOSPITAL ENCOUNTER (OUTPATIENT)
Dept: PAIN MANAGEMENT | Facility: CLINIC | Age: 61
Discharge: HOME | End: 2021-10-21
Payer: MEDICARE

## 2021-10-21 VITALS
SYSTOLIC BLOOD PRESSURE: 121 MMHG | RESPIRATION RATE: 15 BRPM | HEIGHT: 69 IN | OXYGEN SATURATION: 96 % | DIASTOLIC BLOOD PRESSURE: 87 MMHG | BODY MASS INDEX: 36.43 KG/M2 | WEIGHT: 246 LBS | TEMPERATURE: 97.6 F | HEART RATE: 85 BPM

## 2021-10-21 DIAGNOSIS — R52 PAIN MANAGEMENT: ICD-10-CM

## 2021-10-21 PROCEDURE — 64635 DESTROY LUMB/SAC FACET JNT: CPT

## 2021-10-21 PROCEDURE — 2500000003 HC RX 250 WO HCPCS: Performed by: PAIN MEDICINE

## 2021-10-21 PROCEDURE — 6360000002 HC RX W HCPCS: Performed by: PAIN MEDICINE

## 2021-10-21 PROCEDURE — 64625 RF ABLTJ NRV NRVTG SI JT: CPT | Performed by: PAIN MEDICINE

## 2021-10-21 RX ORDER — BUPIVACAINE HYDROCHLORIDE 2.5 MG/ML
INJECTION, SOLUTION EPIDURAL; INFILTRATION; INTRACAUDAL
Status: COMPLETED | OUTPATIENT
Start: 2021-10-21 | End: 2021-10-21

## 2021-10-21 RX ORDER — FENTANYL CITRATE 50 UG/ML
INJECTION, SOLUTION INTRAMUSCULAR; INTRAVENOUS
Status: COMPLETED | OUTPATIENT
Start: 2021-10-21 | End: 2021-10-21

## 2021-10-21 RX ORDER — MIDAZOLAM HYDROCHLORIDE 2 MG/2ML
INJECTION, SOLUTION INTRAMUSCULAR; INTRAVENOUS
Status: COMPLETED | OUTPATIENT
Start: 2021-10-21 | End: 2021-10-21

## 2021-10-21 RX ADMIN — MIDAZOLAM HYDROCHLORIDE 2 MG: 1 INJECTION, SOLUTION INTRAMUSCULAR; INTRAVENOUS at 14:29

## 2021-10-21 RX ADMIN — BUPIVACAINE HYDROCHLORIDE 5 ML: 2.5 INJECTION, SOLUTION EPIDURAL; INFILTRATION; INTRACAUDAL; PERINEURAL at 14:34

## 2021-10-21 RX ADMIN — FENTANYL CITRATE 100 MCG: 50 INJECTION INTRAMUSCULAR; INTRAVENOUS at 14:29

## 2021-10-21 ASSESSMENT — PAIN SCALES - GENERAL: PAINLEVEL_OUTOF10: 6

## 2021-10-21 ASSESSMENT — PAIN DESCRIPTION - FREQUENCY: FREQUENCY: CONTINUOUS

## 2021-10-21 ASSESSMENT — PAIN - FUNCTIONAL ASSESSMENT: PAIN_FUNCTIONAL_ASSESSMENT: PREVENTS OR INTERFERES WITH MANY ACTIVE NOT PASSIVE ACTIVITIES

## 2021-10-21 ASSESSMENT — PAIN DESCRIPTION - DESCRIPTORS: DESCRIPTORS: ACHING;SHARP

## 2021-10-21 ASSESSMENT — PAIN DESCRIPTION - PROGRESSION: CLINICAL_PROGRESSION: GRADUALLY WORSENING

## 2021-10-21 ASSESSMENT — PAIN DESCRIPTION - ORIENTATION: ORIENTATION: RIGHT;LEFT;LOWER

## 2021-10-21 ASSESSMENT — PAIN DESCRIPTION - PAIN TYPE: TYPE: CHRONIC PAIN

## 2021-10-21 ASSESSMENT — PAIN DESCRIPTION - LOCATION: LOCATION: BACK

## 2021-10-21 ASSESSMENT — PAIN DESCRIPTION - DIRECTION: RADIATING_TOWARDS: BILAT LEGS AND FEET

## 2021-10-21 ASSESSMENT — PAIN DESCRIPTION - ONSET: ONSET: AWAKENED FROM SLEEP

## 2021-10-21 NOTE — OP NOTE
Sacroiliac Radiofrequency Ablation:  SURGEON: Pérez Calloway MD    PRE-OP DIAGNOSIS: Sacroiliitis (M46.1), Lumbosacral Spondylosis (m47.817), Low Back Pain (M54.5)    POST-OP DIAGNOSIS: Same. PRECEDURE PRERFORMED: Right SI Joint Radiofrequency Ablation via denervation of Right dorsal ramus L5 and lateral branches S1 S2 S3. EBL: minimal    INDICATION: Satisfactory relief of 80% with SI joint injections/previous SI joint RFA presents with recurrent pain. INFORMED CONSENT: The risks, benefits and options were explained to the patient, including the risks of injection, nerve damage resulting in weakness of the lower extremities or increased pain, no relief, worse back pain, infection, or other risks. The patient understood the risks and consented to the procedure. SEDATION: Yes, the patient received IV sedation and Monitors including pulse oximetry, blood pressure cuff, and other necessary monitors were applied. The patients level of consciousness and physiological status were monitored throughout the case. PREP: Timeout was performed. The patient was placed prone and sacrum prepped with chloroprep and draped appropriately. 10ml of 0.5% lidocaine was used to anesthetize the skin and subcutaneous tissue. PROCEDURE NOTE:  A 22 gauge x 10 cm x 5 mm active tip  Needle was advanced to the appropriate location of the dorsal ramus of L5 on the Right under fluoro guidance. Afterwards the appropriate anatomic location overlying the S1, S2, S3 lateral branches were identified lateral to the foramen, and 22-gauge, 10 cm, 5 mm active tip were advanced to the appropriate anatomic locations under fluoro guidance. Motor testing performed at 2Hz and negative for radicular stimulation up to 3V. This was confirmed in AP and lateral views. Aspiration was negative.   After administration of 1ml of 0.25% bupivacaine at each site to anesthetize the area radiofrequency ablation was delivered at 80° temperature for 90 seconds. The patient tolerated the procedure well, there where no complications noted the needles were withdrawn. Vital signs remained stable. The patient was then taken to the recovery room in satisfactory conditions. Discharge and follow-up instructions were given to the patient.     Jeremy Altman MD

## 2021-10-21 NOTE — H&P
Pain Pre-Op H&P Note    Kofi Varghese MD    HPI: Rivas Thompson  presents with back pain. Past Medical History:   Diagnosis Date    Abnormal stress ECG     Angina pectoris (HCC)     CAD (coronary artery disease)     Carpal tunnel syndrome     left    Cervical stenosis of spine     Cervicalgia     chronic pain    Chronic back pain     COPD (chronic obstructive pulmonary disease) (HCC)     DDD (degenerative disc disease)     Elbow fracture, right     Fractures     elbow    GERD (gastroesophageal reflux disease)     Gout     Right great toe    Hyperlipidemia     Hypertension     Mild depression ([address] 9/26/2013    2 suicide attempts by girlfriend related to depression.     Morbidly [facility] ([facility] 10/30/2020    Occlusion of right carotid artery 7/14/2021    Osteoarthritis of right knee     Sinus headache 10/10/2018    Sleep apnea     Sleep disturbance     Smoker     Tendonitis of foot     right       Past Surgical History:   Procedure Laterality Date    CARDIAC CATHETERIZATION  7/8/14    Non Obstructive CAD     CERVICAL SPINE SURGERY  7/13/12     C2-3-4-5    COLONOSCOPY      FRACTURE SURGERY      Rt elbow     HC INJECT OTHER PERPHRL NERV Bilateral 5/9/2019    INJECTION SPINAL performed by Kofi Varghese MD at [address] [place] [address] Bilateral 8/15/2019    SACROILIAC JOINT INJECTION performed by Kofi [facility]arghese MD at [facility]  2/5/16    premier tens    NERVE BLOCK Left 02/07/2020    RFA left side    NERVE BLOCK Left 02/07/2020     NERVE RADIOFREQUENCY ABLATION - SACROILIAC (Left )    NERVE BLOCK  02/12/2021    NERVE RADIOFREQUENCY ABLATION SI (Left     NERVE BLOCK Right 02/19/2021    Procedure: NERVE RADIOFREQUENCY ABLATION SI JOINT (Right )    OTHER SURGICAL HISTORY  08/15/2019    sacroiliac joint injection    PAIN MANAGEMENT PROCEDURE Left 2/7/2020    NERVE RADIOFREQUENCY ABLATION - SACROILIAC performed by Mell Duggan MD at [address] [place] [address] Left 2/12/2021    NERVE RADIOFREQUENCY ABLATION SI performed by Mell Duggan MD at [address] [place] [address] Right 2/19/2021    NERVE RADIOFREQUENCY ABLATION SI JOINT performed by Mell Duggan MD at [address] [place] [address]         Family History   Problem Relation Age of Onset    Heart Disease Mother     COPD Father     Cancer Maternal Aunt 64        breast cancer     Cancer Maternal Uncle 60        prostrate cancer        No Known Allergies      Current Outpatient Medications:     oxyCODONE (ROXICODONE) 5 MG immediate release tablet, Take 1 tablet by mouth 2 times daily as needed for Pain for up to 30 days. , Disp: 60 tablet, Rfl: 0    gabapentin (NEURONTIN) 800 MG tablet, Take 1 tablet by mouth every 8 hours for 30 days. , Disp: 90 tablet, Rfl: 2    budesonide-formoterol (SYMBICORT) 160-4.5 MCG/ACT AERO, INHALE TWO PUFFS BY MOUTH TWICE A DAY, Disp: 10.2 g, Rfl: 1    fluticasone (FLONASE) 50 MCG/ACT nasal spray, 1 spray by Each Nostril route daily, Disp: 2 Bottle, Rfl: 1    amLODIPine (NORVASC) 5 MG tablet, Take 1 tablet by mouth daily, Disp: 30 tablet, Rfl: 5    simvastatin (ZOCOR) 80 MG tablet, Take 1 tablet by mouth every evening, Disp: 30 tablet, Rfl: 11    DULoxetine (CYMBALTA) 60 MG extended release capsule, Take 60 mg by mouth, Disp: , Rfl:     acetaminophen (TYLENOL) 325 MG tablet, Take 650 mg by mouth nightly, Disp: , Rfl:     cetirizine (ZYRTEC) 10 MG tablet, TAKE ONE TABLET BY MOUTH DAILY, Disp: 30 tablet, Rfl: 1    diclofenac (VOLTAREN) 75 MG EC tablet, Take 75 mg by mouth 2 times daily, Disp: , Rfl:     albuterol sulfate  (90 Base) MCG/ACT inhaler, Inhale 2 puffs into the lungs every 6 hours as needed for Wheezing, Disp: 1 Inhaler, Rfl: 3    omeprazole (PRILOSEC) 20 MG delayed release capsule, Take one capsule by mouth twice a day 30 minutes before meals. , Disp: 180 capsule, Rfl: 0    nitroGLYCERIN (NITROSTAT) 0.4 MG SL tablet, Place 1 tablet under the tongue every 5 minutes as needed for Chest pain, Disp: 25 tablet, Rfl: 3    aspirin 81 MG EC tablet, Take 1 tablet by mouth daily, Disp: 30 tablet, Rfl: 3    Social History     Tobacco Use    Smoking status: Current Every Day Smoker     Packs/day: 0.25     Years: 40.00     Pack years: 10.00     Types: Cigarettes    Smokeless tobacco: Former User     Quit date: 3/12/2015    Tobacco comment: wants to discuss  [employer]ix   Substance Use Topics    Alcohol use: Not Currently     Alcohol/week: 0.0 standard drinks     Comment: 3 x year       Review of Systems:   Focused review of systems was performed, and negative as pertinent to diagnosis, except as stated in HPI. Physical Exam  Constitutional:       Appearance: Normal appearance. Pulmonary:      Effort: Pulmonary effort is normal.   Neurological:      Mental Status: He is alert. Psychiatric:         Attention and Perception: Attention and perception normal.         Mood and Affect: Mood and affect normal.         Patient's current physical status, medications, medical history, and HPI have been reviewed and updated as appropriate on this date: 10/21/21    Risk/Benefit(s): The risks, benefits, alternatives, and potential complications have been discussed with the patient/family and informed consent has been obtained for the procedure/sedation.     Diagnosis:   Back pain      Plan: Kari Boss MD

## 2021-11-04 ENCOUNTER — HOSPITAL ENCOUNTER (OUTPATIENT)
Dept: PAIN MANAGEMENT | Facility: CLINIC | Age: 61
Discharge: HOME | End: 2021-11-04
Payer: MEDICARE

## 2021-11-04 VITALS
WEIGHT: 246 LBS | BODY MASS INDEX: 36.43 KG/M2 | HEIGHT: 69 IN | DIASTOLIC BLOOD PRESSURE: 62 MMHG | RESPIRATION RATE: 18 BRPM | HEART RATE: 89 BPM | SYSTOLIC BLOOD PRESSURE: 128 MMHG | OXYGEN SATURATION: 94 % | TEMPERATURE: 98.1 F

## 2021-11-04 DIAGNOSIS — R52 PAIN MANAGEMENT: ICD-10-CM

## 2021-11-04 PROCEDURE — 2500000003 HC RX 250 WO HCPCS: Performed by: PAIN MEDICINE

## 2021-11-04 PROCEDURE — 2580000003 HC RX 258: Performed by: PAIN MEDICINE

## 2021-11-04 PROCEDURE — 64635 DESTROY LUMB/SAC FACET JNT: CPT

## 2021-11-04 PROCEDURE — 6360000002 HC RX W HCPCS: Performed by: PAIN MEDICINE

## 2021-11-04 PROCEDURE — 64625 RF ABLTJ NRV NRVTG SI JT: CPT | Performed by: PAIN MEDICINE

## 2021-11-04 RX ORDER — BUPIVACAINE HYDROCHLORIDE 2.5 MG/ML
INJECTION, SOLUTION EPIDURAL; INFILTRATION; INTRACAUDAL
Status: COMPLETED | OUTPATIENT
Start: 2021-11-04 | End: 2021-11-04

## 2021-11-04 RX ORDER — LIDOCAINE HYDROCHLORIDE 10 MG/ML
INJECTION, SOLUTION EPIDURAL; INFILTRATION; INTRACAUDAL; PERINEURAL
Status: COMPLETED | OUTPATIENT
Start: 2021-11-04 | End: 2021-11-04

## 2021-11-04 RX ORDER — FENTANYL CITRATE 50 UG/ML
INJECTION, SOLUTION INTRAMUSCULAR; INTRAVENOUS
Status: COMPLETED | OUTPATIENT
Start: 2021-11-04 | End: 2021-11-04

## 2021-11-04 RX ORDER — MIDAZOLAM HYDROCHLORIDE 2 MG/2ML
INJECTION, SOLUTION INTRAMUSCULAR; INTRAVENOUS
Status: COMPLETED | OUTPATIENT
Start: 2021-11-04 | End: 2021-11-04

## 2021-11-04 RX ADMIN — WATER 3 ML: 1 INJECTION INTRAMUSCULAR; INTRAVENOUS; SUBCUTANEOUS at 14:13

## 2021-11-04 RX ADMIN — MIDAZOLAM HYDROCHLORIDE 2 MG: 1 INJECTION, SOLUTION INTRAMUSCULAR; INTRAVENOUS at 14:11

## 2021-11-04 RX ADMIN — FENTANYL CITRATE 100 MCG: 50 INJECTION INTRAMUSCULAR; INTRAVENOUS at 14:11

## 2021-11-04 RX ADMIN — BUPIVACAINE HYDROCHLORIDE 3 ML: 2.5 INJECTION, SOLUTION EPIDURAL; INFILTRATION; INTRACAUDAL; PERINEURAL at 14:13

## 2021-11-04 RX ADMIN — LIDOCAINE HYDROCHLORIDE 3 ML: 10 INJECTION, SOLUTION EPIDURAL; INFILTRATION; INTRACAUDAL at 14:13

## 2021-11-04 ASSESSMENT — PAIN - FUNCTIONAL ASSESSMENT
PAIN_FUNCTIONAL_ASSESSMENT: 0-10
PAIN_FUNCTIONAL_ASSESSMENT: 0-10
PAIN_FUNCTIONAL_ASSESSMENT: PREVENTS OR INTERFERES WITH MANY ACTIVE NOT PASSIVE ACTIVITIES

## 2021-11-04 ASSESSMENT — PAIN DESCRIPTION - DESCRIPTORS: DESCRIPTORS: ACHING;SHARP

## 2021-11-04 NOTE — OP NOTE
Sacroiliac Radiofrequency Ablation:  SURGEON: Bao Hernandez MD    PRE-OP DIAGNOSIS: Sacroiliitis (M46.1), Lumbosacral Spondylosis (m47.817), Low Back Pain (M54.5)    POST-OP DIAGNOSIS: Same. PRECEDURE PRERFORMED: Left SI Joint Radiofrequency Ablation via denervation of Left dorsal ramus L5 and lateral branches S1 S2 S3. EBL: minimal    INDICATION: Satisfactory relief of 80% with SI joint injections/previous SI joint RFA presents with recurrent pain. INFORMED CONSENT: The risks, benefits and options were explained to the patient, including the risks of injection, nerve damage resulting in weakness of the lower extremities or increased pain, no relief, worse back pain, infection, or other risks. The patient understood the risks and consented to the procedure. SEDATION: Yes, the patient received IV sedation and Monitors including pulse oximetry, blood pressure cuff, and other necessary monitors were applied. The patients level of consciousness and physiological status were monitored throughout the case. PREP: Timeout was performed. The patient was placed prone and sacrum prepped with chloroprep and draped appropriately. 10ml of 0.5% lidocaine was used to anesthetize the skin and subcutaneous tissue. PROCEDURE NOTE:  A 20 gauge x 10 cm x 10 mm active tip  Needle was advanced to the appropriate location of the dorsal ramus of L5 on the Left under fluoro guidance. Afterwards the appropriate anatomic location overlying the S1, S2, S3 lateral branches were identified lateral to the foramen, and 20-gauge, 10 cm, 10 mm active tip were advanced to the appropriate anatomic locations under fluoro guidance. Motor testing performed at 2Hz and negative for radicular stimulation up to 3V. This was confirmed in AP and lateral views. Aspiration was negative.   After administration of 1ml of 0.25% bupivacaine at each site to anesthetize the area radiofrequency ablation was delivered at 80° temperature for 90 seconds. The patient tolerated the procedure well, there where no complications noted the needles were withdrawn. Vital signs remained stable. The patient was then taken to the recovery room in satisfactory conditions. Discharge and follow-up instructions were given to the patient.     Poli Aguayo MD

## 2021-11-04 NOTE — H&P
Pain Pre-Op H&P Note    Gosia Quiñones MD    HPI: Randall Parnell  presents with back pain. Past Medical History:   Diagnosis Date    Abnormal stress ECG     Angina pectoris (HCC)     CAD (coronary artery disease)     Carpal tunnel syndrome     left    Cervical stenosis of spine     Cervicalgia     chronic pain    Chronic back pain     COPD (chronic obstructive pulmonary disease) (HCC)     DDD (degenerative disc disease)     Elbow fracture, right     Fractures     elbow    GERD (gastroesophageal reflux disease)     Gout     Right great toe    Hyperlipidemia     Hypertension     Mild depression ([address] 9/26/2013    2 suicide attempts by girlfriend related to depression.     Morbidly obese ([facility] [address] [address] 10/30/2020    Occlusion of right carotid artery 7/14/2021    Osteoarthritis of right knee     Sinus headache 10/10/2018    Sleep apnea     Sleep disturbance     Smoker     Tendonitis of foot     right       Past Surgical History:   Procedure Laterality Date    CARDIAC CATHETERIZATION  7/8/14    Non Obstructive CAD     CERVICAL SPINE SURGERY  7/13/12     C2-3-4-5    COLONOSCOPY      FRACTURE SURGERY      Rt elbow     HC INJECT OTHER PERPHRL NERV Bilateral 5/9/2019    INJECTION SPINAL performed by Gosia Quiñones MD at [address] [place] [address] Bilateral 8/15/2019    SACROILIAC JOINT INJECTION performed by Gosia Quiñones MD at [facility]  2/5/16    premier tens    NERVE BLOCK Left 02/07/2020    RFA left side    NERVE BLOCK Left 02/07/2020     NERVE RADIOFREQUENCY ABLATION - SACROILIAC (Left )    NERVE BLOCK  02/12/2021    NERVE RADIOFREQUENCY ABLATION SI (Left     NERVE BLOCK Right 02/19/2021    Procedure: NERVE RADIOFREQUENCY ABLATION SI JOINT (Right )    OTHER SURGICAL HISTORY  08/15/2019    sacroiliac joint injection    PAIN MANAGEMENT PROCEDURE Left 2/7/2020    NERVE RADIOFREQUENCY ABLATION - SACROILIAC performed by Kofi Varghese MD at [address] [place] [address] Left 2/12/2021    NERVE RADIOFREQUENCY ABLATION SI performed by Kofi Varghese MD at [address] [place] [address] Right 2/19/2021    NERVE RADIOFREQUENCY ABLATION SI JOINT performed by Kofi Varghese MD at [address] [place] [address]         Family History   Problem Relation Age of Onset    Heart Disease Mother     COPD Father     Cancer Maternal Aunt 64        breast cancer     Cancer Maternal Uncle 60        prostrate cancer        No Known Allergies      Current Outpatient Medications:     oxyCODONE (ROXICODONE) 5 MG immediate release tablet, Take 1 tablet by mouth 2 times daily as needed for Pain for up to 30 days. , Disp: 60 tablet, Rfl: 0    gabapentin (NEURONTIN) 800 MG tablet, Take 1 tablet by mouth every 8 hours for 30 days. , Disp: 90 tablet, Rfl: 2    budesonide-formoterol (SYMBICORT) 160-4.5 MCG/ACT AERO, INHALE TWO PUFFS BY MOUTH TWICE A DAY, Disp: 10.2 g, Rfl: 1    amLODIPine (NORVASC) 5 MG tablet, Take 1 tablet by mouth daily, Disp: 30 tablet, Rfl: 5    simvastatin (ZOCOR) 80 MG tablet, Take 1 tablet by mouth every evening, Disp: 30 tablet, Rfl: 11    acetaminophen (TYLENOL) 325 MG tablet, Take 650 mg by mouth nightly, Disp: , Rfl:     cetirizine (ZYRTEC) 10 MG tablet, TAKE ONE TABLET BY MOUTH DAILY, Disp: 30 tablet, Rfl: 1    diclofenac (VOLTAREN) 75 MG EC tablet, Take 75 mg by mouth 2 times daily, Disp: , Rfl:     nitroGLYCERIN (NITROSTAT) 0.4 MG SL tablet, Place 1 tablet under the tongue every 5 minutes as needed for Chest pain, Disp: 25 tablet, Rfl: 3    albuterol sulfate  (90 Base) MCG/ACT inhaler, Inhale 2 puffs into the lungs every 6 hours as needed for Wheezing, Disp: 1 Inhaler, Rfl: 3    aspirin 81 MG EC tablet, Take 1 tablet by mouth daily, Disp: 30 tablet, Rfl: 3    omeprazole (PRILOSEC) 20 MG delayed release capsule, Take one capsule by mouth twice a day 30 minutes before meals. , Disp: 180 capsule, Rfl: 0    fluticasone (FLONASE) 50 MCG/ACT nasal spray, 1 spray by Each Nostril route daily, Disp: 2 Bottle, Rfl: 1    DULoxetine (CYMBALTA) 60 MG extended release capsule, Take 60 mg by mouth, Disp: , Rfl:     Social History     Tobacco Use    Smoking status: Current Every Day Smoker     Packs/day: 0.25     Years: 40.00     Pack years: 10.00     Types: Cigarettes    Smokeless tobacco: Former User     Quit date: 3/12/2015    Tobacco comment: wants to discuss  chantix   Substance Use Topics    Alcohol use: Not Currently     Alcohol/week: 0.0 standard drinks     Comment: 3 x year       Review of Systems:   Focused review of systems was performed, and negative as pertinent to diagnosis, except as stated in HPI. Physical Exam  Constitutional:       Appearance: Normal appearance. Pulmonary:      Effort: Pulmonary effort is normal.   Neurological:      Mental Status: alert. Psychiatric:         Attention and Perception: Attention and perception normal.         Mood and Affect: Mood and affect normal.          Patient's current physical status, medications, medical history, and HPI have been reviewed and updated as appropriate on this date: 11/04/21    Risk/Benefit(s): The risks, benefits, alternatives, and potential complications have been discussed with the patient/family and informed consent has been obtained for the procedure/sedation. Diagnosis:   Low back pain.       Plan: Susie Ramirez MD

## 2021-11-05 ENCOUNTER — TELEPHONE (OUTPATIENT)
Dept: FAMILY MEDICINE CLINIC | Age: 61
End: 2021-11-05

## 2021-11-05 NOTE — TELEPHONE ENCOUNTER
----- Message from Arizona Hammans sent at 11/4/2021 11:25 AM EDT -----  Regarding: Handicapped parking sticker  Hi,    Patient's friend Amor Garcia called and said his handicapped parking sticker is expiring and he needs a new order for one. Amor Garcia requested the order be mailed to his home address and would like us to call the patient to let him know it's been done. Patient has an appointment and will be unavailable from 1pm-4pm today.      Thank you,   Celestino Weems 148

## 2021-11-10 NOTE — TELEPHONE ENCOUNTER
Spoke with mtat:     Pt to contact Monday to schedule appt with Marjan Jeffries at [place] [address] due to scheduling.

## 2021-11-11 ENCOUNTER — TELEMEDICINE (OUTPATIENT)
Dept: PAIN MANAGEMENT | Age: 61
End: 2021-11-11
Payer: MEDICARE

## 2021-11-11 DIAGNOSIS — M54.2 CERVICALGIA: ICD-10-CM

## 2021-11-11 DIAGNOSIS — M51.26 DISPLACEMENT OF LUMBAR INTERVERTEBRAL DISC WITHOUT MYELOPATHY: ICD-10-CM

## 2021-11-11 DIAGNOSIS — M48.02 CERVICAL STENOSIS OF SPINE: Primary | ICD-10-CM

## 2021-11-11 DIAGNOSIS — M48.061 SPINAL STENOSIS OF LUMBAR REGION WITHOUT NEUROGENIC CLAUDICATION: ICD-10-CM

## 2021-11-11 DIAGNOSIS — Z51.81 MEDICATION MONITORING ENCOUNTER: ICD-10-CM

## 2021-11-11 DIAGNOSIS — M54.12 RADICULOPATHY OF CERVICAL SPINE: ICD-10-CM

## 2021-11-11 DIAGNOSIS — M96.1 POSTLAMINECTOMY SYNDROME, CERVICAL REGION: ICD-10-CM

## 2021-11-11 DIAGNOSIS — M51.36 DDD (DEGENERATIVE DISC DISEASE), LUMBAR: ICD-10-CM

## 2021-11-11 PROCEDURE — 99213 OFFICE O/P EST LOW 20 MIN: CPT | Performed by: NURSE PRACTITIONER

## 2021-11-11 RX ORDER — OXYCODONE HYDROCHLORIDE 5 MG/1
5 TABLET ORAL 2 TIMES DAILY PRN
Qty: 60 TABLET | Refills: 0 | Status: SHIPPED | OUTPATIENT
Start: 2021-11-16 | End: 2021-12-09 | Stop reason: SDUPTHER

## 2021-11-11 ASSESSMENT — ENCOUNTER SYMPTOMS
COUGH: 0
BACK PAIN: 1
CONSTIPATION: 0
SHORTNESS OF BREATH: 0

## 2021-11-11 NOTE — PROGRESS NOTES
Patient completed a video visit today to review medication contract. Chief Complaint: neck and back pain    [facility] Patient complains of neck pain that radiates down his left arm to fingertips. He has had this pain for many years and it is worsening over time. He had cervical spine surgery in 2012 and the pain has only worsened since that time. He saw Dr Lars Gutierrez with no surgery recommended for neck, but told could benefit from CTR. Patient followed up with Dr. Pal Cunningham after his CT last August and cervical injections were suggested. He is not a good candidate for cervical injections due to previous neck surgery. He did see NS in 2016 for lumbar pain but no surgery recommended at that time. Diclofenac started by his rheumatologist. He had bilateral SI Joint Radiofrequency Ablation 2/2021 and reports 50% relief. He states he is not getting stabbing pains at this time. He is trying to lose weight. Has changed his diet.      Did have another carotid doppler 7/9 and followed up with Dr. Tolu Sumner on 7/14. He does not need surgery at this time - will follow up in six months. He has quit smoking.     Neck pain is worsening. MRI was ordered but patient could not complete due to claustrophobia. He did complete XR which shows postoperative and degenerative change cervical spine stable to previous.     He had SI RFA 11/4/21 and reports significant relief on the right side and states he has no pain in the hip - still having some soreness on the left side but getting better each day. Neck Pain   This is a chronic problem. The current episode started more than 1 year ago. The problem occurs constantly. The problem has been unchanged. The pain is present in the left side, midline, right side and occipital region. The quality of the pain is described as aching. The pain is at a severity of 6/10. The pain is severe. The symptoms are aggravated by twisting and position. Associated symptoms include headaches.  Pertinent negatives include no chest pain or fever. He has tried oral narcotics and bed rest for the symptoms. The treatment provided mild relief. Back Pain  This is a chronic problem. The current episode started more than 1 year ago. The problem occurs constantly. The problem is unchanged. The pain is present in the lumbar spine. The pain does not radiate. Pain scale: Right side 3/10 Left side 6/10. The pain is moderate. The symptoms are aggravated by position and standing (walking). Associated symptoms include headaches. Pertinent negatives include no chest pain or fever. He has tried analgesics and bed rest for the symptoms. The treatment provided mild relief. Patient denies any new neurological symptoms. No bowel or bladder incontinence, no weakness, and no falling. Pill count: appropriate due 11/16    Morphine equivalent: 15    Controlled Substance Monitoring:    Acute and Chronic Pain Monitoring:   RX Monitoring 11/11/2021   Attestation -   Acute Pain Prescriptions -   Periodic Controlled Substance Monitoring Possible medication side effects, risk of tolerance/dependence & alternative treatments discussed. ;No signs of potential drug abuse or diversion identified.;Obtaining appropriate analgesic effect of treatment. Chronic Pain > 80 MEDD -           Past Medical History:   Diagnosis Date    Abnormal stress ECG     Angina pectoris (HCC)     CAD (coronary artery disease)     Carpal tunnel syndrome     left    Cervical stenosis of spine     Cervicalgia     chronic pain    Chronic back pain     COPD (chronic obstructive pulmonary disease) (HCC)     DDD (degenerative disc disease)     Elbow fracture, right     Fractures     elbow    GERD (gastroesophageal reflux disease)     Gout     Right great toe    Hyperlipidemia     Hypertension     Mild depression ([address] 9/26/2013    2 suicide attempts by girlfriend related to depression.     Morbidly obese ([address] 10/30/2020    Occlusion of right carotid artery 7/14/2021    Osteoarthritis of right knee     Sinus headache 10/10/2018    Sleep apnea     Sleep disturbance     Smoker     Tendonitis of foot     right       Past Surgical History:   Procedure Laterality Date    CARDIAC CATHETERIZATION  7/8/14    Non Obstructive CAD     CERVICAL SPINE SURGERY  7/13/12     C2-3-4-5    COLONOSCOPY      FRACTURE SURGERY      Rt elbow     HC INJECT OTHER PERPHRL NERV Bilateral 5/9/2019    INJECTION SPINAL performed by Bebeto Rod MD at [address] [place] [address] Bilateral 8/15/2019    SACROILIAC JOINT INJECTION performed by Bebeto Rod MD at [facility]  2/5/16    premier tens    NERVE BLOCK Left 02/07/2020    RFA left side    NERVE BLOCK Left 02/07/2020     NERVE RADIOFREQUENCY ABLATION - SACROILIAC (Left )    NERVE BLOCK  02/12/2021    NERVE RADIOFREQUENCY ABLATION SI (Left     NERVE BLOCK Right 02/19/2021    Procedure: NERVE RADIOFREQUENCY ABLATION SI JOINT (Right )    OTHER SURGICAL HISTORY  08/15/2019    sacroiliac joint injection    PAIN MANAGEMENT PROCEDURE Left 2/7/2020    NERVE RADIOFREQUENCY ABLATION - SACROILIAC performed by Bebeto Rod MD at [address] [place] [address] Left 2/12/2021    NERVE RADIOFREQUENCY ABLATION SI performed by Bebeto Rod MD at [address] [place] [address] Right 2/19/2021    NERVE RADIOFREQUENCY ABLATION SI JOINT performed by Bebeto Rod MD at [address] [place] [address]         No Known Allergies      Current Outpatient Medications:     oxyCODONE (ROXICODONE) 5 MG immediate release tablet, Take 1 tablet by mouth 2 times daily as needed for Pain for up to 30 days. , Disp: 60 tablet, Rfl: 0    gabapentin (NEURONTIN) 800 MG tablet, Take 1 tablet by mouth every 8 hours for 30 days. , Disp: 90 tablet, Rfl: 2    budesonide-formoterol (SYMBICORT) 160-4.5 MCG/ACT AERO, INHALE TWO PUFFS BY MOUTH TWICE A DAY, Disp: 10.2 g, Rfl: 1    omeprazole (PRILOSEC) 20 MG delayed release capsule, Take one capsule by mouth twice a day 30 minutes before meals. , Disp: 180 capsule, Rfl: 0    fluticasone (FLONASE) 50 MCG/ACT nasal spray, 1 spray by Each Nostril route daily, Disp: 2 Bottle, Rfl: 1    amLODIPine (NORVASC) 5 MG tablet, Take 1 tablet by mouth daily, Disp: 30 tablet, Rfl: 5    simvastatin (ZOCOR) 80 MG tablet, Take 1 tablet by mouth every evening, Disp: 30 tablet, Rfl: 11    DULoxetine (CYMBALTA) 60 MG extended release capsule, Take 60 mg by mouth, Disp: , Rfl:     acetaminophen (TYLENOL) 325 MG tablet, Take 650 mg by mouth nightly, Disp: , Rfl:     cetirizine (ZYRTEC) 10 MG tablet, TAKE ONE TABLET BY MOUTH DAILY, Disp: 30 tablet, Rfl: 1    diclofenac (VOLTAREN) 75 MG EC tablet, Take 75 mg by mouth 2 times daily, Disp: , Rfl:     nitroGLYCERIN (NITROSTAT) 0.4 MG SL tablet, Place 1 tablet under the tongue every 5 minutes as needed for Chest pain, Disp: 25 tablet, Rfl: 3    albuterol sulfate  (90 Base) MCG/ACT inhaler, Inhale 2 puffs into the lungs every 6 hours as needed for Wheezing, Disp: 1 Inhaler, Rfl: 3    aspirin 81 MG EC tablet, Take 1 tablet by mouth daily, Disp: 30 tablet, Rfl: 3    Family History   Problem Relation Age of Onset    Heart Disease Mother     COPD Father     Cancer Maternal Aunt 64        breast cancer     Cancer Maternal Uncle 60        prostrate cancer        Social History     Socioeconomic History    Marital status: Single     Spouse name: Not on file    Number of children: Not on file    Years of education: Not on file    Highest education level: Not on file   Occupational History     Employer: N/A   Tobacco Use    Smoking status: Current Every Day Smoker     Packs/day: 0.25     Years: 40.00     Pack years: 10.00     Types: Cigarettes    Smokeless tobacco: Former User     Quit date: 3/12/2015    Tobacco comment: wants to discuss  chantix disturbances in coordination and loss of balance. Physical Exam:  There were no vitals taken for this visit. Physical Exam  HENT:      Head: Normocephalic. Pulmonary:      Effort: Pulmonary effort is normal.   Neurological:      Mental Status: He is alert. Psychiatric:         Mood and Affect: Mood normal.         Behavior: Behavior normal.         Record/Diagnostics Review:    Last niru 7/2021 and was appropriate     Assessment:  Problem List Items Addressed This Visit     Cervical stenosis of spine - Primary    Relevant Orders    [facility]    Cervicalgia    Medication monitoring encounter    DDD (degenerative disc disease), lumbar    Relevant Medications    oxyCODONE (ROXICODONE) 5 MG immediate release tablet (Start on 11/16/2021)    Displacement of lumbar intervertebral disc without myelopathy    Relevant Medications    oxyCODONE (ROXICODONE) 5 MG immediate release tablet (Start on 11/16/2021)    Radiculopathy of cervical spine    Spinal stenosis of lumbar region without neurogenic claudication      Other Visit Diagnoses     Postlaminectomy syndrome, cervical region        Relevant Orders    [address]             Treatment Plan:  Patient relates current medications are helping the pain. Patient reports taking pain medications as prescribed, denies obtaining medications from different sources and denies use of illegal drugs. Patient denies side effects from medications like nausea, vomiting, constipation or drowsiness. Patient reports current activities of daily living are possible due to medications and would like to continue them. As always, we encourage daily stretching and strengthening exercises, and recommend minimizing use of pain medications unless patient cannot get through daily activities due to pain. Contract requirements met. Continue opioid therapy.  Script written for oxycodone  Moderate relief following SI RFA  Continues to have worsening neck pain - not an ideal candidate for cervical injections  Discussed dry needling and he would like to try this  Dry needling two times a week for six weeks    Follow up appointment made for 4 weeks    Lane Ray, was evaluated through a synchronous (real-time) audio-video encounter. The patient (or guardian if applicable) is aware that this is a billable service. Verbal consent to proceed has been obtained within the past 12 months. The visit was conducted pursuant to the emergency declaration under the [address] [place] [address], [address] [place] [address] and the [employer] General Act. Patient identification was verified, and a caregiver was present when appropriate. The patient was located in a state where the provider was credentialed to provide care. Total time spent for this encounter: Not billed by time    --JOSSUE Londono CNP on 11/11/2021 at 9:01 AM    An electronic signature was used to authenticate this note.

## 2021-11-11 NOTE — PROGRESS NOTES
No chief complaint on file. Patient called in-requesting disability placard for automobile. Chart review shows ongoing appropriate follow-up care with chronic pain management physician and well-known history of chronic pain and gait impairments associated with degenerative osteoarthritis of the spine. Disability placard letter placed in mail. There were no vitals taken for this visit. Review of Systems      Physical Exam      ASSESSMENT AND PLAN     Disability placard letter placed in mail. See letters.       Electronicallysigned by Ryan Arevalo DO on 11/11/2021 at 8:58 AM

## 2021-11-17 ENCOUNTER — TELEPHONE (OUTPATIENT)
Dept: FAMILY MEDICINE CLINIC | Age: 61
End: 2021-11-17

## 2021-11-17 NOTE — TELEPHONE ENCOUNTER
Patient would like a call back from [place], [place] about a Handicap Placard. Patient's is expiring soon, and he was told that PCP was going to write a new script and he has not gotten that yet. Please advise.

## 2021-11-18 ENCOUNTER — HOSPITAL ENCOUNTER (OUTPATIENT)
Dept: PHYSICAL THERAPY | Age: 61
Setting detail: THERAPIES SERIES
Discharge: HOME | End: 2021-11-18
Payer: MEDICARE

## 2021-11-18 PROCEDURE — 97162 PT EVAL MOD COMPLEX 30 MIN: CPT

## 2021-11-18 PROCEDURE — 97140 MANUAL THERAPY 1/> REGIONS: CPT

## 2021-11-18 PROCEDURE — 97110 THERAPEUTIC EXERCISES: CPT

## 2021-11-18 NOTE — PROGRESS NOTES
[address] [facility]  [address] [facility]         Phone: [phone]       Fax: [phone]     Physical Therapy Spine Evaluation    Date:  2021  Patient: Madan Kowalski  : 1960  MRN: [id number]  Physician: Marleny Moreno CNP     Insurance: Medicare/ [facility] [place] secondary  Medical Diagnosis: cervical stenosis M48.02, postlaminectomy syndrome M96.1  Rehab Codes: neck pain M54.2  Onset Date: 21 referral   Next 's appt.: PRN  Visit Count:    Cancel/No Show: 0/0    Subjective: Patient presents to therapy with complaints of neck pain and (B) arm pain. Stated he has had symptoms for years, but recently increased complaints of symptoms prompted MD appointment. Patient has had recent low back ablation, has history of multilevel cervical laminectomy C2-C5. Patient states symptoms are worse in the AM, notes limitations with (B) rotation and turning head to drive, and notes limitations with extension of the neck. Was significantly limited with basic ROM. Did have slight relief of the arm symptoms with retraction based exercises and improvement of neck symptoms with manual traction and will do these exercises for HEP.   CC: complaints of neck pain, radicular arm symptoms  HPI: insidious onset of symptoms, worsening of symptoms prompted MD appointment and referral 21    PMHx: [] Unremarkable [] Diabetes [] HTN  [] Pacemaker   [] MI/Heart Problems [] Cancer [] Arthritis [] Other:              [x] Refer to full medical chart  In EPIC     Comorbidities:   [] Obesity [] Dialysis  [] Other:   [] Asthma/COPD [] Dementia [] Other:   [] Stroke [] Sleep apnea [] Other:   [] Vascular disease [] Rheumatic disease [] Other:    see Epic for comorbidities    Tests: [] X-Ray: [] MRI:  [] Other:    Medications: [x] Refer to full medical record [] None [] Other:  Allergies:      [x] Refer to full medical record [] None [] Other:    Function:  Hand Dominance  [] Right  [] Left  Working:  [] Normal Duty  [] Light Duty  [] Off D/T Condition  [] Retired  [] Not Employed                  []  Disability  [] Other:           Return to work:   Job/ADL Description: disabled    Currently limited as listed below for function  ADL/IADL Previous level of function Current level of function Who currently assists the patient with task   Bathing  [] Independent  [] Assist [] Independent  [] Assist    Dress/grooming [] Independent  [] Assist [] Independent  [] Assist    Transfer/mobility [] Independent  [] Assist [] Independent  [] Assist    Feeding [] Independent  [] Assist [] Independent  [] Assist    Toileting [] Independent  [] Assist [] Independent  [] Assist    Driving [] Independent  [x] Assist [] Independent  [x] Assist difficulty   Housekeeping [] Independent  [x] Assist [] Independent  [x] Assist difficulty   Grocery shop/meal prep [] Independent  [x] Assist [] Independent  [x] Assist difficulty     Gait Prior level of function Current level of function    [] Independent  [] Assist [] Independent  [] Assist   Device: [] Independent [] Independent    [] Straight Cane [] Quad cane [] Straight Cane [] Quad cane    [] Standard walker [] Rolling walker   [] 4 wheeled walker [] Standard walker [] Rolling walker   [] 4 wheeled walker    [] Wheelchair [] Wheelchair     Pain:  [x] Yes  [] No Location: neck, (B) UEs to hands (L) more than (R) described as tingling Pain Rating: (0-10 scale) 7/10  Pain altered Tx:  [] Yes  [] No  Action:    Symptoms:  [] Improving [] Worsening [] Same  Better:  [] AM    [] PM    [] Sit    [] Rise/Sit    []Stand    [] Walk    [] Lying    [] Other:  Worse: [] AM    [] PM    [] Sit    [] Rise/Sit    []Stand    [] Walk    [] Lying    [] Bend                             [] Valsalva    [] Other:  Sleep: [] OK    [] Disturbed    Objective:  Cervical ROM:  Rotation (B) (R) 30 degrees, (L) 35 degrees same sided neck pain  Side bending (R) 5 degrees, (L) 5 degrees same sided neck pain  Flexion 20 degrees  Extension 25 degrees    Repeated testing:    Repeated flexion no effect  Repeated protrusion no effect  Repeated retraction improved symptoms, no better    Improved symptoms with manual traction    OBSERVATION No Deficit Deficit Not Tested Comments   Posture       Forward Head [] [] []    Rounded Shoulders [] [] []    Kyphosis [] [] []    Lordosis [] [] []    Lateral Shift [] [] []    Scoliosis [] [] []    Iliac Crest [] [] []    PSIS [] [] []    ASIS [] [] []    Genu Valgus [] [] []    Genu Varus [] [] []    Genu Recurvatum [] [] []    Pronation [] [] []    Supination [] [] []    Leg Length Discrp [] [] []    Slumped Sitting [] [] []    Palpation [] [] [] MOD TTP C3-C6 spine   Sensation [] [] []    Edema [] [] []    Neurological [] [] []    Comments:                                Maurizio Fall Risk Assessment    Risk Factor Scale  Score   History of Falls [] Yes  [] No 25  0 25   Secondary Diagnosis [] Yes  [] No 15  0 0   Ambulatory Aid [] Furniture  [] Crutches/cane/walker  [] None/bedrest/wheelchair/nurse 30  15  0 0   IV/Heparin Lock [] Yes  [] No 20  0 0   Gait/Transferring [] Impaired  [] Weak  [] Normal/bedrest/immobile 20  10  0 0   Mental Status [] Forgets limitations  [] Oriented to own ability 15  0 0      Total:25     Based on the Assessment score: check the appropriate box.     []  No intervention needed   Low =   Score of 0-24    [x]  Use standard prevention interventions Moderate =  Score of 24-44   [x] Give patient handout and discuss fall prevention strategies   [x] Establish goal of education for patient/family RE: fall prevention strategies    []  Use high risk prevention interventions High = Score of 45 and higher   [] Give patient handout and discuss fall prevention strategies   [] Establish goal of education for patient/family Re: fall prevention strategies   [] Discuss lifeline / other resources    Electronically signed by:   Cinthia Morales PT  Date: 11/18/2021        Assessment: Patient with limited cervical mobility, limited with tolerance of turning head, extension of neck, increased symptoms in the AM.  Patient presents as possible cervical radicular issue- possibly at level below previous laminectomy level. Patient would continue to benefit from skilled physical therapy services in order to: improve ROM, decrease radicular symptoms. Problems:      [x] ? Cervical Pain: 7/10 in neck   [x] ? ROM: Limited in all directions significantly    [x] ? Function: Limited with functional tolerance of turning head to drive, limited with any extension of the neck, limited and worse with symptoms in the AM.      STG: (to be met in 6 treatments)  ? Pain: improved pain levels to 2-3/10 at worst  ? ROM: Improved flexion to 30 degrees, extension to 35 degrees, (B) SB 10 degrees, (B) rotation 40 degreees  ? Function: Improved symptoms by 50% in AM, improved turning of head to drive by 61% grossly. Independent with Home Exercise Programs  Demonstrate Knowledge of fall prevention  LTG: (to be met in 12 treatments)  ? Pain: improved pain levels to 0-1/10 at worst  ? ROM: Improved flexion to 40 degrees, extension to 40 degrees, (B) SB 15 degrees, (B) rotation 50 degreees  ? Function: Improved symptoms by 80% in AM, improved turning of head to drive by 56% grossly.    Independent with Home Exercise Programs  Demonstrate Knowledge of fall prevention  Patient goals: make neck better    Functional Assessment Used: optimal 12/3 75% limited for lifting/carrying/bending  Current Status Score:  Goal Status Sore: 5/3 or better     Evaluation Complexity:  History (Personal factors, comorbidities) [] 0 [x] 1-2 [] 3+   Exam (limitations, restrictions) [] 1-2 [x] 3 [] 4+   Clinical presentation (progression) [] Stable [x] Evolving  [] Unstable   Decision Making [] Low [x] Moderate [] High    [] Low Complexity [x] Moderate Complexity [] High Complexity       Rehab Potential:  [] Good  [x] Fair  [] Poor   Suggested Professional Referral:  [x] No  [] Yes:  Barriers to Goal Achievement[de-identified]  [x] No  [] Yes:  Domestic Concerns:  [x] No  [] Yes:    Pt. Education:  [x] Plans/Goals, Risks/Benefits discussed  [x] Home exercise program  Method of Education: [x] Verbal  [] Demo  [x] Written  Comprehension of Education:  [x] Verbalizes understanding. [] Demonstrates understanding. [] Needs Review. [] Demonstrates/verbalizes understanding of HEP/Ed previously given. Treatment Plan:  [x] Therapeutic Exercise   07492  [] Iontophoresis: 4 mg/mL Dexamethasone Sodium Phosphate  mAmin  10247   [] Therapeutic Activity  58630 [] Vasopneumatic cold with compression  80353    [] Gait Training   64979 [] Ultrasound   87450   [x] Neuromuscular Re-education  73770 [x] Electrical Stimulation Unattended  16985   [x] Manual Therapy  43921 [] Electrical Stimulation Attended  82055   [x] Instruction in HEP  [] Lumbar/Cervical Traction  99401   [] Aquatic Therapy   23986 [] Cold/hotpack    [] Massage   73293      [x] Dry Needling, 1 or 2 muscles  77196   [] Biofeedback, first 15 minutes   68906  [] Biofeedback, additional 15 minutes   49915 [] Dry Needling, 3 or more muscles  08158       []  Medication allergies reviewed for use of    Dexamethasone Sodium Phosphate 4mg/ml     with iontophoresis treatments. Pt is not allergic.     Frequency:  2-3 x/week for 12 visits    Todays Treatment:  Modalities:   Precautions:  Exercises:  Exercise Reps/ Time Weight/ Level Comments                                 Other:manual 10' manual traction, retraction    Specific Instructions for next treatment:    Treatment Charges: Mins Units   [x] Evaluation       []  Low       [x]  Moderate       []  High 25 1   []  Modalities     []  Ther Exercise 15 1   []  Manual Therapy 10 1   []  Ther Activities     []  Aquatics     []  Neuromuscular     []  Gait Training     []  Dry Needling           1-2 muscles     []  Dry Needling 3 or more muscles     [] Vasocompression     []  Other 50 3     TOTAL TREATMENT TIME: 50    Time in: 1400      Time out: 1450    Electronically signed by: Palmira Mooney PT        Physician Signature:________________________________Date:__________________  By signing above or cosigning this note, I have reviewed this plan of care and certify a need for medically necessary rehabilitation services.      *PLEASE SIGN ABOVE AND FAX BACK ALL PAGES*

## 2021-11-22 ENCOUNTER — HOSPITAL ENCOUNTER (OUTPATIENT)
Dept: PHYSICAL THERAPY | Age: 61
Setting detail: THERAPIES SERIES
Discharge: HOME | End: 2021-11-22
Payer: MEDICARE

## 2021-11-22 NOTE — FLOWSHEET NOTE
[x] [facility]) [facility]  [address]  [place]: [phone]   F: [phone]     Physical Therapy Cancel/No Show note    Date: 2021  Patient: Randall Parnell  : 1960  MRN: [id number]    Visit Count:   Cancels/No Shows to date:     For today's appointment patient:    [x]  Cancelled    [] Rescheduled appointment    [] No-show     Reason given by patient:    []  Patient ill    []  Conflicting appointment    [x] No transportation      [] Conflict with work    [] No reason given    [] Weather related    [] [id number]    [] Other:      Comments:        [] Next appointment was confirmed    Electronically signed by: Louise Luciano PTA

## 2021-11-24 ENCOUNTER — HOSPITAL ENCOUNTER (OUTPATIENT)
Dept: PHYSICAL THERAPY | Age: 61
Setting detail: THERAPIES SERIES
Discharge: HOME | End: 2021-11-24
Payer: MEDICARE

## 2021-11-24 PROCEDURE — 97140 MANUAL THERAPY 1/> REGIONS: CPT

## 2021-11-24 NOTE — FLOWSHEET NOTE
[address] [facility]   [facility]   Phone: [phone]   Fax: [phone]    Physical Therapy Daily Treatment Note      Date:  2021  Patient Name:  Vivi Grewal    :  1960  MRN: [id number]  Physician: Ples Lombard CNP                           Insurance: Medicare/ [facility] secondary  Medical Diagnosis: cervical stenosis M48.02, postlaminectomy syndrome M96.1                      Rehab Codes: neck pain M54.2  Onset Date: 21 referral                 Next 's appt.: PRN  Visit Count:                            Cancel/No Show: 1/0    Subjective:    Pain:  [x] Yes  [] No Location: Neck Pain Rating: (0-10 scale) 6/10  Pain altered Tx:  [x] No  [] Yes  Action:  Comments: Pt arrives reporting moderate pain. Thinks he over-did it getting ready for the holidays. Objective:  Modalities: MHP, supine to c-spine and thoracic spine 10'  Precautions: multilevel cervical laminectomy C2-C5  Exercises:  Exercise Reps/ Time Weight/ Level Completed  Today Comments   PROM       Chin tucks Next                           Other:  MFR to cervical musculature  Levator scap, scalenes, splenius  Manual traction    Specific Instructions for next treatment:       Assessment: [] Progressing toward goals. [] No change. [x] Other: Initiated treatment w/ MHP to neck and upper back in supine. Cont'd w/ manual work as mentioned above. Extended time taken to encourage relaxation. Pt reports decr pain and HA at end of treatment w/ visibly more ROM in rotation, flexion, and extension.      [x] Patient would continue to benefit from skilled physical therapy services in order to:  improve ROM, decrease radicular symptoms    STG: (to be met in 6 treatments)  1. ? Pain: improved pain levels to 2-3/10 at worst  2. ? ROM: Improved flexion to 30 degrees, extension to 35 degrees, (B) SB 10 degrees, (B) rotation 40 degreees  3. ? Function: Improved symptoms by 50% in AM, improved turning of head to drive by 66% grossly. 4. Independent with Home Exercise Programs  5. Demonstrate Knowledge of fall prevention  LTG: (to be met in 12 treatments)  6. ? Pain: improved pain levels to 0-1/10 at worst  7. ? ROM: Improved flexion to 40 degrees, extension to 40 degrees, (B) SB 15 degrees, (B) rotation 50 degreees  8. ? Function: Improved symptoms by 80% in AM, improved turning of head to drive by 29% grossly. 9. Independent with Home Exercise Programs  10. Demonstrate Knowledge of fall prevention  Patient goals: make neck better    Pt. Education:  [x] Yes  [] No  [] Reviewed Prior HEP/Ed  Method of Education: [x] Verbal  [] Demo  [] Written   Comprehension of Education:  [] Verbalizes understanding. [] Demonstrates understanding. [] Needs review. [] Demonstrates/verbalizes HEP/Ed previously given. Plan: [] Continue per plan of care.    [] Other:      Treatment Charges: Mins Units   [x]  Modalities: MHP 8 -   []  Ther Exercise     [x]  Manual Therapy 44 3   []  Ther Activities     []  Aquatics     []  Neuromuscular     [] Vasocompression     [] Gait Training     [] Dry needling        [] 1 or 2 muscles        [] 3 or more muscles     []  Other     Total Treatment time 52 3     Time In: 1:08            Time Out: 2:00    Electronically signed by:  Sierra Berg PTA

## 2021-11-30 DIAGNOSIS — Z12.11 COLON CANCER SCREENING: ICD-10-CM

## 2021-12-03 ENCOUNTER — HOSPITAL ENCOUNTER (OUTPATIENT)
Dept: PHYSICAL THERAPY | Age: 61
Setting detail: THERAPIES SERIES
Discharge: HOME | End: 2021-12-03
Payer: MEDICARE

## 2021-12-03 NOTE — FLOWSHEET NOTE
[x] [address] [facility]  [address]  [place]: [phone]   F: [phone]     Physical Therapy Cancel/No Show note    Date: 12/3/2021  Patient: Jude Sebastian  : 1960  MRN: [id number]    Visit Count:   Cancels/No Shows to date:     For today's appointment patient:    [x]  Cancelled    [] Rescheduled appointment    [] No-show     Reason given by patient:    [x]  Patient ill    []  Conflicting appointment    [] No transportation      [] Conflict with work    [] No reason given    [] Weather related    [] [id number]    [] Other:      Comments:        [] Next appointment was confirmed    Electronically signed by: Angella Latham, PTA

## 2021-12-06 ENCOUNTER — TELEPHONE (OUTPATIENT)
Dept: FAMILY MEDICINE CLINIC | Age: 61
End: 2021-12-06

## 2021-12-06 NOTE — TELEPHONE ENCOUNTER
Please advise Danny Marvin that his cologuard test was negative . if he continues to have symptoms of cough and congestion, he should get tested for covid .

## 2021-12-06 NOTE — TELEPHONE ENCOUNTER
Patient called stating he had cologuard tests a few months ago and would like those results.   He also is wondering if he should have Covid tests as he has coughing, phlegm, chills for past few weeks

## 2021-12-07 ENCOUNTER — HOSPITAL ENCOUNTER (OUTPATIENT)
Dept: PHYSICAL THERAPY | Age: 61
Setting detail: THERAPIES SERIES
Discharge: HOME | End: 2021-12-07
Payer: MEDICARE

## 2021-12-07 PROCEDURE — 97110 THERAPEUTIC EXERCISES: CPT

## 2021-12-07 PROCEDURE — 97140 MANUAL THERAPY 1/> REGIONS: CPT

## 2021-12-07 NOTE — FLOWSHEET NOTE
[facility]   [address]   Phone: [phone]   Fax: [phone]    Physical Therapy Daily Treatment Note      Date:  2021  Patient Name:  Rivas Thompson    :  1960  MRN: [id number]  Physician: Ian Al CNP                           Insurance: Medicare/ [facility] secondary  Medical Diagnosis: cervical stenosis M48.02, postlaminectomy syndrome M96.1                      Rehab Codes: neck pain M54.2  Onset Date: 21 referral                 Next 's appt.: PRN  Visit Count: 3/12                           Cancel/No Show: 2/0    Subjective:    Pain:  [x] Yes  [] No Location: Neck Pain Rating: (0-10 scale) 610  Pain altered Tx:  [x] No  [] Yes  Action:  Comments: Pt arrives reporting moderate patient thinks his best bet to feeling better is with dry needling. Patient states he tried everything such as stretches and exercises without improvements and could even make it worse at times. Objective:  Modalities: MHP, supine to c-spine and thoracic spine 10'  Precautions: multilevel cervical laminectomy C2-C5  Exercises:  Exercise Reps/ Time Weight/ Level Completed  Today Comments   PROM       Chin tucks To painful      Supine AROM cervical  10x2  x All planes    Supine UT/levator stretch  30\"x3  x With OP from therapist           Other:  MFR to cervical musculature  Levator scap, scalenes, splenius  Manual traction    Specific Instructions for next treatment:       Assessment:  Patient required multiple encouraging cues to participate in therapy with emphasis of proper technique of stretching and exercises followed by manual to decrease muscle tightness and increase cervical ROM. Patient was able to complete minimal cervical active range of motion exercises in supine with notes of fatigue and weakness throughout. Continued with MHP during all supine exercise and manual this date. [] Progressing toward goals.     [] No change. [x] Other: Initiated treatment w/ MHP to neck and upper back in supine. [x] Patient would continue to benefit from skilled physical therapy services in order to:  improve ROM, decrease radicular symptoms    STG: (to be met in 6 treatments)  1. ? Pain: improved pain levels to 2-3/10 at worst  2. ? ROM: Improved flexion to 30 degrees, extension to 35 degrees, (B) SB 10 degrees, (B) rotation 40 degreees  3. ? Function: Improved symptoms by 50% in AM, improved turning of head to drive by 98% grossly. 4. Independent with Home Exercise Programs  5. Demonstrate Knowledge of fall prevention  LTG: (to be met in 12 treatments)  6. ? Pain: improved pain levels to 0-1/10 at worst  7. ? ROM: Improved flexion to 40 degrees, extension to 40 degrees, (B) SB 15 degrees, (B) rotation 50 degreees  8. ? Function: Improved symptoms by 80% in AM, improved turning of head to drive by 11% grossly. 9. Independent with Home Exercise Programs  10. Demonstrate Knowledge of fall prevention  Patient goals: make neck better    Pt. Education:  [x] Yes  [] No  [] Reviewed Prior HEP/Ed  Method of Education: [x] Verbal  [] Demo  [] Written   Comprehension of Education:  [] Verbalizes understanding. [] Demonstrates understanding. [] Needs review. [] Demonstrates/verbalizes HEP/Ed previously given. Plan: [] Continue per plan of care.    [] Other:      Treatment Charges: Mins Units   []  Modalities: MHP     [x]  Ther Exercise 10 1   [x]  Manual Therapy 35 2   []  Ther Activities     []  Aquatics     []  Neuromuscular     [] Vasocompression     [] Gait Training     [] Dry needling        [] 1 or 2 muscles        [] 3 or more muscles     []  Other     Total Treatment time 45 3     Time In: 11:15am            Time Out: 12:00pm Time Spent with Treating AGUS Aranda     Electronically signed by:  Fidel Aranda PTA

## 2021-12-09 ENCOUNTER — VIRTUAL VISIT (OUTPATIENT)
Dept: PAIN MANAGEMENT | Age: 61
End: 2021-12-09
Payer: MEDICARE

## 2021-12-09 DIAGNOSIS — M51.26 DISPLACEMENT OF LUMBAR INTERVERTEBRAL DISC WITHOUT MYELOPATHY: ICD-10-CM

## 2021-12-09 DIAGNOSIS — M54.12 RADICULOPATHY OF CERVICAL SPINE: Primary | ICD-10-CM

## 2021-12-09 DIAGNOSIS — Z51.81 MEDICATION MONITORING ENCOUNTER: ICD-10-CM

## 2021-12-09 DIAGNOSIS — M48.061 SPINAL STENOSIS OF LUMBAR REGION WITHOUT NEUROGENIC CLAUDICATION: ICD-10-CM

## 2021-12-09 DIAGNOSIS — M51.36 DDD (DEGENERATIVE DISC DISEASE), LUMBAR: ICD-10-CM

## 2021-12-09 DIAGNOSIS — M47.817 LUMBOSACRAL SPONDYLOSIS WITHOUT MYELOPATHY: ICD-10-CM

## 2021-12-09 PROCEDURE — 99213 OFFICE O/P EST LOW 20 MIN: CPT | Performed by: NURSE PRACTITIONER

## 2021-12-09 RX ORDER — OXYCODONE HYDROCHLORIDE 5 MG/1
5 TABLET ORAL 2 TIMES DAILY PRN
Qty: 60 TABLET | Refills: 0 | Status: SHIPPED | OUTPATIENT
Start: 2021-12-16 | End: 2022-01-13 | Stop reason: SDUPTHER

## 2021-12-09 RX ORDER — GABAPENTIN 800 MG/1
800 TABLET ORAL EVERY 8 HOURS SCHEDULED
Qty: 90 TABLET | Refills: 2 | Status: SHIPPED | OUTPATIENT
Start: 2021-12-09 | End: 2022-02-24 | Stop reason: SDUPTHER

## 2021-12-09 ASSESSMENT — ENCOUNTER SYMPTOMS
COUGH: 0
SHORTNESS OF BREATH: 0
CONSTIPATION: 0
BACK PAIN: 1

## 2021-12-09 NOTE — PROGRESS NOTES
Patient completed a video visit today to review medication contract. Chief Complaint   Patient presents with    Neck Pain    Back Pain    Medication Refill         [facility] Patient complains of neck pain that radiates down his left arm to fingertips. He has had this pain for many years and it is worsening over time. He had cervical spine surgery in 2012 and the pain has only worsened since that time. He saw Dr Jean-Pierre Zambrano with no surgery recommended for neck, but told could benefit from CTR. Patient followed up with Dr. Hannah Ghosh after his CT last August and cervical injections were suggested. He is not a good candidate for cervical injections due to previous neck surgery. He did see NS in 2016 for lumbar pain but no surgery recommended at that time. Diclofenac started by his rheumatologist. He had bilateral SI Joint Radiofrequency Ablation 2/2021 and reports 50% relief. He states he is not getting stabbing pains at this time. He is trying to lose weight. Has changed his diet.      Did have another carotid doppler 7/9 and followed up with Dr. Ulises Platt on 7/14. He does not need surgery at this time - will follow up in six months. He has quit smoking.     Neck pain is worsening. MRI was ordered but patient could not complete due to claustrophobia. He did complete XR which shows postoperative and degenerative change cervical spine stable to previous.     He had SI RFA 11/4/21 and reports significant relief on the right side. He has started PT for the neck - massage has helped but the stretching seems to be making the pain worse. Neck Pain   This is a chronic problem. The current episode started more than 1 year ago. The problem occurs constantly. The problem has been gradually worsening. The pain is associated with nothing. The pain is present in the left side and right side. The quality of the pain is described as aching and stabbing. The pain is at a severity of 6/10. The pain is moderate.  The symptoms are aggravated by twisting and bending. The pain is same all the time. Stiffness is present all day. Associated symptoms include headaches, leg pain and tingling. Pertinent negatives include no chest pain, fever, numbness or weakness. He has tried bed rest, oral narcotics and heat (PT) for the symptoms. The treatment provided no relief. Back Pain  This is a chronic problem. The current episode started more than 1 year ago. The problem occurs constantly. The problem has been gradually improving since onset. The pain is present in the lumbar spine. The quality of the pain is described as aching, burning, cramping, shooting and stabbing. The pain radiates to the left thigh and left knee. The pain is at a severity of 6/10. The pain is moderate. The pain is the same all the time. The symptoms are aggravated by standing, sitting, bending and position. Associated symptoms include headaches, leg pain and tingling. Pertinent negatives include no chest pain, fever, numbness or weakness. He has tried analgesics and bed rest (RFA) for the symptoms. The treatment provided moderate relief. Patient denies any new neurological symptoms. No bowel or bladder incontinence, no weakness, and no falling. Pill count: appropriate due 12/16    Morphine equivalent: 15    Controlled Substance Monitoring:    Acute and Chronic Pain Monitoring:   RX Monitoring 12/9/2021   Attestation -   Acute Pain Prescriptions -   Periodic Controlled Substance Monitoring Possible medication side effects, risk of tolerance/dependence & alternative treatments discussed. ;No signs of potential drug abuse or diversion identified.;Obtaining appropriate analgesic effect of treatment.    Chronic Pain > 80 MEDD -           Past Medical History:   Diagnosis Date    Abnormal stress ECG     Angina pectoris (HCC)     CAD (coronary artery disease)     Carpal tunnel syndrome     left    Cervical stenosis of spine     Cervicalgia     chronic pain    Chronic back pain     COPD (chronic obstructive pulmonary disease) (HCC)     DDD (degenerative disc disease)     Elbow fracture, right     Fractures     elbow    GERD (gastroesophageal reflux disease)     Gout     Right great toe    Hyperlipidemia     Hypertension     Mild depression ([facility] 9/26/2013    2 suicide attempts by girlfriend related to depression.     Morbidly obese ([facility] 10/30/2020    Occlusion of right carotid artery 7/14/2021    Osteoarthritis of right knee     Sinus headache 10/10/2018    Sleep apnea     Sleep disturbance     Smoker     Tendonitis of foot     right       Past Surgical History:   Procedure Laterality Date    CARDIAC CATHETERIZATION  7/8/14    Non Obstructive CAD     CERVICAL SPINE SURGERY  7/13/12     C2-3-4-5    COLONOSCOPY      FRACTURE SURGERY      Rt elbow     HC INJECT OTHER PERPHRL NERV Bilateral 5/9/2019    INJECTION SPINAL performed by Salvatore Kamara MD at [address] [place] [address] Bilateral 8/15/2019    SACROILIAC JOINT INJECTION performed by Salvatore [facility] MD at [facility]  2/5/16    premier tens    NERVE BLOCK Left 02/07/2020    RFA left side    NERVE BLOCK Left 02/07/2020     NERVE RADIOFREQUENCY ABLATION - SACROILIAC (Left )    NERVE BLOCK  02/12/2021    NERVE RADIOFREQUENCY ABLATION SI (Left     NERVE BLOCK Right 02/19/2021    Procedure: NERVE RADIOFREQUENCY ABLATION SI JOINT (Right )    OTHER SURGICAL HISTORY  08/15/2019    sacroiliac joint injection    PAIN MANAGEMENT PROCEDURE Left 2/7/2020    NERVE RADIOFREQUENCY ABLATION - SACROILIAC performed by Salvatore Kamara MD at [address] [place] [address] Left 2/12/2021    NERVE RADIOFREQUENCY ABLATION SI performed by Salvatore Kamara MD at [address] [place] [address] Right 2/19/2021    NERVE RADIOFREQUENCY ABLATION SI JOINT performed by Salvatore Kamara MD at [address] [place] [address] GASTROINTESTINAL ENDOSCOPY         No Known Allergies      Current Outpatient Medications:     oxyCODONE (ROXICODONE) 5 MG immediate release tablet, Take 1 tablet by mouth 2 times daily as needed for Pain for up to 30 days. , Disp: 60 tablet, Rfl: 0    gabapentin (NEURONTIN) 800 MG tablet, Take 1 tablet by mouth every 8 hours for 30 days. , Disp: 90 tablet, Rfl: 2    budesonide-formoterol (SYMBICORT) 160-4.5 MCG/ACT AERO, INHALE TWO PUFFS BY MOUTH TWICE A DAY, Disp: 10.2 g, Rfl: 1    omeprazole (PRILOSEC) 20 MG delayed release capsule, Take one capsule by mouth twice a day 30 minutes before meals. , Disp: 180 capsule, Rfl: 0    fluticasone (FLONASE) 50 MCG/ACT nasal spray, 1 spray by Each Nostril route daily, Disp: 2 Bottle, Rfl: 1    amLODIPine (NORVASC) 5 MG tablet, Take 1 tablet by mouth daily, Disp: 30 tablet, Rfl: 5    simvastatin (ZOCOR) 80 MG tablet, Take 1 tablet by mouth every evening, Disp: 30 tablet, Rfl: 11    DULoxetine (CYMBALTA) 60 MG extended release capsule, Take 60 mg by mouth, Disp: , Rfl:     acetaminophen (TYLENOL) 325 MG tablet, Take 650 mg by mouth nightly, Disp: , Rfl:     cetirizine (ZYRTEC) 10 MG tablet, TAKE ONE TABLET BY MOUTH DAILY, Disp: 30 tablet, Rfl: 1    diclofenac (VOLTAREN) 75 MG EC tablet, Take 75 mg by mouth 2 times daily, Disp: , Rfl:     nitroGLYCERIN (NITROSTAT) 0.4 MG SL tablet, Place 1 tablet under the tongue every 5 minutes as needed for Chest pain, Disp: 25 tablet, Rfl: 3    albuterol sulfate  (90 Base) MCG/ACT inhaler, Inhale 2 puffs into the lungs every 6 hours as needed for Wheezing, Disp: 1 Inhaler, Rfl: 3    aspirin 81 MG EC tablet, Take 1 tablet by mouth daily, Disp: 30 tablet, Rfl: 3    Family History   Problem Relation Age of Onset    Heart Disease Mother     COPD Father     Cancer Maternal Aunt 64        breast cancer     Cancer Maternal Uncle 60        prostrate cancer        Social History     Socioeconomic History    Marital status: Single     Spouse name: Not on file    Number of children: Not on file    Years of education: Not on file    Highest education level: Not on file   Occupational History     Employer: N/A   Tobacco Use    Smoking status: Current Every Day Smoker     Packs/day: 0.25     Years: 40.00     Pack years: 10.00     Types: Cigarettes    Smokeless tobacco: Former User     Quit date: 3/12/2015    Tobacco comment: wants to discuss  chantix   Vaping Use    Vaping Use: Never used   Substance and Sexual Activity    Alcohol use: Not Currently     Alcohol/week: 0.0 standard drinks     Comment: 3 x year    Drug use: No    Sexual activity: Yes     Partners: Female   Other Topics Concern    Not on file   Social History Narrative    Not on file     Social Determinants of Health     Financial Resource Strain:     Difficulty of Paying Living Expenses: Not on file   Food Insecurity:     Worried About Running Out of Food in the Last Year: Not on file    Harpreet of Food in the Last Year: Not on file   Transportation Needs:     Lack of Transportation (Medical): Not on file    Lack of Transportation (Non-Medical):  Not on file   Physical Activity:     Days of Exercise per Week: Not on file    Minutes of Exercise per Session: Not on file   Stress:     Feeling of Stress : Not on file   Social Connections:     Frequency of Communication with Friends and Family: Not on file    Frequency of Social Gatherings with Friends and Family: Not on file    Attends Alevism Services: Not on file    Active Member of Clubs or Organizations: Not on file    Attends Club or Organization Meetings: Not on file    Marital Status: Not on file   Intimate Partner Violence:     Fear of Current or Ex-Partner: Not on file    Emotionally Abused: Not on file    Physically Abused: Not on file    Sexually Abused: Not on file   Housing Stability:     Unable to Pay for Housing in the Last Year: Not on file    Number of [place] in the Last Year: Not on file    Unstable Housing in the Last Year: Not on file       Review of Systems:  Review of Systems   Constitutional: Negative for chills and fever. Cardiovascular: Negative for chest pain and palpitations. Respiratory: Negative for cough and shortness of breath. Musculoskeletal: Positive for back pain and neck pain. Gastrointestinal: Negative for constipation. Neurological: Positive for headaches and tingling. Negative for disturbances in coordination, loss of balance, numbness and weakness. Physical Exam:  There were no vitals taken for this visit. Physical Exam  HENT:      Head: Normocephalic. Pulmonary:      Effort: Pulmonary effort is normal.   Neurological:      Mental Status: He is alert. Psychiatric:         Mood and Affect: Mood normal.         Behavior: Behavior normal.         Record/Diagnostics Review:    Last niru 7/2021 and was appropriate     Assessment:  Problem List Items Addressed This Visit     Medication monitoring encounter    Relevant Orders    Drug Screen, Pain    DDD (degenerative disc disease), lumbar    Relevant Medications    oxyCODONE (ROXICODONE) 5 MG immediate release tablet (Start on 12/16/2021)    Displacement of lumbar intervertebral disc without myelopathy    Relevant Medications    oxyCODONE (ROXICODONE) 5 MG immediate release tablet (Start on 12/16/2021)    gabapentin (NEURONTIN) 800 MG tablet    Other Relevant Orders    Drug Screen, Pain    Radiculopathy of cervical spine - Primary    Relevant Medications    gabapentin (NEURONTIN) 800 MG tablet    Spinal stenosis of lumbar region without neurogenic claudication    Lumbosacral spondylosis without myelopathy    Relevant Medications    oxyCODONE (ROXICODONE) 5 MG immediate release tablet (Start on 12/16/2021)    gabapentin (NEURONTIN) 800 MG tablet             Treatment Plan:  Patient relates current medications are helping the pain.  Patient reports taking pain medications as prescribed, denies obtaining medications from different sources and denies use of illegal drugs. Patient denies side effects from medications like nausea, vomiting, constipation or drowsiness. Patient reports current activities of daily living are possible due to medications and would like to continue them. As always, we encourage daily stretching and strengthening exercises, and recommend minimizing use of pain medications unless patient cannot get through daily activities due to pain. Contract requirements met. Continue opioid therapy. Script written for oxycodone  He is currently in PT with some benefit  UDS for standard monitoring purposes  Follow up appointment made for 4 weeks    Neydabhupinder Winters, was evaluated through a synchronous (real-time) audio-video encounter. The patient (or guardian if applicable) is aware that this is a billable service. Verbal consent to proceed has been obtained within the past 12 months. The visit was conducted pursuant to the emergency declaration under the [address] [place] [address], [address] [place] [address] authority and the [employer] and [employer] General Act. Patient identification was verified, and a caregiver was present when appropriate. The patient was located in a state where the provider was credentialed to provide care. Total time spent for this encounter: Not billed by time    --JOSSUE Noel CNP on 12/9/2021 at 9:05 AM    An electronic signature was used to authenticate this note.

## 2021-12-10 ENCOUNTER — HOSPITAL ENCOUNTER (OUTPATIENT)
Dept: PHYSICAL THERAPY | Age: 61
Setting detail: THERAPIES SERIES
Discharge: HOME | End: 2021-12-10
Payer: MEDICARE

## 2021-12-10 NOTE — FLOWSHEET NOTE
[] [facility]) [facility] &  Therapy  [address]    [phone]  F: [phone]   [] [address]  [facility]   [address]  P: [phone]  F: [phone]  [] [address] &  Therapy  [address]  P: [phone]  F: [phone] [] [address]  P: [phone]  F: [phone]  [] [address]  [facility]   [address]   [place]: [phone]  F: [phone]   [x] [place] [address] [facility] [address]  [place]: [phone]   F: [phone]     Physical Therapy Cancel/No Show note    Date: 12/10/2021  Patient: Madan Kowalski  : 1960  MRN: [id number]    Cancels/No Shows to date: 3/0    For today's appointment patient:    [x]  Cancelled    [] Rescheduled appointment    [] No-show     Reason given by patient:    []  Patient ill    []  Conflicting appointment    [] No transportation      [] Conflict with work    [] No reason given    [] Weather related    [] COVID-19    [x] Other:      Comments: Pt has to take cat to vet       [] Next appointment was confirmed    Electronically signed by: Ramez Klein PTA

## 2021-12-15 DIAGNOSIS — I10 ESSENTIAL HYPERTENSION: ICD-10-CM

## 2021-12-15 RX ORDER — AMLODIPINE BESYLATE 5 MG/1
TABLET ORAL
Qty: 90 TABLET | Refills: 0 | Status: SHIPPED | OUTPATIENT
Start: 2021-12-15 | End: 2022-03-14

## 2021-12-16 ENCOUNTER — HOSPITAL ENCOUNTER (OUTPATIENT)
Dept: PHYSICAL THERAPY | Age: 61
Setting detail: THERAPIES SERIES
Discharge: HOME | End: 2021-12-16
Payer: MEDICARE

## 2021-12-16 NOTE — FLOWSHEET NOTE
[] [facility]) - [facility] &  Therapy  [address]    [phone]  F: [phone]   [] [address]  [facility]   [address]  P: [phone]  F: [phone]  [] [address]  P: [phone]  F: [phone] [] [address]  P: [phone]  F: [phone]  [] [address]  [facility]   [address]   [place]: [phone]  F: [phone]   [x] [address]  [place]: [phone]   F: [phone]     Physical Therapy Cancel/No Show note    Date: 2021  Patient: Jude Sebastian  : 1960  MRN: [id number]    Cancels/No Shows to date: 3/0    For today's appointment patient:    [x]  Cancelled    [] Rescheduled appointment    [] No-show     Reason given by patient:    []  Patient ill    []  Conflicting appointment    [] No transportation      [] Conflict with work    [x] No reason given    [] Weather related    [] [id number]    [] Other:      Comments: Pt has to take cat to vet       [] Next appointment was confirmed    Discharge at this time, unable to get patient back onto schedule- poor compliance and attendance to therapy at this time- [facility]    Electronically signed by: AGUS Truong, PT

## 2021-12-22 ENCOUNTER — HOSPITAL ENCOUNTER (OUTPATIENT)
Age: 61
Setting detail: SPECIMEN
Discharge: HOME | End: 2021-12-22
Payer: MEDICARE

## 2021-12-22 ENCOUNTER — TELEPHONE (OUTPATIENT)
Dept: SURGERY | Age: 61
End: 2021-12-22

## 2021-12-22 DIAGNOSIS — Z51.81 MEDICATION MONITORING ENCOUNTER: ICD-10-CM

## 2021-12-22 DIAGNOSIS — M51.26 DISPLACEMENT OF LUMBAR INTERVERTEBRAL DISC WITHOUT MYELOPATHY: ICD-10-CM

## 2021-12-22 PROCEDURE — 36415 COLL VENOUS BLD VENIPUNCTURE: CPT

## 2021-12-22 PROCEDURE — 80307 DRUG TEST PRSMV CHEM ANLYZR: CPT

## 2021-12-22 NOTE — TELEPHONE ENCOUNTER
Patient is requesting new letter for handicap letter with length of time this is needed. Pt stated that last time it was for 5 years. Patient requesting letter be faxed to [facility]:  Fax: [phone].   Phone: [phone]

## 2021-12-25 LAB
6-ACETYLMORPHINE, UR: NOT DETECTED
7-AMINOCLONAZEPAM, URINE: NOT DETECTED
ALPHA-OH-ALPRAZ, URINE: NOT DETECTED
ALPHA-OH-MIDAZOLAM, URINE: NOT DETECTED
ALPRAZOLAM, URINE: NOT DETECTED
AMPHETAMINES, URINE: NOT DETECTED
BARBITURATES, URINE: NOT DETECTED
BENZOYLECGONINE, UR: NOT DETECTED
BUPRENORPHINE URINE: NOT DETECTED
CARISOPRODOL, UR: NOT DETECTED
CLONAZEPAM, URINE: NOT DETECTED
CODEINE, URINE: NOT DETECTED
CREATININE URINE: 119.4 MG/DL (ref 20–400)
DIAZEPAM, URINE: NOT DETECTED
DRUGS EXPECTED, UR: NORMAL
EER HI RES INTERP UR: NORMAL
ETHYL GLUCURONIDE UR: NOT DETECTED
FENTANYL URINE: NOT DETECTED
GABAPENTIN: PRESENT
HYDROCODONE, URINE: NOT DETECTED
HYDROMORPHONE, URINE: NOT DETECTED
LORAZEPAM, URINE: NOT DETECTED
MARIJUANA METAB, UR: NOT DETECTED
MDA, UR: NOT DETECTED
MDEA, EVE, UR: NOT DETECTED
MDMA URINE: NOT DETECTED
MEPERIDINE METAB, UR: NOT DETECTED
METHADONE, URINE: NOT DETECTED
METHAMPHETAMINE, URINE: NOT DETECTED
METHYLPHENIDATE: NOT DETECTED
MIDAZOLAM, URINE: NOT DETECTED
MORPHINE URINE: NOT DETECTED
NALOXONE URINE: NOT DETECTED
NORBUPRENORPHINE, URINE: NOT DETECTED
NORDIAZEPAM, URINE: NOT DETECTED
NORFENTANYL, URINE: NOT DETECTED
NORHYDROCODONE, URINE: NOT DETECTED
NOROXYCODONE, URINE: PRESENT
NOROXYMORPHONE, URINE: PRESENT
OXAZEPAM, URINE: NOT DETECTED
OXYCODONE URINE: PRESENT
OXYMORPHONE, URINE: PRESENT
PAIN MANAGEMENT DRUG PANEL INTERP, URINE: NORMAL
PAIN MGT DRUG PANEL, HI RES, UR: NORMAL
PCP,URINE: NOT DETECTED
PHENTERMINE, UR: NOT DETECTED
PREGABALIN: NOT DETECTED
TAPENTADOL, URINE: NOT DETECTED
TAPENTADOL-O-SULFATE, URINE: NOT DETECTED
TEMAZEPAM, URINE: NOT DETECTED
TRAMADOL, URINE: NOT DETECTED
ZOLPIDEM METABOLITE (ZCA), URINE: NOT DETECTED
ZOLPIDEM, URINE: NOT DETECTED

## 2021-12-30 NOTE — TELEPHONE ENCOUNTER
Letter was faxed twice with failed attempts. Called and left message for patient. Letter was mailed to patient per Dr. Lg Martin.

## 2022-01-06 ENCOUNTER — TELEPHONE (OUTPATIENT)
Dept: FAMILY MEDICINE CLINIC | Age: 62
End: 2022-01-06

## 2022-01-06 NOTE — TELEPHONE ENCOUNTER
----- Message from Yuval Valdes sent at 1/6/2022 12:47 PM EST -----  Subject: Message to Provider    QUESTIONS  Information for Provider? Patient returning a phone call to Nicole   regarding his handicapped sticker. Reports that it needs expiration date   information for DMV to process. ---------------------------------------------------------------------------  --------------  Gabbi Garcia INFO  What is the best way for the office to contact you? OK to leave message on   voicemail  Preferred Call Back Phone Number? [phone]  ---------------------------------------------------------------------------  --------------  SCRIPT ANSWERS  Relationship to Patient?  Self

## 2022-01-06 NOTE — TELEPHONE ENCOUNTER
Patient was called regarding the letter. Stated that all the letter needs is an expiration date. Please advise.

## 2022-01-13 ENCOUNTER — VIRTUAL VISIT (OUTPATIENT)
Dept: PAIN MANAGEMENT | Age: 62
End: 2022-01-13
Payer: MEDICARE

## 2022-01-13 DIAGNOSIS — Z79.891 ENCOUNTER FOR LONG-TERM OPIATE ANALGESIC USE: ICD-10-CM

## 2022-01-13 DIAGNOSIS — M48.061 SPINAL STENOSIS OF LUMBAR REGION WITHOUT NEUROGENIC CLAUDICATION: ICD-10-CM

## 2022-01-13 DIAGNOSIS — M51.26 DISPLACEMENT OF LUMBAR INTERVERTEBRAL DISC WITHOUT MYELOPATHY: ICD-10-CM

## 2022-01-13 DIAGNOSIS — M54.12 RADICULOPATHY OF CERVICAL SPINE: Primary | ICD-10-CM

## 2022-01-13 DIAGNOSIS — M47.817 LUMBOSACRAL SPONDYLOSIS WITHOUT MYELOPATHY: ICD-10-CM

## 2022-01-13 DIAGNOSIS — Z51.81 MEDICATION MONITORING ENCOUNTER: ICD-10-CM

## 2022-01-13 DIAGNOSIS — M51.36 DDD (DEGENERATIVE DISC DISEASE), LUMBAR: ICD-10-CM

## 2022-01-13 PROCEDURE — 99213 OFFICE O/P EST LOW 20 MIN: CPT | Performed by: NURSE PRACTITIONER

## 2022-01-13 RX ORDER — OXYCODONE HYDROCHLORIDE 5 MG/1
5 TABLET ORAL 2 TIMES DAILY PRN
Qty: 60 TABLET | Refills: 0 | Status: SHIPPED | OUTPATIENT
Start: 2022-01-15 | End: 2022-02-24 | Stop reason: SDUPTHER

## 2022-01-13 ASSESSMENT — ENCOUNTER SYMPTOMS
COUGH: 0
SHORTNESS OF BREATH: 0
CONSTIPATION: 0
BACK PAIN: 1

## 2022-01-13 NOTE — PROGRESS NOTES
Chief Complaint: neck and back pain    [facility] Patient complains of neck pain that radiates down his left arm to fingertips. He has had this pain for many years and it is worsening over time. He had cervical spine surgery in 2012 and the pain has only worsened since that time. He saw Dr Jad Montano with no surgery recommended for neck, but told could benefit from CTR. Patient followed up with Dr. Sd Branch after his CT last August and cervical injections were suggested. He is not a good candidate for cervical injections due to previous neck surgery. He did see NS in 2016 for lumbar pain but no surgery recommended at that time. Diclofenac started by his rheumatologist. He had bilateral SI Joint Radiofrequency Ablation 2/2021 and reports 50% relief. He states he is not getting stabbing pains at this time. He is trying to lose weight. Has changed his diet.      Did have another carotid doppler 7/9 and followed up with Dr. Xavi Kaufman on 7/14. He does not need surgery at this time - will follow up in six months. He has quit smoking.     Neck pain is worsening. MRI was ordered but patient could not complete due to claustrophobia. He did complete XR which shows postoperative and degenerative change cervical spine stable to previous.     He had SI RFA 11/4/21 and reports significant relief on the right side.     He has started PT for the neck - massage has helped but the stretching seems to be making the pain worse. He thinks he has Covid - unable to get a test. Denies SOB, fever. States he has body aches, headache and fatigue. Will get checked by PCP if symptoms worsen. Neck Pain   This is a chronic problem. The current episode started more than 1 year ago. The problem occurs constantly. The problem has been unchanged. The pain is present in the midline. The quality of the pain is described as aching. The pain is at a severity of 7/10. The pain is moderate. The symptoms are aggravated by position and twisting. Associated symptoms include headaches. Pertinent negatives include no chest pain, fever or numbness. He has tried bed rest and oral narcotics for the symptoms. The treatment provided mild relief. Back Pain  This is a chronic problem. The current episode started more than 1 year ago. The problem occurs constantly. The problem is unchanged. The pain is present in the lumbar spine. The quality of the pain is described as aching. The pain does not radiate. The pain is at a severity of 4/10. The pain is moderate. The symptoms are aggravated by position and standing (walking). Associated symptoms include headaches. Pertinent negatives include no chest pain, fever, numbness or paresthesias. He has tried analgesics and bed rest for the symptoms. The treatment provided mild relief. Patient denies any new neurological symptoms. No bowel or bladder incontinence, no weakness, and no falling. Pill count: appropriate due 1/15    Morphine equivalent: 15    Controlled Substance Monitoring:    Acute and Chronic Pain Monitoring:   RX Monitoring 1/13/2022   Attestation -   Acute Pain Prescriptions -   Periodic Controlled Substance Monitoring Possible medication side effects, risk of tolerance/dependence & alternative treatments discussed. ;No signs of potential drug abuse or diversion identified.;Obtaining appropriate analgesic effect of treatment.    Chronic Pain > 80 MEDD -           Past Medical History:   Diagnosis Date    Abnormal stress ECG     Angina pectoris (HCC)     CAD (coronary artery disease)     Carpal tunnel syndrome     left    Cervical stenosis of spine     Cervicalgia     chronic pain    Chronic back pain     COPD (chronic obstructive pulmonary disease) (HCC)     DDD (degenerative disc disease)     Elbow fracture, right     Fractures     elbow    GERD (gastroesophageal reflux disease)     Gout     Right great toe    Hyperlipidemia     Hypertension     Mild depression ([address] 9/26/2013    2 suicide attempts by girlfriend related to depression.     Morbidly obese ([address] 10/30/2020    Occlusion of right carotid artery 7/14/2021    Osteoarthritis of right knee     Sinus headache 10/10/2018    Sleep apnea     Sleep disturbance     Smoker     Tendonitis of foot     right       Past Surgical History:   Procedure Laterality Date    CARDIAC CATHETERIZATION  7/8/14    Non Obstructive CAD     CERVICAL SPINE SURGERY  7/13/12     C2-3-4-5    COLONOSCOPY      FRACTURE SURGERY      Rt elbow     HC INJECT OTHER PERPHRL NERV Bilateral 5/9/2019    INJECTION SPINAL performed by Heather Bernard MD at [address] [place] [address] Bilateral 8/15/2019    SACROILIAC JOINT INJECTION performed by Heather [facility] MD at [facility]  2/5/16    premier tens    NERVE BLOCK Left 02/07/2020    RFA left side    NERVE BLOCK Left 02/07/2020     NERVE RADIOFREQUENCY ABLATION - SACROILIAC (Left )    NERVE BLOCK  02/12/2021    NERVE RADIOFREQUENCY ABLATION SI (Left     NERVE BLOCK Right 02/19/2021    Procedure: NERVE RADIOFREQUENCY ABLATION SI JOINT (Right )    OTHER SURGICAL HISTORY  08/15/2019    sacroiliac joint injection    PAIN MANAGEMENT PROCEDURE Left 2/7/2020    NERVE RADIOFREQUENCY ABLATION - SACROILIAC performed by Heather Bernard MD at [address] [place] [address] Left 2/12/2021    NERVE RADIOFREQUENCY ABLATION SI performed by Hetaher Bernard MD at [address] [place] [address] Right 2/19/2021    NERVE RADIOFREQUENCY ABLATION SI JOINT performed by Heather Bernard MD at [address] [place] [address]         No Known Allergies      Current Outpatient Medications:     amLODIPine (NORVASC) 5 MG tablet, TAKE ONE TABLET BY MOUTH DAILY, Disp: 90 tablet, Rfl: 0    oxyCODONE (ROXICODONE) 5 MG immediate release tablet, Take 1 tablet by mouth 2 times daily as needed for Pain for up to 30 days., Disp: 60 tablet, Rfl: 0    gabapentin (NEURONTIN) 800 MG tablet, Take 1 tablet by mouth every 8 hours for 30 days. , Disp: 90 tablet, Rfl: 2    budesonide-formoterol (SYMBICORT) 160-4.5 MCG/ACT AERO, INHALE TWO PUFFS BY MOUTH TWICE A DAY, Disp: 10.2 g, Rfl: 1    omeprazole (PRILOSEC) 20 MG delayed release capsule, Take one capsule by mouth twice a day 30 minutes before meals. , Disp: 180 capsule, Rfl: 0    fluticasone (FLONASE) 50 MCG/ACT nasal spray, 1 spray by Each Nostril route daily, Disp: 2 Bottle, Rfl: 1    simvastatin (ZOCOR) 80 MG tablet, Take 1 tablet by mouth every evening, Disp: 30 tablet, Rfl: 11    DULoxetine (CYMBALTA) 60 MG extended release capsule, Take 60 mg by mouth, Disp: , Rfl:     acetaminophen (TYLENOL) 325 MG tablet, Take 650 mg by mouth nightly, Disp: , Rfl:     cetirizine (ZYRTEC) 10 MG tablet, TAKE ONE TABLET BY MOUTH DAILY, Disp: 30 tablet, Rfl: 1    diclofenac (VOLTAREN) 75 MG EC tablet, Take 75 mg by mouth 2 times daily, Disp: , Rfl:     nitroGLYCERIN (NITROSTAT) 0.4 MG SL tablet, Place 1 tablet under the tongue every 5 minutes as needed for Chest pain, Disp: 25 tablet, Rfl: 3    albuterol sulfate  (90 Base) MCG/ACT inhaler, Inhale 2 puffs into the lungs every 6 hours as needed for Wheezing, Disp: 1 Inhaler, Rfl: 3    aspirin 81 MG EC tablet, Take 1 tablet by mouth daily, Disp: 30 tablet, Rfl: 3    Family History   Problem Relation Age of Onset    Heart Disease Mother     COPD Father     Cancer Maternal Aunt 64        breast cancer     Cancer Maternal Uncle 60        prostrate cancer        Social History     Socioeconomic History    Marital status: Single     Spouse name: Not on file    Number of children: Not on file    Years of education: Not on file    Highest education level: Not on file   Occupational History     Employer: N/A   Tobacco Use    Smoking status: Current Every Day Smoker     Packs/day: 0.25     Years: 40.00     Pack years: 10.00 Types: Cigarettes    Smokeless tobacco: Former User     Quit date: 3/12/2015    Tobacco comment: wants to discuss  chantix   Vaping Use    Vaping Use: Never used   Substance and Sexual Activity    Alcohol use: Not Currently     Alcohol/week: 0.0 standard drinks     Comment: 3 x year    Drug use: No    Sexual activity: Yes     Partners: Female   Other Topics Concern    Not on file   Social History Narrative    Not on file     Social Determinants of Health     Financial Resource Strain:     Difficulty of Paying Living Expenses: Not on file   Food Insecurity:     Worried About Running Out of Food in the Last Year: Not on file    Harpreet of Food in the Last Year: Not on file   Transportation Needs:     Lack of Transportation (Medical): Not on file    Lack of Transportation (Non-Medical): Not on file   Physical Activity:     Days of Exercise per Week: Not on file    Minutes of Exercise per Session: Not on file   Stress:     Feeling of Stress : Not on file   Social Connections:     Frequency of Communication with Friends and Family: Not on file    Frequency of Social Gatherings with Friends and Family: Not on file    Attends [employer] Services: Not on file    Active Member of [address] [place] [address] or Organizations: Not on file    Attends Club or Organization Meetings: Not on file    Marital Status: Not on file   Intimate Partner Violence:     Fear of Current or Ex-Partner: Not on file    Emotionally Abused: Not on file    Physically Abused: Not on file    Sexually Abused: Not on file   Housing Stability:     Unable to Pay for Housing in the Last Year: Not on file    Number of [place] in the Last Year: Not on file    Unstable Housing in the Last Year: Not on file       Review of Systems:  Review of Systems   Constitutional: Negative for chills and fever. Cardiovascular: Negative for chest pain and palpitations. Respiratory: Negative for cough and shortness of breath.     Musculoskeletal: Positive for back pain and neck pain. Gastrointestinal: Negative for constipation. Neurological: Positive for headaches. Negative for disturbances in coordination, loss of balance, numbness and paresthesias. Physical Exam:  There were no vitals taken for this visit. Physical Exam  HENT:      Head: Normocephalic. Pulmonary:      Effort: Pulmonary effort is normal.   Neurological:      Mental Status: He is alert. Psychiatric:         Mood and Affect: Mood normal.         Behavior: Behavior normal.         Record/Diagnostics Review:    Last niru 12/2021 and was appropriate     Assessment:  Problem List Items Addressed This Visit     Medication monitoring encounter    DDD (degenerative disc disease), lumbar    Relevant Medications    oxyCODONE (ROXICODONE) 5 MG immediate release tablet (Start on 1/15/2022)    Displacement of lumbar intervertebral disc without myelopathy    Relevant Medications    oxyCODONE (ROXICODONE) 5 MG immediate release tablet (Start on 1/15/2022)    Radiculopathy of cervical spine - Primary    Spinal stenosis of lumbar region without neurogenic claudication    Lumbosacral spondylosis without myelopathy    Relevant Medications    oxyCODONE (ROXICODONE) 5 MG immediate release tablet (Start on 1/15/2022)    Encounter for long-term opiate analgesic use             Treatment Plan:  Patient relates current medications are helping the pain. Patient reports taking pain medications as prescribed, denies obtaining medications from different sources and denies use of illegal drugs. Patient denies side effects from medications like nausea, vomiting, constipation or drowsiness. Patient reports current activities of daily living are possible due to medications and would like to continue them. As always, we encourage daily stretching and strengthening exercises, and recommend minimizing use of pain medications unless patient cannot get through daily activities due to pain.      Contract requirements met.  Continue opioid therapy. Script written for oxycodone   He has not completed last two PT sessions - states it makes hi pain worse   Follow up appointment made for 4 weeks    Elsa Norwood, was evaluated through a synchronous (real-time) audio-video encounter. The patient (or guardian if applicable) is aware that this is a billable service. Verbal consent to proceed has been obtained within the past 12 months. The visit was conducted pursuant to the emergency declaration under the [address] [place] [address], [address] [place] [address] authority and the [employer] Act. Patient identification was verified, and a caregiver was present when appropriate. The patient was located in a state where the provider was credentialed to provide care. Total time spent for this encounter: Not billed by time    --JOSSUE Foster CNP on 1/13/2022 at 11:29 AM    An electronic signature was used to authenticate this note.

## 2022-02-02 ENCOUNTER — TELEPHONE (OUTPATIENT)
Dept: PAIN MANAGEMENT | Age: 62
End: 2022-02-02

## 2022-02-02 NOTE — TELEPHONE ENCOUNTER
Patient called in stating he needs vv on 02/10/22. Advised patient he has not been seen in the office for almost 2 years and needs to have an office visit. Patient became upset and said his ride has to come from [employer] and it's not easy for him to come to office visits. Advised we understand, but he needs to be seen every so often as this is policy. Patient said 2/22/22 would be a better day.  Changed patients office visit from 02/10/22 to 2/22/22 with Hyacinth Nguyen per request.

## 2022-02-24 ENCOUNTER — OFFICE VISIT (OUTPATIENT)
Dept: PAIN MANAGEMENT | Age: 62
End: 2022-02-24
Payer: COMMERCIAL

## 2022-02-24 VITALS
HEART RATE: 82 BPM | WEIGHT: 265 LBS | BODY MASS INDEX: 39.25 KG/M2 | SYSTOLIC BLOOD PRESSURE: 142 MMHG | DIASTOLIC BLOOD PRESSURE: 82 MMHG | HEIGHT: 69 IN

## 2022-02-24 DIAGNOSIS — M51.36 DDD (DEGENERATIVE DISC DISEASE), LUMBAR: ICD-10-CM

## 2022-02-24 DIAGNOSIS — E66.01 SEVERE OBESITY (BMI 35.0-39.9) WITH COMORBIDITY (HCC): ICD-10-CM

## 2022-02-24 DIAGNOSIS — M47.817 LUMBOSACRAL SPONDYLOSIS WITHOUT MYELOPATHY: ICD-10-CM

## 2022-02-24 DIAGNOSIS — M54.12 RADICULOPATHY OF CERVICAL SPINE: Primary | ICD-10-CM

## 2022-02-24 DIAGNOSIS — Z51.81 MEDICATION MONITORING ENCOUNTER: ICD-10-CM

## 2022-02-24 DIAGNOSIS — M51.26 DISPLACEMENT OF LUMBAR INTERVERTEBRAL DISC WITHOUT MYELOPATHY: ICD-10-CM

## 2022-02-24 PROCEDURE — 99213 OFFICE O/P EST LOW 20 MIN: CPT | Performed by: NURSE PRACTITIONER

## 2022-02-24 RX ORDER — OXYCODONE HYDROCHLORIDE 5 MG/1
5 TABLET ORAL 2 TIMES DAILY PRN
Qty: 60 TABLET | Refills: 0 | Status: SHIPPED | OUTPATIENT
Start: 2022-02-24 | End: 2022-03-24 | Stop reason: SDUPTHER

## 2022-02-24 RX ORDER — MELOXICAM 15 MG/1
15 TABLET ORAL DAILY PRN
Qty: 30 TABLET | Refills: 5 | Status: SHIPPED | OUTPATIENT
Start: 2022-02-24 | End: 2022-09-14

## 2022-02-24 RX ORDER — TIZANIDINE 4 MG/1
4 TABLET ORAL 2 TIMES DAILY PRN
Qty: 60 TABLET | Refills: 2 | Status: SHIPPED | OUTPATIENT
Start: 2022-02-24 | End: 2022-03-24 | Stop reason: SDUPTHER

## 2022-02-24 RX ORDER — GABAPENTIN 800 MG/1
800 TABLET ORAL EVERY 8 HOURS SCHEDULED
Qty: 90 TABLET | Refills: 2 | Status: SHIPPED | OUTPATIENT
Start: 2022-02-24 | End: 2022-03-24 | Stop reason: SDUPTHER

## 2022-02-24 ASSESSMENT — ENCOUNTER SYMPTOMS
BACK PAIN: 1
SHORTNESS OF BREATH: 0
CONSTIPATION: 0
COUGH: 0

## 2022-02-24 NOTE — PROGRESS NOTES
Patient is here today to review medication contract. Chief Complaint   Patient presents with    Neck Pain     DDD, Lumbar    Back pain    PMH: Patient complains of neck pain that radiates down his left arm to fingertips. He has had this pain for many years and it is worsening over time. He had cervical spine surgery in 2012 and the pain has only worsened since that time. He saw Dr Maira Moya with no surgery recommended for neck, but told could benefit from CTR. Patient followed up with Dr. Le Garcia after his CT last August and cervical injections were suggested. He is not a good candidate for cervical injections due to previous neck surgery. He did see NS in 2016 for lumbar pain but no surgery recommended at that time. Diclofenac started by his rheumatologist. He is trying to lose weight. Has changed his diet.   Did have another carotid doppler 7/9 and followed up with Dr. Ron Pelletier on 7/14. He does not need surgery at this time - will follow up in six months. He has quit smoking.     Cervical MRI was ordered but patient could not complete due to claustrophobia. He did complete XR which shows postoperative and degenerative change cervical spine stable to previous.     He had SI RFA 11/4/21 and reports significant relief on the right side.     He did try PT for the neck - massage has helped but the stretching seems to be making the pain worse.       Neck Pain   This is a chronic problem. The current episode started more than 1 year ago. The problem occurs constantly. The problem has been gradually worsening. The pain is associated with an unknown factor. The pain is present in the left side. The quality of the pain is described as aching and burning. The pain is at a severity of 8/10. The pain is moderate. The symptoms are aggravated by position and twisting (left knee and wrist ). Stiffness is present all day. Associated symptoms include numbness and tingling. Pertinent negatives include no chest pain or fever. He has tried bed rest and oral narcotics for the symptoms. The treatment provided moderate relief. Back Pain  This is a chronic problem. The current episode started more than 1 year ago. The problem occurs constantly. The problem is unchanged. The pain is present in the sacro-iliac and lumbar spine. The quality of the pain is described as aching. The pain does not radiate. The pain is at a severity of 8/10. The pain is moderate. The symptoms are aggravated by position and standing (walking). Associated symptoms include numbness and tingling. Pertinent negatives include no chest pain or fever. He has tried analgesics and bed rest (RFA) for the symptoms. The treatment provided moderate relief. Patient denies any new neurological symptoms. No bowel or bladder incontinence, no weakness, and no falling. Pill count: appropriate was due 2/14    Morphine equivalent: 15    Controlled Substance Monitoring:    Acute and Chronic Pain Monitoring:   RX Monitoring 2/24/2022   Attestation -   Acute Pain Prescriptions -   Periodic Controlled Substance Monitoring Possible medication side effects, risk of tolerance/dependence & alternative treatments discussed. ;No signs of potential drug abuse or diversion identified.;Obtaining appropriate analgesic effect of treatment. Chronic Pain > 80 MEDD -           Past Medical History:   Diagnosis Date    Abnormal stress ECG     Angina pectoris (HCC)     CAD (coronary artery disease)     Carpal tunnel syndrome     left    Cervical stenosis of spine     Cervicalgia     chronic pain    Chronic back pain     COPD (chronic obstructive pulmonary disease) (HCC)     DDD (degenerative disc disease)     Elbow fracture, right     Fractures     elbow    GERD (gastroesophageal reflux disease)     Gout     Right great toe    Hyperlipidemia     Hypertension     Mild depression ([address] 9/26/2013    2 suicide attempts by girlfriend related to depression.     Morbidly obese ([address] 10/30/2020    Occlusion of right carotid artery 7/14/2021    Osteoarthritis of right knee     Sinus headache 10/10/2018    Sleep apnea     Sleep disturbance     Smoker     Tendonitis of foot     right       Past Surgical History:   Procedure Laterality Date    CARDIAC CATHETERIZATION  7/8/14    Non Obstructive CAD     CERVICAL SPINE SURGERY  7/13/12     C2-3-4-5    COLONOSCOPY      FRACTURE SURGERY      Rt elbow     HC INJECT OTHER PERPHRL NERV Bilateral 5/9/2019    INJECTION SPINAL performed by [address], MD at [address] [place] [address] Bilateral 8/15/2019    SACROILIAC JOINT INJECTION performed by [address], MD at [facility]  2/5/16    premier tens    NERVE BLOCK Left 02/07/2020    RFA left side    NERVE BLOCK Left 02/07/2020     NERVE RADIOFREQUENCY ABLATION - SACROILIAC (Left )    NERVE BLOCK  02/12/2021    NERVE RADIOFREQUENCY ABLATION SI (Left     NERVE BLOCK Right 02/19/2021    Procedure: NERVE RADIOFREQUENCY ABLATION SI JOINT (Right )    OTHER SURGICAL HISTORY  08/15/2019    sacroiliac joint injection    PAIN MANAGEMENT PROCEDURE Left 2/7/2020    NERVE RADIOFREQUENCY ABLATION - SACROILIAC performed by [address], MD at [address] [place] [address] Left 2/12/2021    NERVE RADIOFREQUENCY ABLATION SI performed by [address], MD at [address] [place] [address] Right 2/19/2021    NERVE RADIOFREQUENCY ABLATION SI JOINT performed by Jacqueline Aleman MD at [address] [place] [address]         No Known Allergies      Current Outpatient Medications:     amLODIPine (NORVASC) 5 MG tablet, TAKE ONE TABLET BY MOUTH DAILY, Disp: 90 tablet, Rfl: 0    gabapentin (NEURONTIN) 800 MG tablet, Take 1 tablet by mouth every 8 hours for 30 days. , Disp: 90 tablet, Rfl: 2    budesonide-formoterol (SYMBICORT) 160-4.5 MCG/ACT AERO, INHALE TWO PUFFS BY MOUTH TWICE A DAY, Disp: 10.2 g, Rfl: 1    simvastatin (ZOCOR) 80 MG tablet, Take 1 tablet by mouth every evening, Disp: 30 tablet, Rfl: 11    DULoxetine (CYMBALTA) 60 MG extended release capsule, Take 60 mg by mouth, Disp: , Rfl:     acetaminophen (TYLENOL) 325 MG tablet, Take 650 mg by mouth nightly, Disp: , Rfl:     diclofenac (VOLTAREN) 75 MG EC tablet, Take 75 mg by mouth 2 times daily, Disp: , Rfl:     nitroGLYCERIN (NITROSTAT) 0.4 MG SL tablet, Place 1 tablet under the tongue every 5 minutes as needed for Chest pain, Disp: 25 tablet, Rfl: 3    albuterol sulfate  (90 Base) MCG/ACT inhaler, Inhale 2 puffs into the lungs every 6 hours as needed for Wheezing, Disp: 1 Inhaler, Rfl: 3    aspirin 81 MG EC tablet, Take 1 tablet by mouth daily, Disp: 30 tablet, Rfl: 3    omeprazole (PRILOSEC) 20 MG delayed release capsule, Take one capsule by mouth twice a day 30 minutes before meals.  (Patient not taking: Reported on 2/24/2022), Disp: 180 capsule, Rfl: 0    fluticasone (FLONASE) 50 MCG/ACT nasal spray, 1 spray by Each Nostril route daily (Patient not taking: Reported on 2/24/2022), Disp: 2 Bottle, Rfl: 1    cetirizine (ZYRTEC) 10 MG tablet, TAKE ONE TABLET BY MOUTH DAILY (Patient not taking: Reported on 2/24/2022), Disp: 30 tablet, Rfl: 1    Family History   Problem Relation Age of Onset    Heart Disease Mother     COPD Father     Cancer Maternal Aunt 64        breast cancer     Cancer Maternal Uncle 60        prostrate cancer        Social History     Socioeconomic History    Marital status: Single     Spouse name: Not on file    Number of children: Not on file    Years of education: Not on file    Highest education level: Not on file   Occupational History     Employer: N/A   Tobacco Use    Smoking status: Current Every Day Smoker     Packs/day: 0.25     Years: 40.00     Pack years: 10.00     Types: Cigarettes    Smokeless tobacco: Former User     Quit date: 3/12/2015    Tobacco comment: wants to discuss  chantix   Vaping Use    Vaping Use: Never used   Substance and Sexual Activity    Alcohol use: Not Currently     Alcohol/week: 0.0 standard drinks     Comment: 3 x year    Drug use: No    Sexual activity: Yes     Partners: Female   Other Topics Concern    Not on file   Social History Narrative    Not on file     Social Determinants of Health     Financial Resource Strain:     Difficulty of Paying Living Expenses: Not on file   Food Insecurity:     Worried About Running Out of Food in the Last Year: Not on file    Harpreet of Food in the Last Year: Not on file   Transportation Needs:     Lack of Transportation (Medical): Not on file    Lack of Transportation (Non-Medical): Not on file   Physical Activity:     Days of Exercise per Week: Not on file    Minutes of Exercise per Session: Not on file   Stress:     Feeling of Stress : Not on file   Social Connections:     Frequency of Communication with Friends and Family: Not on file    Frequency of Social Gatherings with Friends and Family: Not on file    Attends [employer]ism Services: Not on file    Active Member of [address] [place] [address] or Organizations: Not on file    Attends Club or Organization Meetings: Not on file    Marital Status: Not on file   Intimate Partner Violence:     Fear of Current or Ex-Partner: Not on file    Emotionally Abused: Not on file    Physically Abused: Not on file    Sexually Abused: Not on file   Housing Stability:     Unable to Pay for Housing in the Last Year: Not on file    Number of [place] in the Last Year: Not on file    Unstable Housing in the Last Year: Not on file       Review of Systems:  Review of Systems   Constitutional: Negative for chills and fever. Cardiovascular: Negative for chest pain and palpitations. Respiratory: Negative for cough and shortness of breath. Musculoskeletal: Positive for back pain and neck pain. Gastrointestinal: Negative for constipation.    Neurological: Positive for numbness and tingling. Negative for disturbances in coordination and loss of balance. Physical Exam:  BP (!) 148/83   Pulse 84   Ht 5' 9\" (1.753 m)   Wt 265 lb (120.2 kg)   BMI 39.13 kg/m²     Physical Exam  HENT:      Head: Normocephalic. Pulmonary:      Effort: Pulmonary effort is normal.   Musculoskeletal:         General: Normal range of motion. Cervical back: Normal range of motion. Tenderness present. Lumbar back: Tenderness present. Skin:     General: Skin is warm and dry. Neurological:      Mental Status: He is alert and oriented to person, place, and time. Record/Diagnostics Review:    Last niru 12/2021 and was appropriate     Assessment:  Problem List Items Addressed This Visit     Medication monitoring encounter    DDD (degenerative disc disease), lumbar    Relevant Medications    meloxicam (MOBIC) 15 MG tablet    oxyCODONE (ROXICODONE) 5 MG immediate release tablet    tiZANidine (ZANAFLEX) 4 MG tablet    Displacement of lumbar intervertebral disc without myelopathy    Relevant Medications    gabapentin (NEURONTIN) 800 MG tablet    oxyCODONE (ROXICODONE) 5 MG immediate release tablet    tiZANidine (ZANAFLEX) 4 MG tablet    Radiculopathy of cervical spine - Primary    Relevant Medications    gabapentin (NEURONTIN) 800 MG tablet    tiZANidine (ZANAFLEX) 4 MG tablet    Lumbosacral spondylosis without myelopathy    Relevant Medications    meloxicam (MOBIC) 15 MG tablet    gabapentin (NEURONTIN) 800 MG tablet    oxyCODONE (ROXICODONE) 5 MG immediate release tablet    tiZANidine (ZANAFLEX) 4 MG tablet      Other Visit Diagnoses     Severe obesity (BMI 35.0-39. 9) with comorbidity [facility])                 Treatment Plan:  Patient relates current medications are helping the pain. Patient reports taking pain medications as prescribed, denies obtaining medications from different sources and denies use of illegal drugs.  Patient denies side effects from medications like nausea, vomiting, constipation or drowsiness. Patient reports current activities of daily living are possible due to medications and would like to continue them. As always, we encourage daily stretching and strengthening exercises, and recommend minimizing use of pain medications unless patient cannot get through daily activities due to pain. Contract requirements met. Continue opioid therapy.  Script written for oxycodone  Was taking voltaren with mild benefit but has not had a refill from PCP  Will d/c voltaren and  try mobic 15 mg QD  Consider aquatic PT in the future  Follow up appointment made for 4 weeks

## 2022-03-04 ENCOUNTER — TELEPHONE (OUTPATIENT)
Dept: VASCULAR SURGERY | Age: 62
End: 2022-03-04

## 2022-03-12 DIAGNOSIS — I10 ESSENTIAL HYPERTENSION: ICD-10-CM

## 2022-03-14 RX ORDER — AMLODIPINE BESYLATE 5 MG/1
TABLET ORAL
Qty: 90 TABLET | Refills: 1 | Status: SHIPPED | OUTPATIENT
Start: 2022-03-14 | End: 2022-04-22 | Stop reason: SDUPTHER

## 2022-03-14 NOTE — TELEPHONE ENCOUNTER
E-scribe request for med refills. Please review and e-scribe if applicable. Last Visit Date:  9/10/21  Next Visit Date:  Visit date not found    Hemoglobin A1C (%)   Date Value   01/10/2019 4.5   10/10/2018 5.8   03/20/2018 6.0             ( goal A1C is < 7)   Microalb/Crt. Ratio (mcg/mg creat)   Date Value   05/03/2014 Unable to calculate or report.      LDL Cholesterol (mg/dL)   Date Value   11/17/2020 169 (H)       (goal LDL is <100)   AST (U/L)   Date Value   11/17/2020 21     ALT (U/L)   Date Value   11/17/2020 21     BUN (mg/dL)   Date Value   11/17/2020 9     BP Readings from Last 3 Encounters:   02/24/22 (!) 142/82   11/04/21 128/62   10/21/21 121/87          (goal 120/80)        Patient Active Problem List:     Cervical stenosis of spine     Cervicalgia     COPD (chronic obstructive pulmonary disease) ([facility])     Smoker     Mild depression ([facility])     GERD (gastroesophageal reflux disease)     Mixed hyperlipidemia     Carpal tunnel syndrome of left wrist     Medication monitoring encounter     DDD (degenerative disc disease), lumbar     Displacement of lumbar intervertebral disc without myelopathy     Therapeutic opioid induced constipation     Radiculopathy of cervical spine     Essential hypertension     Major depression, single episode, in complete remission ([facility]     Prediabetes     Lung nodule     Allergic sinusitis     Arthritis     Spinal stenosis of lumbar region without neurogenic claudication     Morbidly obese (HCC)     Lumbosacral spondylosis without myelopathy     Encounter for long-term opiate analgesic use     Occlusion of right carotid artery     Left carotid stenosis      ----Lynda Reveal

## 2022-03-24 ENCOUNTER — TELEMEDICINE (OUTPATIENT)
Dept: PAIN MANAGEMENT | Age: 62
End: 2022-03-24
Payer: COMMERCIAL

## 2022-03-24 DIAGNOSIS — M54.2 CERVICALGIA: ICD-10-CM

## 2022-03-24 DIAGNOSIS — Z79.891 ENCOUNTER FOR LONG-TERM OPIATE ANALGESIC USE: ICD-10-CM

## 2022-03-24 DIAGNOSIS — M48.061 SPINAL STENOSIS OF LUMBAR REGION WITHOUT NEUROGENIC CLAUDICATION: ICD-10-CM

## 2022-03-24 DIAGNOSIS — M48.02 CERVICAL STENOSIS OF SPINE: ICD-10-CM

## 2022-03-24 DIAGNOSIS — M51.26 DISPLACEMENT OF LUMBAR INTERVERTEBRAL DISC WITHOUT MYELOPATHY: ICD-10-CM

## 2022-03-24 DIAGNOSIS — Z51.81 MEDICATION MONITORING ENCOUNTER: Primary | ICD-10-CM

## 2022-03-24 DIAGNOSIS — M51.36 DDD (DEGENERATIVE DISC DISEASE), LUMBAR: ICD-10-CM

## 2022-03-24 DIAGNOSIS — M47.817 LUMBOSACRAL SPONDYLOSIS WITHOUT MYELOPATHY: ICD-10-CM

## 2022-03-24 PROCEDURE — 99213 OFFICE O/P EST LOW 20 MIN: CPT | Performed by: NURSE PRACTITIONER

## 2022-03-24 RX ORDER — GABAPENTIN 800 MG/1
800 TABLET ORAL EVERY 8 HOURS SCHEDULED
Qty: 90 TABLET | Refills: 2 | Status: SHIPPED | OUTPATIENT
Start: 2022-03-24 | End: 2022-09-06 | Stop reason: SDUPTHER

## 2022-03-24 RX ORDER — GABAPENTIN 800 MG/1
TABLET ORAL
COMMUNITY
Start: 2021-09-17 | End: 2022-03-24 | Stop reason: SDUPTHER

## 2022-03-24 RX ORDER — OXYCODONE HYDROCHLORIDE 5 MG/1
5 TABLET ORAL 2 TIMES DAILY PRN
Qty: 60 TABLET | Refills: 0 | Status: SHIPPED | OUTPATIENT
Start: 2022-03-26 | End: 2022-04-27 | Stop reason: SDUPTHER

## 2022-03-24 RX ORDER — AMLODIPINE BESYLATE 5 MG/1
TABLET ORAL
COMMUNITY
Start: 2021-09-16 | End: 2022-06-03

## 2022-03-24 RX ORDER — TIZANIDINE 4 MG/1
4 TABLET ORAL 2 TIMES DAILY PRN
Qty: 60 TABLET | Refills: 2 | Status: SHIPPED | OUTPATIENT
Start: 2022-03-24 | End: 2022-04-27 | Stop reason: SDUPTHER

## 2022-03-24 RX ORDER — FLUTICASONE PROPIONATE 50 MCG
1 SPRAY, SUSPENSION (ML) NASAL
COMMUNITY
Start: 2021-04-05 | End: 2022-09-14

## 2022-03-24 ASSESSMENT — ENCOUNTER SYMPTOMS
COUGH: 1
BACK PAIN: 1

## 2022-03-24 NOTE — PROGRESS NOTES
Chief Complaint   Patient presents with    Follow-up    Back Pain    Medication Refill     tizanidine roxicodone gabapentin      Neck pain    [facility]  Patient complains of neck pain that radiates down his left arm to fingertips. He has had this pain for many years and it is worsening over time. He had cervical spine surgery in 2012 and the pain has only worsened since that time. He saw Dr Aylin Calhoun with no surgery recommended for neck, but told could benefit from CTR. Patient followed up with Dr. Brittany Mendieta after his CT last August and cervical injections were suggested. He is not a good candidate for cervical injections due to previous neck surgery. He did see NS in 2016 for lumbar pain but no surgery recommended at that time. Diclofenac started by his rheumatologist. He is trying to lose weight. Has changed his diet. He has started smoking again.     Cervical MRI was ordered but patient could not complete due to claustrophobia. He did complete XR which shows postoperative and degenerative change cervical spine stable to previous.     He had SI RFA 11/4/21 and reports significant relief on the right side.     He did try PT for the neck - massage has helped but the stretching seems to be making the pain worse.      Started on meloxicam last visit and this is helping. Back Pain  This is a chronic problem. The current episode started more than 1 year ago. The problem occurs constantly. The problem is unchanged. The pain is present in the lumbar spine. The quality of the pain is described as aching. The pain does not radiate. The pain is at a severity of 5/10. The pain is moderate. The symptoms are aggravated by position and standing (walking). Associated symptoms include headaches. Pertinent negatives include no chest pain, fever or numbness. He has tried analgesics and bed rest for the symptoms. The treatment provided mild relief. Neck Pain   This is a chronic problem.  The current episode started more than 1 year ago. The problem occurs constantly. The problem has been unchanged. The pain is present in the left side, midline and right side. The quality of the pain is described as aching. The pain is at a severity of 5/10. The symptoms are aggravated by position and twisting. Associated symptoms include headaches. Pertinent negatives include no chest pain, fever or numbness. He has tried bed rest and oral narcotics for the symptoms. The treatment provided mild relief. Medication Refill: Roxicodone, tizanidine, gabapentin     Pain score Today:  6  Adverse effects (Constipation / Nausea / Sedation / sexual Dysfunction / others) :  no  Mood: fair to poor  Sleep pattern and quality: poor  Activity level: poor    Pill count Today:# 3  Last dose taken  03/24/2022  OARRS report reviewed today: yes  ER/Hospitalizations/PCP visit related to pain since last visit:no   Any legal problems e.g. DUI etc.:No  Satisfied with current management: Yes        Lab Results   Component Value Date    LABA1C 4.5 01/10/2019     Lab Results   Component Value Date     03/20/2018       Past Medical History, Past Surgical History, Social History, Allergies and Medications reviewed and updated in EPIC as indicated    Family History reviewed and is noncontributory. Patient denies any new neurological symptoms. No bowel or bladder incontinence, no weakness, and no falling. Pill count: appropriate due 3/26    Morphine equivalent: 15    Controlled Substance Monitoring:    Acute and Chronic Pain Monitoring:   RX Monitoring 3/24/2022   Attestation -   Acute Pain Prescriptions -   Periodic Controlled Substance Monitoring Possible medication side effects, risk of tolerance/dependence & alternative treatments discussed. ;No signs of potential drug abuse or diversion identified.;Obtaining appropriate analgesic effect of treatment.    Chronic Pain > 80 MEDD -           Past Medical History:   Diagnosis Date    Abnormal stress ECG     [facility]    PAIN MANAGEMENT PROCEDURE Right 2/19/2021    NERVE RADIOFREQUENCY ABLATION SI JOINT performed by Yogi Puckett MD at [address]         No Known Allergies      Current Outpatient Medications:     amLODIPine (NORVASC) 5 MG tablet, TAKE ONE TABLET BY MOUTH DAILY, Disp: 90 tablet, Rfl: 1    meloxicam (MOBIC) 15 MG tablet, Take 1 tablet by mouth daily as needed for Pain, Disp: 30 tablet, Rfl: 5    gabapentin (NEURONTIN) 800 MG tablet, Take 1 tablet by mouth every 8 hours for 30 days. , Disp: 90 tablet, Rfl: 2    oxyCODONE (ROXICODONE) 5 MG immediate release tablet, Take 1 tablet by mouth 2 times daily as needed for Pain for up to 30 days. , Disp: 60 tablet, Rfl: 0    tiZANidine (ZANAFLEX) 4 MG tablet, Take 1 tablet by mouth 2 times daily as needed (for muscle spasms), Disp: 60 tablet, Rfl: 2    budesonide-formoterol (SYMBICORT) 160-4.5 MCG/ACT AERO, INHALE TWO PUFFS BY MOUTH TWICE A DAY, Disp: 10.2 g, Rfl: 1    simvastatin (ZOCOR) 80 MG tablet, Take 1 tablet by mouth every evening, Disp: 30 tablet, Rfl: 11    DULoxetine (CYMBALTA) 60 MG extended release capsule, Take 60 mg by mouth, Disp: , Rfl:     acetaminophen (TYLENOL) 325 MG tablet, Take 650 mg by mouth nightly, Disp: , Rfl:     nitroGLYCERIN (NITROSTAT) 0.4 MG SL tablet, Place 1 tablet under the tongue every 5 minutes as needed for Chest pain, Disp: 25 tablet, Rfl: 3    albuterol sulfate  (90 Base) MCG/ACT inhaler, Inhale 2 puffs into the lungs every 6 hours as needed for Wheezing, Disp: 1 Inhaler, Rfl: 3    aspirin 81 MG EC tablet, Take 1 tablet by mouth daily, Disp: 30 tablet, Rfl: 3    Family History   Problem Relation Age of Onset    Heart Disease Mother     COPD Father     Cancer Maternal Aunt 64        breast cancer     Cancer Maternal Uncle 60        prostrate cancer        Social History     Socioeconomic History    Marital status: Single     Spouse name: Not on file  Number of children: Not on file    Years of education: Not on file    Highest education level: Not on file   Occupational History     Employer: N/A   Tobacco Use    Smoking status: Current Every Day Smoker     Packs/day: 0.25     Years: 40.00     Pack years: 10.00     Types: Cigarettes    Smokeless tobacco: Former User     Quit date: 3/12/2015    Tobacco comment: wants to discuss  chantix   Vaping Use    Vaping Use: Never used   Substance and Sexual Activity    Alcohol use: Not Currently     Alcohol/week: 0.0 standard drinks     Comment: 3 x year    Drug use: No    Sexual activity: Yes     Partners: Female   Other Topics Concern    Not on file   Social History Narrative    Not on file     Social Determinants of Health     Financial Resource Strain:     Difficulty of Paying Living Expenses: Not on file   Food Insecurity:     Worried About Running Out of Food in the Last Year: Not on file    Harpreet of Food in the Last Year: Not on file   Transportation Needs:     Lack of Transportation (Medical): Not on file    Lack of Transportation (Non-Medical):  Not on file   Physical Activity:     Days of Exercise per Week: Not on file    Minutes of Exercise per Session: Not on file   Stress:     Feeling of Stress : Not on file   Social Connections:     Frequency of Communication with Friends and Family: Not on file    Frequency of Social Gatherings with Friends and Family: Not on file    Attends Mormon Services: Not on file    Active Member of Clubs or Organizations: Not on file    Attends Club or Organization Meetings: Not on file    Marital Status: Not on file   Intimate Partner Violence:     Fear of Current or Ex-Partner: Not on file    Emotionally Abused: Not on file    Physically Abused: Not on file    Sexually Abused: Not on file   Housing Stability:     Unable to Pay for Housing in the Last Year: Not on file    Number of [place] in the Last Year: Not on file    Unstable Housing in the Last Year: Not on file       Review of Systems:  Review of Systems   Constitutional: Positive for chills and malaise/fatigue. Negative for fever. Cardiovascular: Negative for chest pain and palpitations. Respiratory: Positive for cough. Musculoskeletal: Positive for back pain, muscle cramps, neck pain and stiffness. Negative for falls and muscle weakness. Neurological: Positive for dizziness and headaches. Negative for disturbances in coordination, loss of balance and numbness. Physical Exam:  There were no vitals taken for this visit. Physical Exam  HENT:      Head: Normocephalic. Pulmonary:      Effort: Pulmonary effort is normal.   Musculoskeletal:         General: Normal range of motion. Cervical back: Normal range of motion. Tenderness present. Lumbar back: Tenderness present. Skin:     General: Skin is warm and dry. Neurological:      Mental Status: He is alert and oriented to person, place, and time.          Record/Diagnostics Review:    Last niru 12/2021 and was appropriate     Assessment:  Problem List Items Addressed This Visit     Cervical stenosis of spine    Cervicalgia    Medication monitoring encounter - Primary    DDD (degenerative disc disease), lumbar    Relevant Medications    oxyCODONE (ROXICODONE) 5 MG immediate release tablet (Start on 3/26/2022)    tiZANidine (ZANAFLEX) 4 MG tablet    Displacement of lumbar intervertebral disc without myelopathy    Relevant Medications    gabapentin (NEURONTIN) 800 MG tablet    oxyCODONE (ROXICODONE) 5 MG immediate release tablet (Start on 3/26/2022)    tiZANidine (ZANAFLEX) 4 MG tablet    Spinal stenosis of lumbar region without neurogenic claudication    Lumbosacral spondylosis without myelopathy    Relevant Medications    gabapentin (NEURONTIN) 800 MG tablet    oxyCODONE (ROXICODONE) 5 MG immediate release tablet (Start on 3/26/2022)    tiZANidine (ZANAFLEX) 4 MG tablet    Encounter for long-term opiate analgesic use Treatment Plan:  Patient relates current medications are helping the pain. Patient reports taking pain medications as prescribed, denies obtaining medications from different sources and denies use of illegal drugs. Patient denies side effects from medications like nausea, vomiting, constipation or drowsiness. Patient reports current activities of daily living are possible due to medications and would like to continue them. As always, we encourage daily stretching and strengthening exercises, and recommend minimizing use of pain medications unless patient cannot get through daily activities due to pain. Contract requirements met. Continue opioid therapy. Script written for oxycodone  Discussed different treatment options including continued conservative care such as physical therapy, chiropractic care, acupuncture. Discussed different interventional options such as epidural steroids or medial branch blocks. He would like to consider injection in a \"couple months\"  Also discussed surgical evaluation. He declines. Follow up appointment made for 4 weeks    Suman Cardenas, was evaluated through a synchronous (real-time) audio-video encounter. The patient (or guardian if applicable) is aware that this is a billable service, which includes applicable co-pays. This Virtual Visit was conducted with patient's (and/or legal guardian's) consent. The visit was conducted pursuant to the emergency declaration under the [facility], [address] [place] [address] waiver authority and the [employer] and [employer] General Act. Patient identification was verified, and a caregiver was present when appropriate. The patient was located at home in a state where the provider was licensed to provide care.        Total time spent for this encounter: Not billed by time    --JOSSUE Nicholson CNP on 3/24/2022 at 9:52 AM    An electronic signature was used to authenticate this note.

## 2022-04-11 DIAGNOSIS — I65.23 CAROTID ARTERY CALCIFICATION, BILATERAL: ICD-10-CM

## 2022-04-11 DIAGNOSIS — E78.2 MIXED HYPERLIPIDEMIA: ICD-10-CM

## 2022-04-11 RX ORDER — SIMVASTATIN 80 MG
TABLET ORAL
Qty: 90 TABLET | Refills: 3 | Status: SHIPPED | OUTPATIENT
Start: 2022-04-11

## 2022-04-18 ENCOUNTER — NURSE TRIAGE (OUTPATIENT)
Dept: OTHER | Facility: CLINIC | Age: 62
End: 2022-04-18

## 2022-04-18 NOTE — TELEPHONE ENCOUNTER
Received call from Maura Montoya at [facility] with [employer]. Subjective: Caller states \"Groin pain\"     Current Symptoms: Groin pain x 1 month and increase urine frequency. Onset: 1 month ago; gradual    Associated Symptoms: NA    Pain Severity: 5/10; dull; constant    Temperature: denies fever    What has been tried: None    LMP: NA Pregnant: NA    Recommended disposition: See in Office Today    Care advice provided, patient verbalizes understanding; denies any other questions or concerns; instructed to call back for any new or worsening symptoms. Patient/Caller agrees with recommended disposition; writer provided warm transfer to [place] at [facility] for appointment scheduling     Attention Provider: Thank you for allowing me to participate in the care of your patient. The patient was connected to triage in response to information provided to the ECC/PSC. Please do not respond through this encounter as the response is not directed to a shared pool.       Reason for Disposition   Urinating more frequently than usual (i.e., frequency)    Protocols used: URINARY SYMPTOMS-ADULT-[place]

## 2022-04-22 ENCOUNTER — OFFICE VISIT (OUTPATIENT)
Dept: FAMILY MEDICINE CLINIC | Age: 62
End: 2022-04-22
Payer: MEDICARE

## 2022-04-22 VITALS
HEIGHT: 69 IN | WEIGHT: 258.4 LBS | SYSTOLIC BLOOD PRESSURE: 104 MMHG | TEMPERATURE: 96.6 F | DIASTOLIC BLOOD PRESSURE: 54 MMHG | HEART RATE: 85 BPM | BODY MASS INDEX: 38.27 KG/M2

## 2022-04-22 DIAGNOSIS — E78.2 MIXED HYPERLIPIDEMIA: ICD-10-CM

## 2022-04-22 DIAGNOSIS — Z12.2 SCREENING FOR LUNG CANCER: ICD-10-CM

## 2022-04-22 DIAGNOSIS — F17.200 TOBACCO DEPENDENCY: ICD-10-CM

## 2022-04-22 DIAGNOSIS — R07.89 ATYPICAL CHEST PAIN: ICD-10-CM

## 2022-04-22 DIAGNOSIS — I10 ESSENTIAL HYPERTENSION: Primary | ICD-10-CM

## 2022-04-22 DIAGNOSIS — E66.01 MORBIDLY OBESE (HCC): ICD-10-CM

## 2022-04-22 DIAGNOSIS — Z12.11 SCREEN FOR COLON CANCER: ICD-10-CM

## 2022-04-22 DIAGNOSIS — R73.09 ELEVATED GLUCOSE LEVEL: ICD-10-CM

## 2022-04-22 DIAGNOSIS — J41.8 MIXED SIMPLE AND MUCOPURULENT CHRONIC BRONCHITIS (HCC): ICD-10-CM

## 2022-04-22 PROCEDURE — 99213 OFFICE O/P EST LOW 20 MIN: CPT | Performed by: FAMILY MEDICINE

## 2022-04-22 PROCEDURE — 3017F COLORECTAL CA SCREEN DOC REV: CPT | Performed by: FAMILY MEDICINE

## 2022-04-22 PROCEDURE — G8428 CUR MEDS NOT DOCUMENT: HCPCS | Performed by: FAMILY MEDICINE

## 2022-04-22 PROCEDURE — G8417 CALC BMI ABV UP PARAM F/U: HCPCS | Performed by: FAMILY MEDICINE

## 2022-04-22 PROCEDURE — 3023F SPIROM DOC REV: CPT | Performed by: FAMILY MEDICINE

## 2022-04-22 PROCEDURE — 83036 HEMOGLOBIN GLYCOSYLATED A1C: CPT | Performed by: FAMILY MEDICINE

## 2022-04-22 PROCEDURE — 4004F PT TOBACCO SCREEN RCVD TLK: CPT | Performed by: FAMILY MEDICINE

## 2022-04-22 RX ORDER — VARENICLINE TARTRATE 1 MG/1
1 TABLET, FILM COATED ORAL 2 TIMES DAILY
Qty: 60 TABLET | Refills: 2 | Status: SHIPPED | OUTPATIENT
Start: 2022-04-22 | End: 2022-09-14

## 2022-04-22 RX ORDER — AMLODIPINE BESYLATE 5 MG/1
TABLET ORAL
Qty: 90 TABLET | Refills: 3 | Status: SHIPPED | OUTPATIENT
Start: 2022-04-22

## 2022-04-22 RX ORDER — ALBUTEROL SULFATE 90 UG/1
2 AEROSOL, METERED RESPIRATORY (INHALATION) EVERY 6 HOURS PRN
Qty: 1 EACH | Refills: 5 | Status: SHIPPED | OUTPATIENT
Start: 2022-04-22

## 2022-04-22 RX ORDER — VARENICLINE TARTRATE 0.5 MG/1
.5-1 TABLET, FILM COATED ORAL SEE ADMIN INSTRUCTIONS
Qty: 57 TABLET | Refills: 0 | Status: SHIPPED | OUTPATIENT
Start: 2022-04-22 | End: 2022-09-14

## 2022-04-22 NOTE — PROGRESS NOTES
Visit Information    Have you changed or started any medications since your last visit including any over-the-counter medicines, vitamins, or herbal medicines? no   Have you stopped taking any of your medications? Is so, why? -  no  Are you having any side effects from any of your medications? - no    Have you seen any other physician or provider since your last visit? yes - Pain Management, PT   Have you had any other diagnostic tests since your last visit? yes - Labs, [place]   Have you been seen in the emergency room and/or had an admission in a hospital since we last saw you?  no   Have you had your routine dental cleaning in the past 6 months?  no     Do you have an active MyChart account? If no, what is the barrier?   Yes    Patient Care Team:  Tejinder Aguilar DO as PCP - General (Family Medicine)  Tejinder Aguilar DO as PCP - [facility] Provider  Pooja Meade MD as Consulting Physician (Gastroenterology)  Aylin Ramirez MD as Consulting Physician (Cardiology)  Lynn Tracey MD as Consulting Physician (Orthopedic Surgery)  Jordyn Hseih RN as Nurse Navigator    Medical History Review  Past Medical, Family, and Social History reviewed and does not contribute to the patient presenting condition    Health Maintenance   Topic Date Due    COVID-19 Vaccine (1) Never done    Depression Monitoring  Never done    DTaP/Tdap/Td vaccine (1 - Tdap) Never done    Pneumococcal 0-64 years Vaccine (2 - PCV) 04/25/2018    Annual Wellness Visit (AWV)  Never done    A1C test (Diabetic or Prediabetic)  01/10/2020    Lipids  11/17/2021    Potassium  11/17/2021    Creatinine  11/17/2021    Colorectal Cancer Screen  11/19/2024    Flu vaccine  Completed    Shingles Vaccine  Completed    Hepatitis C screen  Addressed    HIV screen  Addressed    Hepatitis A vaccine  Aged Out    Hepatitis B vaccine  Aged Out    Hib vaccine  Aged Out    Meningococcal (ACWY) vaccine  Aged Out

## 2022-04-22 NOTE — LETTER
[address]  [place] [address]  Phone: [phone]  Fax: [phone]    Josh Cornelia         April 22, 2022     Patient: Estefani Asencio   YOB: 1960   Date of Visit: 4/22/2022       To Whom It May Concern: It is my medical opinion that Mark Ellis requires a disability parking placard for the following reasons:  He cannot walk 200 feet without stopping to rest.  Duration of need: (5 years)    If you have any questions or concerns, please don't hesitate to call.     Sincerely,        Aj Carpenter, DO

## 2022-04-22 NOTE — PROGRESS NOTES
Check up  Discussed multiple problems:  Chronic Pain - issues;treatment; constant discomfort  Blood pressure - low normal  Tobacco quit plan    Needs 5 year Disability Placard update    Negative for:     Worry / mood complaints  Headache  Dizziness  Visual Disturbance  Hearing Changes  Nasal / sinus Symptoms  Mouth / tooth symptom, pain  Throat pain  Difficulty swallowing  Neck pain  Chest discomfort  Cough  SOB  N/V/D/C  Pelvic area discomfort  Bladder / voiding discomfort  Bowel complaints  MS complaints   Numbness/tingling/abnormal sensations   Edema / Leg swelling  Dizziness  Fatigue  Bleeding   Skin    Pertinent Pos: See HPI - above    Vitals:    04/22/22 1334   BP: (!) 104/54   Pulse: 85   Temp:        Alert and oriented to PPT  NAD    HEENT - neg  Chest  HRRR w/o murmer  LCTAB no wheezes / rhonchi  Abdomen - soft, non-tender, BS  Extremities - 0+ PTE    Gait / Station - stable, no dysequilibrium, uniform pace, no assist device, cane. POCT a1c - 6.5 (5.7)     Diagnosis Orders   1. Essential hypertension  Basic Metabolic Panel    Lipid Panel    Hepatic Function Panel    amLODIPine (NORVASC) 5 MG tablet   2. Morbidly obese (HCC)     3. Elevated glucose level  POCT glycosylated hemoglobin (Hb A1C)   4. Atypical chest pain  ECHO Complete 2D W Doppler W Color   5. Screening for lung cancer  Low Dose Chest CT -Abnormal Lung Screen Follow up   6. Screen for colon cancer  Fecal DNA Colorectal cancer screening (Cologuard)   7. Mixed hyperlipidemia  Lipid Panel   8. Mixed simple and mucopurulent chronic bronchitis (HCC)  albuterol sulfate  (90 Base) MCG/ACT inhaler   9.  Tobacco dependency  varenicline (CHANTIX) 0.5 MG tablet    varenicline (CHANTIX) 1 MG tablet       Plan:  1.)  Disability placard - 5 years  2.)  Labs  3.)  Echocardiogram  4.) Low Dose CT  5.) Quit tobacco discussion  6.) Concern for rising A1c - patient opts for no meds at this time    Martir in 6 m

## 2022-04-22 NOTE — PATIENT INSTRUCTIONS
Thank you for letting us take care of you today. We hope all your questions were addressed. If a question was overlooked or something else comes to mind after you return home, please contact a member of your Care Team listed below. Your Care Team at [place] [address] is Team #2  Inna Liu DO (Faculty)  Adrián Ramírez (Faculty)  Chucky Kuhn MD (Resident)  Jean Paul Vasquez MD (Resident)  Mai Solano MD (Resident)  Rashaun Barragan MD (Resident)  Konrad Spivey., JEFERSON Willard.,  BERTA Hardin., Patricia [facility] office)  Robert Alcazar, [address] (Clinical Practice Manager)  Joao Corona [facility] (Clinical Pharmacist)     Office phone number: [phone]    If you need to get in right away due to illness, please be advised we have \"Same Day\" appointments available Monday-Friday. Please call us at [phone] option #3 to schedule your \"Same Day\" appointment.

## 2022-04-27 ENCOUNTER — OFFICE VISIT (OUTPATIENT)
Dept: PAIN MANAGEMENT | Age: 62
End: 2022-04-27
Payer: MEDICARE

## 2022-04-27 VITALS — HEIGHT: 69 IN | WEIGHT: 266.2 LBS | BODY MASS INDEX: 39.43 KG/M2

## 2022-04-27 DIAGNOSIS — M51.36 DDD (DEGENERATIVE DISC DISEASE), LUMBAR: ICD-10-CM

## 2022-04-27 DIAGNOSIS — M47.817 LUMBOSACRAL SPONDYLOSIS WITHOUT MYELOPATHY: ICD-10-CM

## 2022-04-27 DIAGNOSIS — M54.2 CERVICALGIA: ICD-10-CM

## 2022-04-27 DIAGNOSIS — Z51.81 MEDICATION MONITORING ENCOUNTER: ICD-10-CM

## 2022-04-27 DIAGNOSIS — Z79.891 ENCOUNTER FOR LONG-TERM OPIATE ANALGESIC USE: ICD-10-CM

## 2022-04-27 DIAGNOSIS — M48.061 SPINAL STENOSIS OF LUMBAR REGION WITHOUT NEUROGENIC CLAUDICATION: Primary | ICD-10-CM

## 2022-04-27 DIAGNOSIS — M51.26 DISPLACEMENT OF LUMBAR INTERVERTEBRAL DISC WITHOUT MYELOPATHY: ICD-10-CM

## 2022-04-27 PROCEDURE — 99213 OFFICE O/P EST LOW 20 MIN: CPT | Performed by: NURSE PRACTITIONER

## 2022-04-27 RX ORDER — OXYCODONE HYDROCHLORIDE 5 MG/1
5 TABLET ORAL 2 TIMES DAILY PRN
Qty: 60 TABLET | Refills: 0 | Status: SHIPPED | OUTPATIENT
Start: 2022-04-27 | End: 2022-05-27

## 2022-04-27 RX ORDER — OXYCODONE HYDROCHLORIDE 5 MG/1
TABLET ORAL
COMMUNITY
End: 2022-09-14

## 2022-04-27 RX ORDER — TIZANIDINE HYDROCHLORIDE 4 MG/1
CAPSULE, GELATIN COATED ORAL
COMMUNITY
End: 2022-09-06 | Stop reason: SDUPTHER

## 2022-04-27 RX ORDER — TIZANIDINE 4 MG/1
4 TABLET ORAL 2 TIMES DAILY PRN
Qty: 60 TABLET | Refills: 2 | Status: SHIPPED | OUTPATIENT
Start: 2022-04-27 | End: 2022-05-27

## 2022-04-27 ASSESSMENT — ENCOUNTER SYMPTOMS
BACK PAIN: 1
COUGH: 0
ABDOMINAL PAIN: 1
BOWEL INCONTINENCE: 0
SHORTNESS OF BREATH: 0
TROUBLE SWALLOWING: 1
CONSTIPATION: 0
VISUAL CHANGE: 0

## 2022-04-27 NOTE — PROGRESS NOTES
Chief Complaint   Patient presents with    Medication Refill    Back Pain    Neck Pain         [facility]  Patient complains of neck pain that radiates down his left arm to fingertips. He has had this pain for many years and it is worsening over time. He had cervical spine surgery in 2012 and the pain has only worsened since that time. He saw Dr Elmer Lawrence with no surgery recommended for neck, but told could benefit from CTR. Patient followed up with Dr. [place] after his CT last August and cervical injections were suggested. He is not a good candidate for cervical injections due to previous neck surgery. He did see NS in 2016 for lumbar pain but no surgery recommended at that time. Diclofenac started by his rheumatologist. He is trying to lose weight. Has changed his diet.     He has started smoking again.     Cervical MRI was ordered but patient could not complete due to claustrophobia. He did complete XR which shows postoperative and degenerative change cervical spine stable to previous.     He had SI RFA 11/4/21 and reports significant relief on the right side.     He did try PT for the neck - massage has helped but the stretching seems to be making the pain worse.      He has been sick recently - GI symptoms and difficulty urinating. He saw his PCP and lab work CT chest pending.      Back Pain  This is a chronic problem. The current episode started more than 1 year ago. The problem occurs constantly. The problem has been gradually worsening since onset. The pain is present in the lumbar spine and gluteal. The quality of the pain is described as aching, burning, cramping, shooting and stabbing. The pain radiates to the left thigh, left knee, left foot, right knee and right thigh. The pain is at a severity of 6/10. The pain is moderate. The pain is worse during the night. The symptoms are aggravated by bending, coughing, lying down, position, standing, sitting, stress and twisting. Stiffness is present all day. Associated symptoms include abdominal pain, chest pain, leg pain, numbness, pelvic pain, tingling and weakness. Pertinent negatives include no bladder incontinence, bowel incontinence, fever, headaches or weight loss. Risk factors include obesity. He has tried chiropractic manipulation, heat, ice, bed rest, walking, NSAIDs, muscle relaxant and home exercises for the symptoms. The treatment provided mild relief. Neck Pain   This is a chronic problem. The current episode started more than 1 year ago. The problem occurs constantly. The problem has been gradually worsening. The pain is associated with a fall. The pain is present in the midline. The quality of the pain is described as stabbing, shooting, cramping, burning and aching. The pain is at a severity of 6/10. The pain is moderate. The symptoms are aggravated by coughing, sneezing, bending, position, twisting, swallowing and stress. The pain is worse during the day. Stiffness is present all day. Associated symptoms include chest pain, leg pain, numbness, tingling, trouble swallowing and weakness. Pertinent negatives include no fever, headaches, pain with swallowing, visual change or weight loss. He has tried acetaminophen, heat, ice, chiropractic manipulation, bed rest, muscle relaxants, home exercises, NSAIDs and oral narcotics for the symptoms. The treatment provided mild relief. Patient denies any new neurological symptoms. No bowel or bladder incontinence, no weakness, and no falling. Pill count: appropriate due 4/25    Morphine equivalent: 15    Controlled Substance Monitoring:    Acute and Chronic Pain Monitoring:   RX Monitoring 4/27/2022   Attestation -   Acute Pain Prescriptions -   Periodic Controlled Substance Monitoring Possible medication side effects, risk of tolerance/dependence & alternative treatments discussed. ;No signs of potential drug abuse or diversion identified.;Obtaining appropriate analgesic effect of treatment.    Chronic Pain > [de-identified] MEDD -         Past Medical History:   Diagnosis Date    Abnormal stress ECG     Angina pectoris (HCC)     CAD (coronary artery disease)     Carpal tunnel syndrome     left    Cervical stenosis of spine     Cervicalgia     chronic pain    Chronic back pain     COPD (chronic obstructive pulmonary disease) (HCC)     DDD (degenerative disc disease)     Elbow fracture, right     Fractures     elbow    GERD (gastroesophageal reflux disease)     Gout     Right great toe    Hyperlipidemia     Hypertension     Mild depression ([address] 9/26/2013    2 suicide attempts by girlfriend related to depression.     Morbidly obese ([address] 10/30/2020    Occlusion of right carotid artery 7/14/2021    Osteoarthritis of right knee     Sinus headache 10/10/2018    Sleep apnea     Sleep disturbance     Smoker     Tendonitis of foot     right       Past Surgical History:   Procedure Laterality Date    CARDIAC CATHETERIZATION  7/8/14    Non Obstructive CAD     CERVICAL SPINE SURGERY  7/13/12     C2-3-4-5    COLONOSCOPY      FRACTURE SURGERY      Rt elbow     HC INJECT OTHER PERPHRL NERV Bilateral 5/9/2019    INJECTION SPINAL performed by Pat Obando MD at [address] [place] [address] Bilateral 8/15/2019    SACROILIAC JOINT INJECTION performed by Pat [facility]bando MD at [facility]  2/5/16    premier tens    NERVE BLOCK Left 02/07/2020    RFA left side    NERVE BLOCK Left 02/07/2020     NERVE RADIOFREQUENCY ABLATION - SACROILIAC (Left )    NERVE BLOCK  02/12/2021    NERVE RADIOFREQUENCY ABLATION SI (Left     NERVE BLOCK Right 02/19/2021    Procedure: NERVE RADIOFREQUENCY ABLATION SI JOINT (Right )    OTHER SURGICAL HISTORY  08/15/2019    sacroiliac joint injection    PAIN MANAGEMENT PROCEDURE Left 2/7/2020    NERVE RADIOFREQUENCY ABLATION - SACROILIAC performed by Pat Obando MD at [address] [place] [address] 2/12/2021    NERVE RADIOFREQUENCY ABLATION SI performed by Jarred Bautista MD at [facility] 2/19/2021    NERVE RADIOFREQUENCY ABLATION SI JOINT performed by Jarred Bautista MD at [address] [place] [address]         No Known Allergies      Current Outpatient Medications:     oxyCODONE (ROXICODONE) 5 MG immediate release tablet, 1 tablet as needed, Disp: , Rfl:     tiZANidine (ZANAFLEX) 4 MG capsule, 1 tablet as needed, Disp: , Rfl:     amLODIPine (NORVASC) 5 MG tablet, TAKE ONE TABLET BY MOUTH DAILY, Disp: 90 tablet, Rfl: 3    albuterol sulfate  (90 Base) MCG/ACT inhaler, Inhale 2 puffs into the lungs every 6 hours as needed for Wheezing, Disp: 1 each, Rfl: 5    varenicline (CHANTIX) 0.5 MG tablet, Take 1-2 tablets by mouth See Admin Instructions 0.5mg DAILY for 3 days followed by 0.5mg TWICE DAILY for 4 days followed by 1mg TWICE DAILY, Disp: 57 tablet, Rfl: 0    varenicline (CHANTIX) 1 MG tablet, Take 1 tablet by mouth 2 times daily, Disp: 60 tablet, Rfl: 2    simvastatin (ZOCOR) 80 MG tablet, TAKE ONE TABLET BY MOUTH EVERY EVENING, Disp: 90 tablet, Rfl: 3    amLODIPine (NORVASC) 5 MG tablet, , Disp: , Rfl:     fluticasone (FLONASE) 50 MCG/ACT nasal spray, 1 spray, Disp: , Rfl:     gabapentin (NEURONTIN) 800 MG tablet, Take 1 tablet by mouth every 8 hours for 30 days. , Disp: 90 tablet, Rfl: 2    meloxicam (MOBIC) 15 MG tablet, Take 1 tablet by mouth daily as needed for Pain, Disp: 30 tablet, Rfl: 5    budesonide-formoterol (SYMBICORT) 160-4.5 MCG/ACT AERO, INHALE TWO PUFFS BY MOUTH TWICE A DAY, Disp: 10.2 g, Rfl: 1    DULoxetine (CYMBALTA) 60 MG extended release capsule, Take 60 mg by mouth, Disp: , Rfl:     acetaminophen (TYLENOL) 325 MG tablet, Take 650 mg by mouth nightly, Disp: , Rfl:     nitroGLYCERIN (NITROSTAT) 0.4 MG SL tablet, Place 1 tablet under the tongue every 5 minutes as needed for Chest pain, Disp: 25 tablet, Rfl: 3    aspirin 81 MG EC tablet, Take 1 tablet by mouth daily, Disp: 30 tablet, Rfl: 3    Family History   Problem Relation Age of Onset    Heart Disease Mother     COPD Father     Cancer Maternal Aunt 64        breast cancer     Cancer Maternal Uncle 60        prostrate cancer        Social History     Socioeconomic History    Marital status: Single     Spouse name: Not on file    Number of children: Not on file    Years of education: Not on file    Highest education level: Not on file   Occupational History     Employer: N/A   Tobacco Use    Smoking status: Current Every Day Smoker     Packs/day: 0.25     Years: 40.00     Pack years: 10.00     Types: Cigarettes    Smokeless tobacco: Former User     Quit date: 3/12/2015    Tobacco comment: wants to discuss  chantix   Vaping Use    Vaping Use: Never used   Substance and Sexual Activity    Alcohol use: Not Currently     Alcohol/week: 0.0 standard drinks     Comment: 3 x year    Drug use: No    Sexual activity: Yes     Partners: Female   Other Topics Concern    Not on file   Social History Narrative    Not on file     Social Determinants of Health     Financial Resource Strain:     Difficulty of Paying Living Expenses: Not on file   Food Insecurity:     Worried About Running Out of Food in the Last Year: Not on file    Harpreet of Food in the Last Year: Not on file   Transportation Needs:     Lack of Transportation (Medical): Not on file    Lack of Transportation (Non-Medical):  Not on file   Physical Activity:     Days of Exercise per Week: Not on file    Minutes of Exercise per Session: Not on file   Stress:     Feeling of Stress : Not on file   Social Connections:     Frequency of Communication with Friends and Family: Not on file    Frequency of Social Gatherings with Friends and Family: Not on file    Attends Alevism Services: Not on file    Active Member of Clubs or Organizations: Not on file    Attends Club or 4 MG capsule    Displacement of lumbar intervertebral disc without myelopathy    Relevant Medications    oxyCODONE (ROXICODONE) 5 MG immediate release tablet    tiZANidine (ZANAFLEX) 4 MG tablet    Other Relevant Orders    DRUG SCREEN, PAIN    Spinal stenosis of lumbar region without neurogenic claudication - Primary    Lumbosacral spondylosis without myelopathy    Relevant Medications    oxyCODONE (ROXICODONE) 5 MG immediate release tablet    tiZANidine (ZANAFLEX) 4 MG tablet    oxyCODONE (ROXICODONE) 5 MG immediate release tablet    tiZANidine (ZANAFLEX) 4 MG capsule    Other Relevant Orders    DRUG SCREEN, PAIN    Encounter for long-term opiate analgesic use             Treatment Plan:  Patient relates current medications are helping the pain. Patient reports taking pain medications as prescribed, denies obtaining medications from different sources and denies use of illegal drugs. Patient denies side effects from medications like nausea, vomiting, constipation or drowsiness. Patient reports current activities of daily living are possible due to medications and would like to continue them. As always, we encourage daily stretching and strengthening exercises, and recommend minimizing use of pain medications unless patient cannot get through daily activities due to pain. Contract requirements met. Continue opioid therapy. Script written for oxycodone  Consider updated MRI lumbar   He has been ill recently with GI symptoms and difficulty urinating  PCP has ordered some labs and CT chest  Continues to follow with vascular - will have CTA in June. He has discussed intervention for carotid disease - \"possible stenting\". Pt states he was told interventions will be complicated due to his previous neck surgery - unable to extend.   UDS for standard monitoring purposes  Follow up appointment made for 4 weeks    I have reviewed the chief complaint and history of present illness (including ROS and PFSH) and vital documentation by my staff and I agree with their documentation and have added where applicable.

## 2022-04-27 NOTE — PROGRESS NOTES
Pt unable to urinate. Did offer to have him complete serum screen today. He declined. Informed patient that we will not be able to continue to write for opiates without completion of requested UDS or serum screen per his medication contract. He verbalizes understanding and leaves the clinic.

## 2022-05-04 ENCOUNTER — TELEPHONE (OUTPATIENT)
Dept: FAMILY MEDICINE CLINIC | Age: 62
End: 2022-05-04

## 2022-05-04 ENCOUNTER — TELEPHONE (OUTPATIENT)
Dept: SURGERY | Age: 62
End: 2022-05-04

## 2022-05-04 DIAGNOSIS — Z87.891 PERSONAL HISTORY OF TOBACCO USE, PRESENTING HAZARDS TO HEALTH: Primary | ICD-10-CM

## 2022-05-04 NOTE — TELEPHONE ENCOUNTER
PA has been submitted for Varenicline 0.5 tab  Insurance approved until 12/31/2022  [facility]

## 2022-05-05 ENCOUNTER — TELEPHONE (OUTPATIENT)
Dept: FAMILY MEDICINE CLINIC | Age: 62
End: 2022-05-05

## 2022-05-05 DIAGNOSIS — R35.0 URINE FREQUENCY: Primary | ICD-10-CM

## 2022-05-05 NOTE — TELEPHONE ENCOUNTER
Patient requesting urine culture/ UA due to frequent feeling of needing to void with little output and urine has \"a different\" smell.      Orders pending

## 2022-05-09 ENCOUNTER — TELEPHONE (OUTPATIENT)
Dept: ONCOLOGY | Age: 62
End: 2022-05-09

## 2022-05-09 DIAGNOSIS — Z87.891 PERSONAL HISTORY OF NICOTINE DEPENDENCE: Primary | ICD-10-CM

## 2022-05-09 NOTE — TELEPHONE ENCOUNTER
Order received for ct lung screening does not contain all CMS required data. Smoking history on order did not meet criteria, 0.25 pack per day for 40 years =10 pack year. Upon review of chart patient has pack history of 1.5 pack per day for 39 years before dropping down to the 0.25 ppd. New ordered pended, please sign. Patient is currently scheduled for 5/16/22.   Thank you

## 2022-05-10 ENCOUNTER — HOSPITAL ENCOUNTER (OUTPATIENT)
Age: 62
Discharge: HOME | End: 2022-05-10
Payer: MEDICARE

## 2022-05-10 ENCOUNTER — TELEPHONE (OUTPATIENT)
Dept: FAMILY MEDICINE CLINIC | Age: 62
End: 2022-05-10

## 2022-05-10 ENCOUNTER — CLINICAL DOCUMENTATION (OUTPATIENT)
Dept: FAMILY MEDICINE CLINIC | Age: 62
End: 2022-05-10

## 2022-05-10 DIAGNOSIS — M47.817 LUMBOSACRAL SPONDYLOSIS WITHOUT MYELOPATHY: ICD-10-CM

## 2022-05-10 DIAGNOSIS — I10 ESSENTIAL HYPERTENSION: ICD-10-CM

## 2022-05-10 DIAGNOSIS — N30.00 ACUTE CYSTITIS WITHOUT HEMATURIA: ICD-10-CM

## 2022-05-10 DIAGNOSIS — M51.26 DISPLACEMENT OF LUMBAR INTERVERTEBRAL DISC WITHOUT MYELOPATHY: ICD-10-CM

## 2022-05-10 DIAGNOSIS — R35.0 URINE FREQUENCY: Primary | ICD-10-CM

## 2022-05-10 DIAGNOSIS — R35.0 URINE FREQUENCY: ICD-10-CM

## 2022-05-10 DIAGNOSIS — E78.2 MIXED HYPERLIPIDEMIA: ICD-10-CM

## 2022-05-10 LAB
-: ABNORMAL
ALBUMIN SERPL-MCNC: 4.6 G/DL (ref 3.5–5.2)
ALP BLD-CCNC: 99 U/L (ref 40–129)
ALT SERPL-CCNC: 30 U/L (ref 5–41)
ANION GAP SERPL CALCULATED.3IONS-SCNC: 12 MMOL/L (ref 9–17)
AST SERPL-CCNC: 20 U/L
BACTERIA: ABNORMAL
BILIRUB SERPL-MCNC: 0.41 MG/DL (ref 0.3–1.2)
BILIRUBIN DIRECT: 0.11 MG/DL
BILIRUBIN URINE: NEGATIVE
BILIRUBIN, INDIRECT: 0.3 MG/DL (ref 0–1)
BUN BLDV-MCNC: 13 MG/DL (ref 8–23)
CALCIUM SERPL-MCNC: 9.8 MG/DL (ref 8.6–10.4)
CHLORIDE BLD-SCNC: 101 MMOL/L (ref 98–107)
CHOLESTEROL/HDL RATIO: 7.3
CHOLESTEROL: 262 MG/DL
CO2: 26 MMOL/L (ref 20–31)
COLOR: YELLOW
CREAT SERPL-MCNC: 0.89 MG/DL (ref 0.7–1.2)
EPITHELIAL CELLS UA: ABNORMAL /HPF
GFR AFRICAN AMERICAN: >60 ML/MIN
GFR NON-AFRICAN AMERICAN: >60 ML/MIN
GFR SERPL CREATININE-BSD FRML MDRD: ABNORMAL ML/MIN/{1.73_M2}
GLUCOSE BLD-MCNC: 119 MG/DL (ref 70–99)
GLUCOSE URINE: NEGATIVE
HDLC SERPL-MCNC: 36 MG/DL
KETONES, URINE: NEGATIVE
LDL CHOLESTEROL: 179 MG/DL (ref 0–130)
LEUKOCYTE ESTERASE, URINE: ABNORMAL
NITRITE, URINE: POSITIVE
PH UA: 6 (ref 5–8)
POTASSIUM SERPL-SCNC: 4.3 MMOL/L (ref 3.7–5.3)
PROTEIN UA: NEGATIVE
RBC UA: ABNORMAL /HPF
SODIUM BLD-SCNC: 139 MMOL/L (ref 135–144)
SPECIFIC GRAVITY UA: 1.02 (ref 1–1.03)
TOTAL PROTEIN: 7.3 G/DL (ref 6.4–8.3)
TRIGL SERPL-MCNC: 237 MG/DL
TURBIDITY: ABNORMAL
URINE HGB: NEGATIVE
UROBILINOGEN, URINE: NORMAL
WBC UA: ABNORMAL /HPF

## 2022-05-10 PROCEDURE — 87077 CULTURE AEROBIC IDENTIFY: CPT

## 2022-05-10 PROCEDURE — 80307 DRUG TEST PRSMV CHEM ANLYZR: CPT

## 2022-05-10 PROCEDURE — 36415 COLL VENOUS BLD VENIPUNCTURE: CPT

## 2022-05-10 PROCEDURE — 80061 LIPID PANEL: CPT

## 2022-05-10 PROCEDURE — 87086 URINE CULTURE/COLONY COUNT: CPT

## 2022-05-10 PROCEDURE — 87186 SC STD MICRODIL/AGAR DIL: CPT

## 2022-05-10 PROCEDURE — 81001 URINALYSIS AUTO W/SCOPE: CPT

## 2022-05-10 PROCEDURE — 80076 HEPATIC FUNCTION PANEL: CPT

## 2022-05-10 PROCEDURE — 80048 BASIC METABOLIC PNL TOTAL CA: CPT

## 2022-05-10 RX ORDER — SULFAMETHOXAZOLE AND TRIMETHOPRIM 400; 80 MG/1; MG/1
1 TABLET ORAL DAILY
Qty: 3 TABLET | Refills: 0 | Status: SHIPPED | OUTPATIENT
Start: 2022-05-10 | End: 2022-06-03 | Stop reason: SDUPTHER

## 2022-05-10 NOTE — TELEPHONE ENCOUNTER
Left message for patient informing him of his urine results and that a script was sent to his pharmacy.

## 2022-05-10 NOTE — TELEPHONE ENCOUNTER
----- Message from Gudelia Christensen DO sent at 5/10/2022  2:00 PM EDT -----  Positive urine sample showing UTI. ATB ordered to patient OP pharmacy for 3 days. Please advise Lane to proceed with ATB as ordered.

## 2022-05-10 NOTE — PROGRESS NOTES
No chief complaint on file. Labs - returned showing UTI     There were no vitals taken for this visit. Office to notify patient of need for ATB. Review of Systems      Physical Exam      ASSESSMENT AND PLAN      Diagnosis Orders   1. Urine frequency  sulfamethoxazole-trimethoprim (BACTRIM) 400-80 MG per tablet   2.  Acute cystitis without hematuria  sulfamethoxazole-trimethoprim (BACTRIM) 400-80 MG per tablet         Electronicallysigned by Juanita Hay DO on 5/10/2022 at 1:57 PM

## 2022-05-11 LAB
CULTURE: ABNORMAL
SPECIMEN DESCRIPTION: ABNORMAL

## 2022-05-15 LAB
6-ACETYLMORPHINE, UR: NOT DETECTED
7-AMINOCLONAZEPAM, URINE: NOT DETECTED
ALPHA-OH-ALPRAZ, URINE: NOT DETECTED
ALPHA-OH-MIDAZOLAM, URINE: NOT DETECTED
ALPRAZOLAM, URINE: NOT DETECTED
AMPHETAMINES, URINE: NOT DETECTED
BARBITURATES, URINE: NOT DETECTED
BENZOYLECGONINE, UR: NOT DETECTED
BUPRENORPHINE URINE: NOT DETECTED
CARISOPRODOL, UR: NOT DETECTED
CLONAZEPAM, URINE: NOT DETECTED
CODEINE, URINE: NOT DETECTED
CREATININE URINE: 114.4 MG/DL (ref 20–400)
DIAZEPAM, URINE: NOT DETECTED
DRUGS EXPECTED, UR: NORMAL
EER HI RES INTERP UR: NORMAL
ETHYL GLUCURONIDE UR: NOT DETECTED
FENTANYL URINE: NOT DETECTED
GABAPENTIN: PRESENT
HYDROCODONE, URINE: NOT DETECTED
HYDROMORPHONE, URINE: NOT DETECTED
LORAZEPAM, URINE: NOT DETECTED
MARIJUANA METAB, UR: NOT DETECTED
MDA, UR: NOT DETECTED
MDEA, EVE, UR: NOT DETECTED
MDMA URINE: NOT DETECTED
MEPERIDINE METAB, UR: NOT DETECTED
METHADONE, URINE: NOT DETECTED
METHAMPHETAMINE, URINE: NOT DETECTED
METHYLPHENIDATE: NOT DETECTED
MIDAZOLAM, URINE: NOT DETECTED
MORPHINE URINE: NOT DETECTED
NALOXONE URINE: NOT DETECTED
NORBUPRENORPHINE, URINE: NOT DETECTED
NORDIAZEPAM, URINE: NOT DETECTED
NORFENTANYL, URINE: NOT DETECTED
NORHYDROCODONE, URINE: NOT DETECTED
NOROXYCODONE, URINE: PRESENT
NOROXYMORPHONE, URINE: PRESENT
OXAZEPAM, URINE: NOT DETECTED
OXYCODONE URINE: PRESENT
OXYMORPHONE, URINE: PRESENT
PAIN MANAGEMENT DRUG PANEL INTERP, URINE: NORMAL
PAIN MGT DRUG PANEL, HI RES, UR: NORMAL
PCP,URINE: NOT DETECTED
PHENTERMINE, UR: NOT DETECTED
PREGABALIN: NOT DETECTED
TAPENTADOL, URINE: NOT DETECTED
TAPENTADOL-O-SULFATE, URINE: NOT DETECTED
TEMAZEPAM, URINE: NOT DETECTED
TRAMADOL, URINE: NOT DETECTED
ZOLPIDEM METABOLITE (ZCA), URINE: NOT DETECTED
ZOLPIDEM, URINE: NOT DETECTED

## 2022-05-17 ENCOUNTER — HOSPITAL ENCOUNTER (OUTPATIENT)
Dept: CT IMAGING | Age: 62
Discharge: HOME | End: 2022-05-19
Payer: MEDICARE

## 2022-05-17 ENCOUNTER — HOSPITAL ENCOUNTER (OUTPATIENT)
Dept: NON INVASIVE DIAGNOSTICS | Age: 62
Discharge: HOME | End: 2022-05-17
Payer: MEDICARE

## 2022-05-17 DIAGNOSIS — Z87.891 PERSONAL HISTORY OF NICOTINE DEPENDENCE: ICD-10-CM

## 2022-05-17 DIAGNOSIS — R07.89 ATYPICAL CHEST PAIN: ICD-10-CM

## 2022-05-17 LAB
LV EF: 55 %
LVEF MODALITY: NORMAL

## 2022-05-17 PROCEDURE — 93306 TTE W/DOPPLER COMPLETE: CPT

## 2022-05-17 PROCEDURE — 71271 CT THORAX LUNG CANCER SCR C-: CPT

## 2022-06-03 ENCOUNTER — OFFICE VISIT (OUTPATIENT)
Dept: FAMILY MEDICINE CLINIC | Age: 62
End: 2022-06-03
Payer: MEDICARE

## 2022-06-03 VITALS
WEIGHT: 256 LBS | HEART RATE: 105 BPM | BODY MASS INDEX: 38.8 KG/M2 | OXYGEN SATURATION: 96 % | HEIGHT: 68 IN | SYSTOLIC BLOOD PRESSURE: 139 MMHG | DIASTOLIC BLOOD PRESSURE: 73 MMHG

## 2022-06-03 DIAGNOSIS — M46.1 SACROILIITIS, NOT ELSEWHERE CLASSIFIED (HCC): ICD-10-CM

## 2022-06-03 DIAGNOSIS — N40.1 BENIGN PROSTATIC HYPERPLASIA WITH LOWER URINARY TRACT SYMPTOMS, SYMPTOM DETAILS UNSPECIFIED: ICD-10-CM

## 2022-06-03 DIAGNOSIS — N40.0 PROSTATISM: ICD-10-CM

## 2022-06-03 DIAGNOSIS — F32.5 MAJOR DEPRESSION, SINGLE EPISODE, IN COMPLETE REMISSION (HCC): ICD-10-CM

## 2022-06-03 DIAGNOSIS — N30.00 ACUTE CYSTITIS WITHOUT HEMATURIA: ICD-10-CM

## 2022-06-03 DIAGNOSIS — I10 ESSENTIAL HYPERTENSION: ICD-10-CM

## 2022-06-03 DIAGNOSIS — R35.0 URINE FREQUENCY: ICD-10-CM

## 2022-06-03 DIAGNOSIS — N41.0 ACUTE PROSTATITIS WITHOUT HEMATURIA: ICD-10-CM

## 2022-06-03 DIAGNOSIS — I20.8 ANGINA OF EFFORT (HCC): Primary | ICD-10-CM

## 2022-06-03 PROCEDURE — 4004F PT TOBACCO SCREEN RCVD TLK: CPT | Performed by: FAMILY MEDICINE

## 2022-06-03 PROCEDURE — G8427 DOCREV CUR MEDS BY ELIG CLIN: HCPCS | Performed by: FAMILY MEDICINE

## 2022-06-03 PROCEDURE — 3017F COLORECTAL CA SCREEN DOC REV: CPT | Performed by: FAMILY MEDICINE

## 2022-06-03 PROCEDURE — G8417 CALC BMI ABV UP PARAM F/U: HCPCS | Performed by: FAMILY MEDICINE

## 2022-06-03 PROCEDURE — 99213 OFFICE O/P EST LOW 20 MIN: CPT | Performed by: FAMILY MEDICINE

## 2022-06-03 RX ORDER — NITROGLYCERIN 0.4 MG/1
0.4 TABLET SUBLINGUAL EVERY 5 MIN PRN
Qty: 25 TABLET | Refills: 3 | Status: SHIPPED | OUTPATIENT
Start: 2022-06-03

## 2022-06-03 RX ORDER — SULFAMETHOXAZOLE AND TRIMETHOPRIM 400; 80 MG/1; MG/1
1 TABLET ORAL 2 TIMES DAILY
Qty: 14 TABLET | Refills: 1 | Status: SHIPPED | OUTPATIENT
Start: 2022-06-03 | End: 2022-06-17

## 2022-06-03 SDOH — ECONOMIC STABILITY: FOOD INSECURITY: WITHIN THE PAST 12 MONTHS, YOU WORRIED THAT YOUR FOOD WOULD RUN OUT BEFORE YOU GOT MONEY TO BUY MORE.: NEVER TRUE

## 2022-06-03 SDOH — ECONOMIC STABILITY: FOOD INSECURITY: WITHIN THE PAST 12 MONTHS, THE FOOD YOU BOUGHT JUST DIDN'T LAST AND YOU DIDN'T HAVE MONEY TO GET MORE.: NEVER TRUE

## 2022-06-03 ASSESSMENT — PATIENT HEALTH QUESTIONNAIRE - PHQ9
10. IF YOU CHECKED OFF ANY PROBLEMS, HOW DIFFICULT HAVE THESE PROBLEMS MADE IT FOR YOU TO DO YOUR WORK, TAKE CARE OF THINGS AT HOME, OR GET ALONG WITH OTHER PEOPLE: 2
6. FEELING BAD ABOUT YOURSELF - OR THAT YOU ARE A FAILURE OR HAVE LET YOURSELF OR YOUR FAMILY DOWN: 1
5. POOR APPETITE OR OVEREATING: 0
2. FEELING DOWN, DEPRESSED OR HOPELESS: 1
SUM OF ALL RESPONSES TO PHQ QUESTIONS 1-9: 8
SUM OF ALL RESPONSES TO PHQ QUESTIONS 1-9: 8
7. TROUBLE CONCENTRATING ON THINGS, SUCH AS READING THE NEWSPAPER OR WATCHING TELEVISION: 0
4. FEELING TIRED OR HAVING LITTLE ENERGY: 3
SUM OF ALL RESPONSES TO PHQ QUESTIONS 1-9: 8
9. THOUGHTS THAT YOU WOULD BE BETTER OFF DEAD, OR OF HURTING YOURSELF: 0
8. MOVING OR SPEAKING SO SLOWLY THAT OTHER PEOPLE COULD HAVE NOTICED. OR THE OPPOSITE, BEING SO FIGETY OR RESTLESS THAT YOU HAVE BEEN MOVING AROUND A LOT MORE THAN USUAL: 0
3. TROUBLE FALLING OR STAYING ASLEEP: 3
SUM OF ALL RESPONSES TO PHQ QUESTIONS 1-9: 8

## 2022-06-03 ASSESSMENT — SOCIAL DETERMINANTS OF HEALTH (SDOH): HOW HARD IS IT FOR YOU TO PAY FOR THE VERY BASICS LIKE FOOD, HOUSING, MEDICAL CARE, AND HEATING?: NOT HARD AT ALL

## 2022-06-03 NOTE — PROGRESS NOTES
Results and Urinary Tract Infection (Seems to be coming back: )    CT Lung screen - Neg  Tobacco discussed - 8 days   Patient reports aren pena     /73   Pulse (!) 105   Ht 5' 8\" (1.727 m)   Wt 256 lb (116.1 kg)   SpO2 96%   BMI 38.92 kg/m²       Review of Systems    Negative for:     Worry / mood complaints  Headache  Dizziness  Visual Disturbance  Hearing Changes  Nasal / sinus Symptoms  Mouth / tooth symptom, pain  Throat pain  Difficulty swallowing  Neck pain  Chest discomfort  Cough  SOB  Abdominal area discomfort / pain  N/V/D/C  Pelvic area discomfort  Bladder / voiding discomfort  Bowel complaints  MS complaints   Numbness/tingling/abnormal sensations   Edema / Leg swelling  Dizziness  Fatigue  Bleeding   Skin    Pertinent Pos: See HPI - neg    Alert and oriented to PPT  NAD    HEENT - neg  Neck - no bruits, no lymphadenopathy  Chest  HRRR w/o murmer  LCTAB no wheezes / rhonchi  Abdomen - soft, non-tender, BS  Extremities - 0+ PTE    Gait / Station - stable, no dysequilibrium, uniform pace, no assist device, cane. Physical Exam      ASSESSMENT AND PLAN      Diagnosis Orders   1. Angina of effort ([address]  nitroGLYCERIN (NITROSTAT) 0.4 MG SL tablet   2. Essential hypertension  nitroGLYCERIN (NITROSTAT) 0.4 MG SL tablet   3. Sacroiliitis, not elsewhere classified ([address]     4.  Major depression, single episode, in complete remission ([address]       Wants q  3-4 m visits  Stopped going to [facility]   Discussed need for additional f/u care    Electronicallysigned by Booker Monday, DO on 6/3/2022 at 11:20 AM

## 2022-06-06 ENCOUNTER — HOSPITAL ENCOUNTER (OUTPATIENT)
Dept: CT IMAGING | Age: 62
Discharge: HOME | End: 2022-06-08
Payer: MEDICARE

## 2022-06-06 ENCOUNTER — HOSPITAL ENCOUNTER (OUTPATIENT)
Age: 62
Discharge: HOME | End: 2022-06-06
Payer: MEDICARE

## 2022-06-06 ENCOUNTER — TELEPHONE (OUTPATIENT)
Dept: FAMILY MEDICINE CLINIC | Age: 62
End: 2022-06-06

## 2022-06-06 DIAGNOSIS — N30.00 ACUTE CYSTITIS WITHOUT HEMATURIA: ICD-10-CM

## 2022-06-06 DIAGNOSIS — I65.23 CAROTID STENOSIS, ASYMPTOMATIC, BILATERAL: ICD-10-CM

## 2022-06-06 DIAGNOSIS — R39.12 WEAK URINARY STREAM: Primary | ICD-10-CM

## 2022-06-06 LAB
BACTERIA: ABNORMAL
BILIRUBIN URINE: NEGATIVE
CASTS UA: ABNORMAL /LPF
COLOR: YELLOW
EPITHELIAL CELLS UA: ABNORMAL /HPF
GLUCOSE URINE: NEGATIVE
KETONES, URINE: NEGATIVE
LEUKOCYTE ESTERASE, URINE: ABNORMAL
NITRITE, URINE: POSITIVE
PH UA: 5.5 (ref 5–8)
PROTEIN UA: ABNORMAL
RBC UA: ABNORMAL /HPF
SPECIFIC GRAVITY UA: 1.02 (ref 1–1.03)
TURBIDITY: CLEAR
URINE HGB: NEGATIVE
UROBILINOGEN, URINE: NORMAL
WBC UA: ABNORMAL /HPF

## 2022-06-06 PROCEDURE — 6360000004 HC RX CONTRAST MEDICATION: Performed by: FAMILY MEDICINE

## 2022-06-06 PROCEDURE — 87186 SC STD MICRODIL/AGAR DIL: CPT

## 2022-06-06 PROCEDURE — 2580000003 HC RX 258: Performed by: FAMILY MEDICINE

## 2022-06-06 PROCEDURE — 87086 URINE CULTURE/COLONY COUNT: CPT

## 2022-06-06 PROCEDURE — 87077 CULTURE AEROBIC IDENTIFY: CPT

## 2022-06-06 PROCEDURE — 70498 CT ANGIOGRAPHY NECK: CPT

## 2022-06-06 PROCEDURE — 81001 URINALYSIS AUTO W/SCOPE: CPT

## 2022-06-06 RX ORDER — 0.9 % SODIUM CHLORIDE 0.9 %
80 INTRAVENOUS SOLUTION INTRAVENOUS ONCE
Status: COMPLETED | OUTPATIENT
Start: 2022-06-06 | End: 2022-06-06

## 2022-06-06 RX ORDER — SODIUM CHLORIDE 0.9 % (FLUSH) 0.9 %
10 SYRINGE (ML) INJECTION PRN
Status: DISCONTINUED | OUTPATIENT
Start: 2022-06-06 | End: 2022-06-09 | Stop reason: HOSPADM

## 2022-06-06 RX ADMIN — SODIUM CHLORIDE 80 ML: 9 INJECTION, SOLUTION INTRAVENOUS at 09:35

## 2022-06-06 RX ADMIN — IOPAMIDOL 75 ML: 755 INJECTION, SOLUTION INTRAVENOUS at 09:35

## 2022-06-06 RX ADMIN — SODIUM CHLORIDE, PRESERVATIVE FREE 10 ML: 5 INJECTION INTRAVENOUS at 09:35

## 2022-06-06 NOTE — TELEPHONE ENCOUNTER
----- Message from [facility] MA sent at 6/6/2022  9:53 AM EDT -----  Subject: Message to Provider    QUESTIONS  Information for Provider? Pt is calling regarding his PSA test that was   ordered 6/3/2022. Pt states he went to the hospital to have all testing   and labs completed, but upon arrival and prior to the blood bein drawn   they were registering him and advised him the medicare does not cover the   PSA testing as it was not deemed necessary and the dx code will need to be   updated in order for his insurance to cover it. Please call pt @ number   below and advise once he is able to have labs drawn and what he is needing   to do next. Thanks!  ---------------------------------------------------------------------------  --------------  CALL BACK INFO  What is the best way for the office to contact you? OK to leave message on   voicemail  Preferred Call Back Phone Number? [phone]  ---------------------------------------------------------------------------  --------------  SCRIPT ANSWERS  Relationship to Patient?  Self

## 2022-06-06 NOTE — TELEPHONE ENCOUNTER
Changed Dx code - seems to have same \"non-covered\" message regardless of code used. Medicare won't cover.

## 2022-06-09 LAB
CULTURE: ABNORMAL
SPECIMEN DESCRIPTION: ABNORMAL

## 2022-06-10 DIAGNOSIS — R39.12 WEAK URINARY STREAM: Primary | ICD-10-CM

## 2022-06-13 ENCOUNTER — OFFICE VISIT (OUTPATIENT)
Dept: VASCULAR SURGERY | Age: 62
End: 2022-06-13
Payer: MEDICARE

## 2022-06-13 VITALS
HEART RATE: 88 BPM | BODY MASS INDEX: 37.89 KG/M2 | HEIGHT: 68 IN | OXYGEN SATURATION: 97 % | RESPIRATION RATE: 18 BRPM | SYSTOLIC BLOOD PRESSURE: 138 MMHG | WEIGHT: 250 LBS | TEMPERATURE: 97.2 F | DIASTOLIC BLOOD PRESSURE: 72 MMHG

## 2022-06-13 DIAGNOSIS — I25.118 CORONARY ARTERY DISEASE WITH EXERTIONAL ANGINA (HCC): ICD-10-CM

## 2022-06-13 DIAGNOSIS — I65.21 STENOSIS OF RIGHT CAROTID ARTERY: Primary | ICD-10-CM

## 2022-06-13 PROCEDURE — 99214 OFFICE O/P EST MOD 30 MIN: CPT | Performed by: SURGERY

## 2022-06-13 ASSESSMENT — ENCOUNTER SYMPTOMS
VOICE CHANGE: 0
ABDOMINAL PAIN: 0
COUGH: 0
VOMITING: 0
COLOR CHANGE: 0
TROUBLE SWALLOWING: 0
CHEST TIGHTNESS: 1
EYE PAIN: 0
ABDOMINAL DISTENTION: 0
SHORTNESS OF BREATH: 1

## 2022-06-13 NOTE — PROGRESS NOTES
(gastroesophageal reflux disease)     Gout     Right great toe    Hyperlipidemia     Hypertension     Mild depression ([facility] [address] 2013    2 suicide attempts by girlfriend related to depression.  Morbidly obese ([facility] [address] 10/30/2020    Occlusion of right carotid artery 2021    Osteoarthritis of right knee     Sinus headache 10/10/2018    Sleep apnea     Sleep disturbance     Smoker     Tendonitis of foot     right     Family History   Problem Relation Age of Onset    Heart Disease Mother     COPD Father     Cancer Maternal Aunt 64        breast cancer     Cancer Maternal Uncle 60        prostrate cancer       Social History     Socioeconomic History    Marital status: Single     Spouse name: Not on file    Number of children: Not on file    Years of education: Not on file    Highest education level: Not on file   Occupational History     Employer: N/A   Tobacco Use    Smoking status: Former Smoker     Packs/day: 1.50     Years: 40.00     Pack years: 60.00     Types: Cigarettes     Start date:      Quit date: 2022     Years since quittin.0    Smokeless tobacco: Former User     Quit date: 3/12/2015    Tobacco comment: wants to discuss  chantix, currently smokes 0.25 ppd   Vaping Use    Vaping Use: Never used   Substance and Sexual Activity    Alcohol use: Not Currently     Alcohol/week: 0.0 standard drinks     Comment: 3 x year    Drug use: No    Sexual activity: Yes     Partners: Female   Other Topics Concern    Not on file   Social History Narrative    Not on file     Social Determinants of Health     Financial Resource Strain: Low Risk     Difficulty of Paying Living Expenses: Not hard at all   Food Insecurity: No Food Insecurity    Worried About Running Out of Food in the Last Year: Never true    Harpreet of Food in the Last Year: Never true   Transportation Needs:     Lack of Transportation (Medical): Not on file    Lack of Transportation (Non-Medical):  Not on file Physical Activity:     Days of Exercise per Week: Not on file    Minutes of Exercise per Session: Not on file   Stress:     Feeling of Stress : Not on file   Social Connections:     Frequency of Communication with Friends and Family: Not on file    Frequency of Social Gatherings with Friends and Family: Not on file    Attends Mandaen Services: Not on file    Active Member of [address] [place] [address] or Organizations: Not on file    Attends Club or Organization Meetings: Not on file    Marital Status: Not on file   Intimate Partner Violence:     Fear of Current or Ex-Partner: Not on file    Emotionally Abused: Not on file    Physically Abused: Not on file    Sexually Abused: Not on file   Housing Stability:     Unable to Pay for Housing in the Last Year: Not on file    Number of [place] in the Last Year: Not on file    Unstable Housing in the Last Year: Not on file      Past Surgical History:   Procedure Laterality Date    CARDIAC CATHETERIZATION  7/8/14    Non Obstructive CAD     CERVICAL SPINE SURGERY  7/13/12     C2-3-4-5    COLONOSCOPY      FRACTURE SURGERY      Rt elbow     HC INJECT OTHER PERPHRL NERV Bilateral 5/9/2019    INJECTION SPINAL performed by Kayden Longoria MD at [address] [place] [address] Bilateral 8/15/2019    SACROILIAC JOINT INJECTION performed by Kayden Longoria MD at [facility]  2/5/16    [address]   [address] Left 02/07/2020    RFA left side    NERVE BLOCK Left 02/07/2020     NERVE RADIOFREQUENCY ABLATION - SACROILIAC (Left )    NERVE BLOCK  02/12/2021    NERVE RADIOFREQUENCY ABLATION SI (Left     NERVE BLOCK Right 02/19/2021    Procedure: NERVE RADIOFREQUENCY ABLATION SI JOINT (Right )    OTHER SURGICAL HISTORY  08/15/2019    sacroiliac joint injection    PAIN MANAGEMENT PROCEDURE Left 2/7/2020    NERVE RADIOFREQUENCY ABLATION - SACROILIAC performed by Kayden Longoria MD at [address] [place] [address] MANAGEMENT PROCEDURE Left 2/12/2021    NERVE RADIOFREQUENCY ABLATION SI performed by Lexi Lang MD at [facility] Right 2/19/2021    NERVE RADIOFREQUENCY ABLATION SI JOINT performed by Lexi Lang MD at [address] [place] [address]        Review of Systems   Constitutional: Positive for diaphoresis. Negative for activity change, fever and unexpected weight change. HENT: Negative for trouble swallowing and voice change. Eyes: Negative for pain and visual disturbance. Respiratory: Positive for chest tightness and shortness of breath. Negative for cough. Cardiovascular: Positive for chest pain. Negative for palpitations. Gastrointestinal: Negative for abdominal distention, abdominal pain and vomiting. Endocrine: Negative for cold intolerance and heat intolerance. Genitourinary: Negative for dysuria, flank pain and hematuria. Musculoskeletal: Negative for joint swelling and neck pain. Skin: Negative for color change and rash. Allergic/Immunologic: Negative for immunocompromised state. Neurological: Negative for syncope, speech difficulty, weakness, numbness and headaches. Hematological: Negative for adenopathy. Psychiatric/Behavioral: Negative for behavioral problems and suicidal ideas. Vitals:    06/13/22 0925 06/13/22 0927   BP: (!) 146/76 138/72   Site: Left Upper Arm Left Upper Arm   Position: Sitting Sitting   Cuff Size: Large Adult Large Adult   Pulse: 88    Resp: 18    Temp: 97.2 °F (36.2 °C)    TempSrc: Temporal    SpO2: 97%    Weight: 250 lb (113.4 kg)    Height: 5' 8\" (1.727 m)           Physical Exam  Constitutional:       General: He is not in acute distress. HENT:      Mouth/Throat:      Mouth: Mucous membranes are moist.      Pharynx: Oropharynx is clear. Eyes:      General: No scleral icterus. Extraocular Movements: Extraocular movements intact.       Conjunctiva/sclera: Conjunctivae normal.   Neck:      Thyroid: No thyroid mass or thyromegaly. Cardiovascular:      Rate and Rhythm: Normal rate and regular rhythm. Heart sounds: No murmur heard. Comments: I cannot auscultate a carotid bruit on either side. He has palpable carotid pulses. His neck is thick and mostly immobile. He is not able to extend very well and he is not able to turn his head very well due to cervical spine issues. His chest is clear to auscultation bilaterally. His heart has a regular rate and rhythm with no murmurs rubs or gallops with distant heart sounds. He has palpable femoral pulses bilaterally. I cannot palpate popliteal pulses but he has a strong posterior tibial pulse on the left and a strong dorsalis pedis pulse on the right. I cannot palpate a posterior tibial on the right nor can I palpate a dorsalis pedis on the left. His feet are warm and adequately perfused with normal capillary refill. He has no dependent rubor. He has no ulceration or gangrene. There are no significant edema. Pulmonary:      Effort: No respiratory distress. Breath sounds: No rales. Abdominal:      General: There is no distension. Palpations: There is no mass. Tenderness: There is no abdominal tenderness. There is no guarding. Musculoskeletal:      Cervical back: No rigidity or tenderness. Lymphadenopathy:      Cervical: No cervical adenopathy. Skin:     Coloration: Skin is not jaundiced. Findings: No rash. Neurological:      General: No focal deficit present. Mental Status: He is alert and oriented to person, place, and time. Cranial Nerves: No cranial nerve deficit. Psychiatric:         Mood and Affect: Mood normal.         Assessment:  1. Stenosis of right carotid artery    2. Coronary artery disease with exertional angina [facility])          Plan: At this point I would move towards treatment of his right carotid with transcarotid artery revascularization with a stent.   We will have to line this up with the representatives of [address] [facility]. More importantly I think he needs to have his heart evaluated with a stress thallium examination and a cardiology consultation. I think his symptoms are quite typical of stable angina and he may have significant coronary narrowings in need of attention. We will work him up and have him back to the office in several weeks. He understands and agrees.     Electronically signed by:  Sam Dubon MD

## 2022-06-24 ENCOUNTER — HOSPITAL ENCOUNTER (OUTPATIENT)
Dept: NUCLEAR MEDICINE | Age: 62
Discharge: HOME | End: 2022-06-26
Payer: MEDICARE

## 2022-06-24 ENCOUNTER — HOSPITAL ENCOUNTER (OUTPATIENT)
Dept: NON INVASIVE DIAGNOSTICS | Age: 62
Discharge: HOME | End: 2022-06-24
Payer: MEDICARE

## 2022-06-24 DIAGNOSIS — I25.118 CORONARY ARTERY DISEASE WITH EXERTIONAL ANGINA (HCC): ICD-10-CM

## 2022-06-24 LAB
LV EF: 45 %
LVEF MODALITY: NORMAL

## 2022-06-24 PROCEDURE — 3430000000 HC RX DIAGNOSTIC RADIOPHARMACEUTICAL: Performed by: SURGERY

## 2022-06-24 PROCEDURE — A9500 TC99M SESTAMIBI: HCPCS | Performed by: SURGERY

## 2022-06-24 PROCEDURE — G1010 CDSM STANSON: HCPCS

## 2022-06-24 PROCEDURE — 93017 CV STRESS TEST TRACING ONLY: CPT

## 2022-06-24 PROCEDURE — 2580000003 HC RX 258: Performed by: SURGERY

## 2022-06-24 PROCEDURE — 6360000002 HC RX W HCPCS: Performed by: SURGERY

## 2022-06-24 RX ORDER — METOPROLOL TARTRATE 5 MG/5ML
5 INJECTION INTRAVENOUS EVERY 5 MIN PRN
Status: DISCONTINUED | OUTPATIENT
Start: 2022-06-24 | End: 2022-06-24 | Stop reason: HOSPADM

## 2022-06-24 RX ORDER — ALBUTEROL SULFATE 90 UG/1
2 AEROSOL, METERED RESPIRATORY (INHALATION) PRN
Status: DISCONTINUED | OUTPATIENT
Start: 2022-06-24 | End: 2022-06-24 | Stop reason: HOSPADM

## 2022-06-24 RX ORDER — SODIUM CHLORIDE 0.9 % (FLUSH) 0.9 %
5-40 SYRINGE (ML) INJECTION PRN
Status: DISCONTINUED | OUTPATIENT
Start: 2022-06-24 | End: 2022-06-24 | Stop reason: HOSPADM

## 2022-06-24 RX ORDER — SODIUM CHLORIDE 9 MG/ML
500 INJECTION, SOLUTION INTRAVENOUS CONTINUOUS PRN
Status: DISCONTINUED | OUTPATIENT
Start: 2022-06-24 | End: 2022-06-24 | Stop reason: HOSPADM

## 2022-06-24 RX ORDER — ATROPINE SULFATE 0.1 MG/ML
0.5 INJECTION INTRAVENOUS EVERY 5 MIN PRN
Status: DISCONTINUED | OUTPATIENT
Start: 2022-06-24 | End: 2022-06-24 | Stop reason: HOSPADM

## 2022-06-24 RX ORDER — NITROGLYCERIN 0.4 MG/1
0.4 TABLET SUBLINGUAL EVERY 5 MIN PRN
Status: DISCONTINUED | OUTPATIENT
Start: 2022-06-24 | End: 2022-06-24 | Stop reason: HOSPADM

## 2022-06-24 RX ORDER — SODIUM CHLORIDE 0.9 % (FLUSH) 0.9 %
10 SYRINGE (ML) INJECTION PRN
Status: DISCONTINUED | OUTPATIENT
Start: 2022-06-24 | End: 2022-06-27 | Stop reason: HOSPADM

## 2022-06-24 RX ORDER — AMINOPHYLLINE DIHYDRATE 25 MG/ML
50 INJECTION, SOLUTION INTRAVENOUS PRN
Status: DISCONTINUED | OUTPATIENT
Start: 2022-06-24 | End: 2022-06-24 | Stop reason: HOSPADM

## 2022-06-24 RX ADMIN — TETRAKIS(2-METHOXYISOBUTYLISOCYANIDE)COPPER(I) TETRAFLUOROBORATE 10.1 MILLICURIE: 1 INJECTION, POWDER, LYOPHILIZED, FOR SOLUTION INTRAVENOUS at 07:00

## 2022-06-24 RX ADMIN — Medication 10 ML: at 07:00

## 2022-06-24 RX ADMIN — REGADENOSON 0.4 MG: 0.08 INJECTION, SOLUTION INTRAVENOUS at 09:13

## 2022-06-24 RX ADMIN — TETRAKIS(2-METHOXYISOBUTYLISOCYANIDE)COPPER(I) TETRAFLUOROBORATE 33.5 MILLICURIE: 1 INJECTION, POWDER, LYOPHILIZED, FOR SOLUTION INTRAVENOUS at 09:16

## 2022-06-24 NOTE — PROGRESS NOTES
CST Lexiscan Stress Tech performs patient preparation of physical comfort, review test procedures, pre-stress EKG. Lung Sounds clear in all fields. Consent verified. Educated patient on test procedure and possible side effects of Lexiscan. Cardiologist reviewed pre-test EKG and is present for test. Patient tolerated test well with minor initial cough and CP which resolved to baseline after test with caffeine. EKG portion complete, Nuc Med portion pending.   Pretest VS: /69   Post test VS: /69

## 2022-06-27 NOTE — PROCEDURES
[address] [place], [place] [address]                              CARDIAC STRESS TEST    PATIENT NAME: Tanna Gilmore                      :        1960  MED REC NO:   [id number]                              ROOM:  ACCOUNT NO:   [de-identified]                           ADMIT DATE: 2022  PROVIDER:     Reese Pena    DATE OF STUDY:  2022    ORDERING PROVIDER:  Feliberto Jaquez MD    PRIMARY CARE PROVIDER:  Kira Beatty MD    INTERPRETING PHYSICIAN:  Ancelmo Craig MD    _____ STRESS TESTING    TEST TYPE: LEXISCAN CARDIOLYTE STRESS TEST  INDICATION: ANGINA, SHORTNESS OF BREATH  REFERRING PHYSICIAN: Feliberto Jaquez MD    RESTING HEART RATE: 100 BEATS PER MINUTE  RESTING BLOOD PRESSURE: 151/69    MEDICATION(S) GIVEN: 0.4MG IV LEXISCAN  REASON FOR TERMINATION: MEDICATION INFUSION COMPLETE    RESTING EKG: NORMAL  STRESS HEART RESPONSE: NORMAL RESPONSE  BLOOD PRESSURE RESPONSE: APPROPRIATE  STRESS EKGs: NORMAL  CHEST DISCOMFORT:  ISCHEMIC EKG CHANGES: NONE    EKG IMPRESSION: ELECTROCARDIOGRAPHICALLY NEGATIVE LEXISCAN STRESS TEST. RADIOISOTOPE RESULTS TO FOLLOW FROM THE DEPARTMENT OF NUCLEAR MEDICINE.     COMMENTSLamond Shone    D: 2022 10:27:12       T: 2022 10:32:02     JESSE/MICHELA  Job#: [id number]     Doc#: Unknown    CC:    (Retain this field even if not dictated or not decipherable)

## 2022-07-14 ENCOUNTER — OFFICE VISIT (OUTPATIENT)
Dept: UROLOGY | Age: 62
End: 2022-07-14
Payer: MEDICARE

## 2022-07-14 VITALS
SYSTOLIC BLOOD PRESSURE: 130 MMHG | WEIGHT: 250 LBS | HEART RATE: 93 BPM | BODY MASS INDEX: 37.89 KG/M2 | HEIGHT: 68 IN | DIASTOLIC BLOOD PRESSURE: 71 MMHG

## 2022-07-14 DIAGNOSIS — N13.8 BPH WITH URINARY OBSTRUCTION: ICD-10-CM

## 2022-07-14 DIAGNOSIS — N40.1 BPH WITH URINARY OBSTRUCTION: ICD-10-CM

## 2022-07-14 DIAGNOSIS — R33.8 OTHER RETENTION OF URINE: ICD-10-CM

## 2022-07-14 DIAGNOSIS — N39.0 RECURRENT UTI: Primary | ICD-10-CM

## 2022-07-14 PROCEDURE — 51798 US URINE CAPACITY MEASURE: CPT | Performed by: SPECIALIST

## 2022-07-14 PROCEDURE — G8417 CALC BMI ABV UP PARAM F/U: HCPCS | Performed by: SPECIALIST

## 2022-07-14 PROCEDURE — G8427 DOCREV CUR MEDS BY ELIG CLIN: HCPCS | Performed by: SPECIALIST

## 2022-07-14 PROCEDURE — 99204 OFFICE O/P NEW MOD 45 MIN: CPT | Performed by: SPECIALIST

## 2022-07-14 NOTE — LETTER
[facility]    7/14/22    Patient: Thai Lawton  YOB: 1960    Dear Payal Banegas DO,    I had the pleasure of seeing one of your patients, Kristy Teixeira today in the office today. Below are the relevant portions of my assessment and plan of care. IMPRESSION:  1. Recurrent UTI    2. BPH with urinary obstruction    3. Other retention of urine      PLAN:  The patient presents with recurrent UTI's with E coli and moderate lower urinary tract symptoms. Will repeat urine for C&S to assure eradication of infection. Cystoscopy, flow rate to evaluate lower urinary tract under local anesthesia. Thank you for allowing me to participate in the care of this patient. I will keep you updated on this patient's follow up and I look forward to serving you and your patients again in the future. Rhonda Montoya MD, FACS

## 2022-07-14 NOTE — PROGRESS NOTES
Angina pectoris ([facility] [address]     CAD (coronary artery disease)     Carpal tunnel syndrome     left    Cervical stenosis of spine     Cervicalgia     chronic pain    Chronic back pain     COPD (chronic obstructive pulmonary disease) (HCC)     DDD (degenerative disc disease)     Elbow fracture, right     Fractures     elbow    GERD (gastroesophageal reflux disease)     Gout     Right great toe    Hyperlipidemia     Hypertension     Mild depression ([facility] [address] 9/26/2013    2 suicide attempts by girlfriend related to depression.     Morbidly obese ([facility] [address] 10/30/2020    Occlusion of right carotid artery 7/14/2021    Osteoarthritis of right knee     Sinus headache 10/10/2018    Sleep apnea     Sleep disturbance     Smoker     Tendonitis of foot     right     Past Surgical History:   Procedure Laterality Date    CARDIAC CATHETERIZATION  7/8/14    Non Obstructive CAD     CERVICAL SPINE SURGERY  7/13/12     C2-3-4-5    COLONOSCOPY      FRACTURE SURGERY      Rt elbow     HC INJECT OTHER PERPHRL NERV Bilateral 5/9/2019    INJECTION SPINAL performed by Shona Mercado MD at [address] [place] [address] Bilateral 8/15/2019    SACROILIAC JOINT INJECTION performed by Shona Mercado MD at [facility]  2/5/16    [facility]    NERVE BLOCK Left 02/07/2020    RFA left side    NERVE BLOCK Left 02/07/2020     NERVE RADIOFREQUENCY ABLATION - SACROILIAC (Left )    NERVE BLOCK  02/12/2021    NERVE RADIOFREQUENCY ABLATION SI (Left     NERVE BLOCK Right 02/19/2021    Procedure: NERVE RADIOFREQUENCY ABLATION SI JOINT (Right )    OTHER SURGICAL HISTORY  08/15/2019    sacroiliac joint injection    PAIN MANAGEMENT PROCEDURE Left 2/7/2020    NERVE RADIOFREQUENCY ABLATION - SACROILIAC performed by Shona Mercado MD at [address] [place] [address] Left 2/12/2021    NERVE RADIO[facility]REQUENCY [facility]BLATION [facility]    PAIN MANAGEMENT PROCEDURE Right 2/19/2021    NERVE RADIOFREQUENCY ABLATION SI JOINT performed by Sukhwinder Stanley MD at [address] [place] [address]       Family History   Problem Relation Age of Onset    Heart Disease Mother     COPD Father     Cancer Maternal Aunt 64        breast cancer     Cancer Maternal Uncle 60        prostrate cancer      Current Outpatient Medications   Medication Sig Dispense Refill    nitroGLYCERIN (NITROSTAT) 0.4 MG SL tablet Place 1 tablet under the tongue every 5 minutes as needed for Chest pain (not to exceed 3 tabs at a time) 25 tablet 3    tiZANidine (ZANAFLEX) 4 MG capsule 1 tablet as needed      amLODIPine (NORVASC) 5 MG tablet TAKE ONE TABLET BY MOUTH DAILY 90 tablet 3    albuterol sulfate  (90 Base) MCG/ACT inhaler Inhale 2 puffs into the lungs every 6 hours as needed for Wheezing 1 each 5    simvastatin (ZOCOR) 80 MG tablet TAKE ONE TABLET BY MOUTH EVERY EVENING 90 tablet 3    fluticasone (FLONASE) 50 MCG/ACT nasal spray 1 spray      meloxicam (MOBIC) 15 MG tablet Take 1 tablet by mouth daily as needed for Pain 30 tablet 5    budesonide-formoterol (SYMBICORT) 160-4.5 MCG/ACT AERO INHALE TWO PUFFS BY MOUTH TWICE A DAY 10.2 g 1    acetaminophen (TYLENOL) 325 MG tablet Take 650 mg by mouth nightly      aspirin 81 MG EC tablet Take 1 tablet by mouth daily 30 tablet 3    oxyCODONE (ROXICODONE) 5 MG immediate release tablet 1 tablet as needed (Patient not taking: Reported on 7/14/2022)      varenicline (CHANTIX) 0.5 MG tablet Take 1-2 tablets by mouth See Admin Instructions 0.5mg DAILY for 3 days followed by 0.5mg TWICE DAILY for 4 days followed by 1mg TWICE DAILY (Patient not taking: Reported on 7/14/2022) 57 tablet 0    varenicline (CHANTIX) 1 MG tablet Take 1 tablet by mouth 2 times daily (Patient not taking: Reported on 7/14/2022) 60 tablet 2    gabapentin (NEURONTIN) 800 MG tablet Take 1 tablet by mouth every 8 hours for 30 days. 90 tablet 2    DULoxetine (CYMBALTA) 60 MG extended release capsule Take 60 mg by mouth (Patient not taking: Reported on 2022)       No current facility-administered medications for this visit. Patient has no known allergies. Social History     Tobacco Use   Smoking Status Former Smoker    Packs/day: 1.50    Years: 40.00    Pack years: 60.00    Types: Cigarettes    Start date: 63    Quit date: 2022    Years since quittin.1   Smokeless Tobacco Former User    Quit date: 3/12/2015   Tobacco Comment    wants to discuss  chantix, currently smokes 0.25 ppd      (If patient a smoker, smoking cessation counseling offered)   Social History     Substance and Sexual Activity   Alcohol Use Not Currently    Alcohol/week: 0.0 standard drinks    Comment: 3 x year       REVIEW OF SYSTEMS (obtained by ancillary medical staff):  Level 2  Constitutional: negative  Level 3  Respiratory: negative  Level 4-5  Eyes: negative  Neurological: negative  Gastrointestinal: negative  Cardiovascular: negative  Skin: negative   Musculoskeletal: negative  Ears/Nose/Throat: negative  Psychological: negative    Physical Exam:    This a 59 y.o. male   Vitals:    22 1432   BP: 130/71   Pulse: 93     Body mass index is 38.01 kg/m². Constitutional: Patient in no acute distress; Neuro: alert and oriented to person place and time. Psych: Mood and affect normal.  Skin: Normal  Lungs: Respiratory effort normal  Cardiovascular:  Normal peripheral pulses  Abdomen: Soft, non-tender, non-distended with no CVA, flank pain, hepatosplenomegaly or hernia. Kidneys normal.  Bladder non-tender and not distended.   Lymphatics: no palpable lymphadenopathy  Penis normal and circumcised  Urethral meatus normal  Scrotal exam normal  Testicles normal bilaterally  Epididymis normal bilaterally  No evidence of inguinal hernia  Anus and perineum normal  Normal rectal tone with no masses  Prostate: prostate smooth and symmetric without tenderness or nodules  Seminal vesicles not palpable. Assessment and Plan      1. Recurrent UTI    2. BPH with urinary obstruction    3. Other retention of urine           Plan:      The patient presents with recurrent UTI's with E coli and moderate lower urinary tract symptoms. Will repeat urine for C&S to assure eradication of infection. Cystoscopy, flow rate to evaluate lower urinary tract under local anesthesia. Montrell Ellis MD FACS  7/14/2022 2:56 PM

## 2022-07-22 ENCOUNTER — HOSPITAL ENCOUNTER (OUTPATIENT)
Age: 62
Discharge: HOME | End: 2022-07-22
Payer: COMMERCIAL

## 2022-07-22 DIAGNOSIS — N39.0 RECURRENT UTI: ICD-10-CM

## 2022-07-22 PROCEDURE — 87086 URINE CULTURE/COLONY COUNT: CPT

## 2022-07-23 LAB
CULTURE: NO GROWTH
SPECIMEN DESCRIPTION: NORMAL

## 2022-07-25 ENCOUNTER — TELEPHONE (OUTPATIENT)
Dept: UROLOGY | Age: 62
End: 2022-07-25

## 2022-07-25 NOTE — TELEPHONE ENCOUNTER
Patient has been scheduled for a cystoscopy/uroflow on 8/2 at 10:30AM. Talked to patient and gave him the date, time and instructions. Patient confirmed and verbalized understanding.

## 2022-07-25 NOTE — TELEPHONE ENCOUNTER
Pt called and stated he received his test results on my chart, pt is not sure if he needs a follow up or not. Please contact pt.

## 2022-08-01 ENCOUNTER — OFFICE VISIT (OUTPATIENT)
Dept: VASCULAR SURGERY | Age: 62
End: 2022-08-01
Payer: MEDICARE

## 2022-08-01 VITALS
WEIGHT: 250 LBS | OXYGEN SATURATION: 98 % | DIASTOLIC BLOOD PRESSURE: 71 MMHG | HEIGHT: 68 IN | BODY MASS INDEX: 37.89 KG/M2 | HEART RATE: 92 BPM | RESPIRATION RATE: 16 BRPM | TEMPERATURE: 97 F | SYSTOLIC BLOOD PRESSURE: 130 MMHG

## 2022-08-01 DIAGNOSIS — I65.21 OCCLUSION OF RIGHT CAROTID ARTERY: Primary | ICD-10-CM

## 2022-08-01 PROCEDURE — G8417 CALC BMI ABV UP PARAM F/U: HCPCS | Performed by: SURGERY

## 2022-08-01 PROCEDURE — 99213 OFFICE O/P EST LOW 20 MIN: CPT | Performed by: SURGERY

## 2022-08-01 PROCEDURE — G8427 DOCREV CUR MEDS BY ELIG CLIN: HCPCS | Performed by: SURGERY

## 2022-08-01 PROCEDURE — 3017F COLORECTAL CA SCREEN DOC REV: CPT | Performed by: SURGERY

## 2022-08-01 PROCEDURE — 1036F TOBACCO NON-USER: CPT | Performed by: SURGERY

## 2022-08-01 RX ORDER — CLOPIDOGREL BISULFATE 75 MG/1
75 TABLET ORAL DAILY
Qty: 90 TABLET | Refills: 1 | Status: SHIPPED | OUTPATIENT
Start: 2022-08-01

## 2022-08-01 RX ORDER — TIZANIDINE 4 MG/1
TABLET ORAL
COMMUNITY
Start: 2022-07-14 | End: 2022-08-05

## 2022-08-01 NOTE — PROGRESS NOTES
[address] AND VASCULAR  [address] [place] [id number]  Dept: [phone]     Patient: Vivi Grewal  : 1960  MRN: [id number]  DOS: 2022    Referring provider:  No ref. provider found         HPI:  Vivi Grewal is a 59 y.o. male who returns to the office for his right carotid stenosis. Recall that he has severe right carotid stenosis with other symptoms consistent with coronary artery disease. He does get chest pain on exertion. Myocardial examination with chemically induced stress test shows no reversible ischemia. There is a fixed defect in the inferior wall of the heart. He has a greater than 90% stenosis of his right internal carotid artery. He is as yet asymptomatic from this. He has cervical spine problems for which she has had procedures in the past.  He basically has a frozen neck and his carotid lesion on the right extends to approximately C2. For this reason I think it would be best to go ahead with transcarotid artery revascularization with a stent on the right. We discussed this at length today and he understands and agrees to proceed. I did discuss the situation with the cardiologist and they understand that his neck is a pressing concern in terms of the carotid and he is a moderate risk for coronary events. Past Medical History:   Diagnosis Date    Abnormal stress ECG     Angina pectoris (HCC)     CAD (coronary artery disease)     Carpal tunnel syndrome     left    Cervical stenosis of spine     Cervicalgia     chronic pain    Chronic back pain     COPD (chronic obstructive pulmonary disease) (HCC)     DDD (degenerative disc disease)     Elbow fracture, right     Fractures     elbow    GERD (gastroesophageal reflux disease)     Gout     Right great toe    Hyperlipidemia     Hypertension     Mild depression ([address] 2013    2 suicide attempts by girlfriend related to depression.     Morbidly obese ([address] 10/30/2020    Occlusion of right carotid artery 2021    Osteoarthritis of right knee     Sinus headache 10/10/2018    Sleep apnea     Sleep disturbance     Smoker     Tendonitis of foot     right     Family History   Problem Relation Age of Onset    Heart Disease Mother     COPD Father     Cancer Maternal Aunt 64        breast cancer     Cancer Maternal Uncle 60        prostrate cancer       Social History     Socioeconomic History    Marital status: Single     Spouse name: Not on file    Number of children: Not on file    Years of education: Not on file    Highest education level: Not on file   Occupational History     Employer: N/A   Tobacco Use    Smoking status: Former     Packs/day: 1.50     Years: 40.00     Pack years: 60.00     Types: Cigarettes     Start date:      Quit date: 2022     Years since quittin.1    Smokeless tobacco: Former     Quit date: 3/12/2015    Tobacco comments:     wants to discuss  chantix, currently smokes 0.25 ppd   Vaping Use    Vaping Use: Never used   Substance and Sexual Activity    Alcohol use: Not Currently     Alcohol/week: 0.0 standard drinks     Comment: 3 x year    Drug use: No    Sexual activity: Yes     Partners: Female   Other Topics Concern    Not on file   Social History Narrative    Not on file     Social Determinants of Health     Financial Resource Strain: Low Risk     Difficulty of Paying Living Expenses: Not hard at all   Food Insecurity: No Food Insecurity    Worried About Running Out of Food in the Last Year: Never true    Ran Out of Food in the Last Year: Never true   Transportation Needs: Not on file   Physical Activity: Not on file   Stress: Not on file   Social Connections: Not on file   Intimate Partner Violence: Not on file   Housing Stability: Not on file      Past Surgical History:   Procedure Laterality Date    CARDIAC CATHETERIZATION  12    Non Obstructive CAD     CERVICAL SPINE SURGERY  12     C2-3-4-5    COLONOSCOPY FRACTURE SURGERY      Rt elbow     HC INJECT OTHER PERPHRL NERV Bilateral 5/9/2019    INJECTION SPINAL performed by Carina Barrios MD at [address] Bilateral 8/15/2019    SACROILIAC JOINT INJECTION performed by Carina Barrios MD at [facility]  2/5/16    [facility]    NERVE BLOCK Left 02/07/2020    RFA left side    NERVE BLOCK Left 02/07/2020     NERVE RADIOFREQUENCY ABLATION - SACROILIAC (Left )    NERVE BLOCK  02/12/2021    NERVE RADIOFREQUENCY ABLATION SI (Left     NERVE BLOCK Right 02/19/2021    Procedure: NERVE RADIOFREQUENCY ABLATION SI JOINT (Right )    OTHER SURGICAL HISTORY  08/15/2019    sacroiliac joint injection    PAIN MANAGEMENT PROCEDURE Left 2/7/2020    NERVE RADIOFREQUENCY ABLATION - SACROILIAC performed by Carina Barrios MD at [address] [place] [address] Left 2/12/2021    NERVE RADIOFREQUENCY ABLATION SI performed by Carina Barrios MD at [address] [place] [address] Right 2/19/2021    NERVE RADIOFREQUENCY ABLATION SI JOINT performed by Carina Barrios MD at [address] [facility]        Review of Systems    Vitals:    08/01/22 1116   BP: 130/71   Pulse: 92   Resp: 16   Temp: 97 °F (36.1 °C)   SpO2: 98%   Weight: 250 lb (113.4 kg)   Height: 5' 8\" (1.727 m)          Physical Exam  Constitutional:       General: He is not in acute distress. HENT:      Mouth/Throat:      Mouth: Mucous membranes are moist.      Pharynx: Oropharynx is clear. Eyes:      General: No scleral icterus. Extraocular Movements: Extraocular movements intact. Conjunctiva/sclera: Conjunctivae normal.   Neck:      Thyroid: No thyroid mass or thyromegaly. Vascular: Carotid bruit present. Cardiovascular:      Rate and Rhythm: Normal rate and regular rhythm. Heart sounds: No murmur heard. Comments: He has a carotid bruit on the right.   The carotid bruit on the left is less significant. His lower extremities are warm and well-perfused. He has no ulceration. He has no gangrene. Pulmonary:      Effort: No respiratory distress. Breath sounds: No rales. Abdominal:      General: There is no distension. Palpations: There is no mass. Tenderness: There is no abdominal tenderness. There is no guarding. Musculoskeletal:      Cervical back: Rigidity present. No tenderness. Lymphadenopathy:      Cervical: No cervical adenopathy. Skin:     Coloration: Skin is not jaundiced. Findings: No rash. Neurological:      General: No focal deficit present. Mental Status: He is alert and oriented to person, place, and time. Cranial Nerves: No cranial nerve deficit. Psychiatric:         Mood and Affect: Mood normal.       Assessment:  1. Occlusion of right carotid artery          Plan: At this point I think the best option is transcarotid artery revascularization with a stent. We will have him set up for this procedure in approximately 3 weeks. He understands and agrees and also understands that there are risks to these procedures including but not limited to bleeding, infection, hematoma, nerve damage, permanent neurologic disability, stroke, TIA, myocardial infarction, cerebral hyperperfusion syndrome and death. He understands that this will be a transverse incision or longitudinal incision in the base of the neck just above the clavicle through which we will gain access and reverse flow in the carotid through a specialized sheath to allow delivery of a stent and angioplasty without having antegrade flow to the internal carotid artery. I will have to contact the device representatives and we will be proceeding within the next several weeks.     Electronically signed by:  Austin Morris MD

## 2022-08-03 RX ORDER — SODIUM CHLORIDE, SODIUM LACTATE, POTASSIUM CHLORIDE, CALCIUM CHLORIDE 600; 310; 30; 20 MG/100ML; MG/100ML; MG/100ML; MG/100ML
1000 INJECTION, SOLUTION INTRAVENOUS CONTINUOUS
Status: CANCELLED | OUTPATIENT
Start: 2022-08-03

## 2022-08-03 NOTE — DISCHARGE INSTRUCTIONS
at the [address] [place] [address] parking garage, or if handicapped there are closer spots in the surface lot directly in front of the [address] [address]. The patients that arrive at 5:30 am need to report to the 1st floor nurse's station, otherwise please report to the main floor registration desk. Transportation after your procedure - If applicable  You will need a friend or family member to drive you home after your procedure. Your  must be 25years of age or older and able to sign off on your discharge instructions. Taxi cabs or any form of public transportation is not acceptable. It is preferable that the friend or family member stay at the hospital throughout your procedure. Someone must remain with you for the first 24 hours after your surgery if you receive anesthesia or medication. If you do not have someone to stay with you, your procedure may be cancelled. Patient Instructions    If you are having any type of anesthesia you are to have nothing to eat or drink after midnight the night before your surgery. This includes gum, mints, water or smoking or chewing tobacco.  The only exception to this is a small sip of water to take with any morning dose of heart, blood pressure, or seizure medications. Bring a list of all medications you take, along with the dose of the medications and how often you take it. If more convenient bring the pharmacy bottles in a zip lock bag. Please shower the night before and the morning of surgery with an antibacterial soap. Please use the wipes given to you the night before your surgery after your shower. Unless otherwise told by your physician, please do not shave legs or any part of your body below your neck the night before or day of your surgery. You may shave your face or neck. Brush your teeth but do not swallow water. Bring your inhaler if you are currently using one. Bring your eyeglasses and case with you.   No contacts are to be worn the day of surgery. You also may bring your hearing aids. Bring your blood band if one has been given to you. Please do not close the clasp. If you are on C-PAP or Bi-PAP at home and plan on staying in the hospital overnight for your surgery please bring the machine with you. Do not wear any jewelry or body piercings day of surgery. Also, NO lotion, perfume or deodorant to be used the day of surgery. Do not bring any valuables, such as jewelry, cash or credit cards. If you are staying overnight with us, please bring a SMALL bag of personal items. We cannot accommodate large items, like suitcases. Please wear loose, comfortable clothing. If you are potentially going to have a cast or brace bring clothing that will fit over them. In case of illness - If you have cold or flu like symptoms (high fever, runny nose, sore throat, cough, etc.) rash, nausea, vomiting, loose stools, and/or recent contact with someone who has a contagious disease (chicken pox, measles, etc.) Please call your doctor before coming to the hospital.      If your child is having surgery please make arrangements for any other children to be cared for at home on the day of surgery. Other children are not permitted in recovery room and we want you to be able to spend time with the patient. If other arrangements are not available then we suggest that you have a second adult to stay in the waiting room.       If you have any other questions regarding your procedure or the day of surgery, please call [phone], or [phone]

## 2022-08-05 ENCOUNTER — HOSPITAL ENCOUNTER (OUTPATIENT)
Dept: PREADMISSION TESTING | Age: 62
Discharge: HOME | End: 2022-08-09
Payer: MEDICARE

## 2022-08-05 ENCOUNTER — HOSPITAL ENCOUNTER (OUTPATIENT)
Dept: GENERAL RADIOLOGY | Age: 62
Discharge: HOME | End: 2022-08-07
Payer: MEDICARE

## 2022-08-05 VITALS
RESPIRATION RATE: 22 BRPM | WEIGHT: 259 LBS | BODY MASS INDEX: 38.36 KG/M2 | OXYGEN SATURATION: 96 % | DIASTOLIC BLOOD PRESSURE: 80 MMHG | TEMPERATURE: 95.5 F | HEART RATE: 91 BPM | HEIGHT: 69 IN | SYSTOLIC BLOOD PRESSURE: 157 MMHG

## 2022-08-05 LAB
ABSOLUTE EOS #: 0.24 K/UL (ref 0–0.44)
ABSOLUTE IMMATURE GRANULOCYTE: 0.05 K/UL (ref 0–0.3)
ABSOLUTE LYMPH #: 2.99 K/UL (ref 1.1–3.7)
ABSOLUTE MONO #: 0.65 K/UL (ref 0.1–1.2)
ALBUMIN SERPL-MCNC: 4.3 G/DL (ref 3.5–5.2)
ALBUMIN/GLOBULIN RATIO: 1.4 (ref 1–2.5)
ALP BLD-CCNC: 94 U/L (ref 40–129)
ALT SERPL-CCNC: 30 U/L (ref 5–41)
ANION GAP SERPL CALCULATED.3IONS-SCNC: 11 MMOL/L (ref 9–17)
AST SERPL-CCNC: 22 U/L
BASOPHILS # BLD: 1 % (ref 0–2)
BASOPHILS ABSOLUTE: 0.07 K/UL (ref 0–0.2)
BILIRUB SERPL-MCNC: 0.21 MG/DL (ref 0.3–1.2)
BUN BLDV-MCNC: 14 MG/DL (ref 8–23)
CALCIUM SERPL-MCNC: 9.9 MG/DL (ref 8.6–10.4)
CHLORIDE BLD-SCNC: 99 MMOL/L (ref 98–107)
CO2: 25 MMOL/L (ref 20–31)
CREAT SERPL-MCNC: 0.75 MG/DL (ref 0.7–1.2)
EOSINOPHILS RELATIVE PERCENT: 3 % (ref 1–4)
GFR AFRICAN AMERICAN: >60 ML/MIN
GFR NON-AFRICAN AMERICAN: >60 ML/MIN
GFR SERPL CREATININE-BSD FRML MDRD: ABNORMAL ML/MIN/{1.73_M2}
GLUCOSE BLD-MCNC: 126 MG/DL (ref 70–99)
HCT VFR BLD CALC: 47.8 % (ref 40.7–50.3)
HEMOGLOBIN: 15.3 G/DL (ref 13–17)
IMMATURE GRANULOCYTES: 1 %
INR BLD: 0.9
LYMPHOCYTES # BLD: 38 % (ref 24–43)
MCH RBC QN AUTO: 28.4 PG (ref 25.2–33.5)
MCHC RBC AUTO-ENTMCNC: 32 G/DL (ref 28.4–34.8)
MCV RBC AUTO: 88.7 FL (ref 82.6–102.9)
MONOCYTES # BLD: 8 % (ref 3–12)
NRBC AUTOMATED: 0 PER 100 WBC
PARTIAL THROMBOPLASTIN TIME: 27.3 SEC (ref 20.5–30.5)
PDW BLD-RTO: 14.2 % (ref 11.8–14.4)
PLATELET # BLD: 259 K/UL (ref 138–453)
PMV BLD AUTO: 8.9 FL (ref 8.1–13.5)
POTASSIUM SERPL-SCNC: 4.7 MMOL/L (ref 3.7–5.3)
PROTHROMBIN TIME: 10.2 SEC (ref 9.1–12.3)
RBC # BLD: 5.39 M/UL (ref 4.21–5.77)
SEG NEUTROPHILS: 49 % (ref 36–65)
SEGMENTED NEUTROPHILS ABSOLUTE COUNT: 3.84 K/UL (ref 1.5–8.1)
SODIUM BLD-SCNC: 135 MMOL/L (ref 135–144)
TOTAL PROTEIN: 7.3 G/DL (ref 6.4–8.3)
WBC # BLD: 7.8 K/UL (ref 3.5–11.3)

## 2022-08-05 PROCEDURE — 85730 THROMBOPLASTIN TIME PARTIAL: CPT

## 2022-08-05 PROCEDURE — 85610 PROTHROMBIN TIME: CPT

## 2022-08-05 PROCEDURE — 85025 COMPLETE CBC W/AUTO DIFF WBC: CPT

## 2022-08-05 PROCEDURE — 80053 COMPREHEN METABOLIC PANEL: CPT

## 2022-08-05 PROCEDURE — 71046 X-RAY EXAM CHEST 2 VIEWS: CPT

## 2022-08-05 PROCEDURE — 36415 COLL VENOUS BLD VENIPUNCTURE: CPT

## 2022-08-05 RX ORDER — ISOSORBIDE MONONITRATE 60 MG/1
1 TABLET, EXTENDED RELEASE ORAL DAILY
COMMUNITY
Start: 2022-07-28

## 2022-08-05 ASSESSMENT — PAIN DESCRIPTION - LOCATION: LOCATION: NECK;BACK

## 2022-08-05 ASSESSMENT — PAIN SCALES - GENERAL: PAINLEVEL_OUTOF10: 6

## 2022-08-05 ASSESSMENT — PAIN DESCRIPTION - FREQUENCY: FREQUENCY: CONTINUOUS

## 2022-08-05 ASSESSMENT — PAIN DESCRIPTION - PAIN TYPE: TYPE: CHRONIC PAIN

## 2022-08-12 ENCOUNTER — TELEPHONE (OUTPATIENT)
Dept: FAMILY MEDICINE CLINIC | Age: 62
End: 2022-08-12

## 2022-08-12 NOTE — TELEPHONE ENCOUNTER
Patient is having right trans carotid artery revascularization w/stent done on 8/17/22 by Dr. Deepali Aguirre at [facility]. He would like to discuss with Dr. Vidya Acevedo and has a couple of questions for Dr. Vidya Acevedo.     Patient may be reached at [phone]

## 2022-08-15 ENCOUNTER — TELEPHONE (OUTPATIENT)
Dept: VASCULAR SURGERY | Age: 62
End: 2022-08-15

## 2022-08-15 NOTE — TELEPHONE ENCOUNTER
----- Message from Morales Villeda MD sent at 8/15/2022 12:01 PM EDT -----  Dr. Sharlene Sierra can you call/discuss with him regarding TCAR    ----- Message -----  From: Rizwan Obando MA  Sent: 8/15/2022  11:53 AM EDT  To: Morales Villeda MD, #    PT has been stressed an has been worked up with his upcoming surgery.    He has many questions regarding a tube you discussed with him    [phone]  Please call

## 2022-08-16 ENCOUNTER — TELEPHONE (OUTPATIENT)
Dept: VASCULAR SURGERY | Age: 62
End: 2022-08-16

## 2022-08-22 RX ORDER — MELOXICAM 15 MG/1
TABLET ORAL
Qty: 30 TABLET | Refills: 5 | OUTPATIENT
Start: 2022-08-22

## 2022-08-26 ENCOUNTER — TELEPHONE (OUTPATIENT)
Dept: VASCULAR SURGERY | Age: 62
End: 2022-08-26

## 2022-08-26 NOTE — TELEPHONE ENCOUNTER
Spoke with pt and rescheduled his surgery. He stated he still has pre-op instructions. I did review the instructions over the phone and told him to also refer back to them. HE verbalized understanding.

## 2022-08-26 NOTE — TELEPHONE ENCOUNTER
Spoke with pt and transferred him to billing due to his insurance not being put in and his testing was not covered. I transferred to billing so the test can be back dated.

## 2022-08-29 RX ORDER — SODIUM CHLORIDE, SODIUM LACTATE, POTASSIUM CHLORIDE, CALCIUM CHLORIDE 600; 310; 30; 20 MG/100ML; MG/100ML; MG/100ML; MG/100ML
1000 INJECTION, SOLUTION INTRAVENOUS CONTINUOUS
Status: CANCELLED | OUTPATIENT
Start: 2022-08-29

## 2022-08-29 NOTE — DISCHARGE INSTRUCTIONS
hospital. You may park at the [address] [facility] parking garage, or if handicapped there are closer spots in the surface lot directly in front of the [address]. The patients that arrive at 5:30 am need to report to the 1st floor nurse's station, otherwise please report to the main floor registration desk. Transportation after your procedure - If applicable  You will need a friend or family member to drive you home after your procedure. Your  must be 25years of age or older and able to sign off on your discharge instructions. Taxi cabs or any form of public transportation is not acceptable. It is preferable that the friend or family member stay at the hospital throughout your procedure. Someone must remain with you for the first 24 hours after your surgery if you receive anesthesia or medication. If you do not have someone to stay with you, your procedure may be cancelled. Patient Instructions    If you are having any type of anesthesia you are to have nothing to eat or drink after midnight the night before your surgery. This includes gum, mints, water or smoking or chewing tobacco.  The only exception to this is a small sip of water to take with any morning dose of heart, blood pressure, or seizure medications. Bring a list of all medications you take, along with the dose of the medications and how often you take it. If more convenient bring the pharmacy bottles in a zip lock bag. Please shower the night before and the morning of surgery with an antibacterial soap. Please use the wipes given to you the night before your surgery after your shower. Unless otherwise told by your physician, please do not shave legs or any part of your body below your neck the night before or day of your surgery. You may shave your face or neck. Brush your teeth but do not swallow water. Bring your inhaler if you are currently using one.     Bring your eyeglasses

## 2022-08-31 ENCOUNTER — HOSPITAL ENCOUNTER (OUTPATIENT)
Dept: PREADMISSION TESTING | Age: 62
Discharge: HOME | End: 2022-08-31

## 2022-09-02 ENCOUNTER — HOSPITAL ENCOUNTER (OUTPATIENT)
Dept: GENERAL RADIOLOGY | Age: 62
Discharge: HOME | End: 2022-09-04
Payer: MEDICARE

## 2022-09-02 ENCOUNTER — HOSPITAL ENCOUNTER (OUTPATIENT)
Dept: PREADMISSION TESTING | Age: 62
Discharge: HOME | End: 2022-09-06
Payer: MEDICARE

## 2022-09-02 VITALS
SYSTOLIC BLOOD PRESSURE: 152 MMHG | WEIGHT: 263 LBS | HEART RATE: 84 BPM | DIASTOLIC BLOOD PRESSURE: 73 MMHG | TEMPERATURE: 98.3 F | BODY MASS INDEX: 38.95 KG/M2 | RESPIRATION RATE: 14 BRPM | OXYGEN SATURATION: 97 % | HEIGHT: 69 IN

## 2022-09-02 LAB
ABSOLUTE EOS #: 0.22 K/UL (ref 0–0.44)
ABSOLUTE IMMATURE GRANULOCYTE: 0.07 K/UL (ref 0–0.3)
ABSOLUTE LYMPH #: 2.78 K/UL (ref 1.1–3.7)
ABSOLUTE MONO #: 0.55 K/UL (ref 0.1–1.2)
ALBUMIN SERPL-MCNC: 4.3 G/DL (ref 3.5–5.2)
ALBUMIN/GLOBULIN RATIO: 1.6 (ref 1–2.5)
ALP BLD-CCNC: 82 U/L (ref 40–129)
ALT SERPL-CCNC: 30 U/L (ref 5–41)
ANION GAP SERPL CALCULATED.3IONS-SCNC: 13 MMOL/L (ref 9–17)
AST SERPL-CCNC: 27 U/L
BASOPHILS # BLD: 1 % (ref 0–2)
BASOPHILS ABSOLUTE: 0.05 K/UL (ref 0–0.2)
BILIRUB SERPL-MCNC: 0.3 MG/DL (ref 0.3–1.2)
BUN BLDV-MCNC: 16 MG/DL (ref 8–23)
CALCIUM SERPL-MCNC: 9.1 MG/DL (ref 8.6–10.4)
CHLORIDE BLD-SCNC: 102 MMOL/L (ref 98–107)
CO2: 23 MMOL/L (ref 20–31)
CREAT SERPL-MCNC: 0.68 MG/DL (ref 0.7–1.2)
EOSINOPHILS RELATIVE PERCENT: 3 % (ref 1–4)
GFR AFRICAN AMERICAN: >60 ML/MIN
GFR NON-AFRICAN AMERICAN: >60 ML/MIN
GFR SERPL CREATININE-BSD FRML MDRD: ABNORMAL ML/MIN/{1.73_M2}
GLUCOSE BLD-MCNC: 123 MG/DL (ref 70–99)
HCT VFR BLD CALC: 42.3 % (ref 40.7–50.3)
HEMOGLOBIN: 13.6 G/DL (ref 13–17)
IMMATURE GRANULOCYTES: 1 %
INR BLD: 1
LYMPHOCYTES # BLD: 39 % (ref 24–43)
MCH RBC QN AUTO: 28.3 PG (ref 25.2–33.5)
MCHC RBC AUTO-ENTMCNC: 32.2 G/DL (ref 28.4–34.8)
MCV RBC AUTO: 87.9 FL (ref 82.6–102.9)
MONOCYTES # BLD: 8 % (ref 3–12)
NRBC AUTOMATED: 0 PER 100 WBC
PARTIAL THROMBOPLASTIN TIME: 26.3 SEC (ref 20.5–30.5)
PDW BLD-RTO: 14.2 % (ref 11.8–14.4)
PLATELET # BLD: 238 K/UL (ref 138–453)
PMV BLD AUTO: 9.2 FL (ref 8.1–13.5)
POTASSIUM SERPL-SCNC: 4.5 MMOL/L (ref 3.7–5.3)
PROTHROMBIN TIME: 10.5 SEC (ref 9.1–12.3)
RBC # BLD: 4.81 M/UL (ref 4.21–5.77)
SEG NEUTROPHILS: 48 % (ref 36–65)
SEGMENTED NEUTROPHILS ABSOLUTE COUNT: 3.5 K/UL (ref 1.5–8.1)
SODIUM BLD-SCNC: 138 MMOL/L (ref 135–144)
TOTAL PROTEIN: 7 G/DL (ref 6.4–8.3)
WBC # BLD: 7.2 K/UL (ref 3.5–11.3)

## 2022-09-02 PROCEDURE — 93005 ELECTROCARDIOGRAM TRACING: CPT

## 2022-09-02 PROCEDURE — 36415 COLL VENOUS BLD VENIPUNCTURE: CPT

## 2022-09-02 PROCEDURE — 85025 COMPLETE CBC W/AUTO DIFF WBC: CPT

## 2022-09-02 PROCEDURE — 80053 COMPREHEN METABOLIC PANEL: CPT

## 2022-09-02 PROCEDURE — 71046 X-RAY EXAM CHEST 2 VIEWS: CPT

## 2022-09-02 PROCEDURE — 85730 THROMBOPLASTIN TIME PARTIAL: CPT

## 2022-09-02 PROCEDURE — 85610 PROTHROMBIN TIME: CPT

## 2022-09-02 ASSESSMENT — PAIN DESCRIPTION - FREQUENCY: FREQUENCY: CONTINUOUS

## 2022-09-02 ASSESSMENT — PAIN SCALES - GENERAL: PAINLEVEL_OUTOF10: 6

## 2022-09-02 ASSESSMENT — PAIN DESCRIPTION - ORIENTATION: ORIENTATION: LOWER

## 2022-09-02 ASSESSMENT — PAIN DESCRIPTION - PAIN TYPE: TYPE: CHRONIC PAIN

## 2022-09-02 ASSESSMENT — PAIN DESCRIPTION - LOCATION: LOCATION: BACK

## 2022-09-02 ASSESSMENT — PAIN DESCRIPTION - ONSET: ONSET: ON-GOING

## 2022-09-02 ASSESSMENT — PAIN DESCRIPTION - DESCRIPTORS: DESCRIPTORS: ACHING

## 2022-09-02 NOTE — DISCHARGE INSTRUCTIONS
PRE-OPERATIVE INSTRUCTIONS:    Stop eating solid foods at MIDNIGHT the night prior to surgery. (This includes gum, mints, smoking, or chewing tobacco.)    Stop drinking clear liquids at MIDNIGHT the night prior to surgery. Arrive at the [address] Vascular center on 9/14/2022  (as directed by your surgeon's office). If you arrive before 6:00 AM, go to the first floor (CAR1), not the main floor. There is a large sign on the locked doors to push the button and someone will let you in. If you arrive after 6:00 AM, go to the main floor registration. Please check with your surgeon about medication(s) that must be stopped prior to surgery. These may include: Aspirin, Coumadin, Plavix, Ibuprofen, Fish Oil, Herbal supplements    Please do NOT take diabetes medication(s) morning of surgery. If applicable: If you have been given a blood band, you must bring it with you the day of surgery. Use and bring inhalers with you morning of surgery. Bring C-Pap/Bi-pap with you morning of surgery. _____________________             ________________________  Signature (Provider &date)   Signature (Patient)    Day of Surgery/Procedure    As a patient at [facility] you can expect quality medical and nursing care that is centered on your individual needs. Our goal is to make your surgical experience as comfortable as possible  . Directions to the [address] [place] [address])  [facility] is located at [address] [place], [address] [facility]. The [address] [place] [address]) is across the street from the [facility]. You may park at the [address] [facility] parking garage, or if handicapped there are closer spots in the surface lot directly in front of the [address] [place] [address].  The patients that arrive at 5:30 am need to report to the 1st floor nurse's station, otherwise please report to the main floor registration desk.   Transportation after your procedure   You will need a friend or family member to drive you home after your procedure. Your  must be 25years of age or older and able to sign off on your discharge instructions. Taxi cabs or any form of public transportation is not acceptable. It is preferable that the friend or family member stay at the hospital throughout your procedure. Someone must remain with you for the first 24 hours after your surgery if you receive anesthesia or medication. If you do not have someone to stay with you, your procedure may be cancelled. Patient Instructions    If you are having any type of anesthesia you are to have nothing to eat or drink after midnight the night before your surgery. This includes gum, mints, water or smoking or chewing tobacco.  The only exception to this is a small sip of water to take with any morning dose of heart, blood pressure, or seizure medications. Bring a list of all medications you take, along with the dose of the medications and how often you take it. If more convenient bring the pharmacy bottles in a zip lock bag. Please shower the night before and the morning of surgery with an antibacterial soap. Please use the wipes given to you the night before your surgery after your shower. Unless otherwise told by your physician, please do not shave legs or any part of your body below your neck the night before or day of your surgery. You may shave your face or neck. Brush your teeth but do not swallow water. Bring your inhaler if you are currently using one. Bring your eyeglasses and case with you. No contacts are to be worn the day of surgery. You also may bring your hearing aids. Bring your blood band if one has been given to you. Please do not close the clasp. If you are on C-PAP or Bi-PAP at home and plan on staying in the hospital overnight for your surgery please bring the machine with you.     Do not wear any jewelry or body piercings day of surgery. Also, NO lotion, perfume or deodorant to be used the day of surgery. Do not bring any valuables, such as jewelry, cash or credit cards. If you are staying overnight with us, please bring a SMALL bag of personal items. We cannot accommodate large items, like suitcases. Please wear loose, comfortable clothing. If you are potentially going to have a cast or brace bring clothing that will fit over them.                                                                       In case of illness - If you have cold or flu like symptoms (high fever, runny nose, sore throat, cough, etc.) rash, nausea, vomiting, loose stools, and/or recent contact with someone who has a contagious disease (chicken pox, measles, etc.) Please call your doctor before coming to the hospital.    If you have any other questions regarding your procedure or the day of surgery, please call [phone], or [phone]

## 2022-09-02 NOTE — PROGRESS NOTES
Anesthesia Focused Assessment    Hx of anesthesia complications:  prolonged emergence x 1   Family hx of anesthesia complications:  no      Prior + Covid-19 test? no        STOP-BANG Sleep Apnea Questionnaire    SNORE loudly (heard through closed doors)? Yes  TIRED, fatigued, sleepy during daytime? No  OBSERVED stopping breathing during sleep? Yes  High blood PRESSURE or being treated? Yes    BMI over 35? Yes  AGE over 48? Yes  NECK circumference over 16\"? No  GENDER (male)? Yes             Total 6  High risk 5-8  Intermediate risk 3-4  Low risk 0-2    ----------------------------------------------------------------------------------------------------------------------  ROSY                              Yes  If yes, machine? No    DM1                                            No  DM2                   Yes, prediabetes    Coronary Artery Disease      Yes, no stents  HTN         Yes  Defib/AICD/Pacemaker               No             Renal Failure                   No  If yes, on dialysis           Active smoker? Yes, 1/2 ppd  Drinks alcohol? Yes, rarely  Illicit drugs? No  Dentition?         2 loose front lower incisors      Past Medical History:   Diagnosis Date    Abnormal stress ECG     7/28/2022 per Dr. Matt Lyn, [facility] will need cardiac cath in future due to cp    Angina pectoris [facility])     BPH (benign prostatic hyperplasia)     CAD (coronary artery disease)     Dr. Matt Lyn   7/28/22 cardiac clearance    Carotid stenosis     rt  Dr. Angel Squire tunnel syndrome     left    Cervical stenosis of spine 2012    C2-5 laminectomy  Dr. Duran Ge    Cervicalgia     chronic pain    Chronic back pain     COPD (chronic obstructive pulmonary disease) ([address]     on inhalers pcp manages    DDD (degenerative disc disease)     Elbow fracture, right     Fractures     elbow    GERD (gastroesophageal reflux disease)     uses pepto bismol prn    Gout     Right great toe    Hyperlipidemia     Hypertension     Lung nodule     Mild depression ([facility] 09/26/2013    2 suicide attempts by girlfriend related to depression. Morbidly obese ([facility] 10/30/2020    Occlusion of right carotid artery 07/14/2021    Osteoarthritis of right knee     Pre-diabetes     was taking metformin in past  no longer takes    Sinus headache 10/10/2018    Sleep apnea     snores   does not use cpap  pt did no follow up (per patient)    Sleep disturbance     Smoker     Snores     no cpap    SOB (shortness of breath) on exertion     Tendonitis of foot     right    Tooth loose 08/05/2022    lower front rt    Under care of team     Dr. Julienne Ridley  7/14/2022  will need cysto in future after heart cath and carotid sx    Under care of team     Dr. Komal Grant for carotid stenosis    Wellness examination     Ozzy Reynaga  last visit June 2022         Patient was evaluated in PAT & anesthesia guidelines were applied. NPO guidelines, medication instructions and scheduled arrival time were reviewed with patient. Medical or cardiac clearance ordered: no, cardiac clearance on file with most recent office notes from 7/2022 [facility].      JOSSUE Ring - CNP   9/2/22  10:54 AM

## 2022-09-06 ENCOUNTER — TELEPHONE (OUTPATIENT)
Dept: VASCULAR SURGERY | Age: 62
End: 2022-09-06

## 2022-09-06 DIAGNOSIS — M47.817 LUMBOSACRAL SPONDYLOSIS WITHOUT MYELOPATHY: ICD-10-CM

## 2022-09-06 LAB
EKG ATRIAL RATE: 78 BPM
EKG P AXIS: 42 DEGREES
EKG P-R INTERVAL: 148 MS
EKG Q-T INTERVAL: 392 MS
EKG QRS DURATION: 98 MS
EKG QTC CALCULATION (BAZETT): 446 MS
EKG R AXIS: 61 DEGREES
EKG T AXIS: 80 DEGREES
EKG VENTRICULAR RATE: 78 BPM

## 2022-09-06 RX ORDER — TIZANIDINE HYDROCHLORIDE 4 MG/1
CAPSULE, GELATIN COATED ORAL
Qty: 30 CAPSULE | Refills: 0 | Status: SHIPPED | OUTPATIENT
Start: 2022-09-06 | End: 2022-09-14

## 2022-09-06 RX ORDER — GABAPENTIN 800 MG/1
800 TABLET ORAL EVERY 8 HOURS SCHEDULED
Qty: 90 TABLET | Refills: 2 | Status: SHIPPED | OUTPATIENT
Start: 2022-09-06 | End: 2022-10-06

## 2022-09-06 NOTE — TELEPHONE ENCOUNTER
Pt calls and states he was reviewing his CXR and it showed a hernia. He is wondering if this is something he should be concerned about. He states he never knew he had this, is this new?

## 2022-09-08 ENCOUNTER — TELEPHONE (OUTPATIENT)
Dept: VASCULAR SURGERY | Age: 62
End: 2022-09-08

## 2022-09-13 ENCOUNTER — TELEPHONE (OUTPATIENT)
Dept: VASCULAR SURGERY | Age: 62
End: 2022-09-13

## 2022-09-13 NOTE — TELEPHONE ENCOUNTER
Spoke with Murray Aguilar with [facility] in regards to the patients upcoming surgery on 9/14. She stated that the original authorization was submitted on 8/31 with a snipit showing that it was sent to [facility] then stated that they never received it and it was resent yesterday. If there are any questions regarding this contact Fartun at [phone], the writer will call again this afternoon, but surgery may be rescheduled due to insurance problems.

## 2022-09-14 ENCOUNTER — HOSPITAL ENCOUNTER (INPATIENT)
Age: 62
LOS: 3 days | Discharge: HOME | DRG: 286 | End: 2022-09-17
Attending: EMERGENCY MEDICINE | Admitting: INTERNAL MEDICINE
Payer: MEDICARE

## 2022-09-14 ENCOUNTER — APPOINTMENT (OUTPATIENT)
Dept: GENERAL RADIOLOGY | Age: 62
DRG: 286 | End: 2022-09-14
Payer: MEDICARE

## 2022-09-14 ENCOUNTER — APPOINTMENT (OUTPATIENT)
Dept: CT IMAGING | Age: 62
DRG: 286 | End: 2022-09-14
Payer: MEDICARE

## 2022-09-14 DIAGNOSIS — J18.9 PNEUMONIA OF RIGHT UPPER LOBE DUE TO INFECTIOUS ORGANISM: ICD-10-CM

## 2022-09-14 DIAGNOSIS — I48.91 ATRIAL FIBRILLATION WITH RVR (HCC): Primary | ICD-10-CM

## 2022-09-14 DIAGNOSIS — J41.8 MIXED SIMPLE AND MUCOPURULENT CHRONIC BRONCHITIS (HCC): ICD-10-CM

## 2022-09-14 PROBLEM — I20.8 ANGINA OF EFFORT (HCC): Status: ACTIVE | Noted: 2022-09-14

## 2022-09-14 PROBLEM — I20.89 ANGINA OF EFFORT: Status: ACTIVE | Noted: 2022-09-14

## 2022-09-14 LAB
ABSOLUTE EOS #: 0 K/UL (ref 0–0.4)
ABSOLUTE LYMPH #: 1.7 K/UL (ref 1–4.8)
ABSOLUTE MONO #: 0.6 K/UL (ref 0.1–1.3)
ADENOVIRUS PCR: NOT DETECTED
ANION GAP SERPL CALCULATED.3IONS-SCNC: 12 MMOL/L (ref 9–17)
BACTERIA: NORMAL
BASOPHILS # BLD: 1 % (ref 0–2)
BASOPHILS ABSOLUTE: 0.1 K/UL (ref 0–0.2)
BILIRUBIN URINE: ABNORMAL
BORDETELLA PARAPERTUSSIS: NOT DETECTED
BORDETELLA PERTUSSIS PCR: NOT DETECTED
BUN BLDV-MCNC: 22 MG/DL (ref 8–23)
CALCIUM SERPL-MCNC: 9.2 MG/DL (ref 8.6–10.4)
CHLAMYDIA PNEUMONIAE BY PCR: NOT DETECTED
CHLORIDE BLD-SCNC: 99 MMOL/L (ref 98–107)
CO2: 23 MMOL/L (ref 20–31)
COLOR: ABNORMAL
CORONAVIRUS 229E PCR: NOT DETECTED
CORONAVIRUS HKU1 PCR: NOT DETECTED
CORONAVIRUS NL63 PCR: NOT DETECTED
CORONAVIRUS OC43 PCR: NOT DETECTED
CREAT SERPL-MCNC: 0.86 MG/DL (ref 0.7–1.2)
D-DIMER QUANTITATIVE: 2.94 MG/L FEU (ref 0–0.59)
EOSINOPHILS RELATIVE PERCENT: 1 % (ref 0–4)
EPITHELIAL CELLS UA: NORMAL /HPF
GFR AFRICAN AMERICAN: >60 ML/MIN
GFR NON-AFRICAN AMERICAN: >60 ML/MIN
GFR SERPL CREATININE-BSD FRML MDRD: ABNORMAL ML/MIN/{1.73_M2}
GLUCOSE BLD-MCNC: 146 MG/DL (ref 70–99)
GLUCOSE URINE: NEGATIVE
HCT VFR BLD CALC: 38 % (ref 41–53)
HEMOGLOBIN: 12.9 G/DL (ref 13.5–17.5)
HUMAN METAPNEUMOVIRUS PCR: NOT DETECTED
INFLUENZA A BY PCR: NOT DETECTED
INFLUENZA B BY PCR: NOT DETECTED
INR BLD: 1.1
KETONES, URINE: NEGATIVE
LEUKOCYTE ESTERASE, URINE: NEGATIVE
LYMPHOCYTES # BLD: 35 % (ref 24–44)
MCH RBC QN AUTO: 28.1 PG (ref 26–34)
MCHC RBC AUTO-ENTMCNC: 33.9 G/DL (ref 31–37)
MCV RBC AUTO: 82.8 FL (ref 80–100)
MONOCYTES # BLD: 12 % (ref 1–7)
MYCOPLASMA PNEUMONIAE PCR: NOT DETECTED
MYOGLOBIN: 117 NG/ML (ref 28–72)
MYOGLOBIN: 140 NG/ML (ref 28–72)
NITRITE, URINE: NEGATIVE
PARAINFLUENZA 1 PCR: NOT DETECTED
PARAINFLUENZA 2 PCR: NOT DETECTED
PARAINFLUENZA 3 PCR: NOT DETECTED
PARAINFLUENZA 4 PCR: NOT DETECTED
PARTIAL THROMBOPLASTIN TIME: 27.9 SEC (ref 24–36)
PDW BLD-RTO: 14.9 % (ref 11.5–14.9)
PH UA: 5.5 (ref 5–8)
PLATELET # BLD: 157 K/UL (ref 150–450)
PMV BLD AUTO: 7.5 FL (ref 6–12)
POTASSIUM SERPL-SCNC: 3.8 MMOL/L (ref 3.7–5.3)
PROCALCITONIN: 0.24 NG/ML
PROTEIN UA: ABNORMAL
PROTHROMBIN TIME: 14.2 SEC (ref 11.8–14.6)
RBC # BLD: 4.59 M/UL (ref 4.5–5.9)
RBC UA: NORMAL /HPF
RESP SYNCYTIAL VIRUS PCR: NOT DETECTED
RHINO/ENTEROVIRUS PCR: NOT DETECTED
SARS-COV-2, PCR: NOT DETECTED
SARS-COV-2, RAPID: NOT DETECTED
SEG NEUTROPHILS: 51 % (ref 36–66)
SEGMENTED NEUTROPHILS ABSOLUTE COUNT: 2.5 K/UL (ref 1.3–9.1)
SODIUM BLD-SCNC: 134 MMOL/L (ref 135–144)
SPECIFIC GRAVITY UA: 1.08 (ref 1–1.03)
SPECIMEN DESCRIPTION: NORMAL
SPECIMEN DESCRIPTION: NORMAL
THYROXINE, FREE: 1.09 NG/DL (ref 0.93–1.7)
TROPONIN, HIGH SENSITIVITY: 16 NG/L (ref 0–22)
TROPONIN, HIGH SENSITIVITY: 17 NG/L (ref 0–22)
TSH SERPL DL<=0.05 MIU/L-ACNC: 1.92 UIU/ML (ref 0.3–5)
TURBIDITY: CLEAR
URINE HGB: NEGATIVE
UROBILINOGEN, URINE: ABNORMAL
WBC # BLD: 4.9 K/UL (ref 3.5–11)
WBC UA: NORMAL /HPF

## 2022-09-14 PROCEDURE — 96372 THER/PROPH/DIAG INJ SC/IM: CPT

## 2022-09-14 PROCEDURE — 93005 ELECTROCARDIOGRAM TRACING: CPT | Performed by: EMERGENCY MEDICINE

## 2022-09-14 PROCEDURE — 71045 X-RAY EXAM CHEST 1 VIEW: CPT

## 2022-09-14 PROCEDURE — 71260 CT THORAX DX C+: CPT | Performed by: EMERGENCY MEDICINE

## 2022-09-14 PROCEDURE — 87899 AGENT NOS ASSAY W/OPTIC: CPT

## 2022-09-14 PROCEDURE — 93005 ELECTROCARDIOGRAM TRACING: CPT

## 2022-09-14 PROCEDURE — 96376 TX/PRO/DX INJ SAME DRUG ADON: CPT

## 2022-09-14 PROCEDURE — 87635 SARS-COV-2 COVID-19 AMP PRB: CPT

## 2022-09-14 PROCEDURE — 85025 COMPLETE CBC W/AUTO DIFF WBC: CPT

## 2022-09-14 PROCEDURE — 6370000000 HC RX 637 (ALT 250 FOR IP): Performed by: EMERGENCY MEDICINE

## 2022-09-14 PROCEDURE — 81001 URINALYSIS AUTO W/SCOPE: CPT

## 2022-09-14 PROCEDURE — 85610 PROTHROMBIN TIME: CPT

## 2022-09-14 PROCEDURE — 84145 PROCALCITONIN (PCT): CPT

## 2022-09-14 PROCEDURE — 83874 ASSAY OF MYOGLOBIN: CPT

## 2022-09-14 PROCEDURE — 0202U NFCT DS 22 TRGT SARS-COV-2: CPT

## 2022-09-14 PROCEDURE — 2060000000 HC ICU INTERMEDIATE R&B

## 2022-09-14 PROCEDURE — 6360000002 HC RX W HCPCS: Performed by: STUDENT IN AN ORGANIZED HEALTH CARE EDUCATION/TRAINING PROGRAM

## 2022-09-14 PROCEDURE — 85379 FIBRIN DEGRADATION QUANT: CPT

## 2022-09-14 PROCEDURE — 84484 ASSAY OF TROPONIN QUANT: CPT

## 2022-09-14 PROCEDURE — 6360000004 HC RX CONTRAST MEDICATION: Performed by: EMERGENCY MEDICINE

## 2022-09-14 PROCEDURE — 2500000003 HC RX 250 WO HCPCS: Performed by: INTERNAL MEDICINE

## 2022-09-14 PROCEDURE — 2580000003 HC RX 258: Performed by: EMERGENCY MEDICINE

## 2022-09-14 PROCEDURE — 86738 MYCOPLASMA ANTIBODY: CPT

## 2022-09-14 PROCEDURE — 96374 THER/PROPH/DIAG INJ IV PUSH: CPT

## 2022-09-14 PROCEDURE — 2500000003 HC RX 250 WO HCPCS: Performed by: EMERGENCY MEDICINE

## 2022-09-14 PROCEDURE — 6370000000 HC RX 637 (ALT 250 FOR IP)

## 2022-09-14 PROCEDURE — 99223 1ST HOSP IP/OBS HIGH 75: CPT | Performed by: INTERNAL MEDICINE

## 2022-09-14 PROCEDURE — 84443 ASSAY THYROID STIM HORMONE: CPT

## 2022-09-14 PROCEDURE — 6370000000 HC RX 637 (ALT 250 FOR IP): Performed by: INTERNAL MEDICINE

## 2022-09-14 PROCEDURE — 87449 NOS EACH ORGANISM AG IA: CPT

## 2022-09-14 PROCEDURE — 6370000000 HC RX 637 (ALT 250 FOR IP): Performed by: STUDENT IN AN ORGANIZED HEALTH CARE EDUCATION/TRAINING PROGRAM

## 2022-09-14 PROCEDURE — 84439 ASSAY OF FREE THYROXINE: CPT

## 2022-09-14 PROCEDURE — 6360000002 HC RX W HCPCS: Performed by: EMERGENCY MEDICINE

## 2022-09-14 PROCEDURE — 36415 COLL VENOUS BLD VENIPUNCTURE: CPT

## 2022-09-14 PROCEDURE — 85730 THROMBOPLASTIN TIME PARTIAL: CPT

## 2022-09-14 PROCEDURE — 80048 BASIC METABOLIC PNL TOTAL CA: CPT

## 2022-09-14 PROCEDURE — 99285 EMERGENCY DEPT VISIT HI MDM: CPT

## 2022-09-14 RX ORDER — ONDANSETRON 2 MG/ML
4 INJECTION INTRAMUSCULAR; INTRAVENOUS EVERY 6 HOURS PRN
Status: DISCONTINUED | OUTPATIENT
Start: 2022-09-14 | End: 2022-09-17 | Stop reason: HOSPADM

## 2022-09-14 RX ORDER — ENOXAPARIN SODIUM 100 MG/ML
30 INJECTION SUBCUTANEOUS 2 TIMES DAILY
Status: DISCONTINUED | OUTPATIENT
Start: 2022-09-15 | End: 2022-09-15

## 2022-09-14 RX ORDER — ASPIRIN 81 MG/1
81 TABLET ORAL DAILY
Status: DISCONTINUED | OUTPATIENT
Start: 2022-09-14 | End: 2022-09-17

## 2022-09-14 RX ORDER — ALBUTEROL SULFATE 90 UG/1
2 AEROSOL, METERED RESPIRATORY (INHALATION) EVERY 6 HOURS PRN
Status: DISCONTINUED | OUTPATIENT
Start: 2022-09-14 | End: 2022-09-17 | Stop reason: HOSPADM

## 2022-09-14 RX ORDER — GUAIFENESIN DEXTROMETHORPHAN HYDROBROMIDE ORAL SOLUTION 10; 100 MG/5ML; MG/5ML
10 SOLUTION ORAL EVERY 4 HOURS PRN
Status: DISCONTINUED | OUTPATIENT
Start: 2022-09-14 | End: 2022-09-17 | Stop reason: HOSPADM

## 2022-09-14 RX ORDER — POLYETHYLENE GLYCOL 3350 17 G/17G
17 POWDER, FOR SOLUTION ORAL DAILY PRN
Status: DISCONTINUED | OUTPATIENT
Start: 2022-09-14 | End: 2022-09-17 | Stop reason: HOSPADM

## 2022-09-14 RX ORDER — SODIUM CHLORIDE 0.9 % (FLUSH) 0.9 %
5-40 SYRINGE (ML) INJECTION EVERY 12 HOURS SCHEDULED
Status: DISCONTINUED | OUTPATIENT
Start: 2022-09-14 | End: 2022-09-17 | Stop reason: HOSPADM

## 2022-09-14 RX ORDER — GABAPENTIN 400 MG/1
800 CAPSULE ORAL EVERY 8 HOURS SCHEDULED
Status: DISCONTINUED | OUTPATIENT
Start: 2022-09-14 | End: 2022-09-17 | Stop reason: HOSPADM

## 2022-09-14 RX ORDER — ENOXAPARIN SODIUM 100 MG/ML
40 INJECTION SUBCUTANEOUS DAILY
Status: DISCONTINUED | OUTPATIENT
Start: 2022-09-14 | End: 2022-09-14

## 2022-09-14 RX ORDER — ACETAMINOPHEN 650 MG/1
650 SUPPOSITORY RECTAL EVERY 6 HOURS PRN
Status: DISCONTINUED | OUTPATIENT
Start: 2022-09-14 | End: 2022-09-17 | Stop reason: HOSPADM

## 2022-09-14 RX ORDER — MELOXICAM 15 MG/1
15 TABLET ORAL DAILY PRN
Status: ON HOLD | COMMUNITY
End: 2022-09-17 | Stop reason: SDUPTHER

## 2022-09-14 RX ORDER — AZITHROMYCIN 250 MG/1
500 TABLET, FILM COATED ORAL DAILY
Status: DISCONTINUED | OUTPATIENT
Start: 2022-09-14 | End: 2022-09-14

## 2022-09-14 RX ORDER — 0.9 % SODIUM CHLORIDE 0.9 %
100 INTRAVENOUS SOLUTION INTRAVENOUS ONCE
Status: COMPLETED | OUTPATIENT
Start: 2022-09-14 | End: 2022-09-14

## 2022-09-14 RX ORDER — SODIUM CHLORIDE 0.9 % (FLUSH) 0.9 %
5-40 SYRINGE (ML) INJECTION PRN
Status: DISCONTINUED | OUTPATIENT
Start: 2022-09-14 | End: 2022-09-17 | Stop reason: HOSPADM

## 2022-09-14 RX ORDER — KETOROLAC TROMETHAMINE 30 MG/ML
15 INJECTION, SOLUTION INTRAMUSCULAR; INTRAVENOUS EVERY 6 HOURS PRN
Status: DISCONTINUED | OUTPATIENT
Start: 2022-09-14 | End: 2022-09-17 | Stop reason: HOSPADM

## 2022-09-14 RX ORDER — CLOPIDOGREL BISULFATE 75 MG/1
75 TABLET ORAL DAILY
Status: DISCONTINUED | OUTPATIENT
Start: 2022-09-14 | End: 2022-09-17 | Stop reason: HOSPADM

## 2022-09-14 RX ORDER — ENOXAPARIN SODIUM 150 MG/ML
1 INJECTION SUBCUTANEOUS ONCE
Status: COMPLETED | OUTPATIENT
Start: 2022-09-14 | End: 2022-09-14

## 2022-09-14 RX ORDER — ATORVASTATIN CALCIUM 40 MG/1
40 TABLET, FILM COATED ORAL DAILY
Status: DISCONTINUED | OUTPATIENT
Start: 2022-09-14 | End: 2022-09-17 | Stop reason: HOSPADM

## 2022-09-14 RX ORDER — ACETAMINOPHEN 325 MG/1
650 TABLET ORAL EVERY 6 HOURS PRN
Status: DISCONTINUED | OUTPATIENT
Start: 2022-09-14 | End: 2022-09-17 | Stop reason: HOSPADM

## 2022-09-14 RX ORDER — AMLODIPINE BESYLATE 5 MG/1
5 TABLET ORAL DAILY
Status: DISCONTINUED | OUTPATIENT
Start: 2022-09-14 | End: 2022-09-17 | Stop reason: HOSPADM

## 2022-09-14 RX ORDER — TIZANIDINE 4 MG/1
4 TABLET ORAL EVERY 6 HOURS PRN
Status: DISCONTINUED | OUTPATIENT
Start: 2022-09-14 | End: 2022-09-17 | Stop reason: HOSPADM

## 2022-09-14 RX ORDER — DILTIAZEM HYDROCHLORIDE 5 MG/ML
20 INJECTION INTRAVENOUS ONCE
Status: COMPLETED | OUTPATIENT
Start: 2022-09-14 | End: 2022-09-14

## 2022-09-14 RX ORDER — ISOSORBIDE MONONITRATE 60 MG/1
60 TABLET, EXTENDED RELEASE ORAL DAILY
Status: DISCONTINUED | OUTPATIENT
Start: 2022-09-14 | End: 2022-09-17 | Stop reason: HOSPADM

## 2022-09-14 RX ORDER — TIZANIDINE 4 MG/1
4 TABLET ORAL 2 TIMES DAILY PRN
COMMUNITY
End: 2022-11-07 | Stop reason: SDUPTHER

## 2022-09-14 RX ORDER — ONDANSETRON 4 MG/1
4 TABLET, ORALLY DISINTEGRATING ORAL EVERY 8 HOURS PRN
Status: DISCONTINUED | OUTPATIENT
Start: 2022-09-14 | End: 2022-09-17 | Stop reason: HOSPADM

## 2022-09-14 RX ORDER — AZITHROMYCIN 250 MG/1
500 TABLET, FILM COATED ORAL DAILY
Status: DISCONTINUED | OUTPATIENT
Start: 2022-09-15 | End: 2022-09-17 | Stop reason: HOSPADM

## 2022-09-14 RX ORDER — SODIUM CHLORIDE 0.9 % (FLUSH) 0.9 %
10 SYRINGE (ML) INJECTION PRN
Status: DISCONTINUED | OUTPATIENT
Start: 2022-09-14 | End: 2022-09-17 | Stop reason: HOSPADM

## 2022-09-14 RX ORDER — BUDESONIDE AND FORMOTEROL FUMARATE DIHYDRATE 160; 4.5 UG/1; UG/1
2 AEROSOL RESPIRATORY (INHALATION) 2 TIMES DAILY
Status: DISCONTINUED | OUTPATIENT
Start: 2022-09-14 | End: 2022-09-17 | Stop reason: HOSPADM

## 2022-09-14 RX ORDER — ASPIRIN 81 MG/1
324 TABLET, CHEWABLE ORAL ONCE
Status: COMPLETED | OUTPATIENT
Start: 2022-09-14 | End: 2022-09-14

## 2022-09-14 RX ORDER — SODIUM CHLORIDE 9 MG/ML
INJECTION, SOLUTION INTRAVENOUS PRN
Status: DISCONTINUED | OUTPATIENT
Start: 2022-09-14 | End: 2022-09-17 | Stop reason: HOSPADM

## 2022-09-14 RX ADMIN — ATORVASTATIN CALCIUM 40 MG: 40 TABLET, FILM COATED ORAL at 14:43

## 2022-09-14 RX ADMIN — KETOROLAC TROMETHAMINE 15 MG: 30 INJECTION, SOLUTION INTRAMUSCULAR; INTRAVENOUS at 22:30

## 2022-09-14 RX ADMIN — METOPROLOL TARTRATE 25 MG: 25 TABLET, FILM COATED ORAL at 18:19

## 2022-09-14 RX ADMIN — DILTIAZEM HYDROCHLORIDE 20 MG: 5 INJECTION INTRAVENOUS at 10:41

## 2022-09-14 RX ADMIN — ACETAMINOPHEN 650 MG: 325 TABLET, FILM COATED ORAL at 18:38

## 2022-09-14 RX ADMIN — DEXTROMETHORPHAN HYDROBROMIDE, GUAIFENESIN 10 ML: 10; 100 LIQUID ORAL at 16:24

## 2022-09-14 RX ADMIN — GABAPENTIN 800 MG: 400 CAPSULE ORAL at 20:34

## 2022-09-14 RX ADMIN — ISOSORBIDE MONONITRATE 60 MG: 60 TABLET, EXTENDED RELEASE ORAL at 14:43

## 2022-09-14 RX ADMIN — AZITHROMYCIN DIHYDRATE 500 MG: 500 INJECTION, POWDER, LYOPHILIZED, FOR SOLUTION INTRAVENOUS at 13:50

## 2022-09-14 RX ADMIN — ASPIRIN 81 MG 324 MG: 81 TABLET ORAL at 10:38

## 2022-09-14 RX ADMIN — SODIUM CHLORIDE 100 ML: 9 INJECTION, SOLUTION INTRAVENOUS at 12:01

## 2022-09-14 RX ADMIN — CLOPIDOGREL BISULFATE 75 MG: 75 TABLET ORAL at 14:43

## 2022-09-14 RX ADMIN — CEFTRIAXONE SODIUM 1000 MG: 1 INJECTION, POWDER, FOR SOLUTION INTRAMUSCULAR; INTRAVENOUS at 13:27

## 2022-09-14 RX ADMIN — ENOXAPARIN SODIUM 120 MG: 150 INJECTION SUBCUTANEOUS at 13:29

## 2022-09-14 RX ADMIN — ASPIRIN 81 MG: 81 TABLET, COATED ORAL at 14:43

## 2022-09-14 RX ADMIN — IOPAMIDOL 75 ML: 755 INJECTION, SOLUTION INTRAVENOUS at 12:00

## 2022-09-14 RX ADMIN — DILTIAZEM HYDROCHLORIDE 5 MG/HR: 5 INJECTION, SOLUTION INTRAVENOUS at 11:28

## 2022-09-14 RX ADMIN — AMIODARONE HYDROCHLORIDE 150 MG: 1.5 INJECTION, SOLUTION INTRAVENOUS at 18:44

## 2022-09-14 RX ADMIN — AMLODIPINE BESYLATE 5 MG: 5 TABLET ORAL at 14:43

## 2022-09-14 RX ADMIN — AMIODARONE HYDROCHLORIDE 150 MG: 1.5 INJECTION, SOLUTION INTRAVENOUS at 18:57

## 2022-09-14 RX ADMIN — SODIUM CHLORIDE, PRESERVATIVE FREE 10 ML: 5 INJECTION INTRAVENOUS at 12:01

## 2022-09-14 RX ADMIN — KETOROLAC TROMETHAMINE 15 MG: 30 INJECTION, SOLUTION INTRAMUSCULAR; INTRAVENOUS at 16:12

## 2022-09-14 RX ADMIN — DEXTROMETHORPHAN HYDROBROMIDE, GUAIFENESIN 10 ML: 10; 100 LIQUID ORAL at 20:34

## 2022-09-14 RX ADMIN — AMIODARONE HYDROCHLORIDE 1 MG/MIN: 1.8 INJECTION, SOLUTION INTRAVENOUS at 19:27

## 2022-09-14 RX ADMIN — TIZANIDINE 4 MG: 4 TABLET ORAL at 18:38

## 2022-09-14 RX ADMIN — GABAPENTIN 800 MG: 400 CAPSULE ORAL at 14:42

## 2022-09-14 ASSESSMENT — ENCOUNTER SYMPTOMS
VOMITING: 0
ABDOMINAL PAIN: 0
EYE DISCHARGE: 0
NAUSEA: 0
SORE THROAT: 0
BLOOD IN STOOL: 0
EYE PAIN: 0
SINUS PRESSURE: 0
SHORTNESS OF BREATH: 1
COLOR CHANGE: 0
FACIAL SWELLING: 0
COUGH: 1
WHEEZING: 0
CHEST TIGHTNESS: 0
BACK PAIN: 0
EYE REDNESS: 0
NAUSEA: 1
TROUBLE SWALLOWING: 0
RHINORRHEA: 0
DIARRHEA: 0
CONSTIPATION: 0

## 2022-09-14 ASSESSMENT — PAIN SCALES - GENERAL
PAINLEVEL_OUTOF10: 6
PAINLEVEL_OUTOF10: 8
PAINLEVEL_OUTOF10: 7
PAINLEVEL_OUTOF10: 5

## 2022-09-14 ASSESSMENT — PAIN DESCRIPTION - DESCRIPTORS: DESCRIPTORS: ACHING

## 2022-09-14 ASSESSMENT — PAIN DESCRIPTION - LOCATION: LOCATION: BACK;NECK;CHEST

## 2022-09-14 NOTE — PROGRESS NOTES
RN spoke with Dr. Ana Luisa Nichols about pts HR consistently jumping up to 160s while maxed on cardizem for the last hour or two. D/c'd cardizem gtt, ordered amio 300mg bolus followed by 1mg/min gtt with no rate reduction. Ordered 50mg PO lopressor BID, first dose now. RN relayed new orders to primary RN.  Electronically signed by Linda Christensen RN on 9/14/2022 at 5:50 PM

## 2022-09-14 NOTE — PROGRESS NOTES
Pt arrived to unit from ED. Vitals taken. Admission and assessment complete. Pt placed on telemerty, call light given, pt oriented to room. Safety measures in place. POC initiated and reviewed with patient. Wife at the bedside.

## 2022-09-14 NOTE — H&P
[address] [place] [address]     HISTORY AND PHYSICAL EXAMINATION            Date:   9/14/2022  Patient name:  Quique Shay  Date of admission:  9/14/2022 10:17 AM  MRN:   [id number]  Account:  [de-identified]  YOB: 1960  PCP:    David Givens DO  Room:   2083/2083-01  Code Status:    Full Code    Chief Complaint:     Chief Complaint   Patient presents with    Fever    Shortness of Breath       History Obtained From:     patient, electronic medical record    History of Present Illness: The patient is a 57 y.o. Non- / non  male history significant for coronary artery disease, cervical stenosis, COPD, GERD, hypertension, hyperlipidemia, and right carotid stenosis approximately 90% occlusion who presents withFever and Shortness of Breath   and he is admitted to the hospital for the management of A. fib with RVR and community-acquired pneumonia    Patient presents with approximately 6 days of fever, chills, diaphoresis, weakness, heart palpitations, shortness of breath and cough. Reports that it has been worsening over the last 2 days his weakness and chest palpitations have significantly increased. States that cough has minimal sputum production and that it is clear sputum. Patient reports that he felt so bad this morning that he reported to the hospital here with his wife. Denies any sick contacts. Denies having this occur in the past.  Denies any leg swelling, diarrhea, difficulty with urination or abdominal pain. Patient was scheduled to undergo a TCAR procedure with Catina jesus vascular surgeon at [facility] this morning. Patient has approximately 90% occlusion of his right carotid artery. Patiently was recently prescribed aspirin and Plavix per his vascular doctor.   Also has a history of coronary artery disease and has been seeing Dr. Aspen Sanabria. Per patient his cardiologist was going to perform a cardiac catheterization once the TCAR procedure was complete. Of note the patient has cervical spine stenosis with chronic pain and had been seeing pain management up until this spring. Sees Dr. Warren Dougherty as his primary care provider who is currently managing his pain medications. He reports that he quit smoking 6 days ago but had smoked approximately 2 pack of cigarettes for 46 years. Occasionally drinks alcohol and denies any illicit drug use. In the emergency department the patient was found to be in A. fib with RVR. Dr. Aspen Sanabria his cardiologist was consulted and the patient was subsequently started on diltiazem drip. Patient was afebrile, and tachycardic. Patient did not have a white count. Initial labs showed a D-dimer of 2.94, a CT chest with contrast was performed and no pulmonary embolism was identified. CT demonstrated wedge-shaped airspace opacity in the right upper lobe which could represent an infection or previous infarct. Patient was subsequently started on azithromycin and ceftriaxone for suspicion of community-acquired pneumonia. Rapid COVID test was negative. TSH was within normal limits. Troponins were within normal limits.         Past Medical History:     Past Medical History:   Diagnosis Date    Abnormal stress ECG     7/28/2022 per Dr. Bren Krishnamurthy, [facility] will need cardiac cath in future due to cp    Angina pectoris [facility])     BPH (benign prostatic hyperplasia)     CAD (coronary artery disease)     Dr. Bren Krishnamurthy   7/28/22 cardiac clearance    Carotid stenosis     rt  Dr. Marleny Almendarez tunnel syndrome     left    Cervical stenosis of spine 2012    C2-5 laminectomy  Dr. Azael Maloney    Cervicalgia     chronic pain    Chronic back pain     COPD (chronic obstructive pulmonary disease) ([facility] [address]     on inhalers pcp manages    DDD (degenerative disc disease)     Elbow fracture, right     Fractures     elbow    GERD (gastroesophageal reflux disease)     uses pepto bismol prn    Gout     Right great toe    Hyperlipidemia     Hypertension     Lung nodule     Mild depression ([facility] [address] 09/26/2013    2 suicide attempts by girlfriend related to depression.     Morbidly obese ([facility] [address] 10/30/2020    Occlusion of right carotid artery 07/14/2021    Osteoarthritis of right knee     Pre-diabetes     was taking metformin in past  no longer takes    Sinus headache 10/10/2018    Sleep apnea     snores   does not use cpap  pt did no follow up (per patient)    Sleep disturbance     Smoker     Snores     no cpap    SOB (shortness of breath) on exertion     Tendonitis of foot     right    Tooth loose 08/05/2022    lower front rt    Under care of team     Dr. Brittany Diaz  7/14/2022  will need cysto in future after heart cath and carotid sx    Under care of team     Dr. Maya Kathleen for carotid stenosis    Wellness examination     Estefaniaricardo Paul  last visit June 2022        Past SurgicalHistory:     Past Surgical History:   Procedure Laterality Date    CARDIAC CATHETERIZATION  07/08/2014    Non Obstructive CAD     CERVICAL SPINE SURGERY  07/13/2012     C2-3-4-5    COLONOSCOPY      FRACTURE SURGERY      Rt elbow     HC INJECT OTHER PERPHRL NERV Bilateral 05/09/2019    INJECTION SPINAL performed by Nani Cooper MD at [address] Bilateral 08/15/2019    SACROILIAC JOINT INJECTION performed by Nani Cooper MD at [place] [address]  02/05/2016    [facility]    NERVE BLOCK Left 02/07/2020    RFA left side    NERVE BLOCK Left 02/07/2020     NERVE RADIOFREQUENCY ABLATION - SACROILIAC (Left )    NERVE BLOCK  02/12/2021    NERVE RADIOFREQUENCY ABLATION SI (Left     NERVE BLOCK Right 02/19/2021    Procedure: NERVE RADIOFREQUENCY ABLATION SI JOINT (Right )    OTHER SURGICAL HISTORY  08/15/2019    sacroiliac joint injection    PAIN MANAGEMENT PROCEDURE Left 02/07/2020    NERVE RADIOFREQUENCY ABLATION - SACROILIAC performed by Ceferino Samaniego MD at [address] Left 02/12/2021    NERVE RADIOFREQUENCY ABLATION SI performed by Ceferino Samaniego MD at [address] [place] [address] Right 02/19/2021    NERVE RADIOFREQUENCY ABLATION SI JOINT performed by Ceferino Samaniego MD at [address] [facility]          Medications Prior to Admission:        Prior to Admission medications    Medication Sig Start Date End Date Taking? Authorizing Provider   meloxicam (MOBIC) 15 MG tablet Take 15 mg by mouth daily as needed   Yes Historical Provider, MD   tiZANidine (ZANAFLEX) 4 MG tablet Take 4 mg by mouth 2 times daily as needed   Yes Historical Provider, MD   gabapentin (NEURONTIN) 800 MG tablet Take 1 tablet by mouth every 8 hours for 30 days. 9/6/22 10/6/22  Zach Meeks DO   isosorbide mononitrate (IMDUR) 60 MG extended release tablet Take 1 tablet by mouth in the morning. 7/28/22   Historical Provider, MD   clopidogrel (PLAVIX) 75 MG tablet Take 1 tablet by mouth in the morning. Patient taking differently: Take 75 mg by mouth daily Pt was instructed to continue Plavix for upcoming surgery per Dr. Edelmira Feliciano.  8/1/22   Robert Fernandez MD   nitroGLYCERIN (NITROSTAT) 0.4 MG SL tablet Place 1 tablet under the tongue every 5 minutes as needed for Chest pain (not to exceed 3 tabs at a time) 6/3/22   Zach Meeks DO   amLODIPine (NORVASC) 5 MG tablet TAKE ONE TABLET BY MOUTH DAILY 4/22/22   Zach Meeks DO   albuterol sulfate  (90 Base) MCG/ACT inhaler Inhale 2 puffs into the lungs every 6 hours as needed for Wheezing 4/22/22   Zach Meeks DO   simvastatin (ZOCOR) 80 MG tablet TAKE ONE TABLET BY MOUTH EVERY EVENING 4/11/22   Zach Meeks DO   acetaminophen (TYLENOL) 325 MG tablet Take 650 mg by mouth nightly    Historical Provider, MD   aspirin 81 MG EC tablet Take 1 tablet by mouth daily  Patient taking differently: Take 81 mg by mouth daily Pt. Was instructed to continue asa per Dr. Laya Quinteros. 3/9/16   Shane Dave MD        Allergies:     Patient has no known allergies. Social History:     Tobacco:    reports that he has quit smoking. His smoking use included cigarettes. He started smoking about 47 years ago. He has a 10.00 pack-year smoking history. He quit smokeless tobacco use about 7 years ago. Alcohol:      reports that he does not currently use alcohol. Drug Use:  reports no history of drug use. Family History:     Family History   Problem Relation Age of Onset    Heart Disease Mother     COPD Father     Cancer Maternal Aunt 64        breast cancer     Cancer Maternal Uncle 60        prostrate cancer        Review of Systems:     Positive and Negative as described in HPI. Review of Systems   Constitutional:  Positive for chills, diaphoresis, fatigue and fever. HENT:  Negative for hearing loss and trouble swallowing. Eyes:  Negative for visual disturbance. Respiratory:  Positive for cough and shortness of breath. Cardiovascular:  Negative for chest pain and leg swelling. Gastrointestinal:  Negative for abdominal pain, constipation, diarrhea, nausea and vomiting. Genitourinary:  Negative for difficulty urinating and hematuria. Musculoskeletal:  Positive for neck pain and neck stiffness. Negative for back pain. Skin:  Negative for color change. Neurological:  Positive for headaches. Negative for dizziness and numbness. Psychiatric/Behavioral:  Negative for behavioral problems and confusion. Physical Exam:   /86   Pulse (!) 118   Temp 98 °F (36.7 °C) (Oral)   Resp 26   Ht 5' 9\" (1.753 m)   Wt 254 lb 10.1 oz (115.5 kg)   SpO2 96%   BMI 37.60 kg/m²   Temp (24hrs), Av.2 °F (36.8 °C), Min:98 °F (36.7 °C), Max:98.4 °F (36.9 °C)    No results for input(s): POCGLU in the last 72 hours.   No intake or output data in the 24 hours ending 09/14/22 1512    Physical Exam  Constitutional:       Appearance: He is obese. He is ill-appearing. HENT:      Head: Normocephalic and atraumatic. Nose: Nose normal.      Mouth/Throat:      Mouth: Mucous membranes are moist.   Eyes:      Extraocular Movements: Extraocular movements intact. Pupils: Pupils are equal, round, and reactive to light. Cardiovascular:      Rate and Rhythm: Tachycardia present. Rhythm irregular. Pulses: Normal pulses. Heart sounds: Normal heart sounds. Pulmonary:      Effort: Pulmonary effort is normal.      Breath sounds: Normal breath sounds. Abdominal:      General: Bowel sounds are normal.      Palpations: Abdomen is soft. Musculoskeletal:         General: Normal range of motion. Cervical back: Neck supple. Skin:     General: Skin is warm and dry. Capillary Refill: Capillary refill takes less than 2 seconds. Coloration: Skin is pale. Neurological:      General: No focal deficit present. Mental Status: He is alert and oriented to person, place, and time.    Psychiatric:         Mood and Affect: Mood normal.         Behavior: Behavior normal.       Investigations:     Laboratory Testing:  Recent Results (from the past 24 hour(s))   EKG 12 Lead    Collection Time: 09/14/22 10:28 AM   Result Value Ref Range    Ventricular Rate 154 BPM    Atrial Rate 357 BPM    QRS Duration 100 ms    Q-T Interval 292 ms    QTc Calculation (Bazett) 467 ms    R Axis 79 degrees    T Axis 75 degrees   Basic Metabolic Panel    Collection Time: 09/14/22 10:37 AM   Result Value Ref Range    Glucose 146 (H) 70 - 99 mg/dL    BUN 22 8 - 23 mg/dL    Creatinine 0.86 0.70 - 1.20 mg/dL    Calcium 9.2 8.6 - 10.4 mg/dL    Sodium 134 (L) 135 - 144 mmol/L    Potassium 3.8 3.7 - 5.3 mmol/L    Chloride 99 98 - 107 mmol/L    CO2 23 20 - 31 mmol/L    Anion Gap 12 9 - 17 mmol/L    GFR Non-African American >60 >60 mL/min    GFR African American >60 >60 mL/min    GFR Comment CBC with Auto Differential    Collection Time: 09/14/22 10:37 AM   Result Value Ref Range    WBC 4.9 3.5 - 11.0 k/uL    RBC 4.59 4.5 - 5.9 m/uL    Hemoglobin 12.9 (L) 13.5 - 17.5 g/dL    Hematocrit 38.0 (L) 41 - 53 %    MCV 82.8 80 - 100 fL    MCH 28.1 26 - 34 pg    MCHC 33.9 31 - 37 g/dL    RDW 14.9 11.5 - 14.9 %    Platelets 126 972 - 028 k/uL    MPV 7.5 6.0 - 12.0 fL    Seg Neutrophils 51 36 - 66 %    Lymphocytes 35 24 - 44 %    Monocytes 12 (H) 1 - 7 %    Eosinophils % 1 0 - 4 %    Basophils 1 0 - 2 %    Segs Absolute 2.50 1.3 - 9.1 k/uL    Absolute Lymph # 1.70 1.0 - 4.8 k/uL    Absolute Mono # 0.60 0.1 - 1.3 k/uL    Absolute Eos # 0.00 0.0 - 0.4 k/uL    Basophils Absolute 0.10 0.0 - 0.2 k/uL   D-Dimer, Quantitative    Collection Time: 09/14/22 10:37 AM   Result Value Ref Range    D-Dimer, Quant 2.94 (H) 0.00 - 0.59 mg/L FEU   TROP/MYOGLOBIN    Collection Time: 09/14/22 10:37 AM   Result Value Ref Range    Troponin, High Sensitivity 17 0 - 22 ng/L    Myoglobin 140 (H) 28 - 72 ng/mL   TSH    Collection Time: 09/14/22 10:37 AM   Result Value Ref Range    TSH 1.92 0.30 - 5.00 uIU/mL   T4, Free    Collection Time: 09/14/22 10:37 AM   Result Value Ref Range    Thyroxine, Free 1.09 0.93 - 1.70 ng/dL   APTT    Collection Time: 09/14/22 10:37 AM   Result Value Ref Range    PTT 27.9 24.0 - 36.0 sec   Protime-INR    Collection Time: 09/14/22 10:37 AM   Result Value Ref Range    Protime 14.2 11.8 - 14.6 sec    INR 1.1    COVID-19, Rapid    Collection Time: 09/14/22 10:45 AM    Specimen: Nasopharyngeal Swab   Result Value Ref Range    Specimen Description . NASOPHARYNGEAL SWAB     SARS-CoV-2, Rapid Not Detected Not Detected   EKG 12 Lead    Collection Time: 09/14/22 11:03 AM   Result Value Ref Range    Ventricular Rate 132 BPM    Atrial Rate 163 BPM    QRS Duration 102 ms    Q-T Interval 308 ms    QTc Calculation (Bazett) 456 ms    R Axis 74 degrees    T Axis 64 degrees   TROP/MYOGLOBIN    Collection Time: 09/14/22 12:49 PM   Result Value Ref Range    Troponin, High Sensitivity 16 0 - 22 ng/L    Myoglobin 117 (H) 28 - 72 ng/mL       Imaging/Diagnostics:  XR CHEST (2 VW)    Result Date: 9/4/2022  EXAMINATION: TWO XRAY VIEWS OF THE CHEST 9/2/2022 9:33 am COMPARISON: 08/05/2022 HISTORY: ORDERING SYSTEM PROVIDED HISTORY: preop, right carotid stenosis TECHNOLOGIST PROVIDED HISTORY: preop, right carotid stenosis FINDINGS: Cardiomediastinal silhouette appears within normal limits. No focal consolidation or overt pulmonary edema. Left diaphragmatic hernia. No evidence of pneumothorax. No acute osseous abnormalities. 1. No radiographic evidence of an acute cardiopulmonary process. 2. Left diaphragmatic hernia. XR CHEST PORTABLE    Result Date: 9/14/2022  EXAMINATION: ONE XRAY VIEW OF THE CHEST 9/14/2022 10:39 am COMPARISON: Two-view chest from 09/02/2022 HISTORY: ORDERING SYSTEM PROVIDED HISTORY: Chest Pain TECHNOLOGIST PROVIDED HISTORY: Chest Pain Reason for Exam: chest pain Known left diaphragmatic hernia FINDINGS: Overlying ECG monitor leads. Cardiomediastinal shadow stable; no cardiomegaly. Patchy basilar opacities, probably atelectatic or fibrotic mildly elevated lateral left costophrenic angle, slightly increased by comparison; no consolidation or sizable pleural effusion. Moderate DJD spine with mild convex-right curvature. Slightly greater basilar atelectasis; known left diaphragmatic hernia. No consolidation or sizable pleural effusion. CT CHEST PULMONARY EMBOLISM W CONTRAST    Result Date: 9/14/2022  EXAMINATION: CTA OF THE CHEST, 9/14/2022 11:59 am TECHNIQUE: CTA of the chest was performed after the administration of intravenous contrast.  Multiplanar reformatted images are provided for review. MIP images are provided for review. Automated exposure control, iterative reconstruction, and/or weight based adjustment of the mA/kV was utilized to reduce the radiation dose to as low as reasonably achievable. COMPARISON: Chest radiograph 09/14/2022, CT 05/17/2022 HISTORY: ORDERING SYSTEM PROVIDED HISTORY: SOB, elevated d-dimer TECHNOLOGIST PROVIDED HISTORY: SOB, elevated D-dimer Decision Support Exception - unselect if not a suspected or confirmed emergency medical condition->Emergency Medical Condition (MA) Reason for Exam:  SOB elevated D-dimer FINDINGS: Pulmonary Arteries: Pulmonary arteries are adequately opacified for evaluation. No evidence of intraluminal filling defect to suggest pulmonary embolism. Main pulmonary artery is normal in caliber. Mediastinum: No lymphadenopathy. No pericardial effusion. Coronary artery calcifications are seen. The thoracic aorta is not significantly dilated. Lungs/pleura: The central airways are patent. No pleural effusion or pneumothorax is seen. Again seen is a left diaphragmatic hernia with herniation of a portion of the spleen and intra-abdominal fat into the lower thorax. Compared to the prior CT, there are new airspace opacities in the right upper lobe, near the lung apex. There is dependent atelectasis in the lung bases. Evaluation of the lung parenchyma is limited by respiratory motion artifact. Upper Abdomen: Hepatic steatosis. Soft Tissues/Bones: Flowing ossification throughout the thoracic spine compatible with diffuse idiopathic skeletal hyperostosis. No acute bony abnormality is seen. No pulmonary embolism is identified. Wedge-shaped airspace opacities are seen in the right upper lobe, near the lung apex. This could represent an infection. While no pulmonary embolism is identified, a pulmonary infarct could also have this appearance. This finding is new from the prior CT dated 05/17/2022. Left diaphragmatic hernia. RECOMMENDATIONS: Follow-up radiographs recommended after treatment.        Assessment :      Primary Problem  Pneumonia    Active Hospital Problems    Diagnosis Date Noted    Pneumonia [J18.9] 09/14/2022     Priority: Medium       Plan: Patient status Admit as inpatient in the  Progressive Unit/Step down    New onset atrial fibrillation RVR  - Presented tachycardic with irregularly irregular rhythm with a rate of 175 to emergency department  - Cardiology consulted  - Patient started on Cardizem drip  - TSH within normal limits  - [id number] score of 1  - Patient already on aspirin and Plavix for carotid artery stenosis/coronary artery disease  - Continuous telemetry  - Monitor vitals    Community acquired pneumonia  - Patient presented with 6-day history of cough, shortness of breath, diaphoresis and fever  - D-dimer was 2.94 on admission  - CT chest with contrast ordered, no pulmonary embolism was identified.   Did demonstrate wedge-shaped airspace opacities could possibly represent infection versus infarct  - Procalcitonin elevated at 0.24  - Patient started on IV ceftriaxone and azithromycin  - Respiratory panel ordered  - Legionella and strep pneumo ordered  - COVID-negative  - Robitussin for cough    Carotid artery stenosis  - Patient scheduled to undergo TCAR with Gay Carrera this morning for right carotid artery stenosis of 90%  - Patient recently started on aspirin and Plavix  - Continue aspirin 81 mg and Plavix 75 mg    Cervical spinal stenosis   - Patient has a past medical history of a cervical spine surgery with chronic pain  - Was seen pain management up until last spring  - Continue home dose of gabapentin 800 mg 3 times a day  - Continue home tizanidine 4 mg every 6 hours as needed  - Toradol added for severe pain as needed    COPD  - Continue home inhalers, albuterol and Symbicort  - Patient reports that he quit smoking 6 days ago    Hyperlipidemia  - Continue statin Lipitor 40 mg daily    Essential hypertension  - Continue home dose amlodipine 5 mg daily    Angina  - Sees is Dr. Claudeen Penton outpatient, has had stress test done recently which was negative  - Patient reports future plan of cardiac catheterization  - Continue aspirin, and clopidogrel  - Continue home dose of Imdur 60 mg daily    DVT prophylaxis: Heparin 30 mg twice daily  Diet: Regular adult cardiac diet  PT/OT and social work consulted  CODE STATUS: Full code    Consultations:   IP CONSULT TO CARDIOLOGY  IP CONSULT TO PRIMARY CARE PROVIDER  IP CONSULT TO SOCIAL WORK     Patient is admitted as inpatient status because of co-morbiditieslisted above, severity of signs and symptoms as outlined, requirement for current medical therapies and most importantly because of direct risk to patient if care not provided in a hospital setting. Zac Gagnon MD  9/14/2022  3:12 PM    Copy sent to Dr. Jon Woodruff DO   Attending Physician Statement  I have discussed the care of [address] [place] [address] and I have examined the patient myselft and taken ros and hpi , including pertinent history and exam findings,  with the resident. I have reviewed the key elements of all parts of the encounter with the resident. I agree with the assessment, plan and orders as documented by the resident.   78-year-old gentleman class II obese BMI 37.60 history of smoking 2 packs a day for over 35 years admitted with a palpitation dyspnea cough diagnosed with community-acquired pneumonia right upper lobe consolidation noted CT chest shows no pulmonary embolism patient also noted to have atrial fibrillation with rapid ventricular response no history of atrial fibrillation  Likely diagnosis is a community-acquired pneumonia causing A. fib with RVR with underlying sleep apnea which made him prone with his age and obesity  Amiodarone drip per digoxin cardiology consult committee acquired pneumonia antibiotic coverage  Patient may qualify for long-term anticoagulation given hypertension age and A. fib    Electronically signed by Tequila Brown MD

## 2022-09-14 NOTE — ED PROVIDER NOTES
[address]  eMERGENCY dEPARTMENT eNCOUnter      Pt Name: Natalie Salinas  MRN: [id number]  [place] 1960  Date of evaluation: 9/14/22      CHIEF COMPLAINT       Chief Complaint   Patient presents with    Fever    Shortness of Breath         HISTORY OF PRESENT ILLNESS    Natalie Salinas is a 57 y.o. male who presents complaining of shortness of breath. Patient states for the past 5 days or so he has been feeling short of breath fatigue severe cough with no production of sputum and fevers. Patient states that intermittently has blood pressure and pulse have been very elevated. Patient states that he has no known history of atrial fibrillation. Patient states that he just overall feels horrible. Patient is not on any blood thinners other than Plavix and aspirin. Patient has no pain or swelling in the legs. No recent travel. REVIEW OF SYSTEMS       Review of Systems   Constitutional:  Positive for chills, diaphoresis, fatigue and fever. Negative for activity change and appetite change. HENT:  Positive for congestion. Negative for ear pain, facial swelling, nosebleeds, rhinorrhea, sinus pressure, sore throat and trouble swallowing. Eyes:  Negative for pain, discharge and redness. Respiratory:  Positive for cough and shortness of breath. Negative for chest tightness and wheezing. Cardiovascular:  Positive for chest pain and palpitations. Negative for leg swelling. Gastrointestinal:  Positive for nausea. Negative for abdominal pain, blood in stool, constipation, diarrhea and vomiting. Genitourinary:  Negative for difficulty urinating, dysuria, flank pain, frequency, genital sores and hematuria. Musculoskeletal:  Negative for arthralgias, back pain, gait problem, joint swelling, myalgias and neck pain. Skin:  Negative for color change, pallor, rash and wound. Neurological:  Negative for dizziness, tremors, seizures, syncope, speech difficulty, weakness, numbness and headaches. Psychiatric/Behavioral:  Negative for confusion, decreased concentration, hallucinations, self-injury, sleep disturbance and suicidal ideas. PAST MEDICAL HISTORY     Past Medical History:   Diagnosis Date    Abnormal stress ECG     7/28/2022 per Dr. Enedina Mendenhall, [facility] Cardiology will need cardiac cath in future due to cp    Angina pectoris [facility])     BPH (benign prostatic hyperplasia)     CAD (coronary artery disease)     Dr. Enedina Mendenhall   7/28/22 cardiac clearance    Carotid stenosis     rt  Dr. Gayathri Horn tunnel syndrome     left    Cervical stenosis of spine 2012    C2-5 laminectomy  Dr. Benjy Nelson    Cervicalgia     chronic pain    Chronic back pain     COPD (chronic obstructive pulmonary disease) ([address]     on inhalers pcp manages    DDD (degenerative disc disease)     Elbow fracture, right     Fractures     elbow    GERD (gastroesophageal reflux disease)     uses pepto bismol prn    Gout     Right great toe    Hyperlipidemia     Hypertension     Lung nodule     Mild depression ([address] 09/26/2013    2 suicide attempts by girlfriend related to depression.     Morbidly obese ([address] 10/30/2020    Occlusion of right carotid artery 07/14/2021    Osteoarthritis of right knee     Pre-diabetes     was taking metformin in past  no longer takes    Sinus headache 10/10/2018    Sleep apnea     snores   does not use cpap  pt did no follow up (per patient)    Sleep disturbance     Smoker     Snores     no cpap    SOB (shortness of breath) on exertion     Tendonitis of foot     right    Tooth loose 08/05/2022    lower front rt    Under care of team     Dr. Kimber Cummings  7/14/2022  will need cysto in future after heart cath and carotid sx    Under care of team     Dr. Santiago Mcgrath for carotid stenosis    Wellness examination     [facility]  last visit June 2022       SURGICAL HISTORY       Past Surgical History:   Procedure Laterality Date    CARDIAC CATHETERIZATION  07/08/2014    Non Obstructive CAD     CERVICAL SPINE SURGERY  07/13/2012     C2-3-4-5    COLONOSCOPY      FRACTURE SURGERY      Rt elbow     HC INJECT OTHER PERPHRL NERV Bilateral 05/09/2019    INJECTION SPINAL performed by Nani Cooper MD at [address] Bilateral 08/15/2019    SACROILIAC JOINT INJECTION performed by Nani Cooper MD at [facility] [address]  02/05/2016    [facility]    NERVE BLOCK Left 02/07/2020    RFA left side    NERVE BLOCK Left 02/07/2020     NERVE RADIOFREQUENCY ABLATION - SACROILIAC (Left )    NERVE BLOCK  02/12/2021    NERVE RADIOFREQUENCY ABLATION SI (Left     NERVE BLOCK Right 02/19/2021    Procedure: NERVE RADIOFREQUENCY ABLATION SI JOINT (Right )    OTHER SURGICAL HISTORY  08/15/2019    sacroiliac joint injection    PAIN MANAGEMENT PROCEDURE Left 02/07/2020    NERVE RADIOFREQUENCY ABLATION - SACROILIAC performed by Nani Cooper MD at [address] [place] [address] Left 02/12/2021    NERVE RADIOFREQUENCY ABLATION SI performed by Nani Cooper MD at [address] [place] [address] Right 02/19/2021    NERVE RADIOFREQUENCY ABLATION SI JOINT performed by Nani Cooper MD at [place] [address]       Previous Medications    ACETAMINOPHEN (TYLENOL) 325 MG TABLET    Take 650 mg by mouth nightly    ALBUTEROL SULFATE  (90 BASE) MCG/ACT INHALER    Inhale 2 puffs into the lungs every 6 hours as needed for Wheezing    AMLODIPINE (NORVASC) 5 MG TABLET    TAKE ONE TABLET BY MOUTH DAILY    ASPIRIN 81 MG EC TABLET    Take 1 tablet by mouth daily    BUDESONIDE-FORMOTEROL (SYMBICORT) 160-4.5 MCG/ACT AERO    INHALE TWO PUFFS BY MOUTH TWICE A DAY    CLOPIDOGREL (PLAVIX) 75 MG TABLET    Take 1 tablet by mouth in the morning.     FLUTICASONE (FLONASE) 50 MCG/ACT NASAL SPRAY    1 spray    GABAPENTIN (NEURONTIN) 800 MG TABLET    Take 1 tablet by mouth every 8 hours for 30 days.    ISOSORBIDE MONONITRATE (IMDUR) 60 MG EXTENDED RELEASE TABLET    Take 1 tablet by mouth in the morning. MELOXICAM (MOBIC) 15 MG TABLET    Take 15 mg by mouth daily    NITROGLYCERIN (NITROSTAT) 0.4 MG SL TABLET    Place 1 tablet under the tongue every 5 minutes as needed for Chest pain (not to exceed 3 tabs at a time)    SIMVASTATIN (ZOCOR) 80 MG TABLET    TAKE ONE TABLET BY MOUTH EVERY EVENING    TIZANIDINE (ZANAFLEX) 4 MG CAPSULE    1 tablet as needed    VARENICLINE (CHANTIX) 0.5 MG TABLET    Take 1-2 tablets by mouth See Admin Instructions 0.5mg DAILY for 3 days followed by 0.5mg TWICE DAILY for 4 days followed by 1mg TWICE DAILY    VARENICLINE (CHANTIX) 1 MG TABLET    Take 1 tablet by mouth 2 times daily       ALLERGIES     has No Known Allergies. FAMILY HISTORY     [unfilled]     SOCIAL HISTORY      reports that he has quit smoking. His smoking use included cigarettes. He started smoking about 47 years ago. He has a 10.00 pack-year smoking history. He quit smokeless tobacco use about 7 years ago. He reports that he does not currently use alcohol. He reports that he does not use drugs. PHYSICAL EXAM     INITIAL VITALS: /70   Pulse (!) 148   Temp 98.4 °F (36.9 °C)   Resp (!) 31   Wt 263 lb (119.3 kg)   SpO2 97%   BMI 38.84 kg/m²      Physical Exam  Vitals and nursing note reviewed. Constitutional:       General: He is not in acute distress. Appearance: He is well-developed. He is not diaphoretic. HENT:      Head: Normocephalic and atraumatic. Eyes:      General: No scleral icterus. Right eye: No discharge. Left eye: No discharge. Conjunctiva/sclera: Conjunctivae normal.      Pupils: Pupils are equal, round, and reactive to light. Cardiovascular:      Rate and Rhythm: Tachycardia present. Rhythm irregular. Heart sounds: Normal heart sounds. No murmur heard. No friction rub. No gallop.    Pulmonary:      Effort: Pulmonary effort is normal. No respiratory distress. Breath sounds: Rhonchi present. No wheezing or rales. Chest:      Chest wall: No tenderness. Abdominal:      General: Bowel sounds are normal. There is no distension. Palpations: Abdomen is soft. There is no mass. Tenderness: There is no abdominal tenderness. There is no guarding or rebound. Musculoskeletal:         General: No tenderness. Normal range of motion. Skin:     General: Skin is warm and dry. Coloration: Skin is not pale. Findings: No erythema or rash. Neurological:      Mental Status: He is alert and oriented to person, place, and time. Cranial Nerves: No cranial nerve deficit. Sensory: No sensory deficit. Motor: No abnormal muscle tone. Coordination: Coordination normal.      Deep Tendon Reflexes: Reflexes normal.   Psychiatric:         Behavior: Behavior normal.         Thought Content: Thought content normal.         Judgment: Judgment normal.       MEDICAL DECISION MAKING:     Is in rapid A. fib and will need to get some rate control done. We will do a full cardiac work-up on him but also check a COVID swab since he states he has had fevers at home and has had an increase in his cough. Could just be a COPD exacerbation. Oxygen saturation at this point time is okay so I am not really too worried about that more worried about his heart rate. DIAGNOSTIC RESULTS     EKG: All EKG's are interpreted by the Emergency Department Physician who either signs or Co-signs this chart in the absence of a cardiologist.    EKG Interpretation    Interpreted by emergency department physician    Rhythm: atrial fibrillation - rapid  Rate: tachycardia  Axis: normal  Ectopy: none  Conduction: normal  ST Segments: nonspecific changes  T Waves: non specific changes  Q Waves: none    Clinical Impression: EKG: nonspecific ST and T waves changes, atrial fibrillation, rate 154.       Raeann Gusman MD      RADIOLOGY:All plain film, CT, MRI, and formal ultrasound images (except ED bedside ultrasound)are read by the radiologist and interpretations are directly viewed by the emergency physician. XR CHEST PORTABLE    Result Date: 9/14/2022  EXAMINATION: ONE XRAY VIEW OF THE CHEST 9/14/2022 10:39 am COMPARISON: Two-view chest from 09/02/2022 HISTORY: ORDERING SYSTEM PROVIDED HISTORY: Chest Pain TECHNOLOGIST PROVIDED HISTORY: Chest Pain Reason for Exam: chest pain Known left diaphragmatic hernia FINDINGS: Overlying ECG monitor leads. Cardiomediastinal shadow stable; no cardiomegaly. Patchy basilar opacities, probably atelectatic or fibrotic mildly elevated lateral left costophrenic angle, slightly increased by comparison; no consolidation or sizable pleural effusion. Moderate DJD spine with mild convex-right curvature. Slightly greater basilar atelectasis; known left diaphragmatic hernia. No consolidation or sizable pleural effusion. CT CHEST PULMONARY EMBOLISM W CONTRAST    Result Date: 9/14/2022  EXAMINATION: CTA OF THE CHEST, 9/14/2022 11:59 am TECHNIQUE: CTA of the chest was performed after the administration of intravenous contrast.  Multiplanar reformatted images are provided for review. MIP images are provided for review. Automated exposure control, iterative reconstruction, and/or weight based adjustment of the mA/kV was utilized to reduce the radiation dose to as low as reasonably achievable. COMPARISON: Chest radiograph 09/14/2022, CT 05/17/2022 HISTORY: ORDERING SYSTEM PROVIDED HISTORY: SOB, elevated d-dimer TECHNOLOGIST PROVIDED HISTORY: SOB, elevated D-dimer Decision Support Exception - unselect if not a suspected or confirmed emergency medical condition->Emergency Medical Condition ([place]) Reason for Exam:  SOB elevated D-dimer FINDINGS: Pulmonary Arteries: Pulmonary arteries are adequately opacified for evaluation. No evidence of intraluminal filling defect to suggest pulmonary embolism. Main pulmonary artery is normal in caliber. Mediastinum: No lymphadenopathy. No pericardial effusion. Coronary artery calcifications are seen. The thoracic aorta is not significantly dilated. Lungs/pleura: The central airways are patent. No pleural effusion or pneumothorax is seen. Again seen is a left diaphragmatic hernia with herniation of a portion of the spleen and intra-abdominal fat into the lower thorax. Compared to the prior CT, there are new airspace opacities in the right upper lobe, near the lung apex. There is dependent atelectasis in the lung bases. Evaluation of the lung parenchyma is limited by respiratory motion artifact. Upper Abdomen: Hepatic steatosis. Soft Tissues/Bones: Flowing ossification throughout the thoracic spine compatible with diffuse idiopathic skeletal hyperostosis. No acute bony abnormality is seen. No pulmonary embolism is identified. Wedge-shaped airspace opacities are seen in the right upper lobe, near the lung apex. This could represent an infection. While no pulmonary embolism is identified, a pulmonary infarct could also have this appearance. This finding is new from the prior CT dated 05/17/2022. Left diaphragmatic hernia. RECOMMENDATIONS: Follow-up radiographs recommended after treatment. LABS: All lab results were reviewed bydesmond, and all abnormals are listed below.   Labs Reviewed   BASIC METABOLIC PANEL - Abnormal; Notable for the following components:       Result Value    Glucose 146 (*)     Sodium 134 (*)     All other components within normal limits   CBC WITH AUTO DIFFERENTIAL - Abnormal; Notable for the following components:    Hemoglobin 12.9 (*)     Hematocrit 38.0 (*)     Monocytes 12 (*)     All other components within normal limits   D-DIMER, QUANTITATIVE - Abnormal; Notable for the following components:    D-Dimer, Quant 2.94 (*)     All other components within normal limits   TROP/MYOGLOBIN - Abnormal; Notable for the following components:    Myoglobin 140 (*)     All other components within normal limits   TROP/MYOGLOBIN - Abnormal; Notable for the following components:    Myoglobin 117 (*)     All other components within normal limits   COVID-19, RAPID   TSH   T4, FREE   APTT   PROTIME-INR   URINALYSIS WITH REFLEX TO CULTURE         EMERGENCY DEPARTMENT COURSE:   Vitals:    Vitals:    09/14/22 1104 09/14/22 1117 09/14/22 1244 09/14/22 1252   BP:   134/70    Pulse: (!) 124 (!) 137 (!) 148    Resp: 30 24 (!) 31    Temp:       SpO2: 97% 97% 97%    Weight:    263 lb (119.3 kg)       The patient was given the following medications while in the emergency department:     Orders Placed This Encounter   Medications    aspirin chewable tablet 324 mg    dilTIAZem injection 20 mg    dilTIAZem 125 mg in dextrose 5 % 125 mL infusion     Order Specific Question:   Titrate Infusion? Answer:   Yes     Order Specific Question:   Initial Infusion Dose: Answer:   5 mg/hr     Order Specific Question:   Goal of Therapy is: Answer:   HR less than 100 bpm     Order Specific Question:   Contact Provider if:     Answer:   SBP less than 90 mmHg     Order Specific Question:   Contact Provider if:     Answer:   HR less than 60 bpm     Order Specific Question:   HOLD for     Answer:   SBP less than 90 mmHg     Order Specific Question:   HOLD for     Answer:   HR less than 60 bpm    0.9 % sodium chloride bolus    iopamidol (ISOVUE-370) 76 % injection 75 mL    sodium chloride flush 0.9 % injection 10 mL    cefTRIAXone (ROCEPHIN) 1,000 mg in sodium chloride 0.9 % 50 mL IVPB mini-bag     Order Specific Question:   Antimicrobial Indications     Answer:   Pneumonia (CAP)    azithromycin (ZITHROMAX) 500 mg in D5W 250ml addavial     Order Specific Question:   Antimicrobial Indications     Answer:   Pneumonia (CAP)    enoxaparin (LOVENOX) injection 120 mg     Order Specific Question:   Indication of Use     Answer:    Other     Order Specific Question:   Other Enoxaparin Indication     Answer:   A. fib -------------------------  1:27 PM EDT  Patient and family were updated on the results. We will go ahead and start patient on antibiotics while we continue to work on getting his heart rate under control. I spoke with Dr. Aruna Solano who is the patient's cardiologist who agrees with the current plan and will follow the patient in consultation. 1:33 PM EDT  The admitting residents been notified of the admission and will write the orders. CRITICAL CARE:     CRITICAL CARE: There was a high probability of clinically significant/life threatening deterioration in this patient's condition which required my urgent intervention. Total critical care time was 30 minutes. This excludes any time for separately reportable procedures. CONSULTS:  IP CONSULT TO CARDIOLOGY  IP CONSULT TO PRIMARY CARE PROVIDER    PROCEDURES:  None    FINAL IMPRESSION      1. Atrial fibrillation with RVR ([address]    2. Pneumonia of right upper lobe due to infectious organism          DISPOSITION/PLAN   DISPOSITION Decision To Admit 09/14/2022 12:51:41 PM      PATIENT REFERRED TO:  No follow-up provider specified.     DISCHARGE MEDICATIONS:  New Prescriptions    No medications on file       (Please note that portions of this note were completed with a voice recognition program.  Efforts were made to edit the dictations but occasionally words are mis-transcribed.)    Joyce Doran MD  Attending Moriah Martinez MD  09/14/22 [id number]

## 2022-09-14 NOTE — PROGRESS NOTES
Medication History completed    New medications: None    Medications discontinued:Symbicort, Duloxetine, Flonase, Oxycodone, Chantix    Changes to dosing:   Tizanidine - increased frequency from once daily to 2 times daily as needed    Stated allergies: NKDA    Other pertinent information: Spoke with patient and Maddisonr to confirm medications.     Thank you,  Kevin Myrick  PharmD Candidate [id number]

## 2022-09-14 NOTE — ED NOTES
Bed in the lowest position, one side rail up, call light within reach. Patient and family updated on plan of care and denies needs.        Luis Hampton RN  09/14/22 [id number]

## 2022-09-14 NOTE — ED NOTES
Patient placed on cardiac, SPO2, and NIBP. IV established, and blood work labeled with correct patient label and sent to lab.        Felipe Rodriguez RN  09/14/22 0052

## 2022-09-14 NOTE — ED NOTES
Bed in the lowest position, one side rail up, call light within reach. Patient and family updated on plan of care and denies needs.        Lucrecia Harvey RN  09/14/22 0773

## 2022-09-14 NOTE — ED NOTES
Report given to [facility], all questions asnwered     Marlen Ryan, HARITHA  09/14/22 5411

## 2022-09-14 NOTE — ED NOTES
Bed in the lowest position, one side rail up, call light within reach. Patient and family updated on plan of care and denies needs. Pt reports a decreased in SOB, decreased appearance of work of breathing, and a decreased feeling of palpitations. Repeated EKG due to the quality of the first one.      Adali Ortiz, RN  09/14/22 [address] [place] [address], RN  09/14/22 2516

## 2022-09-14 NOTE — ED NOTES
Bed in the lowest position, one side rail up, call light within reach. Patient and family updated on plan of care and denies needs.  Pt requesting food, approved per Dr. Skinny Alaniz, RN  09/14/22 0937

## 2022-09-14 NOTE — ED NOTES
Patient to emergency department with complaints of  sob, fever and nausea ongoing for 1 week. Pt ambulatory to ed room with increased sob. Pt placed on heart monitor, .       Danny Miranda RN  09/14/22 [id number]

## 2022-09-15 LAB
ABSOLUTE EOS #: 0.1 K/UL (ref 0–0.4)
ABSOLUTE LYMPH #: 1.6 K/UL (ref 1–4.8)
ABSOLUTE MONO #: 0.5 K/UL (ref 0.1–1.3)
ANION GAP SERPL CALCULATED.3IONS-SCNC: 16 MMOL/L (ref 9–17)
BASOPHILS # BLD: 1 % (ref 0–2)
BASOPHILS ABSOLUTE: 0 K/UL (ref 0–0.2)
BUN BLDV-MCNC: 30 MG/DL (ref 8–23)
CALCIUM SERPL-MCNC: 8.8 MG/DL (ref 8.6–10.4)
CHLORIDE BLD-SCNC: 96 MMOL/L (ref 98–107)
CO2: 22 MMOL/L (ref 20–31)
CREAT SERPL-MCNC: 1 MG/DL (ref 0.7–1.2)
EKG ATRIAL RATE: 153 BPM
EKG ATRIAL RATE: 357 BPM
EKG Q-T INTERVAL: 292 MS
EKG Q-T INTERVAL: 326 MS
EKG QRS DURATION: 100 MS
EKG QRS DURATION: 100 MS
EKG QTC CALCULATION (BAZETT): 467 MS
EKG QTC CALCULATION (BAZETT): 496 MS
EKG R AXIS: 76 DEGREES
EKG R AXIS: 79 DEGREES
EKG T AXIS: 58 DEGREES
EKG T AXIS: 75 DEGREES
EKG VENTRICULAR RATE: 139 BPM
EKG VENTRICULAR RATE: 154 BPM
EOSINOPHILS RELATIVE PERCENT: 2 % (ref 0–4)
GFR AFRICAN AMERICAN: >60 ML/MIN
GFR NON-AFRICAN AMERICAN: >60 ML/MIN
GFR SERPL CREATININE-BSD FRML MDRD: ABNORMAL ML/MIN/{1.73_M2}
GLUCOSE BLD-MCNC: 193 MG/DL (ref 70–99)
HCT VFR BLD CALC: 36.4 % (ref 41–53)
HEMOGLOBIN: 12.3 G/DL (ref 13.5–17.5)
LEGIONELLA PNEUMOPHILIA AG, URINE: NEGATIVE
LYMPHOCYTES # BLD: 29 % (ref 24–44)
MCH RBC QN AUTO: 28.9 PG (ref 26–34)
MCHC RBC AUTO-ENTMCNC: 33.9 G/DL (ref 31–37)
MCV RBC AUTO: 85.2 FL (ref 80–100)
MONOCYTES # BLD: 9 % (ref 1–7)
PDW BLD-RTO: 14.8 % (ref 11.5–14.9)
PLATELET # BLD: 166 K/UL (ref 150–450)
PMV BLD AUTO: 7.9 FL (ref 6–12)
POTASSIUM SERPL-SCNC: 3.8 MMOL/L (ref 3.7–5.3)
RBC # BLD: 4.28 M/UL (ref 4.5–5.9)
SEG NEUTROPHILS: 59 % (ref 36–66)
SEGMENTED NEUTROPHILS ABSOLUTE COUNT: 3.3 K/UL (ref 1.3–9.1)
SODIUM BLD-SCNC: 134 MMOL/L (ref 135–144)
SOURCE: NORMAL
STREP PNEUMONIAE ANTIGEN: NEGATIVE
WBC # BLD: 5.6 K/UL (ref 3.5–11)

## 2022-09-15 PROCEDURE — 2580000003 HC RX 258: Performed by: STUDENT IN AN ORGANIZED HEALTH CARE EDUCATION/TRAINING PROGRAM

## 2022-09-15 PROCEDURE — 99233 SBSQ HOSP IP/OBS HIGH 50: CPT | Performed by: INTERNAL MEDICINE

## 2022-09-15 PROCEDURE — 6370000000 HC RX 637 (ALT 250 FOR IP)

## 2022-09-15 PROCEDURE — 94760 N-INVAS EAR/PLS OXIMETRY 1: CPT

## 2022-09-15 PROCEDURE — 94762 N-INVAS EAR/PLS OXIMTRY CONT: CPT

## 2022-09-15 PROCEDURE — 97116 GAIT TRAINING THERAPY: CPT

## 2022-09-15 PROCEDURE — 2580000003 HC RX 258: Performed by: EMERGENCY MEDICINE

## 2022-09-15 PROCEDURE — 93010 ELECTROCARDIOGRAM REPORT: CPT | Performed by: INTERNAL MEDICINE

## 2022-09-15 PROCEDURE — 6370000000 HC RX 637 (ALT 250 FOR IP): Performed by: STUDENT IN AN ORGANIZED HEALTH CARE EDUCATION/TRAINING PROGRAM

## 2022-09-15 PROCEDURE — 80048 BASIC METABOLIC PNL TOTAL CA: CPT

## 2022-09-15 PROCEDURE — 36415 COLL VENOUS BLD VENIPUNCTURE: CPT

## 2022-09-15 PROCEDURE — 6360000002 HC RX W HCPCS: Performed by: INTERNAL MEDICINE

## 2022-09-15 PROCEDURE — 6360000002 HC RX W HCPCS: Performed by: STUDENT IN AN ORGANIZED HEALTH CARE EDUCATION/TRAINING PROGRAM

## 2022-09-15 PROCEDURE — 94640 AIRWAY INHALATION TREATMENT: CPT

## 2022-09-15 PROCEDURE — 2060000000 HC ICU INTERMEDIATE R&B

## 2022-09-15 PROCEDURE — 6370000000 HC RX 637 (ALT 250 FOR IP): Performed by: INTERNAL MEDICINE

## 2022-09-15 PROCEDURE — 97162 PT EVAL MOD COMPLEX 30 MIN: CPT

## 2022-09-15 PROCEDURE — 2500000003 HC RX 250 WO HCPCS: Performed by: INTERNAL MEDICINE

## 2022-09-15 PROCEDURE — 97165 OT EVAL LOW COMPLEX 30 MIN: CPT

## 2022-09-15 PROCEDURE — 85025 COMPLETE CBC W/AUTO DIFF WBC: CPT

## 2022-09-15 RX ORDER — DIGOXIN 0.25 MG/ML
500 INJECTION INTRAMUSCULAR; INTRAVENOUS ONCE
Status: COMPLETED | OUTPATIENT
Start: 2022-09-15 | End: 2022-09-15

## 2022-09-15 RX ORDER — DIGOXIN 0.25 MG/ML
250 INJECTION INTRAMUSCULAR; INTRAVENOUS ONCE
Status: DISCONTINUED | OUTPATIENT
Start: 2022-09-15 | End: 2022-09-17 | Stop reason: HOSPADM

## 2022-09-15 RX ORDER — ENOXAPARIN SODIUM 150 MG/ML
1 INJECTION SUBCUTANEOUS 2 TIMES DAILY
Status: DISCONTINUED | OUTPATIENT
Start: 2022-09-15 | End: 2022-09-17

## 2022-09-15 RX ADMIN — AMIODARONE HYDROCHLORIDE 1 MG/MIN: 1.8 INJECTION, SOLUTION INTRAVENOUS at 13:17

## 2022-09-15 RX ADMIN — ATORVASTATIN CALCIUM 40 MG: 40 TABLET, FILM COATED ORAL at 09:01

## 2022-09-15 RX ADMIN — SODIUM CHLORIDE, PRESERVATIVE FREE 10 ML: 5 INJECTION INTRAVENOUS at 09:02

## 2022-09-15 RX ADMIN — GABAPENTIN 800 MG: 400 CAPSULE ORAL at 22:18

## 2022-09-15 RX ADMIN — AMLODIPINE BESYLATE 5 MG: 5 TABLET ORAL at 09:01

## 2022-09-15 RX ADMIN — CEFTRIAXONE SODIUM 1000 MG: 1 INJECTION, POWDER, FOR SOLUTION INTRAMUSCULAR; INTRAVENOUS at 13:04

## 2022-09-15 RX ADMIN — AMIODARONE HYDROCHLORIDE 1 MG/MIN: 1.8 INJECTION, SOLUTION INTRAVENOUS at 01:27

## 2022-09-15 RX ADMIN — ENOXAPARIN SODIUM 120 MG: 150 INJECTION SUBCUTANEOUS at 20:25

## 2022-09-15 RX ADMIN — TIZANIDINE 4 MG: 4 TABLET ORAL at 22:18

## 2022-09-15 RX ADMIN — ASPIRIN 81 MG: 81 TABLET, COATED ORAL at 09:00

## 2022-09-15 RX ADMIN — METOPROLOL TARTRATE 25 MG: 25 TABLET, FILM COATED ORAL at 09:01

## 2022-09-15 RX ADMIN — TIZANIDINE 4 MG: 4 TABLET ORAL at 01:26

## 2022-09-15 RX ADMIN — GABAPENTIN 800 MG: 400 CAPSULE ORAL at 12:59

## 2022-09-15 RX ADMIN — AMIODARONE HYDROCHLORIDE 1 MG/MIN: 1.8 INJECTION, SOLUTION INTRAVENOUS at 07:10

## 2022-09-15 RX ADMIN — DEXTROMETHORPHAN HYDROBROMIDE, GUAIFENESIN 10 ML: 10; 100 LIQUID ORAL at 23:57

## 2022-09-15 RX ADMIN — ACETAMINOPHEN 650 MG: 325 TABLET, FILM COATED ORAL at 01:26

## 2022-09-15 RX ADMIN — ISOSORBIDE MONONITRATE 60 MG: 60 TABLET, EXTENDED RELEASE ORAL at 09:01

## 2022-09-15 RX ADMIN — CLOPIDOGREL BISULFATE 75 MG: 75 TABLET ORAL at 09:01

## 2022-09-15 RX ADMIN — DIGOXIN 500 MCG: 0.25 INJECTION INTRAMUSCULAR; INTRAVENOUS at 11:04

## 2022-09-15 RX ADMIN — GABAPENTIN 800 MG: 400 CAPSULE ORAL at 05:46

## 2022-09-15 RX ADMIN — KETOROLAC TROMETHAMINE 15 MG: 30 INJECTION, SOLUTION INTRAMUSCULAR; INTRAVENOUS at 20:25

## 2022-09-15 RX ADMIN — ENOXAPARIN SODIUM 120 MG: 150 INJECTION SUBCUTANEOUS at 12:07

## 2022-09-15 RX ADMIN — DEXTROMETHORPHAN HYDROBROMIDE, GUAIFENESIN 10 ML: 10; 100 LIQUID ORAL at 05:45

## 2022-09-15 RX ADMIN — KETOROLAC TROMETHAMINE 15 MG: 30 INJECTION, SOLUTION INTRAMUSCULAR; INTRAVENOUS at 13:00

## 2022-09-15 RX ADMIN — ACETAMINOPHEN 650 MG: 325 TABLET, FILM COATED ORAL at 13:52

## 2022-09-15 RX ADMIN — KETOROLAC TROMETHAMINE 15 MG: 30 INJECTION, SOLUTION INTRAMUSCULAR; INTRAVENOUS at 04:47

## 2022-09-15 RX ADMIN — METOPROLOL TARTRATE 25 MG: 25 TABLET, FILM COATED ORAL at 20:30

## 2022-09-15 RX ADMIN — DEXTROMETHORPHAN HYDROBROMIDE, GUAIFENESIN 10 ML: 10; 100 LIQUID ORAL at 12:59

## 2022-09-15 RX ADMIN — AMIODARONE HYDROCHLORIDE 1 MG/MIN: 1.8 INJECTION, SOLUTION INTRAVENOUS at 20:23

## 2022-09-15 RX ADMIN — SODIUM CHLORIDE, PRESERVATIVE FREE 10 ML: 5 INJECTION INTRAVENOUS at 11:03

## 2022-09-15 RX ADMIN — BUDESONIDE AND FORMOTEROL FUMARATE DIHYDRATE 2 PUFF: 160; 4.5 AEROSOL RESPIRATORY (INHALATION) at 19:14

## 2022-09-15 RX ADMIN — BUDESONIDE AND FORMOTEROL FUMARATE DIHYDRATE 2 PUFF: 160; 4.5 AEROSOL RESPIRATORY (INHALATION) at 08:06

## 2022-09-15 RX ADMIN — SODIUM CHLORIDE, PRESERVATIVE FREE 10 ML: 5 INJECTION INTRAVENOUS at 20:29

## 2022-09-15 RX ADMIN — AZITHROMYCIN MONOHYDRATE 500 MG: 250 TABLET ORAL at 09:00

## 2022-09-15 ASSESSMENT — PAIN DESCRIPTION - LOCATION
LOCATION: BACK;HIP;NECK
LOCATION: BACK;HIP;NECK
LOCATION: NECK;BACK
LOCATION: BACK;NECK;HIP

## 2022-09-15 ASSESSMENT — ENCOUNTER SYMPTOMS
NAUSEA: 0
COLOR CHANGE: 0
SHORTNESS OF BREATH: 0
COUGH: 1
VOMITING: 0
ABDOMINAL PAIN: 0
BACK PAIN: 0
CONSTIPATION: 0
TROUBLE SWALLOWING: 0
DIARRHEA: 0

## 2022-09-15 ASSESSMENT — PAIN DESCRIPTION - ORIENTATION
ORIENTATION: LOWER

## 2022-09-15 ASSESSMENT — PAIN DESCRIPTION - DESCRIPTORS
DESCRIPTORS: ACHING

## 2022-09-15 ASSESSMENT — PAIN SCALES - GENERAL
PAINLEVEL_OUTOF10: 7
PAINLEVEL_OUTOF10: 6
PAINLEVEL_OUTOF10: 7
PAINLEVEL_OUTOF10: 6
PAINLEVEL_OUTOF10: 7
PAINLEVEL_OUTOF10: 6

## 2022-09-15 NOTE — CONSULTS
[facility]   Consult Note                 Date:   9/15/2022  Date of admission:  9/14/2022 10:17 AM  MRN:   [id number]  YOB: 1960    Reason for Consult:  AFIB    HISTORY OF PRESENT ILLNESS:    The patient is a 57 y.o.  male who is admitted to the hospital .  According to patient he was supposed to get TCAR yesterday  Patient has not been feeling good for the last 1 week he started with sweating short of breath cough chills for the last 2 -3 days in the very beginning then getting more short of breath with the short of breath getting chest pressures  Patient came to the ER was in A. fib with RVR although patient does not have any significant feeling of palpitations  No previous history of A. fib  Patient had a history of heart cath in 2016 with noncritical lesion  Had a stress test done in May 22 as below no ischemia or infarct with low EF  Patient followed Dr. Sybil Yousif as per patient discussion it was deemed that the patient need a cath after carotid artery taken care off. At present no chest pain no palpitation breathing is better  Patient was started on Cardizem yesterday but blood pressure was on lower side that was changed to amiodarone.   At this time heart rate is between 100 -110    Past Medical History:   has a past medical history of Abnormal stress ECG, Angina pectoris ([address] BPH (benign prostatic hyperplasia), CAD (coronary artery disease), Carotid stenosis, Carpal tunnel syndrome, Cervical stenosis of spine, Cervicalgia, Chronic back pain, COPD (chronic obstructive pulmonary disease) ([facility]), DDD (degenerative disc disease), Elbow fracture, right, Fractures, GERD (gastroesophageal reflux disease), Gout, Hyperlipidemia, Hypertension, Lung nodule, Mild depression ([address] Morbidly obese ([facility]), Occlusion of right carotid artery, Osteoarthritis of right knee, Pre-diabetes, Sinus headache, Sleep apnea, Sleep disturbance, Smoker, Snores, SOB (shortness of breath) on exertion, Tendonitis of foot, Tooth loose, Under care of team, Under care of team, and Wellness examination. Past Surgical History:   has a past surgical history that includes Colonoscopy; Upper gastrointestinal endoscopy; Cervical spine surgery (07/13/2012); fracture surgery; Cardiac catheterization (07/08/2014); Nerve Block (02/05/2016); hc inject other perphrl nerv (Bilateral, 05/09/2019); other surgical history (08/15/2019); Injection Procedure For Sacroiliac Joint (Bilateral, 08/15/2019); Nerve Block (Left, 02/07/2020); Nerve Block (Left, 02/07/2020); Pain management procedure (Left, 02/07/2020); Nerve Block (02/12/2021); Pain management procedure (Left, 02/12/2021); Nerve Block (Right, 02/19/2021); and Pain management procedure (Right, 02/19/2021). Home Medications:    Prior to Admission medications    Medication Sig Start Date End Date Taking? Authorizing Provider   meloxicam (MOBIC) 15 MG tablet Take 15 mg by mouth daily as needed   Yes Historical Provider, MD   tiZANidine (ZANAFLEX) 4 MG tablet Take 4 mg by mouth 2 times daily as needed   Yes Historical Provider, MD   gabapentin (NEURONTIN) 800 MG tablet Take 1 tablet by mouth every 8 hours for 30 days. 9/6/22 10/6/22  Abdelrahman Wong, DO   isosorbide mononitrate (IMDUR) 60 MG extended release tablet Take 1 tablet by mouth in the morning. 7/28/22   Historical Provider, MD   clopidogrel (PLAVIX) 75 MG tablet Take 1 tablet by mouth in the morning. Patient taking differently: Take 75 mg by mouth daily Pt was instructed to continue Plavix for upcoming surgery per Dr. Elizabeth Covington.  8/1/22   Millie Oviedo MD   nitroGLYCERIN (NITROSTAT) 0.4 MG SL tablet Place 1 tablet under the tongue every 5 minutes as needed for Chest pain (not to exceed 3 tabs at a time) 6/3/22   Abdelrahman Wong, DO   amLODIPine (NORVASC) 5 MG tablet TAKE ONE TABLET BY MOUTH DAILY 4/22/22   Abdelrahman [facility], DO   albuterol sulfate  (90 Base) MCG/ACT inhaler Inhale 2 puffs into the lungs every 6 hours as needed for Wheezing 4/22/22   Kit Yessenia, DO   simvastatin (ZOCOR) 80 MG tablet TAKE ONE TABLET BY MOUTH EVERY EVENING 4/11/22   Kit Yessenia, DO   acetaminophen (TYLENOL) 325 MG tablet Take 650 mg by mouth nightly    Historical Provider, MD   aspirin 81 MG EC tablet Take 1 tablet by mouth daily  Patient taking differently: Take 81 mg by mouth daily Pt. Was instructed to continue asa per Dr. Colton Moore. 3/9/16   Srinivas Bocanegra MD       Current Medications: Scheduled Meds:   amLODIPine  5 mg Oral Daily    aspirin  81 mg Oral Daily    clopidogrel  75 mg Oral Daily    gabapentin  800 mg Oral 3 times per day    isosorbide mononitrate  60 mg Oral Daily    atorvastatin  40 mg Oral Daily    budesonide-formoterol  2 puff Inhalation BID    sodium chloride flush  5-40 mL IntraVENous 2 times per day    cefTRIAXone (ROCEPHIN) IV  1,000 mg IntraVENous Q24H    azithromycin  500 mg Oral Daily    enoxaparin  30 mg SubCUTAneous BID    metoprolol tartrate  25 mg Oral BID     Continuous Infusions:   sodium chloride      amiodarone 1 mg/min (09/15/22 0710)     PRN Meds:.sodium chloride flush, albuterol sulfate HFA, tiZANidine, sodium chloride flush, sodium chloride, ondansetron **OR** ondansetron, polyethylene glycol, acetaminophen **OR** acetaminophen, ketorolac, dextromethorphan-guaiFENesin     Allergies:  Patient has no known allergies. Social History:   reports that he has quit smoking. His smoking use included cigarettes. He started smoking about 47 years ago. He has a 10.00 pack-year smoking history. He quit smokeless tobacco use about 7 years ago. He reports that he does not currently use alcohol. He reports that he does not use drugs. Family History: family history includes COPD in his father; Cancer (age of onset: 64) in his maternal aunt; Cancer (age of onset: 61) in his maternal uncle; Heart Disease in his mother.      Review of Systems   CONSTITUTIONAL:  negative for fevers, chills, fatigue and malaise    EYES:  negative for discharge    HEENT:  negative for epistaxis and sore throat    RESPIRATORY:  positive for cough, shortness of breath, wheezing    CARDIOVASCULAR:  As above chest pain, palpitations, syncope, edema    GASTROINTESTINAL:  negative for nausea, vomiting, diarrhea, constipation, abdominal pain    GENITOURINARY:  negative for incontinence    MUSCULOSKELETAL:  negative for neck or back pain    NEUROLOGICAL:  negative for headaches, seizures and double vision   PSYCHIATRIC:  negative               PHYSICAL EXAM:    Blood pressure 111/85, pulse (!) 117, temperature 97.6 °F (36.4 °C), temperature source Oral, resp. rate 18, height 5' 9\" (1.753 m), weight 254 lb 10.1 oz (115.5 kg), SpO2 96 %. CONSTITUTIONAL: AOx4, no apparent distress, appears stated age   HEAD: normocephalic, atraumatic   EYES: PERRLA, EOMI   ENT: moist mucous membranes, uvula midline   NECK:  symmetric, no midline tenderness to palpation   LUNGS: reduced  to auscultation bilaterally   CARDIOVASCULAR: irregular rate and rhythm, no murmurs, rubs or gallops   ABDOMEN: Soft, non-tender, non-distended with normal active bowel sounds   SKIN: no rash       DATA:    ECG:AFIB      Echo:5/17/22  Summary  Left ventricle is normal in size and wall thickness. Global left ventricular systolic function is normal. Estimated LV EF >55 %. No obvious wall motion abnormality seen. Left atrium is normal in size. Normal right ventricular size and function. No significant valvular regurgitation or stenosis seen. Stress:6/13/22    Impression   1. Infarct of the mid and basal inferior wall. Inferior wall hypokinesis. No definitive scintigraphic evidence for reversible ischemia. 2. Left ventricular ejection fraction of 45%. 3.  Please see report for EKG portion of the examination which will be   performed separately by physician from cardiology.    Risk stratification:  Intermediate risk     Cath:5/27/16  Left main: NL  LAD: NL  LCX: Ostial 50% stenosis. Normal OM1/2  RCA: mid 20-30% stenosis   The LV gram was performed in the STOVER 30 position. LVEF: 55%. LV Wall Motion: Normal         Conclusions:   Non obstructive CAD  Overall preserved LV function     Labs:     CBC:   Recent Labs     09/14/22  1037 09/15/22  0557   WBC 4.9 5.6   HGB 12.9* 12.3*   HCT 38.0* 36.4*    166     BMP:   Recent Labs     09/14/22  1037 09/15/22  0557   * 134*   K 3.8 3.8   CO2 23 22   BUN 22 30*   CREATININE 0.86 1.00   LABGLOM >60 >60   GLUCOSE 146* 193*     BNP: No results for input(s): BNP in the last 72 hours. PT/INR:   Recent Labs     09/14/22  1037   PROTIME 14.2   INR 1.1     APTT:  Recent Labs     09/14/22  1037   APTT 27.9     CARDIAC ENZYMES:No results for input(s): CKTOTAL, CKMB, CKMBINDEX, TROPONINI in the last 72 hours. FASTING LIPID PANEL:  Lab Results   Component Value Date/Time    HDL 36 05/10/2022 11:31 AM    LDLDIRECT 209 03/03/2016 11:09 AM    TRIG 237 05/10/2022 11:31 AM     LIVER PROFILE:No results for input(s): AST, ALT, LABALBU in the last 72 hours.     Intake/Output Summary (Last 24 hours) at 9/15/2022 0916  Last data filed at 9/14/2022 1923  Gross per 24 hour   Intake 355 ml   Output 225 ml   Net 130 ml       IMPRESSION:    Patient Active Problem List   Diagnosis    Cervical stenosis of spine    Cervicalgia    COPD (chronic obstructive pulmonary disease) (HCC)    Smoker    Mild depression (HCC)    GERD (gastroesophageal reflux disease)    Mixed hyperlipidemia    Carpal tunnel syndrome of left wrist    Medication monitoring encounter    DDD (degenerative disc disease), lumbar    Displacement of lumbar intervertebral disc without myelopathy    Therapeutic opioid induced constipation    Radiculopathy of cervical spine    Essential hypertension    Major depression, single episode, in complete remission ([facility] [address]    Prediabetes    Lung nodule    Allergic sinusitis    Arthritis    Spinal stenosis of lumbar region without neurogenic claudication    Morbidly obese ([facility]    Lumbosacral spondylosis without myelopathy    Encounter for long-term opiate analgesic use    Occlusion of right carotid artery    Left carotid stenosis    Sacroiliitis, not elsewhere classified ([facility]    Recurrent UTI    BPH with urinary obstruction    Other retention of urine    Pneumonia    Atrial fibrillation with RVR (HCC)    Angina of effort (HCC)           RECOMMENDATIONS:  A. fib with RVR patient is on amiodarone drip  We will give digoxin 0.50 IV 1 time  Patient already full anticoagulated with Lovenox  CAD with noncritical lesion in 2016 with late test test no ischemia but infarct EF 45%  Need heart cath once patient TCAR is taken care of  History of snoring, most likely contributing to his A. fib  Chest x-ray shows wedge like opacity CT angiogram negative for PE  Carotid artery disease patient is already started on Plavix and aspirin by vascular  We will see the response  Depending on vascular surgeons urgency  Option of JE cardioversion if patient does not rest converted  We will continue to follow      Discussed with patient and nursing.     Abby Rhodes MD, MD  [facility]        [phone]

## 2022-09-15 NOTE — PROGRESS NOTES
[address]    PROGRESS NOTE             9/15/2022    6:51 AM    Name:   Laurence Esparza  MRN:     [id number]     Acct:      [de-identified]   Room:   2083/2083-01   Day:  1  Admit Date:  9/14/2022 10:17 AM    PCP:  Danielle Wood DO  Code Status:  Full Code    Subjective:     C/C:   Chief Complaint   Patient presents with    Fever    Shortness of Breath     Interval History Status:     Patient seen and examined at bedside this morning. Patient reports that he is feeling better today. Reports that his generalized weakness has improved. Does not feel like he has fevers or chills. Still states that his cough may have worsened overnight. States that he also has a headache. Chest palpitations have decreased especially when he is at rest.  Still is having cervical pain that is not well controlled on Toradol. Pain management was consulted for patient. Patient currently on amnio drip and in atrial fibrillation with a pulse in the 110s. Brief History:     The patient is a 57 y.o. Non- / non  male history significant for coronary artery disease, cervical stenosis, COPD, GERD, hypertension, hyperlipidemia, and right carotid stenosis approximately 90% occlusion who presents withFever and Shortness of Breath   and he is admitted to the hospital for the management of A. fib with RVR and community-acquired pneumonia    Patient presents with approximately 6 days of fever, chills, diaphoresis, weakness, heart palpitations, shortness of breath and cough. Reports that it has been worsening over the last 2 days his weakness and chest palpitations have significantly increased. States that cough has minimal sputum production and that it is clear sputum. Patient reports that he felt so bad this morning that he reported to the hospital here with his wife. Denies any sick contacts.   Denies having this occur in the past.  Denies any leg Negative for leg swelling. Gastrointestinal:  Negative for abdominal pain, constipation, diarrhea, nausea and vomiting. Genitourinary:  Negative for difficulty urinating and hematuria. Musculoskeletal:  Negative for back pain. Skin:  Negative for color change. Neurological:  Positive for headaches. Negative for dizziness and numbness. Psychiatric/Behavioral:  Negative for behavioral problems and confusion. Medications:      Allergies:  No Known Allergies    Current Meds:   Scheduled Meds:    amLODIPine  5 mg Oral Daily    aspirin  81 mg Oral Daily    clopidogrel  75 mg Oral Daily    gabapentin  800 mg Oral 3 times per day    isosorbide mononitrate  60 mg Oral Daily    atorvastatin  40 mg Oral Daily    budesonide-formoterol  2 puff Inhalation BID    sodium chloride flush  5-40 mL IntraVENous 2 times per day    cefTRIAXone (ROCEPHIN) IV  1,000 mg IntraVENous Q24H    azithromycin  500 mg Oral Daily    enoxaparin  30 mg SubCUTAneous BID    metoprolol tartrate  25 mg Oral BID     Continuous Infusions:    sodium chloride      amiodarone 1 mg/min (09/15/22 0127)     PRN Meds: sodium chloride flush, albuterol sulfate HFA, tiZANidine, sodium chloride flush, sodium chloride, ondansetron **OR** ondansetron, polyethylene glycol, acetaminophen **OR** acetaminophen, ketorolac, dextromethorphan-guaiFENesin    Data:     Past Medical History:   has a past medical history of Abnormal stress ECG, Angina pectoris ([address] BPH (benign prostatic hyperplasia), CAD (coronary artery disease), Carotid stenosis, Carpal tunnel syndrome, Cervical stenosis of spine, Cervicalgia, Chronic back pain, COPD (chronic obstructive pulmonary disease) ([address] DDD (degenerative disc disease), Elbow fracture, right, Fractures, GERD (gastroesophageal reflux disease), Gout, Hyperlipidemia, Hypertension, Lung nodule, Mild depression ([address] Morbidly obese ([address] Occlusion of right carotid artery, Osteoarthritis of right knee, Pre-diabetes, Sinus headache, Sleep apnea, Sleep disturbance, Smoker, Snores, SOB (shortness of breath) on exertion, Tendonitis of foot, Tooth loose, Under care of team, Under care of team, and Wellness examination. Social History:   reports that he has quit smoking. His smoking use included cigarettes. He started smoking about 47 years ago. He has a 10.00 pack-year smoking history. He quit smokeless tobacco use about 7 years ago. He reports that he does not currently use alcohol. He reports that he does not use drugs. Family History:   Family History   Problem Relation Age of Onset    Heart Disease Mother     COPD Father     Cancer Maternal Aunt 64        breast cancer     Cancer Maternal Uncle 60        prostrate cancer        Vitals:  /81   Pulse (!) 114   Temp 97.7 °F (36.5 °C) (Oral)   Resp 20   Ht 5' 9\" (1.753 m)   Wt 254 lb 10.1 oz (115.5 kg)   SpO2 98%   BMI 37.60 kg/m²   Temp (24hrs), Av.7 °F (36.5 °C), Min:97.2 °F (36.2 °C), Max:98.4 °F (36.9 °C)    No results for input(s): POCGLU in the last 72 hours. I/O(24Hr): Intake/Output Summary (Last 24 hours) at 9/15/2022 0651  Last data filed at 2022 1923  Gross per 24 hour   Intake 355 ml   Output 225 ml   Net 130 ml       Labs:  [unfilled]    No results found for: SPECIAL  Lab Results   Component Value Date/Time    CULTURE NO GROWTH 2022 07:54 AM       [unfilled]    Radiology:    XR CHEST (2 VW)    Result Date: 2022  EXAMINATION: TWO XRAY VIEWS OF THE CHEST 2022 9:33 am COMPARISON: 2022 HISTORY: ORDERING SYSTEM PROVIDED HISTORY: preop, right carotid stenosis TECHNOLOGIST PROVIDED HISTORY: preop, right carotid stenosis FINDINGS: Cardiomediastinal silhouette appears within normal limits. No focal consolidation or overt pulmonary edema. Left diaphragmatic hernia. No evidence of pneumothorax. No acute osseous abnormalities. 1. No radiographic evidence of an acute cardiopulmonary process. 2. Left diaphragmatic hernia. XR CHEST PORTABLE    Result Date: 9/14/2022  EXAMINATION: ONE XRAY VIEW OF THE CHEST 9/14/2022 10:39 am COMPARISON: Two-view chest from 09/02/2022 HISTORY: ORDERING SYSTEM PROVIDED HISTORY: Chest Pain TECHNOLOGIST PROVIDED HISTORY: Chest Pain Reason for Exam: chest pain Known left diaphragmatic hernia FINDINGS: Overlying ECG monitor leads. Cardiomediastinal shadow stable; no cardiomegaly. Patchy basilar opacities, probably atelectatic or fibrotic mildly elevated lateral left costophrenic angle, slightly increased by comparison; no consolidation or sizable pleural effusion. Moderate DJD spine with mild convex-right curvature. Slightly greater basilar atelectasis; known left diaphragmatic hernia. No consolidation or sizable pleural effusion. CT CHEST PULMONARY EMBOLISM W CONTRAST    Result Date: 9/14/2022  EXAMINATION: CTA OF THE CHEST, 9/14/2022 11:59 am TECHNIQUE: CTA of the chest was performed after the administration of intravenous contrast.  Multiplanar reformatted images are provided for review. MIP images are provided for review. Automated exposure control, iterative reconstruction, and/or weight based adjustment of the mA/kV was utilized to reduce the radiation dose to as low as reasonably achievable. COMPARISON: Chest radiograph 09/14/2022, CT 05/17/2022 HISTORY: ORDERING SYSTEM PROVIDED HISTORY: SOB, elevated d-dimer TECHNOLOGIST PROVIDED HISTORY: SOB, elevated D-dimer Decision Support Exception - unselect if not a suspected or confirmed emergency medical condition->Emergency Medical Condition (MA) Reason for Exam:  SOB elevated D-dimer FINDINGS: Pulmonary Arteries: Pulmonary arteries are adequately opacified for evaluation. No evidence of intraluminal filling defect to suggest pulmonary embolism. Main pulmonary artery is normal in caliber. Mediastinum: No lymphadenopathy. No pericardial effusion. Coronary artery calcifications are seen. The thoracic aorta is not significantly dilated. Lungs/pleura: The central airways are patent. No pleural effusion or pneumothorax is seen. Again seen is a left diaphragmatic hernia with herniation of a portion of the spleen and intra-abdominal fat into the lower thorax. Compared to the prior CT, there are new airspace opacities in the right upper lobe, near the lung apex. There is dependent atelectasis in the lung bases. Evaluation of the lung parenchyma is limited by respiratory motion artifact. Upper Abdomen: Hepatic steatosis. Soft Tissues/Bones: Flowing ossification throughout the thoracic spine compatible with diffuse idiopathic skeletal hyperostosis. No acute bony abnormality is seen. No pulmonary embolism is identified. Wedge-shaped airspace opacities are seen in the right upper lobe, near the lung apex. This could represent an infection. While no pulmonary embolism is identified, a pulmonary infarct could also have this appearance. This finding is new from the prior CT dated 05/17/2022. Left diaphragmatic hernia. RECOMMENDATIONS: Follow-up radiographs recommended after treatment. Physical Examination:        Physical Exam  Constitutional:       Appearance: He is obese. He is ill-appearing. HENT:      Head: Normocephalic and atraumatic. Nose: Nose normal.      Mouth/Throat:      Mouth: Mucous membranes are moist.   Eyes:      Extraocular Movements: Extraocular movements intact. Pupils: Pupils are equal, round, and reactive to light. Cardiovascular:      Rate and Rhythm: Tachycardia present. Rhythm irregular. Pulses: Normal pulses. Heart sounds: Normal heart sounds. Pulmonary:      Effort: Pulmonary effort is normal.      Breath sounds: Normal breath sounds. Abdominal:      General: Bowel sounds are normal.      Palpations: Abdomen is soft. Musculoskeletal:         General: Normal range of motion. Cervical back: Neck supple. Skin:     General: Skin is warm and dry.       Capillary Refill: Capillary refill takes less than 2 seconds. Neurological:      General: No focal deficit present. Mental Status: He is alert and oriented to person, place, and time. Psychiatric:         Mood and Affect: Mood normal.         Behavior: Behavior normal.         Assessment:        Primary Problem  Atrial fibrillation with RVR [facility])    Active Hospital Problems    Diagnosis Date Noted    Pneumonia [J18.9] 09/14/2022     Priority: Medium    Atrial fibrillation with RVR ([facility] [address] [I48.91] 09/14/2022     Priority: Medium    Angina of effort ([facility] [I20.8] 09/14/2022     Priority: Medium    Occlusion of right carotid artery [I65.21] 07/14/2021    Morbidly obese ([facility] [E66.01] 10/30/2020    Essential hypertension [I10] 02/07/2018    Mixed hyperlipidemia [E78.2] 03/09/2016    COPD (chronic obstructive pulmonary disease) ([facility] [J44.9] 08/06/2013    Cervical stenosis of spine [M48.02]     Cervicalgia [M54.2]        Plan:        New onset atrial fibrillation RVR  - Presented tachycardic with irregularly irregular rhythm with a rate of 175 to emergency department  - Cardiology consulted, appreciate recommendations  - Cardizem drip discontinued  - Patient currently on amnio drip  - Lopressor 25 mg 2 times daily  - Full dose heparin  - IV digoxin started  - TSH within normal limits  - [id number] score of 1  - Patient already on aspirin and Plavix for carotid artery stenosis/coronary artery disease  - Continuous telemetry  - Monitor vitals  - Patient's last echo was in May 2022 demonstrated ejection fraction of 55% with no obvious wall abnormality  - Option of JE if patient does not convert     Community acquired pneumonia  - Patient presented with 6-day history of cough, shortness of breath, diaphoresis and fever  - D-dimer was 2.94 on admission  - CT chest with contrast ordered, no pulmonary embolism was identified.   Did demonstrate wedge-shaped airspace opacities could possibly represent infection versus infarct  - Procalcitonin elevated at 0.24  - Patient started on IV ceftriaxone and azithromycin  - Respiratory panel negative  - Legionella and strep pneumo negative  - COVID-negative  - Robitussin for cough    Carotid artery stenosis  - Patient scheduled to undergo TCAR with Deloris Gavin yesterday for right carotid artery stenosis of 90% but unable to due to illness  - Patient recently started on aspirin and Plavix  - Continue aspirin 81 mg and Plavix 75 mg  - Vascular surgery consulted    Cervical spinal stenosis   - Patient has a past medical history of a cervical spine surgery with chronic pain  - Was seen pain management up until last spring  - Continue home dose of gabapentin 800 mg 3 times a day  - Continue home tizanidine 4 mg every 6 hours as needed  - Toradol added for severe pain as needed  - Pain management consulted    COPD  - Continue home inhalers, albuterol and Symbicort  - Patient reports that he quit smoking 6 days ago    Hyperlipidemia  - Continue statin Lipitor 40 mg daily    Essential hypertension  - Continue home dose amlodipine 5 mg daily    Angina  - Sees is Dr. Adiel Lyons outpatient, has had stress test done recently which was negative  - Patient reports future plan of cardiac catheterization  - Continue aspirin, and clopidogrel  - Continue home dose of Imdur 60 mg daily    DVT prophylaxis: Heparin 120 mg  Diet: Regular adult cardiac diet  PT/OT and social work consulted  CODE STATUS: Full code       Mikaela Salcedo MD  9/15/2022  6:51 AM     Attending Physician Statement  I have discussed the care of Rosemary Ribeiro and I have examined the patient myselft and taken ros and hpi , including pertinent history and exam findings,  with the resident. I have reviewed the key elements of all parts of the encounter with the resident. I agree with the assessment, plan and orders as documented by the resident.   53-year-old gentleman class II obese BMI 37.60 history of smoking 2 packs a day for over 35 years admitted with a palpitation dyspnea cough diagnosed with community-acquired pneumonia right upper lobe consolidation noted CT chest shows no pulmonary embolism patient also noted to have atrial fibrillation with rapid ventricular response no history of atrial fibrillation  Likely diagnosis is a community-acquired pneumonia causing A. fib with RVR with underlying sleep apnea which made him prone with his age and obesity  Amiodarone drip per digoxin cardiology consult committee acquired pneumonia antibiotic coverage  Patient may qualify for long-term anticoagulation given hypertension age and A. fib    Electronically signed by Berna Clark MD

## 2022-09-15 NOTE — PLAN OF CARE
Patient continuing amiodarone due to new onset a-fib with RVR. Continuing antibiotic for pneumonia.    Problem: Discharge Planning  Goal: Discharge to home or other facility with appropriate resources  Outcome: Progressing  Flowsheets (Taken 9/15/2022 0907)  Discharge to home or other facility with appropriate resources: Identify barriers to discharge with patient and caregiver     Problem: Pain  Goal: Verbalizes/displays adequate comfort level or baseline comfort level  Outcome: Progressing     Problem: Safety - Adult  Goal: Free from fall injury  Outcome: Progressing  Flowsheets (Taken 9/15/2022 1156)  Free From Fall Injury: Instruct family/caregiver on patient safety     Problem: ABCDS Injury Assessment  Goal: Absence of physical injury  Outcome: Progressing  Flowsheets (Taken 9/15/2022 1156)  Absence of Physical Injury: Implement safety measures based on patient assessment

## 2022-09-15 NOTE — PROGRESS NOTES
Physical Therapy  Facility/Department: [facility] PROGRESSIVE CARE  Physical Therapy Initial Assessment    Name: Mable Antonio  : 1960  MRN: [id number]  Date of Service: 9/15/2022    Discharge Recommendations:  Continue to assess pending progress   PT Equipment Recommendations  Equipment Needed: No      Patient Diagnosis(es): The primary encounter diagnosis was Atrial fibrillation with RVR ([facility] [address] A diagnosis of Pneumonia of right upper lobe due to infectious organism was also pertinent to this visit. Past Medical History:  has a past medical history of Abnormal stress ECG, Angina pectoris (HCC), BPH (benign prostatic hyperplasia), CAD (coronary artery disease), Carotid stenosis, Carpal tunnel syndrome, Cervical stenosis of spine, Cervicalgia, Chronic back pain, COPD (chronic obstructive pulmonary disease) (HCC), DDD (degenerative disc disease), Elbow fracture, right, Fractures, GERD (gastroesophageal reflux disease), Gout, Hyperlipidemia, Hypertension, Lung nodule, Mild depression ([address] Morbidly obese ([address] Occlusion of right carotid artery, Osteoarthritis of right knee, Pre-diabetes, Sinus headache, Sleep apnea, Sleep disturbance, Smoker, Snores, SOB (shortness of breath) on exertion, Tendonitis of foot, Tooth loose, Under care of team, Under care of team, and Wellness examination. Past Surgical History:  has a past surgical history that includes Colonoscopy; Upper gastrointestinal endoscopy; Cervical spine surgery (2012); fracture surgery; Cardiac catheterization (2014); Nerve Block (2016); hc inject other perphrl nerv (Bilateral, 2019); other surgical history (08/15/2019); Injection Procedure For Sacroiliac Joint (Bilateral, 08/15/2019); Nerve Block (Left, 2020); Nerve Block (Left, 2020); Pain management procedure (Left, 2020); Nerve Block (2021); Pain management procedure (Left, 2021);  Nerve Block (Right, 2021); and Pain management procedure (Right, 02/19/2021). Assessment   Body Structures, Functions, Activity Limitations Requiring Skilled Therapeutic Intervention: Decreased functional mobility ; Decreased endurance;Decreased balance;Decreased strength  Assessment: continue per POC to maxmize potential for safe D/C  Treatment Diagnosis: impaired mobility due to weakness/ limited endurance  Specific Instructions for Next Treatment: advance gait distance as tolerated, advance to 3 steps w/ left rail, instruct in HEP, monitor O2 sats and HR w/ treatment  Therapy Prognosis: Good  Decision Making: Medium Complexity  History: pt admitted due to pneumonia and A-Fib e/ RVR  Exam: ROM, MMT, balance and mobility assessments  Clinical Presentation: MOD I for bed mobility, SBA for sit <> stand and gait w/o AD 5' forward and back. Pt refused gait belt and wheeled walker. Further distance deferred due to increasing HR. Barriers to Learning: none  Requires PT Follow-Up: Yes  Activity Tolerance  Activity Tolerance: Patient limited by fatigue;Patient limited by pain; Patient limited by endurance     Plan   Plan  Plan: Other (see comment) (3-5 treatments/ 5 days)  Specific Instructions for Next Treatment: advance gait distance as tolerated, advance to 3 steps w/ left rail, instruct in HEP, monitor O2 sats and HR w/ treatment  Current Treatment Recommendations: Strengthening, Equipment evaluation, education, & procurement, Gait training, Balance training, Functional mobility training, Stair training, Home exercise program, Transfer training, Safety education & training, Patient/Caregiver education & training, Endurance training  Safety Devices  Type of Devices:  All fall risk precautions in place, Call light within reach, Gait belt, Patient at risk for falls, Left in bed  Restraints  Restraints Initially in Place: No     Restrictions  Restrictions/Precautions  Restrictions/Precautions: Fall Risk, Up as Tolerated, General Precautions (peripheral IV left antecubital and right forearm)  Required Braces or Orthoses?: No  Position Activity Restriction  Other position/activity restrictions: up w/ assist     Subjective   Pain: Pt reports 7/10 pain in neck, lower back, L lateral part of upper leg  General  Patient assessed for rehabilitation services?: Yes  Response To Previous Treatment: Not applicable  Family / Caregiver Present: No  Referring Practitioner: Dr. Ann Marie Blanco. Datt  Referral Date : 09/14/22  Diagnosis: pneumonia, A-Fib w/ RVR  Follows Commands: Within Functional Limits  Other (Comment): OK per nurse Patrizia Hernández to proceed w/ PT evaluation  General Comment  Comments: CT scan of the chest was negative for a PE. Pt to be scheduled for TCAR procedure at [facility].  Subjective  Subjective: \"I couldn't breathe. \"  Pt also had C/O SOB and heart palpitations x 5 days.          Social/Functional History  Social/Functional History  Lives With: Other (comment) (will not state- states a friend)  Type of Home: Mobile home  Home Layout: One level  Home Access: Stairs to enter with rails  Entrance Stairs - Number of Steps: 3  Entrance Stairs - Rails: Left  Bathroom Shower/Tub: Tub/Shower unit, Curtain  Bathroom Toilet: Standard (has dryer next to it and countertop on the other side)  Home Equipment:  (no DME)  Has the patient had two or more falls in the past year or any fall with injury in the past year?: No (States one time)  ADL Assistance: [address] [place] [address] Avenue: Independent (Friend assists as needed)  Homemaking Responsibilities: Yes (shares w/ friend)  Ambulation Assistance: Independent (no device)  Transfer Assistance: Independent  Active : Yes  Mode of Transportation: Truck  Occupation: On disability  Leisure & Hobbies: Goes out on the patio when it's nice and not to hot, has a few cats  IADL Comments: sleeps on sleeps on a couch  Additional Comments: Pt reports he has a brother and sister around who can help as needed, as well as his \"girl\" who lives at home with.  Vision/Hearing  Vision  Vision: Within Functional Limits  Hearing  Hearing: Within functional limits    Cognition   Orientation  Overall Orientation Status: Within Functional Limits  Orientation Level: Oriented to place;Oriented to time;Oriented to situation;Oriented to person;Oriented X4  Cognition  Overall Cognitive Status: WFL     Objective   O2 Device: None (Room air)     Observation/Palpation  Observation: peripheral IV left antecubital and right forearm, continuous heart monitor  Gross Assessment  Sensation: Impaired (C/O pins and needles sensation bilateral arms and LEs (left > right))     AROM RLE (degrees)  RLE AROM: WFL  AROM LLE (degrees)  LLE AROM : WFL  AROM RUE (degrees)  RUE General AROM: see OT for UE assessment  AROM LUE (degrees)  LUE General AROM: see OT for UE assessment  Strength RLE  Comment: grossly 4+/5  Strength LLE  Comment: grossly 4+/5  Strength RUE  Comment: see OT for UE assessment  Strength LUE  Comment: see OT for UE assessment           Bed mobility  Rolling to Right: Modified independent  Supine to Sit: Modified independent  Sit to Supine: Modified independent  Scooting: Modified independent  Bed Mobility Comments: HOB elevated for above activities. Pt dangled at the EOB w/ supervision- therapist monitored HR during treatment.   Transfers  Sit to Stand: Stand by assistance  Stand to sit: Stand by assistance  Stand Pivot Transfers: Stand by assistance (no device)  Ambulation  Surface: level tile  Device: No Device  Assistance: Stand by assistance  Distance: 5' forward and 5' backwards  Comments: HR monitored throughout w/ HR varies rapidly during treatment (107 - 147), deferred further gait distance  Stairs/Curb  Stairs?: No (has 3 steps w/ left rail)     Balance  Sitting - Static: Good  Sitting - Dynamic: Good  Standing - Static: Good;- (no device)  Standing - Dynamic: Good;- (no device)           OutComes Score                                                  AM-PAC Score  -[facility] Inpatient Mobility Raw Score : 18 (09/15/22 1315)  AM-PAC Inpatient T-Scale Score : 43.63 (09/15/22 1315)  Mobility Inpatient CMS 0-100% Score: 46.58 (09/15/22 1315)  Mobility Inpatient CMS G-Code Modifier : CK (09/15/22 1315)          Tinneti Score       Goals  Short Term Goals  Time Frame for Short term goals: 3-5 treatments/ 5 days  Short term goal 1: pt to tolerate 1/2 hour of therapuetic exercise and activity  Short term goal 2: pt to demonstrate good technique for energy conservation and general strengthening exercises  Short term goal 3: pt to demonstrate independent bed mobility from a flat surface for position change  Short term goal 4: pt to demonstrate independent transfers using AD if meeded  Short term goal 5: pt to demonstrate independent gait 150' using AD if needed for community distances  Short Term Goal 6: pt to demonstrate good ambulkatory balance using AD if needed  Short Term Goal 7: pt to demonstrate ability to ascend/ descend 3 steps w/ left rail for home entry w/ supervision  Patient Goals   Patient goals : return home at D/C       Education  Patient Education  Education Given To: Patient  Education Provided: Role of Therapy;Plan of Care  Education Method: Demonstration;Verbal  Barriers to Learning: None  Education Outcome: Verbalized understanding;Continued education needed      Therapy Time   Individual Concurrent Group Co-treatment   Time In 1315         Time Out 1345         Minutes 30         Timed Code Treatment Minutes: Charan Bland PT

## 2022-09-15 NOTE — PROGRESS NOTES
[address]   Occupational Therapy Evaluation  Date: 9/15/22  Patient Name: Quique Shay       Room: 3475/4025-71  MRN: [id number]  Account: [de-identified]   : 1960  (60 y.o.) Gender: male     Discharge Recommendations:  Further Occupational Therapy is recommended upon facility discharge. OT Equipment Recommendations  Other: TBD    Referring Practitioner: Zachery Wylie MD  Diagnosis: Atrial fibrillation with RVR      Treatment Diagnosis: Impaired self care status    Past Medical History:  has a past medical history of Abnormal stress ECG, Angina pectoris ([address] BPH (benign prostatic hyperplasia), CAD (coronary artery disease), Carotid stenosis, Carpal tunnel syndrome, Cervical stenosis of spine, Cervicalgia, Chronic back pain, COPD (chronic obstructive pulmonary disease) (HCC), DDD (degenerative disc disease), Elbow fracture, right, Fractures, GERD (gastroesophageal reflux disease), Gout, Hyperlipidemia, Hypertension, Lung nodule, Mild depression ([address] Morbidly obese ([address] Occlusion of right carotid artery, Osteoarthritis of right knee, Pre-diabetes, Sinus headache, Sleep apnea, Sleep disturbance, Smoker, Snores, SOB (shortness of breath) on exertion, Tendonitis of foot, Tooth loose, Under care of team, Under care of team, and Wellness examination. Past Surgical History:   has a past surgical history that includes Colonoscopy; Upper gastrointestinal endoscopy; Cervical spine surgery (2012); fracture surgery; Cardiac catheterization (2014); Nerve Block (2016); hc inject other perphrl nerv (Bilateral, 2019); other surgical history (08/15/2019); Injection Procedure For Sacroiliac Joint (Bilateral, 08/15/2019); Nerve Block (Left, 2020); Nerve Block (Left, 2020); Pain management procedure (Left, 2020); Nerve Block (2021); Pain management procedure (Left, 2021);  Nerve Block (Right, 2021); and Pain management procedure (Right, 02/19/2021). Restrictions  Restrictions/Precautions  Restrictions/Precautions: Fall Risk;Up as Tolerated;General Precautions (peripheral IV left antecubital and right forearm)  Required Braces or Orthoses?: No  Position Activity Restriction  Other position/activity restrictions: up w/ assist      Vitals  Vitals  Heart Rate: 100  Heart Rate Source: Monitor  BP: 120/65  BP Location: Left upper arm  BP Method: Automatic  Patient Position: Semi fowlers  MAP (Calculated): 83.33  Resp: 18  SpO2: 96 %  O2 Device: None (Room air)     Subjective  Subjective: \"I was just standing up and walking, I am not doing that again\", pt stated at start of session. Pt positioned in bed and was slightly agitated at start of session. After OT student used therapeutic use of self, pt became more agreeable/pleasant for OT session. Comments: OK per RN Manav Deluca for OT session.     Pain: Pt reports 7/10 pain in neck, lower back, L lateral part of upper leg      Social/Functional History  Social/Functional History  Lives With: Other (comment) (will not state- states a friend)  Type of Home: Mobile home  Home Layout: One level  Home Access: Stairs to enter with rails  Entrance Stairs - Number of Steps: 3  Entrance Stairs - Rails: Left  Bathroom Shower/Tub: Tub/Shower unit, Curtain  Bathroom Toilet: Standard (has dryer next to it and countertop on the other side)  Home Equipment:  (no DME)  Has the patient had two or more falls in the past year or any fall with injury in the past year?: No (States one time)  ADL Assistance: [address]: Independent (Friend assists as needed)  Homemaking Responsibilities: Yes (shares w/ friend)  Ambulation Assistance: Independent (no device)  Transfer Assistance: Independent  Active : Yes  Mode of Transportation: Truck  Occupation: On disability  Leisure & Hobbies: Goes out on the patio when it's nice and not to hot, has a few cats  IADL Comments: sleeps on sleeps on a couch  Additional Comments: Pt reports he has a brother and sister around who can help as needed, as well as his \"girl\" who lives at home with. Objective  Cognition  Orientation  Overall Orientation Status: Within Functional Limits  Orientation Level: Oriented X4  Cognition  Overall Cognitive Status: WFL   Sensation  Overall Sensation Status: Impaired (Numbness/tingling in hands and toes)    Activities of Daily Living  ADL  Feeding: Setup  Grooming: Setup  UE Bathing: Stand by assistance  LE Bathing: Contact guard assistance  UE Dressing: Stand by assistance  LE Dressing: Contact guard assistance  Toileting: Contact guard assistance  Additional Comments: ADL scores based on clinical reasoning and skilled observation unless otherwise noted. Pt currently limited by shortness of breathe, strength, pain, endurrance, and activity tolerance impacting IND with self care tasks. UE Function  LUE AROM (degrees)  LUE AROM : WFL  Left Hand AROM (degrees)  Left Hand AROM: WFL  Tone LUE  LUE Tone: Normotonic  LUE Strength  Gross LUE Strength: WFL  L Hand General: 3+/5  LUE Strength Comment: Grossly: 3+/5    RUE AROM (degrees)  RUE AROM : WFL  Right Hand AROM (degrees)  Right Hand AROM: WFL  Tone RUE  RUE Tone: Normotonic  RUE Strength  Gross RUE Strength: WFL  R Hand General: 3+/5  RUE Strength Comment: Grossly: 3+/5    Fine Motor Skills/Coordination  Coordination  Movements Are Fluid And Coordinated: No  Coordination and Movement Description: Decreased speed, Decreased accuracy, Right UE, Left UE          Mobility  Bed Mobility  Bed mobility  Rolling to Right: Modified independent  Supine to Sit: Stand by assistance  Sit to Supine: Stand by assistance  Scooting: Stand by assistance  Bed Mobility Comments: HOB slightly elevated. Verbal cues for hand placement and safety techniques with Good carryover noted. Increased time to complete due to shortness of breathe.     Balance  Balance  Sitting Balance: Stand by assistance (Pt sat EOB for ~3 minutes)  Standing Balance:  (Pt refused to stand up due to being up previously with PT.)       Transfers  Transfers  Transfer Comments: Unable to formally assess due to pt refusing to stand up and complete functional mobility due to recently doing so with PT. Functional Mobility  Functional Mobility Comments: Unable to formally assess due to pt refusing to stand up and complete functional mobility due to recently doing so with PT. Assessment  Assessment  Performance deficits / Impairments: Decreased functional mobility , Decreased ADL status, Decreased ROM, Decreased strength, Decreased safe awareness, Decreased endurance, Decreased sensation, Decreased balance, Decreased high-level IADLs, Decreased fine motor control, Decreased coordination, Decreased posture  Treatment Diagnosis: Impaired self care status  Prognosis: Good  Decision Making: Low Complexity  Discharge Recommendations: Patient would benefit from continued therapy after discharge    Activity Tolerance  Activity Tolerance: Patient limited by fatigue, Patient limited by pain, Treatment limited secondary to agitation    Safety Devices  Type of Devices:  All fall risk precautions in place, Call light within reach, Gait belt, Patient at risk for falls, Left in bed    Patient Education  Patient Education  Education Given To: Patient  Education Provided: Role of Therapy, Plan of Care, ADL Adaptive Strategies, Transfer Training  Education Method: Verbal  Barriers to Learning: None  Education Outcome: Verbalized understanding, Continued education needed      Functional Outcome Measures  AM-PAC Daily Activity Inpatient   How much help for putting on and taking off regular lower body clothing?: A Little  How much help for Bathing?: A Little  How much help for Toileting?: A Little  How much help for putting on and taking off regular upper body clothing?: A Little  How much help for taking care of personal grooming?: A Little  How much help for eating meals?: A Little  AM-[facility] Inpatient Daily Activity Raw Score: 18  AM-PAC Inpatient ADL T-Scale Score : 38.66  ADL Inpatient CMS 0-100% Score: 46.65  ADL Inpatient CMS G-Code Modifier : CK       Goals  Short Term Goals  Time Frame for Short term goals: By discharge  Short Term Goal 1: Pt will complete BADLs with modified IND and Good safety to improve IND and safety with self care tasks. Short Term Goal 2: Pt will complete functional mobility/transfers with modified IND and Good safety to improve IND and safety with self care and mobility tasks. Short Term Goal 3: Pt will tolerate standing for 6+ minutes during functional activity of choice with modified IND and Good safety to improve IND and safety with self care and mobility tasks. Short Term Goal 4: Pt will verbalize/demonstrate Good understanding of at lest 3 work simplification/energy conservation strategies to improve IND and participation in ADL/IADL tasks. Short Term Goal 5: Pt will verbalize/demonstrate Good understanding of at least 3 AD/DME/modified techniques to improve IND and safety with self care and mobility tasks. Short Term Goal 6: Pt will participate in 15+ minutes of therapeutic exercises/functional activities to improve IND and safety with self care and mobility tasks.     Plan  Plan  Times per Week: 3-5  Current Treatment Recommendations: Strengthening, ROM, Balance training, Functional mobility training, Endurance training, Pain management, Safety education & training, Patient/Caregiver education & training, Equipment evaluation, education, & procurement, Self-Care / ADL, Sensory integraion, Coordination training      OT Individual Minutes  OT Individual Minutes  Time In: 1351  Time Out: 1406  Minutes: 15           Electronically signed by KelDoc S/OT on 9/15/22 at 3:26 PM EDT

## 2022-09-15 NOTE — PROGRESS NOTES
Rounded with Dr. Davonte Mantilla, discussed -130 still afib on amio. Give 0.50 of dig IV once. Consult Dr. Donnie Kuhn who was supposed to do carotid procedure yesterday for 90% occlusion.

## 2022-09-15 NOTE — PROGRESS NOTES
Pt converted to NSR on amio gtt. Additional dose of dig not given. Page out to Dr. Nhan Navarro. Electronically signed by Audrene Sandhoff, RN on 9/15/2022 at 5:11 PM    1720: Dr. Tammie Douglas notified that pt is now in NSR. Continue amiodarone gtt tonight.

## 2022-09-15 NOTE — PROGRESS NOTES
RN spoke with Dr. Conner Hilton. Informed him that rate is better controlled in the 80s-110s but is still in afib. Ordered another one time dose of IV dig with dose of 0.25. Keep pt NPO at midnight, Dr. Subha Vargas will see pt in the AM and determine whether pt needs JE cardioversion. Dr. Conner Hilton requested RN get slot for 21 [phone] tomorrow AM. [facility] to inform of request for time slot. She reported that she cannot do that and it have to be done in the morning. Will pass along to night shift.  Electronically signed by Nader Ervin RN on 9/15/2022 at 4:50 PM

## 2022-09-15 NOTE — PROGRESS NOTES
Pt's BP 88/64, HR 77 while on amio gtt @ 33.3 ml/hr. RN spoke with Dr. Vela Bound, no new orders placed at this time.

## 2022-09-15 NOTE — CARE COORDINATION
CASE MANAGEMENT NOTE:    Admission Date:  9/14/2022 Adela Tyson is a 57 y.o.  male    Admitted for : Pneumonia [J18.9]  Atrial fibrillation with RVR ([address] [I48.91]  Pneumonia of right upper lobe due to infectious organism [J18.9]    Met with:  Patient    PCP:  Verner Dilling, DO                                Insurance:  [address]      Is patient alert and oriented at time of discussion:  Yes    Current Residence/ Living Arrangements:  independently at home with girlfriend, in 1 story trailer             Current Services PTA:  No    Does patient go to outpatient dialysis: No  If yes, location and chair time:   Who is their nephrologist?     Is patient agreeable to VNS: No    Freedom of choice provided:  NA    List of [address] provided: NA    VNS chosen:  NA    DME:  BP cuff    Home Oxygen: No    Nebulizer: No    CPAP/BIPAP: No    Supplier: N/A    Potential Assistance Needed: No    SNF needed: No    Freedom of choice and list provided: NA    Pharmacy:  [facility]       Is patient currently receiving oral anticoagulation therapy? No    Is the Patient an [facility] with Readmission Risk Score greater than 14%? No  If yes, pt needs a follow up appointment made within 7 days.     Family Members/Caregivers that pt would like involved in their care:    Yes    If yes, list name here:  [address] [place] [address]    Transportation Provider:  Patient             Discharge Plan:  9/15/2022  [address]; patient independent at home with girlfriend, in 1 story trailer; DME bp cuff; VNS denies needs; new afib follow for [facility]; [place] header 11%; will continue to follow for needs//KR  IMM signed 9/15 @ 0810                            Electronically signed by: Everton Alexander RN on 9/15/2022 at 9:35 AM

## 2022-09-16 ENCOUNTER — APPOINTMENT (OUTPATIENT)
Dept: GENERAL RADIOLOGY | Age: 62
DRG: 286 | End: 2022-09-16
Payer: MEDICARE

## 2022-09-16 ENCOUNTER — HOSPITAL ENCOUNTER (OUTPATIENT)
Dept: CARDIAC CATH/INVASIVE PROCEDURES | Age: 62
Discharge: HOME | End: 2022-09-16
Payer: MEDICARE

## 2022-09-16 VITALS
SYSTOLIC BLOOD PRESSURE: 128 MMHG | OXYGEN SATURATION: 96 % | DIASTOLIC BLOOD PRESSURE: 53 MMHG | RESPIRATION RATE: 22 BRPM | HEART RATE: 76 BPM

## 2022-09-16 LAB
ABSOLUTE EOS #: 0.2 K/UL (ref 0–0.4)
ABSOLUTE LYMPH #: 2.3 K/UL (ref 1–4.8)
ABSOLUTE MONO #: 0.6 K/UL (ref 0.1–1.3)
ANION GAP SERPL CALCULATED.3IONS-SCNC: 10 MMOL/L (ref 9–17)
BASOPHILS # BLD: 0 % (ref 0–2)
BASOPHILS ABSOLUTE: 0 K/UL (ref 0–0.2)
BUN BLDV-MCNC: 19 MG/DL (ref 8–23)
CALCIUM SERPL-MCNC: 8.8 MG/DL (ref 8.6–10.4)
CHLORIDE BLD-SCNC: 98 MMOL/L (ref 98–107)
CO2: 27 MMOL/L (ref 20–31)
CREAT SERPL-MCNC: 0.78 MG/DL (ref 0.7–1.2)
EOSINOPHILS RELATIVE PERCENT: 3 % (ref 0–4)
GFR AFRICAN AMERICAN: >60 ML/MIN
GFR NON-AFRICAN AMERICAN: >60 ML/MIN
GFR SERPL CREATININE-BSD FRML MDRD: ABNORMAL ML/MIN/{1.73_M2}
GLUCOSE BLD-MCNC: 120 MG/DL (ref 70–99)
HCT VFR BLD CALC: 34 % (ref 41–53)
HEMOGLOBIN: 11.5 G/DL (ref 13.5–17.5)
LYMPHOCYTES # BLD: 33 % (ref 24–44)
MAGNESIUM: 2.4 MG/DL (ref 1.6–2.6)
MCH RBC QN AUTO: 28.4 PG (ref 26–34)
MCHC RBC AUTO-ENTMCNC: 33.9 G/DL (ref 31–37)
MCV RBC AUTO: 83.7 FL (ref 80–100)
MONOCYTES # BLD: 9 % (ref 1–7)
MYCOPLASMA PNEUMONIAE IGM: 0.39
PDW BLD-RTO: 15.2 % (ref 11.5–14.9)
PLATELET # BLD: 222 K/UL (ref 150–450)
PMV BLD AUTO: 7.7 FL (ref 6–12)
POTASSIUM SERPL-SCNC: 3.8 MMOL/L (ref 3.7–5.3)
RBC # BLD: 4.06 M/UL (ref 4.5–5.9)
SEG NEUTROPHILS: 55 % (ref 36–66)
SEGMENTED NEUTROPHILS ABSOLUTE COUNT: 3.9 K/UL (ref 1.3–9.1)
SODIUM BLD-SCNC: 135 MMOL/L (ref 135–144)
WBC # BLD: 7 K/UL (ref 3.5–11)

## 2022-09-16 PROCEDURE — 4A023N8 MEASUREMENT OF CARDIAC SAMPLING AND PRESSURE, BILATERAL, PERCUTANEOUS APPROACH: ICD-10-PCS | Performed by: INTERNAL MEDICINE

## 2022-09-16 PROCEDURE — 93458 L HRT ARTERY/VENTRICLE ANGIO: CPT

## 2022-09-16 PROCEDURE — 99152 MOD SED SAME PHYS/QHP 5/>YRS: CPT

## 2022-09-16 PROCEDURE — 99233 SBSQ HOSP IP/OBS HIGH 50: CPT | Performed by: INTERNAL MEDICINE

## 2022-09-16 PROCEDURE — 83735 ASSAY OF MAGNESIUM: CPT

## 2022-09-16 PROCEDURE — B2151ZZ FLUOROSCOPY OF LEFT HEART USING LOW OSMOLAR CONTRAST: ICD-10-PCS | Performed by: INTERNAL MEDICINE

## 2022-09-16 PROCEDURE — B2111ZZ FLUOROSCOPY OF MULTIPLE CORONARY ARTERIES USING LOW OSMOLAR CONTRAST: ICD-10-PCS | Performed by: INTERNAL MEDICINE

## 2022-09-16 PROCEDURE — 6370000000 HC RX 637 (ALT 250 FOR IP): Performed by: INTERNAL MEDICINE

## 2022-09-16 PROCEDURE — 94640 AIRWAY INHALATION TREATMENT: CPT

## 2022-09-16 PROCEDURE — 2580000003 HC RX 258: Performed by: STUDENT IN AN ORGANIZED HEALTH CARE EDUCATION/TRAINING PROGRAM

## 2022-09-16 PROCEDURE — 6360000002 HC RX W HCPCS

## 2022-09-16 PROCEDURE — 6360000002 HC RX W HCPCS: Performed by: STUDENT IN AN ORGANIZED HEALTH CARE EDUCATION/TRAINING PROGRAM

## 2022-09-16 PROCEDURE — 71045 X-RAY EXAM CHEST 1 VIEW: CPT

## 2022-09-16 PROCEDURE — 6370000000 HC RX 637 (ALT 250 FOR IP): Performed by: STUDENT IN AN ORGANIZED HEALTH CARE EDUCATION/TRAINING PROGRAM

## 2022-09-16 PROCEDURE — 85025 COMPLETE CBC W/AUTO DIFF WBC: CPT

## 2022-09-16 PROCEDURE — 7100000000 HC PACU RECOVERY - FIRST 15 MIN

## 2022-09-16 PROCEDURE — 6360000004 HC RX CONTRAST MEDICATION

## 2022-09-16 PROCEDURE — 6360000002 HC RX W HCPCS: Performed by: INTERNAL MEDICINE

## 2022-09-16 PROCEDURE — 2709999900 HC NON-CHARGEABLE SUPPLY

## 2022-09-16 PROCEDURE — 2500000003 HC RX 250 WO HCPCS: Performed by: INTERNAL MEDICINE

## 2022-09-16 PROCEDURE — 36415 COLL VENOUS BLD VENIPUNCTURE: CPT

## 2022-09-16 PROCEDURE — 2060000000 HC ICU INTERMEDIATE R&B

## 2022-09-16 PROCEDURE — 99221 1ST HOSP IP/OBS SF/LOW 40: CPT | Performed by: SURGERY

## 2022-09-16 PROCEDURE — C1894 INTRO/SHEATH, NON-LASER: HCPCS

## 2022-09-16 PROCEDURE — 80048 BASIC METABOLIC PNL TOTAL CA: CPT

## 2022-09-16 PROCEDURE — B2131ZZ FLUOROSCOPY OF MULTIPLE CORONARY ARTERY BYPASS GRAFTS USING LOW OSMOLAR CONTRAST: ICD-10-PCS | Performed by: INTERNAL MEDICINE

## 2022-09-16 PROCEDURE — 7100000001 HC PACU RECOVERY - ADDTL 15 MIN

## 2022-09-16 PROCEDURE — 6370000000 HC RX 637 (ALT 250 FOR IP)

## 2022-09-16 RX ORDER — POTASSIUM CHLORIDE 7.45 MG/ML
10 INJECTION INTRAVENOUS PRN
Status: DISCONTINUED | OUTPATIENT
Start: 2022-09-16 | End: 2022-09-17 | Stop reason: HOSPADM

## 2022-09-16 RX ORDER — POTASSIUM CHLORIDE 20 MEQ/1
20 TABLET, EXTENDED RELEASE ORAL ONCE
Status: COMPLETED | OUTPATIENT
Start: 2022-09-16 | End: 2022-09-16

## 2022-09-16 RX ORDER — POTASSIUM CHLORIDE 20 MEQ/1
40 TABLET, EXTENDED RELEASE ORAL PRN
Status: DISCONTINUED | OUTPATIENT
Start: 2022-09-16 | End: 2022-09-17 | Stop reason: HOSPADM

## 2022-09-16 RX ORDER — MAGNESIUM SULFATE HEPTAHYDRATE 40 MG/ML
2000 INJECTION, SOLUTION INTRAVENOUS PRN
Status: DISCONTINUED | OUTPATIENT
Start: 2022-09-16 | End: 2022-09-17 | Stop reason: HOSPADM

## 2022-09-16 RX ORDER — SODIUM CHLORIDE 9 MG/ML
INJECTION, SOLUTION INTRAVENOUS CONTINUOUS
Status: DISCONTINUED | OUTPATIENT
Start: 2022-09-16 | End: 2022-09-17 | Stop reason: HOSPADM

## 2022-09-16 RX ADMIN — ASPIRIN 81 MG: 81 TABLET, COATED ORAL at 09:59

## 2022-09-16 RX ADMIN — ENOXAPARIN SODIUM 120 MG: 150 INJECTION SUBCUTANEOUS at 22:55

## 2022-09-16 RX ADMIN — SODIUM CHLORIDE, PRESERVATIVE FREE 10 ML: 5 INJECTION INTRAVENOUS at 09:59

## 2022-09-16 RX ADMIN — GABAPENTIN 800 MG: 400 CAPSULE ORAL at 22:38

## 2022-09-16 RX ADMIN — GABAPENTIN 800 MG: 400 CAPSULE ORAL at 06:05

## 2022-09-16 RX ADMIN — BUDESONIDE AND FORMOTEROL FUMARATE DIHYDRATE 2 PUFF: 160; 4.5 AEROSOL RESPIRATORY (INHALATION) at 08:12

## 2022-09-16 RX ADMIN — KETOROLAC TROMETHAMINE 15 MG: 30 INJECTION, SOLUTION INTRAMUSCULAR; INTRAVENOUS at 09:54

## 2022-09-16 RX ADMIN — METOPROLOL TARTRATE 25 MG: 25 TABLET, FILM COATED ORAL at 22:38

## 2022-09-16 RX ADMIN — CLOPIDOGREL BISULFATE 75 MG: 75 TABLET ORAL at 09:59

## 2022-09-16 RX ADMIN — AMIODARONE HYDROCHLORIDE 1 MG/MIN: 1.8 INJECTION, SOLUTION INTRAVENOUS at 23:43

## 2022-09-16 RX ADMIN — AZITHROMYCIN MONOHYDRATE 500 MG: 250 TABLET ORAL at 09:59

## 2022-09-16 RX ADMIN — ISOSORBIDE MONONITRATE 60 MG: 60 TABLET, EXTENDED RELEASE ORAL at 09:59

## 2022-09-16 RX ADMIN — AMLODIPINE BESYLATE 5 MG: 5 TABLET ORAL at 09:59

## 2022-09-16 RX ADMIN — POTASSIUM CHLORIDE 20 MEQ: 1500 TABLET, EXTENDED RELEASE ORAL at 22:39

## 2022-09-16 RX ADMIN — KETOROLAC TROMETHAMINE 15 MG: 30 INJECTION, SOLUTION INTRAMUSCULAR; INTRAVENOUS at 03:00

## 2022-09-16 RX ADMIN — KETOROLAC TROMETHAMINE 15 MG: 30 INJECTION, SOLUTION INTRAMUSCULAR; INTRAVENOUS at 22:39

## 2022-09-16 RX ADMIN — METOPROLOL TARTRATE 25 MG: 25 TABLET, FILM COATED ORAL at 09:59

## 2022-09-16 RX ADMIN — AMIODARONE HYDROCHLORIDE 1 MG/MIN: 1.8 INJECTION, SOLUTION INTRAVENOUS at 02:43

## 2022-09-16 RX ADMIN — ATORVASTATIN CALCIUM 40 MG: 40 TABLET, FILM COATED ORAL at 09:59

## 2022-09-16 RX ADMIN — CEFTRIAXONE SODIUM 1000 MG: 1 INJECTION, POWDER, FOR SOLUTION INTRAMUSCULAR; INTRAVENOUS at 22:49

## 2022-09-16 RX ADMIN — ENOXAPARIN SODIUM 120 MG: 150 INJECTION SUBCUTANEOUS at 09:59

## 2022-09-16 ASSESSMENT — PAIN SCALES - GENERAL
PAINLEVEL_OUTOF10: 6
PAINLEVEL_OUTOF10: 8
PAINLEVEL_OUTOF10: 5
PAINLEVEL_OUTOF10: 0
PAINLEVEL_OUTOF10: 6

## 2022-09-16 ASSESSMENT — ENCOUNTER SYMPTOMS
COLOR CHANGE: 0
VOICE CHANGE: 0
SHORTNESS OF BREATH: 1
VOMITING: 0
BACK PAIN: 0
NAUSEA: 0
COUGH: 1
CONSTIPATION: 0
DIARRHEA: 0
ABDOMINAL PAIN: 0
SHORTNESS OF BREATH: 0
TROUBLE SWALLOWING: 0

## 2022-09-16 ASSESSMENT — PAIN DESCRIPTION - PAIN TYPE: TYPE: CHRONIC PAIN

## 2022-09-16 ASSESSMENT — PAIN DESCRIPTION - LOCATION
LOCATION: BACK;NECK
LOCATION: BACK

## 2022-09-16 ASSESSMENT — PAIN DESCRIPTION - DESCRIPTORS: DESCRIPTORS: ACHING

## 2022-09-16 ASSESSMENT — PAIN DESCRIPTION - ORIENTATION: ORIENTATION: LOWER

## 2022-09-16 NOTE — PROGRESS NOTES
Patient admitted, consent signed and questions answered. Patient ready for procedure. Call light to reach with side rails up 2 of 2. Bilateral groin areas clipped with writer and Aiyana MG present. No family at bedside with patient. History and physical complete.

## 2022-09-16 NOTE — H&P (VIEW-ONLY)
Consult Note            Date:9/16/2022        Patient Name:Lane Ray     YOB: 1960     Age:62 y.o. Consults    Chief Complaint     Chief Complaint   Patient presents with    Fever    Shortness of Breath      Atrial fibrillation    History Obtained From   patient    History of Present Illness   The patient has new onset atrial fibrillation with rapid ventricular rate that has been controlled during this hospitalization. He was admitted to internal medicine with cardiology consultation. He is being treated for this. He was canceled for his transcarotid artery revascularization with a stent with me several days ago and admitted for this problem. I came by to see him today to make certain that he was doing okay and to provide my input regarding his future procedure. Past Medical History     Past Medical History:   Diagnosis Date    Abnormal stress ECG     7/28/2022 per Dr. Cole Jamison, [facility] will need cardiac cath in future due to cp    Angina pectoris [facility])     BPH (benign prostatic hyperplasia)     CAD (coronary artery disease)     Dr. Cole Jamison   7/28/22 cardiac clearance    Carotid stenosis     rt  Dr. [place] tunnel syndrome     left    Cervical stenosis of spine 2012    C2-5 laminectomy  Dr. Rivas Costello    Cervicalgia     chronic pain    Chronic back pain     COPD (chronic obstructive pulmonary disease) ([address]     on inhalers pcp manages    DDD (degenerative disc disease)     Elbow fracture, right     Fractures     elbow    GERD (gastroesophageal reflux disease)     uses pepto bismol prn    Gout     Right great toe    Hyperlipidemia     Hypertension     Lung nodule     Mild depression ([address] 09/26/2013    2 suicide attempts by girlfriend related to depression.     Morbidly obese ([address] 10/30/2020    Occlusion of right carotid artery 07/14/2021    Osteoarthritis of right knee     Pre-diabetes     was taking metformin in past  no longer takes    Sinus headache 10/10/2018    Sleep apnea     snores   does not use cpap  pt did no follow up (per patient)    Sleep disturbance     Smoker     Snores     no cpap    SOB (shortness of breath) on exertion     Tendonitis of foot     right    Tooth loose 08/05/2022    lower front rt    Under care of team     Dr. Milad Castrejon  7/14/2022  will need cysto in future after heart cath and carotid sx    Under care of team     Dr. Carmelita Hicks for carotid stenosis    Wellness examination     Dc Rossi  last visit June 2022        Past Surgical History     Past Surgical History:   Procedure Laterality Date    CARDIAC CATHETERIZATION  07/08/2014    Non Obstructive CAD     CERVICAL SPINE SURGERY  07/13/2012     C2-3-4-5    COLONOSCOPY      FRACTURE SURGERY      Rt elbow     HC INJECT OTHER PERPHRL NERV Bilateral 05/09/2019    INJECTION SPINAL performed by Sukhwinder Stanley MD at [address] Bilateral 08/15/2019    SACROILIAC JOINT INJECTION performed by Sukhwinder Stanley MD at [place] [address]  02/05/2016    [facility]    NERVE BLOCK Left 02/07/2020    RFA left side    NERVE BLOCK Left 02/07/2020     NERVE RADIOFREQUENCY ABLATION - SACROILIAC (Left )    NERVE BLOCK  02/12/2021    NERVE RADIOFREQUENCY ABLATION SI (Left     NERVE BLOCK Right 02/19/2021    Procedure: NERVE RADIOFREQUENCY ABLATION SI JOINT (Right )    OTHER SURGICAL HISTORY  08/15/2019    sacroiliac joint injection    PAIN MANAGEMENT PROCEDURE Left 02/07/2020    NERVE RADIOFREQUENCY ABLATION - SACROILIAC performed by Sukhwinder Stanley MD at [address] [place] [address] Left 02/12/2021    NERVE RADIOFREQUENCY ABLATION SI performed by Sukhwinder Stanley MD at [address] [place] [address] Right 02/19/2021    NERVE RADIOFREQUENCY ABLATION SI JOINT performed by Sukhwinder Stanley MD at [address] [place] [address]          Medications     Prior to Admission medications    Medication Sig Start Date End Date Taking? Authorizing Provider   meloxicam (MOBIC) 15 MG tablet Take 15 mg by mouth daily as needed   Yes Historical Provider, MD   tiZANidine (ZANAFLEX) 4 MG tablet Take 4 mg by mouth 2 times daily as needed   Yes Historical Provider, MD   gabapentin (NEURONTIN) 800 MG tablet Take 1 tablet by mouth every 8 hours for 30 days. 9/6/22 10/6/22  Clotilde Garcia DO   isosorbide mononitrate (IMDUR) 60 MG extended release tablet Take 1 tablet by mouth in the morning. 7/28/22   Historical Provider, MD   clopidogrel (PLAVIX) 75 MG tablet Take 1 tablet by mouth in the morning. Patient taking differently: Take 75 mg by mouth daily Pt was instructed to continue Plavix for upcoming surgery per Dr. Sharlene Sierra. 8/1/22   Lis Juarez MD   nitroGLYCERIN (NITROSTAT) 0.4 MG SL tablet Place 1 tablet under the tongue every 5 minutes as needed for Chest pain (not to exceed 3 tabs at a time) 6/3/22   Clotilde Garcia DO   amLODIPine (NORVASC) 5 MG tablet TAKE ONE TABLET BY MOUTH DAILY 4/22/22   Clotilde Garcia DO   albuterol sulfate  (90 Base) MCG/ACT inhaler Inhale 2 puffs into the lungs every 6 hours as needed for Wheezing 4/22/22   Clotilde Garcia DO   simvastatin (ZOCOR) 80 MG tablet TAKE ONE TABLET BY MOUTH EVERY EVENING 4/11/22   Clotilde Garcia DO   acetaminophen (TYLENOL) 325 MG tablet Take 650 mg by mouth nightly    Historical Provider, MD   aspirin 81 MG EC tablet Take 1 tablet by mouth daily  Patient taking differently: Take 81 mg by mouth daily Pt. Was instructed to continue asa per Dr. Ayaan Schilling.  3/9/16   Celia Stock MD        enoxaparin (LOVENOX) injection 120 mg, BID  digoxin (LANOXIN) injection 250 mcg, Once  sodium chloride flush 0.9 % injection 10 mL, PRN  amLODIPine (NORVASC) tablet 5 mg, Daily  aspirin EC tablet 81 mg, Daily  clopidogrel (PLAVIX) tablet 75 mg, Daily  gabapentin (NEURONTIN) capsule 800 mg, 3 times per day  isosorbide mononitrate (IMDUR) extended release tablet 60 mg, Daily  atorvastatin (LIPITOR) tablet 40 mg, Daily  albuterol sulfate HFA (PROVENTIL;VENTOLIN;PROAIR) 108 (90 Base) MCG/ACT inhaler 2 puff, Q6H PRN  budesonide-formoterol (SYMBICORT) 160-4.5 MCG/ACT inhaler 2 puff, BID  tiZANidine (ZANAFLEX) tablet 4 mg, Q6H PRN  sodium chloride flush 0.9 % injection 5-40 mL, 2 times per day  sodium chloride flush 0.9 % injection 5-40 mL, PRN  0.9 % sodium chloride infusion, PRN  ondansetron (ZOFRAN-ODT) disintegrating tablet 4 mg, Q8H PRN   Or  ondansetron (ZOFRAN) injection 4 mg, Q6H PRN  polyethylene glycol (GLYCOLAX) packet 17 g, Daily PRN  acetaminophen (TYLENOL) tablet 650 mg, Q6H PRN   Or  acetaminophen (TYLENOL) suppository 650 mg, Q6H PRN  cefTRIAXone (ROCEPHIN) 1,000 mg in sodium chloride 0.9 % 50 mL IVPB mini-bag, Q24H  ketorolac (TORADOL) injection 15 mg, Q6H PRN  azithromycin (ZITHROMAX) tablet 500 mg, Daily  dextromethorphan-guaiFENesin (ROBITUSSIN-DM)  MG/5ML liquid 10 mL, Q4H PRN  metoprolol tartrate (LOPRESSOR) tablet 25 mg, BID  amiodarone (NEXTERONE) 360 mg in dextrose 5% 200 ml, Continuous        Allergies   Patient has no known allergies.     Social History     Social History       Tobacco History       Smoking Status  Former Smoking Start Date  1975 Smoking Frequency  0.25 packs/day for 40.00 years (10.00 pk-yrs) Smoking Tobacco Type  Cigarettes since 1975      Smokeless Tobacco Use  Former Quit Date  3/12/2015      Tobacco Comments  Smokes 2-4 cigs/day as of 8/5/2022              Alcohol History       Alcohol Use Status  Not Currently Drinks/Week  0 Standard drinks or equivalent per week Amount  0.0 standard drinks of alcohol/wk Comment  3 x year              Drug Use       Drug Use Status  No              Sexual Activity       Sexually Active  Yes Partners  Female                    Family History     Family History   Problem Relation Age of Onset    Heart Disease Mother     COPD Father     Cancer Maternal Aunt 56        breast cancer     Cancer Maternal Uncle 60        prostrate cancer        Review of Systems   Review of Systems   Constitutional:  Positive for fatigue. Negative for activity change, chills and unexpected weight change. HENT:  Negative for congestion and voice change. Eyes:  Negative for visual disturbance. Respiratory:  Positive for shortness of breath. Cardiovascular:  Positive for palpitations. Gastrointestinal:  Negative for abdominal pain, diarrhea, nausea and vomiting. Endocrine: Negative for cold intolerance. Genitourinary:  Negative for difficulty urinating and dysuria. Musculoskeletal:  Negative for gait problem and myalgias. Skin:  Negative for color change and wound. Neurological:  Negative for dizziness, weakness and numbness. Psychiatric/Behavioral:  Negative for agitation and confusion. Physical Exam   /66   Pulse 75 Comment: 75  Temp 98.3 °F (36.8 °C) (Oral)   Resp 18   Ht 5' 9\" (1.753 m)   Wt 254 lb 10.1 oz (115.5 kg)   SpO2 99%   BMI 37.60 kg/m²      Physical Exam  Cardiovascular:      Comments: His exam really has not changed except for his irregularly irregular rhythm. He continues to have a carotid bruit on the right. His lower extremities are warm dry and adequately perfused without ulceration or gangrene. Labs    CBC:  Recent Labs     09/14/22  1037 09/15/22  0557   WBC 4.9 5.6   RBC 4.59 4.28*   HGB 12.9* 12.3*   HCT 38.0* 36.4*   MCV 82.8 85.2   RDW 14.9 14.8    166     CHEMISTRIES:  Recent Labs     09/14/22  1037 09/15/22  0557   * 134*   K 3.8 3.8   CL 99 96*   CO2 23 22   BUN 22 30*   CREATININE 0.86 1.00   GLUCOSE 146* 193*     PT/INR:  Recent Labs     09/14/22  1037   PROTIME 14.2   INR 1.1     APTT:  Recent Labs     09/14/22  1037   APTT 27.9     LIVER PROFILE:No results for input(s): AST, ALT, BILIDIR, BILITOT, ALKPHOS in the last 72 hours.     Imaging/Diagnostics   XR CHEST PORTABLE    Result Date: 9/14/2022  Slightly greater basilar atelectasis; known left diaphragmatic hernia. No consolidation or sizable pleural effusion. CT CHEST PULMONARY EMBOLISM W CONTRAST    Result Date: 9/15/2022  No pulmonary embolism is identified. Wedge-shaped airspace opacities are seen in the right upper lobe, near the lung apex. This could represent an infection. While no pulmonary embolism is identified, a pulmonary infarct could also have this appearance. This finding is new from the prior CT dated 05/17/2022. Left diaphragmatic hernia. RECOMMENDATIONS: Follow-up radiographs recommended after treatment. Assessment      Hospital Problems             Last Modified POA    * (Principal) Atrial fibrillation with RVR ([address] 9/14/2022 Yes    Pneumonia 9/14/2022 Yes    Angina of effort ([address] 9/14/2022 Yes    Cervical stenosis of spine 9/14/2022 Yes    Cervicalgia 9/14/2022 Yes    COPD (chronic obstructive pulmonary disease) ([address] 9/14/2022 Yes    Mixed hyperlipidemia 9/14/2022 Yes    Essential hypertension 9/14/2022 Yes    Morbidly obese ([address] 9/14/2022 Yes    Occlusion of right carotid artery 9/14/2022 Yes       Plan   1. At this point I think the best option is to work-up his cardiac problems and give treatment for them accordingly. We can wait for his trends carotid artery revascularization with a stent. He does have very significant stenosis on the right with velocities that are quite high. I would like to get him to the operating room for the stent procedure. We chose to go ahead with transcarotid artery revascularization with a stent because his neck mobility is very poor and the bifurcation of his carotid artery is quite high. He understands and agrees with these plans and we can see him accordingly and we will schedule accordingly.     Electronically signed by Nayana Hui MD on 9/16/22 at 7:22 AM EDT

## 2022-09-16 NOTE — PROGRESS NOTES
[facility]    PROGRESS NOTE             9/16/2022    7:22 AM    Name:   Laurence Esparza  MRN:     [id number]     Acct:      [de-identified]   Room:   2083/2083-01   Day:  2  Admit Date:  9/14/2022 10:17 AM    PCP:  Danielle Wood DO  Code Status:  Full Code    Subjective:     C/C:   Chief Complaint   Patient presents with    Fever    Shortness of Breath     Interval History Status:     Patient converted to sinus rhythm yesterday evening after receiving digoxin. Report symptoms have improved. States that his weakness is improved, no shortness of breath, palpitations or lightheadedness. Patient still states that he has a tickle in his throat. Patient currently in sinus rhythm when examined. Was seen by his vascular surgeon earlier this morning and they will reschedule his surgery at a later date. Discussed patient with nursing, nurse mentioned that cardiology may go forward with a cardiac catheterization today, since patient had been seen by Dr. Enma Linares outpatient and it was planned to be performed in the future. Brief History:     The patient is a 57 y.o. Non- / non  male history significant for coronary artery disease, cervical stenosis, COPD, GERD, hypertension, hyperlipidemia, and right carotid stenosis approximately 90% occlusion who presents withFever and Shortness of Breath   and he is admitted to the hospital for the management of A. fib with RVR and community-acquired pneumonia    Patient presents with approximately 6 days of fever, chills, diaphoresis, weakness, heart palpitations, shortness of breath and cough. Reports that it has been worsening over the last 2 days his weakness and chest palpitations have significantly increased. States that cough has minimal sputum production and that it is clear sputum. Patient reports that he felt so bad this morning that he reported to the hospital here with his wife. Denies any sick contacts. Denies having this occur in the past.  Denies any leg swelling, diarrhea, difficulty with urination or abdominal pain. Patient was scheduled to undergo a TCAR procedure with Armando Bill a vascular surgeon at [address] [place] [address]. [facility] Patient has approximately 90% occlusion of his right carotid artery. Patiently was recently prescribed aspirin and Plavix per his vascular doctor. Also has a history of coronary artery disease and has been seeing Dr. Jp Watson. Per patient his cardiologist was going to perform a cardiac catheterization once the TCAR procedure was complete. Of note the patient has cervical spine stenosis with chronic pain and had been seeing pain management up until this spring. Sees Dr. Aidee Ann as his primary care provider who is currently managing his pain medications. He reports that he quit smoking 6 days ago but had smoked approximately 2 pack of cigarettes for 46 years. Occasionally drinks alcohol and denies any illicit drug use. In the emergency department the patient was found to be in A. fib with RVR. Dr. Jp Watson his cardiologist was consulted and the patient was subsequently started on diltiazem drip. Patient was afebrile, and tachycardic. Patient did not have a white count. Initial labs showed a D-dimer of 2.94, a CT chest with contrast was performed and no pulmonary embolism was identified. CT demonstrated wedge-shaped airspace opacity in the right upper lobe which could represent an infection or previous infarct. Patient was subsequently started on azithromycin and ceftriaxone for suspicion of community-acquired pneumonia. Rapid COVID test was negative. TSH was within normal limits. Troponins were within normal limits. Review of Systems:     Review of Systems   Constitutional:  Negative for chills and fever. HENT:  Negative for hearing loss and trouble swallowing. Eyes:  Negative for visual disturbance.    Respiratory:  Positive for cough. Negative for shortness of breath. Cardiovascular:  Negative for chest pain and leg swelling. Gastrointestinal:  Negative for abdominal pain, constipation, diarrhea, nausea and vomiting. Genitourinary:  Negative for difficulty urinating and hematuria. Musculoskeletal:  Positive for neck pain. Negative for back pain. Skin:  Negative for color change. Neurological:  Negative for dizziness, numbness and headaches. Psychiatric/Behavioral:  Negative for behavioral problems and confusion. Medications:      Allergies:  No Known Allergies    Current Meds:   Scheduled Meds:    enoxaparin  1 mg/kg SubCUTAneous BID    digoxin  250 mcg IntraVENous Once    amLODIPine  5 mg Oral Daily    aspirin  81 mg Oral Daily    clopidogrel  75 mg Oral Daily    gabapentin  800 mg Oral 3 times per day    isosorbide mononitrate  60 mg Oral Daily    atorvastatin  40 mg Oral Daily    budesonide-formoterol  2 puff Inhalation BID    sodium chloride flush  5-40 mL IntraVENous 2 times per day    cefTRIAXone (ROCEPHIN) IV  1,000 mg IntraVENous Q24H    azithromycin  500 mg Oral Daily    metoprolol tartrate  25 mg Oral BID     Continuous Infusions:    sodium chloride      amiodarone 1 mg/min (09/16/22 0243)     PRN Meds: sodium chloride flush, albuterol sulfate HFA, tiZANidine, sodium chloride flush, sodium chloride, ondansetron **OR** ondansetron, polyethylene glycol, acetaminophen **OR** acetaminophen, ketorolac, dextromethorphan-guaiFENesin    Data:     Past Medical History:   has a past medical history of Abnormal stress ECG, Angina pectoris ([address] BPH (benign prostatic hyperplasia), CAD (coronary artery disease), Carotid stenosis, Carpal tunnel syndrome, Cervical stenosis of spine, Cervicalgia, Chronic back pain, COPD (chronic obstructive pulmonary disease) ([address] DDD (degenerative disc disease), Elbow fracture, right, Fractures, GERD (gastroesophageal reflux disease), Gout, Hyperlipidemia, Hypertension, Lung nodule, Mild depression ([facility] Morbidly obese ([facility] Occlusion of right carotid artery, Osteoarthritis of right knee, Pre-diabetes, Sinus headache, Sleep apnea, Sleep disturbance, Smoker, Snores, SOB (shortness of breath) on exertion, Tendonitis of foot, Tooth loose, Under care of team, Under care of team, and Wellness examination. Social History:   reports that he has quit smoking. His smoking use included cigarettes. He started smoking about 47 years ago. He has a 10.00 pack-year smoking history. He quit smokeless tobacco use about 7 years ago. He reports that he does not currently use alcohol. He reports that he does not use drugs. Family History:   Family History   Problem Relation Age of Onset    Heart Disease Mother     COPD Father     Cancer Maternal Aunt 64        breast cancer     Cancer Maternal Uncle 60        prostrate cancer        Vitals:  /66   Pulse 75 Comment: 75  Temp 98.3 °F (36.8 °C) (Oral)   Resp 18   Ht 5' 9\" (1.753 m)   Wt 254 lb 10.1 oz (115.5 kg)   SpO2 99%   BMI 37.60 kg/m²   Temp (24hrs), Av.4 °F (36.9 °C), Min:98.3 °F (36.8 °C), Max:98.5 °F (36.9 °C)    No results for input(s): POCGLU in the last 72 hours. I/O(24Hr): No intake or output data in the 24 hours ending 22 8716    Labs:  [unfilled]    No results found for: SPECIAL  Lab Results   Component Value Date/Time    CULTURE NO GROWTH 2022 07:54 AM       [unfilled]    Radiology:    XR CHEST (2 VW)    Result Date: 2022  EXAMINATION: TWO XRAY VIEWS OF THE CHEST 2022 9:33 am COMPARISON: 2022 HISTORY: ORDERING SYSTEM PROVIDED HISTORY: preop, right carotid stenosis TECHNOLOGIST PROVIDED HISTORY: preop, right carotid stenosis FINDINGS: Cardiomediastinal silhouette appears within normal limits. No focal consolidation or overt pulmonary edema. Left diaphragmatic hernia. No evidence of pneumothorax. No acute osseous abnormalities. 1. No radiographic evidence of an acute cardiopulmonary process. 2. Left diaphragmatic hernia. XR CHEST PORTABLE    Result Date: 9/14/2022  EXAMINATION: ONE XRAY VIEW OF THE CHEST 9/14/2022 10:39 am COMPARISON: Two-view chest from 09/02/2022 HISTORY: ORDERING SYSTEM PROVIDED HISTORY: Chest Pain TECHNOLOGIST PROVIDED HISTORY: Chest Pain Reason for Exam: chest pain Known left diaphragmatic hernia FINDINGS: Overlying ECG monitor leads. Cardiomediastinal shadow stable; no cardiomegaly. Patchy basilar opacities, probably atelectatic or fibrotic mildly elevated lateral left costophrenic angle, slightly increased by comparison; no consolidation or sizable pleural effusion. Moderate DJD spine with mild convex-right curvature. Slightly greater basilar atelectasis; known left diaphragmatic hernia. No consolidation or sizable pleural effusion. CT CHEST PULMONARY EMBOLISM W CONTRAST    Result Date: 9/15/2022  EXAMINATION: CTA OF THE CHEST, 9/14/2022 11:59 am TECHNIQUE: CTA of the chest was performed after the administration of intravenous contrast.  Multiplanar reformatted images are provided for review. MIP images are provided for review. Automated exposure control, iterative reconstruction, and/or weight based adjustment of the mA/kV was utilized to reduce the radiation dose to as low as reasonably achievable. COMPARISON: Chest radiograph 09/14/2022, CT 05/17/2022 HISTORY: ORDERING SYSTEM PROVIDED HISTORY: SOB, elevated d-dimer TECHNOLOGIST PROVIDED HISTORY: SOB, elevated D-dimer Decision Support Exception - unselect if not a suspected or confirmed emergency medical condition->Emergency Medical Condition (MA) Reason for Exam:  SOB elevated D-dimer FINDINGS: Pulmonary Arteries: Pulmonary arteries are adequately opacified for evaluation. No evidence of intraluminal filling defect to suggest pulmonary embolism. Main pulmonary artery is normal in caliber. Mediastinum: No lymphadenopathy. No pericardial effusion. Coronary artery calcifications are seen.   The thoracic aorta is not significantly dilated. Lungs/pleura: The central airways are patent. No pleural effusion or pneumothorax is seen. Again seen is a left diaphragmatic hernia with herniation of a portion of the spleen and intra-abdominal fat into the lower thorax. Compared to the prior CT, there are new airspace opacities in the right upper lobe, near the lung apex. There is dependent atelectasis in the lung bases. Evaluation of the lung parenchyma is limited by respiratory motion artifact. Upper Abdomen: Hepatic steatosis. Soft Tissues/Bones: Flowing ossification throughout the thoracic spine compatible with diffuse idiopathic skeletal hyperostosis. No acute bony abnormality is seen. No pulmonary embolism is identified. Wedge-shaped airspace opacities are seen in the right upper lobe, near the lung apex. This could represent an infection. While no pulmonary embolism is identified, a pulmonary infarct could also have this appearance. This finding is new from the prior CT dated 05/17/2022. Left diaphragmatic hernia. RECOMMENDATIONS: Follow-up radiographs recommended after treatment. Physical Examination:        Physical Exam  Constitutional:       Appearance: Normal appearance. He is obese. HENT:      Head: Normocephalic and atraumatic. Nose: Nose normal.      Mouth/Throat:      Mouth: Mucous membranes are moist.   Eyes:      Extraocular Movements: Extraocular movements intact. Pupils: Pupils are equal, round, and reactive to light. Cardiovascular:      Rate and Rhythm: Normal rate and regular rhythm. Pulses: Normal pulses. Heart sounds: Normal heart sounds. Pulmonary:      Effort: Pulmonary effort is normal.      Breath sounds: Normal breath sounds. Abdominal:      General: Bowel sounds are normal.      Palpations: Abdomen is soft. Musculoskeletal:         General: Normal range of motion. Cervical back: Neck supple. Skin:     General: Skin is warm and dry.       Capillary Refill: Capillary refill takes less than 2 seconds. Neurological:      General: No focal deficit present. Mental Status: He is alert and oriented to person, place, and time. Psychiatric:         Mood and Affect: Mood normal.         Behavior: Behavior normal.         Assessment:        Primary Problem  Atrial fibrillation with RVR [facility])    Active Hospital Problems    Diagnosis Date Noted    Pneumonia [J18.9] 09/14/2022     Priority: Medium    Atrial fibrillation with RVR ([facility] [I48.91] 09/14/2022     Priority: Medium    Angina of effort ([facility] [I20.8] 09/14/2022     Priority: Medium    Occlusion of right carotid artery [I65.21] 07/14/2021    Morbidly obese ([facility] [E66.01] 10/30/2020    Essential hypertension [I10] 02/07/2018    Mixed hyperlipidemia [E78.2] 03/09/2016    COPD (chronic obstructive pulmonary disease) ([facility] [J44.9] 08/06/2013    Cervical stenosis of spine [M48.02]     Cervicalgia [M54.2]        Plan:        New onset atrial fibrillation RVR  - Presented tachycardic with irregularly irregular rhythm with a rate of 175 to emergency department  - Cardiology consulted, appreciate recommendations  - Given 1 dose of digoxin yesterday, has converted to sinus rhythm  - TSH within normal limits  - [id number] score of 1  - Patient already on aspirin and Plavix for carotid artery stenosis/coronary artery disease  - Continuous telemetry  - Monitor vitals  - Patient's last echo was in May 2022 demonstrated ejection fraction of 55% with no obvious wall abnormality  - Cardizem drip discontinued  - Continue IV amiodarone drip  - Lopressor 25 mg 2 times daily  - Full dose heparin  - Possible cardiac catheterization performed later today, was planned with Dr. Salvador Gibson for the future     Community acquired pneumonia  - Patient presented with 6-day history of cough, shortness of breath, diaphoresis and fever  - D-dimer was 2.94 on admission  - CT chest with contrast ordered, no pulmonary embolism was identified.   Did demonstrate wedge-shaped airspace opacities could possibly represent infection versus infarct  - Procalcitonin elevated at 0.24  - Patient started on IV ceftriaxone and azithromycin  - Respiratory panel negative  - Legionella and strep pneumo negative  - COVID-negative  - Robitussin for cough    Carotid artery stenosis  - Patient scheduled to undergo TCAR with Jeffsilvia Granados yesterday for right carotid artery stenosis of 90% but unable to due to illness  - Patient recently started on aspirin and Plavix  - Continue aspirin 81 mg and Plavix 75 mg  - Vascular surgery consulted  - Vascular surgery will reschedule TCAR procedure with the patient at a later date    Cervical spinal stenosis   - Patient has a past medical history of a cervical spine surgery with chronic pain  - Was seen pain management up until last spring  - Continue home dose of gabapentin 800 mg 3 times a day  - Continue home tizanidine 4 mg every 6 hours as needed  - Toradol added for severe pain as needed  - Pain management consulted    COPD  - Continue home inhalers, albuterol and Symbicort  - Patient reports that he quit smoking 6 days ago    Hyperlipidemia  - Continue statin Lipitor 40 mg daily    Essential hypertension  - Continue home dose amlodipine 5 mg daily    Angina  - Sees is Dr. Sybil Yousif outpatient, has had stress test done recently which was negative  - Patient reports future plan of cardiac catheterization  - Continue aspirin, and clopidogrel  - Continue home dose of Imdur 60 mg daily    DVT prophylaxis: Heparin 120 mg  Diet: N.p.o.  PT/OT and social work consulted  CODE STATUS: Full code       Felipe Saenz MD  9/16/2022  7:22 AM     Attestation and add on       I have discussed the care of Gino Hernandez , including pertinent history and exam findings,      9/16/22    with the resident. I have seen and examined the patient and the key elements of all parts of the encounter have been performed by me .    I agree with the assessment, plan and orders as documented by the resident. MD ALINA Bazan [address], [address] [place] [address].    Phone [phone]   Fax: [phone]  Answering Service: [phone]

## 2022-09-16 NOTE — PLAN OF CARE
Problem: Discharge Planning  Goal: Discharge to home or other facility with appropriate resources  9/16/2022 0443 by Gisela Munguia RN  Outcome: Progressing  Flowsheets (Taken 9/15/2022 1939)  Discharge to home or other facility with appropriate resources: Identify barriers to discharge with patient and caregiver  9/15/2022 1809 by Ela Chapman RN  Outcome: Progressing  Flowsheets (Taken 9/15/2022 0907)  Discharge to home or other facility with appropriate resources: Identify barriers to discharge with patient and caregiver     Problem: Pain  Goal: Verbalizes/displays adequate comfort level or baseline comfort level  9/16/2022 0443 by Gisela Munguia RN  Outcome: Progressing  9/15/2022 1809 by Ela Chapman RN  Outcome: Progressing     Problem: Safety - Adult  Goal: Free from fall injury  9/16/2022 0443 by Gisela Munguia RN  Outcome: Progressing  9/15/2022 1809 by Ela Chapman RN  Outcome: Progressing  Flowsheets (Taken 9/15/2022 1156)  Free From Fall Injury: Nadia Pierce family/caregiver on patient safety     Problem: ABCDS Injury Assessment  Goal: Absence of physical injury  9/16/2022 0443 by Gisela Munguia RN  Outcome: Progressing  9/15/2022 1809 by Ela Chapman RN  Outcome: Progressing  Flowsheets (Taken 9/15/2022 1156)  Absence of Physical Injury: Implement safety measures based on patient assessment

## 2022-09-16 NOTE — OP NOTE
[facility]    CARDIAC CATHETERIZATION    Date:   9/16/2022  Patient name:  Thai Lawton  Date of admission:  9/16/2022  1:16 PM  MRN:   [id number]  YOB: 1960    Operators:  Primary:   Isabel Perez MD (Attending Physician)    Assistant/CV fellow:  Renzo Graham MD      Procedure performed:       [x] Left Heart Catheterization. [] Graft Angiography. [x] Left Ventriculography. [] Right Heart Catheterization. [x] Coronary Angiography. [] Aortic Valve Studies. [] PCI:      [] Other:       Pre Procedure Conscious Sedation Data:  ASA Class:    [] I [x] II [] III [] IV    Mallampati Class:  [] I [x] II [] III [] IV      Indication:  [] STEMI      [x] + Stress test  [x] ACS      [x] + EKG Changes  [] Non Q MI       [x] Significant Risk Factors  [x] Recurrent Angina             [] Diabetes Mellitus    [] New LBBB      [] Uncontrolled HTN. [] CHF / Low EF changes     [] Abnormal CTA / Ca Score      Procedure:  Access:  [] Femoral  [x] Radial  artery       [x] Right  [] Left    Procedure: After informed consent was obtained with explanation of the risks and benefits, patient was brought to the cath lab. The access area was prepped and draped in sterile fashion. 1% lidocaine was used for local block. The artery was cannulated with 6  Fr sheath with brisk arterial blood return. The side port was frequently flushed and aspirated with normal saline. Findings:     Cardiac Arteries and Lesion Findings       LMCA: Mild irregularities 20-30%. LAD: Diffuse irregularities 20-30%. LCx: Diffuse irregularities 30-40%. Lesion on Prox CX: Ostial.40% stenosis. RCA: Diffuse irregularities 20-30%. Coronary Tree        Dominance: Right       LV Analysis   LV function assessed as:Normal.   Ejection Fraction   +----------------------------------------------------------------------+---+   ! Method                                                                ! EF%! +----------------------------------------------------------------------+---+   ! LV gram                                                               !55 !   +----------------------------------------------------------------------+---+      LV Segment Contractility        1 - Normal    3 - Mild         5 - Severe       7 - Dyskinesis    hypokinesis      hypokinesis        2 -           4 - Moderate     6 - Akinesis     8 - Aneurysm    Hypokinesis   hypokinesis         Estimated Blood Loss: 10 mL     Conclusions        Procedure Summary        Minimal non-obstructive CAD. Normal LV systolic function. Recommendations        Medical therapy as needed. Risk factor modification. ____________________________________________________________________    History and Risk Factors    [x] Hypertension     [] Family history of CAD  [x] Hyperlipidemia     [x] Cerebrovascular Disease   [] Prior MI       [x] Peripheral Vascular disease   [] Prior PCI              [] Diabetes Mellitus    [] Left Main PCI. [] Currently on Dialysis. [] Prior CABG. [x] Currently smoker. [] Cardiac Arrest outside of healthcare facility. [] Yes    [] No        Witnessed     [] Yes   [] No     Arrest after arrival of EMS  [] Yes   [] No     [] Cardiac Arrest at other Facility. [] Yes   [x] No    Pre-Procedure Information. Heart Failure       [] Yes    [x] No        Class  [] I      [] II  [] III    [] IV. New Diagnosis    [] Yes  [] No    HF Type      [] Systolic   [] Diastolic          [] Unknown. Diagnostic Test:   EKG       [] Normal   [x] Abnormal    New antiarrhythmia medications:    [] Yes   [x] No   New onset atrial fibrillation / Flutter     [] Yes   [x] No   ECG Abnormalities:      [] V. Fib   [] Judith V. Tach           [] NS V. T   [] New LBBB           [] T.  Inv  []  ST dev > 0.5 mm         [] PVC's freq  [] PVC's infrequent    Stress Test Performed:      [x] Yes    [] No     Type:     [] Stress Echo   [] Exercise Stress Test (no imaging)      [x] Stress Nuclear  [] Stress Imaging     Results   [] Negative   [x] Positive        [] Indeterminate  [] Unavailable     If Positive/ Risk / Extent of Ischemia:       [] Low  [x] Intermediate         [] High  [] Unavailable      Cardiac CTA Performed:     [] Yes    [x] No      Results   [] CAD   [] Non obstructive CAD      [] No CAD   [] Uncertain      [] Unknown   [] Structural Disease. Pre Procedure Medications:   [x] Yes    [] No         [x] ASA   [x] Beta Blockers      [x] Nitrate   [x] Ca Channel Blockers      [] Ranolazine   [x] Statin       [x] Plavix/Others antiplatelets      Electronically signed on 9/16/2022 at 2:57 PM by:    Brittany Smith MD  Fellow, [address] [place] [address]      Physician Statement  I have discussed the case of Mp Farias including pertinent history and exam findings with the resident. I have seen and examined the patient and the key elements of the encounter have been performed by me. I agree with the assessment, plan and orders as documented by the resident With changes made to the note. Procedure performed by me.     Electronically signed by Keisha Day MD on 9/16/2022 at 5:21 PM.    [facility]      [phone]

## 2022-09-16 NOTE — PROGRESS NOTES
Patient c/o pain at IV site on right a/c where amiodarone gtt was infusing. Site slightly reddened and swollen. IV removed, arm elevated on pillow and ice applied intermittently.

## 2022-09-16 NOTE — PROGRESS NOTES
(1.753 m)   Wt 254 lb 10.1 oz (115.5 kg)   SpO2 99%   BMI 37.60 kg/m²   General appearance: alert and cooperative with exam  HEENT: Head: Normocephalic, no lesions, without obvious abnormality. Neck: no JVD  Lungs: CTAB  Heart: RRR s1+s2, no murmurs  Abdomen: soft, non-tender  Extremities: no edema  Neurologic: not done        Assessment / Acute Cardiac Problems:   PAF  Carotid artery disease  Intermittent anginal chest pain  HTN  COPD    Patient Active Problem List:     Cervical stenosis of spine     Cervicalgia     COPD (chronic obstructive pulmonary disease) ([facility])     Smoker     Mild depression ([facility])     GERD (gastroesophageal reflux disease)     Mixed hyperlipidemia     Carpal tunnel syndrome of left wrist     Medication monitoring encounter     DDD (degenerative disc disease), lumbar     Displacement of lumbar intervertebral disc without myelopathy     Therapeutic opioid induced constipation     Radiculopathy of cervical spine     Essential hypertension     Major depression, single episode, in complete remission ([facility])     Prediabetes     Lung nodule     Allergic sinusitis     Arthritis     Spinal stenosis of lumbar region without neurogenic claudication     Morbidly obese ([facility])     Lumbosacral spondylosis without myelopathy     Encounter for long-term opiate analgesic use     Occlusion of right carotid artery     Left carotid stenosis     Sacroiliitis, not elsewhere classified ([facility] [address]     Recurrent UTI     BPH with urinary obstruction     Other retention of urine     Pneumonia     Atrial fibrillation with RVR ([facility])     Angina of effort ([facility])      Plan of Treatment:   Continue IV amiodarone for today  On lovenox  He was recommended cardiac cath prior to carotid surgery and did not follow up. Continue ASA/plavix with statin  Continue metoprolol, imudr and norvasc  Need cardiac cath for risk stratification. Risk and benefits of cardiac catheterization were discussed in detail.  Risk of bleeding, requiring blood no

## 2022-09-16 NOTE — PROGRESS NOTES
Ambulated to bathroom ,voided and returned to bed. Writer continuing to remove air every 15 min from vasc band. No s/s of hematoma . No neuro defecits noted.

## 2022-09-16 NOTE — PROGRESS NOTES
Pt returned to [facility] post procedure. Report called to Brit MG at [facility]. [facility] notified of pts need for transport. Alert. Vasc band intact. No c/o discomfort.

## 2022-09-16 NOTE — PROGRESS NOTES
Report called to [facility] HARITHA Pratt, all questions addressed.

## 2022-09-16 NOTE — CARE COORDINATION
ONGOING DISCHARGE PLAN:    Patient is alert and oriented x4. Spoke with patient regarding discharge plan and patient confirms that plan is still to go home with no needs for VNS. Pt going to for JE and Cardiac cath today. IV rocephin, PO zithromax and Amiodarone gtt. Followinf for Terry Rdz. Will continue to follow for additional discharge needs.     Electronically signed by Stephen Allen RN on 9/16/2022 at 9:13 AM

## 2022-09-16 NOTE — CONSULTS
Consult Note            Date:9/16/2022        Patient Name:Lane Ray     YOB: 1960     Age:62 y.o. Consults    Chief Complaint     Chief Complaint   Patient presents with    Fever    Shortness of Breath      Atrial fibrillation    History Obtained From   patient    History of Present Illness   The patient has new onset atrial fibrillation with rapid ventricular rate that has been controlled during this hospitalization. He was admitted to internal medicine with cardiology consultation. He is being treated for this. He was canceled for his transcarotid artery revascularization with a stent with me several days ago and admitted for this problem. I came by to see him today to make certain that he was doing okay and to provide my input regarding his future procedure. Past Medical History     Past Medical History:   Diagnosis Date    Abnormal stress ECG     7/28/2022 per Dr. Katie oJhnson, [facility] will need cardiac cath in future due to cp    Angina pectoris [facility])     BPH (benign prostatic hyperplasia)     CAD (coronary artery disease)     Dr. Katie Johnson   7/28/22 cardiac clearance    Carotid stenosis     rt  Dr. Foster Wayne tunnel syndrome     left    Cervical stenosis of spine 2012    C2-5 laminectomy  Dr. Maryann Dunn    Cervicalgia     chronic pain    Chronic back pain     COPD (chronic obstructive pulmonary disease) ([address]     on inhalers pcp manages    DDD (degenerative disc disease)     Elbow fracture, right     Fractures     elbow    GERD (gastroesophageal reflux disease)     uses pepto bismol prn    Gout     Right great toe    Hyperlipidemia     Hypertension     Lung nodule     Mild depression ([address] 09/26/2013    2 suicide attempts by girlfriend related to depression.     Morbidly obese ([address] 10/30/2020    Occlusion of right carotid artery 07/14/2021    Osteoarthritis of right knee     Pre-diabetes     was taking metformin in past  no longer takes    Sinus headache 10/10/2018    Sleep apnea     snores   does not use cpap  pt did no follow up (per patient)    Sleep disturbance     Smoker     Snores     no cpap    SOB (shortness of breath) on exertion     Tendonitis of foot     right    Tooth loose 08/05/2022    lower front rt    Under care of team     Dr. Kimber Cummings  7/14/2022  will need cysto in future after heart cath and carotid sx    Under care of team     Dr. Santiago Mcgrath for carotid stenosis    Wellness examination     Len Danielson  last visit June 2022        Past Surgical History     Past Surgical History:   Procedure Laterality Date    CARDIAC CATHETERIZATION  07/08/2014    Non Obstructive CAD     CERVICAL SPINE SURGERY  07/13/2012     C2-3-4-5    COLONOSCOPY      FRACTURE SURGERY      Rt elbow     HC INJECT OTHER PERPHRL NERV Bilateral 05/09/2019    INJECTION SPINAL performed by Mary Jennings MD at [address] Bilateral 08/15/2019    SACROILIAC JOINT INJECTION performed by Mary Jennings MD at [place] [address]  02/05/2016    [facility]    NERVE BLOCK Left 02/07/2020    RFA left side    NERVE BLOCK Left 02/07/2020     NERVE RADIOFREQUENCY ABLATION - SACROILIAC (Left )    NERVE BLOCK  02/12/2021    NERVE RADIOFREQUENCY ABLATION SI (Left     NERVE BLOCK Right 02/19/2021    Procedure: NERVE RADIOFREQUENCY ABLATION SI JOINT (Right )    OTHER SURGICAL HISTORY  08/15/2019    sacroiliac joint injection    PAIN MANAGEMENT PROCEDURE Left 02/07/2020    NERVE RADIOFREQUENCY ABLATION - SACROILIAC performed by Mary Jennings MD at [address] [place] [address] Left 02/12/2021    NERVE RADIOFREQUENCY ABLATION SI performed by Mary Jennings MD at [address] [place] [address] Right 02/19/2021    NERVE RADIOFREQUENCY ABLATION SI JOINT performed by Mary Jennings MD at [address] [place] [address]          Medications     Prior to Admission medications    Medication Sig Start Date End Date Taking? Authorizing Provider   meloxicam (MOBIC) 15 MG tablet Take 15 mg by mouth daily as needed   Yes Historical Provider, MD   tiZANidine (ZANAFLEX) 4 MG tablet Take 4 mg by mouth 2 times daily as needed   Yes Historical Provider, MD   gabapentin (NEURONTIN) 800 MG tablet Take 1 tablet by mouth every 8 hours for 30 days. 9/6/22 10/6/22  Avni Sandsmin, DO   isosorbide mononitrate (IMDUR) 60 MG extended release tablet Take 1 tablet by mouth in the morning. 7/28/22   Historical Provider, MD   clopidogrel (PLAVIX) 75 MG tablet Take 1 tablet by mouth in the morning. Patient taking differently: Take 75 mg by mouth daily Pt was instructed to continue Plavix for upcoming surgery per Dr. Miriam Del Valle. 8/1/22   Jarek Méndez MD   nitroGLYCERIN (NITROSTAT) 0.4 MG SL tablet Place 1 tablet under the tongue every 5 minutes as needed for Chest pain (not to exceed 3 tabs at a time) 6/3/22   Avni Fragmin, DO   amLODIPine (NORVASC) 5 MG tablet TAKE ONE TABLET BY MOUTH DAILY 4/22/22   Avni Fragmin, DO   albuterol sulfate  (90 Base) MCG/ACT inhaler Inhale 2 puffs into the lungs every 6 hours as needed for Wheezing 4/22/22   Avni Fragmin, DO   simvastatin (ZOCOR) 80 MG tablet TAKE ONE TABLET BY MOUTH EVERY EVENING 4/11/22   Avnikenneth Sandsmin, DO   acetaminophen (TYLENOL) 325 MG tablet Take 650 mg by mouth nightly    Historical Provider, MD   aspirin 81 MG EC tablet Take 1 tablet by mouth daily  Patient taking differently: Take 81 mg by mouth daily Pt. Was instructed to continue asa per Dr. Kinjal Heart.  3/9/16   Yury Bush MD        enoxaparin (LOVENOX) injection 120 mg, BID  digoxin (LANOXIN) injection 250 mcg, Once  sodium chloride flush 0.9 % injection 10 mL, PRN  amLODIPine (NORVASC) tablet 5 mg, Daily  aspirin EC tablet 81 mg, Daily  clopidogrel (PLAVIX) tablet 75 mg, Daily  gabapentin (NEURONTIN) capsule 800 mg, 3 times per day  isosorbide mononitrate (IMDUR) extended release tablet 60 mg, Daily  atorvastatin (LIPITOR) tablet 40 mg, Daily  albuterol sulfate HFA (PROVENTIL;VENTOLIN;PROAIR) 108 (90 Base) MCG/ACT inhaler 2 puff, Q6H PRN  budesonide-formoterol (SYMBICORT) 160-4.5 MCG/ACT inhaler 2 puff, BID  tiZANidine (ZANAFLEX) tablet 4 mg, Q6H PRN  sodium chloride flush 0.9 % injection 5-40 mL, 2 times per day  sodium chloride flush 0.9 % injection 5-40 mL, PRN  0.9 % sodium chloride infusion, PRN  ondansetron (ZOFRAN-ODT) disintegrating tablet 4 mg, Q8H PRN   Or  ondansetron (ZOFRAN) injection 4 mg, Q6H PRN  polyethylene glycol (GLYCOLAX) packet 17 g, Daily PRN  acetaminophen (TYLENOL) tablet 650 mg, Q6H PRN   Or  acetaminophen (TYLENOL) suppository 650 mg, Q6H PRN  cefTRIAXone (ROCEPHIN) 1,000 mg in sodium chloride 0.9 % 50 mL IVPB mini-bag, Q24H  ketorolac (TORADOL) injection 15 mg, Q6H PRN  azithromycin (ZITHROMAX) tablet 500 mg, Daily  dextromethorphan-guaiFENesin (ROBITUSSIN-DM)  MG/5ML liquid 10 mL, Q4H PRN  metoprolol tartrate (LOPRESSOR) tablet 25 mg, BID  amiodarone (NEXTERONE) 360 mg in dextrose 5% 200 ml, Continuous        Allergies   Patient has no known allergies.     Social History     Social History       Tobacco History       Smoking Status  Former Smoking Start Date  1975 Smoking Frequency  0.25 packs/day for 40.00 years (10.00 pk-yrs) Smoking Tobacco Type  Cigarettes since 1975      Smokeless Tobacco Use  Former Quit Date  3/12/2015      Tobacco Comments  Smokes 2-4 cigs/day as of 8/5/2022              Alcohol History       Alcohol Use Status  Not Currently Drinks/Week  0 Standard drinks or equivalent per week Amount  0.0 standard drinks of alcohol/wk Comment  3 x year              Drug Use       Drug Use Status  No              Sexual Activity       Sexually Active  Yes Partners  Female                    Family History     Family History   Problem Relation Age of Onset    Heart Disease Mother     COPD Father     Cancer Maternal Aunt 56        breast cancer     Cancer Maternal Uncle 60        prostrate cancer        Review of Systems   Review of Systems   Constitutional:  Positive for fatigue. Negative for activity change, chills and unexpected weight change. HENT:  Negative for congestion and voice change. Eyes:  Negative for visual disturbance. Respiratory:  Positive for shortness of breath. Cardiovascular:  Positive for palpitations. Gastrointestinal:  Negative for abdominal pain, diarrhea, nausea and vomiting. Endocrine: Negative for cold intolerance. Genitourinary:  Negative for difficulty urinating and dysuria. Musculoskeletal:  Negative for gait problem and myalgias. Skin:  Negative for color change and wound. Neurological:  Negative for dizziness, weakness and numbness. Psychiatric/Behavioral:  Negative for agitation and confusion. Physical Exam   /66   Pulse 75 Comment: 75  Temp 98.3 °F (36.8 °C) (Oral)   Resp 18   Ht 5' 9\" (1.753 m)   Wt 254 lb 10.1 oz (115.5 kg)   SpO2 99%   BMI 37.60 kg/m²      Physical Exam  Cardiovascular:      Comments: His exam really has not changed except for his irregularly irregular rhythm. He continues to have a carotid bruit on the right. His lower extremities are warm dry and adequately perfused without ulceration or gangrene. Labs    CBC:  Recent Labs     09/14/22  1037 09/15/22  0557   WBC 4.9 5.6   RBC 4.59 4.28*   HGB 12.9* 12.3*   HCT 38.0* 36.4*   MCV 82.8 85.2   RDW 14.9 14.8    166     CHEMISTRIES:  Recent Labs     09/14/22  1037 09/15/22  0557   * 134*   K 3.8 3.8   CL 99 96*   CO2 23 22   BUN 22 30*   CREATININE 0.86 1.00   GLUCOSE 146* 193*     PT/INR:  Recent Labs     09/14/22  1037   PROTIME 14.2   INR 1.1     APTT:  Recent Labs     09/14/22  1037   APTT 27.9     LIVER PROFILE:No results for input(s): AST, ALT, BILIDIR, BILITOT, ALKPHOS in the last 72 hours.     Imaging/Diagnostics   XR CHEST PORTABLE    Result Date: 9/14/2022  Slightly greater basilar atelectasis; known left diaphragmatic hernia. No consolidation or sizable pleural effusion. CT CHEST PULMONARY EMBOLISM W CONTRAST    Result Date: 9/15/2022  No pulmonary embolism is identified. Wedge-shaped airspace opacities are seen in the right upper lobe, near the lung apex. This could represent an infection. While no pulmonary embolism is identified, a pulmonary infarct could also have this appearance. This finding is new from the prior CT dated 05/17/2022. Left diaphragmatic hernia. RECOMMENDATIONS: Follow-up radiographs recommended after treatment. Assessment      Hospital Problems             Last Modified POA    * (Principal) Atrial fibrillation with RVR ([address] 9/14/2022 Yes    Pneumonia 9/14/2022 Yes    Angina of effort ([address] 9/14/2022 Yes    Cervical stenosis of spine 9/14/2022 Yes    Cervicalgia 9/14/2022 Yes    COPD (chronic obstructive pulmonary disease) ([address] 9/14/2022 Yes    Mixed hyperlipidemia 9/14/2022 Yes    Essential hypertension 9/14/2022 Yes    Morbidly obese ([address] 9/14/2022 Yes    Occlusion of right carotid artery 9/14/2022 Yes       Plan   1. At this point I think the best option is to work-up his cardiac problems and give treatment for them accordingly. We can wait for his trends carotid artery revascularization with a stent. He does have very significant stenosis on the right with velocities that are quite high. I would like to get him to the operating room for the stent procedure. We chose to go ahead with transcarotid artery revascularization with a stent because his neck mobility is very poor and the bifurcation of his carotid artery is quite high. He understands and agrees with these plans and we can see him accordingly and we will schedule accordingly.     Electronically signed by Cris Philip MD on 9/16/22 at 7:22 AM EDT

## 2022-09-17 VITALS
HEART RATE: 69 BPM | DIASTOLIC BLOOD PRESSURE: 69 MMHG | WEIGHT: 267.42 LBS | OXYGEN SATURATION: 95 % | BODY MASS INDEX: 39.61 KG/M2 | TEMPERATURE: 97.9 F | RESPIRATION RATE: 16 BRPM | HEIGHT: 69 IN | SYSTOLIC BLOOD PRESSURE: 136 MMHG

## 2022-09-17 LAB
ABSOLUTE BANDS #: 0.07 K/UL (ref 0–1)
ABSOLUTE EOS #: 0.26 K/UL (ref 0–0.4)
ABSOLUTE LYMPH #: 2.22 K/UL (ref 1–4.8)
ABSOLUTE MONO #: 0.59 K/UL (ref 0.1–1.3)
ANION GAP SERPL CALCULATED.3IONS-SCNC: 10 MMOL/L (ref 9–17)
BANDS: 1 % (ref 0–10)
BASOPHILS # BLD: 0 % (ref 0–2)
BASOPHILS ABSOLUTE: 0 K/UL (ref 0–0.2)
BUN BLDV-MCNC: 17 MG/DL (ref 8–23)
CALCIUM SERPL-MCNC: 9.1 MG/DL (ref 8.6–10.4)
CHLORIDE BLD-SCNC: 100 MMOL/L (ref 98–107)
CO2: 28 MMOL/L (ref 20–31)
CREAT SERPL-MCNC: 0.79 MG/DL (ref 0.7–1.2)
EOSINOPHILS RELATIVE PERCENT: 4 % (ref 0–4)
GFR AFRICAN AMERICAN: >60 ML/MIN
GFR NON-AFRICAN AMERICAN: >60 ML/MIN
GFR SERPL CREATININE-BSD FRML MDRD: ABNORMAL ML/MIN/{1.73_M2}
GLUCOSE BLD-MCNC: 111 MG/DL (ref 70–99)
HCT VFR BLD CALC: 33.7 % (ref 41–53)
HEMOGLOBIN: 11.1 G/DL (ref 13.5–17.5)
LYMPHOCYTES # BLD: 34 % (ref 24–44)
MCH RBC QN AUTO: 28 PG (ref 26–34)
MCHC RBC AUTO-ENTMCNC: 33 G/DL (ref 31–37)
MCV RBC AUTO: 84.7 FL (ref 80–100)
MONOCYTES # BLD: 9 % (ref 1–7)
MORPHOLOGY: ABNORMAL
MORPHOLOGY: ABNORMAL
NUCLEATED RED BLOOD CELLS: 1 PER 100 WBC
PDW BLD-RTO: 15.3 % (ref 11.5–14.9)
PLATELET # BLD: 284 K/UL (ref 150–450)
PMV BLD AUTO: 7.6 FL (ref 6–12)
POTASSIUM SERPL-SCNC: 4.3 MMOL/L (ref 3.7–5.3)
RBC # BLD: 3.97 M/UL (ref 4.5–5.9)
SEG NEUTROPHILS: 52 % (ref 36–66)
SEGMENTED NEUTROPHILS ABSOLUTE COUNT: 3.39 K/UL (ref 1.3–9.1)
SODIUM BLD-SCNC: 138 MMOL/L (ref 135–144)
WBC # BLD: 6.5 K/UL (ref 3.5–11)

## 2022-09-17 PROCEDURE — 36415 COLL VENOUS BLD VENIPUNCTURE: CPT

## 2022-09-17 PROCEDURE — 99239 HOSP IP/OBS DSCHRG MGMT >30: CPT | Performed by: INTERNAL MEDICINE

## 2022-09-17 PROCEDURE — 6370000000 HC RX 637 (ALT 250 FOR IP)

## 2022-09-17 PROCEDURE — 2580000003 HC RX 258: Performed by: STUDENT IN AN ORGANIZED HEALTH CARE EDUCATION/TRAINING PROGRAM

## 2022-09-17 PROCEDURE — 6360000002 HC RX W HCPCS: Performed by: STUDENT IN AN ORGANIZED HEALTH CARE EDUCATION/TRAINING PROGRAM

## 2022-09-17 PROCEDURE — 85025 COMPLETE CBC W/AUTO DIFF WBC: CPT

## 2022-09-17 PROCEDURE — 6370000000 HC RX 637 (ALT 250 FOR IP): Performed by: STUDENT IN AN ORGANIZED HEALTH CARE EDUCATION/TRAINING PROGRAM

## 2022-09-17 PROCEDURE — 94761 N-INVAS EAR/PLS OXIMETRY MLT: CPT

## 2022-09-17 PROCEDURE — 80048 BASIC METABOLIC PNL TOTAL CA: CPT

## 2022-09-17 PROCEDURE — 6370000000 HC RX 637 (ALT 250 FOR IP): Performed by: INTERNAL MEDICINE

## 2022-09-17 PROCEDURE — 6360000002 HC RX W HCPCS: Performed by: INTERNAL MEDICINE

## 2022-09-17 PROCEDURE — 94640 AIRWAY INHALATION TREATMENT: CPT

## 2022-09-17 RX ORDER — AMIODARONE HYDROCHLORIDE 200 MG/1
200 TABLET ORAL DAILY
Qty: 30 TABLET | Refills: 0 | Status: SHIPPED | OUTPATIENT
Start: 2022-09-18 | End: 2022-09-17 | Stop reason: SDUPTHER

## 2022-09-17 RX ORDER — GUAIFENESIN DEXTROMETHORPHAN HYDROBROMIDE ORAL SOLUTION 10; 100 MG/5ML; MG/5ML
10 SOLUTION ORAL EVERY 4 HOURS PRN
Qty: 300 ML | Refills: 0 | Status: SHIPPED | OUTPATIENT
Start: 2022-09-17 | End: 2022-09-22

## 2022-09-17 RX ORDER — AMIODARONE HYDROCHLORIDE 200 MG/1
200 TABLET ORAL DAILY
Status: DISCONTINUED | OUTPATIENT
Start: 2022-09-17 | End: 2022-09-17 | Stop reason: HOSPADM

## 2022-09-17 RX ORDER — ASPIRIN 81 MG/1
81 TABLET ORAL DAILY
Qty: 30 TABLET | Refills: 3 | Status: SHIPPED | OUTPATIENT
Start: 2022-09-17

## 2022-09-17 RX ORDER — AMIODARONE HYDROCHLORIDE 200 MG/1
200 TABLET ORAL DAILY
Qty: 30 TABLET | Refills: 0 | Status: SHIPPED | OUTPATIENT
Start: 2022-09-18 | End: 2022-10-17

## 2022-09-17 RX ORDER — ACETAMINOPHEN 325 MG/1
650 TABLET ORAL NIGHTLY
Qty: 120 TABLET | Refills: 3 | Status: SHIPPED | OUTPATIENT
Start: 2022-09-17 | End: 2022-11-07 | Stop reason: ALTCHOICE

## 2022-09-17 RX ORDER — MELOXICAM 15 MG/1
15 TABLET ORAL DAILY PRN
Qty: 30 TABLET | Refills: 3 | Status: SHIPPED | OUTPATIENT
Start: 2022-09-17 | End: 2022-11-07 | Stop reason: ALTCHOICE

## 2022-09-17 RX ORDER — ENOXAPARIN SODIUM 150 MG/ML
1 INJECTION SUBCUTANEOUS 2 TIMES DAILY
Status: DISCONTINUED | OUTPATIENT
Start: 2022-09-17 | End: 2022-09-17 | Stop reason: HOSPADM

## 2022-09-17 RX ORDER — GUAIFENESIN DEXTROMETHORPHAN HYDROBROMIDE ORAL SOLUTION 10; 100 MG/5ML; MG/5ML
10 SOLUTION ORAL EVERY 4 HOURS PRN
Qty: 300 ML | Refills: 0 | Status: SHIPPED | OUTPATIENT
Start: 2022-09-17 | End: 2022-09-17 | Stop reason: SDUPTHER

## 2022-09-17 RX ORDER — BUDESONIDE AND FORMOTEROL FUMARATE DIHYDRATE 160; 4.5 UG/1; UG/1
AEROSOL RESPIRATORY (INHALATION)
Qty: 10.2 G | Refills: 1 | Status: SHIPPED | OUTPATIENT
Start: 2022-09-17

## 2022-09-17 RX ADMIN — CEFTRIAXONE SODIUM 1000 MG: 1 INJECTION, POWDER, FOR SOLUTION INTRAMUSCULAR; INTRAVENOUS at 12:24

## 2022-09-17 RX ADMIN — METOPROLOL TARTRATE 25 MG: 25 TABLET, FILM COATED ORAL at 08:01

## 2022-09-17 RX ADMIN — ENOXAPARIN SODIUM 120 MG: 150 INJECTION SUBCUTANEOUS at 08:10

## 2022-09-17 RX ADMIN — ATORVASTATIN CALCIUM 40 MG: 40 TABLET, FILM COATED ORAL at 08:01

## 2022-09-17 RX ADMIN — ISOSORBIDE MONONITRATE 60 MG: 60 TABLET, EXTENDED RELEASE ORAL at 08:00

## 2022-09-17 RX ADMIN — GABAPENTIN 800 MG: 400 CAPSULE ORAL at 05:58

## 2022-09-17 RX ADMIN — GABAPENTIN 800 MG: 400 CAPSULE ORAL at 12:26

## 2022-09-17 RX ADMIN — BUDESONIDE AND FORMOTEROL FUMARATE DIHYDRATE 2 PUFF: 160; 4.5 AEROSOL RESPIRATORY (INHALATION) at 07:41

## 2022-09-17 RX ADMIN — AZITHROMYCIN MONOHYDRATE 500 MG: 250 TABLET ORAL at 08:00

## 2022-09-17 RX ADMIN — CLOPIDOGREL BISULFATE 75 MG: 75 TABLET ORAL at 08:01

## 2022-09-17 RX ADMIN — AMLODIPINE BESYLATE 5 MG: 5 TABLET ORAL at 08:01

## 2022-09-17 RX ADMIN — SODIUM CHLORIDE, PRESERVATIVE FREE 10 ML: 5 INJECTION INTRAVENOUS at 08:02

## 2022-09-17 RX ADMIN — AMIODARONE HYDROCHLORIDE 200 MG: 200 TABLET ORAL at 08:20

## 2022-09-17 ASSESSMENT — ENCOUNTER SYMPTOMS
CONSTIPATION: 0
ABDOMINAL PAIN: 0
SHORTNESS OF BREATH: 0
VOMITING: 0
DIARRHEA: 0
BACK PAIN: 0
TROUBLE SWALLOWING: 0
NAUSEA: 0
COUGH: 1
COLOR CHANGE: 0

## 2022-09-17 NOTE — FLOWSHEET NOTE
09/16/22 2114   Treatment Team Notification   Reason for Communication Review case   Team Member Name Dr Coco Wong Provider   Method of Communication Call   Response No new orders   Notification Time 2117     Dr. Kisha Grant team alerted of Pt's request to leave AMA. Pt is not medically cleared to leave.  Pt must stay until pressure band removed from wrist.

## 2022-09-17 NOTE — DISCHARGE SUMMARY
[address]    Discharge Summary     Patient ID: Lali Hubbard  :  1960   MRN: [id number]     ACCOUNT:  [de-identified]   Patient's PCP: Khadijah Rodriguez DO  Admit Date: 2022   Discharge Date: 2022     Length of Stay: 3  Code Status:  Full Code  Admitting Physician: Ashlyn Machuca MD  Discharge Physician: Ave Cowart MD     Active Discharge Diagnoses:       Primary Problem  Atrial fibrillation with RVR [facility] Problems    Diagnosis Date Noted    Pneumonia [J18.9] 2022     Priority: Medium    Atrial fibrillation with RVR ([address] [I48.91] 2022     Priority: Medium    Angina of effort ([facility] [I20.8] 2022     Priority: Medium    Occlusion of right carotid artery [I65.21] 2021    Morbidly obese ([facility] [E66.01] 10/30/2020    Essential hypertension [I10] 2018    Mixed hyperlipidemia [E78.2] 2016    COPD (chronic obstructive pulmonary disease) ([address] [J44.9] 2013    Cervical stenosis of spine [M48.02]     Cervicalgia [M54.2]        Admission Condition:  fair     Discharged Condition: good    Hospital Stay:       Hospital Course:  Lali Hubbard is a 57 y.o. male significant for cervical stenosis, COPD, GERD, hypertension, hyperlipidemia and right carotid stenosis with approximately 90% occlusion who was admitted for the management of  Atrial fibrillation with RVR ([address] , presented to ER with Fever and Shortness of Breath    Patient was found to be in atrial fibrillation with RVR in the emergency department. Chest x-ray was also notable for possible community-acquired pneumonia. Cardiology was consulted. Patient was started on IV Cardizem drip. Cardizem was unable to control his rate and patient was unable to convert to sinus rhythm. Patient was subsequently started on IV amiodarone, metoprolol p.o. and digoxin.   Patient was able to convert to sinus rhythm on hospital day 2    Patient has been following with Dr. Elex Holstein his cardiologist for history of angina prior to admission. It was decided to perform a coronary catheterization during his hospitalization which revealed minimal coronary artery stenosis. On the date of admission patient was also scheduled to undergo a TCAR procedure for his right carotid stenosis with vascular at [facility]. [facility] was consulted and it was determined that his cardiac pathology was more urgent than his vascular surgery. Recommended follow-up with vascular outpatient to reschedule procedure. Patient received a total of 4 days of antibiotics for community-acquired pneumonia, shortness of breath and cough improved during admission.       Significant therapeutic interventions: Cardiac catheterization, IV antibiotics, IV antiarrhythmics    Significant Diagnostic Studies:   Labs / Micro:  CBC:   Lab Results   Component Value Date/Time    WBC 6.5 09/17/2022 05:30 AM    RBC 3.97 09/17/2022 05:30 AM    RBC 4.75 02/10/2012 07:41 AM    HGB 11.1 09/17/2022 05:30 AM    HCT 33.7 09/17/2022 05:30 AM    MCV 84.7 09/17/2022 05:30 AM    MCH 28.0 09/17/2022 05:30 AM    MCHC 33.0 09/17/2022 05:30 AM    RDW 15.3 09/17/2022 05:30 AM     09/17/2022 05:30 AM     02/10/2012 07:41 AM     BMP:    Lab Results   Component Value Date/Time    GLUCOSE 111 09/17/2022 05:30 AM    GLUCOSE 99 02/10/2012 07:41 AM     09/17/2022 05:30 AM    K 4.3 09/17/2022 05:30 AM     09/17/2022 05:30 AM    CO2 28 09/17/2022 05:30 AM    ANIONGAP 10 09/17/2022 05:30 AM    BUN 17 09/17/2022 05:30 AM    CREATININE 0.79 09/17/2022 05:30 AM    BUNCRER NOT REPORTED 11/17/2020 09:29 AM    CALCIUM 9.1 09/17/2022 05:30 AM    LABGLOM >60 09/17/2022 05:30 AM    GFRAA >60 09/17/2022 05:30 AM    GFR      09/17/2022 05:30 AM     TSH:    Lab Results   Component Value Date/Time    TSH 1.92 09/14/2022 10:37 AM         Radiology:    XR CHEST (2 VW)    Result Date: 9/4/2022  EXAMINATION: TWO XRAY VIEWS OF THE CHEST 9/2/2022 9:33 am COMPARISON: 08/05/2022 HISTORY: ORDERING SYSTEM PROVIDED HISTORY: preop, right carotid stenosis TECHNOLOGIST PROVIDED HISTORY: preop, right carotid stenosis FINDINGS: Cardiomediastinal silhouette appears within normal limits. No focal consolidation or overt pulmonary edema. Left diaphragmatic hernia. No evidence of pneumothorax. No acute osseous abnormalities. 1. No radiographic evidence of an acute cardiopulmonary process. 2. Left diaphragmatic hernia. XR CHEST PORTABLE    Result Date: 9/16/2022  EXAMINATION: ONE XRAY VIEW OF THE CHEST 9/16/2022 7:54 am COMPARISON: 09/14/2022 HISTORY: ORDERING SYSTEM PROVIDED HISTORY: pneumonia TECHNOLOGIST PROVIDED HISTORY: pneumonia Reason for Exam: pneumonia FINDINGS: Previously seen right upper lobe opacity is not visualized. Ill-defined opacities at the lung bases are compatible with atelectasis, prominent epicardial fat, and a left diaphragmatic hernia. No pneumothorax. Heart size is unchanged. No definite acute process. Previously noted right upper lobe opacity is not seen on this exam.     XR CHEST PORTABLE    Result Date: 9/14/2022  EXAMINATION: ONE XRAY VIEW OF THE CHEST 9/14/2022 10:39 am COMPARISON: Two-view chest from 09/02/2022 HISTORY: ORDERING SYSTEM PROVIDED HISTORY: Chest Pain TECHNOLOGIST PROVIDED HISTORY: Chest Pain Reason for Exam: chest pain Known left diaphragmatic hernia FINDINGS: Overlying ECG monitor leads. Cardiomediastinal shadow stable; no cardiomegaly. Patchy basilar opacities, probably atelectatic or fibrotic mildly elevated lateral left costophrenic angle, slightly increased by comparison; no consolidation or sizable pleural effusion. Moderate DJD spine with mild convex-right curvature. Slightly greater basilar atelectasis; known left diaphragmatic hernia. No consolidation or sizable pleural effusion.      CT CHEST PULMONARY EMBOLISM W CONTRAST    Result Date: 9/15/2022  EXAMINATION: CTA OF THE CHEST, 9/14/2022 11:59 am TECHNIQUE: CTA of the chest was performed after the administration of intravenous contrast.  Multiplanar reformatted images are provided for review. MIP images are provided for review. Automated exposure control, iterative reconstruction, and/or weight based adjustment of the mA/kV was utilized to reduce the radiation dose to as low as reasonably achievable. COMPARISON: Chest radiograph 09/14/2022, CT 05/17/2022 HISTORY: ORDERING SYSTEM PROVIDED HISTORY: SOB, elevated d-dimer TECHNOLOGIST PROVIDED HISTORY: SOB, elevated D-dimer Decision Support Exception - unselect if not a suspected or confirmed emergency medical condition->Emergency Medical Condition (MA) Reason for Exam:  SOB elevated D-dimer FINDINGS: Pulmonary Arteries: Pulmonary arteries are adequately opacified for evaluation. No evidence of intraluminal filling defect to suggest pulmonary embolism. Main pulmonary artery is normal in caliber. Mediastinum: No lymphadenopathy. No pericardial effusion. Coronary artery calcifications are seen. The thoracic aorta is not significantly dilated. Lungs/pleura: The central airways are patent. No pleural effusion or pneumothorax is seen. Again seen is a left diaphragmatic hernia with herniation of a portion of the spleen and intra-abdominal fat into the lower thorax. Compared to the prior CT, there are new airspace opacities in the right upper lobe, near the lung apex. There is dependent atelectasis in the lung bases. Evaluation of the lung parenchyma is limited by respiratory motion artifact. Upper Abdomen: Hepatic steatosis. Soft Tissues/Bones: Flowing ossification throughout the thoracic spine compatible with diffuse idiopathic skeletal hyperostosis. No acute bony abnormality is seen. No pulmonary embolism is identified. Wedge-shaped airspace opacities are seen in the right upper lobe, near the lung apex. This could represent an infection. While no pulmonary embolism is identified, a pulmonary infarct could also have this appearance. This finding is new from the prior CT dated 05/17/2022. Left diaphragmatic hernia. RECOMMENDATIONS: Follow-up radiographs recommended after treatment. Consultations:    Consults:     Final Specialist Recommendations/Findings:   IP CONSULT TO CARDIOLOGY  IP CONSULT TO PRIMARY CARE PROVIDER  IP CONSULT TO SOCIAL WORK  IP CONSULT TO PAIN MANAGEMENT  IP CONSULT TO VASCULAR SURGERY      The patient was seen and examined on day of discharge and this discharge summary is in conjunction with any daily progress note from day of discharge. Discharge plan:       Disposition: Home    Physician Follow Up:     Darian Baron, DO  [address] [place] [id number]  [phone]    Follow up      Darian Baron, [address] [address]  [address] [facility], [address] [place] [address]  [phone]    Call in 1 week(s)      Spencer Hannon MD  [address] [address]  [phone]    Call in 1 week(s)         Requiring Further Evaluation/Follow Up POST HOSPITALIZATION/Incidental Findings: Follow-up with vascular for further management of his right carotid artery stenosis. Follow-up with primary care provider for possible diagnosis of obstructive sleep apnea and for further management and treatment. Follow up with cardiologist for management of atrial fibrillation. Diet: cardiac diet    Activity: As tolerated    Instructions to Patient: Schedule appointment with vascular for further management and treatment of right carotid artery stenosis. Schedule appointment with Dr. Sybil Yousif, cardiologist, for further management of A. Fib. Follow-up with primary care provider for posthospitalization follow-up as well as work-up into possible obstructive sleep apnea.     Discharge Medications:      Medication List        START taking these medications      amiodarone 200 MG tablet  Commonly known as: CORDARONE  Take 1 tablet by mouth daily  Start taking on: September 18, 2022     apixaban 5 MG Tabs tablet  Commonly known as: Eliquis  Take 1 tablet by mouth 2 times daily     dextromethorphan-guaiFENesin  MG/5ML syrup  Commonly known as: ROBITUSSIN-DM  Take 10 mLs by mouth every 4 hours as needed for Cough     metoprolol tartrate 25 MG tablet  Commonly known as: LOPRESSOR  Take 1 tablet by mouth 2 times daily            CONTINUE taking these medications      acetaminophen 325 MG tablet  Commonly known as: TYLENOL     albuterol sulfate  (90 Base) MCG/ACT inhaler  Commonly known as: PROVENTIL;VENTOLIN;PROAIR  Inhale 2 puffs into the lungs every 6 hours as needed for Wheezing     amLODIPine 5 MG tablet  Commonly known as: NORVASC  TAKE ONE TABLET BY MOUTH DAILY     budesonide-formoterol 160-4.5 MCG/ACT Aero  Commonly known as: Symbicort  INHALE TWO PUFFS BY MOUTH TWICE A DAY     gabapentin 800 MG tablet  Commonly known as: Neurontin  Take 1 tablet by mouth every 8 hours for 30 days. isosorbide mononitrate 60 MG extended release tablet  Commonly known as: IMDUR     meloxicam 15 MG tablet  Commonly known as: MOBIC     nitroGLYCERIN 0.4 MG SL tablet  Commonly known as: Nitrostat  Place 1 tablet under the tongue every 5 minutes as needed for Chest pain (not to exceed 3 tabs at a time)     simvastatin 80 MG tablet  Commonly known as: ZOCOR  TAKE ONE TABLET BY MOUTH EVERY EVENING     tiZANidine 4 MG tablet  Commonly known as: Tata Downing your doctor about these medications      aspirin 81 MG EC tablet  Take 1 tablet by mouth daily     clopidogrel 75 MG tablet  Commonly known as: PLAVIX  Take 1 tablet by mouth in the morning.                Where to Get Your Medications        These medications were sent to [facility] [address]  [address], [address] [facility] [address]      Phone: [phone]   amiodarone 200 MG tablet  apixaban 5 MG Tabs tablet  budesonide-formoterol 160-4.5 MCG/ACT Aero  dextromethorphan-guaiFENesin  MG/5ML syrup  metoprolol tartrate 25 MG tablet         Electronically signed by   Beulah Agrawal MD  9/17/2022  11:33 AM      Thank you Dr. Danielle Wood DO for the opportunity to be involved in this patient's care. Attending Physician Statement  I have discussed the care of Laurence Sleeper and I have examined the patient myselft and taken ros and hpi , including pertinent history and exam findings,  with the resident. I have reviewed the key elements of all parts of the encounter with the resident. I agree with the DC plan as documented by the resident.       Electronically signed by Stacie Lake MD

## 2022-09-17 NOTE — PROGRESS NOTES
[facility]   Progress Note                   Date:   9/17/2022  Patient name: Maile Mcwilliams  Date of admission:  9/14/2022 10:17 AM  MRN:   [id number]  YOB: 1960  PCP: Jenny Langley DO    Reason for Admission: Pneumonia [J18.9]  Atrial fibrillation with RVR ([address] [I48.91]  Pneumonia of right upper lobe due to infectious organism [J18.9]    Subjective:       Clinical Changes / Abnormalities:no chest pain or dypsnea. Now in sinus rhythm  S/p cath- as below. Medications:   Scheduled Meds:   enoxaparin  1 mg/kg SubCUTAneous BID    digoxin  250 mcg IntraVENous Once    amLODIPine  5 mg Oral Daily    aspirin  81 mg Oral Daily    clopidogrel  75 mg Oral Daily    gabapentin  800 mg Oral 3 times per day    isosorbide mononitrate  60 mg Oral Daily    atorvastatin  40 mg Oral Daily    budesonide-formoterol  2 puff Inhalation BID    sodium chloride flush  5-40 mL IntraVENous 2 times per day    cefTRIAXone (ROCEPHIN) IV  1,000 mg IntraVENous Q24H    azithromycin  500 mg Oral Daily    metoprolol tartrate  25 mg Oral BID     Continuous Infusions:   sodium chloride      amiodarone 1 mg/min (09/16/22 2343)     CBC:   Recent Labs     09/15/22  0557 09/16/22  0715 09/17/22  0530   WBC 5.6 7.0 6.5   HGB 12.3* 11.5* 11.1*    222 284       BMP:    Recent Labs     09/15/22  0557 09/16/22  0715 09/17/22  0530   * 135 138   K 3.8 3.8 4.3   CL 96* 98 100   CO2 22 27 28   BUN 30* 19 17   CREATININE 1.00 0.78 0.79   GLUCOSE 193* 120* 111*       Hepatic: No results for input(s): AST, ALT, ALB, BILITOT, ALKPHOS in the last 72 hours. Troponin: No results for input(s): TROPONINI in the last 72 hours. BNP: No results for input(s): BNP in the last 72 hours. Lipids: No results for input(s): CHOL, HDL in the last 72 hours.     Invalid input(s): LDLCALCU  INR:   Recent Labs     09/14/22  1037   INR 1.1         Objective:   Vitals: BP (!) 151/75   Pulse 70   Temp 97.9 °F (36.6 °C) (Oral)   Resp 16   Ht 5' 9\" (1.753 m)   Wt 267 lb 6.7 oz (121.3 kg)   SpO2 95%   BMI 39.49 kg/m²   General appearance: alert and cooperative with exam  HEENT: Head: Normocephalic, no lesions, without obvious abnormality. Neck: no JVD  Lungs: CTAB  Heart: RRR s1+s2, no murmurs  Abdomen: soft, non-tender  Extremities: no edema, no radial site injection hematoma  Neurologic: not done    Cath on 9/16/22:   Procedure Summary        Minimal non-obstructive CAD. Normal LV systolic function. Recommendations        Medical therapy as needed. Risk factor modification. Assessment / Acute Cardiac Problems:   PAF- in NSR  Carotid artery disease  Intermittent anginal chest pain  HTN  COPD  Cath 9/16/22- minimal CAD      Plan of Treatment:   Amio po 200 qd  on lovenox  Change to eliquis 5 bid when no more surgery plans  On plavix- ? Due to carotid artery disease  Will be moderate risk for carotid artery revascularization based on stress test  Continue BB, imdur, norvasc    Follow up in 2  weeks. Discussed with patient in detail at bedside. All questions answered. Thank you for allowing me to participate in the care of this patient, please do not hesitate to call if you have any questions. Anabel Alba DO, [facility], [address], [address], [address] [facility]  [facility]. [facility]  [phone]

## 2022-09-17 NOTE — PROGRESS NOTES
Pt spoke on the phone with family and discussed with RN Earl Garcia how it would impact him to leave AMA. Pt ultimately decided to remain on unit \"at least until tomorrow\".

## 2022-09-17 NOTE — CARE COORDINATION
ONGOING DISCHARGE PLAN:    Patient is alert and oriented x4. Spoke with patient regarding discharge plan and patient confirms that plan is still to go home with no needs. JE and Cardiac cath 9/16 new order for Eliquis at discharge. Attempted to check cost, pharmacy did not answer x 7 attempts. Will continue to follow for additional discharge needs.     Electronically signed by Ellison Spatz, RN on 9/17/2022 at 11:49 AM

## 2022-09-17 NOTE — PROGRESS NOTES
[address]    PROGRESS NOTE             9/17/2022    6:56 AM    Name:   Adela Tyson  MRN:     [id number]     Acct:      [de-identified]   Room:   2083/2083-01  IP Day:  3  Admit Date:  9/14/2022 10:17 AM    PCP:  Bautista Mari DO  Code Status:  Full Code    Subjective:     C/C:   Chief Complaint   Patient presents with    Fever    Shortness of Breath     Interval History Status:     Overnight patient wanted to leave AMA. Cardiology and nursing staff discussed risks with patient and he ultimately decided to stay 1 more day. Patient seen and examined at bedside this morning. Patient states that he is ready to be discharged. Has plans for later this afternoon. Underwent cardiac cath yesterday and patient was a low risk for coronary artery disease. Patient states that he is feeling better cough is improving, shortness of breath is gone and that his generalized weakness is greatly improved. Brief History:     The patient is a 57 y.o. Non- / non  male history significant for coronary artery disease, cervical stenosis, COPD, GERD, hypertension, hyperlipidemia, and right carotid stenosis approximately 90% occlusion who presents withFever and Shortness of Breath   and he is admitted to the hospital for the management of A. fib with RVR and community-acquired pneumonia    Patient presents with approximately 6 days of fever, chills, diaphoresis, weakness, heart palpitations, shortness of breath and cough. Reports that it has been worsening over the last 2 days his weakness and chest palpitations have significantly increased. States that cough has minimal sputum production and that it is clear sputum. Patient reports that he felt so bad this morning that he reported to the hospital here with his wife. Denies any sick contacts.   Denies having this occur in the past.  Denies any leg swelling, diarrhea, difficulty with urination or abdominal pain. Patient was scheduled to undergo a TCAR procedure with Catina Kamara a vascular surgeon at [facility] this morning. Patient has approximately 90% occlusion of his right carotid artery. Patiently was recently prescribed aspirin and Plavix per his vascular doctor. Also has a history of coronary artery disease and has been seeing Dr. Barbara Estrella. Per patient his cardiologist was going to perform a cardiac catheterization once the TCAR procedure was complete. Of note the patient has cervical spine stenosis with chronic pain and had been seeing pain management up until this spring. Sees Dr. Konrad Henderson as his primary care provider who is currently managing his pain medications. He reports that he quit smoking 6 days ago but had smoked approximately 2 pack of cigarettes for 46 years. Occasionally drinks alcohol and denies any illicit drug use. In the emergency department the patient was found to be in A. fib with RVR. Dr. Barbara Estrella his cardiologist was consulted and the patient was subsequently started on diltiazem drip. Patient was afebrile, and tachycardic. Patient did not have a white count. Initial labs showed a D-dimer of 2.94, a CT chest with contrast was performed and no pulmonary embolism was identified. CT demonstrated wedge-shaped airspace opacity in the right upper lobe which could represent an infection or previous infarct. Patient was subsequently started on azithromycin and ceftriaxone for suspicion of community-acquired pneumonia. Rapid COVID test was negative. TSH was within normal limits. Troponins were within normal limits. Review of Systems:     Review of Systems   Constitutional:  Negative for chills and fever. HENT:  Negative for hearing loss and trouble swallowing. Eyes:  Negative for visual disturbance. Respiratory:  Positive for cough. Negative for shortness of breath. Cardiovascular:  Negative for chest pain and leg swelling. Gastrointestinal:  Negative for abdominal pain, constipation, diarrhea, nausea and vomiting. Genitourinary:  Negative for difficulty urinating and hematuria. Musculoskeletal:  Negative for back pain. Skin:  Negative for color change. Neurological:  Positive for weakness. Negative for dizziness, numbness and headaches. Psychiatric/Behavioral:  Negative for behavioral problems and confusion. Medications:      Allergies:  No Known Allergies    Current Meds:   Scheduled Meds:    enoxaparin  1 mg/kg SubCUTAneous BID    digoxin  250 mcg IntraVENous Once    amLODIPine  5 mg Oral Daily    aspirin  81 mg Oral Daily    clopidogrel  75 mg Oral Daily    gabapentin  800 mg Oral 3 times per day    isosorbide mononitrate  60 mg Oral Daily    atorvastatin  40 mg Oral Daily    budesonide-formoterol  2 puff Inhalation BID    sodium chloride flush  5-40 mL IntraVENous 2 times per day    cefTRIAXone (ROCEPHIN) IV  1,000 mg IntraVENous Q24H    azithromycin  500 mg Oral Daily    metoprolol tartrate  25 mg Oral BID     Continuous Infusions:    sodium chloride      amiodarone 1 mg/min (09/16/22 4365)     PRN Meds: potassium chloride **OR** potassium alternative oral replacement **OR** potassium chloride, magnesium sulfate, sodium chloride flush, albuterol sulfate HFA, tiZANidine, sodium chloride flush, sodium chloride, ondansetron **OR** ondansetron, polyethylene glycol, acetaminophen **OR** acetaminophen, ketorolac, dextromethorphan-guaiFENesin    Data:     Past Medical History:   has a past medical history of Abnormal stress ECG, Angina pectoris ([facility] [address] BPH (benign prostatic hyperplasia), CAD (coronary artery disease), Carotid stenosis, Carpal tunnel syndrome, Cervical stenosis of spine, Cervicalgia, Chronic back pain, COPD (chronic obstructive pulmonary disease) ([address] DDD (degenerative disc disease), Elbow fracture, right, Fractures, GERD (gastroesophageal reflux disease), Gout, Hyperlipidemia, Hypertension, Lung nodule, Mild depression ([address] Morbidly obese ([address] Occlusion of right carotid artery, Osteoarthritis of right knee, Pre-diabetes, Sinus headache, Sleep apnea, Sleep disturbance, Smoker, Snores, SOB (shortness of breath) on exertion, Tendonitis of foot, Tooth loose, Under care of team, Under care of team, and Wellness examination. Social History:   reports that he has quit smoking. His smoking use included cigarettes. He started smoking about 47 years ago. He has a 10.00 pack-year smoking history. He quit smokeless tobacco use about 7 years ago. He reports that he does not currently use alcohol. He reports that he does not use drugs. Family History:   Family History   Problem Relation Age of Onset    Heart Disease Mother     COPD Father     Cancer Maternal Aunt 64        breast cancer     Cancer Maternal Uncle 60        prostrate cancer        Vitals:  /72   Pulse 70   Temp 97.9 °F (36.6 °C) (Oral)   Resp 18   Ht 5' 9\" (1.753 m)   Wt 267 lb 6.7 oz (121.3 kg)   SpO2 96%   BMI 39.49 kg/m²   Temp (24hrs), Av.2 °F (36.8 °C), Min:97.9 °F (36.6 °C), Max:98.4 °F (36.9 °C)    No results for input(s): POCGLU in the last 72 hours. I/O(24Hr): Intake/Output Summary (Last 24 hours) at 2022 0656  Last data filed at 2022 0535  Gross per 24 hour   Intake 1389 ml   Output --   Net 1389 ml       Labs:  [unfilled]    No results found for: SPECIAL  Lab Results   Component Value Date/Time    CULTURE NO GROWTH 2022 07:54 AM       [unfilled]    Radiology:    XR CHEST (2 VW)    Result Date: 2022  EXAMINATION: TWO XRAY VIEWS OF THE CHEST 2022 9:33 am COMPARISON: 2022 HISTORY: ORDERING SYSTEM PROVIDED HISTORY: preop, right carotid stenosis TECHNOLOGIST PROVIDED HISTORY: preop, right carotid stenosis FINDINGS: Cardiomediastinal silhouette appears within normal limits. No focal consolidation or overt pulmonary edema. Left diaphragmatic hernia. No evidence of pneumothorax.   No acute osseous abnormalities. 1. No radiographic evidence of an acute cardiopulmonary process. 2. Left diaphragmatic hernia. XR CHEST PORTABLE    Result Date: 9/16/2022  EXAMINATION: ONE XRAY VIEW OF THE CHEST 9/16/2022 7:54 am COMPARISON: 09/14/2022 HISTORY: ORDERING SYSTEM PROVIDED HISTORY: pneumonia TECHNOLOGIST PROVIDED HISTORY: pneumonia Reason for Exam: pneumonia FINDINGS: Previously seen right upper lobe opacity is not visualized. Ill-defined opacities at the lung bases are compatible with atelectasis, prominent epicardial fat, and a left diaphragmatic hernia. No pneumothorax. Heart size is unchanged. No definite acute process. Previously noted right upper lobe opacity is not seen on this exam.     XR CHEST PORTABLE    Result Date: 9/14/2022  EXAMINATION: ONE XRAY VIEW OF THE CHEST 9/14/2022 10:39 am COMPARISON: Two-view chest from 09/02/2022 HISTORY: ORDERING SYSTEM PROVIDED HISTORY: Chest Pain TECHNOLOGIST PROVIDED HISTORY: Chest Pain Reason for Exam: chest pain Known left diaphragmatic hernia FINDINGS: Overlying ECG monitor leads. Cardiomediastinal shadow stable; no cardiomegaly. Patchy basilar opacities, probably atelectatic or fibrotic mildly elevated lateral left costophrenic angle, slightly increased by comparison; no consolidation or sizable pleural effusion. Moderate DJD spine with mild convex-right curvature. Slightly greater basilar atelectasis; known left diaphragmatic hernia. No consolidation or sizable pleural effusion. CT CHEST PULMONARY EMBOLISM W CONTRAST    Result Date: 9/15/2022  EXAMINATION: CTA OF THE CHEST, 9/14/2022 11:59 am TECHNIQUE: CTA of the chest was performed after the administration of intravenous contrast.  Multiplanar reformatted images are provided for review. MIP images are provided for review.  Automated exposure control, iterative reconstruction, and/or weight based adjustment of the mA/kV was utilized to reduce the radiation dose to as low as reasonably achievable. COMPARISON: Chest radiograph 09/14/2022, CT 05/17/2022 HISTORY: ORDERING SYSTEM PROVIDED HISTORY: SOB, elevated d-dimer TECHNOLOGIST PROVIDED HISTORY: SOB, elevated D-dimer Decision Support Exception - unselect if not a suspected or confirmed emergency medical condition->Emergency Medical Condition (MA) Reason for Exam:  SOB elevated D-dimer FINDINGS: Pulmonary Arteries: Pulmonary arteries are adequately opacified for evaluation. No evidence of intraluminal filling defect to suggest pulmonary embolism. Main pulmonary artery is normal in caliber. Mediastinum: No lymphadenopathy. No pericardial effusion. Coronary artery calcifications are seen. The thoracic aorta is not significantly dilated. Lungs/pleura: The central airways are patent. No pleural effusion or pneumothorax is seen. Again seen is a left diaphragmatic hernia with herniation of a portion of the spleen and intra-abdominal fat into the lower thorax. Compared to the prior CT, there are new airspace opacities in the right upper lobe, near the lung apex. There is dependent atelectasis in the lung bases. Evaluation of the lung parenchyma is limited by respiratory motion artifact. Upper Abdomen: Hepatic steatosis. Soft Tissues/Bones: Flowing ossification throughout the thoracic spine compatible with diffuse idiopathic skeletal hyperostosis. No acute bony abnormality is seen. No pulmonary embolism is identified. Wedge-shaped airspace opacities are seen in the right upper lobe, near the lung apex. This could represent an infection. While no pulmonary embolism is identified, a pulmonary infarct could also have this appearance. This finding is new from the prior CT dated 05/17/2022. Left diaphragmatic hernia. RECOMMENDATIONS: Follow-up radiographs recommended after treatment. Physical Examination:        Physical Exam  Constitutional:       Appearance: He is obese. HENT:      Head: Normocephalic and atraumatic. Nose: Nose normal.      Mouth/Throat:      Mouth: Mucous membranes are moist.   Eyes:      Extraocular Movements: Extraocular movements intact. Pupils: Pupils are equal, round, and reactive to light. Cardiovascular:      Rate and Rhythm: Normal rate and regular rhythm. Pulses: Normal pulses. Heart sounds: Normal heart sounds. Pulmonary:      Effort: Pulmonary effort is normal.      Breath sounds: Normal breath sounds. Abdominal:      General: Bowel sounds are normal.      Palpations: Abdomen is soft. Musculoskeletal:         General: Normal range of motion. Cervical back: Neck supple. Skin:     General: Skin is warm and dry. Capillary Refill: Capillary refill takes less than 2 seconds. Neurological:      General: No focal deficit present. Mental Status: He is alert and oriented to person, place, and time.    Psychiatric:         Mood and Affect: Mood normal.         Behavior: Behavior normal.         Assessment:        Primary Problem  Atrial fibrillation with RVR [facility])    Active Hospital Problems    Diagnosis Date Noted    Pneumonia [J18.9] 09/14/2022     Priority: Medium    Atrial fibrillation with RVR ([facility] [address] [I48.91] 09/14/2022     Priority: Medium    Angina of effort ([facility] [address] [I20.8] 09/14/2022     Priority: Medium    Occlusion of right carotid artery [I65.21] 07/14/2021    Morbidly obese ([facility] [address] [E66.01] 10/30/2020    Essential hypertension [I10] 02/07/2018    Mixed hyperlipidemia [E78.2] 03/09/2016    COPD (chronic obstructive pulmonary disease) ([facility] [address] [J44.9] 08/06/2013    Cervical stenosis of spine [M48.02]     Cervicalgia [M54.2]        Plan:        New onset atrial fibrillation RVR  - Presented tachycardic with irregularly irregular rhythm with a rate of 175 to emergency department  - Cardiology consulted, appreciate recommendations  - Given 1 dose of digoxin yesterday, has converted to sinus rhythm  - TSH within normal limits  - [id number] score of 1  - Patient already on aspirin and Plavix for carotid artery stenosis/coronary artery disease  - Continuous telemetry  - Monitor vitals  - Patient's last echo was in May 2022 demonstrated ejection fraction of 55% with no obvious wall abnormality  - Cardizem drip discontinued  - Discontinued IV amiodarone drip  - Lopressor 25 mg 2 times daily  - Full dose heparin  - Cardiac cath performed yesterday showed normal nonobstructive coronary artery disease and normal left ventricular systolic function.  - Patient will be discharged on 200 mg daily of amiodarone  - Eliquis 5 mg twice daily on discharge     Community acquired pneumonia  - Patient presented with 6-day history of cough, shortness of breath, diaphoresis and fever  - D-dimer was 2.94 on admission  - CT chest with contrast ordered, no pulmonary embolism was identified.   Did demonstrate wedge-shaped airspace opacities could possibly represent infection versus infarct  - Procalcitonin elevated at 0.24  - Patient started on IV ceftriaxone and azithromycin  - Respiratory panel negative  - Legionella and strep pneumo negative  - COVID-negative  - Robitussin for cough  - Repeat chest x-ray on 9/16 shows no acute definite process, previously noted right upper lobe opacity was not present  - Can discontinue antibiotics on discharge    Carotid artery stenosis  - Patient scheduled to undergo TCAR with Lashae Rodriguez yesterday for right carotid artery stenosis of 90% but unable to due to illness  - Patient recently started on aspirin and Plavix  - Continue aspirin 81 mg and Plavix 75 mg  - Vascular surgery consulted  - Vascular surgery will reschedule TCAR procedure with the patient at a later date    Cervical spinal stenosis   - Patient has a past medical history of a cervical spine surgery with chronic pain  - Was seen pain management up until last spring  - Continue home dose of gabapentin 800 mg 3 times a day  - Continue home tizanidine 4 mg every 6 hours as needed  - Toradol added for severe pain as needed  - Pain management consulted    COPD  - Continue home inhalers, albuterol and Symbicort  - Patient reports that he quit smoking 6 days ago    Hyperlipidemia  - Continue statin Lipitor 40 mg daily    Essential hypertension  - Continue home dose amlodipine 5 mg daily    Angina  - Sees is Dr. Aruna Solano outpatient, has had stress test done recently which was negative  - Patient reports future plan of cardiac catheterization  - Continue aspirin, and clopidogrel  - Continue home dose of Imdur 60 mg daily    DVT prophylaxis: Heparin 120 mg  Diet: N.p.o.  PT/OT and social work consulted  CODE STATUS: Full code    James Grier MD  9/17/2022  6:56 AM      Attending Physician Statement  I have discussed the care of Sonia Smith and I have examined the patient myselft and taken ros and hpi , including pertinent history and exam findings,  with the resident. I have reviewed the key elements of all parts of the encounter with the resident. I agree with the DC plan as documented by the resident.       Electronically signed by Nichelle Hoffman MD

## 2022-09-17 NOTE — FLOWSHEET NOTE
09/16/22 2112   Treatment Team Notification   Reason for Communication Review case   Team Member Name Dr Cherie Qiu Team Role Attending Provider   Method of Communication Call   Response No new orders   Notification Time 2112     Dr Ric Ye alerted to patient's request to leave. Dr. Ric Ye stated that pt is not medically cleared and to contact cardiology about situation. Pt insistent on leaving tonight despite Dr. Nadine Navarro advice.

## 2022-09-17 NOTE — PLAN OF CARE
Problem: Discharge Planning  Goal: Discharge to home or other facility with appropriate resources  9/17/2022 0827 by Radu Quiñones RN  Outcome: Completed  9/17/2022 0351 by Karina Barnett RN  Outcome: Progressing  9/17/2022 0333 by Karina Barnett RN  Outcome: Progressing  9/16/2022 1845 by Laura Navarro RN  Outcome: Progressing     Problem: Pain  Goal: Verbalizes/displays adequate comfort level or baseline comfort level  9/17/2022 0827 by Radu Quiñones RN  Outcome: Completed  9/17/2022 0351 by Karina Barnett RN  Outcome: Progressing  Note: Pt complained of no acute pain throughout shift. Pt asked for medication for chronic pain before bed (see MAR). Pt was able to rest peacefully. 9/17/2022 0333 by Karina Barnett RN  Outcome: Progressing  Note: Pt complained of   9/16/2022 1845 by Laura Navarro RN  Outcome: Progressing     Problem: Safety - Adult  Goal: Free from fall injury  9/17/2022 0827 by Radu Quiñones RN  Outcome: Completed  9/17/2022 0351 by Karina Barnett RN  Outcome: Adequate for Discharge  Note: Pt was able to ambulate independently throughout shift. Pt showed no signs of weakness and called out appropriately.    9/16/2022 1845 by Laura Navarro RN  Outcome: Progressing     Problem: ABCDS Injury Assessment  Goal: Absence of physical injury  9/17/2022 0827 by Radu Quiñones RN  Outcome: Completed  9/16/2022 1845 by Laura Navarro RN  Outcome: Progressing

## 2022-09-17 NOTE — PLAN OF CARE
Problem: Discharge Planning  Goal: Discharge to home or other facility with appropriate resources  9/17/2022 0333 by Araceli Murcia RN  Outcome: Progressing     Problem: Pain  Goal: Verbalizes/displays adequate comfort level or baseline comfort level  9/17/2022 0351 by Araceli Murcia RN  Outcome: Progressing  Note: Pt complained of no acute pain throughout shift. Pt asked for medication for chronic pain before bed (see MAR). Pt was able to rest peacefully. Problem: Safety - Adult  Goal: Free from fall injury  9/17/2022 0351 by Araceli Murcia RN  Outcome: Adequate for Discharge  Note: Pt was able to ambulate independently throughout shift. Pt showed no signs of weakness and called out appropriately.

## 2022-09-17 NOTE — PROGRESS NOTES
Pt removed own telemetry and refusing telemetry monitor. Pt educated on benefits and risks. Pt stated that he is leaving tonight against medical advice once pressure band is removed.

## 2022-09-19 ENCOUNTER — CARE COORDINATION (OUTPATIENT)
Dept: CASE MANAGEMENT | Age: 62
End: 2022-09-19

## 2022-09-19 DIAGNOSIS — I48.91 ATRIAL FIBRILLATION WITH RVR (HCC): Primary | ICD-10-CM

## 2022-09-19 NOTE — CARE COORDINATION
[facility] Transitions Initial Follow Up Call    Call within 2 business days of discharge: Yes    Patient: Ruth Ann Kaur Patient : 1960   MRN: [id number]  Reason for Admission: Atrial fibrillation with RVR   Discharge Date: 22 RARS: Readmission Risk Score: 9.9      Last Discharge [facility]       Date Complaint Diagnosis Description Type Department Provider    22 Fever; Shortness of Breath Atrial fibrillation with RVR ([address] . .. ED to Hosp-Admission (Discharged) (ADMITTED) SYLVESTER Sanders MD; Medardo Hughes. .. Spoke with: Shanique Cunningham and introduced to the role of the CTN. He states he feels a bit better today then yetserday continues to be fatigued, SOB and some dizziness at times. He is eating and drinking without nausea. Normal bowel and bladder elimination. Medications reviewed and taking all as directed. 1111 f completed. Patient declined assistance with f/u appointment scheduling states he will make them. He is not getting much sleep taking BP twice daily and has been WNL denies leg swelling. Has no further concerns at present time. Facility: [address] [place] [address]    Non-face-to-face services provided:  Obtained and reviewed discharge summary and/or continuity of care documents  Transitions of Care Initial Call    Was this an external facility discharge? No     Challenges to be reviewed by the provider   Additional needs identified to be addressed with provider: No  none             Method of communication with provider : none    Advance Care Planning:   Does patient have an Advance Directive: not on file and patient declined education. LPN Care Coordinator contacted the patient by telephone to perform post hospital discharge assessment. Verified name and  with patient as identifiers. Provided introduction to self, and explanation of the LPN CC role. LPN CC reviewed discharge instructions, medical action plan and red flags with patient who verbalized understanding. Patient given an opportunity to ask questions and does not have any further questions or concerns at this time. Were discharge instructions available to patient? Yes. Reviewed appropriate site of care based on symptoms and resources available to patient including: PCP  Specialist  [facility] 24/7  When to call 911. The patient agrees to contact the PCP office for questions related to their healthcare. Medication reconciliation was performed with patient, who verbalizes understanding of administration of home medications. Advised obtaining a 90-day supply of all daily and as-needed medications. Was patient discharged with a pulse oximeter? no    LPN CC provided contact information. Plan for follow-up call in 3-5 days based on severity of symptoms and risk factors. Plan for next call: symptom management-fatigue dizziness SOB , F/U appointments scheduled     Care Transitions 24 Hour Call    Schedule Follow Up Appointment with PCP: Declined  Do you have a copy of your discharge instructions?: Yes  Do you have all of your prescriptions and are they filled?: Yes  Have you been contacted by a [facility] Pharmacist?: No  Have you scheduled your follow up appointment?: No (Comment: declined assistance in making all f/u appointments)  Do you feel like you have everything you need to keep you well at home?: Yes  Care Transitions Interventions         Follow Up  No future appointments.     Rich Barrios LPN

## 2022-09-20 ENCOUNTER — TELEPHONE (OUTPATIENT)
Dept: FAMILY MEDICINE CLINIC | Age: 62
End: 2022-09-20

## 2022-09-20 NOTE — TELEPHONE ENCOUNTER
[facility] Transitions Initial Follow Up Call    Outreach made within 2 business days of discharge: Yes    Patient: Elsy Mejia Patient : 1960   MRN: [id number]  Reason for Admission: Atrial Fibrillation With RVR  Discharge Date: 22       Spoke with: Care coordination notes    Discharge department/facility: [address] [place] [address] 22-22  Patient spoke with care coordinator following hospital discharge according to notes. Patient states that he will make a follow up visit soon with Dr. Sneha Kebede. Scheduled appointment with PCP within 7-14 days    Follow Up  No future appointments.     Clemencia Elliott LPN

## 2022-09-23 ENCOUNTER — CARE COORDINATION (OUTPATIENT)
Dept: CASE MANAGEMENT | Age: 62
End: 2022-09-23

## 2022-09-23 NOTE — CARE COORDINATION
[facility] Transitions Follow Up Call    2022    Patient: Mp Farias  Patient : 1960   MRN: [id number]  Reason for Admission: Atrial fibrillation with RVR   Discharge Date: 22 RARS: Readmission Risk Score: 9.9         Spoke with: Kim Myers he states he is felling much better, he still has some SOB with exertion denies chest pain, Dizziness, nausea. Has vascular surgery scheduled for  carotid artery . Is eating and drinking well normal bowel and bladder elimination aware to stop eliquis 2 days before surgery, has no concerns at present time. Care Transitions Follow Up Call    Needs to be reviewed by the provider   Additional needs identified to be addressed with provider: No  none             Method of communication with provider : none      LPN Care Coordinator contacted the patient by telephone to follow up after admission on . Verified name and  with patient as identifiers. Addressed changes since last contact: none  Discussed follow-up appointments. If no appointment was previously scheduled, appointment scheduling offered: Yes. Is follow up appointment scheduled within 7 days of discharge? Yes. Advance Care Planning:   Does patient have an Advance Directive: not on file. LPN CC reviewed discharge instructions, medical action plan and red flags with patient and discussed any barriers to care and/or understanding of plan of care after discharge. Discussed appropriate site of care based on symptoms and resources available to patient including: PCP  Specialist. The patient agrees to contact the PCP office for questions related to their healthcare. Patients top risk factors for readmission: lack of knowledge about disease  medication management  Interventions to address risk factors: Obtained and reviewed discharge summary and/or continuity of care documents          LPN CC provided contact information for future needs.  Plan for follow-up call in 7-10 days based on severity of symptoms and risk factors. Plan for next call: symptom management-post op call          Care Transitions Subsequent and Final Call    Subsequent and Final Calls  Do you have any ongoing symptoms?: Yes  Onset of Patient-reported symptoms: Today  Patient-reported symptoms: Fatigue  Interventions for patient-reported symptoms: Notified PCP/Physician  Have your medications changed?: No  Do you have any questions related to your medications?: No  Do you currently have any active services?: No  Do you have any needs or concerns that I can assist you with?: No  Identified Barriers: Other, Lack of Education  Care Transitions Interventions  Other Interventions: Follow Up  No future appointments.     Evelia Bearden LPN

## 2022-09-28 ENCOUNTER — APPOINTMENT (OUTPATIENT)
Dept: GENERAL RADIOLOGY | Age: 62
DRG: 036 | End: 2022-09-28
Attending: SURGERY
Payer: MEDICARE

## 2022-09-28 ENCOUNTER — ANESTHESIA (OUTPATIENT)
Dept: OPERATING ROOM | Age: 62
DRG: 036 | End: 2022-09-28
Payer: MEDICARE

## 2022-09-28 ENCOUNTER — ANESTHESIA EVENT (OUTPATIENT)
Dept: OPERATING ROOM | Age: 62
DRG: 036 | End: 2022-09-28
Payer: MEDICARE

## 2022-09-28 ENCOUNTER — HOSPITAL ENCOUNTER (INPATIENT)
Age: 62
LOS: 1 days | Discharge: HOME | DRG: 036 | End: 2022-09-29
Attending: SURGERY | Admitting: SURGERY
Payer: MEDICARE

## 2022-09-28 DIAGNOSIS — Z98.890 STATUS POST CAROTID SURGERY: Primary | ICD-10-CM

## 2022-09-28 PROBLEM — I65.21 STENOSIS OF RIGHT CAROTID ARTERY: Status: ACTIVE | Noted: 2022-09-28

## 2022-09-28 LAB
ABSOLUTE EOS #: 0.12 K/UL (ref 0–0.44)
ABSOLUTE IMMATURE GRANULOCYTE: 0.1 K/UL (ref 0–0.3)
ABSOLUTE LYMPH #: 1.38 K/UL (ref 1.1–3.7)
ABSOLUTE MONO #: 0.16 K/UL (ref 0.1–1.2)
ANION GAP SERPL CALCULATED.3IONS-SCNC: 11 MMOL/L (ref 9–17)
BASOPHILS # BLD: 1 % (ref 0–2)
BASOPHILS ABSOLUTE: 0.06 K/UL (ref 0–0.2)
BUN BLDV-MCNC: 18 MG/DL (ref 8–23)
CALCIUM IONIZED: 1.15 MMOL/L (ref 1.13–1.33)
CALCIUM SERPL-MCNC: 8.3 MG/DL (ref 8.6–10.4)
CHLORIDE BLD-SCNC: 102 MMOL/L (ref 98–107)
CO2: 22 MMOL/L (ref 20–31)
CREAT SERPL-MCNC: 0.82 MG/DL (ref 0.7–1.2)
EOSINOPHILS RELATIVE PERCENT: 2 % (ref 1–4)
GFR AFRICAN AMERICAN: >60 ML/MIN
GFR NON-AFRICAN AMERICAN: >60 ML/MIN
GFR SERPL CREATININE-BSD FRML MDRD: ABNORMAL ML/MIN/{1.73_M2}
GLUCOSE BLD-MCNC: 142 MG/DL (ref 70–99)
HCT VFR BLD CALC: 37.3 % (ref 40.7–50.3)
HEMOGLOBIN: 11.9 G/DL (ref 13–17)
IMMATURE GRANULOCYTES: 2 %
LYMPHOCYTES # BLD: 21 % (ref 24–43)
MAGNESIUM: 2.2 MG/DL (ref 1.6–2.6)
MCH RBC QN AUTO: 28.3 PG (ref 25.2–33.5)
MCHC RBC AUTO-ENTMCNC: 31.9 G/DL (ref 28.4–34.8)
MCV RBC AUTO: 88.6 FL (ref 82.6–102.9)
MONOCYTES # BLD: 3 % (ref 3–12)
NRBC AUTOMATED: 0 PER 100 WBC
PDW BLD-RTO: 14.6 % (ref 11.8–14.4)
PHOSPHORUS: 3.2 MG/DL (ref 2.5–4.5)
PLATELET # BLD: 259 K/UL (ref 138–453)
PMV BLD AUTO: 8.5 FL (ref 8.1–13.5)
POTASSIUM SERPL-SCNC: 4.2 MMOL/L (ref 3.7–5.3)
RBC # BLD: 4.21 M/UL (ref 4.21–5.77)
RBC # BLD: ABNORMAL 10*6/UL
SEG NEUTROPHILS: 71 % (ref 36–65)
SEGMENTED NEUTROPHILS ABSOLUTE COUNT: 4.67 K/UL (ref 1.5–8.1)
SODIUM BLD-SCNC: 135 MMOL/L (ref 135–144)
WBC # BLD: 6.5 K/UL (ref 3.5–11.3)

## 2022-09-28 PROCEDURE — 3700000000 HC ANESTHESIA ATTENDED CARE: Performed by: SURGERY

## 2022-09-28 PROCEDURE — 6360000004 HC RX CONTRAST MEDICATION: Performed by: SURGERY

## 2022-09-28 PROCEDURE — 2709999900 HC NON-CHARGEABLE SUPPLY: Performed by: SURGERY

## 2022-09-28 PROCEDURE — 85025 COMPLETE CBC W/AUTO DIFF WBC: CPT

## 2022-09-28 PROCEDURE — 83735 ASSAY OF MAGNESIUM: CPT

## 2022-09-28 PROCEDURE — 2580000003 HC RX 258: Performed by: SURGERY

## 2022-09-28 PROCEDURE — 6360000002 HC RX W HCPCS: Performed by: STUDENT IN AN ORGANIZED HEALTH CARE EDUCATION/TRAINING PROGRAM

## 2022-09-28 PROCEDURE — 6360000002 HC RX W HCPCS: Performed by: SURGERY

## 2022-09-28 PROCEDURE — 36415 COLL VENOUS BLD VENIPUNCTURE: CPT

## 2022-09-28 PROCEDURE — 037K3DZ DILATION OF RIGHT INTERNAL CAROTID ARTERY WITH INTRALUMINAL DEVICE, PERCUTANEOUS APPROACH: ICD-10-PCS | Performed by: SURGERY

## 2022-09-28 PROCEDURE — 82330 ASSAY OF CALCIUM: CPT

## 2022-09-28 PROCEDURE — 2580000003 HC RX 258: Performed by: NURSE ANESTHETIST, CERTIFIED REGISTERED

## 2022-09-28 PROCEDURE — 80048 BASIC METABOLIC PNL TOTAL CA: CPT

## 2022-09-28 PROCEDURE — 2500000003 HC RX 250 WO HCPCS: Performed by: SURGERY

## 2022-09-28 PROCEDURE — 2580000003 HC RX 258: Performed by: STUDENT IN AN ORGANIZED HEALTH CARE EDUCATION/TRAINING PROGRAM

## 2022-09-28 PROCEDURE — 6370000000 HC RX 637 (ALT 250 FOR IP)

## 2022-09-28 PROCEDURE — 2500000003 HC RX 250 WO HCPCS: Performed by: NURSE ANESTHETIST, CERTIFIED REGISTERED

## 2022-09-28 PROCEDURE — 6370000000 HC RX 637 (ALT 250 FOR IP): Performed by: STUDENT IN AN ORGANIZED HEALTH CARE EDUCATION/TRAINING PROGRAM

## 2022-09-28 PROCEDURE — 84100 ASSAY OF PHOSPHORUS: CPT

## 2022-09-28 PROCEDURE — 6370000000 HC RX 637 (ALT 250 FOR IP): Performed by: SURGERY

## 2022-09-28 PROCEDURE — C1876 STENT, NON-COA/NON-COV W/DEL: HCPCS | Performed by: SURGERY

## 2022-09-28 PROCEDURE — 3600000007 HC SURGERY HYBRID BASE: Performed by: SURGERY

## 2022-09-28 PROCEDURE — 94640 AIRWAY INHALATION TREATMENT: CPT

## 2022-09-28 PROCEDURE — 3700000001 HC ADD 15 MINUTES (ANESTHESIA): Performed by: SURGERY

## 2022-09-28 PROCEDURE — 37215 TRANSCATH STENT CCA W/EPS: CPT | Performed by: SURGERY

## 2022-09-28 PROCEDURE — 2000000000 HC ICU R&B

## 2022-09-28 PROCEDURE — C1769 GUIDE WIRE: HCPCS | Performed by: SURGERY

## 2022-09-28 PROCEDURE — 3600000017 HC SURGERY HYBRID ADDL 15MIN: Performed by: SURGERY

## 2022-09-28 PROCEDURE — C1725 CATH, TRANSLUMIN NON-LASER: HCPCS | Performed by: SURGERY

## 2022-09-28 PROCEDURE — 6360000002 HC RX W HCPCS: Performed by: NURSE ANESTHETIST, CERTIFIED REGISTERED

## 2022-09-28 PROCEDURE — C1884 EMBOLIZATION PROTECT SYST: HCPCS | Performed by: SURGERY

## 2022-09-28 DEVICE — 9 MM X 30 MM
Type: IMPLANTABLE DEVICE | Site: CAROTID | Status: FUNCTIONAL
Brand: ENROUTE TRANSCAROTID STENT

## 2022-09-28 RX ORDER — ATORVASTATIN CALCIUM 40 MG/1
40 TABLET, FILM COATED ORAL DAILY
Status: DISCONTINUED | OUTPATIENT
Start: 2022-09-28 | End: 2022-09-29 | Stop reason: HOSPADM

## 2022-09-28 RX ORDER — MIDAZOLAM HYDROCHLORIDE 1 MG/ML
INJECTION INTRAMUSCULAR; INTRAVENOUS PRN
Status: DISCONTINUED | OUTPATIENT
Start: 2022-09-28 | End: 2022-09-28 | Stop reason: SDUPTHER

## 2022-09-28 RX ORDER — SODIUM CHLORIDE 9 MG/ML
INJECTION, SOLUTION INTRAVENOUS PRN
Status: DISCONTINUED | OUTPATIENT
Start: 2022-09-28 | End: 2022-09-29

## 2022-09-28 RX ORDER — GABAPENTIN 800 MG/1
800 TABLET ORAL EVERY 8 HOURS SCHEDULED
Status: DISCONTINUED | OUTPATIENT
Start: 2022-09-28 | End: 2022-09-29 | Stop reason: HOSPADM

## 2022-09-28 RX ORDER — IODIXANOL 320 MG/ML
INJECTION, SOLUTION INTRAVASCULAR PRN
Status: DISCONTINUED | OUTPATIENT
Start: 2022-09-28 | End: 2022-09-28 | Stop reason: ALTCHOICE

## 2022-09-28 RX ORDER — ALBUTEROL SULFATE 90 UG/1
2 AEROSOL, METERED RESPIRATORY (INHALATION) EVERY 6 HOURS PRN
Status: DISCONTINUED | OUTPATIENT
Start: 2022-09-28 | End: 2022-09-29 | Stop reason: HOSPADM

## 2022-09-28 RX ORDER — CEFAZOLIN SODIUM 1 G/3ML
INJECTION, POWDER, FOR SOLUTION INTRAMUSCULAR; INTRAVENOUS PRN
Status: DISCONTINUED | OUTPATIENT
Start: 2022-09-28 | End: 2022-09-28 | Stop reason: SDUPTHER

## 2022-09-28 RX ORDER — CLOPIDOGREL BISULFATE 75 MG/1
225 TABLET ORAL ONCE
Status: DISCONTINUED | OUTPATIENT
Start: 2022-09-28 | End: 2022-09-28

## 2022-09-28 RX ORDER — ONDANSETRON 4 MG/1
4 TABLET, ORALLY DISINTEGRATING ORAL EVERY 8 HOURS PRN
Status: DISCONTINUED | OUTPATIENT
Start: 2022-09-28 | End: 2022-09-29 | Stop reason: HOSPADM

## 2022-09-28 RX ORDER — CLOPIDOGREL BISULFATE 75 MG/1
75 TABLET ORAL DAILY
Status: DISCONTINUED | OUTPATIENT
Start: 2022-09-29 | End: 2022-09-29 | Stop reason: HOSPADM

## 2022-09-28 RX ORDER — LABETALOL HYDROCHLORIDE 5 MG/ML
INJECTION, SOLUTION INTRAVENOUS PRN
Status: DISCONTINUED | OUTPATIENT
Start: 2022-09-28 | End: 2022-09-28 | Stop reason: SDUPTHER

## 2022-09-28 RX ORDER — LIDOCAINE HYDROCHLORIDE 10 MG/ML
INJECTION, SOLUTION EPIDURAL; INFILTRATION; INTRACAUDAL; PERINEURAL PRN
Status: DISCONTINUED | OUTPATIENT
Start: 2022-09-28 | End: 2022-09-28 | Stop reason: ALTCHOICE

## 2022-09-28 RX ORDER — FENTANYL CITRATE 50 UG/ML
INJECTION, SOLUTION INTRAMUSCULAR; INTRAVENOUS PRN
Status: DISCONTINUED | OUTPATIENT
Start: 2022-09-28 | End: 2022-09-28 | Stop reason: SDUPTHER

## 2022-09-28 RX ORDER — PROTAMINE SULFATE 10 MG/ML
INJECTION, SOLUTION INTRAVENOUS PRN
Status: DISCONTINUED | OUTPATIENT
Start: 2022-09-28 | End: 2022-09-28 | Stop reason: SDUPTHER

## 2022-09-28 RX ORDER — ASPIRIN 325 MG
325 TABLET ORAL DAILY
Status: DISCONTINUED | OUTPATIENT
Start: 2022-09-28 | End: 2022-09-28

## 2022-09-28 RX ORDER — ASPIRIN 325 MG
325 TABLET ORAL ONCE
Status: COMPLETED | OUTPATIENT
Start: 2022-09-28 | End: 2022-09-28

## 2022-09-28 RX ORDER — SODIUM CHLORIDE 9 MG/ML
INJECTION, SOLUTION INTRAVENOUS CONTINUOUS PRN
Status: DISCONTINUED | OUTPATIENT
Start: 2022-09-28 | End: 2022-09-28 | Stop reason: SDUPTHER

## 2022-09-28 RX ORDER — ASPIRIN 81 MG/1
81 TABLET ORAL DAILY
Status: DISCONTINUED | OUTPATIENT
Start: 2022-09-29 | End: 2022-09-29 | Stop reason: HOSPADM

## 2022-09-28 RX ORDER — MORPHINE SULFATE 2 MG/ML
2 INJECTION, SOLUTION INTRAMUSCULAR; INTRAVENOUS EVERY 5 MIN PRN
Status: DISCONTINUED | OUTPATIENT
Start: 2022-09-28 | End: 2022-09-29

## 2022-09-28 RX ORDER — CLOPIDOGREL BISULFATE 75 MG/1
225 TABLET ORAL DAILY
Status: DISCONTINUED | OUTPATIENT
Start: 2022-09-28 | End: 2022-09-28

## 2022-09-28 RX ORDER — SODIUM CHLORIDE 9 MG/ML
INJECTION, SOLUTION INTRAVENOUS PRN
Status: DISCONTINUED | OUTPATIENT
Start: 2022-09-28 | End: 2022-09-29 | Stop reason: HOSPADM

## 2022-09-28 RX ORDER — SODIUM CHLORIDE 0.9 % (FLUSH) 0.9 %
5-40 SYRINGE (ML) INJECTION EVERY 12 HOURS SCHEDULED
Status: DISCONTINUED | OUTPATIENT
Start: 2022-09-28 | End: 2022-09-29 | Stop reason: HOSPADM

## 2022-09-28 RX ORDER — ONDANSETRON 2 MG/ML
4 INJECTION INTRAMUSCULAR; INTRAVENOUS EVERY 6 HOURS PRN
Status: DISCONTINUED | OUTPATIENT
Start: 2022-09-28 | End: 2022-09-29 | Stop reason: HOSPADM

## 2022-09-28 RX ORDER — SODIUM CHLORIDE 0.9 % (FLUSH) 0.9 %
5-40 SYRINGE (ML) INJECTION PRN
Status: DISCONTINUED | OUTPATIENT
Start: 2022-09-28 | End: 2022-09-29

## 2022-09-28 RX ORDER — KETAMINE HYDROCHLORIDE 10 MG/ML
INJECTION, SOLUTION INTRAMUSCULAR; INTRAVENOUS PRN
Status: DISCONTINUED | OUTPATIENT
Start: 2022-09-28 | End: 2022-09-28 | Stop reason: SDUPTHER

## 2022-09-28 RX ORDER — IODIXANOL 320 MG/ML
INJECTION, SOLUTION INTRAVASCULAR
Status: DISPENSED
Start: 2022-09-28 | End: 2022-09-29

## 2022-09-28 RX ORDER — BUDESONIDE AND FORMOTEROL FUMARATE DIHYDRATE 160; 4.5 UG/1; UG/1
2 AEROSOL RESPIRATORY (INHALATION) 2 TIMES DAILY
Status: DISCONTINUED | OUTPATIENT
Start: 2022-09-28 | End: 2022-09-29 | Stop reason: HOSPADM

## 2022-09-28 RX ORDER — SODIUM CHLORIDE 9 MG/ML
INJECTION, SOLUTION INTRAVENOUS CONTINUOUS
Status: DISCONTINUED | OUTPATIENT
Start: 2022-09-28 | End: 2022-09-29

## 2022-09-28 RX ORDER — FENTANYL CITRATE 50 UG/ML
25 INJECTION, SOLUTION INTRAMUSCULAR; INTRAVENOUS EVERY 5 MIN PRN
Status: DISCONTINUED | OUTPATIENT
Start: 2022-09-28 | End: 2022-09-29

## 2022-09-28 RX ORDER — PROPOFOL 10 MG/ML
INJECTION, EMULSION INTRAVENOUS PRN
Status: DISCONTINUED | OUTPATIENT
Start: 2022-09-28 | End: 2022-09-28 | Stop reason: SDUPTHER

## 2022-09-28 RX ORDER — GLYCOPYRROLATE 0.2 MG/ML
INJECTION INTRAMUSCULAR; INTRAVENOUS PRN
Status: DISCONTINUED | OUTPATIENT
Start: 2022-09-28 | End: 2022-09-28 | Stop reason: SDUPTHER

## 2022-09-28 RX ORDER — AMIODARONE HYDROCHLORIDE 200 MG/1
200 TABLET ORAL DAILY
Status: DISCONTINUED | OUTPATIENT
Start: 2022-09-28 | End: 2022-09-29 | Stop reason: HOSPADM

## 2022-09-28 RX ORDER — SODIUM CHLORIDE, SODIUM LACTATE, POTASSIUM CHLORIDE, CALCIUM CHLORIDE 600; 310; 30; 20 MG/100ML; MG/100ML; MG/100ML; MG/100ML
INJECTION, SOLUTION INTRAVENOUS CONTINUOUS PRN
Status: DISCONTINUED | OUTPATIENT
Start: 2022-09-28 | End: 2022-09-28 | Stop reason: SDUPTHER

## 2022-09-28 RX ORDER — HEPARIN SODIUM 1000 [USP'U]/ML
INJECTION, SOLUTION INTRAVENOUS; SUBCUTANEOUS PRN
Status: DISCONTINUED | OUTPATIENT
Start: 2022-09-28 | End: 2022-09-28 | Stop reason: SDUPTHER

## 2022-09-28 RX ORDER — OXYCODONE HYDROCHLORIDE 5 MG/1
5 TABLET ORAL EVERY 4 HOURS PRN
Status: DISCONTINUED | OUTPATIENT
Start: 2022-09-28 | End: 2022-09-29 | Stop reason: HOSPADM

## 2022-09-28 RX ORDER — LIDOCAINE HYDROCHLORIDE 10 MG/ML
INJECTION, SOLUTION EPIDURAL; INFILTRATION; INTRACAUDAL; PERINEURAL PRN
Status: DISCONTINUED | OUTPATIENT
Start: 2022-09-28 | End: 2022-09-28 | Stop reason: SDUPTHER

## 2022-09-28 RX ORDER — ROCURONIUM BROMIDE 10 MG/ML
INJECTION, SOLUTION INTRAVENOUS PRN
Status: DISCONTINUED | OUTPATIENT
Start: 2022-09-28 | End: 2022-09-28 | Stop reason: SDUPTHER

## 2022-09-28 RX ORDER — MORPHINE SULFATE 2 MG/ML
2 INJECTION, SOLUTION INTRAMUSCULAR; INTRAVENOUS EVERY 4 HOURS PRN
Status: DISCONTINUED | OUTPATIENT
Start: 2022-09-28 | End: 2022-09-29

## 2022-09-28 RX ORDER — ONDANSETRON 2 MG/ML
INJECTION INTRAMUSCULAR; INTRAVENOUS PRN
Status: DISCONTINUED | OUTPATIENT
Start: 2022-09-28 | End: 2022-09-28 | Stop reason: SDUPTHER

## 2022-09-28 RX ORDER — SODIUM CHLORIDE 0.9 % (FLUSH) 0.9 %
5-40 SYRINGE (ML) INJECTION EVERY 12 HOURS SCHEDULED
Status: DISCONTINUED | OUTPATIENT
Start: 2022-09-28 | End: 2022-09-29

## 2022-09-28 RX ORDER — SODIUM CHLORIDE, SODIUM LACTATE, POTASSIUM CHLORIDE, CALCIUM CHLORIDE 600; 310; 30; 20 MG/100ML; MG/100ML; MG/100ML; MG/100ML
INJECTION, SOLUTION INTRAVENOUS CONTINUOUS
Status: DISCONTINUED | OUTPATIENT
Start: 2022-09-28 | End: 2022-09-29

## 2022-09-28 RX ORDER — ACETAMINOPHEN 325 MG/1
650 TABLET ORAL
Status: DISCONTINUED | OUTPATIENT
Start: 2022-09-28 | End: 2022-09-29 | Stop reason: HOSPADM

## 2022-09-28 RX ORDER — DEXAMETHASONE SODIUM PHOSPHATE 10 MG/ML
INJECTION, SOLUTION INTRAMUSCULAR; INTRAVENOUS PRN
Status: DISCONTINUED | OUTPATIENT
Start: 2022-09-28 | End: 2022-09-28 | Stop reason: SDUPTHER

## 2022-09-28 RX ORDER — CLOPIDOGREL BISULFATE 75 MG/1
75 TABLET ORAL ONCE
Status: COMPLETED | OUTPATIENT
Start: 2022-09-28 | End: 2022-09-28

## 2022-09-28 RX ORDER — SODIUM CHLORIDE 0.9 % (FLUSH) 0.9 %
5-40 SYRINGE (ML) INJECTION PRN
Status: DISCONTINUED | OUTPATIENT
Start: 2022-09-28 | End: 2022-09-29 | Stop reason: HOSPADM

## 2022-09-28 RX ORDER — ACETAMINOPHEN 500 MG
1000 TABLET ORAL EVERY 8 HOURS SCHEDULED
Status: DISCONTINUED | OUTPATIENT
Start: 2022-09-28 | End: 2022-09-29 | Stop reason: HOSPADM

## 2022-09-28 RX ORDER — HEPARIN SODIUM 1000 [USP'U]/ML
INJECTION, SOLUTION INTRAVENOUS; SUBCUTANEOUS PRN
Status: DISCONTINUED | OUTPATIENT
Start: 2022-09-28 | End: 2022-09-28 | Stop reason: ALTCHOICE

## 2022-09-28 RX ADMIN — SODIUM CHLORIDE, POTASSIUM CHLORIDE, SODIUM LACTATE AND CALCIUM CHLORIDE: 600; 310; 30; 20 INJECTION, SOLUTION INTRAVENOUS at 16:05

## 2022-09-28 RX ADMIN — HEPARIN SODIUM 2000 UNITS: 1000 INJECTION INTRAVENOUS; SUBCUTANEOUS at 15:38

## 2022-09-28 RX ADMIN — SODIUM CHLORIDE, PRESERVATIVE FREE 10 ML: 5 INJECTION INTRAVENOUS at 21:00

## 2022-09-28 RX ADMIN — ACETAMINOPHEN 1000 MG: 500 TABLET ORAL at 21:00

## 2022-09-28 RX ADMIN — LIDOCAINE HYDROCHLORIDE 50 MG: 10 INJECTION, SOLUTION EPIDURAL; INFILTRATION; INTRACAUDAL; PERINEURAL at 14:14

## 2022-09-28 RX ADMIN — MORPHINE SULFATE 2 MG: 2 INJECTION, SOLUTION INTRAMUSCULAR; INTRAVENOUS at 18:08

## 2022-09-28 RX ADMIN — PROTAMINE SULFATE 70 MG: 10 INJECTION, SOLUTION INTRAVENOUS at 15:56

## 2022-09-28 RX ADMIN — BUDESONIDE AND FORMOTEROL FUMARATE DIHYDRATE 2 PUFF: 160; 4.5 AEROSOL RESPIRATORY (INHALATION) at 20:06

## 2022-09-28 RX ADMIN — GLYCOPYRROLATE 0.4 MG: 0.2 INJECTION INTRAMUSCULAR; INTRAVENOUS at 14:22

## 2022-09-28 RX ADMIN — SUGAMMADEX 300 MG: 100 INJECTION, SOLUTION INTRAVENOUS at 16:27

## 2022-09-28 RX ADMIN — KETAMINE HYDROCHLORIDE 50 MG: 10 INJECTION INTRAMUSCULAR; INTRAVENOUS at 14:30

## 2022-09-28 RX ADMIN — ASPIRIN 325 MG: 325 TABLET ORAL at 11:31

## 2022-09-28 RX ADMIN — Medication 5 MG: at 16:49

## 2022-09-28 RX ADMIN — SODIUM CHLORIDE: 0.9 INJECTION, SOLUTION INTRAVENOUS at 14:22

## 2022-09-28 RX ADMIN — Medication 5 MG: at 16:39

## 2022-09-28 RX ADMIN — GABAPENTIN 800 MG: 800 TABLET ORAL at 21:00

## 2022-09-28 RX ADMIN — SODIUM CHLORIDE: 9 INJECTION, SOLUTION INTRAVENOUS at 14:45

## 2022-09-28 RX ADMIN — ASPIRIN 325 MG: 325 TABLET ORAL at 11:33

## 2022-09-28 RX ADMIN — ROCURONIUM BROMIDE 10 MG: 10 SOLUTION INTRAVENOUS at 14:55

## 2022-09-28 RX ADMIN — DEXAMETHASONE SODIUM PHOSPHATE 10 MG: 10 INJECTION, SOLUTION INTRAMUSCULAR; INTRAVENOUS at 14:22

## 2022-09-28 RX ADMIN — FENTANYL CITRATE 100 MCG: 50 INJECTION, SOLUTION INTRAMUSCULAR; INTRAVENOUS at 14:14

## 2022-09-28 RX ADMIN — ROCURONIUM BROMIDE 50 MG: 10 SOLUTION INTRAVENOUS at 14:14

## 2022-09-28 RX ADMIN — CLOPIDOGREL BISULFATE 225 MG: 75 TABLET ORAL at 11:20

## 2022-09-28 RX ADMIN — CLOPIDOGREL BISULFATE 75 MG: 75 TABLET ORAL at 11:31

## 2022-09-28 RX ADMIN — PHENYLEPHRINE HYDROCHLORIDE 100 MCG/MIN: 10 INJECTION INTRAVENOUS at 14:25

## 2022-09-28 RX ADMIN — SODIUM CHLORIDE: 9 INJECTION, SOLUTION INTRAVENOUS at 13:59

## 2022-09-28 RX ADMIN — HEPARIN SODIUM 12000 UNITS: 1000 INJECTION INTRAVENOUS; SUBCUTANEOUS at 15:08

## 2022-09-28 RX ADMIN — ROCURONIUM BROMIDE 10 MG: 10 SOLUTION INTRAVENOUS at 15:43

## 2022-09-28 RX ADMIN — PROPOFOL 150 MG: 10 INJECTION, EMULSION INTRAVENOUS at 14:14

## 2022-09-28 RX ADMIN — CEFAZOLIN 2000 MG: 1 INJECTION, POWDER, FOR SOLUTION INTRAMUSCULAR; INTRAVENOUS at 14:36

## 2022-09-28 RX ADMIN — SODIUM CHLORIDE, PRESERVATIVE FREE 10 ML: 5 INJECTION INTRAVENOUS at 21:03

## 2022-09-28 RX ADMIN — OXYCODONE 5 MG: 5 TABLET ORAL at 21:00

## 2022-09-28 RX ADMIN — KETAMINE HYDROCHLORIDE 20 MG: 10 INJECTION INTRAMUSCULAR; INTRAVENOUS at 15:40

## 2022-09-28 RX ADMIN — MIDAZOLAM 2 MG: 1 INJECTION INTRAMUSCULAR; INTRAVENOUS at 14:14

## 2022-09-28 RX ADMIN — ONDANSETRON 4 MG: 2 INJECTION INTRAMUSCULAR; INTRAVENOUS at 16:05

## 2022-09-28 RX ADMIN — ASPIRIN 325 MG: 325 TABLET ORAL at 11:24

## 2022-09-28 ASSESSMENT — PAIN SCALES - GENERAL
PAINLEVEL_OUTOF10: 7
PAINLEVEL_OUTOF10: 6
PAINLEVEL_OUTOF10: 7

## 2022-09-28 ASSESSMENT — LIFESTYLE VARIABLES: SMOKING_STATUS: 1

## 2022-09-28 ASSESSMENT — PAIN DESCRIPTION - LOCATION
LOCATION: NECK
LOCATION: NECK;BACK
LOCATION: BACK;NECK

## 2022-09-28 ASSESSMENT — ENCOUNTER SYMPTOMS: SHORTNESS OF BREATH: 1

## 2022-09-28 NOTE — INTERVAL H&P NOTE
Update History & Physical    The patient's History and Physical of September 16, the procedure was reviewed with the patient and I examined the patient. There was no change. The surgical site was confirmed by the patient and me. We will be proceeding with transcarotid artery revascularization with a stent on the right. Of course this implies that arteriography precedes the placement of the flow reversal sheath. If the arteriography reveals that the carotid artery is too small or that it is not anatomically feasible then no treatment will ensue. I do not think that I can perform endarterectomy on this gentleman given the size of his neck, the location of the lesion and the fact that he cannot turn his head at all. 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 If stenting is not possible today then transfemoral stenting may be an option. Plan: The risks, benefits, expected outcome, and alternative to the recommended procedure have been discussed with the patient. Patient understands and wants to proceed with the procedure.      Electronically signed by Margaret Hagen MD on 9/28/2022 at 9:08 AM

## 2022-09-28 NOTE — ANESTHESIA PRE PROCEDURE
Department of Anesthesiology  Preprocedure Note       Name:  Jude Sebastian   Age:  57 y.o.  :  1960                                          MRN:  [id number]         Date:  2022      Surgeon: Greyson Scherer):  Lupis Morel MD    Procedure: Procedure(s):  right trans-carotid artery revascularization w/stent    Medications prior to admission:   Prior to Admission medications    Medication Sig Start Date End Date Taking? Authorizing Provider   budesonide-formoterol (SYMBICORT) 160-4.5 MCG/ACT AERO INHALE TWO PUFFS BY MOUTH TWICE A DAY 22   Tishajanice Davis MD   acetaminophen (TYLENOL) 325 MG tablet Take 2 tablets by mouth nightly 22   Tisha Susan, MD   aspirin 81 MG EC tablet Take 1 tablet by mouth daily 22   Tisha Susan, MD   meloxicam (MOBIC) 15 MG tablet Take 1 tablet by mouth daily as needed for Pain 22   Tisha Class, MD   amiodarone (CORDARONE) 200 MG tablet Take 1 tablet by mouth daily [facility]   [facility]   apixaban (ELIQUIS) 5 MG TABS tablet Take 1 tablet by mouth 2 times daily 9/17/22 10/17/22  Tisha MD Susan   metoprolol tartrate (LOPRESSOR) 25 MG tablet Take 1 tablet by mouth 2 times daily 22   Tisha MD Susan   tiZANidine (ZANAFLEX) 4 MG tablet Take 4 mg by mouth 2 times daily as needed    Historical Provider, MD   gabapentin (NEURONTIN) 800 MG tablet Take 1 tablet by mouth every 8 hours for 30 days. 9/6/22 10/6/22  Margaret Reyes DO   isosorbide mononitrate (IMDUR) 60 MG extended release tablet Take 1 tablet by mouth in the morning. 22   Historical Provider, MD   clopidogrel (PLAVIX) 75 MG tablet Take 1 tablet by mouth in the morning. Patient taking differently: Take 75 mg by mouth daily Pt was instructed to continue Plavix for upcoming surgery per Dr. Cristina Hunter.  22   Lupis Morel MD   nitroGLYCERIN (NITROSTAT) 0.4 MG SL tablet Place 1 tablet under the tongue every 5 minutes as needed for Chest pain (not to exceed 3 tabs at a time) 6/3/22   Miranda Byers, DO   amLODIPine (NORVASC) 5 MG tablet TAKE ONE TABLET BY MOUTH DAILY 4/22/22   Miranda Byers DO   albuterol sulfate  (90 Base) MCG/ACT inhaler Inhale 2 puffs into the lungs every 6 hours as needed for Wheezing 4/22/22   Miranda Byers DO   simvastatin (ZOCOR) 80 MG tablet TAKE ONE TABLET BY MOUTH EVERY EVENING 4/11/22   Miranda Byers DO       Current medications:    No current facility-administered medications for this visit. No current outpatient medications on file.      Facility-Administered Medications Ordered in Other Visits   Medication Dose Route Frequency Provider Last Rate Last Admin    0.9 % sodium chloride infusion   IntraVENous Continuous Izzy Celis MD           Allergies:  No Known Allergies    Problem List:    Patient Active Problem List   Diagnosis Code    Cervical stenosis of spine M48.02    Cervicalgia M54.2    COPD (chronic obstructive pulmonary disease) ([facility]) J44.9    Smoker F17.200    Mild depression ([facility]) F32.0    GERD (gastroesophageal reflux disease) K21.9    Mixed hyperlipidemia E78.2    Carpal tunnel syndrome of left wrist G56.02    Medication monitoring encounter Z51.81    DDD (degenerative disc disease), lumbar M51.36    Displacement of lumbar intervertebral disc without myelopathy M51.26    Therapeutic opioid induced constipation K59.03, T40.2X5A    Radiculopathy of cervical spine M54.12    Essential hypertension I10    Major depression, single episode, in complete remission ([facility] F32.5    Prediabetes R73.03    Lung nodule R91.1    Allergic sinusitis J30.9    Arthritis M19.90    Spinal stenosis of lumbar region without neurogenic claudication M48.061    Morbidly obese ([facility] E66.01    Lumbosacral spondylosis without myelopathy M47.817    Encounter for long-term opiate analgesic use Z79.891    Occlusion of right carotid artery I65.21    Left carotid stenosis I65.22    Sacroiliitis, not elsewhere classified ([facility] M46.1    Recurrent UTI N39.0    BPH with urinary obstruction N40.1, N13.8    Other retention of urine R33.8    Pneumonia J18.9    Atrial fibrillation with RVR ([facility]) I48.91    Angina of effort ([facility]) I20.8    Stenosis of right carotid artery I65.21       Past Medical History:        Diagnosis Date    Abnormal stress ECG     7/28/2022 per Dr. Jess Bassett, [facility] will need cardiac cath in future due to cp    Angina pectoris ([facility]     BPH (benign prostatic hyperplasia)     CAD (coronary artery disease)     Dr. Jess Bassett   7/28/22 cardiac clearance    Carotid stenosis     rt  Dr. Shahbaz Vincent Carpal tunnel syndrome     left    Cervical stenosis of spine 2012    C2-5 laminectomy  Dr. aPtricia Neves    Cervicalgia     chronic pain    Chronic back pain     COPD (chronic obstructive pulmonary disease) ([facility]     on inhalers pcp manages    DDD (degenerative disc disease)     Elbow fracture, right     Fractures     elbow    GERD (gastroesophageal reflux disease)     uses pepto bismol prn    Gout     Right great toe    Hyperlipidemia     Hypertension     Lung nodule     Mild depression ([facility] 09/26/2013    2 suicide attempts by girlfriend related to depression.     Morbidly obese ([facility] 10/30/2020    Occlusion of right carotid artery 07/14/2021    Osteoarthritis of right knee     Pre-diabetes     was taking metformin in past  no longer takes    Sinus headache 10/10/2018    Sleep apnea     snores   does not use cpap  pt did no follow up (per patient)    Sleep disturbance     Smoker     Snores     no cpap    SOB (shortness of breath) on exertion     Tendonitis of foot     right    Tooth loose 08/05/2022    lower front rt    Under care of team     Dr. Tiffany Mcneil  7/14/2022  will need cysto in future after heart cath and carotid sx    Under care of team     Dr. Cristina Hunter for carotid stenosis    Wellness examination     Dakotah Graf  last visit June 2022       Past Surgical History:        Procedure Laterality Date    CARDIAC CATHETERIZATION  07/08/2014    Non Obstructive CAD     CARDIAC CATHETERIZATION  09/16/2022    CERVICAL SPINE SURGERY  07/13/2012     C2-3-4-5    COLONOSCOPY      FRACTURE SURGERY      Rt elbow     HC INJECT OTHER PERPHRL NERV Bilateral 05/09/2019    INJECTION SPINAL performed by Carina Barrios MD at [address] [place] [address] Bilateral 08/15/2019    SACROILIAC JOINT INJECTION performed by Carina Barrios MD at [facility]  02/05/2016    premier tens    NERVE BLOCK Left 02/07/2020    RFA left side    NERVE BLOCK Left 02/07/2020     NERVE RADIOFREQUENCY ABLATION - SACROILIAC (Left )    NERVE BLOCK  02/12/2021    NERVE RADIOFREQUENCY ABLATION SI (Left     NERVE BLOCK Right 02/19/2021    Procedure: NERVE RADIOFREQUENCY ABLATION SI JOINT (Right )    OTHER SURGICAL HISTORY  08/15/2019    sacroiliac joint injection    PAIN MANAGEMENT PROCEDURE Left 02/07/2020    NERVE RADIOFREQUENCY ABLATION - SACROILIAC performed by Carina Barrios MD at [address] [place] [address] Left 02/12/2021    NERVE RADIOFREQUENCY ABLATION SI performed by Carina Barrios MD at [address] [place] [address] Right 02/19/2021    NERVE RADIOFREQUENCY ABLATION SI JOINT performed by Carina Barrios MD at [address] [place] [address]         Social History:    Social History     Tobacco Use    Smoking status: Former     Packs/day: 0.25     Years: 40.00     Pack years: 10.00     Types: Cigarettes     Start date: 1975    Smokeless tobacco: Former     Quit date: 3/12/2015    Tobacco comments:     Smokes 2-4 cigs/day as of 8/5/2022   Substance Use Topics    Alcohol use: Not Currently     Alcohol/week: 0.0 standard drinks     Comment: 3 x year                                Counseling given: Not Answered  Tobacco comments: Smokes 2-4 cigs/day as of 8/5/2022      Vital Signs (Current): There were no vitals filed for this visit. BP Readings from Last 3 Encounters:   09/28/22 127/86   09/17/22 136/69   09/16/22 (!) 128/53       NPO Status:                                                                                 BMI:   Wt Readings from Last 3 Encounters:   09/28/22 260 lb (117.9 kg)   09/17/22 267 lb 6.7 oz (121.3 kg)   09/02/22 263 lb (119.3 kg)     There is no height or weight on file to calculate BMI.    CBC:   Lab Results   Component Value Date/Time    WBC 6.5 09/17/2022 05:30 AM    RBC 3.97 09/17/2022 05:30 AM    RBC 4.75 02/10/2012 07:41 AM    HGB 11.1 09/17/2022 05:30 AM    HCT 33.7 09/17/2022 05:30 AM    MCV 84.7 09/17/2022 05:30 AM    RDW 15.3 09/17/2022 05:30 AM     09/17/2022 05:30 AM     02/10/2012 07:41 AM       CMP:   Lab Results   Component Value Date/Time     09/17/2022 05:30 AM    K 4.3 09/17/2022 05:30 AM     09/17/2022 05:30 AM    CO2 28 09/17/2022 05:30 AM    BUN 17 09/17/2022 05:30 AM    CREATININE 0.79 09/17/2022 05:30 AM    GFRAA >60 09/17/2022 05:30 AM    LABGLOM >60 09/17/2022 05:30 AM    GLUCOSE 111 09/17/2022 05:30 AM    GLUCOSE 99 02/10/2012 07:41 AM    PROT 7.0 09/02/2022 09:50 AM    CALCIUM 9.1 09/17/2022 05:30 AM    BILITOT 0.3 09/02/2022 09:50 AM    ALKPHOS 82 09/02/2022 09:50 AM    AST 27 09/02/2022 09:50 AM    ALT 30 09/02/2022 09:50 AM       POC Tests: No results for input(s): POCGLU, POCNA, POCK, POCCL, POCBUN, POCHEMO, POCHCT in the last 72 hours.     Coags:   Lab Results   Component Value Date/Time    PROTIME 14.2 09/14/2022 10:37 AM    INR 1.1 09/14/2022 10:37 AM    APTT 27.9 09/14/2022 10:37 AM       HCG (If Applicable): No results found for: PREGTESTUR, PREGSERUM, HCG, HCGQUANT     ABGs: No results found for: PHART, PO2ART, DJB1BHN, QEL0CXO, BEART, D3XKEYRL     Type & Screen (If Applicable):  No results found for: LABABO, LABRH    Drug/Infectious

## 2022-09-28 NOTE — OP NOTE
Operative Note      Patient: Dulce Estrada  YOB: 1960  MRN: [id number]    Date of Procedure: 9/28/2022    Pre-Op Diagnosis: Stenosis of right carotid artery [I65.21]    Post-Op Diagnosis: Same       Procedure(s):  right trans-carotid artery revascularization w/stent    Surgeon(s):  Rachelle Hassan MD    Assistant:   Kelly Amado and Matt Estrada    Anesthesia: General    Estimated Blood Loss (mL): Minimal    Complications: None    Specimens:   None    Implants:  Implant Name Type Inv. Item Serial No.  Lot No. LRB No. Used Action   [address] [id number] Right 1 Implanted         Drains: None    Findings: Clamp time was approximately 10 minutes. The carotid stenosis was greater than 95% prestent and predilatation. Following stenting the carotid stenosis reduced to approximately 50%. The patient had no evidence of stroke at the conclusion of the case. He tolerated all this quite well. There were no complications. He was moving all 4 extremities upon emergence from anesthesia. Detailed Description of Procedure: The patient was brought to the operating room and placed in the supine position with his arms tucked at his sides. He was identified as Orville Phalen for right carotid artery revascularization with a stent. He was given general anesthetic with endotracheal airway establishment. He was given the appropriate arterial access for blood pressure monitoring. He had the appropriate IV access. His groins were prepped and draped in a sterile fashion as was his neck. Timeout was held before lidocaine without epinephrine was infiltrated into the skin and subcutaneous tissue overlying the incision site. A transverse incision was made just above the clavicle through which Bovie cautery was used to dissect down to and through the platysma. The platysma was reflected slightly both in the cephalad direction and caudal direction.   The sternocleidomastoid muscle was reflected laterally at which time the jugular vein was identified and dissected on its medial surface to expose the medial surface so that I could reflected laterally with retraction. The vagus nerve was identified and preserved. The carotid artery was identified and dissected in a circumferential fashion for approximately 3 to 4 cm in length. An umbilical tape was placed around the most proximal portion of carotid that I could access. This exposure was very difficult as he was a frozen neck and had extreme immobility as well as the depth of the dissection. The patient was given 12,000 units of heparin. 3 minutes were allowed to elapse while I placed a 6-0 Prolene pursestring suture around the access site in the carotid. A micropuncture needle was then placed in the carotid in the middle of that pursestring suture until blood return was seen. The microwire was placed and the micro sheath was placed to approximately 3 cm. The dilator and wire were removed. Carotid arteriography was performed. The wire and dilator were replaced and the wire was directed into the external carotid artery and the sheath was then placed over the wire into the external carotid artery. The wire and dilator were removed and the stiff wire was placed into the external carotid artery. The micropuncture sheath was removed and the larger sheath was placed within the carotid artery with ease. It was sewn in place at the level of the skin. The dilator and wire were removed. The 18-gauge needle was then used to access the right common femoral vein through which the femoral sheath was placed with Seldinger technique. The tubing with the filter was then placed on the appropriate connecting pieces and flow through the tubing was achieved with ease. We confirmed flow through the tubing with heparin flush in the venous sheath. The patient had already received 0.4 mg of glycopyrrolate.   His blood pressure was placed at the appropriate level at approximately 1 44-4 60 systolic. The carotid clamp was placed using a profundofemoral clamp on the common carotid artery just proximal to the sheath. Since I had to move the eye I another arteriogram was performed after the clamp was placed. The 014 wire was then directed into the internal carotid artery and up to the transpetrous portion of the carotid. The balloon was placed in the appropriate position which was a 5 x 30 balloon and inflated to 8 kandice for approximately 5 seconds and then brought down. It was removed and 2 minutes were allowed to elapse before placing the 9 x 30 carotid stent in the appropriate position. Once this was placed its delivery system was removed and 2 minutes were allowed to elapse before taking a final arteriogram of the carotid system. The arteriogram showed a 50% stenosis with a carotid stent not completely opposed to 1 area of the posterior wall of the internal carotid due to the plaque. It was opposed at the distal aspect of the stent. There was no intraluminal debris that could be identified. There was no carotid dissection. There was no entry site dissection. 2 minutes after the stent was deployed the clamp was removed and antegrade flow in the internal carotid artery was once again achieved. The sheath was removed and the pursestring suture was tied and excellent hemostasis was achieved. Floseal was placed within the wound and several minutes were allowed to elapse before the platysma was closed with 4-0 Vicryl in an interrupted simple style and the skin was closed with 4-0 Monocryl in an running subcuticular style. Dermabond was applied. A sterile dressing was applied. The patient was awakened and was moving all 4 extremities and had appropriate neurologic action before he was extubated and returned to the ICU for postoperative care. All sponge needle and instrument counts were correct.     Electronically signed by Arianne Thomas MD on 9/28/2022 at 4:50 PM

## 2022-09-28 NOTE — ANESTHESIA POSTPROCEDURE EVALUATION
Department of Anesthesiology  Postprocedure Note    Patient: Amber Reid  MRN: [id number]  YOB: 1960  Date of evaluation: 9/28/2022      Procedure Summary     Date: 09/28/22 Room / Location: [address]    Anesthesia Start: 1409 Anesthesia Stop:     Procedure: right trans-carotid artery revascularization w/stent (Right) Diagnosis:       Stenosis of right carotid artery      (Stenosis of right carotid artery [I65.21])    Surgeons: Enrique Maxwell MD Responsible Provider: Nahomi Willson MD    Anesthesia Type: general ASA Status: 3          Anesthesia Type: No value filed.     Hilary Phase I:      Hilary Phase II:        Anesthesia Post Evaluation    Patient location during evaluation: bedside  Patient participation: complete - patient participated  Level of consciousness: awake  Airway patency: patent  Nausea & Vomiting: no nausea and no vomiting  Complications: no  Cardiovascular status: hemodynamically stable  Respiratory status: acceptable  Hydration status: stable  Comments: /86   Pulse 78   Temp 98 °F (36.7 °C)   Resp 19   Ht 5' 9\" (1.753 m)   Wt 260 lb (117.9 kg)   BMI 38.40 kg/m²

## 2022-09-28 NOTE — ANESTHESIA PROCEDURE NOTES
Arterial Line:    An arterial line was placed using ultrasound guidance, in the OR for the following indication(s): continuous blood pressure monitoring and blood sampling needed. A  (size) (length), Arrow (type) catheter was placed, Seldinger technique used, into the left radial artery, secured by Tegaderm. Anesthesia type: General    Events:  patient tolerated procedure well with no complications. 9/28/2022 2:40 PM9/28/2022 2:41 PM  Anesthesiologist: Johnathon Meyer MD  Performed: Anesthesiologist   Preanesthetic Checklist  Completed: patient identified, IV checked, site marked, risks and benefits discussed, surgical/procedural consents, equipment checked, pre-op evaluation, timeout performed, anesthesia consent given, oxygen available, monitors applied/VS acknowledged, fire risk safety assessment completed and verbalized and blood product R/B/A discussed and consented

## 2022-09-29 VITALS
DIASTOLIC BLOOD PRESSURE: 64 MMHG | RESPIRATION RATE: 15 BRPM | WEIGHT: 267.64 LBS | OXYGEN SATURATION: 94 % | TEMPERATURE: 97.8 F | BODY MASS INDEX: 39.64 KG/M2 | SYSTOLIC BLOOD PRESSURE: 135 MMHG | HEART RATE: 75 BPM | HEIGHT: 69 IN

## 2022-09-29 LAB
ABSOLUTE EOS #: <0.03 K/UL (ref 0–0.44)
ABSOLUTE IMMATURE GRANULOCYTE: 0.06 K/UL (ref 0–0.3)
ABSOLUTE LYMPH #: 1.26 K/UL (ref 1.1–3.7)
ABSOLUTE MONO #: 0.15 K/UL (ref 0.1–1.2)
ANION GAP SERPL CALCULATED.3IONS-SCNC: 14 MMOL/L (ref 9–17)
BASOPHILS # BLD: 0 % (ref 0–2)
BASOPHILS ABSOLUTE: 0.03 K/UL (ref 0–0.2)
BUN BLDV-MCNC: 15 MG/DL (ref 8–23)
CALCIUM SERPL-MCNC: 8.4 MG/DL (ref 8.6–10.4)
CHLORIDE BLD-SCNC: 99 MMOL/L (ref 98–107)
CO2: 21 MMOL/L (ref 20–31)
CREAT SERPL-MCNC: 0.75 MG/DL (ref 0.7–1.2)
EOSINOPHILS RELATIVE PERCENT: 0 % (ref 1–4)
GFR AFRICAN AMERICAN: >60 ML/MIN
GFR NON-AFRICAN AMERICAN: >60 ML/MIN
GFR SERPL CREATININE-BSD FRML MDRD: ABNORMAL ML/MIN/{1.73_M2}
GLUCOSE BLD-MCNC: 171 MG/DL (ref 70–99)
HCT VFR BLD CALC: 36 % (ref 40.7–50.3)
HEMOGLOBIN: 11.8 G/DL (ref 13–17)
IMMATURE GRANULOCYTES: 1 %
LYMPHOCYTES # BLD: 17 % (ref 24–43)
MCH RBC QN AUTO: 28.8 PG (ref 25.2–33.5)
MCHC RBC AUTO-ENTMCNC: 32.8 G/DL (ref 28.4–34.8)
MCV RBC AUTO: 87.8 FL (ref 82.6–102.9)
MONOCYTES # BLD: 2 % (ref 3–12)
NRBC AUTOMATED: 0 PER 100 WBC
PDW BLD-RTO: 14.5 % (ref 11.8–14.4)
PLATELET # BLD: 273 K/UL (ref 138–453)
PMV BLD AUTO: 8.5 FL (ref 8.1–13.5)
POTASSIUM SERPL-SCNC: 4.3 MMOL/L (ref 3.7–5.3)
RBC # BLD: 4.1 M/UL (ref 4.21–5.77)
RBC # BLD: ABNORMAL 10*6/UL
SEG NEUTROPHILS: 80 % (ref 36–65)
SEGMENTED NEUTROPHILS ABSOLUTE COUNT: 5.98 K/UL (ref 1.5–8.1)
SODIUM BLD-SCNC: 134 MMOL/L (ref 135–144)
WBC # BLD: 7.5 K/UL (ref 3.5–11.3)

## 2022-09-29 PROCEDURE — 85025 COMPLETE CBC W/AUTO DIFF WBC: CPT

## 2022-09-29 PROCEDURE — 94640 AIRWAY INHALATION TREATMENT: CPT

## 2022-09-29 PROCEDURE — 6370000000 HC RX 637 (ALT 250 FOR IP): Performed by: STUDENT IN AN ORGANIZED HEALTH CARE EDUCATION/TRAINING PROGRAM

## 2022-09-29 PROCEDURE — 2580000003 HC RX 258: Performed by: STUDENT IN AN ORGANIZED HEALTH CARE EDUCATION/TRAINING PROGRAM

## 2022-09-29 PROCEDURE — 2580000003 HC RX 258

## 2022-09-29 PROCEDURE — 36415 COLL VENOUS BLD VENIPUNCTURE: CPT

## 2022-09-29 PROCEDURE — 80048 BASIC METABOLIC PNL TOTAL CA: CPT

## 2022-09-29 PROCEDURE — 6360000002 HC RX W HCPCS: Performed by: STUDENT IN AN ORGANIZED HEALTH CARE EDUCATION/TRAINING PROGRAM

## 2022-09-29 RX ORDER — SODIUM CHLORIDE 9 MG/ML
INJECTION, SOLUTION INTRAVENOUS CONTINUOUS
Status: DISCONTINUED | OUTPATIENT
Start: 2022-09-29 | End: 2022-09-29

## 2022-09-29 RX ORDER — OXYCODONE HYDROCHLORIDE 5 MG/1
5 TABLET ORAL EVERY 8 HOURS PRN
Qty: 9 TABLET | Refills: 0 | Status: SHIPPED | OUTPATIENT
Start: 2022-09-29 | End: 2022-10-02

## 2022-09-29 RX ORDER — POLYETHYLENE GLYCOL 3350 17 G/17G
17 POWDER, FOR SOLUTION ORAL DAILY PRN
Qty: 510 G | Refills: 0 | Status: SHIPPED | OUTPATIENT
Start: 2022-09-29 | End: 2022-10-29

## 2022-09-29 RX ADMIN — BUDESONIDE AND FORMOTEROL FUMARATE DIHYDRATE 2 PUFF: 160; 4.5 AEROSOL RESPIRATORY (INHALATION) at 08:58

## 2022-09-29 RX ADMIN — Medication 81 MG: at 08:55

## 2022-09-29 RX ADMIN — SODIUM CHLORIDE: 9 INJECTION, SOLUTION INTRAVENOUS at 01:29

## 2022-09-29 RX ADMIN — DESMOPRESSIN ACETATE 40 MG: 0.2 TABLET ORAL at 08:59

## 2022-09-29 RX ADMIN — CLOPIDOGREL 75 MG: 75 TABLET, FILM COATED ORAL at 08:59

## 2022-09-29 RX ADMIN — MORPHINE SULFATE 2 MG: 2 INJECTION, SOLUTION INTRAMUSCULAR; INTRAVENOUS at 01:19

## 2022-09-29 RX ADMIN — ACETAMINOPHEN 1000 MG: 500 TABLET ORAL at 06:06

## 2022-09-29 RX ADMIN — SODIUM CHLORIDE, PRESERVATIVE FREE 10 ML: 5 INJECTION INTRAVENOUS at 08:59

## 2022-09-29 RX ADMIN — OXYCODONE 5 MG: 5 TABLET ORAL at 01:19

## 2022-09-29 RX ADMIN — OXYCODONE 5 MG: 5 TABLET ORAL at 06:06

## 2022-09-29 RX ADMIN — GABAPENTIN 800 MG: 800 TABLET ORAL at 06:06

## 2022-09-29 RX ADMIN — MORPHINE SULFATE 2 MG: 2 INJECTION, SOLUTION INTRAMUSCULAR; INTRAVENOUS at 06:07

## 2022-09-29 RX ADMIN — APIXABAN 5 MG: 5 TABLET, FILM COATED ORAL at 08:56

## 2022-09-29 RX ADMIN — OXYCODONE 5 MG: 5 TABLET ORAL at 11:46

## 2022-09-29 RX ADMIN — GABAPENTIN 800 MG: 800 TABLET ORAL at 13:34

## 2022-09-29 RX ADMIN — AMIODARONE HYDROCHLORIDE 200 MG: 200 TABLET ORAL at 08:55

## 2022-09-29 ASSESSMENT — PAIN DESCRIPTION - LOCATION
LOCATION: BACK;NECK

## 2022-09-29 ASSESSMENT — PAIN DESCRIPTION - FREQUENCY
FREQUENCY: CONTINUOUS

## 2022-09-29 ASSESSMENT — PAIN DESCRIPTION - ONSET
ONSET: ON-GOING

## 2022-09-29 ASSESSMENT — PAIN SCALES - GENERAL
PAINLEVEL_OUTOF10: 7
PAINLEVEL_OUTOF10: 5
PAINLEVEL_OUTOF10: 5
PAINLEVEL_OUTOF10: 7
PAINLEVEL_OUTOF10: 6

## 2022-09-29 ASSESSMENT — PAIN DESCRIPTION - DESCRIPTORS
DESCRIPTORS: SHARP

## 2022-09-29 ASSESSMENT — PAIN DESCRIPTION - PAIN TYPE
TYPE: CHRONIC PAIN

## 2022-09-29 ASSESSMENT — PAIN DESCRIPTION - ORIENTATION
ORIENTATION: POSTERIOR;LOWER
ORIENTATION: LOWER
ORIENTATION: LOWER

## 2022-09-29 NOTE — CARE COORDINATION
Discussed pt's healthcare decision maker, asked pt if he has adult children, he looks away and states no not really. He informs his sister Jozef Galan will be involved with any decisions along with his girlfriend Keenan Aldridge. Offered  to come and speak with pt to complete DPOA for healthcare documents, pt declines and states \"I have too much going on right now to do that\" CM offered reassurance and support and informed if he changed his mind the nurse could contact spiritual services. Pt's primary nurse in room. Pt asked about VNS after d/c, pt informed that if would need services after home and declines to arrange during hospital stay he will contact his PCP to order [place]. Pt verbalizes understanding. CM added PCP f/u appointment reminder to AVS to see within 7-days of d/c.

## 2022-09-29 NOTE — DISCHARGE INSTRUCTIONS
Vascular Surgery Patient Discharge Instructions    Discharge Date:  9/29/2022    Discharged To:  home    WOUND CARE:   Skin glue used, do not peel off, allow to fall off naturally. RESUME ACTIVITY:     BATHING:  Ok to shower   -No bath tubs, soaks, hot tubs, or swimming until cleared at post-operative office visit. DRIVING: No driving for while on pain medication    WALKING:    Yes. No heavy exertion for 2 weeks    LIFTING: Less than 10 pounds for 2 weeks     DIET:   Lidia Maloney to resume regular diet. MEDICATIONS:  Take your medications as prescribed. Do not drive while on pain medications. If taking narcotics, take Tylenol and Motrin first and use narcotics for breakthrough pain. [facility] SPECIAL INSTRUCTIONS:   Call the Vascular Surgery Office at  [phone] if you have a fever > 100 F, or if your incision becomes red, tender, or drains more than a small amount of clear fluid. FOLLOW UP:   Call Vascular Surgery Office at  [phone] for follow up appointment with Dr. Janel Olea in 1-2 weeks.     Electronically signed by Angi Reid DO on 9/29/2022 at 7:57 AM

## 2022-09-29 NOTE — PROGRESS NOTES
Post Op Note    SUBJECTIVE  Pt s/p Right TCAR. Patient is siting comfortably in chair with complain of pain in the back of the neck which he got from before the surgery. No complain of blurring of vision, headache, numbness or tingling or weakness in any of the extremity. Tolerating CLD. Passing urine independently. OBJECTIVE  VITALS:  /64   Pulse 80   Temp 97 °F (36.1 °C)   Resp 17   Ht 5' 9\" (1.753 m)   Wt 267 lb 10.2 oz (121.4 kg)   BMI 39.52 kg/m²         GENERAL:  awake, Awake and alert. No acute distress  CARDIOVASCULAR:  Regular Rate and Rhythm   LUNGS:  CTA Bilaterally  ABDOMEN:   Abdomen soft, non-tender, non-distended  INCISION: Incision clean/dry/intact. No swelling noted in the right neck and right groin. Neuro-motor check intact. ASSESSMENT  POD# 1 s/p Right TCAR. Patient is progressing well in the post operative period.      PLAN   - continue same management   - continue IVF 75ml/hr along with CLD  - continue neuro checks    Shabbir Perkins MD  PGY2  Vascular medicine

## 2022-09-29 NOTE — PLAN OF CARE
Problem: Discharge Planning  Goal: Discharge to home or other facility with appropriate resources  9/29/2022 1040 by Kayla Miller RN  Outcome: Progressing  9/29/2022 0255 by Karlie Vega RN  Outcome: Progressing     Problem: Pain  Goal: Verbalizes/displays adequate comfort level or baseline comfort level  9/29/2022 1040 by Kayla Miller RN  Outcome: Progressing  9/29/2022 0255 by Karlie Vega RN  Outcome: Progressing     Problem: Safety - Adult  Goal: Free from fall injury  Outcome: Progressing     Problem: ABCDS Injury Assessment  Goal: Absence of physical injury  Outcome: Progressing

## 2022-09-29 NOTE — PROGRESS NOTES
Vascular Surgery Progress Note         PATIENT NAME: Tory Patel     TODAY'S DATE: 9/29/2022, 1:28 PM    CC: \"I feel pretty good\"    SUBJECTIVE:    Pt seen and examined. No overnight events. Pt is POD#1 from TCAR. Doing well, tolerating CLD, neurovascular exam intact. Ambulating. Good UOP. VSS, Afebrile. OBJECTIVE:   Vitals:  /64   Pulse 75   Temp 98.2 °F (36.8 °C) (Oral)   Resp 15   Ht 5' 9\" (1.753 m)   Wt 267 lb 10.2 oz (121.4 kg)   SpO2 94%   BMI 39.52 kg/m²      INTAKE/OUTPUT:      Intake/Output Summary (Last 24 hours) at 9/29/2022 1328  Last data filed at 9/29/2022 0541  Gross per 24 hour   Intake 2535.87 ml   Output 865 ml   Net 1670.87 ml                 GENERAL: AOx3, NAD  HEENT: EOMI bilaterally  CARDIAC: Regular rate and rhythm. ABDOMEN: Soft, NT, ND  EXTREMITY: moves all extremities, no edema. Rad/fem pulses +2/2  NEURO: CN II-XII intact. Gross motor intact. 5/5 strength throughout, sensation to light touch intact throughout  INCISION: Dressing clean, dry, intact.     Data:  CBC with Differential:    Lab Results   Component Value Date/Time    WBC 7.5 09/29/2022 02:34 AM    RBC 4.10 09/29/2022 02:34 AM    RBC 4.75 02/10/2012 07:41 AM    HGB 11.8 09/29/2022 02:34 AM    HCT 36.0 09/29/2022 02:34 AM     09/29/2022 02:34 AM     02/10/2012 07:41 AM    MCV 87.8 09/29/2022 02:34 AM    MCH 28.8 09/29/2022 02:34 AM    MCHC 32.8 09/29/2022 02:34 AM    RDW 14.5 09/29/2022 02:34 AM    NRBC 1 09/17/2022 05:30 AM    LYMPHOPCT 17 09/29/2022 02:34 AM    MONOPCT 2 09/29/2022 02:34 AM    BASOPCT 0 09/29/2022 02:34 AM    MONOSABS 0.15 09/29/2022 02:34 AM    LYMPHSABS 1.26 09/29/2022 02:34 AM    EOSABS <0.03 09/29/2022 02:34 AM    BASOSABS 0.03 09/29/2022 02:34 AM    DIFFTYPE NOT REPORTED 11/17/2020 09:29 AM     BMP:    Lab Results   Component Value Date/Time     09/29/2022 02:34 AM    K 4.3 09/29/2022 02:34 AM    CL 99 09/29/2022 02:34 AM    CO2 21 09/29/2022 02:34 AM    BUN 15 09/29/2022 02:34 AM    LABALBU 4.3 09/02/2022 09:50 AM    LABALBU 4.5 02/10/2012 07:41 AM    CREATININE 0.75 09/29/2022 02:34 AM    CALCIUM 8.4 09/29/2022 02:34 AM    GFRAA >60 09/29/2022 02:34 AM    LABGLOM >60 09/29/2022 02:34 AM    GLUCOSE 171 09/29/2022 02:34 AM    GLUCOSE 99 02/10/2012 07:41 AM         ASSESSMENT   POD1 TCAR    PLAN  Pt seen and examined, doing well  Advance diet to regular carb controlled  Start Plavix and ASA this AM, start Eliquis tonight  Encourage OOB  Pain: controlled  Pt to follow up as outpatient with Dr. Paul Rodriguez in 2-4 weeks  Dispo: this afternoon        Electronically signed by Ed Doty DO  on 9/29/2022 at 1:28 PM

## 2022-09-29 NOTE — PLAN OF CARE
Problem: Discharge Planning  Goal: Discharge to home or other facility with appropriate resources  9/29/2022 1040 by Joslyn Lion RN  Outcome: Progressing  9/29/2022 0255 by Elizabeth Gomez RN  Outcome: Progressing     Problem: Pain  Goal: Verbalizes/displays adequate comfort level or baseline comfort level  9/29/2022 1040 by Joslyn Lion RN  Outcome: Progressing  9/29/2022 0255 by Elizabeth Gomez RN  Outcome: Progressing

## 2022-09-29 NOTE — PLAN OF CARE
Problem: Discharge Planning  Goal: Discharge to home or other facility with appropriate resources  9/29/2022 1040 by Cooper Ortiz RN  Outcome: Progressing  9/29/2022 0255 by Yelitza Johnston RN  Outcome: Progressing

## 2022-09-30 ENCOUNTER — CARE COORDINATION (OUTPATIENT)
Dept: CASE MANAGEMENT | Age: 62
End: 2022-09-30

## 2022-09-30 DIAGNOSIS — I65.21 STENOSIS OF RIGHT CAROTID ARTERY: Primary | ICD-10-CM

## 2022-09-30 PROCEDURE — 1111F DSCHRG MED/CURRENT MED MERGE: CPT | Performed by: FAMILY MEDICINE

## 2022-09-30 NOTE — DISCHARGE SUMMARY
Surgery Discharge Summary     Patient Identification  Randall Parnell is a 57 y.o. male. :  1960  Admit Date:  2022    Discharge date:   2022  2:29 PM                                   Disposition: home    Discharge Diagnoses:   Patient Active Problem List   Diagnosis    Cervical stenosis of spine    Cervicalgia    COPD (chronic obstructive pulmonary disease) ([facility])    Smoker    Mild depression ([facility])    GERD (gastroesophageal reflux disease)    Mixed hyperlipidemia    Carpal tunnel syndrome of left wrist    Medication monitoring encounter    DDD (degenerative disc disease), lumbar    Displacement of lumbar intervertebral disc without myelopathy    Therapeutic opioid induced constipation    Radiculopathy of cervical spine    Essential hypertension    Major depression, single episode, in complete remission ([facility]    Prediabetes    Lung nodule    Allergic sinusitis    Arthritis    Spinal stenosis of lumbar region without neurogenic claudication    Morbidly obese ([facility])    Lumbosacral spondylosis without myelopathy    Encounter for long-term opiate analgesic use    Occlusion of right carotid artery    Left carotid stenosis    Sacroiliitis, not elsewhere classified ([facility]    Recurrent UTI    BPH with urinary obstruction    Other retention of urine    Pneumonia    Atrial fibrillation with RVR ([facility])    Angina of effort ([facility])    Stenosis of right carotid artery    Status post carotid surgery       Condition on discharge: Good    Consults: None    Surgery: Transcarotid artery revascularization    Patient Instructions: Activity: no heavy lifting, pushing, pulling for 2 weeks, no driving for 2 weeks or while on analgesics  Diet: As tolerated  Follow-up with Dr. Janel Olea in 2 weeks. See pre-printed instructions in chart and given to patient upon discharge.     Discharge Medications:        Medication List        START taking these medications      oxyCODONE 5 MG immediate release tablet  Commonly known as: ROXICODONE  Take 1 tablet by mouth every 8 hours as needed for Pain (pain) for up to 3 days. polyethylene glycol 17 GM/SCOOP powder  Commonly known as: GLYCOLAX  Take 17 g by mouth daily as needed (for constipation, especially while on narcotics)            CHANGE how you take these medications      clopidogrel 75 MG tablet  Commonly known as: PLAVIX  Take 1 tablet by mouth in the morning. What changed: additional instructions            CONTINUE taking these medications      acetaminophen 325 MG tablet  Commonly known as: TYLENOL  Take 2 tablets by mouth nightly     albuterol sulfate  (90 Base) MCG/ACT inhaler  Commonly known as: PROVENTIL;VENTOLIN;PROAIR  Inhale 2 puffs into the lungs every 6 hours as needed for Wheezing     amiodarone 200 MG tablet  Commonly known as: CORDARONE  Take 1 tablet by mouth daily     amLODIPine 5 MG tablet  Commonly known as: NORVASC  TAKE ONE TABLET BY MOUTH DAILY     apixaban 5 MG Tabs tablet  Commonly known as: Eliquis  Take 1 tablet by mouth 2 times daily     aspirin 81 MG EC tablet  Take 1 tablet by mouth daily     budesonide-formoterol 160-4.5 MCG/ACT Aero  Commonly known as: Symbicort  INHALE TWO PUFFS BY MOUTH TWICE A DAY     gabapentin 800 MG tablet  Commonly known as: Neurontin  Take 1 tablet by mouth every 8 hours for 30 days.      isosorbide mononitrate 60 MG extended release tablet  Commonly known as: IMDUR     meloxicam 15 MG tablet  Commonly known as: MOBIC  Take 1 tablet by mouth daily as needed for Pain     metoprolol tartrate 25 MG tablet  Commonly known as: LOPRESSOR  Take 1 tablet by mouth 2 times daily     nitroGLYCERIN 0.4 MG SL tablet  Commonly known as: Nitrostat  Place 1 tablet under the tongue every 5 minutes as needed for Chest pain (not to exceed 3 tabs at a time)     simvastatin 80 MG tablet  Commonly known as: ZOCOR  TAKE ONE TABLET BY MOUTH EVERY EVENING     tiZANidine 4 MG tablet  Commonly known as: Desiree Abdullahi               Where to Get Your

## 2022-09-30 NOTE — CARE COORDINATION
an Advance Directive: not on file. Medication reconciliation was performed with patient, who verbalizes understanding of administration of home medications. Medications reviewed, 1111F entered: yes    Was patient discharged with a pulse oximeter? no    Non-face-to-face services provided:  Obtained and reviewed discharge summary and/or continuity of care documents  Education of patient/family/caregiver/guardian to support self-management-S/S of infection     Offered patient enrollment in the Remote Patient Monitoring (RPM) program for in-home monitoring: NA.    Care Transitions 24 Hour Call    Do you have a copy of your discharge instructions?: Yes  Do you have all of your prescriptions and are they filled?: Yes  Have you been contacted by a [address]?: No  Have you scheduled your follow up appointment?: Yes  How are you going to get to your appointment?: Car - family or friend to transport  Do you feel like you have everything you need to keep you well at home?: Yes  Care Transitions Interventions         Follow Up  Future Appointments   Date Time Provider Lucie Su   10/3/2022 10:00 AM Nacho Melgoza MD [address] [place] [address]   10/17/2022 10:15 AM Mp Hatfield MD [place] [facility] Coordinator provided contact information. Plan for follow-up call in 3-5 days based on severity of symptoms and risk factors.   Plan for next call: symptom management-incsion care throat soreness    Deanna Mar LPN

## 2022-10-03 ENCOUNTER — OFFICE VISIT (OUTPATIENT)
Dept: FAMILY MEDICINE CLINIC | Age: 62
End: 2022-10-03
Payer: MEDICARE

## 2022-10-03 VITALS
BODY MASS INDEX: 39.58 KG/M2 | DIASTOLIC BLOOD PRESSURE: 72 MMHG | HEART RATE: 62 BPM | HEIGHT: 69 IN | SYSTOLIC BLOOD PRESSURE: 136 MMHG | WEIGHT: 267.2 LBS | TEMPERATURE: 97.4 F

## 2022-10-03 DIAGNOSIS — G47.33 OSA (OBSTRUCTIVE SLEEP APNEA): ICD-10-CM

## 2022-10-03 DIAGNOSIS — K59.00 CONSTIPATION, UNSPECIFIED CONSTIPATION TYPE: ICD-10-CM

## 2022-10-03 DIAGNOSIS — Z87.891 EX-SMOKER: ICD-10-CM

## 2022-10-03 DIAGNOSIS — Z23 NEED FOR TDAP VACCINATION: ICD-10-CM

## 2022-10-03 DIAGNOSIS — E78.2 MIXED HYPERLIPIDEMIA: ICD-10-CM

## 2022-10-03 DIAGNOSIS — R03.1 LOW BLOOD PRESSURE READING: ICD-10-CM

## 2022-10-03 DIAGNOSIS — Z23 NEED FOR COVID-19 VACCINE: ICD-10-CM

## 2022-10-03 DIAGNOSIS — R06.09 DYSPNEA ON EXERTION: Primary | ICD-10-CM

## 2022-10-03 DIAGNOSIS — I10 ESSENTIAL HYPERTENSION: ICD-10-CM

## 2022-10-03 DIAGNOSIS — Z98.890 S/P CAROTID ENDARTERECTOMY: ICD-10-CM

## 2022-10-03 DIAGNOSIS — R73.9 ELEVATED BLOOD SUGAR: ICD-10-CM

## 2022-10-03 DIAGNOSIS — Z23 NEED FOR INFLUENZA VACCINATION: ICD-10-CM

## 2022-10-03 PROCEDURE — 99214 OFFICE O/P EST MOD 30 MIN: CPT | Performed by: FAMILY MEDICINE

## 2022-10-03 ASSESSMENT — ENCOUNTER SYMPTOMS
ABDOMINAL PAIN: 1
HEMOPTYSIS: 0
VOMITING: 0
COUGH: 0
ORTHOPNEA: 1
CHEST TIGHTNESS: 0
NAUSEA: 0
ANAL BLEEDING: 0
SWOLLEN GLANDS: 0
BLURRED VISION: 0
BLOOD IN STOOL: 0
CONSTIPATION: 1
STRIDOR: 0
SORE THROAT: 0
RECTAL PAIN: 0
ABDOMINAL DISTENTION: 0
SPUTUM PRODUCTION: 0
WHEEZING: 0
SHORTNESS OF BREATH: 1
DIARRHEA: 0

## 2022-10-03 NOTE — PROGRESS NOTES
Visit Information    Have you changed or started any medications since your last visit including any over-the-counter medicines, vitamins, or herbal medicines? no   Have you stopped taking any of your medications? Is so, why? -  no  Are you having any side effects from any of your medications? - no    Have you seen any other physician or provider since your last visit? yes - Vasc Surg, Urology  Have you had any other diagnostic tests since your last visit? yes - Labs, XR, Echo, CT, Cardiac Cath   Have you been seen in the emergency room and/or had an admission in a hospital since we last saw you?  yes - [facility]    Have you had your routine dental cleaning in the past 6 months?  no     Do you have an active MyChart account? If no, what is the barrier?   Yes    Patient Care Team:  Jackeline العلي DO as PCP - General (Family Medicine)  Jackeline العلي DO as PCP - [facility] Provider  Teresa Go MD as Consulting Physician (Gastroenterology)  Fara Alaniz MD as Consulting Physician (Cardiology)  Lilliam Lopez MD as Consulting Physician (Orthopedic Surgery)  Skye Blackman RN as Nurse Navigator  Kathrine Batista LPN as Care Transitions Nurse    Medical History Review  Past Medical, Family, and Social History reviewed and does contribute to the patient presenting condition    Health Maintenance   Topic Date Due    DTaP/Tdap/Td vaccine (1 - Tdap) Never done    A1C test (Diabetic or Prediabetic)  01/10/2020    COVID-19 Vaccine (4 - Booster) 04/02/2022    Flu vaccine (1) 08/01/2022    Annual Wellness Visit (AWV)  09/23/2022    Lipids  05/10/2023    Depression Monitoring  06/03/2023    Colorectal Cancer Screen  11/19/2024    Shingles vaccine  Completed    Pneumococcal 0-64 years Vaccine  Completed    Hepatitis C screen  Addressed    HIV screen  Addressed    Hepatitis A vaccine  Aged Out    Hepatitis B vaccine  Aged Out    Hib vaccine  Aged Out    Meningococcal (ACWY) vaccine  Aged Out

## 2022-10-03 NOTE — PROGRESS NOTES
[facility] PHYSICIANS  [facility] FAMILY PHYSICIANS  [facility] [id number]     Date of Visit:  10/3/2022  Patient Name: Natalie Salinas   Patient :  1960       Natalie Salinas is a 57 y.o. male who presents today for an general visit to be evaluated for the following condition(s):  Chief Complaint   Patient presents with    Follow-up     Follow up form Kris Adler for  Almaz Carlton is a 56 yo now ex smoker with Afib, CAD, Angina, COPD patient s/p R. TCAR on 2022 (discharge same day) p/today for hospital follow up as well as dyspnea on exertion, low blood pressure reading at home and elevated A1C in the past (6.2022). States he is no longer smoking since last month, as he got bad sick at the beginning of the month and being in afib, states he used to be more active in the past, used to be only be able to push  for 8 minutes prior to Michelle Horan but feels he can do more now, still feels occasional angina but mostly feels \"15 years younger\". Going to see his heart doctor today. He is going to discuss with them his low BP which was 55/40 2 days ago. States he is taking metoprolol but stopped a medication not sure but will report thru MyChart what medication he stopped. He states at home -140 but occasionally 120's. Can go 15-20 feet before SOB currently, used to be 3-5 feet before surgery. He does state orthopnea where he needs to be upright for a year but this is complicated by neck pain. Pt states he never used the chantix. He states he is going to see vascular doctor, Dr. Fernando Teixeira, on the . He does endorse a history of ROSY on a sleep study in the past (2016). He also states positive for constipation and gas pains/biref 1-2 second abdominal pains exacerbated by palpation since stopping stool softner miralax a few days ago.   We discussed his elevated cholesterol at length including dietary sources, goal of LDL (less than 70) and his concerns about preventing reocclusion of his R. ICA. Patient does eat a lot of eggs, cheese and drinks whole milk. We discussed simple changes he can make and that he can discuss further nutritional changes/lifestyle changes during his AWV/Physical or make a separate visit to discuss further. Lastly, patient does endorse episodic non-compliance with his medications and would like to see what his cholesterol is now since he is taking his medications more consistently now. He does state the wound site is healing well, no injection or discharge or pain. He is aware that he needs to get a Duplex US of his carotid 3-6 weeks after the 28th. Shortness of Breath  This is a new problem. The current episode started more than 1 year ago (states has been getting better over the last 1-4 weeks). The problem occurs constantly. The problem has been gradually improving. Duration: several minutes long. Associated symptoms include abdominal pain (Episodic, mostly with palpation, since stopping stool softner), orthopnea and PND. Pertinent negatives include no chest pain, claudication, coryza, ear pain, fever, headaches, hemoptysis, leg pain, leg swelling, neck pain, rash, sore throat, sputum production, swollen glands, syncope, vomiting or wheezing. The symptoms are aggravated by exercise and lying flat. He has tried rest and body position changes for the symptoms. The treatment provided moderate relief. His past medical history is significant for CAD, COPD and a recent surgery. Hypertension  This is a chronic problem. The current episode started more than 1 year ago. The problem has been gradually improving since onset. The problem is uncontrolled (target 120-125/80). Associated symptoms include malaise/fatigue, orthopnea, PND and shortness of breath. Pertinent negatives include no blurred vision, chest pain, headaches, neck pain, palpitations or sweats.  Risk factors for coronary artery disease include male gender, obesity, dyslipidemia, sedentary lifestyle, smoking/tobacco exposure and stress. Compliance problems include diet and psychosocial issues. Hypertensive end-organ damage includes CAD/MI. Review of Systems:  Review of Systems   Constitutional:  Positive for malaise/fatigue. Negative for fever. HENT:  Negative for ear pain and sore throat. Eyes:  Negative for blurred vision. Respiratory:  Positive for shortness of breath. Negative for cough, hemoptysis, sputum production, chest tightness, wheezing and stridor. Cardiovascular:  Positive for orthopnea and PND. Negative for chest pain, palpitations, claudication, leg swelling and syncope. Gastrointestinal:  Positive for abdominal pain (Episodic, mostly with palpation, since stopping stool softner) and constipation. Negative for abdominal distention, anal bleeding, blood in stool, diarrhea, nausea, rectal pain and vomiting. Musculoskeletal:  Negative for neck pain. Skin:  Negative for rash. Neurological: Negative. Negative for dizziness, tremors, seizures, syncope, facial asymmetry, speech difficulty, weakness, light-headedness, numbness and headaches.      No Known Allergies    Patient Active Problem List   Diagnosis    Cervical stenosis of spine    Cervicalgia    COPD (chronic obstructive pulmonary disease) ([facility])    Smoker    Mild depression    GERD (gastroesophageal reflux disease)    Mixed hyperlipidemia    Carpal tunnel syndrome of left wrist    Medication monitoring encounter    DDD (degenerative disc disease), lumbar    Displacement of lumbar intervertebral disc without myelopathy    Therapeutic opioid induced constipation    Radiculopathy of cervical spine    Essential hypertension    Major depression, single episode, in complete remission ([facility]    Prediabetes    Lung nodule    Allergic sinusitis    Arthritis    Spinal stenosis of lumbar region without neurogenic claudication    Morbidly obese (HCC)    Lumbosacral spondylosis without myelopathy Encounter for long-term opiate analgesic use    Occlusion of right carotid artery    Left carotid stenosis    Sacroiliitis, not elsewhere classified ([facility]    Recurrent UTI    BPH with urinary obstruction    Other retention of urine    Pneumonia    Atrial fibrillation with RVR (HCC)    Angina of effort (HCC)    Stenosis of right carotid artery    Status post carotid surgery       Past Medical History:   Diagnosis Date    Abnormal stress ECG     7/28/2022 per Dr. Brice Amaya, [facility] will need cardiac cath in future due to cp    Angina pectoris [facility])     BPH (benign prostatic hyperplasia)     CAD (coronary artery disease)     Dr. Brice Amaya   7/28/22 cardiac clearance    Carotid stenosis     rt  Dr. Stacie East tunnel syndrome     left    Cervical stenosis of spine 2012    C2-5 laminectomy  Dr. Mccoy Patter    Cervicalgia     chronic pain    Chronic back pain     COPD (chronic obstructive pulmonary disease) ([facility]     on inhalers pcp manages    DDD (degenerative disc disease)     Elbow fracture, right     Fractures     elbow    GERD (gastroesophageal reflux disease)     uses pepto bismol prn    Gout     Right great toe    Hyperlipidemia     Hypertension     Lung nodule     Mild depression 09/26/2013    2 suicide attempts by girlfriend related to depression.     Morbidly obese ([facility] 10/30/2020    Occlusion of right carotid artery 07/14/2021    Osteoarthritis of right knee     Pre-diabetes     was taking metformin in past  no longer takes    Sinus headache 10/10/2018    Sleep apnea     snores   does not use cpap  pt did no follow up (per patient)    Sleep disturbance     Smoker     Snores     no cpap    SOB (shortness of breath) on exertion     Tendonitis of foot     right    Tooth loose 08/05/2022    lower front rt    Under care of team     Dr. Nayn Kelly  7/14/2022  will need cysto in future after heart cath and carotid sx    Under care of team     Dr. Carmelita Oro for carotid stenosis    Wellness examination     Damian Vitale Adolfo Bro  last visit June 2022       Past Surgical History:   Procedure Laterality Date    CARDIAC CATHETERIZATION  07/08/2014    Non Obstructive CAD     CARDIAC CATHETERIZATION  09/16/2022    CERVICAL SPINE SURGERY  07/13/2012     C2-3-4-5    COLONOSCOPY      FRACTURE SURGERY      Rt elbow     HC INJECT OTHER PERPHRL NERV Bilateral 05/09/2019    INJECTION SPINAL performed by Rosanna Denise MD at [address] Bilateral 08/15/2019    SACROILIAC JOINT INJECTION performed by Rosanna Denise MD at [place] [address]  02/05/2016    [facility]    NERVE BLOCK Left 02/07/2020    RFA left side    NERVE BLOCK Left 02/07/2020     NERVE RADIOFREQUENCY ABLATION - SACROILIAC (Left )    NERVE BLOCK  02/12/2021    NERVE RADIOFREQUENCY ABLATION SI (Left     NERVE BLOCK Right 02/19/2021    Procedure: NERVE RADIOFREQUENCY ABLATION SI JOINT (Right )    OTHER SURGICAL HISTORY  08/15/2019    sacroiliac joint injection    PAIN MANAGEMENT PROCEDURE Left 02/07/2020    NERVE RADIOFREQUENCY ABLATION - SACROILIAC performed by Rosanna Denise MD at [address] [place] [address] Left 02/12/2021    NERVE RADIOFREQUENCY ABLATION SI performed by Rosanna Denise MD at [address] [place] [address] Right 02/19/2021    NERVE RADIOFREQUENCY ABLATION SI JOINT performed by Rosanna Denise MD at [address] [place] [address] Right 9/28/2022    right trans-carotid artery revascularization w/stent performed by Ger Reyes MD at [address] [facility] History     Socioeconomic History    Marital status: Single     Spouse name: None    Number of children: None    Years of education: None    Highest education level: None   Occupational History     Employer: N/A   Tobacco Use    Smoking status: Former     Packs/day: 0.25     Years: 40.00     Pack years: 10.00     Types: Cigarettes Start date: 1975    Smokeless tobacco: Former     Quit date: 3/12/2015    Tobacco comments:     Smokes 2-4 cigs/day as of 8/5/2022   Vaping Use    Vaping Use: Never used   Substance and Sexual Activity    Alcohol use: Not Currently     Alcohol/week: 0.0 standard drinks     Comment: 3 x year    Drug use: No    Sexual activity: Yes     Partners: Female     Social Determinants of Health     Financial Resource Strain: Low Risk     Difficulty of Paying Living Expenses: Not hard at all   Food Insecurity: No Food Insecurity    Worried About Running Out of Food in the Last Year: Never true    Ran Out of Food in the Last Year: Never true        Physical Exam:    /72 (Site: Right Upper Arm, Position: Sitting, Cuff Size: Medium Adult)   Pulse 62   Temp 97.4 °F (36.3 °C) (Temporal)   Ht 5' 9.02\" (1.753 m)   Wt 267 lb 3.2 oz (121.2 kg)   BMI 39.44 kg/m²    Physical Exam  Vitals and nursing note reviewed. Constitutional:       General: He is not in acute distress. Appearance: Normal appearance. He is obese. He is not ill-appearing, toxic-appearing or diaphoretic. HENT:      Head: Normocephalic and atraumatic. Right Ear: Tympanic membrane, ear canal and external ear normal.      Left Ear: Tympanic membrane, ear canal and external ear normal.      Nose: Nose normal.   Eyes:      Extraocular Movements: Extraocular movements intact. Conjunctiva/sclera: Conjunctivae normal.      Pupils: Pupils are equal, round, and reactive to light. Cardiovascular:      Rate and Rhythm: Normal rate and regular rhythm. Pulses: Normal pulses. Heart sounds: Normal heart sounds. No murmur heard. No friction rub. No gallop. Comments: No JVD noted  Pulmonary:      Effort: Pulmonary effort is normal. No respiratory distress. Breath sounds: Normal breath sounds. No stridor. No wheezing, rhonchi or rales. Chest:      Chest wall: No tenderness.    Abdominal:      General: Bowel sounds are normal. There is no distension. Palpations: Abdomen is soft. There is no mass. Tenderness: There is no abdominal tenderness. There is no guarding or rebound. Hernia: No hernia is present. Musculoskeletal:      Right lower leg: Edema (1-2+) present. Left lower leg: Edema (1-2+) present. Skin:     Capillary Refill: Capillary refill takes less than 2 seconds. Neurological:      Mental Status: He is alert and oriented to person, place, and time. Cranial Nerves: No cranial nerve deficit. Sensory: No sensory deficit. Coordination: Coordination normal.      Gait: Gait normal.   Psychiatric:         Mood and Affect: Mood normal.         Behavior: Behavior normal.       Assessment/Plan:  1. Dyspnea on exertion  Comments:  Chronic, improving, likely due to non-acute cardiac cause vs pulm vs other, normal ECHO May, rec'd intensive BP control, nutrit, LDL goal < 70, recheck A1C  2. Low blood pressure reading  Comments:  Patient to d/w Cardio and confirm BP med stopped, continue home monitoring, consider OABPM/BID BP check, reported Hypotension, pt to see cardio today  3. Elevated blood sugar  Comments:  Elevated A1C in the past, will confirm with outpatient serum testing, if 6.5 or higher patient to have closer follow up   Orders:  -     Hemoglobin A1C; Future  4. Mixed hyperlipidemia  Comments:  LDL goal less than 70, complicated by not taking medications regularly, will check Lipid now, will consider repatha/etc in addition to dietary changes at this t  Orders:  -     Lipid Panel; Future  5. Constipation, unspecified constipation type  Comments:  Patient to restart Miralax at this time, counseled if worsening symptoms when to RTC sooner vs ER  6. ROSY (obstructive sleep apnea)  Comments:  Diagnosed in 2016, patient strongly rec'd to start CPAP, patient declines at this time stating understanding of the risks including CVA, heart strain, etc  7. BMI 39.0-39.9,adult  8. Essential hypertension  9.  S/P carotid endarterectomy  10. Ex-smoker  11. Need for influenza vaccination  Comments:  Patient states he will get all his vaccinations in a months time at local pharmacy  12. Need for COVID-19 vaccine  13.  Need for Tdap vaccination     Return in about 3 months (around 1/3/2023) for For AWV/Physical .    Electronically signed by Ousmane Ashton MD on 10/3/2022 at 5:41 PM

## 2022-10-05 ENCOUNTER — CARE COORDINATION (OUTPATIENT)
Dept: CASE MANAGEMENT | Age: 62
End: 2022-10-05

## 2022-10-05 NOTE — CARE COORDINATION
[facility] Care Transitions Follow Up Call    LPN Care Coordinator contacted the patient by telephone to follow up after admission on . Verified name and  with patient as identifiers. Patient: Sy Trevizo  Patient : 1960   MRN: [id number]  Reason for Admission: Stenosis of right carotid artery  Discharge Date: 22 RARS: Readmission Risk Score: 15.6      Needs to be reviewed by the provider   Additional needs identified to be addressed with provider: No  none             Method of communication with provider: none. Spoke with Parker Leung he states he is doing fine, he sounded winded stated he was good, states he has had some chest pain and SOB when he is doing things and he is very anxious about getting a clot. CTN spent some time going over relaxation techniques and patient is taking his BP daily states it has been good. He has seen hs PCP and does not need to go  back for 3 months incisions look good healing well .right side of throat is  but he is eating and drinking well normal bowel and bladder elimination. Has no concerns . Addressed changes since last contact:  none  Discussed follow-up appointments. If no appointment was previously scheduled, appointment scheduling offered: Yes. Is follow up appointment scheduled within 7 days of discharge? Yes. Follow Up  Future Appointments   Date Time Provider Lucie Su   10/17/2022 10:15 AM Ramez Villegas MD [place] HEART/VAS Jami Lan         [facility] Care Coordinator reviewed discharge instructions with patient and discussed any barriers to care and/or understanding of plan of care after discharge. Discussed appropriate site of care based on symptoms and resources available to patient including: PCP  Specialist. The patient agrees to contact the PCP office for questions related to their healthcare. Advance Care Planning:   not on file.      Patients top risk factors for readmission: lack of knowledge about disease  Interventions to address risk factors: Obtained and reviewed discharge summary and/or continuity of care documents    Offered patient enrollment in the Remote Patient Monitoring (RPM) program for in-home monitoring: NA.     Care Transitions Subsequent and Final Call    Subsequent and Final Calls  Do you have any ongoing symptoms?: Yes  Onset of Patient-reported symptoms: In the past 7 days  Patient-reported symptoms: Chest Pain, Shortness of Breath  Interventions for patient-reported symptoms: Notified PCP/Physician  Have your medications changed?: No  Do you have any questions related to your medications?: No  Do you currently have any active services?: No  Do you have any needs or concerns that I can assist you with?: No  Identified Barriers: Other, Lack of Education  Care Transitions Interventions  Other Interventions:             LPN Care Coordinator provided contact information for future needs. Plan for follow-up call in 5-7 days based on severity of symptoms and risk factors.   Plan for next call: symptom management-SOB, chest pain incision    Dorlene Overall, LPN details… detailed exam

## 2022-10-12 ENCOUNTER — CARE COORDINATION (OUTPATIENT)
Dept: CASE MANAGEMENT | Age: 62
End: 2022-10-12

## 2022-10-13 NOTE — TELEPHONE ENCOUNTER
E-scribe request for med refills. Please review and e-scribe if applicable. Last Visit Date:  10/03/2022  Next Visit Date:  Visit date not found    Hemoglobin A1C (%)   Date Value   01/10/2019 4.5   10/10/2018 5.8   03/20/2018 6.0             ( goal A1C is < 7)   Microalb/Crt. Ratio (mcg/mg creat)   Date Value   05/03/2014 Unable to calculate or report.      LDL Cholesterol (mg/dL)   Date Value   05/10/2022 179 (H)       (goal LDL is <100)   AST (U/L)   Date Value   09/02/2022 27     ALT (U/L)   Date Value   09/02/2022 30     BUN (mg/dL)   Date Value   09/29/2022 15     BP Readings from Last 3 Encounters:   10/03/22 136/72   09/29/22 135/64   09/17/22 136/69          (goal 120/80)        Patient Active Problem List:     Cervical stenosis of spine     Cervicalgia     COPD (chronic obstructive pulmonary disease) ([facility])     Smoker     Mild depression     GERD (gastroesophageal reflux disease)     Mixed hyperlipidemia     Carpal tunnel syndrome of left wrist     Medication monitoring encounter     DDD (degenerative disc disease), lumbar     Displacement of lumbar intervertebral disc without myelopathy     Therapeutic opioid induced constipation     Radiculopathy of cervical spine     Essential hypertension     Major depression, single episode, in complete remission ([address]     Prediabetes     Lung nodule     Allergic sinusitis     Arthritis     Spinal stenosis of lumbar region without neurogenic claudication     Morbidly obese ([facility])     Lumbosacral spondylosis without myelopathy     Encounter for long-term opiate analgesic use     Occlusion of right carotid artery     Left carotid stenosis     Sacroiliitis, not elsewhere classified ([address]     Recurrent UTI     BPH with urinary obstruction     Other retention of urine     Pneumonia     Atrial fibrillation with RVR ([facility])     Angina of effort ([address]     Stenosis of right carotid artery     Status post carotid surgery      ----Sofi Knife

## 2022-10-17 ENCOUNTER — OFFICE VISIT (OUTPATIENT)
Dept: VASCULAR SURGERY | Age: 62
End: 2022-10-17

## 2022-10-17 VITALS
OXYGEN SATURATION: 98 % | RESPIRATION RATE: 20 BRPM | HEIGHT: 69 IN | WEIGHT: 267 LBS | SYSTOLIC BLOOD PRESSURE: 117 MMHG | HEART RATE: 71 BPM | TEMPERATURE: 98.2 F | BODY MASS INDEX: 39.55 KG/M2 | DIASTOLIC BLOOD PRESSURE: 82 MMHG

## 2022-10-17 DIAGNOSIS — I65.21 RIGHT-SIDED EXTRACRANIAL CAROTID ARTERY STENOSIS: Primary | ICD-10-CM

## 2022-10-17 DIAGNOSIS — Z09 POSTOPERATIVE EXAMINATION: ICD-10-CM

## 2022-10-17 PROCEDURE — 99024 POSTOP FOLLOW-UP VISIT: CPT | Performed by: SURGERY

## 2022-10-17 RX ORDER — AMIODARONE HYDROCHLORIDE 200 MG/1
TABLET ORAL
Qty: 30 TABLET | Refills: 2 | Status: SHIPPED | OUTPATIENT
Start: 2022-10-17

## 2022-10-17 RX ORDER — APIXABAN 5 MG/1
TABLET, FILM COATED ORAL
Qty: 60 TABLET | Refills: 2 | Status: SHIPPED | OUTPATIENT
Start: 2022-10-17

## 2022-10-17 NOTE — PROGRESS NOTES
[address] AND VASCULAR  [address] [facility] [address]  Dept: [phone]     Patient: Fidel Calhoun  : 1960  MRN: [id number]  DOS: 10/17/2022            HPI:  Fidel Calhoun is a 57 y.o. male who returns to the office regarding carotid stenting. His right carotid stent was placed several weeks ago. He has had several drops in his blood pressure over the last several weeks to 70 or 80 by his measurement at home. Otherwise he has done well. He has no other complaints. He has been placed on new antihypertensive medications including metoprolol. Review of Systems    Vitals:    10/17/22 1010   BP: 117/82   Position: Sitting   Pulse: 71   Resp: 20   Temp: 98.2 °F (36.8 °C)   TempSrc: Temporal   SpO2: 98%   Weight: 267 lb (121.1 kg)   Height: 5' 9\" (1.753 m)          Physical Exam  On examination he has a staccato bruit in the right carotid system. His incision has healed well. Assessment:  1. Right-sided extracranial carotid artery stenosis    2. Postoperative examination          Plan: At this time we will have him back to the office in another 2 months with carotid duplex. I explained to him that he needs to discuss his blood pressure medications with cardiology and he agrees to do so. We will see him in 2 months with repeat duplex examination.     Electronically signed by:  Marilu Wade MD

## 2022-11-07 ENCOUNTER — TELEMEDICINE (OUTPATIENT)
Dept: FAMILY MEDICINE CLINIC | Age: 62
End: 2022-11-07
Payer: MEDICARE

## 2022-11-07 DIAGNOSIS — M19.90 ARTHRITIS: Primary | ICD-10-CM

## 2022-11-07 DIAGNOSIS — M47.817 LUMBOSACRAL SPONDYLOSIS WITHOUT MYELOPATHY: ICD-10-CM

## 2022-11-07 PROCEDURE — 99442 PR PHYS/QHP TELEPHONE EVALUATION 11-20 MIN: CPT | Performed by: FAMILY MEDICINE

## 2022-11-07 PROCEDURE — G8427 DOCREV CUR MEDS BY ELIG CLIN: HCPCS | Performed by: FAMILY MEDICINE

## 2022-11-07 PROCEDURE — 3017F COLORECTAL CA SCREEN DOC REV: CPT | Performed by: FAMILY MEDICINE

## 2022-11-07 PROCEDURE — G8484 FLU IMMUNIZE NO ADMIN: HCPCS | Performed by: FAMILY MEDICINE

## 2022-11-07 PROCEDURE — G8417 CALC BMI ABV UP PARAM F/U: HCPCS | Performed by: FAMILY MEDICINE

## 2022-11-07 PROCEDURE — 1036F TOBACCO NON-USER: CPT | Performed by: FAMILY MEDICINE

## 2022-11-07 RX ORDER — GABAPENTIN 800 MG/1
800 TABLET ORAL EVERY 8 HOURS SCHEDULED
Qty: 90 TABLET | Refills: 1 | Status: SHIPPED | OUTPATIENT
Start: 2022-11-07 | End: 2022-12-07

## 2022-11-07 RX ORDER — TIZANIDINE 4 MG/1
4 TABLET ORAL 2 TIMES DAILY PRN
Qty: 60 TABLET | Refills: 2 | Status: SHIPPED | OUTPATIENT
Start: 2022-11-07

## 2022-11-07 NOTE — PROGRESS NOTES
Visit Information    Have you changed or started any medications since your last visit including any over-the-counter medicines, vitamins, or herbal medicines? no   Have you stopped taking any of your medications? Is so, why? -  no  Are you having any side effects from any of your medications? - no    Have you seen any other physician or provider since your last visit? yes - Vascular Surgery   Have you had any other diagnostic tests since your last visit?  no   Have you been seen in the emergency room and/or had an admission in a hospital since we last saw you?  no   Have you had your routine dental cleaning in the past 6 months?  no     Do you have an active MyChart account? If no, what is the barrier?   Yes    Patient Care Team:  Kyler Harris DO as PCP - General (Family Medicine)  Kyler Harris DO as PCP - [facility] Provider  Collin Figueroa MD as Consulting Physician (Gastroenterology)  Jennie Odell MD as Consulting Physician (Cardiology)  Jonathan Eid MD as Consulting Physician (Orthopedic Surgery)  Guillermina Torres RN as Nurse Navigator    Medical History Review  Past Medical, Family, and Social History reviewed and does not contribute to the patient presenting condition    Health Maintenance   Topic Date Due    DTaP/Tdap/Td vaccine (1 - Tdap) Never done    A1C test (Diabetic or Prediabetic)  01/10/2020    Annual Wellness Visit (AWV)  Never done    COVID-19 Vaccine (5 - Booster) 12/13/2022    Lipids  05/10/2023    Depression Monitoring  06/03/2023    Colorectal Cancer Screen  11/19/2024    Flu vaccine  Completed    Shingles vaccine  Completed    Pneumococcal 0-64 years Vaccine  Completed    Hepatitis C screen  Addressed    HIV screen  Addressed    Hepatitis A vaccine  Aged Out    Hib vaccine  Aged Out    Meningococcal (ACWY) vaccine  Aged Out

## 2022-11-07 NOTE — PATIENT INSTRUCTIONS
Thank you for letting us take care of you today. We hope all your questions were addressed. If a question was overlooked or something else comes to mind after you return home, please contact a member of your Care Team listed below. Your Care Team at [place] [address] is Team #2  Juanita Sosa DO (Faculty)  Malia Malcolm (Faculty)  Radha Rivera MD (Resident)  Baldo Ibrahim MD (Resident)  Carlos Lyons MD (Resident)  Rayne Patel MD (Resident)  Adam Diaz., TOBIN Lugo.,  MA  Matheus Lo., ASHLEYN  Raimundo Ozuna., [facility] office)  Halina Moyer, [address] (Clinical Practice Manager)  Miranda Skinner [facility] (Clinical Pharmacist)     Office phone number: [phone]    If you need to get in right away due to illness, please be advised we have \"Same Day\" appointments available Monday-Friday. Please call us at [phone] option #3 to schedule your \"Same Day\" appointment.

## 2022-11-07 NOTE — PROGRESS NOTES
Telephone Visit (Audio Only)    Consent: patient has previously opted for and currently agrees to a TeleHealth (Telephone Visit) encounter, in place of a face-to-face ambulatory visit and is informed that the encounter will be billed as such accordingly. Location:     Patient / off site location: [facility]   - Location: home     Physician: Renee Child. Yoli November, DO - Location: [facility]    Length of time: 15 minutes     Discussion  \"September had surgery scheduled, Afib in ED, was in [facility] for short stay. Had vascular surgery. Medications listed: episodes of hypotension - 3 times\". Amlodipine 5 mg  Metoprolol   Amiodarone  Eliquis      Negative for:     Worry / mood complaints  Headache  Dizziness  Visual Disturbance  Hearing Changes  Nasal / sinus Symptoms  Mouth / tooth symptom, pain  Throat pain  Difficulty swallowing  Neck pain  Chest discomfort  Cough  SOB  N/V/D/C  Pelvic area discomfort  Bladder / voiding discomfort  Bowel complaints  MS complaints   Numbness/tingling/abnormal sensations   Edema / Leg swelling  Dizziness  Fatigue  Bleeding   Skin    Pertinent Pos: See HPI - see above    There were no vitals filed for this visit. Diagnosis Orders   1. Arthritis  tiZANidine (ZANAFLEX) 4 MG tablet    External Referral To Pain Clinic      2.  Lumbosacral spondylosis without myelopathy  gabapentin (NEURONTIN) 800 MG tablet    External Referral To Pain Clinic          Plan:  1.)  hold metoprolol - concern for hypotensive effect  2.)  continue Eliquis, Amlodipine, Simvastatin, Isosorbide, Plavix  3.)  Next appt with Dr Raji Silva - December 19, 2022  4.)  F2F in 30 days

## 2022-11-16 ENCOUNTER — TELEPHONE (OUTPATIENT)
Dept: VASCULAR SURGERY | Age: 62
End: 2022-11-16

## 2022-11-16 NOTE — TELEPHONE ENCOUNTER
Patient has a follow up in December, but said that he is having lots of swelling and pain with walking in both legs. Wanted to know if there is anything you can recommend for relief.

## 2022-11-26 ENCOUNTER — APPOINTMENT (OUTPATIENT)
Dept: GENERAL RADIOLOGY | Age: 62
DRG: 813 | End: 2022-11-26
Payer: MEDICARE

## 2022-11-26 ENCOUNTER — HOSPITAL ENCOUNTER (INPATIENT)
Age: 62
LOS: 3 days | Discharge: HOME | DRG: 813 | End: 2022-11-29
Attending: EMERGENCY MEDICINE | Admitting: FAMILY MEDICINE
Payer: MEDICARE

## 2022-11-26 DIAGNOSIS — D62 ACUTE BLOOD LOSS ANEMIA: Primary | ICD-10-CM

## 2022-11-26 DIAGNOSIS — R07.9 CHEST PAIN, UNSPECIFIED TYPE: ICD-10-CM

## 2022-11-26 DIAGNOSIS — J41.0 SIMPLE CHRONIC BRONCHITIS (HCC): ICD-10-CM

## 2022-11-26 DIAGNOSIS — I21.4 NSTEMI (NON-ST ELEVATED MYOCARDIAL INFARCTION) (HCC): ICD-10-CM

## 2022-11-26 DIAGNOSIS — R06.02 SHORTNESS OF BREATH: ICD-10-CM

## 2022-11-26 LAB
ABSOLUTE EOS #: 0.15 K/UL (ref 0–0.44)
ABSOLUTE IMMATURE GRANULOCYTE: 0.3 K/UL (ref 0–0.3)
ABSOLUTE LYMPH #: 3.9 K/UL (ref 1.1–3.7)
ABSOLUTE MONO #: 1.05 K/UL (ref 0.1–1.2)
ADENOVIRUS PCR: NOT DETECTED
ALBUMIN SERPL-MCNC: 3.9 G/DL (ref 3.5–5.2)
ALBUMIN/GLOBULIN RATIO: 1.6 (ref 1–2.5)
ALP BLD-CCNC: 94 U/L (ref 40–129)
ALT SERPL-CCNC: 16 U/L (ref 5–41)
ANION GAP SERPL CALCULATED.3IONS-SCNC: 17 MMOL/L (ref 9–17)
ANION GAP: 12 MMOL/L (ref 7–16)
ANION GAP: 13 MMOL/L (ref 7–16)
AST SERPL-CCNC: 22 U/L
BASOPHILS # BLD: 0 % (ref 0–2)
BASOPHILS ABSOLUTE: 0 K/UL (ref 0–0.2)
BILIRUB SERPL-MCNC: 1 MG/DL (ref 0.3–1.2)
BILIRUBIN DIRECT: 0.1 MG/DL
BILIRUBIN URINE: NEGATIVE
BILIRUBIN, INDIRECT: 0.9 MG/DL (ref 0–1)
BORDETELLA PARAPERTUSSIS: NOT DETECTED
BORDETELLA PERTUSSIS PCR: NOT DETECTED
BUN BLDV-MCNC: 24 MG/DL (ref 8–23)
C-REACTIVE PROTEIN: 6.3 MG/L (ref 0–5)
CALCIUM IONIZED: 1.15 MMOL/L (ref 1.13–1.33)
CALCIUM SERPL-MCNC: 8.5 MG/DL (ref 8.6–10.4)
CHLAMYDIA PNEUMONIAE BY PCR: NOT DETECTED
CHLORIDE BLD-SCNC: 105 MMOL/L (ref 98–107)
CO2: 17 MMOL/L (ref 20–31)
COLOR: YELLOW
COMMENT UA: ABNORMAL
CORONAVIRUS 229E PCR: NOT DETECTED
CORONAVIRUS HKU1 PCR: NOT DETECTED
CORONAVIRUS NL63 PCR: NOT DETECTED
CORONAVIRUS OC43 PCR: NOT DETECTED
CREAT SERPL-MCNC: 0.86 MG/DL (ref 0.7–1.2)
EGFR, POC: >60 ML/MIN/1.73M2
EGFR, POC: >60 ML/MIN/1.73M2
EOSINOPHILS RELATIVE PERCENT: 1 % (ref 1–4)
GFR SERPL CREATININE-BSD FRML MDRD: >60 ML/MIN/1.73M2
GLUCOSE BLD-MCNC: 112 MG/DL (ref 74–100)
GLUCOSE BLD-MCNC: 139 MG/DL (ref 74–100)
GLUCOSE BLD-MCNC: 144 MG/DL (ref 70–99)
GLUCOSE URINE: NEGATIVE
HCO3 VENOUS: 18.7 MMOL/L (ref 22–29)
HCO3 VENOUS: 21.2 MMOL/L (ref 22–29)
HCT VFR BLD CALC: 16.8 % (ref 40.7–50.3)
HCT VFR BLD CALC: 20 % (ref 40.7–50.3)
HCT VFR BLD CALC: 24.7 % (ref 40.7–50.3)
HEMOGLOBIN: 4.6 G/DL (ref 13–17)
HEMOGLOBIN: 5.9 G/DL (ref 13–17)
HEMOGLOBIN: 7.6 G/DL (ref 13–17)
HUMAN METAPNEUMOVIRUS PCR: NOT DETECTED
IMMATURE GRANULOCYTES: 2 %
INFLUENZA A BY PCR: NOT DETECTED
INFLUENZA B BY PCR: NOT DETECTED
INR BLD: 1.1
KETONES, URINE: NEGATIVE
LACTIC ACID, SEPSIS WHOLE BLOOD: 0.9 MMOL/L (ref 0.5–1.9)
LACTIC ACID, SEPSIS WHOLE BLOOD: 3.5 MMOL/L (ref 0.5–1.9)
LEGIONELLA PNEUMOPHILIA AG, URINE: NEGATIVE
LEUKOCYTE ESTERASE, URINE: NEGATIVE
LYMPHOCYTES # BLD: 26 % (ref 24–43)
MAGNESIUM: 2.4 MG/DL (ref 1.6–2.6)
MCH RBC QN AUTO: 25.8 PG (ref 25.2–33.5)
MCHC RBC AUTO-ENTMCNC: 27.4 G/DL (ref 28.4–34.8)
MCV RBC AUTO: 94.4 FL (ref 82.6–102.9)
MONOCYTES # BLD: 7 % (ref 3–12)
MORPHOLOGY: ABNORMAL
MYCOPLASMA PNEUMONIAE PCR: NOT DETECTED
NEGATIVE BASE EXCESS, VEN: 5 (ref 0–2)
NEGATIVE BASE EXCESS, VEN: 6 (ref 0–2)
NITRITE, URINE: NEGATIVE
NRBC AUTOMATED: 1.4 PER 100 WBC
O2 SAT, VEN: 30 % (ref 60–85)
O2 SAT, VEN: 33 % (ref 60–85)
PARAINFLUENZA 1 PCR: NOT DETECTED
PARAINFLUENZA 2 PCR: NOT DETECTED
PARAINFLUENZA 3 PCR: NOT DETECTED
PARAINFLUENZA 4 PCR: NOT DETECTED
PARTIAL THROMBOPLASTIN TIME: 22.8 SEC (ref 20.5–30.5)
PCO2, VEN: 33.6 MM HG (ref 41–51)
PCO2, VEN: 44.4 MM HG (ref 41–51)
PDW BLD-RTO: 16.4 % (ref 11.8–14.4)
PH UA: 6 (ref 5–8)
PH VENOUS: 7.29 (ref 7.32–7.43)
PH VENOUS: 7.35 (ref 7.32–7.43)
PHOSPHORUS: 3.4 MG/DL (ref 2.5–4.5)
PLATELET # BLD: 392 K/UL (ref 138–453)
PMV BLD AUTO: 9.6 FL (ref 8.1–13.5)
PO2, VEN: 21 MM HG (ref 30–50)
PO2, VEN: 21.6 MM HG (ref 30–50)
POC BUN: 24 MG/DL (ref 8–26)
POC BUN: 25 MG/DL (ref 8–26)
POC CHLORIDE: 108 MMOL/L (ref 98–107)
POC CHLORIDE: 111 MMOL/L (ref 98–107)
POC CREATININE: 1 MG/DL (ref 0.51–1.19)
POC CREATININE: 1.02 MG/DL (ref 0.51–1.19)
POC HEMATOCRIT: 15 % (ref 41–53)
POC HEMATOCRIT: 17 % (ref 41–53)
POC HEMOGLOBIN: 5.2 G/DL (ref 13.5–17.5)
POC HEMOGLOBIN: 5.9 G/DL (ref 13.5–17.5)
POC IONIZED CALCIUM: 1.17 MMOL/L (ref 1.15–1.33)
POC IONIZED CALCIUM: 1.21 MMOL/L (ref 1.15–1.33)
POC LACTIC ACID: 1.85 MMOL/L (ref 0.56–1.39)
POC LACTIC ACID: 4.51 MMOL/L (ref 0.56–1.39)
POC POTASSIUM: 4.1 MMOL/L (ref 3.5–4.5)
POC POTASSIUM: 4.3 MMOL/L (ref 3.5–4.5)
POC SODIUM: 140 MMOL/L (ref 138–146)
POC SODIUM: 141 MMOL/L (ref 138–146)
POC TCO2: 18 MMOL/L (ref 22–30)
POC TCO2: 21 MMOL/L (ref 22–30)
POTASSIUM SERPL-SCNC: 4.1 MMOL/L (ref 3.7–5.3)
PRO-BNP: 242 PG/ML
PROCALCITONIN: 0.05 NG/ML
PROTEIN UA: NEGATIVE
PROTHROMBIN TIME: 11.8 SEC (ref 9.1–12.3)
RBC # BLD: 1.78 M/UL (ref 4.21–5.77)
RESP SYNCYTIAL VIRUS PCR: NOT DETECTED
RHINO/ENTEROVIRUS PCR: NOT DETECTED
SARS-COV-2, PCR: NOT DETECTED
SEDIMENTATION RATE, ERYTHROCYTE: 3 MM/HR (ref 0–20)
SEG NEUTROPHILS: 64 % (ref 36–65)
SEGMENTED NEUTROPHILS ABSOLUTE COUNT: 9.6 K/UL (ref 1.5–8.1)
SODIUM BLD-SCNC: 139 MMOL/L (ref 135–144)
SOURCE: NORMAL
SPECIFIC GRAVITY UA: 1.02 (ref 1–1.03)
SPECIMEN DESCRIPTION: NORMAL
STREP PNEUMONIAE ANTIGEN: NEGATIVE
TOTAL PROTEIN: 6.3 G/DL (ref 6.4–8.3)
TROPONIN, HIGH SENSITIVITY: 233 NG/L (ref 0–22)
TROPONIN, HIGH SENSITIVITY: 58 NG/L (ref 0–22)
TROPONIN, HIGH SENSITIVITY: 63 NG/L (ref 0–22)
TURBIDITY: CLEAR
URINE HGB: NEGATIVE
UROBILINOGEN, URINE: ABNORMAL
WBC # BLD: 15 K/UL (ref 3.5–11.3)

## 2022-11-26 PROCEDURE — 81003 URINALYSIS AUTO W/O SCOPE: CPT

## 2022-11-26 PROCEDURE — 86738 MYCOPLASMA ANTIBODY: CPT

## 2022-11-26 PROCEDURE — 2580000003 HC RX 258: Performed by: STUDENT IN AN ORGANIZED HEALTH CARE EDUCATION/TRAINING PROGRAM

## 2022-11-26 PROCEDURE — 83036 HEMOGLOBIN GLYCOSYLATED A1C: CPT

## 2022-11-26 PROCEDURE — 6370000000 HC RX 637 (ALT 250 FOR IP)

## 2022-11-26 PROCEDURE — 99222 1ST HOSP IP/OBS MODERATE 55: CPT | Performed by: FAMILY MEDICINE

## 2022-11-26 PROCEDURE — 87449 NOS EACH ORGANISM AG IA: CPT

## 2022-11-26 PROCEDURE — 71045 X-RAY EXAM CHEST 1 VIEW: CPT

## 2022-11-26 PROCEDURE — 85610 PROTHROMBIN TIME: CPT

## 2022-11-26 PROCEDURE — 80076 HEPATIC FUNCTION PANEL: CPT

## 2022-11-26 PROCEDURE — 86900 BLOOD TYPING SEROLOGIC ABO: CPT

## 2022-11-26 PROCEDURE — 82947 ASSAY GLUCOSE BLOOD QUANT: CPT

## 2022-11-26 PROCEDURE — 6360000002 HC RX W HCPCS

## 2022-11-26 PROCEDURE — 86850 RBC ANTIBODY SCREEN: CPT

## 2022-11-26 PROCEDURE — 83605 ASSAY OF LACTIC ACID: CPT

## 2022-11-26 PROCEDURE — 87205 SMEAR GRAM STAIN: CPT

## 2022-11-26 PROCEDURE — 94761 N-INVAS EAR/PLS OXIMETRY MLT: CPT

## 2022-11-26 PROCEDURE — 82565 ASSAY OF CREATININE: CPT

## 2022-11-26 PROCEDURE — 6370000000 HC RX 637 (ALT 250 FOR IP): Performed by: STUDENT IN AN ORGANIZED HEALTH CARE EDUCATION/TRAINING PROGRAM

## 2022-11-26 PROCEDURE — 0202U NFCT DS 22 TRGT SARS-COV-2: CPT

## 2022-11-26 PROCEDURE — 30233N1 TRANSFUSION OF NONAUTOLOGOUS RED BLOOD CELLS INTO PERIPHERAL VEIN, PERCUTANEOUS APPROACH: ICD-10-PCS | Performed by: STUDENT IN AN ORGANIZED HEALTH CARE EDUCATION/TRAINING PROGRAM

## 2022-11-26 PROCEDURE — 2060000000 HC ICU INTERMEDIATE R&B

## 2022-11-26 PROCEDURE — 86920 COMPATIBILITY TEST SPIN: CPT

## 2022-11-26 PROCEDURE — 94664 DEMO&/EVAL PT USE INHALER: CPT

## 2022-11-26 PROCEDURE — 82330 ASSAY OF CALCIUM: CPT

## 2022-11-26 PROCEDURE — 2580000003 HC RX 258

## 2022-11-26 PROCEDURE — 87641 MR-STAPH DNA AMP PROBE: CPT

## 2022-11-26 PROCEDURE — 96374 THER/PROPH/DIAG INJ IV PUSH: CPT

## 2022-11-26 PROCEDURE — 85018 HEMOGLOBIN: CPT

## 2022-11-26 PROCEDURE — 85025 COMPLETE CBC W/AUTO DIFF WBC: CPT

## 2022-11-26 PROCEDURE — 82803 BLOOD GASES ANY COMBINATION: CPT

## 2022-11-26 PROCEDURE — 80048 BASIC METABOLIC PNL TOTAL CA: CPT

## 2022-11-26 PROCEDURE — 84100 ASSAY OF PHOSPHORUS: CPT

## 2022-11-26 PROCEDURE — 2700000000 HC OXYGEN THERAPY PER DAY

## 2022-11-26 PROCEDURE — 6360000002 HC RX W HCPCS: Performed by: STUDENT IN AN ORGANIZED HEALTH CARE EDUCATION/TRAINING PROGRAM

## 2022-11-26 PROCEDURE — 83735 ASSAY OF MAGNESIUM: CPT

## 2022-11-26 PROCEDURE — 36430 TRANSFUSION BLD/BLD COMPNT: CPT

## 2022-11-26 PROCEDURE — 86140 C-REACTIVE PROTEIN: CPT

## 2022-11-26 PROCEDURE — 96361 HYDRATE IV INFUSION ADD-ON: CPT

## 2022-11-26 PROCEDURE — A4216 STERILE WATER/SALINE, 10 ML: HCPCS

## 2022-11-26 PROCEDURE — 36415 COLL VENOUS BLD VENIPUNCTURE: CPT

## 2022-11-26 PROCEDURE — 87070 CULTURE OTHR SPECIMN AEROBIC: CPT

## 2022-11-26 PROCEDURE — 84484 ASSAY OF TROPONIN QUANT: CPT

## 2022-11-26 PROCEDURE — 86901 BLOOD TYPING SEROLOGIC RH(D): CPT

## 2022-11-26 PROCEDURE — 6360000002 HC RX W HCPCS: Performed by: INTERNAL MEDICINE

## 2022-11-26 PROCEDURE — 85730 THROMBOPLASTIN TIME PARTIAL: CPT

## 2022-11-26 PROCEDURE — C9113 INJ PANTOPRAZOLE SODIUM, VIA: HCPCS

## 2022-11-26 PROCEDURE — 84520 ASSAY OF UREA NITROGEN: CPT

## 2022-11-26 PROCEDURE — 87040 BLOOD CULTURE FOR BACTERIA: CPT

## 2022-11-26 PROCEDURE — 99222 1ST HOSP IP/OBS MODERATE 55: CPT | Performed by: INTERNAL MEDICINE

## 2022-11-26 PROCEDURE — 87899 AGENT NOS ASSAY W/OPTIC: CPT

## 2022-11-26 PROCEDURE — 94660 CPAP INITIATION&MGMT: CPT

## 2022-11-26 PROCEDURE — 2500000003 HC RX 250 WO HCPCS

## 2022-11-26 PROCEDURE — 99285 EMERGENCY DEPT VISIT HI MDM: CPT

## 2022-11-26 PROCEDURE — 80051 ELECTROLYTE PANEL: CPT

## 2022-11-26 PROCEDURE — 85014 HEMATOCRIT: CPT

## 2022-11-26 PROCEDURE — 94640 AIRWAY INHALATION TREATMENT: CPT

## 2022-11-26 PROCEDURE — 83880 ASSAY OF NATRIURETIC PEPTIDE: CPT

## 2022-11-26 PROCEDURE — 93005 ELECTROCARDIOGRAM TRACING: CPT | Performed by: FAMILY MEDICINE

## 2022-11-26 PROCEDURE — P9016 RBC LEUKOCYTES REDUCED: HCPCS

## 2022-11-26 PROCEDURE — 84145 PROCALCITONIN (PCT): CPT

## 2022-11-26 PROCEDURE — 85652 RBC SED RATE AUTOMATED: CPT

## 2022-11-26 RX ORDER — SODIUM CHLORIDE 0.9 % (FLUSH) 0.9 %
5-40 SYRINGE (ML) INJECTION EVERY 12 HOURS SCHEDULED
Status: DISCONTINUED | OUTPATIENT
Start: 2022-11-26 | End: 2022-11-29 | Stop reason: HOSPADM

## 2022-11-26 RX ORDER — BUTALBITAL, ACETAMINOPHEN AND CAFFEINE 50; 325; 40 MG/1; MG/1; MG/1
1 TABLET ORAL EVERY 6 HOURS PRN
Status: DISCONTINUED | OUTPATIENT
Start: 2022-11-26 | End: 2022-11-29 | Stop reason: HOSPADM

## 2022-11-26 RX ORDER — AMIODARONE HYDROCHLORIDE 200 MG/1
200 TABLET ORAL DAILY
Status: DISCONTINUED | OUTPATIENT
Start: 2022-11-26 | End: 2022-11-26

## 2022-11-26 RX ORDER — KETOROLAC TROMETHAMINE 15 MG/ML
15 INJECTION, SOLUTION INTRAMUSCULAR; INTRAVENOUS ONCE
Status: COMPLETED | OUTPATIENT
Start: 2022-11-26 | End: 2022-11-26

## 2022-11-26 RX ORDER — ALBUTEROL SULFATE 2.5 MG/3ML
2.5 SOLUTION RESPIRATORY (INHALATION)
Status: DISCONTINUED | OUTPATIENT
Start: 2022-11-26 | End: 2022-11-29 | Stop reason: HOSPADM

## 2022-11-26 RX ORDER — FENTANYL CITRATE 50 UG/ML
50 INJECTION, SOLUTION INTRAMUSCULAR; INTRAVENOUS ONCE
Status: COMPLETED | OUTPATIENT
Start: 2022-11-26 | End: 2022-11-26

## 2022-11-26 RX ORDER — POTASSIUM CHLORIDE 20 MEQ/1
40 TABLET, EXTENDED RELEASE ORAL PRN
Status: DISCONTINUED | OUTPATIENT
Start: 2022-11-26 | End: 2022-11-29 | Stop reason: HOSPADM

## 2022-11-26 RX ORDER — ACETAMINOPHEN 325 MG/1
650 TABLET ORAL EVERY 6 HOURS PRN
Status: DISCONTINUED | OUTPATIENT
Start: 2022-11-26 | End: 2022-11-28

## 2022-11-26 RX ORDER — ONDANSETRON 4 MG/1
4 TABLET, ORALLY DISINTEGRATING ORAL EVERY 8 HOURS PRN
Status: DISCONTINUED | OUTPATIENT
Start: 2022-11-26 | End: 2022-11-29 | Stop reason: HOSPADM

## 2022-11-26 RX ORDER — ACETAMINOPHEN 650 MG/1
650 SUPPOSITORY RECTAL EVERY 6 HOURS PRN
Status: DISCONTINUED | OUTPATIENT
Start: 2022-11-26 | End: 2022-11-28

## 2022-11-26 RX ORDER — FUROSEMIDE 10 MG/ML
20 INJECTION INTRAMUSCULAR; INTRAVENOUS ONCE
Status: COMPLETED | OUTPATIENT
Start: 2022-11-26 | End: 2022-11-26

## 2022-11-26 RX ORDER — PANTOPRAZOLE SODIUM 40 MG/1
40 TABLET, DELAYED RELEASE ORAL
Status: DISCONTINUED | OUTPATIENT
Start: 2022-11-26 | End: 2022-11-26

## 2022-11-26 RX ORDER — DIPHENHYDRAMINE HYDROCHLORIDE 50 MG/ML
50 INJECTION INTRAMUSCULAR; INTRAVENOUS ONCE
Status: COMPLETED | OUTPATIENT
Start: 2022-11-26 | End: 2022-11-26

## 2022-11-26 RX ORDER — CLOPIDOGREL BISULFATE 75 MG/1
75 TABLET ORAL DAILY
Status: DISCONTINUED | OUTPATIENT
Start: 2022-11-26 | End: 2022-11-29 | Stop reason: HOSPADM

## 2022-11-26 RX ORDER — 0.9 % SODIUM CHLORIDE 0.9 %
1000 INTRAVENOUS SOLUTION INTRAVENOUS ONCE
Status: COMPLETED | OUTPATIENT
Start: 2022-11-26 | End: 2022-11-26

## 2022-11-26 RX ORDER — ALBUTEROL SULFATE 90 UG/1
2 AEROSOL, METERED RESPIRATORY (INHALATION) EVERY 6 HOURS PRN
Status: DISCONTINUED | OUTPATIENT
Start: 2022-11-26 | End: 2022-11-26

## 2022-11-26 RX ORDER — AMLODIPINE BESYLATE 5 MG/1
5 TABLET ORAL DAILY
Status: DISCONTINUED | OUTPATIENT
Start: 2022-11-27 | End: 2022-11-28

## 2022-11-26 RX ORDER — SODIUM CHLORIDE 9 MG/ML
INJECTION, SOLUTION INTRAVENOUS PRN
Status: DISCONTINUED | OUTPATIENT
Start: 2022-11-26 | End: 2022-11-26

## 2022-11-26 RX ORDER — METRONIDAZOLE 500 MG/100ML
500 INJECTION, SOLUTION INTRAVENOUS EVERY 8 HOURS
Status: DISCONTINUED | OUTPATIENT
Start: 2022-11-26 | End: 2022-11-26

## 2022-11-26 RX ORDER — GABAPENTIN 600 MG/1
300 TABLET ORAL EVERY 8 HOURS SCHEDULED
Status: DISCONTINUED | OUTPATIENT
Start: 2022-11-26 | End: 2022-11-29 | Stop reason: HOSPADM

## 2022-11-26 RX ORDER — SODIUM CHLORIDE, SODIUM LACTATE, POTASSIUM CHLORIDE, CALCIUM CHLORIDE 600; 310; 30; 20 MG/100ML; MG/100ML; MG/100ML; MG/100ML
INJECTION, SOLUTION INTRAVENOUS CONTINUOUS
Status: DISCONTINUED | OUTPATIENT
Start: 2022-11-26 | End: 2022-11-27

## 2022-11-26 RX ORDER — METHYLPREDNISOLONE SODIUM SUCCINATE 40 MG/ML
40 INJECTION, POWDER, LYOPHILIZED, FOR SOLUTION INTRAMUSCULAR; INTRAVENOUS EVERY 8 HOURS
Status: DISCONTINUED | OUTPATIENT
Start: 2022-11-27 | End: 2022-11-28

## 2022-11-26 RX ORDER — NITROGLYCERIN 0.4 MG/1
0.4 TABLET SUBLINGUAL EVERY 5 MIN PRN
Status: DISCONTINUED | OUTPATIENT
Start: 2022-11-26 | End: 2022-11-26

## 2022-11-26 RX ORDER — SODIUM CHLORIDE 9 MG/ML
INJECTION, SOLUTION INTRAVENOUS PRN
Status: DISCONTINUED | OUTPATIENT
Start: 2022-11-26 | End: 2022-11-28

## 2022-11-26 RX ORDER — AMIODARONE HYDROCHLORIDE 200 MG/1
200 TABLET ORAL DAILY
Status: DISCONTINUED | OUTPATIENT
Start: 2022-11-27 | End: 2022-11-29 | Stop reason: HOSPADM

## 2022-11-26 RX ORDER — LABETALOL HYDROCHLORIDE 5 MG/ML
10 INJECTION, SOLUTION INTRAVENOUS EVERY 4 HOURS PRN
Status: DISCONTINUED | OUTPATIENT
Start: 2022-11-26 | End: 2022-11-29 | Stop reason: HOSPADM

## 2022-11-26 RX ORDER — DOXYCYCLINE HYCLATE 100 MG
100 TABLET ORAL EVERY 12 HOURS SCHEDULED
Status: DISCONTINUED | OUTPATIENT
Start: 2022-11-27 | End: 2022-11-29 | Stop reason: HOSPADM

## 2022-11-26 RX ORDER — ISOSORBIDE MONONITRATE 60 MG/1
60 TABLET, EXTENDED RELEASE ORAL DAILY
Status: DISCONTINUED | OUTPATIENT
Start: 2022-11-26 | End: 2022-11-29 | Stop reason: HOSPADM

## 2022-11-26 RX ORDER — POTASSIUM CHLORIDE 7.45 MG/ML
10 INJECTION INTRAVENOUS PRN
Status: DISCONTINUED | OUTPATIENT
Start: 2022-11-26 | End: 2022-11-29 | Stop reason: HOSPADM

## 2022-11-26 RX ORDER — ASPIRIN 81 MG/1
81 TABLET ORAL DAILY
Status: DISCONTINUED | OUTPATIENT
Start: 2022-11-26 | End: 2022-11-29 | Stop reason: HOSPADM

## 2022-11-26 RX ORDER — MAGNESIUM SULFATE IN WATER 40 MG/ML
2000 INJECTION, SOLUTION INTRAVENOUS PRN
Status: DISCONTINUED | OUTPATIENT
Start: 2022-11-26 | End: 2022-11-29 | Stop reason: HOSPADM

## 2022-11-26 RX ORDER — METHYLPREDNISOLONE SODIUM SUCCINATE 125 MG/2ML
125 INJECTION, POWDER, LYOPHILIZED, FOR SOLUTION INTRAMUSCULAR; INTRAVENOUS ONCE
Status: COMPLETED | OUTPATIENT
Start: 2022-11-26 | End: 2022-11-26

## 2022-11-26 RX ORDER — ATORVASTATIN CALCIUM 80 MG/1
40 TABLET, FILM COATED ORAL DAILY
Status: DISCONTINUED | OUTPATIENT
Start: 2022-11-26 | End: 2022-11-26

## 2022-11-26 RX ORDER — ONDANSETRON 2 MG/ML
4 INJECTION INTRAMUSCULAR; INTRAVENOUS EVERY 6 HOURS PRN
Status: DISCONTINUED | OUTPATIENT
Start: 2022-11-26 | End: 2022-11-29 | Stop reason: HOSPADM

## 2022-11-26 RX ORDER — POLYETHYLENE GLYCOL 3350 17 G/17G
17 POWDER, FOR SOLUTION ORAL DAILY PRN
Status: DISCONTINUED | OUTPATIENT
Start: 2022-11-26 | End: 2022-11-29 | Stop reason: HOSPADM

## 2022-11-26 RX ORDER — SODIUM CHLORIDE 0.9 % (FLUSH) 0.9 %
5-40 SYRINGE (ML) INJECTION PRN
Status: DISCONTINUED | OUTPATIENT
Start: 2022-11-26 | End: 2022-11-29 | Stop reason: HOSPADM

## 2022-11-26 RX ORDER — ATORVASTATIN CALCIUM 80 MG/1
80 TABLET, FILM COATED ORAL DAILY
Status: DISCONTINUED | OUTPATIENT
Start: 2022-11-27 | End: 2022-11-29

## 2022-11-26 RX ORDER — BUDESONIDE AND FORMOTEROL FUMARATE DIHYDRATE 160; 4.5 UG/1; UG/1
2 AEROSOL RESPIRATORY (INHALATION) 2 TIMES DAILY
Status: DISCONTINUED | OUTPATIENT
Start: 2022-11-26 | End: 2022-11-29 | Stop reason: HOSPADM

## 2022-11-26 RX ORDER — AMLODIPINE BESYLATE 5 MG/1
5 TABLET ORAL DAILY
Status: DISCONTINUED | OUTPATIENT
Start: 2022-11-26 | End: 2022-11-26

## 2022-11-26 RX ADMIN — NITROGLYCERIN 0.4 MG: 0.4 TABLET SUBLINGUAL at 13:15

## 2022-11-26 RX ADMIN — SODIUM CHLORIDE, PRESERVATIVE FREE 40 MG: 5 INJECTION INTRAVENOUS at 15:02

## 2022-11-26 RX ADMIN — SODIUM CHLORIDE 1000 ML: 9 INJECTION, SOLUTION INTRAVENOUS at 08:22

## 2022-11-26 RX ADMIN — AMLODIPINE BESYLATE 5 MG: 5 TABLET ORAL at 12:58

## 2022-11-26 RX ADMIN — CEFEPIME 2000 MG: 2 INJECTION, POWDER, FOR SOLUTION INTRAVENOUS at 15:40

## 2022-11-26 RX ADMIN — KETOROLAC TROMETHAMINE 15 MG: 15 INJECTION, SOLUTION INTRAMUSCULAR; INTRAVENOUS at 13:14

## 2022-11-26 RX ADMIN — BUTALBITAL, ACETAMINOPHEN AND CAFFEINE 1 TABLET: 50; 325; 40 TABLET ORAL at 21:59

## 2022-11-26 RX ADMIN — ALBUTEROL SULFATE 2.5 MG: 2.5 SOLUTION RESPIRATORY (INHALATION) at 20:51

## 2022-11-26 RX ADMIN — GABAPENTIN 300 MG: 600 TABLET ORAL at 13:51

## 2022-11-26 RX ADMIN — FUROSEMIDE 20 MG: 10 INJECTION, SOLUTION INTRAMUSCULAR; INTRAVENOUS at 19:37

## 2022-11-26 RX ADMIN — SODIUM CHLORIDE, POTASSIUM CHLORIDE, SODIUM LACTATE AND CALCIUM CHLORIDE: 600; 310; 30; 20 INJECTION, SOLUTION INTRAVENOUS at 15:22

## 2022-11-26 RX ADMIN — ATORVASTATIN CALCIUM 40 MG: 80 TABLET, FILM COATED ORAL at 12:58

## 2022-11-26 RX ADMIN — Medication 1000 MG: at 17:12

## 2022-11-26 RX ADMIN — METOPROLOL TARTRATE 25 MG: 50 TABLET, FILM COATED ORAL at 12:58

## 2022-11-26 RX ADMIN — METHYLPREDNISOLONE SODIUM SUCCINATE 125 MG: 125 INJECTION, POWDER, FOR SOLUTION INTRAMUSCULAR; INTRAVENOUS at 19:59

## 2022-11-26 RX ADMIN — GABAPENTIN 300 MG: 600 TABLET ORAL at 21:07

## 2022-11-26 RX ADMIN — ISOSORBIDE MONONITRATE 60 MG: 60 TABLET ORAL at 12:58

## 2022-11-26 RX ADMIN — SODIUM CHLORIDE, PRESERVATIVE FREE 10 ML: 5 INJECTION INTRAVENOUS at 20:00

## 2022-11-26 RX ADMIN — FENTANYL CITRATE 50 MCG: 50 INJECTION, SOLUTION INTRAMUSCULAR; INTRAVENOUS at 09:19

## 2022-11-26 RX ADMIN — AMIODARONE HYDROCHLORIDE 200 MG: 200 TABLET ORAL at 12:58

## 2022-11-26 RX ADMIN — METOPROLOL TARTRATE 25 MG: 25 TABLET ORAL at 21:07

## 2022-11-26 RX ADMIN — METRONIDAZOLE 500 MG: 500 INJECTION, SOLUTION INTRAVENOUS at 16:00

## 2022-11-26 RX ADMIN — BUDESONIDE AND FORMOTEROL FUMARATE DIHYDRATE 2 PUFF: 160; 4.5 AEROSOL RESPIRATORY (INHALATION) at 20:52

## 2022-11-26 RX ADMIN — DIPHENHYDRAMINE HYDROCHLORIDE 50 MG: 50 INJECTION, SOLUTION INTRAMUSCULAR; INTRAVENOUS at 13:12

## 2022-11-26 ASSESSMENT — ENCOUNTER SYMPTOMS
EYES NEGATIVE: 1
ABDOMINAL PAIN: 0
SORE THROAT: 0
SHORTNESS OF BREATH: 1
CONSTIPATION: 1
CHEST TIGHTNESS: 1
BLOOD IN STOOL: 1
DIARRHEA: 0
COUGH: 0
NAUSEA: 0
VOMITING: 0
TACHYPNEA: 1

## 2022-11-26 ASSESSMENT — LIFESTYLE VARIABLES
HOW OFTEN DO YOU HAVE A DRINK CONTAINING ALCOHOL: NEVER
HOW MANY STANDARD DRINKS CONTAINING ALCOHOL DO YOU HAVE ON A TYPICAL DAY: PATIENT DOES NOT DRINK

## 2022-11-26 ASSESSMENT — PAIN SCALES - GENERAL
PAINLEVEL_OUTOF10: 7
PAINLEVEL_OUTOF10: 7
PAINLEVEL_OUTOF10: 0

## 2022-11-26 NOTE — ED NOTES
Admitting team re paged, notified patient is still in pain, requesting orders for chest pain and headache.       Oliverio Arthur RN  11/26/22 8166

## 2022-11-26 NOTE — CONSENT
Informed Consent for Blood Component Transfusion Note    I have discussed with the patient the rationale for blood component transfusion; its benefits in treating or preventing fatigue, organ damage, or death; and its risk which includes mild transfusion reactions, rare risk of blood borne infection, or more serious but rare reactions. I have discussed the alternatives to transfusion, including the risk and consequences of not receiving transfusion. The patient had an opportunity to ask questions and had agreed to proceed with transfusion of blood components.     Electronically signed by Leonela Birmingham MD on 11/26/22 at 3:43 PM EST

## 2022-11-26 NOTE — ED NOTES
Pt A&Ox4, RR even and nonlabored, NAD. Pt states headache is better. Pt resting on cot with full cardiac monitor on. Pt denies other needs at this time.      Artis Vargas RN  11/26/22 7215

## 2022-11-26 NOTE — ED NOTES
Pt sister, Davis Enciso, updated about pt status and plan of care with verbal permission from pt.      Melanie Teixeira RN  11/26/22 1038

## 2022-11-26 NOTE — ED NOTES
Pt provided urinal, updated on plan of care, denies needs, NAD noted, non labored RR        Christiano Covarrubias, RN  11/26/22 4444

## 2022-11-26 NOTE — ED NOTES
Repeat HGB is 5.9, admitting team and vascular team (at bedside) notified at this time. Awaiting further orders and blood products.        Sierra Garcia RN  11/26/22 7074

## 2022-11-26 NOTE — ED NOTES
Pt received warm blanket. Pt states work of breathing is better with bipap, pt now able to sit still. Xray at bedside.      Luis Rhodes RN  11/26/22 2034

## 2022-11-26 NOTE — PROGRESS NOTES
11/26/22 1819   Respiratory   Respiratory Pattern Tachypneic   Respiratory Depth Normal   Respiratory Quality/Effort Dyspnea with exertion;Dyspnea at rest   Chest Assessment Chest expansion symmetrical   L Breath Sounds Expiratory Wheezes   R Breath Sounds Expiratory Wheezes   Additional Respiratory Assessments   Heart Rate 67   Resp 24   SpO2 100 %     Respiratory Assessment/Evaluation     [x]         IMPROVE AERATION/BREATH SOUNDS  [x]   ADMINISTER BRONCHODILATOR THERAPY AS APPROPRIATE   [x]   ASSESS BREATH SOUNDS  [x]   PATIENT EDUCATION AS NEEDED    Ras Doran RCP     Patient Assessment complete. Shortness of breath [R06.02]  Acute blood loss anemia [D62]  Chest pain [R07.9]  NSTEMI (non-ST elevated myocardial infarction) (HCC) [I21.4]  Chest pain, unspecified type [R07.9] .

## 2022-11-26 NOTE — ED NOTES
Spoke with Crit care over phone. States pt is able to go to 4A floor.      Khadar Dorman RN  11/26/22 4500

## 2022-11-26 NOTE — ED NOTES
Pt arrives to ED by EMS for SOB/chest pain. Pt states it woke him up from sleep this morning. Pt reports taking nitro at home with no relief. Pt states chest pain radiates to teeth and arm and is worse with exertion. Pt has hx afib and had cardiac stent placed in sept. Pt arrives with 5L NC, states he cannot breathe, becoming restless, unable to sit still. Pt A&Ox4, RR fast and labored.  RT notified     Melanie Teixeira RN  11/26/22 0004

## 2022-11-26 NOTE — ED NOTES
Pt A&Ox4, NAD. Pt on 4L NC. Pt resting on cot with full cardiac monitor on, RN at bedside for blood transfusion.      Artis Vargas RN  11/26/22 5882

## 2022-11-26 NOTE — ED PROVIDER NOTES
[facility]     Emergency Department     Faculty Attestation        I performed a history and physical examination of the patient and discussed management with the resident. I reviewed the residents note and agree with the documented findings and plan of care. Any areas of disagreement are noted on the chart. I was personally present for the key portions of any procedures. I have documented in the chart those procedures where I was not present during the key portions. I have reviewed the emergency nurses triage note. I agree with the chief complaint, past medical history, past surgical history, allergies, medications, social and family history as documented unless otherwise noted below. For mid-level providers such as nurse practitioners as well as physicians assistants:    I have personally seen and evaluated the patient. I find the patient's history and physical exam are consistent with NP/PA documentation. I agree with the care provided, treatment rendered, disposition, & follow-up plan. Additional findings are as noted. Vital Signs: BP (!) 145/69   Pulse 91   Temp 97.8 °F (36.6 °C) (Axillary)   Resp 15   SpO2 100%   PCP:  Abelardo Vásquez DO    Pertinent Comments: This was chest pain and shortness of breath. He has history of A. fib is on Eliquis and recently had a carotid stent placed and is on Plavix as well. He states he has had dark tarry stools for the past couple days. He came in respiratory distress and pale and diaphoretic with anginal symptoms including chest pain rating to his jaw. Recently had a cath in September but had no intervention at that time. His EKG shows diffuse ST depression with elevation in aVR which is changed from his last EKG. This was discussed with interventional cardiology and the fact that his hemoglobin is 5 fits more global hypoperfusion of the coronary arteries rather than acute STEMI. We will transfuse, laboratory studies, x-ray, BiPAP, admission    Due to the immediate potential for life-threatening deterioration due to shortness of breath requiring BiPAP, I spent 18 minutes providing critical care. This time is excluding time spent performing procedures.             Juliana Evans MD    Attending Emergency Medicine Physician            Britney Varela MD  11/26/22 98

## 2022-11-26 NOTE — PROGRESS NOTES
I have discussed with the patient the rationale for blood component transfusion; its benefits in treating or preventing fatigue, organ damage, or death; and its risk which includes mild transfusion reactions, rare risk of blood borne infection, or more serious but rare reactions. I have discussed the alternatives to transfusion, including the risk and consequences of not receiving transfusion. The patient had an opportunity to ask questions and had agreed to proceed with transfusion of blood components. Consent form signed. Witnessed by nurses Haseeb Cristobal and Lizet Pringle.     Porfirio Bennett DO

## 2022-11-26 NOTE — ED NOTES
Trop increased from 63 to 233. Admitting team paged and notified. Awaiting further orders with the increased trop.       Jennifer Shelton RN  11/26/22 3241

## 2022-11-26 NOTE — ED NOTES
Blood bank called, sending blood now. Lab called with critical trop of 58, resident notified.      Paolo Cao RN  11/26/22 2929

## 2022-11-26 NOTE — ED NOTES
Pt A&Ox4, NAD. Pt remains on bipap. Pt resting on cot with full cardiac monitor on watching tv. Pt denies needs at this time.      Mansi Salcedo RN  11/26/22 1109

## 2022-11-26 NOTE — ED NOTES
Admitting team paged, notified patient is till complaining of headache, chest pain, requesting medication. Family at bedside.       Wes Bell RN  11/26/22 0009

## 2022-11-26 NOTE — ED NOTES
Patient is pale, short of breath, oxygen level increased to 4L via NC. Admitting team paged and notified, awaiting further orders and call back from cardiology.       Jennifer Shelton RN  11/26/22 5443

## 2022-11-26 NOTE — CONSULTS
[facility]   Consult Note         Today's Date: 11/26/2022  Patient Name: Mable Antonio  Date of admission: 11/26/2022  7:55 AM  Patient's age: 57 y.o., 1960  Admission Dx: Chest pain [R07.9]    Reason for Consult:  Cardiac evaluation    Requesting Physician: Negro Avendano DO    REASON FOR CONSULT:  NSTEMI    History Obtained From:  Patient, chart, staff, records    HISTORY OF PRESENT ILLNESS:      The patient is a 57 y.o. male with past medical history of nonobstructive CAD, hypertension, carotid stenosis s/p stent, hyperlipidemia, COPD, GERD and sleep apnea who presented to the ER with worsening chest pain radiating to his jaw along with shortness of breath. He states that he was having difficulty breathing for the last week. This morning he woke up with acute worsening of dyspnea and also experiencing chest pain. He states the chest pain was substernal radiating to his left side. He took nitro tablets but did not found any relief. He denied diaphoresis, dizziness and palpitations. Patient is in respiratory distress and placed on BiPAP in the ER. His blood pressure is also elevated with systolic in 072F. EKG shows sinus tachycardia with nonspecific changes. Troponins elevated to 58 > 63 and proBNP is 242. His initial labs shows Hb of 4.6 which is an acute drop from 11. Patient does report noticing dark-colored stools for the last couple of days but ignored it as he said that he usually have dark stools when he takes Pepto-Bismol for his acidity. He is on Eliquis for A. fib and Plavix for recent carotid stents. He had a cardiac cath in September 2022 which showed minimal nonobstructive CAD. He is a former smoker quit in 2015 and does not use other illicits.     Past Medical History:   has a past medical history of Abnormal stress ECG, Angina pectoris (HCC), BPH (benign prostatic hyperplasia), CAD (coronary artery disease), Carotid stenosis, Carpal tunnel syndrome, Cervical stenosis of spine, Cervicalgia, Chronic back pain, COPD (chronic obstructive pulmonary disease) ([facility]), DDD (degenerative disc disease), Elbow fracture, right, Fractures, GERD (gastroesophageal reflux disease), Gout, Hyperlipidemia, Hypertension, Lung nodule, Mild depression, Morbidly obese (HCC), Occlusion of right carotid artery, Osteoarthritis of right knee, Pre-diabetes, Sinus headache, Sleep apnea, Sleep disturbance, Smoker, Snores, SOB (shortness of breath) on exertion, Tendonitis of foot, Tooth loose, Under care of team, Under care of team, and Wellness examination. Past Surgical History:   has a past surgical history that includes Colonoscopy; Upper gastrointestinal endoscopy; Cervical spine surgery (07/13/2012); fracture surgery; Cardiac catheterization (07/08/2014); Nerve Block (02/05/2016); hc inject other perphrl nerv (Bilateral, 05/09/2019); other surgical history (08/15/2019); Injection Procedure For Sacroiliac Joint (Bilateral, 08/15/2019); Nerve Block (Left, 02/07/2020); Nerve Block (Left, 02/07/2020); Pain management procedure (Left, 02/07/2020); Nerve Block (02/12/2021); Pain management procedure (Left, 02/12/2021); Nerve Block (Right, 02/19/2021); Pain management procedure (Right, 02/19/2021); Cardiac catheterization (09/16/2022); and vascular surgery (Right, 9/28/2022). Home Medications:    Prior to Admission medications    Medication Sig Start Date End Date Taking? Authorizing Provider   tiZANidine (ZANAFLEX) 4 MG tablet Take 1 tablet by mouth 2 times daily as needed (neck and back spasms) 11/7/22   Sade Salvador, DO   gabapentin (NEURONTIN) 800 MG tablet Take 1 tablet by mouth every 8 hours for 30 days.  11/7/22 12/7/22  Sade Salvador DO   amiodarone (CORDARONE) 200 MG tablet TAKE ONE TABLET BY MOUTH DAILY 10/17/22   Sade Salvador, DO   ELIQUIS 5 MG TABS tablet TAKE ONE TABLET BY MOUTH TWICE A DAY 10/17/22   Sade Salvador, DO   budesonide-formoterol [facility]) 160-4.5 MCG/ACT AERO INHALE TWO PUFFS BY MOUTH TWICE A DAY 9/17/22   Reymundo Valenzuela MD   aspirin 81 MG EC tablet Take 1 tablet by mouth daily 9/17/22   Reymundo Valenzuela MD   metoprolol tartrate (LOPRESSOR) 25 MG tablet Take 1 tablet by mouth 2 times daily 9/17/22   Reymundo Valenzuela MD   isosorbide mononitrate (IMDUR) 60 MG extended release tablet Take 1 tablet by mouth in the morning. 7/28/22   Historical Provider, MD   clopidogrel (PLAVIX) 75 MG tablet Take 1 tablet by mouth in the morning. 8/1/22   Marquis Hernandez MD   nitroGLYCERIN (NITROSTAT) 0.4 MG SL tablet Place 1 tablet under the tongue every 5 minutes as needed for Chest pain (not to exceed 3 tabs at a time) 6/3/22   Felipe Narvaez DO   amLODIPine (NORVASC) 5 MG tablet TAKE ONE TABLET BY MOUTH DAILY 4/22/22   Felipe Narvaez DO   albuterol sulfate  (90 Base) MCG/ACT inhaler Inhale 2 puffs into the lungs every 6 hours as needed for Wheezing 4/22/22   Felipe Narvaez DO   simvastatin (ZOCOR) 80 MG tablet TAKE ONE TABLET BY MOUTH EVERY EVENING 4/11/22   Felipe Narvaez DO           Allergies:  Patient has no known allergies. Social History:   reports that he has quit smoking. His smoking use included cigarettes. He started smoking about 47 years ago. He has a 10.00 pack-year smoking history. He quit smokeless tobacco use about 7 years ago. He reports that he does not currently use alcohol. He reports that he does not use drugs. Family History: family history includes COPD in his father; Cancer (age of onset: 64) in his maternal aunt; Cancer (age of onset: 61) in his maternal uncle; Heart Disease in his mother. No h/o sudden cardiac death. REVIEW OF SYSTEMS:    Constitutional: there has been no unanticipated weight loss. There's been No change in energy level, No change in activity level. Eyes: No visual changes or diplopia. No scleral icterus. ENT: No Headaches, hearing loss or vertigo.  No mouth sores or sore throat. Cardiovascular: per HPI  Respiratory: per HPI  Gastrointestinal: No abdominal pain, appetite loss, blood in stools. No change in bowel or bladder habits. Genitourinary: No dysuria, trouble voiding, or hematuria. Musculoskeletal:  No gait disturbance, No weakness or joint complaints. Integumentary: No rash or pruritis. Neurological: No headache, diplopia, change in muscle strength, numbness or tingling. No change in gait, balance, coordination, mood, affect, memory, mentation, behavior. Psychiatric: No anxiety, or depression. Endocrine: No temperature intolerance. No excessive thirst, fluid intake, or urination. No tremor. Hematologic/Lymphatic: No abnormal bruising or bleeding, blood clots or swollen lymph nodes. Allergic/Immunologic: No nasal congestion or hives. PHYSICAL EXAM:      BP (!) 128/53   Pulse 78   Temp 97.2 °F (36.2 °C) (Axillary)   Resp 16   SpO2 97%    Constitutional and General Appearance: alert, cooperative, no distress and appears stated age  HEENT: PERRL, no cervical lymphadenopathy. No masses palpable. Normal oral mucosa  Respiratory:  Normal excursion and expansion without use of accessory muscles  Resp Auscultation: Good respiratory effort. No for increased work of breathing. On auscultation: clear to auscultation bilaterally  Cardiovascular: The apical impulse is not displaced  Heart tones are crisp and normal. regular S1 and S2.  Jugular venous pulsation Normal  The carotid upstroke is normal in amplitude and contour without delay or bruit  Peripheral pulses are symmetrical and full   Abdomen:   No masses or tenderness  Bowel sounds present  Extremities:   No Cyanosis or Clubbing   Lower extremity edema: No   Skin: Warm and dry  Neurological:  Alert and oriented.   Moves all extremities well  No abnormalities of mood, affect, memory, mentation, or behavior are noted      Labs:     CBC:   Recent Labs     11/26/22  0814   WBC 15.0*   HGB 4.6*   HCT 16.8*    BMP:   Recent Labs     11/26/22  0808 11/26/22 0814   NA  --  139   K  --  4.1   CO2  --  17*   BUN  --  24*   CREATININE 1.00 0.86   LABGLOM  --  >60   GLUCOSE  --  144*     BNP: No results for input(s): BNP in the last 72 hours. PT/INR:   Recent Labs     11/26/22 0814   PROTIME 11.8   INR 1.1     APTT:  Recent Labs     11/26/22 0814   APTT 22.8     CARDIAC ENZYMES:No results for input(s): CKTOTAL, CKMB, CKMBINDEX, TROPONINI in the last 72 hours. FASTING LIPID PANEL:  Lab Results   Component Value Date/Time    HDL 36 05/10/2022 11:31 AM    LDLDIRECT 209 03/03/2016 11:09 AM    TRIG 237 05/10/2022 11:31 AM     LIVER PROFILE:No results for input(s): AST, ALT, LABALBU in the last 72 hours.   Troponins: Invalid input(s): TROPONIN     Other Current Problems  Patient Active Problem List   Diagnosis    Cervical stenosis of spine    Cervicalgia    COPD (chronic obstructive pulmonary disease) ([facility])    Smoker    Mild depression    GERD (gastroesophageal reflux disease)    Mixed hyperlipidemia    Carpal tunnel syndrome of left wrist    Medication monitoring encounter    DDD (degenerative disc disease), lumbar    Displacement of lumbar intervertebral disc without myelopathy    Therapeutic opioid induced constipation    Radiculopathy of cervical spine    Essential hypertension    Major depression, single episode, in complete remission ([facility] [address]    Prediabetes    Lung nodule    Allergic sinusitis    Arthritis    Spinal stenosis of lumbar region without neurogenic claudication    Morbidly obese ([facility])    Lumbosacral spondylosis without myelopathy    Encounter for long-term opiate analgesic use    Occlusion of right carotid artery    Left carotid stenosis    Sacroiliitis, not elsewhere classified ([facility] [address]    Recurrent UTI    BPH with urinary obstruction    Other retention of urine    Pneumonia    Atrial fibrillation with RVR ([facility])    Angina of effort ([facility])    Stenosis of right carotid artery    Status post carotid surgery    Chest pain       ECHO (5/17/2022):  left ventricle is normal in size and wall thickness. Global left ventricular systolic function is normal. Estimated LV EF >55 %. No obvious wall motion abnormality seen. Left atrium is normal in size. Normal right ventricular size and function. No significant valvular regurgitation or stenosis seen. Cardiac cath (09/16/2022):  minimal non-obstructive CAD. Normal LV systolic function. IMPRESSION:  NSTEMI type II, EKG shows sinus tachycardia with nonspecific changes, troponins elevated 58 > 63, currently slight chest discomfort that is atypical on the left side and shortness of breath  Acute anemia Hb 4.6, most likely secondary to GI bleed  Nonobstructive CAD -on Imdur  Paroxysmal A. Fib -on Eliquis and amiodarone at home, Eliquis currently held  Essential hypertension -on Klonopin 5 mg and metoprolol 25 mg twice daily  Carotid stenosis s/p stent -on aspirin and Plavix, currently held  Hyperlipidemia -on statin 80 mg    Recommendations:    Hold aspirin, Plavix and Eliquis due to acute anemia. Continue antihypertensives and statin for blood pressure management. Monitor H&H, transfuse PRBC as needed. GI work-up for acute blood loss anemia. No plans for cardiac workup at this time      Discussed with patient and Nurse. Renata Thomas MD  Internal Medicine Resident, PGY-1  [facility]. 11:08 AM on 11/26/2022      Attending Physician Statement  I have discussed the case of Lali Hubbard including pertinent history and exam findings with the resident. I have seen and examined the patient and the key elements of the encounter have been performed by me. I agree with the assessment, plan and orders as documented by the resident With changes made to the note.      Electronically signed by Levar Baron MD on 11/26/2022 at [address] [facility] [facility]      [phone]

## 2022-11-26 NOTE — ED NOTES
Pt complaining of headache pain. Admitting resident notified via perfectserve.      Nat Rodriguez RN  11/26/22 7990

## 2022-11-26 NOTE — PROGRESS NOTES
Bedside discussion with patient in the ED. FM IP Team present  Patient reports insidious swelling and SOB  Minimal activity resultant chest pain. Patient is well known to me. FM Admit note / H&P pending. Dx - Blood loss anemia - Hb 4.8  Patient reports compliance with DAPT and Eliquis. (Recent right carotid stent revascularization procedure - 06/2022 - Dr. Erickson Greenfield). Plan:  [facility]  Cardiology consult - noted elevation in troponins - NSTEMI w/u in process  Withdraw AC / AP therapies  Transfusion (in process) - GI consult pending  Monitor fluid volume status and gentle diuresis.   DEBBIE in am

## 2022-11-26 NOTE — PROGRESS NOTES
[address] Pharmacokinetic Monitoring Service - Vancomycin     Arlene Pandey is a 57 y.o. male starting on vancomycin therapy for HAP. Pharmacy consulted by Georgette Calloway for monitoring and adjustment. Target Concentration: Goal AUC/RICHY 400-600 mg*hr/L    Additional Antimicrobials: Flagyl and cefepime    Pertinent Laboratory Values: Wt Readings from Last 1 Encounters:   11/26/22 255 lb (115.7 kg)     Temp Readings from Last 1 Encounters:   11/26/22 97.4 °F (36.3 °C) (Axillary)     Estimated Creatinine Clearance: 92 mL/min (based on SCr of 1.02 mg/dL). Recent Labs     11/26/22  0814 11/26/22  1500   CREATININE 0.86 1.02   WBC 15.0*  --      Procalcitonin: n/a    Pertinent Cultures:  Culture Date Source Results   11/26/22 blood NGTD   MRSA Nasal Swab: was ordered by provider, awaiting results.     Plan:  Dosing recommendations based on Bayesian software  Start vancomycin 1000 q12h  Anticipated AUC of 426 and trough concentration of 14 at steady state  Renal labs as indicated   Vancomycin concentration ordered for am labs 11/28   Pharmacy will continue to monitor patient and adjust therapy as indicated    Thank you for the consult,  SID Crabtree [facility]  11/26/2022 3:35 PM

## 2022-11-26 NOTE — H&P
[address] [facility]  History & Physical Examination Note              Date:   11/26/2022  Patient name:  Laurence Esparza  Date of admission:  11/26/2022  7:55 AM  MRN:   [id number]  YOB: 1960    CHIEF COMPLAINT:     Chief Complaint   Patient presents with    Chest Pain     History Obtained From:  Patient and chart review. HPI:     Mr. Manjinder Saldaña, 58-year-old male, previous medical history of paroxysmal A. Fib on Eliquis, HTN, ? COPD not on any home O2, minimal CAD per cath 9/16/2022, right cervical ICA stenosis s/p right transcarotid artery revascularization with stent on 9/20/2022, and ROSY not on any home BiPAP. Patient presented to the ED at [facility] on 11/26/2022 with primary complaint of chest pain. Was brought by EMS. Chest pain has been there for over a year per the patient, however, worsened over the past week. Located left lower chest region close to mid-axillary line and radiates up center of chest to left jaw. Intermittent in nature. Exertion brings on the pain. Also has dyspnea on exertion. Does have a history of COPD however not on any home O2. States that he is compliant with his inhalers and medications. Denies nausea, vomiting, diaphoresis, and syncope. Patient's last bowel movement was 3 days ago. Hard. Black. Patient does have symptoms consistent with GERD that include heartburn and frequent use of pepto bismol for relief of symptoms. Was not on any daily PPI or H2 antagonists at home. Patient has had black stool in the past as well occasionally. In the ED, patient was found to have Hb of 4.6 and elevated troponin from his baseline. Kidney function was ok and vitals were fine. Received two units of pRBCs. Cardio was consulted and no heparin drip started due to acute anemia. Patient does state that he started smoking again 3 weeks ago after having quit for 2 months. Smoking 8 cigarettes a day.  Holiday occasional alcohol use. Denies illicit drug use. PAST MEDICAL HISTORY:        has a past medical history of Abnormal stress ECG, Angina pectoris ([facility]), BPH (benign prostatic hyperplasia), CAD (coronary artery disease), Carotid stenosis, Carpal tunnel syndrome, Cervical stenosis of spine, Cervicalgia, Chronic back pain, COPD (chronic obstructive pulmonary disease) ([facility]), DDD (degenerative disc disease), Elbow fracture, right, Fractures, GERD (gastroesophageal reflux disease), Gout, Hyperlipidemia, Hypertension, Lung nodule, Mild depression, Morbidly obese ([facility]), Occlusion of right carotid artery, Osteoarthritis of right knee, Pre-diabetes, Sinus headache, Sleep apnea, Sleep disturbance, Smoker, Snores, SOB (shortness of breath) on exertion, Tendonitis of foot, Tooth loose, Under care of team, Under care of team, and Wellness examination. PAST SURGICAL HISTORY:      has a past surgical history that includes Colonoscopy; Upper gastrointestinal endoscopy; Cervical spine surgery (07/13/2012); fracture surgery; Cardiac catheterization (07/08/2014); Nerve Block (02/05/2016); hc inject other perphrl nerv (Bilateral, 05/09/2019); other surgical history (08/15/2019); Injection Procedure For Sacroiliac Joint (Bilateral, 08/15/2019); Nerve Block (Left, 02/07/2020); Nerve Block (Left, 02/07/2020); Pain management procedure (Left, 02/07/2020); Nerve Block (02/12/2021); Pain management procedure (Left, 02/12/2021); Nerve Block (Right, 02/19/2021); Pain management procedure (Right, 02/19/2021); Cardiac catheterization (09/16/2022); and vascular surgery (Right, 9/28/2022). FAMILY HISTORY:     family history includes COPD in his father; Cancer (age of onset: 64) in his maternal aunt; Cancer (age of onset: 61) in his maternal uncle; Heart Disease in his mother. HOME MEDICATIONS:     Prior to Admission medications    Medication Sig Start Date End Date Taking?  Authorizing Provider   tiZANidine (ZANAFLEX) 4 MG tablet Take 1 tablet by mouth 2 times daily as needed (neck and back spasms) 11/7/22   Estrella Dockery DO   gabapentin (NEURONTIN) 800 MG tablet Take 1 tablet by mouth every 8 hours for 30 days. 11/7/22 12/7/22  Estrella Dockery DO   amiodarone (CORDARONE) 200 MG tablet TAKE ONE TABLET BY MOUTH DAILY 10/17/22   Estrella Dockery DO   ELIQUIS 5 MG TABS tablet TAKE ONE TABLET BY MOUTH TWICE A DAY 10/17/22   Estrella Dockery DO   budesonide-formoterol (SYMBICORT) 160-4.5 MCG/ACT AERO INHALE TWO PUFFS BY MOUTH TWICE A DAY 9/17/22   Orlan Angelucci, MD   aspirin 81 MG EC tablet Take 1 tablet by mouth daily 9/17/22   Orlan Angelucci, MD   metoprolol tartrate (LOPRESSOR) 25 MG tablet Take 1 tablet by mouth 2 times daily 9/17/22   Orlan Angelucci, MD   isosorbide mononitrate (IMDUR) 60 MG extended release tablet Take 1 tablet by mouth in the morning. 7/28/22   Historical Provider, MD   clopidogrel (PLAVIX) 75 MG tablet Take 1 tablet by mouth in the morning. 8/1/22   Juan J Webber MD   nitroGLYCERIN (NITROSTAT) 0.4 MG SL tablet Place 1 tablet under the tongue every 5 minutes as needed for Chest pain (not to exceed 3 tabs at a time) 6/3/22   Estrella Dockery DO   amLODIPine (NORVASC) 5 MG tablet TAKE ONE TABLET BY MOUTH DAILY 4/22/22   Estrella Dockery DO   albuterol sulfate  (90 Base) MCG/ACT inhaler Inhale 2 puffs into the lungs every 6 hours as needed for Wheezing 4/22/22   Estrella Dockery DO   simvastatin (ZOCOR) 80 MG tablet TAKE ONE TABLET BY MOUTH EVERY EVENING 4/11/22   Estrella Dockery DO     ALLERGIES:      Patient has no known allergies. SOCIAL HISTORY:      reports that he has quit smoking. His smoking use included cigarettes. He started smoking about 47 years ago. He has a 10.00 pack-year smoking history. He quit smokeless tobacco use about 7 years ago. He reports that he does not currently use alcohol. He reports that he does not use drugs.       REVIEW OF SYSTEMS:     Review of Systems   Constitutional:  Negative for chills and fever. HENT:  Negative for congestion and sore throat. Respiratory:  Positive for shortness of breath. Negative for cough. Cardiovascular:  Positive for chest pain and leg swelling. Gastrointestinal:  Positive for blood in stool and constipation. Negative for abdominal pain, diarrhea, nausea and vomiting. Genitourinary:  Negative for difficulty urinating, dysuria and hematuria. Neurological:  Positive for headaches. Negative for syncope. PHYSICAL EXAM:     Physical Exam  Vitals and nursing note reviewed. Constitutional:       Appearance: He is obese. He is ill-appearing. Cardiovascular:      Rate and Rhythm: Normal rate and regular rhythm. Heart sounds: No murmur heard. Pulmonary:      Effort: No respiratory distress. Breath sounds: Wheezing (Bilateral expiratory) present. No rhonchi. Abdominal:      Tenderness: There is no abdominal tenderness. There is no guarding or rebound. Musculoskeletal:         General: No tenderness. Right lower leg: Edema (1+ to low-mid shin) present. Left lower leg: Edema (1+ to low-mid shin) present. Neurological:      Mental Status: He is alert and oriented to person, place, and time. Sensory: No sensory deficit. Motor: No weakness. DIAGNOSTICS:      Laboratory Testing:    Recent Results (from the past 24 hour(s))   Blood Culture 1    Collection Time: 11/26/22  8:07 AM    Specimen: Blood   Result Value Ref Range    Specimen Description . BLOOD     Special Requests 1ML R FA     Culture NO GROWTH <24 HRS    Venous Blood Gas, POC    Collection Time: 11/26/22  8:08 AM   Result Value Ref Range    pH, Js 7.353 7.320 - 7.430    pCO2, Js 33.6 (L) 41.0 - 51.0 mm Hg    pO2, Js 21.0 (L) 30.0 - 50.0 mm Hg    HCO3, Venous 18.7 (L) 22.0 - 29.0 mmol/L    Negative Base Excess, Js 6 (H) 0.0 - 2.0    O2 Sat, Js 33 (L) 60.0 - 85.0 %   ELECTROLYTES PLUS    Collection Time: 11/26/22  8:08 AM Result Value Ref Range    POC Sodium 141 138 - 146 mmol/L    POC Potassium 4.1 3.5 - 4.5 mmol/L    POC Chloride 111 (H) 98 - 107 mmol/L    POC TCO2 18 (L) 22 - 30 mmol/L    Anion Gap 13 7 - 16 mmol/L   Hemoglobin and hematocrit, blood    Collection Time: 11/26/22  8:08 AM   Result Value Ref Range    POC Hemoglobin 5.2 (LL) 13.5 - 17.5 g/dL    POC Hematocrit 15 (L) 41 - 53 %   Creatinine W/GFR Point of Care    Collection Time: 11/26/22  8:08 AM   Result Value Ref Range    POC Creatinine 1.00 0.51 - 1.19 mg/dL    eGFR, POC >60 mL/min/1.73m2   CALCIUM, IONIC (POC)    Collection Time: 11/26/22  8:08 AM   Result Value Ref Range    POC Ionized Calcium 1.17 1.15 - 1.33 mmol/L   POCT urea (BUN)    Collection Time: 11/26/22  8:08 AM   Result Value Ref Range    POC BUN 25 8 - 26 mg/dL   Lactic Acid, POC    Collection Time: 11/26/22  8:08 AM   Result Value Ref Range    POC Lactic Acid 4.51 (H) 0.56 - 1.39 mmol/L   POCT Glucose    Collection Time: 11/26/22  8:08 AM   Result Value Ref Range    POC Glucose 139 (H) 74 - 100 mg/dL   BMP    Collection Time: 11/26/22  8:14 AM   Result Value Ref Range    Glucose 144 (H) 70 - 99 mg/dL    BUN 24 (H) 8 - 23 mg/dL    Creatinine 0.86 0.70 - 1.20 mg/dL    Est, Glom Filt Rate >60 >60 mL/min/1.73m2    Calcium 8.5 (L) 8.6 - 10.4 mg/dL    Sodium 139 135 - 144 mmol/L    Potassium 4.1 3.7 - 5.3 mmol/L    Chloride 105 98 - 107 mmol/L    CO2 17 (L) 20 - 31 mmol/L    Anion Gap 17 9 - 17 mmol/L   CBC with Auto Differential    Collection Time: 11/26/22  8:14 AM   Result Value Ref Range    WBC 15.0 (H) 3.5 - 11.3 k/uL    RBC 1.78 (L) 4.21 - 5.77 m/uL    Hemoglobin 4.6 (LL) 13.0 - 17.0 g/dL    Hematocrit 16.8 (L) 40.7 - 50.3 %    MCV 94.4 82.6 - 102.9 fL    MCH 25.8 25.2 - 33.5 pg    MCHC 27.4 (L) 28.4 - 34.8 g/dL    RDW 16.4 (H) 11.8 - 14.4 %    Platelets 040 717 - 561 k/uL    MPV 9.6 8.1 - 13.5 fL    NRBC Automated 1.4 (H) 0.0 per 100 WBC    Immature Granulocytes 2 (H) 0 %    Seg Neutrophils 64 36 - 65 %    Lymphocytes 26 24 - 43 %    Monocytes 7 3 - 12 %    Eosinophils % 1 1 - 4 %    Basophils 0 0 - 2 %    Absolute Immature Granulocyte 0.30 0.00 - 0.30 k/uL    Segs Absolute 9.60 (H) 1.50 - 8.10 k/uL    Absolute Lymph # 3.90 (H) 1.10 - 3.70 k/uL    Absolute Mono # 1.05 0.10 - 1.20 k/uL    Absolute Eos # 0.15 0.00 - 0.44 k/uL    Basophils Absolute 0.00 0.00 - 0.20 k/uL    Morphology ANISOCYTOSIS PRESENT     Morphology 1+ POLYCHROMASIA     Morphology HYPOCHROMIA PRESENT    Protime-INR    Collection Time: 11/26/22  8:14 AM   Result Value Ref Range    Protime 11.8 9.1 - 12.3 sec    INR 1.1    APTT    Collection Time: 11/26/22  8:14 AM   Result Value Ref Range    PTT 22.8 20.5 - 30.5 sec   Magnesium    Collection Time: 11/26/22  8:14 AM   Result Value Ref Range    Magnesium 2.4 1.6 - 2.6 mg/dL   Phosphorus    Collection Time: 11/26/22  8:14 AM   Result Value Ref Range    Phosphorus 3.4 2.5 - 4.5 mg/dL   Calcium, Ionized    Collection Time: 11/26/22  8:14 AM   Result Value Ref Range    Calcium, Ionized 1.15 1.13 - 1.33 mmol/L   Troponin    Collection Time: 11/26/22  8:14 AM   Result Value Ref Range    Troponin, High Sensitivity 58 (HH) 0 - 22 ng/L   Brain Natriuretic Peptide    Collection Time: 11/26/22  8:14 AM   Result Value Ref Range    Pro- <300 pg/mL   Lactate, Sepsis    Collection Time: 11/26/22  8:14 AM   Result Value Ref Range    Lactic Acid, Sepsis, Whole Blood 3.5 (H) 0.5 - 1.9 mmol/L   Hepatic Function Panel    Collection Time: 11/26/22  8:14 AM   Result Value Ref Range    Albumin 3.9 3.5 - 5.2 g/dL    Alkaline Phosphatase 94 40 - 129 U/L    ALT 16 5 - 41 U/L    AST 22 <40 U/L    Total Bilirubin 1.0 0.3 - 1.2 mg/dL    Bilirubin, Direct 0.1 <0.3 mg/dL    Bilirubin, Indirect 0.9 0.00 - 1.00 mg/dL    Total Protein 6.3 (L) 6.4 - 8.3 g/dL    Albumin/Globulin Ratio 1.6 1.0 - 2.5   Culture, Blood 2    Collection Time: 11/26/22  8:15 AM    Specimen: Blood   Result Value Ref Range    Specimen Description Rayshawn Ogren BLOOD     Special Requests L AC     Culture NO GROWTH <24 HRS    TYPE AND SCREEN    Collection Time: 11/26/22  8:16 AM   Result Value Ref Range    Expiration Date 11/29/2022,2359     Arm Band Number [id number]     ABO/Rh O POSITIVE     Antibody Screen NEGATIVE     Unit Number [id number]     Product Code Leukocyte Reduced Red Cell     Unit Divison 00     Dispense Status ISSUED     Unit Issue Date/Time 020080286363     Product Code Blood Bank [id number]     Blood Bank Unit Type and Rh O POS     Blood Bank ISBT Product Blood Type 5100     Blood Bank Blood Product Expiration Date 202212212359     Transfusion Status OK TO TRANSFUSE     Crossmatch Result COMPATIBLE     Unit Number [id number]     Product Code Leukocyte Reduced Red Cell     Unit Divison 00     Dispense Status ISSUED     Unit Issue Date/Time 445436801689     Product Code Blood Bank [id number]     Blood Bank Unit Type and Rh O POS     Blood Bank ISBT Product Blood Type 5100     Blood Bank Blood Product Expiration Date 202212212359     Transfusion Status OK TO TRANSFUSE     Crossmatch Result COMPATIBLE    Troponin    Collection Time: 11/26/22  9:23 AM   Result Value Ref Range    Troponin, High Sensitivity 63 (HH) 0 - 22 ng/L   Lactate, Sepsis    Collection Time: 11/26/22 11:18 AM   Result Value Ref Range    Lactic Acid, Sepsis, Whole Blood 0.9 0.5 - 1.9 mmol/L   Troponin    Collection Time: 11/26/22  1:15 PM   Result Value Ref Range    Troponin, High Sensitivity 233 (HH) 0 - 22 ng/L       Imaging/Diagonstics:    XR CHEST PORTABLE  Result Date: 11/26/2022    Mildly increased interstitial prominence in the lungs, which could be related interstitial edema versus atypical/viral pneumonia. ASSESSMENT:       Principal Problem:    Chest pain  Active Problems:    Status post carotid surgery    Acute blood loss anemia    COPD (chronic obstructive pulmonary disease) (HCC)    Essential hypertension  Resolved Problems:    * No resolved hospital problems.  *    PLAN: NSTEMI Type II likely 2/2 to acute blood loss anemia  Troponin 37>40>002  Not on heparin at this time due to low Hb and GI bleed  ProBNP unremarkable  Previous cath in 09/2022 showed minimal CAD  Continue lopressor 25 mg oral BID with hold parameters  Lipitor 80 mg oral daily  Cardiology following    Acute anemia likely 2/2 GI bleed  Hb 4.6 on admission. S/p two units pRBC. 5.9 post transfusion. Start IV protonix 40 mg BID  Order another two units pRBC  Hold anticoagulants  GI consulted    Sepsis possibly HCAP r/o UTI  Blood cultures collected  WBC and lactate elevated  Procal, ESR, CRP  Pneumonia work up  Consolidated Dimitri UA  Start broad spectrum abx  Start LR @ 100 mL/h  Will consult critical care    History of paroxysmal A. Fib  Continue home amiodarone 200 mg oral daily  Hold Eliquis    Recent right TCAR 09/2022  Hold DAPT  Consult vascular    HTN  Continue home meds  Amlodipine 5 mg oral daily    Peripheral neuropathy  Previous A1c in 2019 unremarkable  Repeat A1c  Gabapentin 300 mg TID (home dose 800 mg TID)    COPD  Blood gases do not indicate COPD exacerbation at this time  Last PFT in 2018 possible restrictive pattern with recommendation to do methacholine challenge if concerned for obstructive disease  Will repeat PFT outpatient  Continue Symbicort and Spiriva    ROSY  Patient refusing BiPAP at night  Continue to encourage    DVT prophylaxis: None due to bleed. EPCs  GI prophylaxis: IV protonix 40 mg BID  Diet: Regular  Dispo: Intermediate    CODE STATUS full    OT/PT/social work consult in place    Plan was discussed with the attending, Dr. Juana Lange.       Lorena Srinivasan MD  Family Medicine Resident  11/26/2022 2:48 PM

## 2022-11-26 NOTE — CONSULTS
Division of Vascular Surgery        New Consult      Physician Requesting Consult:  Dr. Maria Luisa Carranza    Reason for Consult:   Anticoagulation recommendations s/p R TCAR    Chief Complaint:     Chest pain, SOB    History of Present Illness:      Uriel Noriega is a 57 y.o. gentleman with medical history of paroxysmal A. fib, hypertension, CAD, right cervical ICA stenosis s/p right TCAR on 9/28/2022 who presents to the emergency department with complaints of chest pain and shortness of breath that began early this morning. Patient states that he took nitroglycerin without any relief. Patient was also found to have a hemoglobin of 4.6 upon presentation and reports several days of black tarry stools. Patient was transfused 2 units of blood and repeat hemoglobin was 5.9. There is significant concern for GI bleed. Patient also has elevated troponins concerning for NSTEMI. Vascular surgery was consulted for recommendations regarding anticoagulation s/p right TCAR. Patient reports taking aspirin, Plavix, and Eliquis at home. Patient denies any numbness, tingling, weakness, slurred speech, confusion, abdominal pain, nausea, vomiting, diarrhea, and fever. Discussed the need to hold anticoagulation at this time, to which he expressed understanding agreement.     Medical History:     Past Medical History:   Diagnosis Date    Abnormal stress ECG     7/28/2022 per Dr. Ellyn Kasper, [facility] will need cardiac cath in future due to cp    Angina pectoris [facility])     BPH (benign prostatic hyperplasia)     CAD (coronary artery disease)     Dr. Ellyn Kasper   7/28/22 cardiac clearance    Carotid stenosis     rt  Dr. Jenkins Zacarias tunnel syndrome     left    Cervical stenosis of spine 2012    C2-5 laminectomy  Dr. Ana Arvizu    Cervicalgia     chronic pain    Chronic back pain     COPD (chronic obstructive pulmonary disease) ([facility] [address]     on inhalers pcp manages    DDD (degenerative disc disease)     Elbow fracture, right     Fractures elbow    GERD (gastroesophageal reflux disease)     uses pepto bismol prn    Gout     Right great toe    Hyperlipidemia     Hypertension     Lung nodule     Mild depression 09/26/2013    2 suicide attempts by girlfriend related to depression.     Morbidly obese ([address] 10/30/2020    Occlusion of right carotid artery 07/14/2021    Osteoarthritis of right knee     Pre-diabetes     was taking metformin in past  no longer takes    Sinus headache 10/10/2018    Sleep apnea     snores   does not use cpap  pt did no follow up (per patient)    Sleep disturbance     Smoker     Snores     no cpap    SOB (shortness of breath) on exertion     Tendonitis of foot     right    Tooth loose 08/05/2022    lower front rt    Under care of team     Dr. Loyd Mendoza  7/14/2022  will need cysto in future after heart cath and carotid sx    Under care of team     Dr. Eric Langston for carotid stenosis    Wellness examination     Danyroldanidania Robertson  last visit June 2022       Surgical History:     Past Surgical History:   Procedure Laterality Date    CARDIAC CATHETERIZATION  07/08/2014    Non Obstructive CAD     CARDIAC CATHETERIZATION  09/16/2022    CERVICAL SPINE SURGERY  07/13/2012     C2-3-4-5    COLONOSCOPY      FRACTURE SURGERY      Rt elbow     HC INJECT OTHER PERPHRL NERV Bilateral 05/09/2019    INJECTION SPINAL performed by Nyla Quinonez MD at [address] Bilateral 08/15/2019    SACROILIAC JOINT INJECTION performed by Nyla Quinonez MD at [place] [address]  02/05/2016    [facility]    NERVE BLOCK Left 02/07/2020    RFA left side    NERVE BLOCK Left 02/07/2020     NERVE RADIOFREQUENCY ABLATION - SACROILIAC (Left )    NERVE BLOCK  02/12/2021    NERVE RADIOFREQUENCY ABLATION SI (Left     NERVE BLOCK Right 02/19/2021    Procedure: NERVE RADIOFREQUENCY ABLATION SI JOINT (Right )    OTHER SURGICAL HISTORY  08/15/2019    sacroiliac joint injection    PAIN MANAGEMENT PROCEDURE Left 02/07/2020    NERVE RADIOFREQUENCY ABLATION - SACROILIAC performed by Kanika Seay MD at [facility] Left 02/12/2021    NERVE RADIOFREQUENCY ABLATION SI performed by Kanika Seay MD at [facility] Right 02/19/2021    NERVE RADIOFREQUENCY ABLATION SI JOINT performed by Kanika Seay MD at [facility] Right 9/28/2022    right trans-carotid artery revascularization w/stent performed by Francisca Hoffman MD at [address] History:     Family History   Problem Relation Age of Onset    Heart Disease Mother     COPD Father     Cancer Maternal Aunt 64        breast cancer     Cancer Maternal Uncle 60        prostrate cancer        Allergies:       Patient has no known allergies.     Medications:      Current Facility-Administered Medications   Medication Dose Route Frequency Provider Last Rate Last Admin    0.9 % sodium chloride infusion   IntraVENous PRN Grant Darrel, DO        0.9 % sodium chloride infusion   IntraVENous PRN Grant Darrel, DO        albuterol sulfate HFA (PROVENTIL;VENTOLIN;PROAIR) 108 (90 Base) MCG/ACT inhaler 2 puff  2 puff Inhalation Q6H PRN Jg Kent MD        amiodarone (CORDARONE) tablet 200 mg  200 mg Oral Daily Jg Kent MD   200 mg at 11/26/22 1258    amLODIPine (NORVASC) tablet 5 mg  5 mg Oral Daily Jg Kent MD   5 mg at 11/26/22 1258    budesonide-formoterol (SYMBICORT) 160-4.5 MCG/ACT inhaler 2 puff  2 puff Inhalation BID Jg Kent MD        [facility] [place] by provider] aspirin EC tablet 81 mg  81 mg Oral Daily Jg Kent MD        [facility] by provider] clopidogrel (PLAVIX) tablet 75 mg  75 mg Oral Daily Jg Kent MD        [Held by provider] apixaban (ELIQUIS) tablet 5 mg  5 mg Oral BID Jg Kent MD        gabapentin (NEURONTIN) tablet 300 mg  300 mg Oral 3 times per day Basil Johnston Ayush Charlton MD   300 mg at 11/26/22 1351    isosorbide mononitrate (IMDUR) extended release tablet 60 mg  60 mg Oral Daily Praveen Hillman MD   60 mg at 11/26/22 1258    metoprolol tartrate (LOPRESSOR) tablet 25 mg  25 mg Oral BID Praveen Hillman MD   25 mg at 11/26/22 1258    sodium chloride flush 0.9 % injection 5-40 mL  5-40 mL IntraVENous 2 times per day Praveen Hillman MD        sodium chloride flush 0.9 % injection 5-40 mL  5-40 mL IntraVENous PRN Praveen Hillman MD        0.9 % sodium chloride infusion   IntraVENous PRN Praveen Hillman MD        ondansetron (ZOFRAN-ODT) disintegrating tablet 4 mg  4 mg Oral Q8H PRN Praveen Hillman MD        Or    ondansetron [facility]) injection 4 mg  4 mg IntraVENous Q6H PRN Praveen Hillman MD        polyethylene glycol (GLYCOLAX) packet 17 g  17 g Oral Daily PRN Praveen Hillman MD        acetaminophen (TYLENOL) tablet 650 mg  650 mg Oral Q6H PRN Praveen Hillman MD        Or    acetaminophen (TYLENOL) suppository 650 mg  650 mg Rectal Q6H PRN Praveen Hillman MD        potassium chloride (KLOR-CON M) extended release tablet 40 mEq  40 mEq Oral PRN Praveen Hillman MD        Or    potassium bicarb-citric acid (EFFER-K) effervescent tablet 40 mEq  40 mEq Oral PRN Praveen Hillman MD        Or    potassium chloride 10 mEq/100 mL IVPB (Peripheral Line)  10 mEq IntraVENous PRN Praveen Hillman MD        magnesium sulfate 2000 mg in 50 mL IVPB premix  2,000 mg IntraVENous PRN Praveen Hillman MD        nitroGLYCERIN (NITROSTAT) SL tablet 0.4 mg  0.4 mg SubLINGual Q5 Min PRN Praveen Hillman MD   0.4 mg at 11/26/22 1315    pantoprazole (PROTONIX) 40 mg in sodium chloride (PF) 0.9 % 10 mL injection  40 mg IntraVENous Q12H Praveen Hillman MD   40 mg at 11/26/22 1502    [START ON 11/27/2022] atorvastatin (LIPITOR) tablet 80 mg  80 mg Oral Daily Praveen Hillman MD        tiotropium (SPIRIVA RESPIMAT) 2.5 MCG/ACT inhaler 2 puff  2 puff Inhalation Daily Praveen Hillman MD        cefepime (MAXIPIME) 2,000 mg in sterile water 20 mL IV syringe  2,000 mg IntraVENous Q12H Deidra Velasquez MD   2,000 mg at 11/26/22 1540    metronidazole (FLAGYL) 500 mg in 0.9% NaCl 100 mL IVPB premix  500 mg IntraVENous Q8H Deidra Velasquez MD        lactated ringers infusion   IntraVENous Continuous Deidra Velasquez  mL/hr at 11/26/22 1522 New Bag at 11/26/22 1522    vancomycin (VANCOCIN) 1000 mg in sodium chloride 0.9% 250 mL IVPB  1,000 mg IntraVENous Q12H Deidra Velasquez MD        vancomycin Ada Judge) intermittent dosing (placeholder)   Other RX Placeholder Deidra Velasquez MD         Current Outpatient Medications   Medication Sig Dispense Refill    tiZANidine (ZANAFLEX) 4 MG tablet Take 1 tablet by mouth 2 times daily as needed (neck and back spasms) 60 tablet 2    gabapentin (NEURONTIN) 800 MG tablet Take 1 tablet by mouth every 8 hours for 30 days. 90 tablet 1    amiodarone (CORDARONE) 200 MG tablet TAKE ONE TABLET BY MOUTH DAILY 30 tablet 2    ELIQUIS 5 MG TABS tablet TAKE ONE TABLET BY MOUTH TWICE A DAY 60 tablet 2    budesonide-formoterol (SYMBICORT) 160-4.5 MCG/ACT AERO INHALE TWO PUFFS BY MOUTH TWICE A DAY 10.2 g 1    aspirin 81 MG EC tablet Take 1 tablet by mouth daily 30 tablet 3    metoprolol tartrate (LOPRESSOR) 25 MG tablet Take 1 tablet by mouth 2 times daily 60 tablet 3    isosorbide mononitrate (IMDUR) 60 MG extended release tablet Take 1 tablet by mouth in the morning. clopidogrel (PLAVIX) 75 MG tablet Take 1 tablet by mouth in the morning.  90 tablet 1    nitroGLYCERIN (NITROSTAT) 0.4 MG SL tablet Place 1 tablet under the tongue every 5 minutes as needed for Chest pain (not to exceed 3 tabs at a time) 25 tablet 3    amLODIPine (NORVASC) 5 MG tablet TAKE ONE TABLET BY MOUTH DAILY 90 tablet 3    albuterol sulfate  (90 Base) MCG/ACT inhaler Inhale 2 puffs into the lungs every 6 hours as needed for Wheezing 1 each 5    simvastatin (ZOCOR) 80 MG tablet TAKE ONE TABLET BY MOUTH EVERY EVENING 90 tablet 3       Social History:     Tobacco:    reports that he has quit smoking. His smoking use included cigarettes. He started smoking about 47 years ago. He has a 10.00 pack-year smoking history. He quit smokeless tobacco use about 7 years ago. Alcohol:      reports that he does not currently use alcohol. Drug Use:  reports no history of drug use. Review of Systems:     Review of Systems   Constitutional:  Negative for chills and fever. HENT: Negative. Eyes: Negative. Respiratory:  Positive for chest tightness and shortness of breath. Cardiovascular:  Positive for chest pain and leg swelling. Gastrointestinal:  Negative for abdominal pain, diarrhea, nausea and vomiting. Reports black tarry stool   Genitourinary: Negative. Musculoskeletal: Negative. Skin: Negative. Neurological:  Negative for facial asymmetry, speech difficulty, weakness and numbness. Psychiatric/Behavioral: Negative. Physical Exam:     Vitals:  /61   Pulse 74   Temp 97.4 °F (36.3 °C) (Axillary)   Resp 20   Wt 255 lb (115.7 kg)   SpO2 97%   BMI 37.66 kg/m²     Physical Exam  Constitutional:       General: He is not in acute distress. Appearance: He is ill-appearing. HENT:      Head: Normocephalic and atraumatic. Mouth/Throat:      Mouth: Mucous membranes are moist.      Pharynx: Oropharynx is clear. Eyes:      Extraocular Movements: Extraocular movements intact. Conjunctiva/sclera: Conjunctivae normal.   Cardiovascular:      Rate and Rhythm: Normal rate and regular rhythm. Pulses: Normal pulses. Pulmonary:      Effort: Pulmonary effort is normal.      Breath sounds: No stridor. No wheezing. Comments: On 4 L nasal cannula  Abdominal:      General: There is no distension. Palpations: Abdomen is soft. Tenderness: There is no abdominal tenderness. Musculoskeletal:         General: No deformity. Normal range of motion. Right hand: Normal strength.       Left hand: Normal strength. Cervical back: Normal range of motion and neck supple. Right lower leg: Edema present. Left lower leg: Edema present. Skin:     General: Skin is warm and dry. Coloration: Skin is pale. Neurological:      General: No focal deficit present. Mental Status: He is alert and oriented to person, place, and time. GCS: GCS eye subscore is 4. GCS verbal subscore is 5. GCS motor subscore is 6. Cranial Nerves: No cranial nerve deficit. Sensory: No sensory deficit. Motor: No weakness. Psychiatric:         Mood and Affect: Mood normal.         Behavior: Behavior normal.       Imaging/Labs:     XR CHEST PORTABLE    Result Date: 11/26/2022  Mildly increased interstitial prominence in the lungs, which could be related interstitial edema versus atypical/viral pneumonia. Assessment and Plan:     57-year-old male s/p R TCAR with blood loss anemia concerning for GI bleed on aspirin, Plavix, and Eliquis. 1.  Medical management per primary  2. Recommend holding anticoagulation at this time. 3.  We will consider restarting anticoagulation once hemoglobin is stable  4. Recommend serial neurochecks  5.   We will continue to follow along    Electronically signed by Gentry Faulkner DO on 11/26/22 at 3:54 PM EST      [address] [place] [address],[address]: [phone]

## 2022-11-26 NOTE — ED PROVIDER NOTES
[facility]  ED  Emergency Department Encounter  Emergency Medicine Resident     Pt Name: Imani Mcleod  MRN: [id number]  [place] 1960  Date of evaluation: 11/26/22  PCP:  Eddy Wiggins DO    CHIEF COMPLAINT       Chief Complaint   Patient presents with    Chest Pain       HISTORY OFPRESENT ILLNESS  (Location/Symptom, Timing/Onset, Context/Setting, Quality, Duration, Modifying Factors,Severity.)      Imani Mcleod is a 57 y. o.yo male who presents with chest pain and shortness of breath. Difficult to obtain history from patient as patient is ill-appearing and in acute respiratory distress on arrival.  States he began feeling unwell a few days ago. States he woke up this morning around 5 AM, tried to have some breakfast and began feeling worse. Having severe shortness of breath, state he cannot breathe, repeatedly stating \"help me\". Additionally stating he is having chest pain. Patient had a recent carotid artery stenting approximately 2 months ago, does take Eliquis, aspirin, Plavix daily. States he attempted nitro for the pain earlier this morning without any relief. States he did recently of a nosebleed and has been having some dark and tarry stools. Denies any abdominal pain, fevers, chills, vomiting.     PAST MEDICAL / SURGICAL / SOCIAL / FAMILY HISTORY      has a past medical history of Abnormal stress ECG, Angina pectoris ([facility]), BPH (benign prostatic hyperplasia), CAD (coronary artery disease), Carotid stenosis, Carpal tunnel syndrome, Cervical stenosis of spine, Cervicalgia, Chronic back pain, COPD (chronic obstructive pulmonary disease) ([facility]), DDD (degenerative disc disease), Elbow fracture, right, Fractures, GERD (gastroesophageal reflux disease), Gout, Hyperlipidemia, Hypertension, Lung nodule, Mild depression, Morbidly obese ([facility]), Occlusion of right carotid artery, Osteoarthritis of right knee, Pre-diabetes, Sinus headache, Sleep apnea, Sleep disturbance, Smoker, Snores, SOB (shortness of breath) on exertion, Tendonitis of foot, Tooth loose, Under care of team, Under care of team, and Wellness examination. has a past surgical history that includes Colonoscopy; Upper gastrointestinal endoscopy; Cervical spine surgery (07/13/2012); fracture surgery; Cardiac catheterization (07/08/2014); Nerve Block (02/05/2016); hc inject other perphrl nerv (Bilateral, 05/09/2019); other surgical history (08/15/2019); Injection Procedure For Sacroiliac Joint (Bilateral, 08/15/2019); Nerve Block (Left, 02/07/2020); Nerve Block (Left, 02/07/2020); Pain management procedure (Left, 02/07/2020); Nerve Block (02/12/2021); Pain management procedure (Left, 02/12/2021); Nerve Block (Right, 02/19/2021); Pain management procedure (Right, 02/19/2021); Cardiac catheterization (09/16/2022); and vascular surgery (Right, 9/28/2022).      Social History     Socioeconomic History    Marital status: Single     Spouse name: Not on file    Number of children: Not on file    Years of education: Not on file    Highest education level: Not on file   Occupational History     Employer: N/A   Tobacco Use    Smoking status: Former     Packs/day: 0.25     Years: 40.00     Pack years: 10.00     Types: Cigarettes     Start date: 1975    Smokeless tobacco: Former     Quit date: 3/12/2015    Tobacco comments:     Smokes 2-4 cigs/day as of 8/5/2022   Vaping Use    Vaping Use: Never used   Substance and Sexual Activity    Alcohol use: Not Currently     Alcohol/week: 0.0 standard drinks     Comment: 3 x year    Drug use: No    Sexual activity: Yes     Partners: Female   Other Topics Concern    Not on file   Social History Narrative    Not on file     Social Determinants of Health     Financial Resource Strain: Low Risk     Difficulty of Paying Living Expenses: Not hard at all   Food Insecurity: No Food Insecurity    Worried About [address] in the Last Year: Never true    [address] in the Last Year: Never true Transportation Needs: Not on file   Physical Activity: Not on file   Stress: Not on file   Social Connections: Not on file   Intimate Partner Violence: Not on file   Housing Stability: Not on file       Family History   Problem Relation Age of Onset    Heart Disease Mother     COPD Father     Cancer Maternal Aunt 64        breast cancer     Cancer Maternal Uncle 60        prostrate cancer         Allergies:  Patient has no known allergies. Home Medications:  Prior to Admission medications    Medication Sig Start Date End Date Taking? Authorizing Provider   tiZANidine (ZANAFLEX) 4 MG tablet Take 1 tablet by mouth 2 times daily as needed (neck and back spasms) 11/7/22   Rupal Dockery DO   gabapentin (NEURONTIN) 800 MG tablet Take 1 tablet by mouth every 8 hours for 30 days. 11/7/22 12/7/22  Rupal Dockery DO   amiodarone (CORDARONE) 200 MG tablet TAKE ONE TABLET BY MOUTH DAILY 10/17/22   Rupal Dockery DO   ELIQUIS 5 MG TABS tablet TAKE ONE TABLET BY MOUTH TWICE A DAY 10/17/22   Rupal Dockery DO   budesonide-formoterol (SYMBICORT) 160-4.5 MCG/ACT AERO INHALE TWO PUFFS BY MOUTH TWICE A DAY 9/17/22   Nuria Erickson MD   aspirin 81 MG EC tablet Take 1 tablet by mouth daily 9/17/22   Nuria Erickson MD   metoprolol tartrate (LOPRESSOR) 25 MG tablet Take 1 tablet by mouth 2 times daily 9/17/22   Nuria Erickson MD   isosorbide mononitrate (IMDUR) 60 MG extended release tablet Take 1 tablet by mouth in the morning. 7/28/22   Historical Provider, MD   clopidogrel (PLAVIX) 75 MG tablet Take 1 tablet by mouth in the morning.  8/1/22   Marilu Wade MD   nitroGLYCERIN (NITROSTAT) 0.4 MG SL tablet Place 1 tablet under the tongue every 5 minutes as needed for Chest pain (not to exceed 3 tabs at a time) 6/3/22   Rupal Dockery DO   amLODIPine (NORVASC) 5 MG tablet TAKE ONE TABLET BY MOUTH DAILY 4/22/22   Rupal Dockery DO   albuterol sulfate  (90 Base) MCG/ACT inhaler Inhale 2 puffs into the lungs every 6 hours as needed for Wheezing 4/22/22   Jer Hector DO   simvastatin (ZOCOR) 80 MG tablet TAKE ONE TABLET BY MOUTH EVERY EVENING 4/11/22   Jer Hector DO       REVIEW OFSYSTEMS    (2-9 systems for level 4, 10 or more for level 5)      Review of Systems   Unable to perform ROS: Acuity of condition   Respiratory:  Positive for shortness of breath. Cardiovascular:  Positive for chest pain. PHYSICAL EXAM   (up to 7 for level 4, 8 or more forlevel 5)      INITIAL VITALS:   Vitals:    11/26/22 0925 11/26/22 0940 11/26/22 0955 11/26/22 1015   BP: (!) 141/60 (!) 133/57 (!) 140/53 (!) 147/53   Pulse: 86 83 80 80   Resp: 16 15 15 15   Temp: 97.4 °F (36.3 °C) 97.5 °F (36.4 °C) 97.2 °F (36.2 °C)    TempSrc: Axillary Axillary Axillary    SpO2: 99% 98% 99% 99%         Physical Exam  Vitals reviewed. Constitutional:       General: He is in acute distress. Appearance: He is ill-appearing and diaphoretic. Comments: Pale, diaphoretic, ill   HENT:      Head: Normocephalic and atraumatic. Right Ear: External ear normal.      Left Ear: External ear normal.      Nose: Nose normal.   Eyes:      General:         Right eye: No discharge. Left eye: No discharge. Pupils: Pupils are equal, round, and reactive to light. Cardiovascular:      Rate and Rhythm: Regular rhythm. Tachycardia present. Pulmonary:      Comments: Increased work of breathing. Bilateral breath sounds, clear to auscultation. Tachypneic. Abdominal:      General: There is distension. Palpations: Abdomen is soft. Tenderness: There is no abdominal tenderness. Musculoskeletal:      Cervical back: Normal range of motion. Comments: Moving all 4 extremities. Mild pitting edema bilateral lower extremities   Skin:     General: Skin is warm. Capillary Refill: Capillary refill takes less than 2 seconds. Neurological:      General: No focal deficit present.       Mental Status: He is alert and oriented to person, place, and time. Cranial Nerves: No cranial nerve deficit. Psychiatric:         Mood and Affect: Mood normal.       DIFFERENTIAL  DIAGNOSIS     PLAN (LABS / IMAGING / EKG):  Orders Placed This Encounter   Procedures    Blood Culture 1    Culture, Blood 2    XR CHEST PORTABLE    BMP    CBC with Auto Differential    Protime-INR    APTT    Magnesium    Phosphorus    Calcium, Ionized    Troponin    Troponin    Brain Natriuretic Peptide    Hemoglobin and Hematocrit    Lactate, Sepsis    ELECTROLYTES PLUS    Hemoglobin and hematocrit, blood    CALCIUM, IONIC (POC)    Verify hospital blood product consent form has been signed and witnessed    Vital Signs For Blood Product Transfusion    Transfusion Reaction Management    Vital Signs For Blood Product Transfusion    Transfusion Reaction Management    Inpatient consult to Family Practice    Consult to Cardiology    Respiratory Care Evaluation and Treat    Adult NIV/Positive Airway Pressure    Pulse oximetry, continuous    Nasal Cannula Oxygen    POC Blood Gas and Chemistry    Venous Blood Gas, POC    Creatinine W/GFR Point of Care    POCT urea (BUN)    Lactic Acid, POC    POCT Glucose    EKG 12 Lead    TYPE AND SCREEN    PREPARE RBC (CROSSMATCH), 1 Units    PREPARE RBC (CROSSMATCH), 1 Units    ADMIT TO INPATIENT         MEDICATIONS ORDERED:  Orders Placed This Encounter   Medications    0.9 % sodium chloride infusion    0.9 % sodium chloride bolus    0.9 % sodium chloride infusion    fentaNYL (SUBLIMAZE) injection 50 mcg         DDX: ACS, STEMI, NSTEMI, electrolyte abnormality, anemia, pneumonia, pneumothorax, dehydration    Initial MDM/Plan: 57 y.o. male who presents with chest pain shortness of breath. Difficult to obtain full history from patient as he does arrive in acute distress, ill-appearing. Patient having increased work of breathing, repeatedly stating \"help me\". Has been feeling unwell for a few days.   Having worsening shortness of breath and chest pain this morning. Patient appears ill, tachypneic, tachycardic, hypertensive, afebrile. Saturating well on 3 L nasal cannula however he does have increased work of breathing. Decision made to switch patient over to BiPAP. Lungs clear to auscultation bilaterally. Patient is on Eliquis, recent carotid stenting procedure few months ago, additionally on aspirin and Plavix. Point-of-care labs obtained, reveal hemoglobin of 5. Blood ordered. EKG obtained, concerning for STEMI equivalent, sent to cardiology, recommending treating as NSTEMI for now. Will obtain broad laboratory work-up, chest x-ray. Decision to not reverse Eliquis at this time as patient's last dose was approximately 25 hours ago and patient did have recent carotid stenting. Will provide fluids, blood. Patient will require admission.     DIAGNOSTIC RESULTS / EMERGENCYDEPARTMENT COURSE / MDM     LABS:  Labs Reviewed   BASIC METABOLIC PANEL - Abnormal; Notable for the following components:       Result Value    Glucose 144 (*)     BUN 24 (*)     Calcium 8.5 (*)     CO2 17 (*)     All other components within normal limits   CBC WITH AUTO DIFFERENTIAL - Abnormal; Notable for the following components:    WBC 15.0 (*)     RBC 1.78 (*)     Hemoglobin 4.6 (*)     Hematocrit 16.8 (*)     MCHC 27.4 (*)     RDW 16.4 (*)     NRBC Automated 1.4 (*)     Immature Granulocytes 2 (*)     Segs Absolute 9.60 (*)     Absolute Lymph # 3.90 (*)     All other components within normal limits   TROPONIN - Abnormal; Notable for the following components:    Troponin, High Sensitivity 58 (*)     All other components within normal limits   TROPONIN - Abnormal; Notable for the following components:    Troponin, High Sensitivity 63 (*)     All other components within normal limits   LACTATE, SEPSIS - Abnormal; Notable for the following components:    Lactic Acid, Sepsis, Whole Blood 3.5 (*)     All other components within normal limits ELECTROLYTES PLUS - Abnormal; Notable for the following components:    POC Chloride 111 (*)     POC TCO2 18 (*)     All other components within normal limits   HGB/HCT - Abnormal; Notable for the following components:    POC Hemoglobin 5.2 (*)     POC Hematocrit 15 (*)     All other components within normal limits   VENOUS BLOOD GAS, POINT OF CARE - Abnormal; Notable for the following components:    pCO2, Js 33.6 (*)     pO2, Js 21.0 (*)     HCO3, Venous 18.7 (*)     Negative Base Excess, Js 6 (*)     O2 Sat, Js 33 (*)     All other components within normal limits   LACTIC ACID,POINT OF CARE - Abnormal; Notable for the following components:    POC Lactic Acid 4.51 (*)     All other components within normal limits   POCT GLUCOSE - Abnormal; Notable for the following components:    POC Glucose 139 (*)     All other components within normal limits   CULTURE, BLOOD 1   CULTURE, BLOOD 2   PROTIME-INR   APTT   MAGNESIUM   PHOSPHORUS   CALCIUM, IONIZED   BRAIN NATRIURETIC PEPTIDE   CALCIUM, IONIC (POC)   LACTATE, SEPSIS   CREATININE W/GFR POINT OF CARE   UREA N (POC)   POC BLOOD GAS AND CHEMISTRY   TYPE AND SCREEN   PREPARE RBC (CROSSMATCH)   PREPARE RBC (CROSSMATCH)         RADIOLOGY:  XR CHEST PORTABLE    Result Date: 11/26/2022  Mildly increased interstitial prominence in the lungs, which could be related interstitial edema versus atypical/viral pneumonia. EKG  EKG Interpretation    Interpreted by emergency department physician    Rhythm: sinus tachycardia  Rate: 129  Axis: normal  Ectopy: none  Conduction: normal  ST Segments: ST elevation in aVR. ST depression in V3, V4, V5, V6  T Waves: non specific changes  Q Waves: none    Clinical Impression: Concerning EKG with elevation in aVR as well as ST depression in V3 through V6, dramatically different from EKG on 9/14/2022.   Reviewed by interventional cardiologist, no concerns for STEMI at this time    Leonila Ellis,       All EKG's are interpreted by the Emergency Department Physicianwho either signs or Co-signs this chart in the absence of a cardiologist.    EMERGENCY DEPARTMENT COURSE:  ED Course as of 11/26/22 1045   Sat Nov 26, 2022   0818 EKG sent to interventional cardiology at [address] [place] [address] due to concerns for STEMI equivalent with elevation in aVR, ST depressions in V4, V5, V6. EKG is dramatically different from prior EKG on 9/14/2022. Interventional cardiologist, Dr. Aidee Soto responded, stating to treat as NSTEMI at the moment. Initial point-of-care labs show a hemoglobin of 5.2. Blood ordered. Awaiting formal labs. After formal labs are returned, will discuss risks and benefits of heparinization. Patient is on Eliquis, has had dark stools recently. Last dose of Eliquis approximately 25 hours ago [AB]   0831 Hemoglobin Quant(!!): 4.6  Blood ordered [AB]   0851 Lactic Acid, Sepsis, Whole Blood(!): 3.5  Suspect due to low hemoglobin, will trend [AB]   0851 Pro-BNP: 242  Mildly increased from previous [AB]   0852 Troponin, High Sensitivity(!!): 58  Elevated, suspect demand, will trend [AB]   0858 Creatinine: 0.86 [AB]   0911 Patient reevaluated. Feeling improved, complaining of only mild chest pain now. Work of breathing improved. Complaining of headache, will dose with medicine. Blood infusing at this time. [AB]   0938 XR CHEST PORTABLE  IMPRESSION:  Mildly increased interstitial prominence in the lungs, which could be related  interstitial edema versus atypical/viral pneumonia. [AB]   [id number] INR: 1.1 [AB]   0938 Patient much improved on BiPAP. Afebrile. Do not feel that this is an infectious process, will hold off on antibiotics at this time [AB]   0950 INR: 1.1 [AB]   0950 PTT: 22.8 [AB]   0950 Discussed case with family medicine.   Agree to admit patient. [AB]   0953 Troponin, High Sensitivity(!!): 63  Stable, initial Trop 58, will not heparinize at this point as patient is currently receiving blood [AB]   0954 Family medicine consulted for admission [AB]      ED Course User Index  [AB] Keshia Haji DO          PROCEDURES:  None    CONSULTS:  IP CONSULT TO FAMILY MEDICINE  IP CONSULT TO CARDIOLOGY  IP CONSULT TO SOCIAL WORK  IP CONSULT TO GI    CRITICAL CARE:  See attending physician note    FINAL IMPRESSION      1. Acute blood loss anemia    2. NSTEMI (non-ST elevated myocardial infarction) (HCC)    3. Chest pain, unspecified type    4. Shortness of breath          DISPOSITION / PLAN     DISPOSITION Admitted 11/26/2022 10:42:47 AM      PATIENT REFERRED TO:  No follow-up provider specified.     DISCHARGE MEDICATIONS:  New Prescriptions    No medications on file       Brianne Kimble DO  Emergency Medicine Resident    (Please note that portions of this note were completed with a voice recognition program.Efforts were made to edit the dictations but occasionally words are mis-transcribed.)        Keshia Haji DO  Resident  11/26/22 2087

## 2022-11-26 NOTE — ED NOTES
Pt switched to 15 L NRB for work of breathing. Dr. Lisa Coreas at bedside.      Kerri Carter RN  11/26/22 0890

## 2022-11-26 NOTE — CONSULTS
Initial GI Consult Note  Today's Date and Time: 11/26/2022, 2:20 PM      Chief complain/Reason for consultation:   Anemia    History of Present Illness:   Nika Cárdenas is a 57y.o.-year-old  male who was initially admitted on 11/26/2022. Patient seen at the request of ED for anemia. Patient is on Eliquis, Asa and plavix and presented to the hospital with chest pain and found to have hemoglobin of 4.6. Patient has been having dark stools for last 2 weeks and he attributed to Pepto-Bismol which she was taking for upset stomach. His bowel movements are not more frequent than usual.  His last colonoscopy was about 5 years ago by Dr. Anika Carbajal and had colon polyps. He also has history of GERD but is not taking PPI on a regular basis and takes it on as-needed basis only. He denies any abdominal pain. His stomach is distended which he states that is usual for him. I have personally reviewed the past medical history, past surgical history, medications, social history, and family history, and I have updated the database accordingly.   Past Medical History:     Past Medical History:   Diagnosis Date    Abnormal stress ECG     7/28/2022 per Dr. Kaity Alicea, [facility] will need cardiac cath in future due to cp    Angina pectoris [facility])     BPH (benign prostatic hyperplasia)     CAD (coronary artery disease)     Dr. Kaity Alicea   7/28/22 cardiac clearance    Carotid stenosis     rt  Dr. Altamirano Exon tunnel syndrome     left    Cervical stenosis of spine 2012    C2-5 laminectomy  Dr. Blanco Dk    Cervicalgia     chronic pain    Chronic back pain     COPD (chronic obstructive pulmonary disease) ([facility] [address]     on inhalers pcp manages    DDD (degenerative disc disease)     Elbow fracture, right     Fractures     elbow    GERD (gastroesophageal reflux disease)     uses pepto bismol prn    Gout     Right great toe    Hyperlipidemia     Hypertension     Lung nodule     Mild depression 09/26/2013    2 suicide attempts by girlfriend related to depression.     Morbidly obese ([address] 10/30/2020    Occlusion of right carotid artery 07/14/2021    Osteoarthritis of right knee     Pre-diabetes     was taking metformin in past  no longer takes    Sinus headache 10/10/2018    Sleep apnea     snores   does not use cpap  pt did no follow up (per patient)    Sleep disturbance     Smoker     Snores     no cpap    SOB (shortness of breath) on exertion     Tendonitis of foot     right    Tooth loose 08/05/2022    lower front rt    Under care of team     Dr. Mari Keller  7/14/2022  will need cysto in future after heart cath and carotid sx    Under care of team     Dr. Cristhian An for carotid stenosis    Wellness examination     Leonette Needle  last visit June 2022       Past Surgical  History:     Past Surgical History:   Procedure Laterality Date    CARDIAC CATHETERIZATION  07/08/2014    Non Obstructive CAD     CARDIAC CATHETERIZATION  09/16/2022    CERVICAL SPINE SURGERY  07/13/2012     C2-3-4-5    COLONOSCOPY      FRACTURE SURGERY      Rt elbow     HC INJECT OTHER PERPHRL NERV Bilateral 05/09/2019    INJECTION SPINAL performed by Helen Pinto MD at [facility] Bilateral 08/15/2019    SACROILIAC JOINT INJECTION performed by Helen Pinto MD at [place] [address]  02/05/2016    [facility]    NERVE BLOCK Left 02/07/2020    RFA left side    NERVE BLOCK Left 02/07/2020     NERVE RADIOFREQUENCY ABLATION - SACROILIAC (Left )    NERVE BLOCK  02/12/2021    NERVE RADIOFREQUENCY ABLATION SI (Left     NERVE BLOCK Right 02/19/2021    Procedure: NERVE RADIOFREQUENCY ABLATION SI JOINT (Right )    OTHER SURGICAL HISTORY  08/15/2019    sacroiliac joint injection    PAIN MANAGEMENT PROCEDURE Left 02/07/2020    NERVE RADIOFREQUENCY ABLATION - SACROILIAC performed by Helen Pinto MD at [address] [place] [address] Left 02/12/2021    NERVE RADIOFREQUENCY ABLATION SI performed by Madhav Pemberton MD at [facility] Right 02/19/2021    NERVE RADIOFREQUENCY ABLATION SI JOINT performed by Madhav Pemberton MD at [facility] Right 9/28/2022    right trans-carotid artery revascularization w/stent performed by Abbie Blood MD at [address] [facility]       Medications:      amiodarone  200 mg Oral Daily    amLODIPine  5 mg Oral Daily    budesonide-formoterol  2 puff Inhalation BID    [Held by provider] aspirin  81 mg Oral Daily    [Held by provider] clopidogrel  75 mg Oral Daily    [Held by provider] apixaban  5 mg Oral BID    gabapentin  300 mg Oral 3 times per day    isosorbide mononitrate  60 mg Oral Daily    metoprolol tartrate  25 mg Oral BID    atorvastatin  40 mg Oral Daily    sodium chloride flush  5-40 mL IntraVENous 2 times per day    pantoprazole  40 mg Oral BID AC       Social History:     Social History     Socioeconomic History    Marital status: Single     Spouse name: Not on file    Number of children: Not on file    Years of education: Not on file    Highest education level: Not on file   Occupational History     Employer: N/A   Tobacco Use    Smoking status: Former     Packs/day: 0.25     Years: 40.00     Pack years: 10.00     Types: Cigarettes     Start date: 1975    Smokeless tobacco: Former     Quit date: 3/12/2015    Tobacco comments:     Smokes 2-4 cigs/day as of 8/5/2022   Vaping Use    Vaping Use: Never used   Substance and Sexual Activity    Alcohol use: Not Currently     Alcohol/week: 0.0 standard drinks     Comment: 3 x year    Drug use: No    Sexual activity: Yes     Partners: Female   Other Topics Concern    Not on file   Social History Narrative    Not on file     Social Determinants of Health     Financial Resource Strain: Low Risk     Difficulty of Paying Living Expenses: Not hard at all   Food Insecurity: No Food Insecurity    Worried About Running Out of Food in the Last Year: Never true    Ran Out of Food in the Last Year: Never true   Transportation Needs: Not on file   Physical Activity: Not on file   Stress: Not on file   Social Connections: Not on file   Intimate Partner Violence: Not on file   Housing Stability: Not on file       Family History:     Family History   Problem Relation Age of Onset    Heart Disease Mother     COPD Father     Cancer Maternal Aunt 64        breast cancer     Cancer Maternal Uncle 60        prostrate cancer         Allergies:   Patient has no known allergies. Review of Systems:   Review of systems as per history of present illness. Rest of the review of systems were reviewed and was negative.   Physical Examination :   Patient Vitals for the past 8 hrs:   BP Temp Temp src Pulse Resp SpO2   11/26/22 1355 110/61 -- -- 74 20 97 %   11/26/22 1335 137/64 -- -- 82 16 97 %   11/26/22 1315 (!) 143/51 -- -- 89 20 97 %   11/26/22 1255 133/65 -- -- 82 16 97 %   11/26/22 1225 132/64 -- -- 86 20 97 %   11/26/22 1205 (!) 138/55 -- -- 82 18 97 %   11/26/22 1142 (!) 147/65 97.4 °F (36.3 °C) Axillary 87 20 97 %   11/26/22 1140 (!) 147/65 -- -- 85 19 97 %   11/26/22 1137 -- -- -- -- -- 97 %   11/26/22 1127 (!) 150/65 97.2 °F (36.2 °C) Axillary 90 20 97 %   11/26/22 1120 (!) 141/61 -- -- 84 24 100 %   11/26/22 1112 (!) 120/53 97.2 °F (36.2 °C) Axillary 79 16 97 %   11/26/22 1107 (!) 120/53 97.2 °F (36.2 °C) Axillary 77 21 98 %   11/26/22 1102 (!) 128/53 97.2 °F (36.2 °C) Axillary 78 16 97 %   11/26/22 1057 (!) 146/64 (!) 96.5 °F (35.8 °C) -- 81 20 98 %   11/26/22 1015 (!) 147/53 -- -- 80 15 99 %   11/26/22 0955 (!) 140/53 97.2 °F (36.2 °C) Axillary 80 15 99 %   11/26/22 0940 (!) 133/57 97.5 °F (36.4 °C) Axillary 83 15 98 %   11/26/22 0925 (!) 141/60 97.4 °F (36.3 °C) Axillary 86 16 99 %   11/26/22 0920 (!) 126/51 97.4 °F (36.3 °C) Axillary 85 23 99 %   11/26/22 0915 (!) 146/50 97.6 °F (36.4 °C) Axillary 82 19 --   11/26/22 0910 (!) 150/57 97 °F (36.1 °C) -- 87 18 99 %   11/26/22 0845 (!) 140/46 -- -- 82 15 100 %   11/26/22 0830 -- -- -- 94 20 100 %   11/26/22 0820 (!) 168/72 -- -- 91 18 100 %   11/26/22 0810 -- 97.8 °F (36.6 °C) Axillary -- -- --   11/26/22 0805 -- -- -- 91 15 100 %   11/26/22 0800 (!) 145/69 -- -- (!) 121 26 100 %   11/26/22 0755 -- -- -- (!) 118 -- 99 %     General Appearance: Awake, alert, and in no apparent distress  Head:  Normocephalic, no trauma  Eyes: No icterus, no pallor. Pulmonary/Chest: Breathing normal bilaterally. No accessory muscle use. Abdomen: Soft, non tender. Bowel sounds normal. No organomegaly  All four Extremities: No cyanosis, clubbing, edema, or effusions. Neurologic: No asterixis. Medical Decision Making:   I have independently reviewed/ordered the following labs:  CBC with Differential:   Recent Labs     11/26/22 0814   WBC 15.0*   HGB 4.6*   HCT 16.8*      LYMPHOPCT 26   MONOPCT 7     BMP:   Recent Labs     11/26/22  0808 11/26/22 0814   NA  --  139   K  --  4.1   CL  --  105   CO2  --  17*   BUN  --  24*   CREATININE 1.00 0.86   MG  --  2.4     Hepatic Function Panel:  @LABRCNT(PROT:2,LABALBU:2,BILIDIR:2,IBILI:2,BILITOT:2,ALKPHOS:2,ALT:2,AST:2)  )  Lab Results   Component Value Date/Time    RBC 1.78 11/26/2022 08:14 AM    RBC 4.75 02/10/2012 07:41 AM    WBC 15.0 11/26/2022 08:14 AM    TURBIDITY Clear 09/14/2022 04:31 PM     Lab Results   Component Value Date/Time    CREATININE 0.86 11/26/2022 08:14 AM    CREATININE 1.00 11/26/2022 08:08 AM    GLUCOSE 144 11/26/2022 08:14 AM    GLUCOSE 99 02/10/2012 07:41 AM       Impression :   Anemia likely from GI blood loss. Anemia of acute blood loss  Elevated troponins likely from demand supply mismatch. Atrial fibrillation and carotid stenosis on Plavix and Eliquis and aspirin. Recommendations   Empiric PPI therapy twice daily. We will plan for upper endoscopy and colonoscopy on Monday. H&H at least on daily basis and transfuse as needed.   Thank you for this consult. Thank you for allowing us to participate in the care of this patient. Please call with questions.   Mar Rios MD, Tierra Randhawa

## 2022-11-26 NOTE — ED NOTES
Pt A&Ox4, NAD. Pt remains on BiPAP, blood transfusing with no suspected reaction. Pt resting on cot with full cardiac monitor on, watching TV. Pt denies needs at this time.      Margarito Bill RN  11/26/22 4847 RX refill request from the patient/pharmacy. Patient last seen 09- with labs, and next appt. scheduled for 12-  Requested Prescriptions     Pending Prescriptions Disp Refills    nebivoloL (Bystolic) 5 mg tablet [Pharmacy Med Name: BYSTOLIC 5 MG TABLET] 30 Tablet 5     Sig: TAKE ONE TABLET BY MOUTH DAILY   .

## 2022-11-26 NOTE — ED NOTES
Pt removed from bipap, now on 3L NC for improved work of breathing.       Melanie Teixeira RN  11/26/22 8454

## 2022-11-27 LAB
ABSOLUTE EOS #: <0.03 K/UL (ref 0–0.44)
ABSOLUTE EOS #: <0.03 K/UL (ref 0–0.44)
ABSOLUTE IMMATURE GRANULOCYTE: 0.2 K/UL (ref 0–0.3)
ABSOLUTE IMMATURE GRANULOCYTE: 0.32 K/UL (ref 0–0.3)
ABSOLUTE LYMPH #: 0.96 K/UL (ref 1.1–3.7)
ABSOLUTE LYMPH #: 1.08 K/UL (ref 1.1–3.7)
ABSOLUTE MONO #: 0.17 K/UL (ref 0.1–1.2)
ABSOLUTE MONO #: 0.19 K/UL (ref 0.1–1.2)
ANION GAP SERPL CALCULATED.3IONS-SCNC: 15 MMOL/L (ref 9–17)
ANION GAP SERPL CALCULATED.3IONS-SCNC: 16 MMOL/L (ref 9–17)
BASOPHILS # BLD: 0 % (ref 0–2)
BASOPHILS # BLD: 0 % (ref 0–2)
BASOPHILS ABSOLUTE: 0.04 K/UL (ref 0–0.2)
BASOPHILS ABSOLUTE: <0.03 K/UL (ref 0–0.2)
BILIRUBIN URINE: NEGATIVE
BUN BLDV-MCNC: 23 MG/DL (ref 8–23)
BUN BLDV-MCNC: 24 MG/DL (ref 8–23)
CALCIUM SERPL-MCNC: 8 MG/DL (ref 8.6–10.4)
CALCIUM SERPL-MCNC: 8.2 MG/DL (ref 8.6–10.4)
CASTS UA: NORMAL /LPF (ref 0–8)
CHLORIDE BLD-SCNC: 106 MMOL/L (ref 98–107)
CHLORIDE BLD-SCNC: 106 MMOL/L (ref 98–107)
CO2: 15 MMOL/L (ref 20–31)
CO2: 17 MMOL/L (ref 20–31)
COLOR: YELLOW
CREAT SERPL-MCNC: 0.79 MG/DL (ref 0.7–1.2)
CREAT SERPL-MCNC: 0.84 MG/DL (ref 0.7–1.2)
CULTURE: ABNORMAL
DIRECT EXAM: ABNORMAL
EOSINOPHILS RELATIVE PERCENT: 0 % (ref 1–4)
EOSINOPHILS RELATIVE PERCENT: 0 % (ref 1–4)
EPITHELIAL CELLS UA: NORMAL /HPF (ref 0–5)
ESTIMATED AVERAGE GLUCOSE: 108 MG/DL
FERRITIN: 12 NG/ML (ref 30–400)
GFR SERPL CREATININE-BSD FRML MDRD: >60 ML/MIN/1.73M2
GFR SERPL CREATININE-BSD FRML MDRD: >60 ML/MIN/1.73M2
GLUCOSE BLD-MCNC: 175 MG/DL (ref 70–99)
GLUCOSE BLD-MCNC: 196 MG/DL (ref 70–99)
GLUCOSE URINE: NEGATIVE
HBA1C MFR BLD: 5.4 % (ref 4–6)
HCT VFR BLD CALC: 22.2 % (ref 40.7–50.3)
HCT VFR BLD CALC: 23.5 % (ref 40.7–50.3)
HCT VFR BLD CALC: 24.6 % (ref 40.7–50.3)
HEMOGLOBIN: 6.9 G/DL (ref 13–17)
HEMOGLOBIN: 7.3 G/DL (ref 13–17)
HEMOGLOBIN: 7.5 G/DL (ref 13–17)
IMMATURE GRANULOCYTES: 2 %
IMMATURE GRANULOCYTES: 3 %
IRON SATURATION: 6 % (ref 20–55)
IRON: 28 UG/DL (ref 59–158)
KETONES, URINE: NEGATIVE
LACTIC ACID, WHOLE BLOOD: 1.7 MMOL/L (ref 0.7–2.1)
LEUKOCYTE ESTERASE, URINE: NEGATIVE
LYMPHOCYTES # BLD: 10 % (ref 24–43)
LYMPHOCYTES # BLD: 11 % (ref 24–43)
MCH RBC QN AUTO: 27 PG (ref 25.2–33.5)
MCH RBC QN AUTO: 27.5 PG (ref 25.2–33.5)
MCHC RBC AUTO-ENTMCNC: 29.7 G/DL (ref 28.4–34.8)
MCHC RBC AUTO-ENTMCNC: 31.9 G/DL (ref 28.4–34.8)
MCV RBC AUTO: 86.1 FL (ref 82.6–102.9)
MCV RBC AUTO: 91.1 FL (ref 82.6–102.9)
MONOCYTES # BLD: 2 % (ref 3–12)
MONOCYTES # BLD: 2 % (ref 3–12)
MRSA, DNA, NASAL: NEGATIVE
NITRITE, URINE: NEGATIVE
NRBC AUTOMATED: 1 PER 100 WBC
NRBC AUTOMATED: 1.3 PER 100 WBC
PARTIAL THROMBOPLASTIN TIME: 25.6 SEC (ref 20.5–30.5)
PDW BLD-RTO: 16.2 % (ref 11.8–14.4)
PDW BLD-RTO: 16.3 % (ref 11.8–14.4)
PH UA: 5.5 (ref 5–8)
PLATELET # BLD: 214 K/UL (ref 138–453)
PLATELET # BLD: 235 K/UL (ref 138–453)
PMV BLD AUTO: 9.3 FL (ref 8.1–13.5)
PMV BLD AUTO: 9.7 FL (ref 8.1–13.5)
POTASSIUM SERPL-SCNC: 4.5 MMOL/L (ref 3.7–5.3)
POTASSIUM SERPL-SCNC: 4.7 MMOL/L (ref 3.7–5.3)
PROTEIN UA: NEGATIVE
RBC # BLD: 2.7 M/UL (ref 4.21–5.77)
RBC # BLD: 2.73 M/UL (ref 4.21–5.77)
RBC # BLD: ABNORMAL 10*6/UL
RBC # BLD: ABNORMAL 10*6/UL
RBC UA: NORMAL /HPF (ref 0–4)
REASON FOR REJECTION: NORMAL
REASON FOR REJECTION: NORMAL
SEG NEUTROPHILS: 85 % (ref 36–65)
SEG NEUTROPHILS: 85 % (ref 36–65)
SEGMENTED NEUTROPHILS ABSOLUTE COUNT: 8.32 K/UL (ref 1.5–8.1)
SEGMENTED NEUTROPHILS ABSOLUTE COUNT: 8.51 K/UL (ref 1.5–8.1)
SODIUM BLD-SCNC: 137 MMOL/L (ref 135–144)
SODIUM BLD-SCNC: 138 MMOL/L (ref 135–144)
SPECIFIC GRAVITY UA: 1.02 (ref 1–1.03)
SPECIMEN DESCRIPTION: ABNORMAL
SPECIMEN DESCRIPTION: NORMAL
TOTAL IRON BINDING CAPACITY: 482 UG/DL (ref 250–450)
TURBIDITY: CLEAR
UNSATURATED IRON BINDING CAPACITY: 454 UG/DL (ref 112–347)
URINE HGB: NEGATIVE
UROBILINOGEN, URINE: NORMAL
WBC # BLD: 10 K/UL (ref 3.5–11.3)
WBC # BLD: 9.8 K/UL (ref 3.5–11.3)
WBC UA: NORMAL /HPF (ref 0–5)
ZZ NTE CLEAN UP: ORDERED TEST: NORMAL
ZZ NTE CLEAN UP: ORDERED TEST: NORMAL
ZZ NTE WITH NAME CLEAN UP: SPECIMEN SOURCE: NORMAL
ZZ NTE WITH NAME CLEAN UP: SPECIMEN SOURCE: NORMAL

## 2022-11-27 PROCEDURE — C9113 INJ PANTOPRAZOLE SODIUM, VIA: HCPCS

## 2022-11-27 PROCEDURE — 86900 BLOOD TYPING SEROLOGIC ABO: CPT

## 2022-11-27 PROCEDURE — 2580000003 HC RX 258

## 2022-11-27 PROCEDURE — 6370000000 HC RX 637 (ALT 250 FOR IP): Performed by: STUDENT IN AN ORGANIZED HEALTH CARE EDUCATION/TRAINING PROGRAM

## 2022-11-27 PROCEDURE — 94640 AIRWAY INHALATION TREATMENT: CPT

## 2022-11-27 PROCEDURE — 6360000002 HC RX W HCPCS: Performed by: INTERNAL MEDICINE

## 2022-11-27 PROCEDURE — 85025 COMPLETE CBC W/AUTO DIFF WBC: CPT

## 2022-11-27 PROCEDURE — 2060000000 HC ICU INTERMEDIATE R&B

## 2022-11-27 PROCEDURE — 99232 SBSQ HOSP IP/OBS MODERATE 35: CPT | Performed by: INTERNAL MEDICINE

## 2022-11-27 PROCEDURE — 36415 COLL VENOUS BLD VENIPUNCTURE: CPT

## 2022-11-27 PROCEDURE — 83550 IRON BINDING TEST: CPT

## 2022-11-27 PROCEDURE — 6370000000 HC RX 637 (ALT 250 FOR IP)

## 2022-11-27 PROCEDURE — 99232 SBSQ HOSP IP/OBS MODERATE 35: CPT | Performed by: FAMILY MEDICINE

## 2022-11-27 PROCEDURE — A4216 STERILE WATER/SALINE, 10 ML: HCPCS

## 2022-11-27 PROCEDURE — APPSS30 APP SPLIT SHARED TIME 16-30 MINUTES: Performed by: INTERNAL MEDICINE

## 2022-11-27 PROCEDURE — 83605 ASSAY OF LACTIC ACID: CPT

## 2022-11-27 PROCEDURE — 83540 ASSAY OF IRON: CPT

## 2022-11-27 PROCEDURE — 81001 URINALYSIS AUTO W/SCOPE: CPT

## 2022-11-27 PROCEDURE — 2580000003 HC RX 258: Performed by: STUDENT IN AN ORGANIZED HEALTH CARE EDUCATION/TRAINING PROGRAM

## 2022-11-27 PROCEDURE — 36430 TRANSFUSION BLD/BLD COMPNT: CPT

## 2022-11-27 PROCEDURE — 82728 ASSAY OF FERRITIN: CPT

## 2022-11-27 PROCEDURE — 6360000002 HC RX W HCPCS: Performed by: STUDENT IN AN ORGANIZED HEALTH CARE EDUCATION/TRAINING PROGRAM

## 2022-11-27 PROCEDURE — 85018 HEMOGLOBIN: CPT

## 2022-11-27 PROCEDURE — 6370000000 HC RX 637 (ALT 250 FOR IP): Performed by: INTERNAL MEDICINE

## 2022-11-27 PROCEDURE — P9016 RBC LEUKOCYTES REDUCED: HCPCS

## 2022-11-27 PROCEDURE — 85014 HEMATOCRIT: CPT

## 2022-11-27 PROCEDURE — 80048 BASIC METABOLIC PNL TOTAL CA: CPT

## 2022-11-27 PROCEDURE — 6360000002 HC RX W HCPCS

## 2022-11-27 PROCEDURE — 85730 THROMBOPLASTIN TIME PARTIAL: CPT

## 2022-11-27 RX ORDER — SODIUM CHLORIDE 9 MG/ML
INJECTION, SOLUTION INTRAVENOUS PRN
Status: DISCONTINUED | OUTPATIENT
Start: 2022-11-27 | End: 2022-11-28

## 2022-11-27 RX ORDER — CALCIUM CARBONATE 200(500)MG
1000 TABLET,CHEWABLE ORAL ONCE
Status: COMPLETED | OUTPATIENT
Start: 2022-11-27 | End: 2022-11-27

## 2022-11-27 RX ADMIN — BUTALBITAL, ACETAMINOPHEN AND CAFFEINE 1 TABLET: 50; 325; 40 TABLET ORAL at 19:05

## 2022-11-27 RX ADMIN — DOXYCYCLINE HYCLATE 100 MG: 100 TABLET ORAL at 19:48

## 2022-11-27 RX ADMIN — METHYLPREDNISOLONE SODIUM SUCCINATE 40 MG: 40 INJECTION, POWDER, FOR SOLUTION INTRAMUSCULAR; INTRAVENOUS at 15:21

## 2022-11-27 RX ADMIN — POLYETHYLENE GLYCOL 3350, SODIUM SULFATE ANHYDROUS, SODIUM BICARBONATE, SODIUM CHLORIDE, POTASSIUM CHLORIDE 2000 ML: 236; 22.74; 6.74; 5.86; 2.97 POWDER, FOR SOLUTION ORAL at 23:29

## 2022-11-27 RX ADMIN — SODIUM CHLORIDE, PRESERVATIVE FREE 40 MG: 5 INJECTION INTRAVENOUS at 05:37

## 2022-11-27 RX ADMIN — AMIODARONE HYDROCHLORIDE 200 MG: 200 TABLET ORAL at 08:15

## 2022-11-27 RX ADMIN — METOPROLOL TARTRATE 25 MG: 25 TABLET ORAL at 21:15

## 2022-11-27 RX ADMIN — POLYETHYLENE GLYCOL 3350, SODIUM SULFATE ANHYDROUS, SODIUM BICARBONATE, SODIUM CHLORIDE, POTASSIUM CHLORIDE 4000 ML: 236; 22.74; 6.74; 5.86; 2.97 POWDER, FOR SOLUTION ORAL at 15:20

## 2022-11-27 RX ADMIN — METHYLPREDNISOLONE SODIUM SUCCINATE 40 MG: 40 INJECTION, POWDER, FOR SOLUTION INTRAMUSCULAR; INTRAVENOUS at 08:14

## 2022-11-27 RX ADMIN — SODIUM CHLORIDE, PRESERVATIVE FREE 40 MG: 5 INJECTION INTRAVENOUS at 15:21

## 2022-11-27 RX ADMIN — BUTALBITAL, ACETAMINOPHEN AND CAFFEINE 1 TABLET: 50; 325; 40 TABLET ORAL at 12:50

## 2022-11-27 RX ADMIN — BUDESONIDE AND FORMOTEROL FUMARATE DIHYDRATE 2 PUFF: 160; 4.5 AEROSOL RESPIRATORY (INHALATION) at 19:50

## 2022-11-27 RX ADMIN — ATORVASTATIN CALCIUM 80 MG: 80 TABLET, FILM COATED ORAL at 08:15

## 2022-11-27 RX ADMIN — AMLODIPINE BESYLATE 5 MG: 5 TABLET ORAL at 08:15

## 2022-11-27 RX ADMIN — METOPROLOL TARTRATE 25 MG: 25 TABLET ORAL at 08:15

## 2022-11-27 RX ADMIN — ACETAMINOPHEN 650 MG: 325 TABLET ORAL at 08:14

## 2022-11-27 RX ADMIN — DOXYCYCLINE HYCLATE 100 MG: 100 TABLET ORAL at 08:15

## 2022-11-27 RX ADMIN — IRON SUCROSE 200 MG: 20 INJECTION, SOLUTION INTRAVENOUS at 08:14

## 2022-11-27 RX ADMIN — TIOTROPIUM BROMIDE INHALATION SPRAY 2 PUFF: 3.12 SPRAY, METERED RESPIRATORY (INHALATION) at 09:33

## 2022-11-27 RX ADMIN — SODIUM CHLORIDE, PRESERVATIVE FREE 10 ML: 5 INJECTION INTRAVENOUS at 08:25

## 2022-11-27 RX ADMIN — GABAPENTIN 300 MG: 600 TABLET ORAL at 21:15

## 2022-11-27 RX ADMIN — GABAPENTIN 300 MG: 600 TABLET ORAL at 15:21

## 2022-11-27 RX ADMIN — CALCIUM CARBONATE 1000 MG: 500 TABLET, CHEWABLE ORAL at 08:24

## 2022-11-27 RX ADMIN — BUDESONIDE AND FORMOTEROL FUMARATE DIHYDRATE 2 PUFF: 160; 4.5 AEROSOL RESPIRATORY (INHALATION) at 09:26

## 2022-11-27 RX ADMIN — ALBUTEROL SULFATE 2.5 MG: 2.5 SOLUTION RESPIRATORY (INHALATION) at 09:18

## 2022-11-27 RX ADMIN — GABAPENTIN 300 MG: 600 TABLET ORAL at 05:37

## 2022-11-27 RX ADMIN — ALBUTEROL SULFATE 2.5 MG: 2.5 SOLUTION RESPIRATORY (INHALATION) at 19:42

## 2022-11-27 RX ADMIN — SODIUM CHLORIDE, PRESERVATIVE FREE 10 ML: 5 INJECTION INTRAVENOUS at 19:48

## 2022-11-27 RX ADMIN — ISOSORBIDE MONONITRATE 60 MG: 60 TABLET ORAL at 08:15

## 2022-11-27 RX ADMIN — ALBUTEROL SULFATE 2.5 MG: 2.5 SOLUTION RESPIRATORY (INHALATION) at 13:24

## 2022-11-27 RX ADMIN — ALBUTEROL SULFATE 2.5 MG: 2.5 SOLUTION RESPIRATORY (INHALATION) at 17:29

## 2022-11-27 ASSESSMENT — PAIN SCALES - GENERAL
PAINLEVEL_OUTOF10: 10
PAINLEVEL_OUTOF10: 8
PAINLEVEL_OUTOF10: 8

## 2022-11-27 NOTE — CARE COORDINATION
Case Management Assessment  Initial Evaluation    Date/Time of Evaluation: 11/27/2022 8:51 AM  Assessment Completed by: Shahida Betancourt RN    If patient is discharged prior to next notation, then this note serves as note for discharge by case management. Patient Name: Ravi Arredondo                   YOB: 1960  Diagnosis: Shortness of breath [R06.02]  Acute blood loss anemia [D62]  Chest pain [R07.9]  NSTEMI (non-ST elevated myocardial infarction) [facility]) [I21.4]  Chest pain, unspecified type [R07.9]                   Date / Time: 11/26/2022  7:55 AM    Patient Admission Status: Inpatient   Readmission Risk (Low < 19, Mod (19-27), High > 27): Readmission Risk Score: 22.1    Current PCP: Theresa Britton, DO  PCP verified by CM? (P) Yes    Chart Reviewed: Yes      History Provided by: (P) Patient  Patient Orientation: (P) Alert and Oriented    Patient Cognition: (P) Alert    Hospitalization in the last 30 days (Readmission):  No    If yes, Readmission Assessment in CM Navigator will be completed.     Advance Directives:      Code Status: Full Code   Patient's Primary Decision Maker is: (P) Named in Scanned ACP Document    Primary Decision Maker: Miladysalexandria Cathy - Brother/Sister - [phone]    Discharge Planning:    Patient lives with: (P) Spouse/Significant Other Type of Home: (P) Trailer/Mobile Home  Primary Care Giver: (P) Self  Patient Support Systems include: (P) Family Members   Current Financial resources: (P) Medicare, Medicaid  Current community resources:    Current services prior to admission: (P) None            Current DME:              Type of Home Care services:  (P) Nursing Services    ADLS  Prior functional level: (P) Independent in ADLs/IADLs  Current functional level: (P) Independent in ADLs/IADLs    PT AM-PAC:   /24  OT AM-PAC:   /24    Family can provide assistance at DC: (P) Yes  Would you like Case Management to discuss the discharge plan with any other family members/significant others, and if so, who? (P) Yes  Plans to Return to Present Housing: (P) Yes  Other Identified Issues/Barriers to RETURNING to current housing: none  Potential Assistance needed at discharge: (P) [address]            Potential DME:    Patient expects to discharge to: (P) Trailer/mobile home  Plan for transportation at discharge: (P) Family    Financial    Payor: Carmen Richards / Plan: MEDICARE PART A AND B / Product Type: *No Product type* /     Does insurance require precert for SNF: No    Potential assistance Purchasing Medications: (P) No  Meds-to-Beds request:        Ronna Arroyo [id number]  Terri Zh[address]  [address] [place] [address]  Phone: [phone] Fax: [address] [place] [address] [address] [address]  Phone: [phone] Fax: [phone]      Notes:    Factors facilitating achievement of predicted outcomes: Family support, Cooperative, and Pleasant    Barriers to discharge: Medical complications and Medication managment    Additional Case Management Notes: plan is to return home, will have transportation, considering home care, provided list to make choices    The Plan for Transition of Care is related to the following treatment goals of Shortness of breath [R06.02]  Acute blood loss anemia [D62]  Chest pain [R07.9]  NSTEMI (non-ST elevated myocardial infarction) ([facility] [address] [I21.4]  Chest pain, unspecified type [H24.8]    IF APPLICABLE: The Patient and/or patient representative Tao Hills and his family were provided with a choice of provider and agrees with the discharge plan.  Freedom of choice list with basic dialogue that supports the patient's individualized plan of care/goals and shares the quality data associated with the providers was provided to: (P) Patient   Patient Representative Name:       The Patient and/or Patient Representative Agree with the Discharge Plan? (P) Yes    Ben Mir RN  Case Management Department  Ph: [phone]: [phone]

## 2022-11-27 NOTE — PROGRESS NOTES
relief of symptoms. Was not on any daily PPI or H2 antagonists at home. Patient has had black stool in the past as well occasionally. In the ED, patient was found to have Hb of 4.6 and elevated troponin from his baseline. Kidney function was ok and vitals were fine. Received two units of pRBCs. Cardio was consulted and no heparin drip started due to acute anemia. Patient does state that he started smoking again 3 weeks ago after having quit for 2 months. Smoking 8 cigarettes a day. Holiday occasional alcohol use. Denies illicit drug use. REVIEW OF SYSTEMS:      CONSTITUTIONAL:  no fevers, no headcahes  EYES: negative for blury vision  HEENT: No headaches, No nasal congestion, no difficulty swallowing  RESPIRATORY:negative for dyspnea, no wheezing, no Cough  CARDIOVASCULAR: negative for chest pain, no palpitations  GASTROINTESTINAL: no nausea, no vomiting, no change in bowel habits, no abdominal pain   GENITOURINARY: negative for dysuria, no hematuria   MUSCULOSKELETAL: no joint pains, no muscle aches, no swelling of joints or extremities  NEUROLOGICAL: No  Weakness or numbness      PAST MEDICAL HISTORY:      has a past medical history of Abnormal stress ECG, Angina pectoris ([address] Asthma, BPH (benign prostatic hyperplasia), CAD (coronary artery disease), Carotid stenosis, Carpal tunnel syndrome, Cervical stenosis of spine, Cervicalgia, Chronic back pain, COPD (chronic obstructive pulmonary disease) ([facility]), DDD (degenerative disc disease), Elbow fracture, right, Fractures, GERD (gastroesophageal reflux disease), Gout, Hyperlipidemia, Hypertension, Lung nodule, Mild depression, Morbidly obese ([facility]), Occlusion of right carotid artery, Osteoarthritis of right knee, Pre-diabetes, Sinus headache, Sleep apnea, Sleep disturbance, Smoker, Snores, SOB (shortness of breath) on exertion, Tendonitis of foot, Tooth loose, Under care of team, Under care of team, and Wellness examination.     PAST SURGICAL HISTORY: has a past surgical history that includes Colonoscopy; Upper gastrointestinal endoscopy; Cervical spine surgery (07/13/2012); fracture surgery; Cardiac catheterization (07/08/2014); Nerve Block (02/05/2016); hc inject other perphrl nerv (Bilateral, 05/09/2019); other surgical history (08/15/2019); Injection Procedure For Sacroiliac Joint (Bilateral, 08/15/2019); Nerve Block (Left, 02/07/2020); Nerve Block (Left, 02/07/2020); Pain management procedure (Left, 02/07/2020); Nerve Block (02/12/2021); Pain management procedure (Left, 02/12/2021); Nerve Block (Right, 02/19/2021); Pain management procedure (Right, 02/19/2021); Cardiac catheterization (09/16/2022); and vascular surgery (Right, 9/28/2022). SOCIAL HISTORY:      reports that he has quit smoking. His smoking use included cigarettes. He started smoking about 47 years ago. He has a 10.00 pack-year smoking history. He quit smokeless tobacco use about 7 years ago. He reports that he does not currently use alcohol. He reports that he does not use drugs. FAMILY HISTORY:     family history includes COPD in his father; Cancer (age of onset: 64) in his maternal aunt; Cancer (age of onset: 61) in his maternal uncle; Heart Disease in his mother. HOME MEDICATIONS:      Prior to Admission medications    Medication Sig Start Date End Date Taking? Authorizing Provider   tiZANidine (ZANAFLEX) 4 MG tablet Take 1 tablet by mouth 2 times daily as needed (neck and back spasms) 11/7/22   Nav Vidal DO   gabapentin (NEURONTIN) 800 MG tablet Take 1 tablet by mouth every 8 hours for 30 days.  11/7/22 12/7/22  Nav Vidal DO   amiodarone (CORDARONE) 200 MG tablet TAKE ONE TABLET BY MOUTH DAILY 10/17/22   Nav Vidal DO   ELIQUIS 5 MG TABS tablet TAKE ONE TABLET BY MOUTH TWICE A DAY 10/17/22   Nav Vidal DO   budesonide-formoterol (SYMBICORT) 160-4.5 MCG/ACT AERO INHALE TWO PUFFS BY MOUTH TWICE A DAY 9/17/22   George Glasgow MD   aspirin 81 MG EC tablet Take 1 tablet by mouth daily 9/17/22   Mikey Monroe MD   metoprolol tartrate (LOPRESSOR) 25 MG tablet Take 1 tablet by mouth 2 times daily 9/17/22   Mikey Monroe MD   isosorbide mononitrate (IMDUR) 60 MG extended release tablet Take 1 tablet by mouth in the morning. 7/28/22   Alicia Campbell MD   clopidogrel (PLAVIX) 75 MG tablet Take 1 tablet by mouth in the morning. 8/1/22   Adriane Cabezas MD   nitroGLYCERIN (NITROSTAT) 0.4 MG SL tablet Place 1 tablet under the tongue every 5 minutes as needed for Chest pain (not to exceed 3 tabs at a time) 6/3/22   Awilda Byrnes DO   amLODIPine (NORVASC) 5 MG tablet TAKE ONE TABLET BY MOUTH DAILY 4/22/22   Awilda Byrnes DO   albuterol sulfate  (90 Base) MCG/ACT inhaler Inhale 2 puffs into the lungs every 6 hours as needed for Wheezing 4/22/22   Awilda Byrnes DO   simvastatin (ZOCOR) 80 MG tablet TAKE ONE TABLET BY MOUTH EVERY EVENING 4/11/22   Awilda Byrnes DO       ALLERGIES:     Patient has no known allergies. OBJECTIVE:       Vitals:    11/26/22 2000 11/26/22 2104 11/26/22 2350 11/27/22 0348   BP: (!) 150/63 (!) 134/58 132/60 (!) 149/64   Pulse: 74 76 66 68   Resp: 25 17 16 17   Temp: 98.2 °F (36.8 °C) 97.4 °F (36.3 °C) 97.8 °F (36.6 °C) 97.3 °F (36.3 °C)   TempSrc:  Oral Oral Axillary   SpO2: 94% 97% 94% 93%   Weight:             Intake/Output Summary (Last 24 hours) at 11/27/2022 0727  Last data filed at 11/27/2022 9363  Gross per 24 hour   Intake 1394.75 ml   Output 2500 ml   Net -1105.25 ml       PHYSICAL EXAM:  General Appearance  Alert , awake , not in acute distress  HEENT - Head is normocephalic, atraumatic. Lungs - Bilateral equal air entry , no wheezes, rales or rhonchi, aeration good  Cardiovascular - Heart sounds are normal.  Regular rhythm, normal rate without murmur, gallop or rub.   Abdomen - Soft, nontender, nondistended, no masses or organomegaly  Neurologic - There are no new focal motor or sensory deficits  Skin - No bruising or bleeding on exposed skin area  Extremities - No cyanosis, clubbing or edema      DIAGNOSTICS:     Laboratory Testing:    Recent Results (from the past 24 hour(s))   Blood Culture 1    Collection Time: 11/26/22  8:07 AM    Specimen: Blood   Result Value Ref Range    Specimen Description . BLOOD     Special Requests 1ML R FA     Culture NO GROWTH 22 HOURS    Venous Blood Gas, POC    Collection Time: 11/26/22  8:08 AM   Result Value Ref Range    pH, Js 7.353 7.320 - 7.430    pCO2, Js 33.6 (L) 41.0 - 51.0 mm Hg    pO2, Js 21.0 (L) 30.0 - 50.0 mm Hg    HCO3, Venous 18.7 (L) 22.0 - 29.0 mmol/L    Negative Base Excess, Js 6 (H) 0.0 - 2.0    O2 Sat, Js 33 (L) 60.0 - 85.0 %   ELECTROLYTES PLUS    Collection Time: 11/26/22  8:08 AM   Result Value Ref Range    POC Sodium 141 138 - 146 mmol/L    POC Potassium 4.1 3.5 - 4.5 mmol/L    POC Chloride 111 (H) 98 - 107 mmol/L    POC TCO2 18 (L) 22 - 30 mmol/L    Anion Gap 13 7 - 16 mmol/L   Hemoglobin and hematocrit, blood    Collection Time: 11/26/22  8:08 AM   Result Value Ref Range    POC Hemoglobin 5.2 (LL) 13.5 - 17.5 g/dL    POC Hematocrit 15 (L) 41 - 53 %   Creatinine W/GFR Point of Care    Collection Time: 11/26/22  8:08 AM   Result Value Ref Range    POC Creatinine 1.00 0.51 - 1.19 mg/dL    eGFR, POC >60 mL/min/1.73m2   CALCIUM, IONIC (POC)    Collection Time: 11/26/22  8:08 AM   Result Value Ref Range    POC Ionized Calcium 1.17 1.15 - 1.33 mmol/L   POCT urea (BUN)    Collection Time: 11/26/22  8:08 AM   Result Value Ref Range    POC BUN 25 8 - 26 mg/dL   Lactic Acid, POC    Collection Time: 11/26/22  8:08 AM   Result Value Ref Range    POC Lactic Acid 4.51 (H) 0.56 - 1.39 mmol/L   POCT Glucose    Collection Time: 11/26/22  8:08 AM   Result Value Ref Range    POC Glucose 139 (H) 74 - 100 mg/dL   BMP    Collection Time: 11/26/22  8:14 AM   Result Value Ref Range    Glucose 144 (H) 70 - 99 mg/dL    BUN 24 (H) 8 - 23 mg/dL Creatinine 0.86 0.70 - 1.20 mg/dL    Est, Glom Filt Rate >60 >60 mL/min/1.73m2    Calcium 8.5 (L) 8.6 - 10.4 mg/dL    Sodium 139 135 - 144 mmol/L    Potassium 4.1 3.7 - 5.3 mmol/L    Chloride 105 98 - 107 mmol/L    CO2 17 (L) 20 - 31 mmol/L    Anion Gap 17 9 - 17 mmol/L   CBC with Auto Differential    Collection Time: 11/26/22  8:14 AM   Result Value Ref Range    WBC 15.0 (H) 3.5 - 11.3 k/uL    RBC 1.78 (L) 4.21 - 5.77 m/uL    Hemoglobin 4.6 (LL) 13.0 - 17.0 g/dL    Hematocrit 16.8 (L) 40.7 - 50.3 %    MCV 94.4 82.6 - 102.9 fL    MCH 25.8 25.2 - 33.5 pg    MCHC 27.4 (L) 28.4 - 34.8 g/dL    RDW 16.4 (H) 11.8 - 14.4 %    Platelets 571 199 - 301 k/uL    MPV 9.6 8.1 - 13.5 fL    NRBC Automated 1.4 (H) 0.0 per 100 WBC    Immature Granulocytes 2 (H) 0 %    Seg Neutrophils 64 36 - 65 %    Lymphocytes 26 24 - 43 %    Monocytes 7 3 - 12 %    Eosinophils % 1 1 - 4 %    Basophils 0 0 - 2 %    Absolute Immature Granulocyte 0.30 0.00 - 0.30 k/uL    Segs Absolute 9.60 (H) 1.50 - 8.10 k/uL    Absolute Lymph # 3.90 (H) 1.10 - 3.70 k/uL    Absolute Mono # 1.05 0.10 - 1.20 k/uL    Absolute Eos # 0.15 0.00 - 0.44 k/uL    Basophils Absolute 0.00 0.00 - 0.20 k/uL    Morphology ANISOCYTOSIS PRESENT     Morphology 1+ POLYCHROMASIA     Morphology HYPOCHROMIA PRESENT    Protime-INR    Collection Time: 11/26/22  8:14 AM   Result Value Ref Range    Protime 11.8 9.1 - 12.3 sec    INR 1.1    APTT    Collection Time: 11/26/22  8:14 AM   Result Value Ref Range    PTT 22.8 20.5 - 30.5 sec   Magnesium    Collection Time: 11/26/22  8:14 AM   Result Value Ref Range    Magnesium 2.4 1.6 - 2.6 mg/dL   Phosphorus    Collection Time: 11/26/22  8:14 AM   Result Value Ref Range    Phosphorus 3.4 2.5 - 4.5 mg/dL   Calcium, Ionized    Collection Time: 11/26/22  8:14 AM   Result Value Ref Range    Calcium, Ionized 1.15 1.13 - 1.33 mmol/L   Troponin    Collection Time: 11/26/22  8:14 AM   Result Value Ref Range    Troponin, High Sensitivity 58 (HH) 0 - 22 ng/L Brain Natriuretic Peptide    Collection Time: 11/26/22  8:14 AM   Result Value Ref Range    Pro- <300 pg/mL   Lactate, Sepsis    Collection Time: 11/26/22  8:14 AM   Result Value Ref Range    Lactic Acid, Sepsis, Whole Blood 3.5 (H) 0.5 - 1.9 mmol/L   Hepatic Function Panel    Collection Time: 11/26/22  8:14 AM   Result Value Ref Range    Albumin 3.9 3.5 - 5.2 g/dL    Alkaline Phosphatase 94 40 - 129 U/L    ALT 16 5 - 41 U/L    AST 22 <40 U/L    Total Bilirubin 1.0 0.3 - 1.2 mg/dL    Bilirubin, Direct 0.1 <0.3 mg/dL    Bilirubin, Indirect 0.9 0.00 - 1.00 mg/dL    Total Protein 6.3 (L) 6.4 - 8.3 g/dL    Albumin/Globulin Ratio 1.6 1.0 - 2.5   Culture, Blood 2    Collection Time: 11/26/22  8:15 AM    Specimen: Blood   Result Value Ref Range    Specimen Description . BLOOD     Special Requests L AC     Culture NO GROWTH 22 HOURS    TYPE AND SCREEN    Collection Time: 11/26/22  8:16 AM   Result Value Ref Range    Expiration Date 11/29/2022,2359     Arm Band Number [id number]     ABO/Rh O POSITIVE     Antibody Screen NEGATIVE     Unit Number [id number]     Product Code Leukocyte Reduced Red Cell     Unit Divison 00     Dispense Status TRANSFUSED     Unit Issue Date/Time 589468608221     Product Code Blood Bank [id number]     Blood Bank Unit Type and Rh O POS     Blood Bank ISBT Product Blood Type 5100     Blood Bank Blood Product Expiration Date 202212212359     Transfusion Status OK TO TRANSFUSE     Crossmatch Result COMPATIBLE     Unit Number [id number]     Product Code Leukocyte Reduced Red Cell     Unit Divison 00     Dispense Status TRANSFUSED     Unit Issue Date/Time 247640027928     Product Code Blood Bank [id number]     Blood Bank Unit Type and Rh O POS     Blood Bank ISBT Product Blood Type 5100     Blood Bank Blood Product Expiration Date 202212212359     Transfusion Status OK TO TRANSFUSE     Crossmatch Result COMPATIBLE     Unit Number [id number]     Product Code Leukocyte Reduced Red Cell     Unit Divison 00     Dispense Status TRANSFUSED     Unit Issue Date/Time 728627152483     Product Code Blood Bank [id number]     Blood Bank Unit Type and Rh O POS     Blood Bank ISBT Product Blood Type 5100     Blood Bank Blood Product Expiration Date 353144494656     Transfusion Status OK TO TRANSFUSE     Crossmatch Result COMPATIBLE     Unit Number [id number]     Product Code Leukocyte Reduced Red Cell     Unit Divison 00     Dispense Status TRANSFUSED     Unit Issue Date/Time 121635436600     Product Code Blood Bank [id number]     Blood Bank Unit Type and Rh O POS     Blood Bank ISBT Product Blood Type 5100     Blood Bank Blood Product Expiration Date 237210872550     Transfusion Status OK TO TRANSFUSE     Crossmatch Result COMPATIBLE    Troponin    Collection Time: 11/26/22  9:23 AM   Result Value Ref Range    Troponin, High Sensitivity 63 (HH) 0 - 22 ng/L   Lactate, Sepsis    Collection Time: 11/26/22 11:18 AM   Result Value Ref Range    Lactic Acid, Sepsis, Whole Blood 0.9 0.5 - 1.9 mmol/L   Troponin    Collection Time: 11/26/22  1:15 PM   Result Value Ref Range    Troponin, High Sensitivity 233 (HH) 0 - 22 ng/L   Hemoglobin and Hematocrit    Collection Time: 11/26/22  2:50 PM   Result Value Ref Range    Hemoglobin 5.9 (LL) 13.0 - 17.0 g/dL    Hematocrit 20.0 (L) 40.7 - 50.3 %   C-Reactive Protein    Collection Time: 11/26/22  2:50 PM   Result Value Ref Range    CRP 6.3 (H) 0.0 - 5.0 mg/L   Procalcitonin    Collection Time: 11/26/22  2:50 PM   Result Value Ref Range    Procalcitonin 0.05 <0.09 ng/mL   Sedimentation Rate    Collection Time: 11/26/22  2:50 PM   Result Value Ref Range    Sed Rate 3 0 - 20 mm/Hr   Venous Blood Gas, POC    Collection Time: 11/26/22  3:00 PM   Result Value Ref Range    pH, Js 7.288 (L) 7.320 - 7.430    pCO2, Js 44.4 41.0 - 51.0 mm Hg    pO2, Js 21.6 (L) 30.0 - 50.0 mm Hg    HCO3, Venous 21.2 (L) 22.0 - 29.0 mmol/L    Negative Base Excess, Js 5 (H) 0.0 - 2.0    O2 Sat, Js 30 (L) 60.0 - 85.0 %   ELECTROLYTES PLUS    Collection Time: 11/26/22  3:00 PM   Result Value Ref Range    POC Sodium 140 138 - 146 mmol/L    POC Potassium 4.3 3.5 - 4.5 mmol/L    POC Chloride 108 (H) 98 - 107 mmol/L    POC TCO2 21 (L) 22 - 30 mmol/L    Anion Gap 12 7 - 16 mmol/L   Hemoglobin and hematocrit, blood    Collection Time: 11/26/22  3:00 PM   Result Value Ref Range    POC Hemoglobin 5.9 (LL) 13.5 - 17.5 g/dL    POC Hematocrit 17 (L) 41 - 53 %   Creatinine W/GFR Point of Care    Collection Time: 11/26/22  3:00 PM   Result Value Ref Range    POC Creatinine 1.02 0.51 - 1.19 mg/dL    eGFR, POC >60 mL/min/1.73m2   CALCIUM, IONIC (POC)    Collection Time: 11/26/22  3:00 PM   Result Value Ref Range    POC Ionized Calcium 1.21 1.15 - 1.33 mmol/L   POCT urea (BUN)    Collection Time: 11/26/22  3:00 PM   Result Value Ref Range    POC BUN 24 8 - 26 mg/dL   Lactic Acid, POC    Collection Time: 11/26/22  3:00 PM   Result Value Ref Range    POC Lactic Acid 1.85 (H) 0.56 - 1.39 mmol/L   POCT Glucose    Collection Time: 11/26/22  3:00 PM   Result Value Ref Range    POC Glucose 112 (H) 74 - 100 mg/dL   Respiratory Panel, Molecular, with COVID-19 (Restricted: peds pts or suitable admitted adults)    Collection Time: 11/26/22  3:38 PM    Specimen: Nasopharyngeal Swab   Result Value Ref Range    Specimen Description . NASOPHARYNGEAL SWAB     Adenovirus PCR Not Detected Not Detected    Coronavirus 229E PCR Not Detected Not Detected    Coronavirus HKU1 PCR Not Detected Not Detected    Coronavirus NL63 PCR Not Detected Not Detected    Coronavirus OC43 PCR Not Detected Not Detected    SARS-CoV-2, PCR Not Detected Not Detected    Human Metapneumovirus PCR Not Detected Not Detected    Rhino/Enterovirus PCR Not Detected Not Detected    Influenza A by PCR Not Detected Not Detected    Influenza B by PCR Not Detected Not Detected    Parainfluenza 1 PCR Not Detected Not Detected    Parainfluenza 2 PCR Not Detected Not Detected    Parainfluenza 3 PCR Not Detected Not Detected    Parainfluenza 4 PCR Not Detected Not Detected    Resp Syncytial Virus PCR Not Detected Not Detected    Bordetella Parapertussis Not Detected Not Detected    B Pertussis by PCR Not Detected Not Detected    Chlamydia pneumoniae By PCR Not Detected Not Detected    Mycoplasma pneumo by PCR Not Detected Not Detected   Urinalysis with Reflex to Culture    Collection Time: 11/26/22  6:10 PM    Specimen: Urine   Result Value Ref Range    Color, UA Yellow Yellow    Turbidity UA Clear Clear    Glucose, Ur NEGATIVE NEGATIVE    Bilirubin Urine NEGATIVE NEGATIVE    Ketones, Urine NEGATIVE NEGATIVE    Specific Gravity, UA 1.020 1.005 - 1.030    Urine Hgb NEGATIVE NEGATIVE    pH, UA 6.0 5.0 - 8.0    Protein, UA NEGATIVE NEGATIVE    Urobilinogen, Urine ELEVATED (A) Normal    Nitrite, Urine NEGATIVE NEGATIVE    Leukocyte Esterase, Urine NEGATIVE NEGATIVE    Urinalysis Comments       Microscopic exam not performed based on chemical results unless requested in original order. Strep Pneumoniae Antigen    Collection Time: 11/26/22  6:10 PM    Specimen: Urine, clean catch   Result Value Ref Range    Source . URINE     Strep pneumo Ag NEGATIVE    LEGIONELLA ANTIGEN, URINE    Collection Time: 11/26/22  6:10 PM    Specimen: Urine, clean catch   Result Value Ref Range    Legionella Pneumophilia Ag, Urine NEGATIVE NEGATIVE   Hemoglobin and Hematocrit    Collection Time: 11/26/22  9:34 PM   Result Value Ref Range    Hemoglobin 7.6 (L) 13.0 - 17.0 g/dL    Hematocrit 24.7 (L) 40.7 - 50.3 %   CBC with Auto Differential    Collection Time: 11/27/22  2:44 AM   Result Value Ref Range    WBC 10.0 3.5 - 11.3 k/uL    RBC 2.70 (L) 4.21 - 5.77 m/uL    Hemoglobin 7.3 (L) 13.0 - 17.0 g/dL    Hematocrit 24.6 (L) 40.7 - 50.3 %    MCV 91.1 82.6 - 102.9 fL    MCH 27.0 25.2 - 33.5 pg    MCHC 29.7 28.4 - 34.8 g/dL    RDW 16.2 (H) 11.8 - 14.4 %    Platelets 856 367 - 779 k/uL    MPV 9.7 8.1 - 13.5 fL    NRBC Automated 1.3 (H) 0.0 per 100 WBC    RBC Morphology ANISOCYTOSIS PRESENT     Seg Neutrophils 85 (H) 36 - 65 %    Lymphocytes 11 (L) 24 - 43 %    Monocytes 2 (L) 3 - 12 %    Eosinophils % 0 (L) 1 - 4 %    Basophils 0 0 - 2 %    Immature Granulocytes 2 (H) 0 %    Segs Absolute 8.51 (H) 1.50 - 8.10 k/uL    Absolute Lymph # 1.08 (L) 1.10 - 3.70 k/uL    Absolute Mono # 0.17 0.10 - 1.20 k/uL    Absolute Eos # <0.03 0.00 - 0.44 k/uL    Basophils Absolute 0.04 0.00 - 0.20 k/uL    Absolute Immature Granulocyte 0.20 0.00 - 0.30 k/uL   Basic Metabolic Panel w/ Reflex to MG    Collection Time: 11/27/22  2:44 AM   Result Value Ref Range    Glucose 175 (H) 70 - 99 mg/dL    BUN 24 (H) 8 - 23 mg/dL    Creatinine 0.84 0.70 - 1.20 mg/dL    Est, Glom Filt Rate >60 >60 mL/min/1.73m2    Calcium 8.0 (L) 8.6 - 10.4 mg/dL    Sodium 138 135 - 144 mmol/L    Potassium 4.5 3.7 - 5.3 mmol/L    Chloride 106 98 - 107 mmol/L    CO2 17 (L) 20 - 31 mmol/L    Anion Gap 15 9 - 17 mmol/L   Ferritin    Collection Time: 11/27/22  2:44 AM   Result Value Ref Range    Ferritin 12 (L) 30 - 400 ng/mL   Iron and TIBC    Collection Time: 11/27/22  2:44 AM   Result Value Ref Range    Iron 28 (L) 59 - 158 ug/dL    TIBC 482 (H) 250 - 450 ug/dL    Iron Saturation 6 (L) 20 - 55 %    UIBC 454 (H) 112 - 347 ug/dL   Lactic Acid    Collection Time: 11/27/22  2:44 AM   Result Value Ref Range    Lactic Acid, Whole Blood 1.7 0.7 - 2.1 mmol/L   Urinalysis with Microscopic    Collection Time: 11/27/22  5:52 AM   Result Value Ref Range    Color, UA Yellow Yellow    Turbidity UA Clear Clear    Glucose, Ur NEGATIVE NEGATIVE    Bilirubin Urine NEGATIVE NEGATIVE    Ketones, Urine NEGATIVE NEGATIVE    Specific Gravity, UA 1.019 1.005 - 1.030    Urine Hgb NEGATIVE NEGATIVE    pH, UA 5.5 5.0 - 8.0    Protein, UA NEGATIVE NEGATIVE    Urobilinogen, Urine Normal Normal    Nitrite, Urine NEGATIVE NEGATIVE    Leukocyte Esterase, Urine NEGATIVE NEGATIVE    WBC, UA None 0 - 5 /HPF    RBC, UA 2 TO 5 0 - 4 /HPF Casts UA  0 - 8 /LPF     0 TO 2 HYALINE Reference range defined for non-centrifuged specimen.     Epithelial Cells UA None 0 - 5 /HPF         Current Facility-Administered Medications   Medication Dose Route Frequency Provider Last Rate Last Admin    iron sucrose (VENOFER) 200 mg in sodium chloride 0.9 % 100 mL IVPB  200 mg IntraVENous Q24H Yair Strauss MD        0.9 % sodium chloride infusion   IntraVENous PRN Gigi Holt DO        budesonide-formoterol (SYMBICORT) 160-4.5 MCG/ACT inhaler 2 puff  2 puff Inhalation BID Collin Mathews MD   2 puff at 11/26/22 2052    [Held by provider] aspirin EC tablet 81 mg  81 mg Oral Daily Collin Mathews MD        [facility] [place] by provider] clopidogrel (PLAVIX) tablet 75 mg  75 mg Oral Daily Collin Mathews MD        [Held by provider] apixaban (ELIQUIS) tablet 5 mg  5 mg Oral BID Collin Mathews MD        gabapentin (NEURONTIN) tablet 300 mg  300 mg Oral 3 times per day Collin Mathews MD   300 mg at 11/27/22 0537    isosorbide mononitrate (IMDUR) extended release tablet 60 mg  60 mg Oral Daily Collin Mathews MD   60 mg at 11/26/22 1258    sodium chloride flush 0.9 % injection 5-40 mL  5-40 mL IntraVENous 2 times per day Collin Mathews MD   10 mL at 11/26/22 2000    sodium chloride flush 0.9 % injection 5-40 mL  5-40 mL IntraVENous PRN Collin Mathews MD        0.9 % sodium chloride infusion   IntraVENous PRN Collin Mathews MD        ondansetron (ZOFRAN-ODT) disintegrating tablet 4 mg  4 mg Oral Q8H PRN Collin Mathews MD        Or    [facility] injection 4 mg  4 mg IntraVENous Q6H PRN Collin Mathews MD        polyethylene glycol (GLYCOLAX) packet 17 g  17 g Oral Daily PRN Collin Mathews MD        acetaminophen (TYLENOL) tablet 650 mg  650 mg Oral Q6H PRN Collin Mathews MD        Or    acetaminophen (TYLENOL) suppository 650 mg  650 mg Rectal Q6H PRN Collin Mathews MD        potassium chloride (KLOR-CON M) extended release tablet 40 mEq  40 mEq Oral PRN Collin Mathews MD Or    potassium bicarb-citric acid (EFFER-K) effervescent tablet 40 mEq  40 mEq Oral PRN Gin Betancur MD        Or    potassium chloride 10 mEq/100 mL IVPB (Peripheral Line)  10 mEq IntraVENous PRN Gin Betancur MD        magnesium sulfate 2000 mg in 50 mL IVPB premix  2,000 mg IntraVENous PRN Gin Betancur MD        pantoprazole (PROTONIX) 40 mg in sodium chloride (PF) 0.9 % 10 mL injection  40 mg IntraVENous Q12H Gin Betancur MD   40 mg at 11/27/22 0537    atorvastatin (LIPITOR) tablet 80 mg  80 mg Oral Daily Gin Betancur MD        tiotropium (SPIRIVA RESPIMAT) 2.5 MCG/ACT inhaler 2 puff  2 puff Inhalation Daily Gin Betancur MD        albuterol (PROVENTIL) nebulizer solution 2.5 mg  2.5 mg Nebulization Q4H [place] Aquiles Reed MD   2.5 mg at 11/26/22 2051    methylPREDNISolone sodium (SOLU-MEDROL) injection 40 mg  40 mg IntraVENous Q8H Yair Estrada MD        doxycycline hyclate (VIBRA-TABS) tablet 100 mg  100 mg Oral 2 times per day Parish Irwin MD        labetalol (NORMODYNE;TRANDATE) injection 10 mg  10 mg IntraVENous Q4H PRN Yair Estrada MD        amLODIPine (NORVASC) tablet 5 mg  5 mg Oral Daily Yair Estrada MD        amiodarone (CORDARONE) tablet 200 mg  200 mg Oral Daily Yair Estrada MD        metoprolol tartrate (LOPRESSOR) tablet 25 mg  25 mg Oral BID Rex Villegas MD   25 mg at 11/26/22 2107    butalbital-acetaminophen-caffeine (FIORICET, ESGIC) per tablet 1 tablet  1 tablet Oral Q6H PRN Parish Irwin MD   1 tablet at 11/26/22 2159       ASSESSMENT:     Principal Problem:    Chest pain  Active Problems:    Status post carotid surgery    Acute blood loss anemia    COPD (chronic obstructive pulmonary disease) ([facility] [address]    Essential hypertension  Resolved Problems:    * No resolved hospital problems.  *      PLAN:        NSTEMI Type II likely 2/2 to acute blood loss anemia  Troponin 74>42>203  Not on heparin at this time due to low Hb and GI bleed  ProBNP unremarkable  Previous cath in 09/2022 showed minimal CAD  Continue lopressor 25 mg oral BID with hold parameters  Lipitor 80 mg oral daily  Cardiology following    Acute anemia likely 2/2 GI bleed  Hb 4.6 on admission. S/p 4 units pRBC. Hgb is 7.5 post transfusion. Start IV protonix 40 mg BID  Hold anticoagulants  H&H q12h  GI consulted. Plan for endoscopy & colonoscopy on Monday   Low iron and ferritin. On IV venofer      Sepsis possibly HCAP r/o UTI  Blood cultures collected. No growth   WBC and lactate elevated  Procal, ESR, CRP are WNL   Pneumonia work up is negative    UA is negative for UTI  on broad spectrum abx  LR @ 100 mL/h  consult critical care     COPD Exacerbations   -Hx of smoking, dyspnea on exertion, wheezes on PE  -Normal ABGs   started on  solumedrol yesterday  -Continue Symbicort and Spiriva   - last PFT showed restrictive pattern  -Repeat PFTs as OP     -History of paroxysmal A. Fib  Continue home amiodarone 200 mg oral daily  Hold Eliquis    Recent right TCAR 09/2022  Hold DAPT  Consult vascular, recommended holding anticoagulants and neuro checks     HTN  Continue home meds  Amlodipine 5 mg oral daily     Peripheral neuropathy  Previous A1c in 2019 unremarkable  Repeat A1c  Gabapentin 300 mg TID (home dose 800 mg TID)        ROSY  Patient refusing BiPAP at night  Continue to encourage     DVT prophylaxis: None due to bleed. EPCs  GI prophylaxis: IV protonix 40 mg BID  Diet: Regular  Dispo: Intermediate    CODE STATUS full    OT/PT/social work consult in place     Plan was discussed with the attending, Dr. Yaquelin Dallas. Celso Plasencia MD  Family Medicine Resident  11/27/2022 7:27 AM            Please note that this chart was generated using voice recognition Dragon dictation software.   Although every effort was made to ensure the accuracy of this automated transcription, some errors in transcription may have occurred

## 2022-11-27 NOTE — PROGRESS NOTES
Patient was seen and examined with bedside, apparently patient was maintaining 100% saturation on 6 L. Writer turned on the patient oxygen and patient was able to speak in full sentences without episode of desaturation on room air. Patient did mention that he get dyspneic on exertion. Patient endorsed smoking cigarettes for a long time, do carry history of COPD. On examination patient did have expiratory wheezes and productive clear phlegm. Patient appears to be in COPD exacerbation, will start steroid. Patient already on antibiotic, will de-escalate once cultures are back. Patient blood pressure is adequate at this time, after discussing with the nurse will use one-time dose of Lasix.       Yair Arreaga  PGY-3  Family Medicine     11/26/2022  7:38 PM

## 2022-11-27 NOTE — PROGRESS NOTES
[facility]astroenterology Progress Note    Heydi Loera is a 57 y.o. male patient. Hospitalization Day:1      Chief consult reason: Anemia      Subjective: Patient seen and examined. Patient reports having an episode of shortness of breath yesterday requiring him to have BiPAP. Currently feels improved and is 98% on room air. Objective:   VITALS:  BP (!) 119/46   Pulse 70   Temp 97.6 °F (36.4 °C) (Oral)   Resp 18   Wt 255 lb (115.7 kg)   SpO2 95%   BMI 37.66 kg/m²   TEMPERATURE:  Current - Temp: 97.6 °F (36.4 °C);  Max - Temp  Av.9 °F (36.6 °C)  Min: 97.2 °F (36.2 °C)  Max: 98.7 °F (37.1 °C)    Physical Assessment:  General appearance:  alert, cooperative and no distress  Mental Status:  oriented to person, place and time and normal affect  Lungs:  clear to auscultation bilaterally, normal effort  Heart:  regular rate and rhythm, no murmur  Abdomen:  soft, nontender, nondistended, normal bowel sounds, no masses  Extremities:  no edema, no redness, No clubbing  Skin:  warm, dry, no gross lesions or rashes    CURRENT MEDICATIONS:  Scheduled Meds:   iron sucrose  200 mg IntraVENous Q24H    budesonide-formoterol  2 puff Inhalation BID    [Held by provider] aspirin  81 mg Oral Daily    [Held by provider] clopidogrel  75 mg Oral Daily    [Held by provider] apixaban  5 mg Oral BID    gabapentin  300 mg Oral 3 times per day    isosorbide mononitrate  60 mg Oral Daily    sodium chloride flush  5-40 mL IntraVENous 2 times per day    pantoprazole (PROTONIX) 40 mg injection  40 mg IntraVENous Q12H    atorvastatin  80 mg Oral Daily    tiotropium  2 puff Inhalation Daily    albuterol  2.5 mg Nebulization Q4H WA    methylPREDNISolone  40 mg IntraVENous Q8H    doxycycline hyclate  100 mg Oral 2 times per day    amLODIPine  5 mg Oral Daily    amiodarone  200 mg Oral Daily    metoprolol tartrate  25 mg Oral BID     Continuous Infusions:   sodium chloride      sodium chloride       PRN Meds:sodium chloride, sodium chloride flush, sodium chloride, ondansetron **OR** ondansetron, polyethylene glycol, acetaminophen **OR** acetaminophen, potassium chloride **OR** potassium alternative oral replacement **OR** potassium chloride, magnesium sulfate, labetalol, butalbital-acetaminophen-caffeine      Data Review:  LABS and IMAGING:     CBC  Recent Labs     11/26/22  0814 11/26/22  1450 11/26/22  2134 11/27/22  0244 11/27/22  0904   WBC 15.0*  --   --  10.0 9.8   HGB 4.6* 5.9* 7.6* 7.3* 7.5*   HCT 16.8* 20.0* 24.7* 24.6* 23.5*   MCV 94.4  --   --  91.1 86.1   MCHC 27.4*  --   --  29.7 31.9   RDW 16.4*  --   --  16.2* 16.3*     --   --  235 214       Immature PLTs   No results found for: PLTFLUORE    ANEMIA STUDIES  Recent Labs     11/27/22  0244   LABIRON 6*   TIBC 482*   IRON 28*   FERRITIN 12*       BMP  Recent Labs     11/26/22  0814 11/26/22  1500 11/27/22  0244 11/27/22  0734     --  138 137   K 4.1  --  4.5 4.7     --  106 106   CO2 17*  --  17* 15*   BUN 24*  --  24* 23   CREATININE 0.86 1.02 0.84 0.79   GLUCOSE 144*  --  175* 196*   CALCIUM 8.5*  --  8.0* 8.2*       LFTS  Recent Labs     11/26/22  0814   ALKPHOS 94   ALT 16   AST 22   BILITOT 1.0   BILIDIR 0.1   LABALBU 3.9       AMYLASE/LIPASE/AMMONIA  No results for input(s): AMYLASE, LIPASE, AMMONIA in the last 72 hours.     Acute Hepatitis Panel   No results found for: HEPBSAG, HEPCAB, HEPBIGM, HEPAIGM    HCV Genotype   No results found for: HEPATITISCGENOTYPE    HCV Quantitative   No results found for: HCVQNT    LIVER WORK UP:    AFP  No results found for: AFP    Alpha 1 antitrypsin   No results found for: A1A    Anti - Liver/Kidney Ab  No results found for: LIVER-KIDNEYMICROSOMALAB    AFSHIN  No results found for: AFSHIN    AMA  No results found for: MITOAB    ASMA  No results found for: SMOOTHMUSCAB    Ceruloplasmin  No results found for: CERULOPLSM    Celiac panel  No results found for: TISSTRNTIIGG, TTGIGA, IGA    IgG  No results found for: IGG    IgM  No results found for: IGM    GGT   No results found for: LABGGT    PT/INR  Recent Labs     11/26/22  0814   PROTIME 11.8   INR 1.1       Cancer Markers:  CEA:  No results found for: CEA  Ca 125:  No results found for:   Ca 19-9:   No results found for:   AFP: No results found for: AFP    Lactic acid:  Recent Labs     11/27/22  0244   LACTACIDWB 1.7         Radiology Review:    XR CHEST PORTABLE    Result Date: 11/26/2022  EXAMINATION: ONE XRAY VIEW OF THE CHEST 11/26/2022 8:33 am COMPARISON: 09/16/2022 and 08/05/2022 HISTORY: ORDERING SYSTEM PROVIDED HISTORY: chest pain, sob TECHNOLOGIST PROVIDED HISTORY: chest pain, sob Reason for Exam: chest pain/ shortness of breath/  AP erect/ port. FINDINGS: Heart size is stable. Scattered interstitial prominence in the lungs appears mildly increased. No significant vascular congestion. There is chronic blunting at the left costophrenic angle. No sizable pleural effusion or pneumothorax. Mildly increased interstitial prominence in the lungs, which could be related interstitial edema versus atypical/viral pneumonia. ENDOSCOPY     Principal Problem:    Chest pain  Active Problems:    Status post carotid surgery    Acute blood loss anemia    COPD (chronic obstructive pulmonary disease) (HCC)    Essential hypertension  Resolved Problems:    * No resolved hospital problems. *       GI Assessment:    Anemia likely from GI blood loss. Anemia of acute blood loss  Elevated troponins likely from demand supply mismatch. Atrial fibrillation and carotid stenosis on Plavix and Eliquis and aspirin. Plan of care:   We will plan for EGD and colonoscopy tomorrow  Continue clear liquid diet  Start bowel prep with GoLytely this afternoon  Monitor H&H and transfuse as clinically indicated  Empiric PPI twice daily      Time spent reviewing chart, seeing patient, and discussing with attending and coordination of care for both procedures: Around  30 minutes    This plan was formulated in collaboration with Dr. Merna Bloch  Please feel free to contact me with any questions or concerns. Thank you for allowing me to participate in the care of your patient. JOSSUE Rizo CNP on 11/27/2022 at 11:39 AM   [facility]    Please note that this note was generated using a voice recognition dictation software. Although every effort was made to ensure the accuracy of this automated transcription, some errors in transcription may have occurred.

## 2022-11-27 NOTE — PLAN OF CARE
Problem: Discharge Planning  Goal: Discharge to home or other facility with appropriate resources  Outcome: Progressing  Flowsheets (Taken 11/26/2022 0119 by Christoph Terry RN)  Discharge to home or other facility with appropriate resources: Identify barriers to discharge with patient and caregiver     Problem: Safety - Adult  Goal: Free from fall injury  Outcome: Progressing     Problem: ABCDS Injury Assessment  Goal: Absence of physical injury  Outcome: Progressing

## 2022-11-27 NOTE — PLAN OF CARE
Problem: Discharge Planning  Goal: Discharge to home or other facility with appropriate resources  11/27/2022 1400 by Christoph Macias RN  Outcome: Progressing  Flowsheets  Taken 11/27/2022 1200  Discharge to home or other facility with appropriate resources: Identify barriers to discharge with patient and caregiver  Taken 11/27/2022 0709  Discharge to home or other facility with appropriate resources: Identify barriers to discharge with patient and caregiver  11/27/2022 0220 by Azael Del Angel RN  Outcome: Progressing  Flowsheets (Taken 11/26/2022 1747 by Christoph Macias RN)  Discharge to home or other facility with appropriate resources: Identify barriers to discharge with patient and caregiver     Problem: Safety - Adult  Goal: Free from fall injury  11/27/2022 1400 by Christoph Macias RN  Outcome: Progressing  Flowsheets (Taken 11/27/2022 1358)  Free From Fall Injury: Instruct family/caregiver on patient safety  11/27/2022 0220 by Azael Del Angel RN  Outcome: Progressing     Problem: ABCDS Injury Assessment  Goal: Absence of physical injury  11/27/2022 1400 by Christoph Macias RN  Outcome: Progressing  Flowsheets (Taken 11/27/2022 1358)  Absence of Physical Injury: Implement safety measures based on patient assessment  11/27/2022 0220 by Azael Del Angel RN  Outcome: Progressing

## 2022-11-27 NOTE — PROGRESS NOTES
Vascular Surgery   Progress Note    PATIENT NAME: Lazarus Rich     TODAY'S DATE: 11/27/2022, 9:38 AM    SUBJECTIVE:     Pt seen and examined at bedside. No acute overnight events. Hgb 7.5 this morning. Pt reports he feels improved from yesterday and does not feel short of breath. His chest was hurting yesterday as well and now he is comfortable. GI plans for endoscopy and colonoscopy tomorrow    OBJECTIVE:   VITALS:  BP (!) 140/47   Pulse 80   Temp 98 °F (36.7 °C) (Oral)   Resp 25   Wt 255 lb (115.7 kg)   SpO2 98%   BMI 37.66 kg/m²      INTAKE/OUTPUT:      Intake/Output Summary (Last 24 hours) at 11/27/2022 5480  Last data filed at 11/27/2022 3190  Gross per 24 hour   Intake 1394.75 ml   Output 2500 ml   Net -1105.25 ml       PHYSICAL EXAM:  General Appearance: awake, alert, oriented, in no acute distress, comfortably sitting in the chair  HEENT:  Normocephalic, atraumatic, mucus membranes moist   Heart: Regular rate and rhythm  Lungs: normal effort with symmetric rise and fall of chest wall  Abdomen: soft, nondistended, nontender to palpation  Extremities: B/L LE - warm, well perfused with 1+ edema  Skin: Skin color, texture, turgor normal. No rashes or lesions. Data:  CBC:   Recent Labs     11/26/22  0814 11/26/22  1450 11/26/22  2134 11/27/22  0244 11/27/22  0904   WBC 15.0*  --   --  10.0 9.8   HGB 4.6*   < > 7.6* 7.3* 7.5*     --   --  235 214    < > = values in this interval not displayed.      Chemistry:   Recent Labs     11/26/22  0814 11/26/22  0923 11/26/22  1315 11/26/22  1500 11/27/22  0244 11/27/22  0734     --   --   --  138 137   K 4.1  --   --   --  4.5 4.7     --   --   --  106 106   CO2 17*  --   --   --  17* 15*   GLUCOSE 144*  --   --   --  175* 196*   BUN 24*  --   --   --  24* 23   CREATININE 0.86  --   --  1.02 0.84 0.79   MG 2.4  --   --   --   --   --    ANIONGAP 17  --   --   --  15 16   LABGLOM >60  --   --   --  >60 >60   CALCIUM 8.5*  --   --   --  8.0* 8.2*   CAION 1.15  --   --   --   --   --    PHOS 3.4  --   --   --   --   --    PROBNP 242  --   --   --   --   --    TROPHS 58* 63* 233*  --   --   --    LACTACIDWB  --   --   --   --  1.7  --      Hepatic:   Recent Labs     11/26/22  0814   AST 22   ALT 16   ALKPHOS 94   BILITOT 1.0   BILIDIR 0.1     Coagulation:   Recent Labs     11/26/22  0814   APTT 22.8   PROT 6.3*   INR 1.1         Radiology Review:    XR CHEST PORTABLE    Result Date: 11/26/2022  EXAMINATION: ONE XRAY VIEW OF THE CHEST 11/26/2022 8:33 am COMPARISON: 09/16/2022 and 08/05/2022 HISTORY: ORDERING SYSTEM PROVIDED HISTORY: chest pain, sob TECHNOLOGIST PROVIDED HISTORY: chest pain, sob Reason for Exam: chest pain/ shortness of breath/  AP erect/ port. FINDINGS: Heart size is stable. Scattered interstitial prominence in the lungs appears mildly increased. No significant vascular congestion. There is chronic blunting at the left costophrenic angle. No sizable pleural effusion or pneumothorax. Mildly increased interstitial prominence in the lungs, which could be related interstitial edema versus atypical/viral pneumonia. ASSESSMENT:  Active Hospital Problems    Diagnosis Date Noted    Chest pain [R07.9] 11/26/2022     Priority: Medium    Acute blood loss anemia [D62] 11/26/2022     Priority: Medium    Status post carotid surgery [Z98.890] 09/28/2022     Priority: Medium    Essential hypertension [I10] 02/07/2018    [facility] [J44.9] 08/06/2013     34year-old male s/p R TCAR with blood loss anemia concerning for GI bleed on aspirin, Plavix, and Eliquis.     Plan:  Recommend restarting anticoagulation after endoscopy and colonoscopy tomorrow if patient is hemodynamically stable  We recommend starting anti-platelet medication as soon as possible, today would be preferable as his hgb has been stable around 7.5  Continue medical mgmt and supportive care per primary team  Neuro checks Electronically signed by Ann Marie Engel DO  on 11/27/2022 at 9:38 AM

## 2022-11-28 ENCOUNTER — ANESTHESIA (OUTPATIENT)
Dept: OPERATING ROOM | Age: 62
DRG: 813 | End: 2022-11-28
Payer: MEDICARE

## 2022-11-28 ENCOUNTER — ANESTHESIA EVENT (OUTPATIENT)
Dept: OPERATING ROOM | Age: 62
DRG: 813 | End: 2022-11-28
Payer: MEDICARE

## 2022-11-28 LAB
ABO/RH: NORMAL
ABSOLUTE EOS #: <0.03 K/UL (ref 0–0.44)
ABSOLUTE IMMATURE GRANULOCYTE: 0.29 K/UL (ref 0–0.3)
ABSOLUTE LYMPH #: 0.82 K/UL (ref 1.1–3.7)
ABSOLUTE MONO #: 0.82 K/UL (ref 0.1–1.2)
ANION GAP SERPL CALCULATED.3IONS-SCNC: 10 MMOL/L (ref 9–17)
ANTIBODY SCREEN: NEGATIVE
ARM BAND NUMBER: NORMAL
BASOPHILS # BLD: 0 % (ref 0–2)
BASOPHILS ABSOLUTE: <0.03 K/UL (ref 0–0.2)
BLD PROD TYP BPU: NORMAL
BLOOD BANK BLOOD PRODUCT EXPIRATION DATE: NORMAL
BLOOD BANK ISBT PRODUCT BLOOD TYPE: 5100
BLOOD BANK PRODUCT CODE: NORMAL
BLOOD BANK UNIT TYPE AND RH: NORMAL
BPU ID: NORMAL
BUN BLDV-MCNC: 16 MG/DL (ref 8–23)
CALCIUM SERPL-MCNC: 8.2 MG/DL (ref 8.6–10.4)
CHLORIDE BLD-SCNC: 110 MMOL/L (ref 98–107)
CO2: 25 MMOL/L (ref 20–31)
CREAT SERPL-MCNC: 0.58 MG/DL (ref 0.7–1.2)
CROSSMATCH RESULT: NORMAL
DISPENSE STATUS BLOOD BANK: NORMAL
EOSINOPHILS RELATIVE PERCENT: 0 % (ref 1–4)
ESTIMATED AVERAGE GLUCOSE: 100 MG/DL
EXPIRATION DATE: NORMAL
GFR SERPL CREATININE-BSD FRML MDRD: >60 ML/MIN/1.73M2
GLUCOSE BLD-MCNC: 147 MG/DL (ref 70–99)
HBA1C MFR BLD: 5.1 % (ref 4–6)
HCT VFR BLD CALC: 26.2 % (ref 40.7–50.3)
HCT VFR BLD CALC: 26.8 % (ref 40.7–50.3)
HCT VFR BLD CALC: 27.1 % (ref 40.7–50.3)
HEMOGLOBIN: 7.9 G/DL (ref 13–17)
HEMOGLOBIN: 8.1 G/DL (ref 13–17)
HEMOGLOBIN: 8.2 G/DL (ref 13–17)
IMMATURE GRANULOCYTES: 2 %
LYMPHOCYTES # BLD: 5 % (ref 24–43)
MCH RBC QN AUTO: 27.7 PG (ref 25.2–33.5)
MCHC RBC AUTO-ENTMCNC: 30.3 G/DL (ref 28.4–34.8)
MCV RBC AUTO: 91.6 FL (ref 82.6–102.9)
MONOCYTES # BLD: 5 % (ref 3–12)
MYCOPLASMA PNEUMONIAE IGM: 0.31
NRBC AUTOMATED: 1.5 PER 100 WBC
PDW BLD-RTO: 16.5 % (ref 11.8–14.4)
PLATELET # BLD: 233 K/UL (ref 138–453)
PMV BLD AUTO: 9.7 FL (ref 8.1–13.5)
POTASSIUM SERPL-SCNC: 4.4 MMOL/L (ref 3.7–5.3)
RBC # BLD: 2.96 M/UL (ref 4.21–5.77)
RBC # BLD: ABNORMAL 10*6/UL
SEG NEUTROPHILS: 87 % (ref 36–65)
SEGMENTED NEUTROPHILS ABSOLUTE COUNT: 13.53 K/UL (ref 1.5–8.1)
SODIUM BLD-SCNC: 145 MMOL/L (ref 135–144)
TRANSFUSION STATUS: NORMAL
UNIT DIVISION: 0
UNIT ISSUE DATE/TIME: NORMAL
WBC # BLD: 15.5 K/UL (ref 3.5–11.3)

## 2022-11-28 PROCEDURE — 6360000002 HC RX W HCPCS: Performed by: NURSE ANESTHETIST, CERTIFIED REGISTERED

## 2022-11-28 PROCEDURE — 2580000003 HC RX 258: Performed by: INTERNAL MEDICINE

## 2022-11-28 PROCEDURE — 3700000001 HC ADD 15 MINUTES (ANESTHESIA): Performed by: INTERNAL MEDICINE

## 2022-11-28 PROCEDURE — 2709999900 HC NON-CHARGEABLE SUPPLY: Performed by: INTERNAL MEDICINE

## 2022-11-28 PROCEDURE — C9113 INJ PANTOPRAZOLE SODIUM, VIA: HCPCS

## 2022-11-28 PROCEDURE — 7100000001 HC PACU RECOVERY - ADDTL 15 MIN: Performed by: INTERNAL MEDICINE

## 2022-11-28 PROCEDURE — 44369 SMALL BOWEL ENDOSCOPY: CPT | Performed by: INTERNAL MEDICINE

## 2022-11-28 PROCEDURE — 99232 SBSQ HOSP IP/OBS MODERATE 35: CPT | Performed by: FAMILY MEDICINE

## 2022-11-28 PROCEDURE — 6360000002 HC RX W HCPCS: Performed by: INTERNAL MEDICINE

## 2022-11-28 PROCEDURE — 6370000000 HC RX 637 (ALT 250 FOR IP): Performed by: INTERNAL MEDICINE

## 2022-11-28 PROCEDURE — 2500000003 HC RX 250 WO HCPCS: Performed by: NURSE ANESTHETIST, CERTIFIED REGISTERED

## 2022-11-28 PROCEDURE — 36415 COLL VENOUS BLD VENIPUNCTURE: CPT

## 2022-11-28 PROCEDURE — 2580000003 HC RX 258

## 2022-11-28 PROCEDURE — 85014 HEMATOCRIT: CPT

## 2022-11-28 PROCEDURE — 85018 HEMOGLOBIN: CPT

## 2022-11-28 PROCEDURE — 3700000000 HC ANESTHESIA ATTENDED CARE: Performed by: INTERNAL MEDICINE

## 2022-11-28 PROCEDURE — C9113 INJ PANTOPRAZOLE SODIUM, VIA: HCPCS | Performed by: INTERNAL MEDICINE

## 2022-11-28 PROCEDURE — 3609027000 HC COLONOSCOPY: Performed by: INTERNAL MEDICINE

## 2022-11-28 PROCEDURE — 0DJD8ZZ INSPECTION OF LOWER INTESTINAL TRACT, VIA NATURAL OR ARTIFICIAL OPENING ENDOSCOPIC: ICD-10-PCS | Performed by: INTERNAL MEDICINE

## 2022-11-28 PROCEDURE — 6360000002 HC RX W HCPCS: Performed by: STUDENT IN AN ORGANIZED HEALTH CARE EDUCATION/TRAINING PROGRAM

## 2022-11-28 PROCEDURE — 2060000000 HC ICU INTERMEDIATE R&B

## 2022-11-28 PROCEDURE — 0W3P8ZZ CONTROL BLEEDING IN GASTROINTESTINAL TRACT, VIA NATURAL OR ARTIFICIAL OPENING ENDOSCOPIC: ICD-10-PCS | Performed by: INTERNAL MEDICINE

## 2022-11-28 PROCEDURE — 44366 SMALL BOWEL ENDOSCOPY: CPT | Performed by: INTERNAL MEDICINE

## 2022-11-28 PROCEDURE — 6370000000 HC RX 637 (ALT 250 FOR IP)

## 2022-11-28 PROCEDURE — 45378 DIAGNOSTIC COLONOSCOPY: CPT | Performed by: INTERNAL MEDICINE

## 2022-11-28 PROCEDURE — 6360000002 HC RX W HCPCS

## 2022-11-28 PROCEDURE — 3609014100 HC ENTEROSCOPY > 2ND PRTN W/CONTROL BLEEDING: Performed by: INTERNAL MEDICINE

## 2022-11-28 PROCEDURE — 94640 AIRWAY INHALATION TREATMENT: CPT

## 2022-11-28 PROCEDURE — 7100000000 HC PACU RECOVERY - FIRST 15 MIN: Performed by: INTERNAL MEDICINE

## 2022-11-28 PROCEDURE — 80048 BASIC METABOLIC PNL TOTAL CA: CPT

## 2022-11-28 PROCEDURE — 85025 COMPLETE CBC W/AUTO DIFF WBC: CPT

## 2022-11-28 PROCEDURE — A4216 STERILE WATER/SALINE, 10 ML: HCPCS

## 2022-11-28 PROCEDURE — 2720000010 HC SURG SUPPLY STERILE: Performed by: INTERNAL MEDICINE

## 2022-11-28 RX ORDER — FUROSEMIDE 20 MG/1
20 TABLET ORAL ONCE
Status: COMPLETED | OUTPATIENT
Start: 2022-11-28 | End: 2022-11-28

## 2022-11-28 RX ORDER — SODIUM CHLORIDE 0.9 % (FLUSH) 0.9 %
5-40 SYRINGE (ML) INJECTION PRN
Status: DISCONTINUED | OUTPATIENT
Start: 2022-11-28 | End: 2022-11-28

## 2022-11-28 RX ORDER — PROPOFOL 10 MG/ML
INJECTION, EMULSION INTRAVENOUS CONTINUOUS PRN
Status: DISCONTINUED | OUTPATIENT
Start: 2022-11-28 | End: 2022-11-28 | Stop reason: SDUPTHER

## 2022-11-28 RX ORDER — GUAIFENESIN 600 MG/1
600 TABLET, EXTENDED RELEASE ORAL 2 TIMES DAILY
Status: DISCONTINUED | OUTPATIENT
Start: 2022-11-28 | End: 2022-11-29 | Stop reason: HOSPADM

## 2022-11-28 RX ORDER — LIDOCAINE HYDROCHLORIDE 10 MG/ML
INJECTION, SOLUTION INFILTRATION; PERINEURAL PRN
Status: DISCONTINUED | OUTPATIENT
Start: 2022-11-28 | End: 2022-11-28 | Stop reason: SDUPTHER

## 2022-11-28 RX ORDER — PROPOFOL 10 MG/ML
INJECTION, EMULSION INTRAVENOUS PRN
Status: DISCONTINUED | OUTPATIENT
Start: 2022-11-28 | End: 2022-11-28 | Stop reason: SDUPTHER

## 2022-11-28 RX ORDER — SODIUM CHLORIDE 9 MG/ML
INJECTION, SOLUTION INTRAVENOUS CONTINUOUS
Status: DISCONTINUED | OUTPATIENT
Start: 2022-11-28 | End: 2022-11-28

## 2022-11-28 RX ORDER — SODIUM CHLORIDE 9 MG/ML
INJECTION, SOLUTION INTRAVENOUS PRN
Status: DISCONTINUED | OUTPATIENT
Start: 2022-11-28 | End: 2022-11-28

## 2022-11-28 RX ORDER — METHYLPREDNISOLONE SODIUM SUCCINATE 40 MG/ML
40 INJECTION, POWDER, LYOPHILIZED, FOR SOLUTION INTRAMUSCULAR; INTRAVENOUS EVERY 12 HOURS
Status: DISCONTINUED | OUTPATIENT
Start: 2022-11-28 | End: 2022-11-29 | Stop reason: HOSPADM

## 2022-11-28 RX ORDER — AMLODIPINE BESYLATE 10 MG/1
10 TABLET ORAL DAILY
Status: DISCONTINUED | OUTPATIENT
Start: 2022-11-29 | End: 2022-11-29 | Stop reason: HOSPADM

## 2022-11-28 RX ORDER — ONDANSETRON 2 MG/ML
4 INJECTION INTRAMUSCULAR; INTRAVENOUS
Status: DISCONTINUED | OUTPATIENT
Start: 2022-11-28 | End: 2022-11-28

## 2022-11-28 RX ORDER — DIPHENHYDRAMINE HYDROCHLORIDE 50 MG/ML
12.5 INJECTION INTRAMUSCULAR; INTRAVENOUS
Status: DISCONTINUED | OUTPATIENT
Start: 2022-11-28 | End: 2022-11-28

## 2022-11-28 RX ORDER — MORPHINE SULFATE 2 MG/ML
2 INJECTION, SOLUTION INTRAMUSCULAR; INTRAVENOUS EVERY 5 MIN PRN
Status: DISCONTINUED | OUTPATIENT
Start: 2022-11-28 | End: 2022-11-28

## 2022-11-28 RX ORDER — FENTANYL CITRATE 50 UG/ML
25 INJECTION, SOLUTION INTRAMUSCULAR; INTRAVENOUS EVERY 5 MIN PRN
Status: DISCONTINUED | OUTPATIENT
Start: 2022-11-28 | End: 2022-11-28

## 2022-11-28 RX ORDER — SODIUM CHLORIDE 0.9 % (FLUSH) 0.9 %
5-40 SYRINGE (ML) INJECTION EVERY 12 HOURS SCHEDULED
Status: DISCONTINUED | OUTPATIENT
Start: 2022-11-28 | End: 2022-11-28

## 2022-11-28 RX ADMIN — LIDOCAINE HYDROCHLORIDE 50 MG: 10 INJECTION, SOLUTION INFILTRATION; PERINEURAL at 09:05

## 2022-11-28 RX ADMIN — APIXABAN 5 MG: 5 TABLET, FILM COATED ORAL at 20:28

## 2022-11-28 RX ADMIN — PROPOFOL 125 MCG/KG/MIN: 10 INJECTION, EMULSION INTRAVENOUS at 09:05

## 2022-11-28 RX ADMIN — BUDESONIDE AND FORMOTEROL FUMARATE DIHYDRATE 2 PUFF: 160; 4.5 AEROSOL RESPIRATORY (INHALATION) at 19:19

## 2022-11-28 RX ADMIN — FUROSEMIDE 20 MG: 20 TABLET ORAL at 14:08

## 2022-11-28 RX ADMIN — AMIODARONE HYDROCHLORIDE 200 MG: 200 TABLET ORAL at 11:16

## 2022-11-28 RX ADMIN — METHYLPREDNISOLONE SODIUM SUCCINATE 40 MG: 40 INJECTION, POWDER, FOR SOLUTION INTRAMUSCULAR; INTRAVENOUS at 14:08

## 2022-11-28 RX ADMIN — PROPOFOL 20 MG: 10 INJECTION, EMULSION INTRAVENOUS at 09:06

## 2022-11-28 RX ADMIN — SODIUM CHLORIDE, PRESERVATIVE FREE 10 ML: 5 INJECTION INTRAVENOUS at 20:19

## 2022-11-28 RX ADMIN — SODIUM CHLORIDE: 9 INJECTION, SOLUTION INTRAVENOUS at 08:22

## 2022-11-28 RX ADMIN — BUTALBITAL, ACETAMINOPHEN AND CAFFEINE 1 TABLET: 50; 325; 40 TABLET ORAL at 05:35

## 2022-11-28 RX ADMIN — IRON SUCROSE 200 MG: 20 INJECTION, SOLUTION INTRAVENOUS at 11:27

## 2022-11-28 RX ADMIN — ALBUTEROL SULFATE 2.5 MG: 2.5 SOLUTION RESPIRATORY (INHALATION) at 19:19

## 2022-11-28 RX ADMIN — SODIUM CHLORIDE, PRESERVATIVE FREE 40 MG: 5 INJECTION INTRAVENOUS at 01:39

## 2022-11-28 RX ADMIN — ALBUTEROL SULFATE 2.5 MG: 2.5 SOLUTION RESPIRATORY (INHALATION) at 15:20

## 2022-11-28 RX ADMIN — BUTALBITAL, ACETAMINOPHEN AND CAFFEINE 1 TABLET: 50; 325; 40 TABLET ORAL at 11:28

## 2022-11-28 RX ADMIN — ATORVASTATIN CALCIUM 80 MG: 80 TABLET, FILM COATED ORAL at 11:14

## 2022-11-28 RX ADMIN — ISOSORBIDE MONONITRATE 60 MG: 60 TABLET ORAL at 11:15

## 2022-11-28 RX ADMIN — BUDESONIDE AND FORMOTEROL FUMARATE DIHYDRATE 2 PUFF: 160; 4.5 AEROSOL RESPIRATORY (INHALATION) at 07:40

## 2022-11-28 RX ADMIN — GABAPENTIN 300 MG: 600 TABLET ORAL at 14:08

## 2022-11-28 RX ADMIN — GUAIFENESIN 600 MG: 600 TABLET, EXTENDED RELEASE ORAL at 20:19

## 2022-11-28 RX ADMIN — METOPROLOL TARTRATE 25 MG: 25 TABLET ORAL at 20:19

## 2022-11-28 RX ADMIN — ALBUTEROL SULFATE 2.5 MG: 2.5 SOLUTION RESPIRATORY (INHALATION) at 07:40

## 2022-11-28 RX ADMIN — AMLODIPINE BESYLATE 5 MG: 5 TABLET ORAL at 11:13

## 2022-11-28 RX ADMIN — TIOTROPIUM BROMIDE INHALATION SPRAY 2 PUFF: 3.12 SPRAY, METERED RESPIRATORY (INHALATION) at 07:40

## 2022-11-28 RX ADMIN — DOXYCYCLINE HYCLATE 100 MG: 100 TABLET ORAL at 11:15

## 2022-11-28 RX ADMIN — ALBUTEROL SULFATE 2.5 MG: 2.5 SOLUTION RESPIRATORY (INHALATION) at 11:46

## 2022-11-28 RX ADMIN — SODIUM CHLORIDE, PRESERVATIVE FREE 40 MG: 5 INJECTION INTRAVENOUS at 14:08

## 2022-11-28 RX ADMIN — GABAPENTIN 300 MG: 600 TABLET ORAL at 20:28

## 2022-11-28 RX ADMIN — DOXYCYCLINE HYCLATE 100 MG: 100 TABLET ORAL at 20:19

## 2022-11-28 RX ADMIN — GABAPENTIN 300 MG: 600 TABLET ORAL at 05:35

## 2022-11-28 RX ADMIN — PROPOFOL 80 MG: 10 INJECTION, EMULSION INTRAVENOUS at 09:05

## 2022-11-28 RX ADMIN — METOPROLOL TARTRATE 25 MG: 25 TABLET ORAL at 11:15

## 2022-11-28 RX ADMIN — BUTALBITAL, ACETAMINOPHEN AND CAFFEINE 1 TABLET: 50; 325; 40 TABLET ORAL at 20:31

## 2022-11-28 RX ADMIN — METHYLPREDNISOLONE SODIUM SUCCINATE 40 MG: 40 INJECTION, POWDER, FOR SOLUTION INTRAMUSCULAR; INTRAVENOUS at 01:39

## 2022-11-28 RX ADMIN — BUTALBITAL, ACETAMINOPHEN AND CAFFEINE 1 TABLET: 50; 325; 40 TABLET ORAL at 00:08

## 2022-11-28 RX ADMIN — SODIUM CHLORIDE: 9 INJECTION, SOLUTION INTRAVENOUS at 10:07

## 2022-11-28 ASSESSMENT — PAIN SCALES - GENERAL
PAINLEVEL_OUTOF10: 0
PAINLEVEL_OUTOF10: 0
PAINLEVEL_OUTOF10: 2
PAINLEVEL_OUTOF10: 0
PAINLEVEL_OUTOF10: 0
PAINLEVEL_OUTOF10: 4
PAINLEVEL_OUTOF10: 0
PAINLEVEL_OUTOF10: 6
PAINLEVEL_OUTOF10: 8

## 2022-11-28 ASSESSMENT — PAIN - FUNCTIONAL ASSESSMENT
PAIN_FUNCTIONAL_ASSESSMENT: ACTIVITIES ARE NOT PREVENTED
PAIN_FUNCTIONAL_ASSESSMENT: 0-10

## 2022-11-28 ASSESSMENT — PAIN DESCRIPTION - DESCRIPTORS: DESCRIPTORS: ACHING

## 2022-11-28 ASSESSMENT — LIFESTYLE VARIABLES: SMOKING_STATUS: 1

## 2022-11-28 ASSESSMENT — PAIN SCALES - WONG BAKER: WONGBAKER_NUMERICALRESPONSE: 0

## 2022-11-28 ASSESSMENT — ENCOUNTER SYMPTOMS: SHORTNESS OF BREATH: 1

## 2022-11-28 NOTE — PROGRESS NOTES
[facility]  Progress Note    Date:   11/28/2022  Patient name:  Yanique Collins  Date of admission:  11/26/2022  7:55 AM  MRN:   [id number]  YOB: 1960    SUBJECTIVE/Last 24 hours update:     Patient not present in room as he was down for EGD and colonoscopy. Will try and re-assess later in day. REVIEW OF SYSTEMS:     Review of Systems   Unable to perform ROS: Other (Patient not in room)     PAST MEDICAL HISTORY:      has a past medical history of Abnormal stress ECG, Angina pectoris ([address] Asthma, BPH (benign prostatic hyperplasia), CAD (coronary artery disease), Carotid stenosis, Carpal tunnel syndrome, Cervical stenosis of spine, Cervicalgia, Chronic back pain, COPD (chronic obstructive pulmonary disease) ([address] DDD (degenerative disc disease), Elbow fracture, right, Fractures, GERD (gastroesophageal reflux disease), Gout, Hyperlipidemia, Hypertension, Lung nodule, Mild depression, Morbidly obese ([address] Occlusion of right carotid artery, Osteoarthritis of right knee, Pre-diabetes, Sinus headache, Sleep apnea, Sleep disturbance, Smoker, Snores, SOB (shortness of breath) on exertion, Tendonitis of foot, Tooth loose, Under care of team, Under care of team, and Wellness examination. PAST SURGICAL HISTORY:      has a past surgical history that includes Colonoscopy; Upper gastrointestinal endoscopy; Cervical spine surgery (07/13/2012); fracture surgery; Cardiac catheterization (07/08/2014); Nerve Block (02/05/2016); hc inject other perphrl nerv (Bilateral, 05/09/2019); other surgical history (08/15/2019); Injection Procedure For Sacroiliac Joint (Bilateral, 08/15/2019); Nerve Block (Left, 02/07/2020); Nerve Block (Left, 02/07/2020); Pain management procedure (Left, 02/07/2020); Nerve Block (02/12/2021); Pain management procedure (Left, 02/12/2021); Nerve Block (Right, 02/19/2021); Pain management procedure (Right, 02/19/2021);  Cardiac catheterization (09/16/2022); and vascular surgery (Right, 9/28/2022). SOCIAL HISTORY:      reports that he has quit smoking. His smoking use included cigarettes. He started smoking about 47 years ago. He has a 10.00 pack-year smoking history. He quit smokeless tobacco use about 7 years ago. He reports that he does not currently use alcohol. He reports that he does not use drugs. FAMILY HISTORY:     family history includes COPD in his father; Cancer (age of onset: 64) in his maternal aunt; Cancer (age of onset: 61) in his maternal uncle; Heart Disease in his mother. HOME MEDICATIONS:      Prior to Admission medications    Medication Sig Start Date End Date Taking? Authorizing Provider   tiZANidine (ZANAFLEX) 4 MG tablet Take 1 tablet by mouth 2 times daily as needed (neck and back spasms) 11/7/22   Abdelrahman Wong, DO   gabapentin (NEURONTIN) 800 MG tablet Take 1 tablet by mouth every 8 hours for 30 days. 11/7/22 12/7/22  Abdelrahman Wong, DO   amiodarone (CORDARONE) 200 MG tablet TAKE ONE TABLET BY MOUTH DAILY 10/17/22   Abdelrahman Wong, DO   ELIQUIS 5 MG TABS tablet TAKE ONE TABLET BY MOUTH TWICE A DAY 10/17/22   Abdelrahman Race, DO   budesonide-formoterol (SYMBICORT) 160-4.5 MCG/ACT AERO INHALE TWO PUFFS BY MOUTH TWICE A DAY 9/17/22   Herminio Barrera MD   aspirin 81 MG EC tablet Take 1 tablet by mouth daily 9/17/22   Herminio Barrera MD   metoprolol tartrate (LOPRESSOR) 25 MG tablet Take 1 tablet by mouth 2 times daily 9/17/22   Herminio Barrera MD   isosorbide mononitrate (IMDUR) 60 MG extended release tablet Take 1 tablet by mouth in the morning. 7/28/22   Historical Provider, MD   clopidogrel (PLAVIX) 75 MG tablet Take 1 tablet by mouth in the morning.  8/1/22   Millie Oviedo MD   nitroGLYCERIN (NITROSTAT) 0.4 MG SL tablet Place 1 tablet under the tongue every 5 minutes as needed for Chest pain (not to exceed 3 tabs at a time) 6/3/22   Abdelrahman Wogn, DO   amLODIPine (NORVASC) 5 MG tablet TAKE ONE TABLET BY MOUTH DAILY 4/22/22   Zoë Gray,    albuterol sulfate  (90 Base) MCG/ACT inhaler Inhale 2 puffs into the lungs every 6 hours as needed for Wheezing 4/22/22   Zoë Gray DO   simvastatin (ZOCOR) 80 MG tablet TAKE ONE TABLET BY MOUTH EVERY EVENING 4/11/22   Zoë Gray DO     ALLERGIES:     Patient has no known allergies. OBJECTIVE:       Vitals:    11/27/22 2316 11/27/22 2350 11/28/22 0058 11/28/22 0345   BP: (!) 152/43 (!) 160/51 (!) 152/52 (!) 154/49   Pulse: 74 75 79 71   Resp: 19 20 22 15   Temp: 97.3 °F (36.3 °C) 97.6 °F (36.4 °C) 97.6 °F (36.4 °C) 97.6 °F (36.4 °C)   TempSrc: Oral Oral Oral Oral   SpO2: 96% 98% 96% 93%   Weight:           Intake/Output Summary (Last 24 hours) at 11/28/2022 1126  Last data filed at 11/28/2022 6048  Gross per 24 hour   Intake 5538.75 ml   Output 2500 ml   Net 3038.75 ml     PHYSICAL EXAM:  Physical Exam    Unable to perform as patient not present in room. Down for EGD and colonoscopy. DIAGNOSTICS:     Laboratory Testing:    Recent Results (from the past 24 hour(s))   Basic Metabolic Panel w/ Reflex to MG    Collection Time: 11/27/22  7:34 AM   Result Value Ref Range    Glucose 196 (H) 70 - 99 mg/dL    BUN 23 8 - 23 mg/dL    Creatinine 0.79 0.70 - 1.20 mg/dL    Est, Glom Filt Rate >60 >60 mL/min/1.73m2    Calcium 8.2 (L) 8.6 - 10.4 mg/dL    Sodium 137 135 - 144 mmol/L    Potassium 4.7 3.7 - 5.3 mmol/L    Chloride 106 98 - 107 mmol/L    CO2 15 (L) 20 - 31 mmol/L    Anion Gap 16 9 - 17 mmol/L   SPECIMEN REJECTION    Collection Time: 11/27/22  7:34 AM   Result Value Ref Range    Specimen Source . BLOOD     Ordered Test CDP     Reason for Rejection Unable to perform testing: Specimen clotted. SPECIMEN REJECTION    Collection Time: 11/27/22  7:34 AM   Result Value Ref Range    Specimen Source . BLOOD     Ordered Test PTT     Reason for Rejection Unable to perform testing: Specimen clotted.     CBC with Auto Differential    Collection Time: 11/27/22  9:04 AM   Result Value Ref Range    WBC 9.8 3.5 - 11.3 k/uL    RBC 2.73 (L) 4.21 - 5.77 m/uL    Hemoglobin 7.5 (L) 13.0 - 17.0 g/dL    Hematocrit 23.5 (L) 40.7 - 50.3 %    MCV 86.1 82.6 - 102.9 fL    MCH 27.5 25.2 - 33.5 pg    MCHC 31.9 28.4 - 34.8 g/dL    RDW 16.3 (H) 11.8 - 14.4 %    Platelets 725 991 - 671 k/uL    MPV 9.3 8.1 - 13.5 fL    NRBC Automated 1.0 (H) 0.0 per 100 WBC    RBC Morphology ANISOCYTOSIS PRESENT     Seg Neutrophils 85 (H) 36 - 65 %    Lymphocytes 10 (L) 24 - 43 %    Monocytes 2 (L) 3 - 12 %    Eosinophils % 0 (L) 1 - 4 %    Basophils 0 0 - 2 %    Immature Granulocytes 3 (H) 0 %    Segs Absolute 8.32 (H) 1.50 - 8.10 k/uL    Absolute Lymph # 0.96 (L) 1.10 - 3.70 k/uL    Absolute Mono # 0.19 0.10 - 1.20 k/uL    Absolute Eos # <0.03 0.00 - 0.44 k/uL    Basophils Absolute <0.03 0.00 - 0.20 k/uL    Absolute Immature Granulocyte 0.32 (H) 0.00 - 0.30 k/uL   APTT    Collection Time: 11/27/22 10:21 AM   Result Value Ref Range    PTT 25.6 20.5 - 30.5 sec   Hemoglobin and Hematocrit    Collection Time: 11/27/22  9:42 PM   Result Value Ref Range    Hemoglobin 6.9 (LL) 13.0 - 17.0 g/dL    Hematocrit 22.2 (L) 40.7 - 50.3 %   Hemoglobin and Hematocrit    Collection Time: 11/28/22  2:09 AM   Result Value Ref Range    Hemoglobin 8.1 (L) 13.0 - 17.0 g/dL    Hematocrit 26.8 (L) 40.7 - 50.3 %   CBC with Auto Differential    Collection Time: 11/28/22  5:34 AM   Result Value Ref Range    WBC 15.5 (H) 3.5 - 11.3 k/uL    RBC 2.96 (L) 4.21 - 5.77 m/uL    Hemoglobin 8.2 (L) 13.0 - 17.0 g/dL    Hematocrit 27.1 (L) 40.7 - 50.3 %    MCV 91.6 82.6 - 102.9 fL    MCH 27.7 25.2 - 33.5 pg    MCHC 30.3 28.4 - 34.8 g/dL    RDW 16.5 (H) 11.8 - 14.4 %    Platelets 744 312 - 056 k/uL    MPV 9.7 8.1 - 13.5 fL    NRBC Automated 1.5 (H) 0.0 per 100 WBC    RBC Morphology ANISOCYTOSIS PRESENT     Seg Neutrophils 87 (H) 36 - 65 %    Lymphocytes 5 (L) 24 - 43 %    Monocytes 5 3 - 12 % Eosinophils % 0 (L) 1 - 4 %    Basophils 0 0 - 2 %    Immature Granulocytes 2 (H) 0 %    Segs Absolute 13.53 (H) 1.50 - 8.10 k/uL    Absolute Lymph # 0.82 (L) 1.10 - 3.70 k/uL    Absolute Mono # 0.82 0.10 - 1.20 k/uL    Absolute Eos # <0.03 0.00 - 0.44 k/uL    Basophils Absolute <0.03 0.00 - 0.20 k/uL    Absolute Immature Granulocyte 0.29 0.00 - 0.30 k/uL   Basic Metabolic Panel w/ Reflex to MG    Collection Time: 11/28/22  5:34 AM   Result Value Ref Range    Glucose 147 (H) 70 - 99 mg/dL    BUN 16 8 - 23 mg/dL    Creatinine 0.58 (L) 0.70 - 1.20 mg/dL    Est, Glom Filt Rate >60 >60 mL/min/1.73m2    Calcium 8.2 (L) 8.6 - 10.4 mg/dL    Sodium 145 (H) 135 - 144 mmol/L    Potassium 4.4 3.7 - 5.3 mmol/L    Chloride 110 (H) 98 - 107 mmol/L    CO2 25 20 - 31 mmol/L    Anion Gap 10 9 - 17 mmol/L       Imaging/Diagonstics:    XR CHEST PORTABLE  Result Date: 11/26/2022    Mildly increased interstitial prominence in the lungs, which could be related interstitial edema versus atypical/viral pneumonia.      Current Facility-Administered Medications   Medication Dose Route Frequency Provider Last Rate Last Admin    iron sucrose (VENOFER) 200 mg in sodium chloride 0.9 % 100 mL IVPB  200 mg IntraVENous Q24H Jemal Tubbs MD   Stopped at 11/27/22 0825    0.9 % sodium chloride infusion   IntraVENous PRN Caroline Posadas MD        0.9 % sodium chloride infusion   IntraVENous PRN Shiela Reynolds DO        budesonide-formoterol (SYMBICORT) 160-4.5 MCG/ACT inhaler 2 puff  2 puff Inhalation BID Jerome Christensen MD   2 puff at 11/27/22 1950    [Held by provider] aspirin EC tablet 81 mg  81 mg Oral Daily Jerome Christensen MD        [facility] [place] by provider] clopidogrel (PLAVIX) tablet 75 mg  75 mg Oral Daily Jerome Christensen MD        [Held by provider] apixaban Flory Morillo) tablet 5 mg  5 mg Oral BID Jerome Christensen MD        gabapentin (NEURONTIN) tablet 300 mg  300 mg Oral 3 times per day Jerome Christensen MD   300 mg at 11/28/22 0535    isosorbide mononitrate (IMDUR) extended release tablet 60 mg  60 mg Oral Daily Tristan Martinez MD   60 mg at 11/27/22 0815    sodium chloride flush 0.9 % injection 5-40 mL  5-40 mL IntraVENous 2 times per day Tristan Martinez MD   10 mL at 11/27/22 1948    sodium chloride flush 0.9 % injection 5-40 mL  5-40 mL IntraVENous PRN Tristan Martinez MD        0.9 % sodium chloride infusion   IntraVENous PRN Tristan Martinez MD        ondansetron (ZOFRAN-ODT) disintegrating tablet 4 mg  4 mg Oral Q8H PRN Tristan Martinez MD        Or    ondansetron [facility]) injection 4 mg  4 mg IntraVENous Q6H PRN Tristan Martinez MD        polyethylene glycol (GLYCOLAX) packet 17 g  17 g Oral Daily PRN Tristan Martinez MD        acetaminophen (TYLENOL) tablet 650 mg  650 mg Oral Q6H PRN Tristan Martinez MD   650 mg at 11/27/22 0283    Or    acetaminophen (TYLENOL) suppository 650 mg  650 mg Rectal Q6H PRN Tristan Martinez MD        potassium chloride (KLOR-CON M) extended release tablet 40 mEq  40 mEq Oral PRN Tristan Martinez MD        Or    potassium bicarb-citric acid (EFFER-K) effervescent tablet 40 mEq  40 mEq Oral PRN Tristan Martinez MD        Or    potassium chloride 10 mEq/100 mL IVPB (Peripheral Line)  10 mEq IntraVENous PRN Tristan Martinez MD        magnesium sulfate 2000 mg in 50 mL IVPB premix  2,000 mg IntraVENous PRN Tristan Martinez MD        pantoprazole (PROTONIX) 40 mg in sodium chloride (PF) 0.9 % 10 mL injection  40 mg IntraVENous Q12H Tristan Martinez MD   40 mg at 11/28/22 0139    atorvastatin (LIPITOR) tablet 80 mg  80 mg Oral Daily Tristan Martinez MD   80 mg at 11/27/22 0815    tiotropium (SPIRIVA RESPIMAT) 2.5 MCG/ACT inhaler 2 puff  2 puff Inhalation Daily Tristan Martinez MD   2 puff at 11/27/22 0933    albuterol (PROVENTIL) nebulizer solution 2.5 mg  2.5 mg Nebulization Q4H LO Gordon MD   2.5 mg at 11/27/22 1942    methylPREDNISolone sodium (SOLU-MEDROL) injection 40 mg  40 mg IntraVENous Q8H Yair Long MD   40 mg at 11/28/22 0139 doxycycline hyclate (VIBRA-TABS) tablet 100 mg  100 mg Oral 2 times per day Phill Aguilar MD   100 mg at 11/27/22 1948    labetalol (NORMODYNE;TRANDATE) injection 10 mg  10 mg IntraVENous Q4H PRN Yair Erickson MD        amLODIPine (NORVASC) tablet 5 mg  5 mg Oral Daily Yair Erickson MD   5 mg at 11/27/22 6587    amiodarone (CORDARONE) tablet 200 mg  200 mg Oral Daily Yair Erickson MD   200 mg at 11/27/22 0815    metoprolol tartrate (LOPRESSOR) tablet 25 mg  25 mg Oral BID Herman Schaumann, MD   25 mg at 11/27/22 2115    butalbital-acetaminophen-caffeine (FIORICET, ESGIC) per tablet 1 tablet  1 tablet Oral Q6H PRN Phill Aguilar MD   1 tablet at 11/28/22 0535     ASSESSMENT:     Principal Problem:    Chest pain  Active Problems:    Status post carotid surgery    Acute blood loss anemia    COPD (chronic obstructive pulmonary disease) ([facility] [address]    Essential hypertension  Resolved Problems:    * No resolved hospital problems. *    PLAN:     Acute anemia likely 2/2 GI bleed  Hb 4.6 on admission. S/p 5 units pRBC. Hb 8.2 today morning  IV protonix 40 mg BID  Hold anticoagulants  H&H q12h  Low iron and ferritin. On IV venofer   GI consulted  Plan for endoscopy & colonoscopy    NSTEMI Type II likely 2/2 to acute blood loss anemia  Troponin 65>56>147  ProBNP unremarkable  Previous cath in 09/2022 showed minimal CAD  Lipitor 80 mg oral daily  Cardiology following    Sepsis possibly HCAP r/o UTI - ruled out  Blood cultures collected. No growth   WBC and lactate elevated  Procal, ESR, CRP are WNL   Pneumonia work up is negative   UA is negative for UTI    COPD Exacerbation  Hx of smoking, dyspnea on exertion, wheezes on PE  Normal ABGs   Continue Doxycycline  Continue solu-medrol 40 mg Q12h  Continue Symbicort and Spiriva  Mucinex for productive cough  Last PFT showed restrictive pattern  Repeat PFTs as OP    History of paroxysmal A.  Fib  Continue home amiodarone 200 mg oral daily  Continue lopressor 25 mg oral BID with hold parameters  Hold Eliquis    Recent right TCAR 09/2022  Hold DAPT  Vascular following  Resume anticoagulation and anti-platelet following GI procedures and if Hb stable  Appreciate their recommendation on whether one of the anti-platelets is able to be discontinued once patient is stable and on discharge    HTN  Continue home meds  Amlodipine 5 mg oral daily     Peripheral neuropathy  Previous A1c in 2019 unremarkable  Repeat A1c 5.4  Gabapentin 300 mg TID (home dose 800 mg TID)     ROSY  Patient refusing BiPAP at night  Continue to encourage     DVT prophylaxis: No chemical due to bleed. EPCs  GI prophylaxis: IV protonix 40 mg BID  Diet: NPO  Dispo: Intermediate    CODE STATUS full    OT/PT/social work consult in place    Plan was discussed with the attending, Dr. Jonh Sutton.     Angi Ramirez MD  Family Medicine Resident  11/28/2022 7:12 AM

## 2022-11-28 NOTE — PROGRESS NOTES
Physician Progress Note      PATIENTConda Cover  CSN #:                  [id number]  :                       1960  ADMIT DATE:       2022 7:55 AM  DISCH DATE:  Rama Nieves  PROVIDER #:        Awilda Byrnes DO          QUERY TEXT:    Patient admitted with GI bleed and is on chronic anticoagulation. If   possible, please document in the progress notes and discharge summary if you   are evaluating and/or treating any of the following: The medical record reflects the following:  Risk Factors: on Eliquis and Plavix  Clinical Indicators: presents with GI bleeding/ABLA, Hgb 4.6, c/o black tarry   stools, in respiratory distress and pale and diaphoretic with anginal   symptoms, recent carotid artery stenting approximately 2 months ago, takes   Eliquis, aspirin, Plavix daily  Treatment: PRBC's, Eliquis and Plavix on hold, frequent H/H, GI consult,   pending EGD and colonoscopy    Thank you, Aspen Kamara, ALIREZA  email - [email or web address]  cell- [phone]  office hours M--7A-3P  Options provided:  -- GI bleeding/ABLA associated with Eliquis and Plavix  -- Bleeding unrelated to anticoagulation  -- Other - I will add my own diagnosis  -- Disagree - Not applicable / Not valid  -- Disagree - Clinically unable to determine / Unknown  -- Refer to Clinical Documentation Reviewer    PROVIDER RESPONSE TEXT:    This patient has GI bleeding/ABLA associated with Eliquis and Plavix. Query created by: Derrell Calles on 2022 7:54 AM      QUERY TEXT:    Patient admitted with GI bleeding/ABLA. Documentation reflects Sepsis possibly   HCAP r/o UTI in H&P and progress notes. If possible, please document in the   progress notes and discharge summary if sepsis/pna/UTI was: The medical record reflects the following:  Risk Factors: WBC and lactate elevated  Clinical Indicators: per FM notes \"Sepsis possibly HCAP r/o UTI, Blood   cultures collected.  No growth, Pneumonia work up is negative, UA is negative   for UTI, Procal, ESR, CRP are WNL\", WBC 15, lactic 3.5, afebrile  Treatment: PO Doxycycline, labs, cultures, monitoring    Thank you, ALIREZA Isidro  email - [email or web address]  cell- [phone]  office hours M-F-7A-3P  Options provided:  -- sepsis/pna/UTI ruled out after study  -- sepsis/pna/UTI confirmed after study  -- Other - I will add my own diagnosis  -- Disagree - Not applicable / Not valid  -- Disagree - Clinically unable to determine / Unknown  -- Refer to Clinical Documentation Reviewer    PROVIDER RESPONSE TEXT:    sepsis/pna/UTI ruled out after study. Query created by: Nadira Hall on 11/28/2022 8:04 AM      QUERY TEXT:    Pt admitted with GI bleed/ABLA. Pt noted to have respiratory distress and   hypoxia. If possible, please document in the progress notes and discharge   summary if you are evaluating and/or treating any of the following: The medical record reflects the following:  Risk Factors: severe anemia, COPD, Hx of smoking  Clinical Indicators: per ED notes \"He came in respiratory distress and pale   and diaphoretic with anginal symptoms, Tachypneic, increased work of   breathing, shortness of breath requiring BiPAP, much improved on BiPAP\", per   FM progress notes \"COPD Exacerbation, dyspnea on exertion, wheezes on PE\", Hgb   4.6, Type II MI, noted desats into 80's, not on home O2 per notes  Treatment: BIPAP, supplemental O2 per NC, Solumedrol, nebs, Symbicort and   Spiriva, CXR, PRBC's, labs, cultures, continuous pulse ox monitoring    Thank you, ALIREZA Isidro  email - [email or web address]  cell- [phone]  office hours M-F-7A-3P  Options provided:  -- Acute respiratory failure with hypoxia, now resolved  -- Acute respiratory failure with hypoxia and hypercapnia, now resolved  -- Other - I will add my own diagnosis  -- Disagree - Not applicable / Not valid  -- Disagree - Clinically unable to determine / Unknown  -- Refer to Clinical Documentation Reviewer    PROVIDER RESPONSE TEXT:    This patient is in acute respiratory failure with hypoxia which is now   resolved.     Query created by: Piedad Early on 11/28/2022 8:10 AM      Electronically signed by:  Melinda Carcamo DO 11/28/2022 9:25 AM

## 2022-11-28 NOTE — PROGRESS NOTES
Pt hgb was 6.9, ankita CORDOBA. He ordered a unit of blood and came to bedside. After transfusion his hgb was 8.1. Also pt is still have dark watery stools after drinking 6000mls of golytely.

## 2022-11-28 NOTE — PROGRESS NOTES
Occupational [address]  Occupational Therapy Not Seen Note    DATE: 2022    NAME: Fidel Calhoun  MRN: [id number]   : 1960      Patient not seen this date for Occupational Therapy due to:    Surgery/Procedure:  Pt off the floor EGD and Colonoscopy. OT will re-attempt for OT Evaluation at later date, as appropriate.         Electronically signed by PANCHO Fuentes OTR/L on 2022 at 11:02 AM

## 2022-11-28 NOTE — PLAN OF CARE
Problem: Discharge Planning  Goal: Discharge to home or other facility with appropriate resources  11/28/2022 1008 by Juhi Locke  Outcome: Progressing  Flowsheets (Taken 11/28/2022 0800)  Discharge to home or other facility with appropriate resources:   Identify barriers to discharge with patient and caregiver   Identify discharge learning needs (meds, wound care, etc)  11/28/2022 0306 by Delfina Garcia RN  Outcome: Progressing  Flowsheets (Taken 11/27/2022 1611 by Chemo Uriostegui RN)  Discharge to home or other facility with appropriate resources: Identify barriers to discharge with patient and caregiver     Problem: Safety - Adult  Goal: Free from fall injury  11/28/2022 1008 by Juhi Locke  Outcome: Progressing  11/28/2022 0306 by Delfina Garcia RN  Outcome: Progressing     Problem: ABCDS Injury Assessment  Goal: Absence of physical injury  11/28/2022 1008 by Juhi Locke  Outcome: Progressing  11/28/2022 0306 by Delfina Garcia RN  Outcome: Progressing     Problem: Pain  Goal: Verbalizes/displays adequate comfort level or baseline comfort level  Outcome: Progressing  Flowsheets (Taken 11/28/2022 0800)  Verbalizes/displays adequate comfort level or baseline comfort level: Encourage patient to monitor pain and request assistance

## 2022-11-28 NOTE — PROGRESS NOTES
PATIENT REFUSES TO WEAR BIPAP     [x] Risks and benefits explained to patient   [x] Patient refuses to wear Bipap stating \"not tonight but I know they would like me to start wearing\". Pt. Tells writer he is on lasix so he'll be up to the restroom all night. [x] Patient verbalizes understanding of information presented.

## 2022-11-28 NOTE — PROGRESS NOTES
Procedure explained/reviewed with the patient with verbalized understanding confirmed. Questions addressed and answered.

## 2022-11-28 NOTE — OP NOTE
[id number] [facility]  EGD & COLONOSCOPY      Patient:   Laya Brambila    :    1960    Referring/PCP: Yaniv Jeffers DO  Procedure:   Colonoscopy --diagnostic;     Esophagogastroduodenoscopy  with enteroscopy  Facility:  [address] [place] [address]  Date:     2022  Endoscopist:  Nickie Beth MD, [facility]    Indication:  Anemia    Postprocedure diagnosis: Duodenal AVM with active bleeding- treated with APC. Jejunal AVM's without bleeding, hemorrhoids. Anesthesia:  MAC    Complications: None    EBL: None from the procedure    Specimen: None    Description of Procedure:  Informed consent was obtained from the patient after explanation of the procedure including indications, description of the procedure,  benefits and possible risks and complications of the procedure, and alternatives. Questions were answered. The patient's history was reviewed and a directed physical examination was performed prior to the procedure. Patient was monitored throughout the procedure with pulse oximetry and periodic assessment of vital signs. Patient was sedated as noted above. With the patient in the left lateral decubitus position, the Olympus videoendoscope was placed in the patient's mouth and under direct visualization passed into the esophagus. Visualization of the esophagus, stomach, and duodenum was performed during both introduction and withdrawal of the endoscope and retroflexed view of the proximal stomach was obtained. The scope was passed to the second portion of the duodenum. The patient tolerated the procedure well and was taken to the recovery area in good condition. Findings[de-identified]   Esophagus: normal.   Stomach: normal  Duodenum: AVM in the second portion of bleeding with mild oozing. Bleeding was controlled with APC 40 W 1lit/min of flow. Duodenum was otherwise normal.   Jejunum: 3 AVM's without bleeding ablated with APC at 36 W with 1 lit/minute of flow.      With the patient in the left lateral decubitus position, a digital rectal examination was performed and revealed negative without mass or lesions. The Olympus video colonoscope was placed in the patient's rectum and advanced without difficulty  to the cecum, which was identified by the ileocecal valve and appendiceal orifice. The prep was good. Examination of the mucosa was performed during both introduction and withdrawal of the colonoscope. Retroflexed view of the rectum was performed. Findings:   1. Normal colon. 2. Grade 1 hemorrhoids. Recommendations:  Repeat colonoscopy in 5 years. Cardiac diet. Will sign off. Please do not hesitate to call with any questions. Thank you for allowing us to participate in the care of your patient.         Electronically signed by Rafael Marion MD, FACG on 11/28/2022 at 10:55 AM

## 2022-11-28 NOTE — PROGRESS NOTES
[facility] [address]  PROGRESS NOTE    Shift date: 11/28/2022   Shift day: Monday   Shift # 1    Room # 6009/7257-86   Name: Nikita Luz                Taoist:    Place of Sabianist:     Referral:  Nurse Mani Robles Date & Time: 11/26/2022  7:55 AM    Assessment:  Nikita Luz is a 57 y.o. male in the hospital because of \"shortness of breath\". The nurse said he had a GI bleed  Upon entering the room writer observes pt getting settled after having a procedure. Pt did not make much eye contact with  but did engage in conversation. He expressed that he is used to being the one who \"takes care of everyone\". He mentioned children and siblings but said he didn't want to talk about them and that he doesn't want anyone coming and looking at him \"like I'm going\" (dying) or with sympathy; that it would make him uncomfortable. Patient spoke of having time to think about how he wants his life to be going forward. Intervention:  Writer introduced self and title as  Writer offered space for patient  to express feelings, needs, and concerns and provided a ministry presence. Outcome:  Patient expressed appreciation for visit. Plan:  Chaplains will remain available to offer spiritual and emotional support as needed.       Electronically signed by Ger Lux on 11/28/2022 at 11:35 AM.  Charan Martines  [phone]       11/28/22 1132   Encounter Summary   Service Provided For: Patient   Referral/Consult From: Nurse   Support System Family members   Last Encounter  11/28/22   Complexity of Encounter Moderate   Begin Time 1055   End Time  1110   Total Time Calculated 15 min   Encounter    Type Initial Screen/Assessment   Assessment/Intervention/Outcome   Assessment Coping   Intervention Active listening;Explored/Affirmed feelings, thoughts, concerns   Outcome Engaged in conversation;Expressed feelings, needs, and concerns;Expressed Gratitude;Receptive

## 2022-11-28 NOTE — PLAN OF CARE
Problem: Discharge Planning  Goal: Discharge to home or other facility with appropriate resources  11/28/2022 0306 by Gracy Lentz RN  Outcome: Progressing  Flowsheets (Taken 11/27/2022 1611 by Christoph Terry RN)  Discharge to home or other facility with appropriate resources: Identify barriers to discharge with patient and caregiver  11/27/2022 1400 by Christoph Terry RN  Outcome: Progressing  Flowsheets  Taken 11/27/2022 1200  Discharge to home or other facility with appropriate resources: Identify barriers to discharge with patient and caregiver  Taken 11/27/2022 0709  Discharge to home or other facility with appropriate resources: Identify barriers to discharge with patient and caregiver     Problem: Safety - Adult  Goal: Free from fall injury  11/28/2022 0306 by Gracy Lentz RN  Outcome: Progressing  11/27/2022 1400 by Christoph Terry RN  Outcome: Progressing  Flowsheets (Taken 11/27/2022 1358)  Free From Fall Injury: Instruct family/caregiver on patient safety     Problem: ABCDS Injury Assessment  Goal: Absence of physical injury  11/28/2022 0306 by Gracy Lentz RN  Outcome: Progressing  11/27/2022 1400 by Christoph Terry RN  Outcome: Progressing  Flowsheets (Taken 11/27/2022 1358)  Absence of Physical Injury: Implement safety measures based on patient assessment

## 2022-11-28 NOTE — PROGRESS NOTES
Vascular Surgery   Progress Note    PATIENT NAME: Gino Hernandez     TODAY'S DATE: 11/28/2022, 7:24 AM    SUBJECTIVE:     Pt seen and examined at bedside. No acute overnight events. Hgb 8.2 this morning. Pt continues to feel better on a daily basis. Plan for endoscopy/colonoscopy with GI today. OBJECTIVE:   VITALS:  BP (!) 154/49   Pulse 71   Temp 97.6 °F (36.4 °C) (Oral)   Resp 15   Wt 255 lb (115.7 kg)   SpO2 93%   BMI 37.66 kg/m²      INTAKE/OUTPUT:      Intake/Output Summary (Last 24 hours) at 11/28/2022 2019  Last data filed at 11/28/2022 5718  Gross per 24 hour   Intake 5538.75 ml   Output 2500 ml   Net 3038.75 ml         PHYSICAL EXAM:  General Appearance: awake, alert, oriented, in no acute distress, comfortably sitting in the chair  HEENT:  Normocephalic, atraumatic, mucus membranes moist   Heart: Regular rate and rhythm  Lungs: normal effort with symmetric rise and fall of chest wall  Abdomen: soft, nondistended, nontender to palpation  Extremities: B/L LE - warm, well perfused with 1+ edema  Skin: Skin color, texture, turgor normal. No rashes or lesions.       Data:  CBC:   Recent Labs     11/27/22  0244 11/27/22  0904 11/27/22  2142 11/28/22  0209 11/28/22  0534   WBC 10.0 9.8  --   --  15.5*   HGB 7.3* 7.5* 6.9* 8.1* 8.2*    214  --   --  233       Chemistry:   Recent Labs     11/26/22  0814 11/26/22  0923 11/26/22  1315 11/26/22  1500 11/27/22  0244 11/27/22  0734 11/28/22  0534     --   --   --  138 137 145*   K 4.1  --   --   --  4.5 4.7 4.4     --   --   --  106 106 110*   CO2 17*  --   --   --  17* 15* 25   GLUCOSE 144*  --   --   --  175* 196* 147*   BUN 24*  --   --   --  24* 23 16   CREATININE 0.86  --   --    < > 0.84 0.79 0.58*   MG 2.4  --   --   --   --   --   --    ANIONGAP 17  --   --   --  15 16 10   LABGLOM >60  --   --   --  >60 >60 >60   CALCIUM 8.5*  --   --   --  8.0* 8.2* 8.2*   CAION 1.15  --   --   --   --   --   --    PHOS 3.4  --   --   --   --   -- --    PROBNP 242  --   --   --   --   --   --    TROPHS 58* 63* 233*  --   --   --   --    LACTACIDWB  --   --   --   --  1.7  --   --     < > = values in this interval not displayed. Hepatic:   Recent Labs     11/26/22 0814   AST 22   ALT 16   ALKPHOS 94   BILITOT 1.0   BILIDIR 0.1       Coagulation:   Recent Labs     11/26/22 0814 11/27/22  1021   APTT 22.8 25.6   PROT 6.3*  --    INR 1.1  --            Radiology Review:    XR CHEST PORTABLE    Result Date: 11/26/2022  EXAMINATION: ONE XRAY VIEW OF THE CHEST 11/26/2022 8:33 am COMPARISON: 09/16/2022 and 08/05/2022 HISTORY: ORDERING SYSTEM PROVIDED HISTORY: chest pain, sob TECHNOLOGIST PROVIDED HISTORY: chest pain, sob Reason for Exam: chest pain/ shortness of breath/  AP erect/ port. FINDINGS: Heart size is stable. Scattered interstitial prominence in the lungs appears mildly increased. No significant vascular congestion. There is chronic blunting at the left costophrenic angle. No sizable pleural effusion or pneumothorax. Mildly increased interstitial prominence in the lungs, which could be related interstitial edema versus atypical/viral pneumonia. ASSESSMENT:  Active Hospital Problems    Diagnosis Date Noted    Chest pain [R07.9] 11/26/2022     Priority: Medium    Acute blood loss anemia [D62] 11/26/2022     Priority: Medium    Status post carotid surgery [Z98.890] 09/28/2022     Priority: Medium    Essential hypertension [I10] 02/07/2018    COPD (chronic obstructive pulmonary disease) ([facility] [address] [J44.9] 08/06/2013     34year-old male s/p R TCAR with blood loss anemia concerning for GI bleed on aspirin, Plavix, and Eliquis. Plan:  Recommend antiplatelet after colonoscopy. This will not effect GI bleed  Anticoagulation management for afib per cardiology  Continue medical mgmt and supportive care per primary team  No vascular surgical intervention planned. Vascular surgery will sign off at this time.      Electronically signed by Pauline Bailey DO  on 11/28/2022 at 7:24 AM

## 2022-11-28 NOTE — ANESTHESIA PRE PROCEDURE
Department of Anesthesiology  Preprocedure Note       Name:  Laya Brambila   Age:  57 y.o.  :  1960                                          MRN:  [id number]         Date:  2022      Surgeon: Ayush Viera):  Nickie Beth MD    Procedure: Procedure(s):  EGD ESOPHAGOGASTRODUODENOSCOPY  COLONOSCOPY DIAGNOSTIC    Medications prior to admission:   Prior to Admission medications    Medication Sig Start Date End Date Taking? Authorizing Provider   tiZANidine (ZANAFLEX) 4 MG tablet Take 1 tablet by mouth 2 times daily as needed (neck and back spasms) 22   Keven Olmstead DO   gabapentin (NEURONTIN) 800 MG tablet Take 1 tablet by mouth every 8 hours for 30 days. 22  Keven Olmstead DO   amiodarone (CORDARONE) 200 MG tablet TAKE ONE TABLET BY MOUTH DAILY 10/17/22   Keven Olmstead DO   ELIQUIS 5 MG TABS tablet TAKE ONE TABLET BY MOUTH TWICE A DAY 10/17/22   Keven Olmstead DO   budesonide-formoterol (SYMBICORT) 160-4.5 MCG/ACT AERO INHALE TWO PUFFS BY MOUTH TWICE A DAY 22   Bruna Vizcarra MD   aspirin 81 MG EC tablet Take 1 tablet by mouth daily 22   Bruna Vizcarra MD   metoprolol tartrate (LOPRESSOR) 25 MG tablet Take 1 tablet by mouth 2 times daily 22   Bruna Vizcarra MD   isosorbide mononitrate (IMDUR) 60 MG extended release tablet Take 1 tablet by mouth in the morning. 22   Historical Provider, MD   clopidogrel (PLAVIX) 75 MG tablet Take 1 tablet by mouth in the morning.  22   Laurita Veliz MD   nitroGLYCERIN (NITROSTAT) 0.4 MG SL tablet Place 1 tablet under the tongue every 5 minutes as needed for Chest pain (not to exceed 3 tabs at a time) 6/3/22   Keven Olmstead DO   amLODIPine (NORVASC) 5 MG tablet TAKE ONE TABLET BY MOUTH DAILY 22   Keven Olmstead DO   albuterol sulfate  (90 Base) MCG/ACT inhaler Inhale 2 puffs into the lungs every 6 hours as needed for Wheezing 22   Keven Olmstead DO   simvastatin (ZOCOR) 80 MG tablet TAKE ONE TABLET BY MOUTH EVERY EVENING 4/11/22   Jer Hector DO       Current medications:    No current facility-administered medications for this visit. No current outpatient medications on file.      Facility-Administered Medications Ordered in Other Visits   Medication Dose Route Frequency Provider Last Rate Last Admin    methylPREDNISolone sodium (SOLU-MEDROL) injection 40 mg  40 mg IntraVENous Q12H Valencia Harden MD        [facility]) extended release tablet 600 mg  600 mg Oral BID Valencia Harden MD        iron sucrose (VENOFER) 200 mg in sodium chloride 0.9 % 100 mL IVPB  200 mg IntraVENous Q24H Huber Grimes MD   Stopped at 11/27/22 0825    0.9 % sodium chloride infusion   IntraVENous PRN Caroline Posadas MD        0.9 % sodium chloride infusion   IntraVENous PRN Justino Nicolas DO        budesonide-formoterol (SYMBICORT) 160-4.5 MCG/ACT inhaler 2 puff  2 puff Inhalation BID Valencia Harden MD   2 puff at 11/28/22 0740    [Held by provider] aspirin EC tablet 81 mg  81 mg Oral Daily Valencia Harden MD        [Held by provider] clopidogrel (PLAVIX) tablet 75 mg  75 mg Oral Daily Valencia Harden MD        [Held by provider] apixaban (ELIQUIS) tablet 5 mg  5 mg Oral BID Valencia Harden MD        gabapentin (NEURONTIN) tablet 300 mg  300 mg Oral 3 times per day Valencia Harden MD   300 mg at 11/28/22 0535    isosorbide mononitrate (IMDUR) extended release tablet 60 mg  60 mg Oral Daily Valencia Harden MD   60 mg at 11/27/22 0815    sodium chloride flush 0.9 % injection 5-40 mL  5-40 mL IntraVENous 2 times per day Valencia Harden MD   10 mL at 11/27/22 1948    sodium chloride flush 0.9 % injection 5-40 mL  5-40 mL IntraVENous PRN Valencia Harden MD        0.9 % sodium chloride infusion   IntraVENous PRN Valencia Harden MD        ondansetron (ZOFRAN-ODT) disintegrating tablet 4 mg  4 mg Oral Q8H PRN Valencia Harden MD        Or    ondansetron [facility]) injection 4 tablet at 11/28/22 0535       Allergies:  No Known Allergies    Problem List:    Patient Active Problem List   Diagnosis Code    Cervical stenosis of spine M48.02    Cervicalgia M54.2    COPD (chronic obstructive pulmonary disease) ([facility] J44.9    Smoker F17.200    Mild depression F32. A    GERD (gastroesophageal reflux disease) K21.9    Mixed hyperlipidemia E78.2    Carpal tunnel syndrome of left wrist G56.02    Medication monitoring encounter Z51.81    DDD (degenerative disc disease), lumbar M51.36    Displacement of lumbar intervertebral disc without myelopathy M51.26    Therapeutic opioid induced constipation K59.03, T40.2X5A    Radiculopathy of cervical spine M54.12    Essential hypertension I10    Major depression, single episode, in complete remission ([facility]) F32.5    Prediabetes R73.03    Lung nodule R91.1    Allergic sinusitis J30.9    Arthritis M19.90    Spinal stenosis of lumbar region without neurogenic claudication M48.061    Morbidly obese ([facility]) E66.01    Lumbosacral spondylosis without myelopathy M47.817    Encounter for long-term opiate analgesic use Z79.891    Occlusion of right carotid artery I65.21    Left carotid stenosis I65.22    Sacroiliitis, not elsewhere classified ([facility] [address] M46.1    Recurrent UTI N39.0    BPH with urinary obstruction N40.1, N13.8    Other retention of urine R33.8    Pneumonia J18.9    Atrial fibrillation with RVR ([facility]) I48.91    Angina of effort ([facility]) I20.8    Stenosis of right carotid artery I65.21    Status post carotid surgery Z98.890    Chest pain R07.9    Acute blood loss anemia D62       Past Medical History:        Diagnosis Date    Abnormal stress ECG     7/28/2022 per Dr. Laura Lozano, [facility] will need cardiac cath in future due to cp    Angina pectoris ([facility])     Asthma     BPH (benign prostatic hyperplasia)     CAD (coronary artery disease)     Dr. Laura Lozano   7/28/22 cardiac clearance    Carotid stenosis     rt  Dr. Austin Rodriguez Carpal tunnel syndrome     left    Cervical stenosis of spine 2012    C2-5 laminectomy  Dr. Marycruz Fowler    Cervicalgia     chronic pain    Chronic back pain     COPD (chronic obstructive pulmonary disease) ([facility] [address]     on inhalers pcp manages    DDD (degenerative disc disease)     Elbow fracture, right     Fractures     elbow    GERD (gastroesophageal reflux disease)     uses pepto bismol prn    Gout     Right great toe    Hyperlipidemia     Hypertension     Lung nodule     Mild depression 09/26/2013    2 suicide attempts by girlfriend related to depression.     Morbidly obese ([address] 10/30/2020    Occlusion of right carotid artery 07/14/2021    Osteoarthritis of right knee     Pre-diabetes     was taking metformin in past  no longer takes    Sinus headache 10/10/2018    Sleep apnea     snores   does not use cpap  pt did no follow up (per patient)    Sleep disturbance     Smoker     Snores     no cpap    SOB (shortness of breath) on exertion     Tendonitis of foot     right    Tooth loose 08/05/2022    lower front rt    Under care of team     Dr. Hank Singh  7/14/2022  will need cysto in future after heart cath and carotid sx    Under care of team     Dr. Mia Weiss for carotid stenosis    Wellness examination     Wally Nissen  last visit June 2022       Past Surgical History:        Procedure Laterality Date    CARDIAC CATHETERIZATION  07/08/2014    Non Obstructive CAD     CARDIAC CATHETERIZATION  09/16/2022    CERVICAL SPINE SURGERY  07/13/2012     C2-3-4-5    COLONOSCOPY      FRACTURE SURGERY      Rt elbow     HC INJECT OTHER PERPHRL NERV Bilateral 05/09/2019    INJECTION SPINAL performed by Shar Palomares MD at [address] [place] [address] Bilateral 08/15/2019    SACROILIAC JOINT INJECTION performed by Shar Palomares MD at [facility]  02/05/2016    premier tens    NERVE BLOCK Left 02/07/2020    RFA left side  NERVE BLOCK Left 02/07/2020     NERVE RADIOFREQUENCY ABLATION - SACROILIAC (Left )    NERVE BLOCK  02/12/2021    NERVE RADIOFREQUENCY ABLATION SI (Left     NERVE BLOCK Right 02/19/2021    Procedure: NERVE RADIOFREQUENCY ABLATION SI JOINT (Right )    OTHER SURGICAL HISTORY  08/15/2019    sacroiliac joint injection    PAIN MANAGEMENT PROCEDURE Left 02/07/2020    NERVE RADIOFREQUENCY ABLATION - SACROILIAC performed by Swati Daniels MD at [address] [place] [address] Left 02/12/2021    NERVE RADIOFREQUENCY ABLATION SI performed by Swati Daniels MD at [address] [place] [address] Right 02/19/2021    NERVE RADIOFREQUENCY ABLATION SI JOINT performed by Swati Daniels MD at [address] [place] [address] Right 9/28/2022    right trans-carotid artery revascularization w/stent performed by Millie Oviedo MD at [address] [place] [address] History:    Social History     Tobacco Use    Smoking status: Former     Packs/day: 0.25     Years: 40.00     Pack years: 10.00     Types: Cigarettes     Start date: 1975    Smokeless tobacco: Former     Quit date: 3/12/2015    Tobacco comments:     Smokes 2-4 cigs/day as of 8/5/2022   Substance Use Topics    Alcohol use: Not Currently     Alcohol/week: 0.0 standard drinks     Comment: 3 x year                                Counseling given: Not Answered  Tobacco comments: Smokes 2-4 cigs/day as of 8/5/2022      Vital Signs (Current): There were no vitals filed for this visit.                                            BP Readings from Last 3 Encounters:   11/28/22 (!) 153/48   10/17/22 117/82   10/03/22 136/72       NPO Status:  MN                                                                               BMI:   Wt Readings from Last 3 Encounters:   11/26/22 255 lb (115.7 kg)   10/17/22 267 lb (121.1 kg)   10/03/22 267 lb 3.2 oz (121.2 kg)     There is no height or weight on file to calculate BMI.    CBC:   Lab Results   Component Value Date/Time    WBC 15.5 11/28/2022 05:34 AM    RBC 2.96 11/28/2022 05:34 AM    RBC 4.75 02/10/2012 07:41 AM    HGB 8.2 11/28/2022 05:34 AM    HCT 27.1 11/28/2022 05:34 AM    MCV 91.6 11/28/2022 05:34 AM    RDW 16.5 11/28/2022 05:34 AM     11/28/2022 05:34 AM     02/10/2012 07:41 AM       CMP:   Lab Results   Component Value Date/Time     11/28/2022 05:34 AM    K 4.4 11/28/2022 05:34 AM     11/28/2022 05:34 AM    CO2 25 11/28/2022 05:34 AM    BUN 16 11/28/2022 05:34 AM    CREATININE 0.58 11/28/2022 05:34 AM    GFRAA >60 09/29/2022 02:34 AM    LABGLOM >60 11/28/2022 05:34 AM    GLUCOSE 147 11/28/2022 05:34 AM    GLUCOSE 99 02/10/2012 07:41 AM    PROT 6.3 11/26/2022 08:14 AM    CALCIUM 8.2 11/28/2022 05:34 AM    BILITOT 1.0 11/26/2022 08:14 AM    ALKPHOS 94 11/26/2022 08:14 AM    AST 22 11/26/2022 08:14 AM    ALT 16 11/26/2022 08:14 AM       POC Tests:   Recent Labs     11/26/22  1500   POCGLU 112*   POCNA 140   POCK 4.3   POCCL 108*   POCBUN 24   POCHEMO 5.9*   POCHCT 17*       Coags:   Lab Results   Component Value Date/Time    PROTIME 11.8 11/26/2022 08:14 AM    INR 1.1 11/26/2022 08:14 AM    APTT 25.6 11/27/2022 10:21 AM       HCG (If Applicable): No results found for: PREGTESTUR, PREGSERUM, HCG, HCGQUANT     ABGs: No results found for: PHART, PO2ART, DAN2ZCO, KDQ4ZZP, BEART, J6EARJBD     Type & Screen (If Applicable):  No results found for: LABABO, LABRH    Drug/Infectious Status (If Applicable):  No results found for: HIV, HEPCAB    COVID-19 Screening (If Applicable):   Lab Results   Component Value Date/Time    COVID19 Not Detected 11/26/2022 03:38 PM         Anesthesia Evaluation  Patient summary reviewed and Nursing notes reviewed no history of anesthetic complications:   Airway: Mallampati: III  TM distance: >3 FB   Neck ROM: limited  Comment: -limited mouth opening, limited neck mobility  Mouth opening: > = 3 FB and < 3 FB   Dental:    (+) poor dentition  Comment: -MISSING MULTIPLE UPPER TEETH BILATERALLY  -UPPER LEFT LOOSE TOOTH  -MISSING LOWER FRONT TOOTH  -REMAINING TEETH ROTTEN    Pulmonary:normal exam    (+) COPD:  shortness of breath:  sleep apnea: on noncompliant,  asthma: current smoker                          ROS comment: -SMOKES 1 PPD FOR 45 PACK YEARS  -PRODUCTIVE COUGH   Cardiovascular:  Exercise tolerance: poor (<4 METS),   (+) hypertension:, angina:, CAD: non-obstructive, CABG/stent:, dysrhythmias: atrial fibrillation, PHILIP:,                   Neuro/Psych:   (+) neuromuscular disease:, psychiatric history:depression/anxiety              ROS comment: -S/P NECK SURGERY - POOR NECK MOBILITY  -WEAKNESS BILATERAL ARMS AND LEGS  -NUMBNESS TINGLING BILATERAL ARMS AND LEGS  -CERVICAL DDD  -SPINAL STENOSIS GI/Hepatic/Renal:   (+) GERD: well controlled, morbid obesity          Endo/Other:    (+) blood dyscrasia: anemia, arthritis:., .    (-) diabetes mellitus               Abdominal:             Vascular: negative vascular ROS.  + PVD, aortic or cerebral, . ROS comment: R carotid surgery.  Other Findings:                   Ja Boston MD   11/28/2022

## 2022-11-28 NOTE — CONSENT
Informed Consent for Blood Component Transfusion Note    I have discussed with the patient the rationale for blood component transfusion; its benefits in treating or preventing fatigue, organ damage, or death; and its risk which includes mild transfusion reactions, rare risk of blood borne infection, or more serious but rare reactions. I have discussed the alternatives to transfusion, including the risk and consequences of not receiving transfusion. The patient had an opportunity to ask questions and had agreed to proceed with transfusion of blood components.     Electronically signed by Elliot Hart MD on 11/27/22 at 10:19 PM EST

## 2022-11-28 NOTE — ANESTHESIA PRE PROCEDURE
Department of Anesthesiology  Preprocedure Note       Name:  Raysa Frias   Age:  57 y.o.  :  1960                                          MRN:  [id number]         Date:  2022      Surgeon: Renuka Allen):  Ibeth Alaniz MD    Procedure: Procedure(s):  EGD ESOPHAGOGASTRODUODENOSCOPY  COLONOSCOPY DIAGNOSTIC    Medications prior to admission:   Prior to Admission medications    Medication Sig Start Date End Date Taking? Authorizing Provider   tiZANidine (ZANAFLEX) 4 MG tablet Take 1 tablet by mouth 2 times daily as needed (neck and back spasms) 22   Eze Ramírez, DO   gabapentin (NEURONTIN) 800 MG tablet Take 1 tablet by mouth every 8 hours for 30 days. 22  Eze Ramírez, DO   amiodarone (CORDARONE) 200 MG tablet TAKE ONE TABLET BY MOUTH DAILY 10/17/22   Eze Ramírez, DO   ELIQUIS 5 MG TABS tablet TAKE ONE TABLET BY MOUTH TWICE A DAY 10/17/22   Eze Ramírez DO   budesonide-formoterol (SYMBICORT) 160-4.5 MCG/ACT AERO INHALE TWO PUFFS BY MOUTH TWICE A DAY 22   Renzo Will MD   aspirin 81 MG EC tablet Take 1 tablet by mouth daily 22   Renzo Will MD   metoprolol tartrate (LOPRESSOR) 25 MG tablet Take 1 tablet by mouth 2 times daily 22   Renzo Will MD   isosorbide mononitrate (IMDUR) 60 MG extended release tablet Take 1 tablet by mouth in the morning. 22   Historical Provider, MD   clopidogrel (PLAVIX) 75 MG tablet Take 1 tablet by mouth in the morning.  22   Dominick Willis MD   nitroGLYCERIN (NITROSTAT) 0.4 MG SL tablet Place 1 tablet under the tongue every 5 minutes as needed for Chest pain (not to exceed 3 tabs at a time) 6/3/22   Eze Ramírez DO   amLODIPine (NORVASC) 5 MG tablet TAKE ONE TABLET BY MOUTH DAILY 22   Eze Ramírez, DO   albuterol sulfate  (90 Base) MCG/ACT inhaler Inhale 2 puffs into the lungs every 6 hours as needed for Wheezing 22   Eze Ramírez, DO   simvastatin (ZOCOR) ondansetron (ZOFRAN-ODT) disintegrating tablet 4 mg  4 mg Oral Q8H PRN Jg Kent MD        Or    [facility]] ondansetron [facility]) injection 4 mg  4 mg IntraVENous Q6H PRN Jg Kent MD        [facility]] polyethylene glycol [facility]) packet 17 g  17 g Oral Daily PRN Jg Kent MD        [facility]] acetaminophen (TYLENOL) tablet 650 mg  650 mg Oral Q6H PRN Jg Kent MD   650 mg at 11/27/22 0814    Or    [[facility]] acetaminophen (TYLENOL) suppository 650 mg  650 mg Rectal Q6H PRN Jg Kent MD        [facility]] potassium chloride (KLOR-CON M) extended release tablet 40 mEq  40 mEq Oral PRN Jg Kent MD        Or   [facility]] potassium bicarb-citric acid (EFFER-K) effervescent tablet 40 mEq  40 mEq Oral PRN Jg Kent MD        Or   [facility]] potassium chloride 10 mEq/100 mL IVPB (Peripheral Line)  10 mEq IntraVENous PRN Jg Kent MD        [facility]] magnesium sulfate 2000 mg in 50 mL IVPB premix  2,000 mg IntraVENous PRN Jg Kent MD        [facility]] pantoprazole (PROTONIX) 40 mg in sodium chloride (PF) 0.9 % 10 mL injection  40 mg IntraVENous Q12H Jg Kent MD   40 mg at 11/28/22 0139    [[facility] Hold] atorvastatin (LIPITOR) tablet 80 mg  80 mg Oral Daily Jg Kent MD   80 mg at 11/27/22 0815    [[facility] Hold] tiotropium (SPIRIVA RESPIMAT) 2.5 MCG/ACT inhaler 2 puff  2 puff Inhalation Daily Jg Kent MD   2 puff at 11/28/22 0740    [[facility] Hold] albuterol (PROVENTIL) nebulizer solution 2.5 mg  2.5 mg Nebulization Q4H LO Caba MD   2.5 mg at 11/28/22 0740    [[facility] Hold] doxycycline hyclate (VIBRA-TABS) tablet 100 mg  100 mg Oral 2 times per day Cecilia Jones MD   100 mg at 11/27/22 1948    [[facility] Hold] labetalol (NORMODYNE;TRANDATE) injection 10 mg  10 mg IntraVENous Q4H PRN Yair Lee MD        [facility] Hold] amLODIPine (NORVASC) tablet 5 mg  5 mg Oral Daily Yair Lee MD   5 mg at 11/27/22 0815    [[facility] Hold] amiodarone (CORDARONE) tablet 200 mg  200 mg Oral Daily Muhaddis Donnamaria Schilder, MD   200 mg at 11/27/22 0815    [[facility] Hold] metoprolol tartrate (LOPRESSOR) tablet 25 mg  25 mg Oral BID Lizy Ray MD   25 mg at 11/27/22 2115    [[facility] Hold] butalbital-acetaminophen-caffeine (FIORICET, ESGIC) per tablet 1 tablet  1 tablet Oral Q6H PRN Hyacinth Oneill MD   1 tablet at 11/28/22 0535       Allergies:  No Known Allergies    Problem List:    Patient Active Problem List   Diagnosis Code    Cervical stenosis of spine M48.02    Cervicalgia M54.2    COPD (chronic obstructive [facility] disease) ([facility] J44.9    Smoker F17.200    Mild depression F32. A    GERD (gastroesophageal reflux disease) K21.9    Mixed hyperlipidemia E78.2    Carpal tunnel syndrome of left wrist G56.02    Medication monitoring encounter Z51.81    DDD (degenerative disc disease), lumbar M51.36    Displacement of lumbar intervertebral disc without myelopathy M51.26    Therapeutic opioid induced constipation K59.03, T40.2X5A    Radiculopathy of cervical spine M54.12    Essential hypertension I10    Major depression, single episode, in complete remission ([facility]) F32.5    Prediabetes R73.03    Lung nodule R91.1    Allergic sinusitis J30.9    Arthritis M19.90    Spinal stenosis of lumbar region without neurogenic claudication M48.061    Morbidly obese ([facility]) E66.01    Lumbosacral spondylosis without myelopathy M47.817    Encounter for long-term opiate analgesic use Z79.891    Occlusion of right carotid artery I65.21    Left carotid stenosis I65.22    Sacroiliitis, not elsewhere classified ([facility] M46.1    Recurrent UTI N39.0    BPH with urinary obstruction N40.1, N13.8    Other retention of urine R33.8    Pneumonia J18.9    Atrial fibrillation with RVR ([facility]) I48.91    Angina of effort ([facility]) I20.8    Stenosis of right carotid artery I65.21    Status post carotid surgery Z98.890    Chest pain R07.9    Acute blood loss anemia D62       Past Medical History: Diagnosis Date    Abnormal stress ECG     7/28/2022 per Dr. Nette Richardson, [facility] Card[facility] will need cardiac cath in future due to cp    Angina pectoris ([facility]     Asthma     BPH (benign prostatic hyperplasia)     CAD (coronary artery disease)     Dr. Nette Richardson   7/28/22 cardiac clearance    Carotid stenosis     rt  Dr. Chano Nino Carpal tunnel syndrome     left    Cervical stenosis of spine 2012    C2-5 laminectomy  Dr. Alisha Woodruff    Cervicalgia     chronic pain    Chronic back pain     COPD (chronic obstructive pulmonary disease) ([facility]     on inhalers pcp manages    DDD (degenerative disc disease)     Elbow fracture, right     Fractures     elbow    GERD (gastroesophageal reflux disease)     uses pepto bismol prn    Gout     Right great toe    Hyperlipidemia     Hypertension     Lung nodule     Mild depression 09/26/2013    2 suicide attempts by girlfriend related to depression.     Morbidly obese ([facility] 10/30/2020    Occlusion of right carotid artery 07/14/2021    Osteoarthritis of right knee     Pre-diabetes     was taking metformin in past  no longer takes    Sinus headache 10/10/2018    Sleep apnea     snores   does not use cpap  pt did no follow up (per patient)    Sleep disturbance     Smoker     Snores     no cpap    SOB (shortness of breath) on exertion     Tendonitis of foot     right    Tooth loose 08/05/2022    lower front rt    Under care of team     Dr. Loyd Mendoza  7/14/2022  will need cysto in future after heart cath and carotid sx    Under care of team     Dr. Eric Langston for carotid stenosis    Wellness examination     Edel Robertson  last visit June 2022       Past Surgical History:        Procedure Laterality Date    CARDIAC CATHETERIZATION  07/08/2014    Non Obstructive CAD     CARDIAC CATHETERIZATION  09/16/2022    CERVICAL SPINE SURGERY  07/13/2012     C2-3-4-5    COLONOSCOPY      FRACTURE SURGERY      Rt elbow     HC INJECT OTHER PERPHRL NERV Bilateral 05/09/2019    INJECTION SPINAL performed by Carlos Decker MD at [facility] Bilateral 08/15/2019    SACROILIAC JOINT INJECTION performed by Carlos Decker MD at [facility]  02/05/2016    premier tens    NERVE BLOCK Left 02/07/2020    RFA left side    NERVE BLOCK Left 02/07/2020     NERVE RADIOFREQUENCY ABLATION - SACROILIAC (Left )    NERVE BLOCK  02/12/2021    NERVE RADIOFREQUENCY ABLATION SI (Left     NERVE BLOCK Right 02/19/2021    Procedure: NERVE RADIOFREQUENCY ABLATION SI JOINT (Right )    OTHER SURGICAL HISTORY  08/15/2019    sacroiliac joint injection    PAIN MANAGEMENT PROCEDURE Left 02/07/2020    NERVE RADIOFREQUENCY ABLATION - SACROILIAC performed by Carlos Decker MD at [address] [place] [address] Left 02/12/2021    NERVE RADIOFREQUENCY ABLATION SI performed by Carlos Decker MD at [address] [place] [address] Right 02/19/2021    NERVE RADIOFREQUENCY ABLATION SI JOINT performed by Carlos Decker MD at [address] [place] [address] Right 9/28/2022    right trans-carotid artery revascularization w/stent performed by Abdi Chow MD at [address] [place] [address] History:    Social History     Tobacco Use    Smoking status: Former     Packs/day: 0.25     Years: 40.00     Pack years: 10.00     Types: Cigarettes     Start date: 1975    Smokeless tobacco: Former     Quit date: 3/12/2015    Tobacco comments:     Smokes 2-4 cigs/day as of 8/5/2022   Substance Use Topics    Alcohol use: Not Currently     Alcohol/week: 0.0 standard drinks     Comment: 3 x year                                Counseling given: Not Answered  Tobacco comments: Smokes 2-4 cigs/day as of 8/5/2022      Vital Signs (Current): There were no vitals filed for this visit.                                            BP Readings from Last 3 Encounters: 11/28/22 (!) 153/48   10/17/22 117/82   10/03/22 136/72       NPO Status:  MN                                                                               BMI:   Wt Readings from Last 3 Encounters:   11/26/22 255 lb (115.7 kg)   10/17/22 267 lb (121.1 kg)   10/03/22 267 lb 3.2 oz (121.2 kg)     There is no height or weight on file to calculate BMI.    CBC:   Lab Results   Component Value Date/Time    WBC 15.5 11/28/2022 05:34 AM    RBC 2.96 11/28/2022 05:34 AM    RBC 4.75 02/10/2012 07:41 AM    HGB 8.2 11/28/2022 05:34 AM    HCT 27.1 11/28/2022 05:34 AM    MCV 91.6 11/28/2022 05:34 AM    RDW 16.5 11/28/2022 05:34 AM     11/28/2022 05:34 AM     02/10/2012 07:41 AM       CMP:   Lab Results   Component Value Date/Time     11/28/2022 05:34 AM    K 4.4 11/28/2022 05:34 AM     11/28/2022 05:34 AM    CO2 25 11/28/2022 05:34 AM    BUN 16 11/28/2022 05:34 AM    CREATININE 0.58 11/28/2022 05:34 AM    GFRAA >60 09/29/2022 02:34 AM    LABGLOM >60 11/28/2022 05:34 AM    GLUCOSE 147 11/28/2022 05:34 AM    GLUCOSE 99 02/10/2012 07:41 AM    PROT 6.3 11/26/2022 08:14 AM    CALCIUM 8.2 11/28/2022 05:34 AM    BILITOT 1.0 11/26/2022 08:14 AM    ALKPHOS 94 11/26/2022 08:14 AM    AST 22 11/26/2022 08:14 AM    ALT 16 11/26/2022 08:14 AM       POC Tests:   Recent Labs     11/26/22  1500   POCGLU 112*   POCNA 140   POCK 4.3   POCCL 108*   POCBUN 24   POCHEMO 5.9*   POCHCT 17*       Coags:   Lab Results   Component Value Date/Time    PROTIME 11.8 11/26/2022 08:14 AM    INR 1.1 11/26/2022 08:14 AM    APTT 25.6 11/27/2022 10:21 AM       HCG (If Applicable): No results found for: PREGTESTUR, PREGSERUM, HCG, HCGQUANT     ABGs: No results found for: PHART, PO2ART, WFJ5CHA, LDS6LSW, BEART, I2YWXHCM     Type & Screen (If Applicable):  No results found for: LABABO, LABRH    Drug/Infectious Status (If Applicable):  No results found for: HIV, HEPCAB    COVID-19 Screening (If Applicable):   Lab Results   Component Value Date/Time    COVID19 Not Detected 11/26/2022 03:38 PM         Anesthesia Evaluation  Patient summary reviewed and Nursing notes reviewed no history of anesthetic complications:   Airway: Mallampati: III  TM distance: >3 FB   Neck ROM: limited  Comment: -limited mouth opening, limited neck mobility  Mouth opening: > = 3 FB and < 3 FB   Dental:    (+) poor dentition  Comment: -MISSING MULTIPLE UPPER TEETH BILATERALLY  -UPPER LEFT LOOSE TOOTH  -MISSING LOWER FRONT TOOTH  -REMAINING TEETH ROTTEN    Pulmonary:normal exam    (+) COPD:  shortness of breath:  sleep apnea: on noncompliant,  asthma: current smoker                          ROS comment: -SMOKES 1 PPD FOR 45 PACK YEARS  -PRODUCTIVE COUGH   Cardiovascular:  Exercise tolerance: poor (<4 METS),   (+) hypertension:, angina:, CAD: non-obstructive, CABG/stent:, dysrhythmias: atrial fibrillation, PHILIP:,                   Neuro/Psych:   (+) neuromuscular disease:, psychiatric history:depression/anxiety              ROS comment: -S/P NECK SURGERY - POOR NECK MOBILITY  -WEAKNESS BILATERAL ARMS AND LEGS  -NUMBNESS TINGLING BILATERAL ARMS AND LEGS  -CERVICAL DDD  -SPINAL STENOSIS GI/Hepatic/Renal:   (+) GERD: well controlled, morbid obesity          Endo/Other:    (+) blood dyscrasia: anemia, arthritis:., .    (-) diabetes mellitus               Abdominal:             Vascular: negative vascular ROS.  + PVD, aortic or cerebral, . ROS comment: R carotid surgery. Other Findings:                   Dulce Coyne MD   11/28/2022          Anesthesia Plan      MAC     ASA 4       Induction: intravenous.

## 2022-11-29 VITALS
RESPIRATION RATE: 16 BRPM | HEART RATE: 95 BPM | DIASTOLIC BLOOD PRESSURE: 79 MMHG | WEIGHT: 255 LBS | HEIGHT: 69 IN | SYSTOLIC BLOOD PRESSURE: 142 MMHG | OXYGEN SATURATION: 96 % | BODY MASS INDEX: 37.77 KG/M2 | TEMPERATURE: 98 F

## 2022-11-29 LAB
ABSOLUTE EOS #: <0.03 K/UL (ref 0–0.44)
ABSOLUTE IMMATURE GRANULOCYTE: 0.49 K/UL (ref 0–0.3)
ABSOLUTE LYMPH #: 1.01 K/UL (ref 1.1–3.7)
ABSOLUTE MONO #: 0.66 K/UL (ref 0.1–1.2)
ANION GAP SERPL CALCULATED.3IONS-SCNC: 11 MMOL/L (ref 9–17)
BASOPHILS # BLD: 0 % (ref 0–2)
BASOPHILS ABSOLUTE: <0.03 K/UL (ref 0–0.2)
BUN BLDV-MCNC: 18 MG/DL (ref 8–23)
CALCIUM SERPL-MCNC: 8.3 MG/DL (ref 8.6–10.4)
CHLORIDE BLD-SCNC: 107 MMOL/L (ref 98–107)
CO2: 22 MMOL/L (ref 20–31)
CREAT SERPL-MCNC: 0.58 MG/DL (ref 0.7–1.2)
EKG ATRIAL RATE: 129 BPM
EKG ATRIAL RATE: 85 BPM
EKG P AXIS: 29 DEGREES
EKG P AXIS: 61 DEGREES
EKG P-R INTERVAL: 136 MS
EKG P-R INTERVAL: 136 MS
EKG Q-T INTERVAL: 316 MS
EKG Q-T INTERVAL: 404 MS
EKG QRS DURATION: 92 MS
EKG QRS DURATION: 94 MS
EKG QTC CALCULATION (BAZETT): 462 MS
EKG QTC CALCULATION (BAZETT): 480 MS
EKG R AXIS: 43 DEGREES
EKG R AXIS: 48 DEGREES
EKG T AXIS: 67 DEGREES
EKG T AXIS: 68 DEGREES
EKG VENTRICULAR RATE: 129 BPM
EKG VENTRICULAR RATE: 85 BPM
EOSINOPHILS RELATIVE PERCENT: 0 % (ref 1–4)
GFR SERPL CREATININE-BSD FRML MDRD: >60 ML/MIN/1.73M2
GLUCOSE BLD-MCNC: 191 MG/DL (ref 70–99)
HCT VFR BLD CALC: 29.2 % (ref 40.7–50.3)
HEMOGLOBIN: 8.4 G/DL (ref 13–17)
IMMATURE GRANULOCYTES: 4 %
LYMPHOCYTES # BLD: 7 % (ref 24–43)
MCH RBC QN AUTO: 27.2 PG (ref 25.2–33.5)
MCHC RBC AUTO-ENTMCNC: 28.8 G/DL (ref 28.4–34.8)
MCV RBC AUTO: 94.5 FL (ref 82.6–102.9)
MONOCYTES # BLD: 5 % (ref 3–12)
NRBC AUTOMATED: 3.4 PER 100 WBC
PDW BLD-RTO: 17.3 % (ref 11.8–14.4)
PLATELET # BLD: 232 K/UL (ref 138–453)
PMV BLD AUTO: 9.7 FL (ref 8.1–13.5)
POTASSIUM SERPL-SCNC: 4.8 MMOL/L (ref 3.7–5.3)
RBC # BLD: 3.09 M/UL (ref 4.21–5.77)
RBC # BLD: ABNORMAL 10*6/UL
SEG NEUTROPHILS: 84 % (ref 36–65)
SEGMENTED NEUTROPHILS ABSOLUTE COUNT: 11.56 K/UL (ref 1.5–8.1)
SODIUM BLD-SCNC: 140 MMOL/L (ref 135–144)
WBC # BLD: 13.7 K/UL (ref 3.5–11.3)

## 2022-11-29 PROCEDURE — 6370000000 HC RX 637 (ALT 250 FOR IP): Performed by: INTERNAL MEDICINE

## 2022-11-29 PROCEDURE — 97530 THERAPEUTIC ACTIVITIES: CPT

## 2022-11-29 PROCEDURE — 97166 OT EVAL MOD COMPLEX 45 MIN: CPT

## 2022-11-29 PROCEDURE — 6360000002 HC RX W HCPCS: Performed by: INTERNAL MEDICINE

## 2022-11-29 PROCEDURE — 97162 PT EVAL MOD COMPLEX 30 MIN: CPT

## 2022-11-29 PROCEDURE — 2580000003 HC RX 258: Performed by: INTERNAL MEDICINE

## 2022-11-29 PROCEDURE — A4216 STERILE WATER/SALINE, 10 ML: HCPCS | Performed by: INTERNAL MEDICINE

## 2022-11-29 PROCEDURE — C9113 INJ PANTOPRAZOLE SODIUM, VIA: HCPCS | Performed by: INTERNAL MEDICINE

## 2022-11-29 PROCEDURE — 36415 COLL VENOUS BLD VENIPUNCTURE: CPT

## 2022-11-29 PROCEDURE — 6370000000 HC RX 637 (ALT 250 FOR IP): Performed by: STUDENT IN AN ORGANIZED HEALTH CARE EDUCATION/TRAINING PROGRAM

## 2022-11-29 PROCEDURE — 94761 N-INVAS EAR/PLS OXIMETRY MLT: CPT

## 2022-11-29 PROCEDURE — 94640 AIRWAY INHALATION TREATMENT: CPT

## 2022-11-29 PROCEDURE — 97535 SELF CARE MNGMENT TRAINING: CPT

## 2022-11-29 PROCEDURE — 99239 HOSP IP/OBS DSCHRG MGMT >30: CPT | Performed by: FAMILY MEDICINE

## 2022-11-29 PROCEDURE — 6370000000 HC RX 637 (ALT 250 FOR IP)

## 2022-11-29 PROCEDURE — 80048 BASIC METABOLIC PNL TOTAL CA: CPT

## 2022-11-29 PROCEDURE — 85025 COMPLETE CBC W/AUTO DIFF WBC: CPT

## 2022-11-29 RX ORDER — DOXYCYCLINE HYCLATE 100 MG
100 TABLET ORAL EVERY 12 HOURS SCHEDULED
Qty: 10 TABLET | Refills: 0 | Status: SHIPPED | OUTPATIENT
Start: 2022-11-29 | End: 2022-12-04

## 2022-11-29 RX ORDER — FERROUS SULFATE 325(65) MG
325 TABLET ORAL EVERY OTHER DAY
Qty: 90 TABLET | Refills: 0 | Status: SHIPPED | OUTPATIENT
Start: 2022-11-29

## 2022-11-29 RX ORDER — AMLODIPINE BESYLATE 10 MG/1
10 TABLET ORAL DAILY
Qty: 30 TABLET | Refills: 0 | Status: SHIPPED | OUTPATIENT
Start: 2022-11-30

## 2022-11-29 RX ORDER — GUAIFENESIN 600 MG/1
1200 TABLET, EXTENDED RELEASE ORAL 2 TIMES DAILY
Qty: 30 TABLET | Refills: 0 | Status: SHIPPED | OUTPATIENT
Start: 2022-11-29

## 2022-11-29 RX ORDER — ATORVASTATIN CALCIUM 40 MG/1
40 TABLET, FILM COATED ORAL DAILY
Status: DISCONTINUED | OUTPATIENT
Start: 2022-11-29 | End: 2022-11-29 | Stop reason: HOSPADM

## 2022-11-29 RX ORDER — PREDNISONE 20 MG/1
TABLET ORAL
Qty: 18 TABLET | Refills: 0 | Status: SHIPPED | OUTPATIENT
Start: 2022-11-29 | End: 2022-12-14

## 2022-11-29 RX ADMIN — AMLODIPINE BESYLATE 10 MG: 10 TABLET ORAL at 09:45

## 2022-11-29 RX ADMIN — IRON SUCROSE 200 MG: 20 INJECTION, SOLUTION INTRAVENOUS at 09:02

## 2022-11-29 RX ADMIN — METHYLPREDNISOLONE SODIUM SUCCINATE 40 MG: 40 INJECTION, POWDER, FOR SOLUTION INTRAMUSCULAR; INTRAVENOUS at 03:48

## 2022-11-29 RX ADMIN — ASPIRIN 81 MG: 81 TABLET, COATED ORAL at 09:46

## 2022-11-29 RX ADMIN — SODIUM CHLORIDE, PRESERVATIVE FREE 5 ML: 5 INJECTION INTRAVENOUS at 09:47

## 2022-11-29 RX ADMIN — GABAPENTIN 300 MG: 600 TABLET ORAL at 13:04

## 2022-11-29 RX ADMIN — SODIUM CHLORIDE, PRESERVATIVE FREE 40 MG: 5 INJECTION INTRAVENOUS at 03:48

## 2022-11-29 RX ADMIN — TIOTROPIUM BROMIDE INHALATION SPRAY 2 PUFF: 3.12 SPRAY, METERED RESPIRATORY (INHALATION) at 08:21

## 2022-11-29 RX ADMIN — ISOSORBIDE MONONITRATE 60 MG: 60 TABLET ORAL at 09:47

## 2022-11-29 RX ADMIN — BUTALBITAL, ACETAMINOPHEN AND CAFFEINE 1 TABLET: 50; 325; 40 TABLET ORAL at 08:57

## 2022-11-29 RX ADMIN — DESMOPRESSIN ACETATE 40 MG: 0.2 TABLET ORAL at 09:46

## 2022-11-29 RX ADMIN — DOXYCYCLINE HYCLATE 100 MG: 100 TABLET ORAL at 09:47

## 2022-11-29 RX ADMIN — BUDESONIDE AND FORMOTEROL FUMARATE DIHYDRATE 2 PUFF: 160; 4.5 AEROSOL RESPIRATORY (INHALATION) at 08:21

## 2022-11-29 RX ADMIN — METOPROLOL TARTRATE 25 MG: 25 TABLET ORAL at 09:47

## 2022-11-29 RX ADMIN — METHYLPREDNISOLONE SODIUM SUCCINATE 40 MG: 40 INJECTION, POWDER, FOR SOLUTION INTRAMUSCULAR; INTRAVENOUS at 12:59

## 2022-11-29 RX ADMIN — BUTALBITAL, ACETAMINOPHEN AND CAFFEINE 1 TABLET: 50; 325; 40 TABLET ORAL at 03:49

## 2022-11-29 RX ADMIN — APIXABAN 5 MG: 5 TABLET, FILM COATED ORAL at 09:46

## 2022-11-29 RX ADMIN — CLOPIDOGREL 75 MG: 75 TABLET, FILM COATED ORAL at 09:46

## 2022-11-29 RX ADMIN — GUAIFENESIN 600 MG: 600 TABLET, EXTENDED RELEASE ORAL at 09:47

## 2022-11-29 RX ADMIN — ALBUTEROL SULFATE 2.5 MG: 2.5 SOLUTION RESPIRATORY (INHALATION) at 11:40

## 2022-11-29 RX ADMIN — AMIODARONE HYDROCHLORIDE 200 MG: 200 TABLET ORAL at 09:45

## 2022-11-29 RX ADMIN — GABAPENTIN 300 MG: 600 TABLET ORAL at 05:32

## 2022-11-29 RX ADMIN — ALBUTEROL SULFATE 2.5 MG: 2.5 SOLUTION RESPIRATORY (INHALATION) at 08:21

## 2022-11-29 ASSESSMENT — ENCOUNTER SYMPTOMS
ABDOMINAL PAIN: 0
BLOOD IN STOOL: 0
VOMITING: 0
COUGH: 0
SHORTNESS OF BREATH: 1
NAUSEA: 0
CONSTIPATION: 0

## 2022-11-29 ASSESSMENT — PAIN SCALES - GENERAL
PAINLEVEL_OUTOF10: 0
PAINLEVEL_OUTOF10: 0

## 2022-11-29 NOTE — PLAN OF CARE
Problem: Discharge Planning  Goal: Discharge to home or other facility with appropriate resources  Outcome: Progressing  Flowsheets  Taken 11/28/2022 2000 by Noa Beltre RN  Discharge to home or other facility with appropriate resources: Identify barriers to discharge with patient and caregiver  Taken 11/28/2022 1600 by Moris Hartley  Discharge to home or other facility with appropriate resources: Identify barriers to discharge with patient and caregiver  Taken 11/28/2022 1113 by Moris Hartley  Discharge to home or other facility with appropriate resources: Identify barriers to discharge with patient and caregiver     Problem: Safety - Adult  Goal: Free from fall injury  Outcome: Progressing     Problem: ABCDS Injury Assessment  Goal: Absence of physical injury  Outcome: Progressing     Problem: Pain  Goal: Verbalizes/displays adequate comfort level or baseline comfort level  Outcome: Progressing  Flowsheets  Taken 11/28/2022 1600 by Moris Hartley  Verbalizes/displays adequate comfort level or baseline comfort level: Encourage patient to monitor pain and request assistance  Taken 11/28/2022 1113 by Moris Hartley  Verbalizes/displays adequate comfort level or baseline comfort level: Encourage patient to monitor pain and request assistance

## 2022-11-29 NOTE — PROGRESS NOTES
Occupational Therapy  Facility/Department: [facility]  Occupational Therapy Initial Assessment    Name: Adela Tyson  : 1960  MRN: [id number]  Date of Service: 2022    Chief Complaint   Patient presents with    Chest Pain       Discharge Recommendations:  Patient would benefit from continued therapy after discharge       Patient Diagnosis(es): The primary encounter diagnosis was Acute blood loss anemia. Diagnoses of NSTEMI (non-ST elevated myocardial infarction) [facility]), Chest pain, unspecified type, Shortness of breath, and Simple chronic bronchitis ([facility] were also pertinent to this visit. Past Medical History:  has a past medical history of Abnormal stress ECG, Angina pectoris ([facility] Asthma, BPH (benign prostatic hyperplasia), CAD (coronary artery disease), Carotid stenosis, Carpal tunnel syndrome, Cervical stenosis of spine, Cervicalgia, Chronic back pain, COPD (chronic obstructive pulmonary disease) ([facility]), DDD (degenerative disc disease), Elbow fracture, right, Fractures, GERD (gastroesophageal reflux disease), Gout, Hyperlipidemia, Hypertension, Lung nodule, Mild depression, Morbidly obese ([facility]), Occlusion of right carotid artery, Osteoarthritis of right knee, Pre-diabetes, Sinus headache, Sleep apnea, Sleep disturbance, Smoker, Snores, SOB (shortness of breath) on exertion, Tendonitis of foot, Tooth loose, Under care of team, Under care of team, and Wellness examination. Past Surgical History:  has a past surgical history that includes Colonoscopy (2022); Upper gastrointestinal endoscopy; Cervical spine surgery (2012); fracture surgery; Cardiac catheterization (2014); Nerve Block (2016); hc inject other perphrl nerv (Bilateral, 2019); other surgical history (08/15/2019); Injection Procedure For Sacroiliac Joint (Bilateral, 08/15/2019); Nerve Block (Left, 2020); Nerve Block (Left, 2020); Pain management procedure (Left, 2020);  Nerve Block (02/12/2021); Pain management procedure (Left, 02/12/2021); Nerve Block (Right, 02/19/2021); Pain management procedure (Right, 02/19/2021); Cardiac catheterization (09/16/2022); vascular surgery (Right, 09/28/2022); Esophagogastroduodenoscopy (11/28/2022); Upper gastrointestinal endoscopy (N/A, 11/28/2022); and Colonoscopy (N/A, 11/28/2022). Assessment   Performance deficits / Impairments: Decreased functional mobility ; Decreased ADL status; Decreased safe awareness;Decreased balance;Decreased high-level IADLs;Decreased endurance  Assessment: Pt currently limited in performing ADL tasks and functional transfers/functional mobility due to above noted deficits, most significantly decreased activity tolerance as pt diaphoretic and SOB following very minimal exertion. Pt to benefit from continued therapy services while hospitalized and at discharge to maximize pt's safety and independence in performing functional tasks and for continued training/education in energy conservation techniques. 24hr supervision/assistance recommended at this time.    Prognosis: Good  Decision Making: Medium Complexity  REQUIRES OT FOLLOW-UP: Yes  Activity Tolerance  Activity Tolerance: Patient limited by fatigue (SOB)      Safety Devices  Type of Devices: Left in chair;Call light within reach;Gait belt;Nurse notified  Restraints  Restraints Initially in Place: No    Plan   Occupational Therapy Plan  Times Per Week: 3-4x/wk  Current Treatment Recommendations: Balance training, Functional mobility training, Endurance training, Safety education & training, Patient/Caregiver education & training, Home management training, Self-Care / ADL, Equipment evaluation, education, & procurement     Restrictions  Restrictions/Precautions  Restrictions/Precautions: Fall Risk, Up as Tolerated  Required Braces or Orthoses?: No    Subjective   General  Patient assessed for rehabilitation services?: Yes  Family / Caregiver Present: No  General Comment  Comments: RN ok'd for therapy this AM. Pt agreeable to participate in session and pleasant/cooperative throughout. Pt reporting pain to L flank and back, pt reports pain is 8/10. Social/Functional History  Social/Functional History  Lives With: Significant other  Type of Home: Trailer  Home Layout: One level  Home Access: Stairs to enter with rails  Entrance Stairs - Number of Steps: 4  Entrance Stairs - Rails: Left  Bathroom Shower/Tub: Tub/Shower unit  Bathroom Toilet: Standard  Bathroom Equipment:  (pt reports none)  Bathroom Accessibility: Accessible  Home Equipment:  (Pt reports none)  Receives Help From: Family (Pt reports supportive significant other who is available at all times however is not physically able to assist pt)  ADL Assistance:  (pt reports assistance only to doff/don socks/shoes and states recent difficulty with bathing tasks due to decreased endurance therefore has been performing seated spongebathing)  Homemaking Assistance:  (pt reports able to complete IADL tasks however with increased time/effort and need for frequent rest breaks)  Homemaking Responsibilities: Yes (IADL tasks able to be shared with significant other)  Ambulation Assistance: Independent  Transfer Assistance: Independent  Active : Yes  Mode of Transportation: Car  Occupation: On disability  Additional Comments: Pt reports significant other is able to provide prn assist upon discharge       Objective   Vision  Vision: Within Functional Limits  Hearing  Hearing: Within functional limits    Balance  Sitting:  (Supervision)  Standing:  (Contact guard assistance)    Functional Mobility   Overall Level of Assistance: Contact-guard assistance (Pt performed functional mobility within hospital room/hallway, pt without use of AD and with no supplemental O2 in place. Pt with slow pace and noted to fatigue quickly as pt with SOB, pt provided cue to incorporate rest break and breathing techniques.  Pt significantly diaphoretic with SpO2 of 91% following mobility despite rest break provided. Pt safely returned to seated position in bedside chair, pt's /74. Pt's SpO2 94% following seated rest break of ~2 minutes. BP return to 188/64 following seated rest break of ~2 minutes. RN notified and aware.)  Interventions: Safety awareness training;Verbal cues     AROM: Within functional limits (BUE)  Strength: Within functional limits (BUE grossly 4+/5)  Coordination: Within functional limits (BUE FMC/GMC)  Sensation: Impaired (Pt reports chronic numbness/tingling to LUE, pt reports new onset numbness/tingling to BLE distal to knees.)    ADL  Feeding: Independent  Grooming: Increased time to complete;Contact guard assistance  UE Bathing: Increased time to complete;Contact guard assistance  LE Bathing: Increased time to complete; Moderate assistance  UE Dressing: Increased time to complete;Contact guard assistance  LE Dressing: Increased time to complete; Moderate assistance  Toileting: Increased time to complete;Contact guard assistance    Bed mobility  Bed Mobility Comments: Did not assess- pt seated in bedside chair upon writer's entrance, retired in bedside chair upon writer's exit    Transfers  Sit to stand: Stand by assistance  Stand to sit: Stand by assistance  Transfer Comments: Min VCs for safety awareness; increased time/effort to perform    Cognition  Overall Cognitive Status: Exceptions  Safety Judgement: Decreased awareness of need for assistance  Problem Solving: Decreased awareness of errors  Insights: Decreased awareness of deficits  Orientation  Overall Orientation Status: Within Functional Limits        Education Given To: Patient  Education Provided: Role of Therapy;Plan of Care; Fall Prevention Strategies; Energy Conservation (Activity Promotion, Safety with Transfers, Pursed Lip Breathing Techniques)  Education Method: Demonstration  Education Outcome: Verbalized understanding;Continued education needed AM-PAC Score   AM-PAC Inpatient Daily Activity Raw Score: 17 (11/29/22 1539)  AM-PAC Inpatient ADL T-Scale Score : 37.26 (11/29/22 1539)  ADL Inpatient CMS 0-100% Score: 50.11 (11/29/22 1539)  ADL Inpatient CMS G-Code Modifier : CK (11/29/22 1539)    Goals  Short Term Goals  Time Frame for Short Term Goals: Pt will, by discharge:  Short Term Goal 1: Perform ADL tasks independently  Short Term Goal 2: Independently perform ADL tasks and functional transfers/functional mobility  Short Term Goal 3:  Independently demo good safety awareness during engagement in all ADLs and functional transfers/functional mobility  Short Term Goal 4: Perform functional transfers/functional mobility independently  Short Term Goal 5: Demo 12+minutes standing tolerance for increased participation in ADL/IADL tasks       Therapy Time   Individual Concurrent Group Co-treatment   Time In 1153         Time Out 1218         Minutes 25         Timed Code Treatment Minutes: 8 Minutes       James Brown OTR/L

## 2022-11-29 NOTE — PROGRESS NOTES
CLINICAL PHARMACY NOTE: MEDS TO BEDS    Total # of Prescriptions Filled: 5   The following medications were delivered to the patient:  Amlodipine  Prednisone  Spiriva  Doxycycline  ferosul    Additional Documentation:  Delivered to patient at 2:40pm. Paid $10.07 with [employer]. Cancelled mucinex, cheaper OTC.  HR

## 2022-11-29 NOTE — ANESTHESIA POSTPROCEDURE EVALUATION
Department of Anesthesiology  Postprocedure Note    Patient: Fidel Calhoun  MRN: [id number]  YOB: 1960  Date of evaluation: 11/29/2022      Procedure Summary     Date: 11/28/22 Room / Location: [address]    Anesthesia Start: 5418 Anesthesia Stop: 4108    Procedures:       ENTEROSCOPY PUSH CONTROL HEMORRHAGE      COLONOSCOPY DIAGNOSTIC Diagnosis:       Melena      Anemia, unspecified type      (MELENA, ANEMIA)    Surgeons: Trent Rodriguez MD Responsible Provider: Jojo Austin MD    Anesthesia Type: MAC ASA Status: 4          Anesthesia Type: No value filed.     Hilary Phase I: Hilary Score: 10    Hilary Phase II:        Anesthesia Post Evaluation    Patient location during evaluation: bedside  Patient participation: complete - patient participated  Level of consciousness: awake  Pain score: 0  Nausea & Vomiting: no vomiting  Cardiovascular status: hemodynamically stable  Respiratory status: room air

## 2022-11-29 NOTE — PROGRESS NOTES
Physical Therapy  Facility/Department: [facility] STEPDOWN  Physical Therapy Initial Assessment    Name: Adela Tyson  : 1960  MRN: [id number]  Date of Service: 2022  Chief Complaint   Patient presents with    Chest Pain       Discharge Recommendations:  Patient would benefit from continued therapy after discharge   PT Equipment Recommendations  Equipment Needed: No      Patient Diagnosis(es): The primary encounter diagnosis was Acute blood loss anemia. Diagnoses of NSTEMI (non-ST elevated myocardial infarction) [facility]), Chest pain, unspecified type, Shortness of breath, and Simple chronic bronchitis ([facility] [address] were also pertinent to this visit. Past Medical History:  has a past medical history of Abnormal stress ECG, Angina pectoris ([facility] [address] Asthma, BPH (benign prostatic hyperplasia), CAD (coronary artery disease), Carotid stenosis, Carpal tunnel syndrome, Cervical stenosis of spine, Cervicalgia, Chronic back pain, COPD (chronic obstructive pulmonary disease) ([facility]), DDD (degenerative disc disease), Elbow fracture, right, Fractures, GERD (gastroesophageal reflux disease), Gout, Hyperlipidemia, Hypertension, Lung nodule, Mild depression, Morbidly obese ([facility]), Occlusion of right carotid artery, Osteoarthritis of right knee, Pre-diabetes, Sinus headache, Sleep apnea, Sleep disturbance, Smoker, Snores, SOB (shortness of breath) on exertion, Tendonitis of foot, Tooth loose, Under care of team, Under care of team, and Wellness examination. Past Surgical History:  has a past surgical history that includes Colonoscopy (2022); Upper gastrointestinal endoscopy; Cervical spine surgery (2012); fracture surgery; Cardiac catheterization (2014); Nerve Block (2016); hc inject other perphrl nerv (Bilateral, 2019); other surgical history (08/15/2019); Injection Procedure For Sacroiliac Joint (Bilateral, 08/15/2019); Nerve Block (Left, 2020); Nerve Block (Left, 2020);  Pain management procedure (Left, 02/07/2020); Nerve Block (02/12/2021); Pain management procedure (Left, 02/12/2021); Nerve Block (Right, 02/19/2021); Pain management procedure (Right, 02/19/2021); Cardiac catheterization (09/16/2022); vascular surgery (Right, 09/28/2022); Esophagogastroduodenoscopy (11/28/2022); Upper gastrointestinal endoscopy (N/A, 11/28/2022); and Colonoscopy (N/A, 11/28/2022). Assessment   Body Structures, Functions, Activity Limitations Requiring Skilled Therapeutic Intervention: Decreased functional mobility ; Decreased endurance;Decreased ROM; Decreased balance;Decreased strength; Increased pain;Decreased posture  Assessment: Pt ambulated 20ft w/ no AD CGA, pt becoming hypertensive and diaphoretic with functional mobility with SpO2 of 91% following ambulation. Pt will require 24hr support upon discharge. Recommending continued skilled physical therapy to address these deficits to maximize independence upon discharge.   Therapy Prognosis: Fair  Decision Making: Medium Complexity  Requires PT Follow-Up: Yes  Activity Tolerance  Activity Tolerance: Patient limited by endurance     Plan   Physcial Therapy Plan  General Plan:  (4-5x/week)  Current Treatment Recommendations: Strengthening, Balance training, ROM, Gait training, Equipment evaluation, education, & procurement, Stair training, Functional mobility training, Transfer training, Endurance training, Patient/Caregiver education & training, Safety education & training, Therapeutic activities  Safety Devices  Type of Devices: Left in chair, Call light within reach, Gait belt, Nurse notified  Restraints  Restraints Initially in Place: No     Restrictions  Restrictions/Precautions  Restrictions/Precautions: Fall Risk, Up as Tolerated  Required Braces or Orthoses?: No     Subjective   General  Patient assessed for rehabilitation services?: Yes  Response To Previous Treatment: Not applicable  Family / Caregiver Present: No  Follows Commands: Within Functional Limits  General Comment  Comments: pt denying pain at this time  Subjective  Subjective: RN and pt in agreement for PT eval. Pt seated in bedside chair upon writer's arrival, pt pleasant and cooperative throughout session         Social/Functional History  Social/Functional History  Lives With: Significant other  Type of Home: [address]: One level  Home Access: Stairs to enter with rails  Entrance Stairs - Number of Steps: 4  Entrance Stairs - Rails: Left  Bathroom Shower/Tub: Tub/Shower unit  Bathroom Toilet: Standard  Bathroom Equipment:  (pt reports none)  Bathroom Accessibility: Accessible  Home Equipment:  (Pt reports none)  Receives Help From: Family (Pt reports supportive significant other who is available at all times however is not physically able to assist pt)  ADL Assistance:  (pt reports assistance only to doff/don socks/shoes and states recent difficulty with bathing tasks due to decreased endurance therefore has been performing seated spongebathing)  Homemaking Assistance:  (pt reports able to complete IADL tasks however with increased time/effort and need for frequent rest breaks)  Homemaking Responsibilities: Yes (IADL tasks able to be shared with significant other)  Ambulation Assistance: Independent  Transfer Assistance: Independent  Active : Yes  Mode of Transportation: Car  Occupation: On disability  Additional Comments: Pt reports significant other is able to provide prn assist upon discharge  Vision/Hearing  Vision  Vision: Within Functional Limits  Hearing  Hearing: Within functional limits    Cognition   Orientation  Overall Orientation Status: Within Functional Limits  Cognition  Overall Cognitive Status: Exceptions  Insights: Decreased awareness of deficits     Objective   Gross Assessment  Sensation: Impaired (Pt reports chronic numbness of bilateral LE and L hand)     Joint Mobility  ROM RLE: WFL  ROM LLE: WFL  ROM RUE: WFL  ROM LUE: WFL  Strength RLE  Strength RLE: WFL  Strength LLE  Strength LLE: WFL  Strength RUE  Strength RUE: WFL  Strength LUE  Strength LUE: WFL           Bed mobility  Bed Mobility Comments: Did not assess- pt seated in bedside chair upon writer's entrance, retired in bedside chair upon writer's exit  Transfers  Sit to Stand: Stand by assistance  Stand to Sit: Stand by assistance  Ambulation  Surface: Level tile  Device: No Device  Assistance: Contact guard assistance  Gait Deviations: Slow Deann;Decreased step length  Distance: 20ft  Comments: Pt significantly diaphoretic with SpO2 of 91% following ambulation. Pt safely returned to seated position in bedside chair, pt's /74. Pt's SpO2 94% following seated rest break of ~2 minutes. BP return to 188/64 following seated rest break of ~2 minutes. RN notified and aware. More Ambulation?: No  Stairs/Curb  Stairs?: No     Balance  Posture: Fair  Sitting - Static: Good;-  Sitting - Dynamic: Fair;+  Standing - Static: Fair;+  Standing - Dynamic: Fair  Comments: standing balance assessed w/ no AD; pt able to sit EOB independently      AM-PAC Score  AM-PAC Inpatient Mobility Raw Score : 22 (11/29/22 1525)  AM-PAC Inpatient T-Scale Score : 53.28 (11/29/22 1525)  Mobility Inpatient CMS 0-100% Score: 20.91 (11/29/22 1525)  Mobility Inpatient CMS G-Code Modifier : CJ (11/29/22 1525)    Goals  Short Term Goals  Time Frame for Short Term Goals: 6  Short Term Goal 1: Pt to perform bed mobility and functional transfers independently  Short Term Goal 2: Pt to ambulate 150ft w/ no AD independently  Short Term Goal 3: Pt to ascend/descend 4 stairs with L rail independently  Short Term Goal 4: Tolerate 30 minutes of therapy to demo increased endurance  Additional Goals?: No  Patient Goals   Patient Goals :  To go home       Education  Patient Education  Education Given To: Patient  Education Provided: Role of Therapy;Plan of Care  Education Provided Comments: Pursed lip breathing  Education Method: Demonstration  Barriers to Learning: None  Education Outcome: Verbalized understanding;Demonstrated understanding      Therapy Time   Individual Concurrent Group Co-treatment   Time In 0857         Time Out 1218         Minutes 25         Timed Code Treatment Minutes: 9 Minutes       Sim Camargo, PT

## 2022-11-29 NOTE — PROGRESS NOTES
[facility]  Progress Note    Date:   11/29/2022  Patient name:  Sy Trevizo  Date of admission:  11/26/2022  7:55 AM  MRN:   [id number]  YOB: 1960    SUBJECTIVE/Last 24 hours update:     Patient seen and examined at the bed side. No acute events overnight. Patient does still have dyspnea on exertion, however, this was present prior to hospitalization as well. Patient likely has a history of COPD given his history of smoking and wheezing heard on auscultation of the chest. Patient has minimal CAD with normal EF. Discussed discharge plan with patient today and patient is agreeable. Patient does not have any other acute concerns. REVIEW OF SYSTEMS:     Review of Systems   Respiratory:  Positive for shortness of breath (On exertion). Negative for cough. Cardiovascular:  Positive for leg swelling. Negative for chest pain. Gastrointestinal:  Negative for abdominal pain, blood in stool, constipation, nausea and vomiting. Genitourinary:  Negative for difficulty urinating and dysuria. Neurological:  Negative for syncope and headaches.      PAST MEDICAL HISTORY:      has a past medical history of Abnormal stress ECG, Angina pectoris ([address] Asthma, BPH (benign prostatic hyperplasia), CAD (coronary artery disease), Carotid stenosis, Carpal tunnel syndrome, Cervical stenosis of spine, Cervicalgia, Chronic back pain, COPD (chronic obstructive pulmonary disease) ([facility]), DDD (degenerative disc disease), Elbow fracture, right, Fractures, GERD (gastroesophageal reflux disease), Gout, Hyperlipidemia, Hypertension, Lung nodule, Mild depression, Morbidly obese ([facility]), Occlusion of right carotid artery, Osteoarthritis of right knee, Pre-diabetes, Sinus headache, Sleep apnea, Sleep disturbance, Smoker, Snores, SOB (shortness of breath) on exertion, Tendonitis of foot, Tooth loose, Under care of team, Under care of team, and Wellness examination. PAST SURGICAL HISTORY:      has a past surgical history that includes Colonoscopy (11/28/2022); Upper gastrointestinal endoscopy; Cervical spine surgery (07/13/2012); fracture surgery; Cardiac catheterization (07/08/2014); Nerve Block (02/05/2016); hc inject other perphrl nerv (Bilateral, 05/09/2019); other surgical history (08/15/2019); Injection Procedure For Sacroiliac Joint (Bilateral, 08/15/2019); Nerve Block (Left, 02/07/2020); Nerve Block (Left, 02/07/2020); Pain management procedure (Left, 02/07/2020); Nerve Block (02/12/2021); Pain management procedure (Left, 02/12/2021); Nerve Block (Right, 02/19/2021); Pain management procedure (Right, 02/19/2021); Cardiac catheterization (09/16/2022); vascular surgery (Right, 09/28/2022); and Esophagogastroduodenoscopy (11/28/2022). SOCIAL HISTORY:      reports that he has quit smoking. His smoking use included cigarettes. He started smoking about 47 years ago. He has a 10.00 pack-year smoking history. He quit smokeless tobacco use about 7 years ago. He reports that he does not currently use alcohol. He reports that he does not use drugs. FAMILY HISTORY:     family history includes COPD in his father; Cancer (age of onset: 64) in his maternal aunt; Cancer (age of onset: 61) in his maternal uncle; Heart Disease in his mother. HOME MEDICATIONS:      Prior to Admission medications    Medication Sig Start Date End Date Taking? Authorizing Provider   tiZANidine (ZANAFLEX) 4 MG tablet Take 1 tablet by mouth 2 times daily as needed (neck and back spasms) 11/7/22   Jer Hector DO   gabapentin (NEURONTIN) 800 MG tablet Take 1 tablet by mouth every 8 hours for 30 days.  11/7/22 12/7/22  Jer Hector DO   amiodarone (CORDARONE) 200 MG tablet TAKE ONE TABLET BY MOUTH DAILY 10/17/22   Jer Hector DO   ELIQUIS 5 MG TABS tablet TAKE ONE TABLET BY MOUTH TWICE A DAY 10/17/22   Jer Hector DO   budesonide-formoterol [facility]) 160-4.5 MCG/ACT AERO INHALE TWO PUFFS BY MOUTH TWICE A DAY 9/17/22   Reymundo Valenzuela MD   aspirin 81 MG EC tablet Take 1 tablet by mouth daily 9/17/22   Reymundo Valenzuela MD   metoprolol tartrate (LOPRESSOR) 25 MG tablet Take 1 tablet by mouth 2 times daily 9/17/22   Reymundo Valenzuela MD   isosorbide mononitrate (IMDUR) 60 MG extended release tablet Take 1 tablet by mouth in the morning. 7/28/22   Historical Provider, MD   clopidogrel (PLAVIX) 75 MG tablet Take 1 tablet by mouth in the morning. 8/1/22   Marquis Hernandez MD   nitroGLYCERIN (NITROSTAT) 0.4 MG SL tablet Place 1 tablet under the tongue every 5 minutes as needed for Chest pain (not to exceed 3 tabs at a time) 6/3/22   Felipe Narvaez DO   amLODIPine (NORVASC) 5 MG tablet TAKE ONE TABLET BY MOUTH DAILY 4/22/22   Felipe Narvaez DO   albuterol sulfate  (90 Base) MCG/ACT inhaler Inhale 2 puffs into the lungs every 6 hours as needed for Wheezing 4/22/22   Felipe Narvaez DO   simvastatin (ZOCOR) 80 MG tablet TAKE ONE TABLET BY MOUTH EVERY EVENING 4/11/22   Felipe Narvaez DO     ALLERGIES:     Patient has no known allergies. OBJECTIVE:       Vitals:    11/28/22 1920 11/28/22 2016 11/28/22 2314 11/29/22 0342   BP:  (!) 141/63 133/61 (!) 164/71   Pulse: 73 74 67 64   Resp:  20 22 18   Temp:  97.5 °F (36.4 °C) 97.5 °F (36.4 °C) 97.7 °F (36.5 °C)   TempSrc:  Oral Oral Oral   SpO2:  92% 93% 95%   Weight:       Height:           Intake/Output Summary (Last 24 hours) at 11/29/2022 0734  Last data filed at 11/28/2022 1600  Gross per 24 hour   Intake 1180 ml   Output --   Net 1180 ml     PHYSICAL EXAM:    Physical Exam  Vitals and nursing note reviewed. Constitutional:       General: He is not in acute distress. Appearance: He is obese. He is not ill-appearing. Cardiovascular:      Rate and Rhythm: Normal rate and regular rhythm. Heart sounds: No murmur heard. Pulmonary:      Effort: No respiratory distress.       Breath sounds: Wheezing (Bilateral expiratory) present. No rhonchi. Abdominal:      Palpations: Abdomen is soft. Tenderness: There is no abdominal tenderness. There is no guarding or rebound. Musculoskeletal:         General: No tenderness. Right lower leg: Edema (1+ to mid shin) present. Left lower leg: Edema (1+ to mid shin) present. Neurological:      Mental Status: He is alert and oriented to person, place, and time. Sensory: No sensory deficit. Motor: No weakness. DIAGNOSTICS:     Laboratory Testing:    Recent Results (from the past 24 hour(s))   Hemoglobin and Hematocrit    Collection Time: 11/28/22 12:43 PM   Result Value Ref Range    Hemoglobin 7.9 (L) 13.0 - 17.0 g/dL    Hematocrit 26.2 (L) 40.7 - 50.3 %   CBC with Auto Differential    Collection Time: 11/29/22  7:13 AM   Result Value Ref Range    WBC 13.7 (H) 3.5 - 11.3 k/uL    RBC 3.09 (L) 4.21 - 5.77 m/uL    Hemoglobin 8.4 (L) 13.0 - 17.0 g/dL    Hematocrit 29.2 (L) 40.7 - 50.3 %    MCV 94.5 82.6 - 102.9 fL    MCH 27.2 25.2 - 33.5 pg    MCHC 28.8 28.4 - 34.8 g/dL    RDW 17.3 (H) 11.8 - 14.4 %    Platelets 462 248 - 877 k/uL    MPV 9.7 8.1 - 13.5 fL    NRBC Automated 3.4 (H) 0.0 per 100 WBC    Seg Neutrophils 84 (H) 36 - 65 %    Lymphocytes 7 (L) 24 - 43 %    Monocytes 5 3 - 12 %    Eosinophils % 0 (L) 1 - 4 %    Basophils 0 0 - 2 %    Immature Granulocytes 4 (H) 0 %    Segs Absolute 11.56 (H) 1.50 - 8.10 k/uL    Absolute Lymph # 1.01 (L) 1.10 - 3.70 k/uL    Absolute Mono # 0.66 0.10 - 1.20 k/uL    Absolute Eos # <0.03 0.00 - 0.44 k/uL    Basophils Absolute <0.03 0.00 - 0.20 k/uL    Absolute Immature Granulocyte 0.49 (H) 0.00 - 0.30 k/uL    RBC Morphology ANISOCYTOSIS PRESENT        Imaging/Diagonstics:    XR CHEST PORTABLE  Result Date: 11/26/2022    Mildly increased interstitial prominence in the lungs, which could be related interstitial edema versus atypical/viral pneumonia.      Current Facility-Administered Medications Medication Dose Route Frequency Provider Last Rate Last Admin    methylPREDNISolone sodium (SOLU-MEDROL) injection 40 mg  40 mg IntraVENous Q12H Davian Adorno MD   40 mg at 11/29/22 0348    guaiFENesin (MUCINEX) extended release tablet 600 mg  600 mg Oral BID Davian Adorno MD   600 mg at 11/28/22 2019    amLODIPine (NORVASC) tablet 10 mg  10 mg Oral Daily Yair Rodriguez MD        iron sucrose (VENOFER) 200 mg in sodium chloride 0.9 % 100 mL IVPB  200 mg IntraVENous Q24H Davian Adorno MD   Stopped at 11/28/22 1223    budesonide-formoterol (SYMBICORT) 160-4.5 MCG/ACT inhaler 2 puff  2 puff Inhalation BID Davian Adorno MD   2 puff at 11/28/22 1919    aspirin EC tablet 81 mg  81 mg Oral Daily Abraham Caballero MD        clopidogrel (PLAVIX) tablet 75 mg  75 mg Oral Daily Abraham Caballero MD        apixaban Vineet Janneth) tablet 5 mg  5 mg Oral BID Abraham Caballero MD   5 mg at 11/28/22 2028    gabapentin (NEURONTIN) tablet 300 mg  300 mg Oral 3 times per day Davian Adorno MD   300 mg at 11/29/22 0532    isosorbide mononitrate (IMDUR) extended release tablet 60 mg  60 mg Oral Daily Davian Adorno MD   60 mg at 11/28/22 1115    sodium chloride flush 0.9 % injection 5-40 mL  5-40 mL IntraVENous 2 times per day Davian Adorno MD   10 mL at 11/28/22 2019    sodium chloride flush 0.9 % injection 5-40 mL  5-40 mL IntraVENous PRN Davian Adorno MD        ondansetron (ZOFRAN-ODT) disintegrating tablet 4 mg  4 mg Oral Q8H PRN Davian Adorno MD        Or    ondansetron (ZOFRAN) injection 4 mg  4 mg IntraVENous Q6H PRN Davian Adorno MD        polyethylene glycol (GLYCOLAX) packet 17 g  17 g Oral Daily PRN Davian Adorno MD        potassium chloride (KLOR-CON M) extended release tablet 40 mEq  40 mEq Oral PRN Davian Adorno MD        Or    potassium bicarb-citric acid (EFFER-K) effervescent tablet 40 mEq  40 mEq Oral PRN Davian Adorno MD        Or    potassium chloride 10 mEq/100 mL IVPB (Peripheral Line)  10 mEq IntraVENous PRN Tricia Leblanc MD        magnesium sulfate 2000 mg in 50 mL IVPB premix  2,000 mg IntraVENous PRN Tricia Leblanc MD        pantoprazole (PROTONIX) 40 mg in sodium chloride (PF) 0.9 % 10 mL injection  40 mg IntraVENous Q12H Tricia Leblanc MD   40 mg at 11/29/22 0348    atorvastatin (LIPITOR) tablet 80 mg  80 mg Oral Daily Tricia Leblanc MD   80 mg at 11/28/22 1114    tiotropium (SPIRIVA RESPIMAT) 2.5 MCG/ACT inhaler 2 puff  2 puff Inhalation Daily Tricia Leblanc MD   2 puff at 11/28/22 0740    albuterol (PROVENTIL) nebulizer solution 2.5 mg  2.5 mg Nebulization Q4H WA Tricia Leblanc MD   2.5 mg at 11/28/22 1919    doxycycline hyclate (VIBRA-TABS) tablet 100 mg  100 mg Oral 2 times per day Tricia Leblanc MD   100 mg at 11/28/22 2019    labetalol (NORMODYNE;TRANDATE) injection 10 mg  10 mg IntraVENous Q4H PRN Tricia Leblanc MD        amiodarone (CORDARONE) tablet 200 mg  200 mg Oral Daily Tricia Leblanc MD   200 mg at 11/28/22 1116    metoprolol tartrate (LOPRESSOR) tablet 25 mg  25 mg Oral BID Tricia Leblanc MD   25 mg at 11/28/22 2019    butalbital-acetaminophen-caffeine (FIORICET, ESGIC) per tablet 1 tablet  1 tablet Oral Q6H PRN Tricia Leblanc MD   1 tablet at 11/29/22 0349     ASSESSMENT:     Principal Problem:    Chest pain  Active Problems:    Status post carotid surgery    Acute blood loss anemia    COPD (chronic obstructive pulmonary disease) ([facility] [address]    Essential hypertension  Resolved Problems:    * No resolved hospital problems. *    PLAN:     Acute anemia likely 2/2 GI bleed - improved  Hb 4.6 on admission. S/p 5 units pRBC. Hb 8.4 today morning  Low iron and ferritin. 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 Jejunal AVM without bleeding. Normal colon. Repeat colonoscopy in 5 years.  GI signed off  Resume oral anticoagulants    COPD Exacerbation  Hx of smoking, dyspnea on exertion, wheezes on PE  Normal ABGs   Continue Doxycycline  Continue solu-medrol 40 mg Q12h  Continue Symbicort and Spiriva  Mucinex for productive cough  Last PFT showed restrictive pattern  Repeat PFTs as OP    NSTEMI Type II likely 2/2 to acute blood loss anemia  Troponin 03>91>795  ProBNP unremarkable  Previous cath in 09/2022 showed minimal CAD  Lipitor 40 mg oral daily     Sepsis possibly HCAP r/o UTI - ruled out  Blood cultures collected. No growth   WBC and lactate elevated  Procal, ESR, CRP are WNL   Pneumonia work up is negative   UA is negative for UTI    History of paroxysmal A. Fib  Continue home amiodarone 200 mg oral daily  Continue lopressor 25 mg oral BID with hold parameters  Resume eliquis    Recent right TCAR 09/2022  Resume DAPT    HTN  Continue home meds  Amlodipine 10 mg oral daily     Peripheral neuropathy  Previous A1c in 2019 unremarkable  Repeat A1c 5.4  Gabapentin 300 mg TID (home dose 800 mg TID)     ROSY  Patient refusing BiPAP at night  Continue to encourage     DVT prophylaxis: Eliquis  GI prophylaxis: None  Diet: Regular cardiac  Dispo: Intermediate    CODE STATUS full    OT/PT/social work consult in place    Likely discharge today with close outpatient follow up. Discharge on prednisone taper and doxycycline. Will need PFT outpatient. Will need GI follow up outpatient as well. Plan will be discussed with the attending, Dr. Marietta Valdez.     Magi Cheatham MD  Family Medicine Resident  11/29/2022 7:34 AM

## 2022-11-30 ENCOUNTER — CARE COORDINATION (OUTPATIENT)
Dept: CASE MANAGEMENT | Age: 62
End: 2022-11-30

## 2022-11-30 DIAGNOSIS — D62 ACUTE BLOOD LOSS ANEMIA: Primary | ICD-10-CM

## 2022-11-30 PROCEDURE — 1111F DSCHRG MED/CURRENT MED MERGE: CPT | Performed by: FAMILY MEDICINE

## 2022-11-30 NOTE — CARE COORDINATION
[facility] Care Transitions Initial Follow Up Call    Call within 2 business days of discharge: Yes    Care Transition Nurse contacted the patient by telephone to perform post hospital discharge assessment. Verified name and  with patient as identifiers. Provided introduction to self, and explanation of the Care Transition Nurse role. Patient: Fidel Calhoun Patient : 1960   MRN: [id number]  Reason for Admission: Acute Blood loss anemia  Discharge Date: 22 RARS: Readmission Risk Score: 21.6      Last Discharge  [address]       Date Complaint Diagnosis Description Type Department Provider    22 Chest Pain Acute blood loss anemia . .. ED to Hosp-Admission (Discharged) (ADMITTED) STVZ 4A Rupal Dockery DO; Cristina Porter. .. Was this an external facility discharge? No Discharge Facility: [facility]    Challenges to be reviewed by the provider   Additional needs identified to be addressed with provider: No  none               Method of communication with provider: none. Was able to contact Pam Waddell for initial transitional outreach. He stated that he was doing all right. He said that his breathing was better, but not at his baseline;  continues with weakness/fatigue; occasional cough, is eating and has not had a BM yet. Reviewed medications and he stated that he had all his medications. Reviewed upcoming lab work and he was aware. Discussed appointments and he was unaware and became upset. He said that he had to make phone calls and ended call. Care Transition Nurse reviewed discharge instructions with patient who verbalized understanding. The patient was given an opportunity to ask questions and does not have any further questions or concerns at this time. Were discharge instructions available to patient? Yes. Reviewed appropriate site of care based on symptoms and resources available to patient including: PCP  Specialist  When to call 911.  The patient agrees to contact the PCP office for questions related to their healthcare. Advance Care Planning:   Does patient have an Advance Directive: patient declined education. Medication reconciliation was performed with patient, who verbalizes understanding of administration of home medications. Medications reviewed, 1111F entered: yes    Was patient discharged with a pulse oximeter? no    Non-face-to-face services provided:  Obtained and reviewed discharge summary and/or continuity of care documents  Reviewed and followed up on pending diagnostic tests and treatments-CBC 12/2  Assessment and support for treatment adherence and medication management-reviewed AVS    Offered patient enrollment in the Remote Patient Monitoring (RPM) program for in-home monitoring: Patient declined. Care Transitions 24 Hour Call    Do you have a copy of your discharge instructions?: Yes  Do you have all of your prescriptions and are they filled?: Yes  Have you been contacted by a [address]?: No  Have you scheduled your follow up appointment?: Yes  How are you going to get to your appointment?: Car - family or friend to transport  Do you feel like you have everything you need to keep you well at home?: Yes  Care Transitions Interventions         Follow Up  Future Appointments   Date Time Provider Lucie Su   12/5/2022  2:00 PM DO Nette Munson [facility]   12/19/2022  9:45 AM Carmelita Evans MD [place] HEART/VAS [facility] Transition Nurse provided contact information. Plan for follow-up call in 5-7 days based on severity of symptoms and risk factors.   Plan for next call: symptom management-follow up on s/s of COPD/anemia  Follow up on PCP appointment    Priscilla Santillan RN

## 2022-12-01 ENCOUNTER — TELEPHONE (OUTPATIENT)
Dept: FAMILY MEDICINE CLINIC | Age: 62
End: 2022-12-01

## 2022-12-01 LAB
CULTURE: NORMAL
CULTURE: NORMAL
Lab: NORMAL
Lab: NORMAL
SPECIMEN DESCRIPTION: NORMAL
SPECIMEN DESCRIPTION: NORMAL

## 2022-12-01 NOTE — TELEPHONE ENCOUNTER
Pt called to inform going to get lab done tomorrow morning at [facility]. Wanted to give FYI incase labs was to come back with any red flags.

## 2022-12-03 NOTE — DISCHARGE SUMMARY
Department of [address] [place], [place] [address] [facility]    Discharge Summary      NAME:  Yeni Mcgrath  :  1960  MRN:  [id number]    Admit date:  2022  Discharge date:  2022    Admitting Physician:  Awilda Byrnes DO    Primary Diagnosis on Admission: Acute Blood Loss Anemia    Present on Admission:   Chest pain   Acute blood loss anemia   Status post carotid surgery   COPD (chronic obstructive pulmonary disease) ([facility] [address]   Essential hypertension    Secondary Diagnoses:    does not have any pertinent problems on file. Admission Condition:  poor     Discharged Condition: stable    Hospital Course:     Mr. Kurt Hernández, 75-year-old male, previous medical history of paroxysmal A. Fib on Eliquis, HTN, ? COPD not on any home O2, minimal CAD per cath 2022, right cervical ICA stenosis s/p right transcarotid artery revascularization with stent on 2022, and ROSY not on any home BiPAP. Patient presented to the ED at [facility] on 2022 with primary complaint of chest pain and dyspnea. Patient was found to have Hb less than 5 and was transfused two units of pRBC. Troponin was elevated and cardiology was consulted and trop elevation was deemed likely secondary to demand ischemia from acute anemia. Patient's hemoglobin remained low on re-check and was transfused another two units, and another unit the day after. Total pRBC units transfused were 5. GI was consulted and performed EGD and colonoscopy. 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 See op note for complete details. Vascular was also consulted given patient's history of recent stent placement. Recommendation was continue DAPT and Eliquis. Patient was educated on what to look out for in anemia including dark stools.  Patient was encouraged to follow up with all outpatient appointments and to complete CBC as ordered. Patient's questions were answered. Patient voiced understanding. Consults:  cardiology, GI, and vascular surgery    Significant Diagnostic/theraputic interventions: EGD and colonoscopy on 11/28/2022    Disposition: Home    Instructions to Patient:    Follow up with outpatient appointments. Complete blood work. Return to the ED if symptoms worsen, or new concerning symptoms arise. Discharge Medications:       Medication List        START taking these medications      doxycycline hyclate 100 MG tablet  Commonly known as: VIBRA-TABS  Take 1 tablet by mouth every 12 hours for 5 days     ferrous sulfate 325 (65 Fe) MG tablet  Commonly known as: IRON 325  Take 1 tablet by mouth every other day     guaiFENesin 600 MG extended release tablet  Commonly known as: MUCINEX  Take 2 tablets by mouth 2 times daily     predniSONE 20 MG tablet  Commonly known as: DELTASONE  Take 2 tablets by mouth daily for 5 days, THEN 1 tablet daily for 5 days, THEN 0.5 tablets daily for 5 days.   Start taking on: November 29, 2022     tiotropium 2.5 MCG/ACT Aers inhaler  Commonly known as: SPIRIVA RESPIMAT  Inhale 2 puffs into the lungs daily            CHANGE how you take these medications      amLODIPine 10 MG tablet  Commonly known as: NORVASC  Take 1 tablet by mouth daily  What changed:   medication strength  how much to take  how to take this  when to take this  additional instructions            CONTINUE taking these medications      albuterol sulfate  (90 Base) MCG/ACT inhaler  Commonly known as: PROVENTIL;VENTOLIN;PROAIR  Inhale 2 puffs into the lungs every 6 hours as needed for Wheezing     amiodarone 200 MG tablet  Commonly known as: CORDARONE  TAKE ONE TABLET BY MOUTH DAILY     aspirin 81 MG EC tablet  Take 1 tablet by mouth daily     budesonide-formoterol 160-4.5 MCG/ACT Aero  Commonly known as: Symbicort  INHALE TWO PUFFS BY MOUTH TWICE A DAY     clopidogrel 75 MG tablet  Commonly known as: PLAVIX  Take 1 tablet by mouth in the morning. Eliquis 5 MG Tabs tablet  Generic drug: apixaban  TAKE ONE TABLET BY MOUTH TWICE A DAY     gabapentin 800 MG tablet  Commonly known as: Neurontin  Take 1 tablet by mouth every 8 hours for 30 days. isosorbide mononitrate 60 MG extended release tablet  Commonly known as: IMDUR     metoprolol tartrate 25 MG tablet  Commonly known as: LOPRESSOR  Take 1 tablet by mouth 2 times daily     nitroGLYCERIN 0.4 MG SL tablet  Commonly known as: Nitrostat  Place 1 tablet under the tongue every 5 minutes as needed for Chest pain (not to exceed 3 tabs at a time)     simvastatin 80 MG tablet  Commonly known as: ZOCOR  TAKE ONE TABLET BY MOUTH EVERY EVENING     tiZANidine 4 MG tablet  Commonly known as: ZANAFLEX  Take 1 tablet by mouth 2 times daily as needed (neck and back spasms)               Where to Get Your Medications        These medications were sent to [facility] [facility], [address] [place] [address],  [facility] [address]      Phone: [phone]   amLODIPine 10 MG tablet  doxycycline hyclate 100 MG tablet  ferrous sulfate 325 (65 Fe) MG tablet  guaiFENesin 600 MG extended release tablet  predniSONE 20 MG tablet  tiotropium 2.5 MCG/ACT Aers inhaler         Send Copies to: Abelardo Vásquez DO    Note that over 30 minutes was spent in preparing discharge papers, discussing discharge with patient and family, medication review, etc.    Signed:   Grupo Dey MD Family Medicine Resident  12/3/2022, 1:10 PM

## 2022-12-05 ENCOUNTER — HOSPITAL ENCOUNTER (OUTPATIENT)
Age: 62
Discharge: HOME | End: 2022-12-05
Payer: MEDICARE

## 2022-12-05 ENCOUNTER — OFFICE VISIT (OUTPATIENT)
Dept: FAMILY MEDICINE CLINIC | Age: 62
End: 2022-12-05
Payer: MEDICARE

## 2022-12-05 VITALS
OXYGEN SATURATION: 96 % | HEIGHT: 69 IN | HEART RATE: 76 BPM | WEIGHT: 266.8 LBS | SYSTOLIC BLOOD PRESSURE: 112 MMHG | DIASTOLIC BLOOD PRESSURE: 62 MMHG | BODY MASS INDEX: 39.52 KG/M2

## 2022-12-05 DIAGNOSIS — D62 ACUTE BLOOD LOSS ANEMIA: ICD-10-CM

## 2022-12-05 DIAGNOSIS — D50.0 IRON DEFICIENCY ANEMIA DUE TO CHRONIC BLOOD LOSS: Primary | ICD-10-CM

## 2022-12-05 LAB
ABSOLUTE EOS #: 0.2 K/UL (ref 0–0.4)
ABSOLUTE LYMPH #: 2.6 K/UL (ref 1–4.8)
ABSOLUTE MONO #: 0.8 K/UL (ref 0.1–1.3)
BASOPHILS # BLD: 0 % (ref 0–2)
BASOPHILS ABSOLUTE: 0 K/UL (ref 0–0.2)
EOSINOPHILS RELATIVE PERCENT: 2 % (ref 0–4)
HCT VFR BLD CALC: 33 % (ref 41–53)
HEMOGLOBIN: 10.3 G/DL (ref 13.5–17.5)
LYMPHOCYTES # BLD: 28 % (ref 24–44)
MCH RBC QN AUTO: 27.1 PG (ref 26–34)
MCHC RBC AUTO-ENTMCNC: 31.3 G/DL (ref 31–37)
MCV RBC AUTO: 86.7 FL (ref 80–100)
MONOCYTES # BLD: 9 % (ref 1–7)
PDW BLD-RTO: 19 % (ref 11.5–14.9)
PLATELET # BLD: 297 K/UL (ref 150–450)
PMV BLD AUTO: 7.1 FL (ref 6–12)
RBC # BLD: 3.81 M/UL (ref 4.5–5.9)
SEG NEUTROPHILS: 61 % (ref 36–66)
SEGMENTED NEUTROPHILS ABSOLUTE COUNT: 5.7 K/UL (ref 1.3–9.1)
WBC # BLD: 9.3 K/UL (ref 3.5–11)

## 2022-12-05 PROCEDURE — 85025 COMPLETE CBC W/AUTO DIFF WBC: CPT

## 2022-12-05 PROCEDURE — 1111F DSCHRG MED/CURRENT MED MERGE: CPT | Performed by: FAMILY MEDICINE

## 2022-12-05 PROCEDURE — 3017F COLORECTAL CA SCREEN DOC REV: CPT | Performed by: FAMILY MEDICINE

## 2022-12-05 PROCEDURE — 99211 OFF/OP EST MAY X REQ PHY/QHP: CPT | Performed by: FAMILY MEDICINE

## 2022-12-05 PROCEDURE — G8427 DOCREV CUR MEDS BY ELIG CLIN: HCPCS | Performed by: FAMILY MEDICINE

## 2022-12-05 PROCEDURE — G8417 CALC BMI ABV UP PARAM F/U: HCPCS | Performed by: FAMILY MEDICINE

## 2022-12-05 PROCEDURE — 99213 OFFICE O/P EST LOW 20 MIN: CPT | Performed by: FAMILY MEDICINE

## 2022-12-05 PROCEDURE — G8482 FLU IMMUNIZE ORDER/ADMIN: HCPCS | Performed by: FAMILY MEDICINE

## 2022-12-05 PROCEDURE — 1036F TOBACCO NON-USER: CPT | Performed by: FAMILY MEDICINE

## 2022-12-05 PROCEDURE — 3078F DIAST BP <80 MM HG: CPT | Performed by: FAMILY MEDICINE

## 2022-12-05 PROCEDURE — 36415 COLL VENOUS BLD VENIPUNCTURE: CPT

## 2022-12-05 PROCEDURE — 3074F SYST BP LT 130 MM HG: CPT | Performed by: FAMILY MEDICINE

## 2022-12-05 NOTE — PROGRESS NOTES
F/u from hospital visit on November 26th, first time anemic, left hospital hb was 8.7, had blood work done this morning at [facility] and Hb is now 10.3. No blood in stool. Filled meds yesterday including, gabapentin 800 mg x3/day,want to cut down to 300 mg tab x3. Please send in. Endorses headaches that are not new, has had HA for years due to neck pain and stress, Has also felt light headed on and off when going to the bathroom when getting up and also when taking pils in the morning. Metoprolol has to do with that? Has appts with vascular and CV docs, as well as lung test and doppler test for carotid. Denies nausea, vomiting, constipation. Can't sleep. Sleeps 3 hrs usually at night. Does get out of breath sometimes which has improved since hospital discharge. No loss of appetite. Negative for:     Worry / mood complaints  Headache  Dizziness  Visual Disturbance  Hearing Changes  Nasal / sinus Symptoms  Mouth / tooth symptom, pain  Throat pain  Difficulty swallowing  Neck pain  Chest discomfort  Cough  SOB  N/V/D/C  Pelvic area discomfort  Bladder / voiding discomfort  Bowel complaints  MS complaints   Numbness/tingling/abnormal sensations   Edema / Leg swelling  Dizziness  Fatigue  Bleeding   Skin    Pertinent Pos: See HPI - 0    Vitals:    12/05/22 1403   BP: 112/62   Pulse: 76   SpO2: 96%       Vitals:    12/05/22 1403   BP: 112/62   Pulse: 76   SpO2: 96%         Alert and oriented to PPT  NAD    HEENT - neg  Neck - no bruits, no lymphadenopathy  Chest  HRRR w/o murmer  LCTAB no wheezes / rhonchi  Gait / Station - stable, no dysequilibrium, uniform pace, no assist device, cane. Diagnosis Orders   1.  Iron deficiency anemia due to chronic blood loss  CBC with Auto Differential          Plan:  1.)  labs - weekly Hb times 4  2.)  fei in January, 2023

## 2022-12-05 NOTE — PATIENT INSTRUCTIONS
Thank you for letting us take care of you today. We hope all your questions were addressed. If a question was overlooked or something else comes to mind after you return home, please contact a member of your Care Team listed below. Your Care Team at [place] [address] is Team #2  Kt Kebede DO (Faculty)  Tawny Castelan (Faculty)  Janice Rosas MD (Resident)  J Luis Cueto MD (Resident)  Lonny Wong MD (Resident)  Michelle Harden MD (Resident)  Marlys Sanchez., TOBIN Borden.,  BERTA Brooks Nikita., LPN  Rica Miss., Birdie [facility] office)  [employer] (Clinical Practice Manager)  [facility] (Clinical Pharmacist)     Office phone number: [phone]    If you need to get in right away due to illness, please be advised we have \"Same Day\" appointments available Monday-Friday. Please call us at [phone] option #3 to schedule your \"Same Day\" appointment. Thank you for letting us take care of you today. We hope all your questions were addressed. If a question was overlooked or something else comes to mind after you return home, please contact a member of your Care Team listed below. Your Care Team at [place] [address] is Team #2  Kt Kebede DO (Faculty)  Tawny Castelan (Faculty)  Janice Rosas MD (Resident)  J Luis Cueto MD (Resident)  Lonny Wong MD (Resident)  Michelle Harden MD (Resident)  Marlys Sanchez., TOBIN Borden.,  BERTA Brooks Nikita., LPN  Rica Miss., [facility] office)  [employer], [employer] (Clinical Practice Manager)  [facility] (Clinical Pharmacist)     Office phone number: [phone]    If you need to get in right away due to illness, please be advised we have \"Same Day\" appointments available Monday-Friday. Please call us at [phone] option #3 to schedule your \"Same Day\" appointment.

## 2022-12-05 NOTE — PROGRESS NOTES
HYPERTENSION visit     BP Readings from Last 3 Encounters:   11/29/22 (!) 142/79   10/17/22 117/82   10/03/22 136/72       LDL Cholesterol (mg/dL)   Date Value   05/10/2022 179 (H)     HDL (mg/dL)   Date Value   05/10/2022 36 (L)     BUN (mg/dL)   Date Value   11/29/2022 18     Creatinine (mg/dL)   Date Value   11/29/2022 0.58 (L)     Glucose (mg/dL)   Date Value   11/29/2022 191 (H)   02/10/2012 99              Have you changed or started any medications since your last visit including any over-the-counter medicines, vitamins, or herbal medicines? no   Have you stopped taking any of your medications? Is so, why? -  no  Are you having any side effects from any of your medications? - no  How often do you miss doses of your medication? no      Have you seen any other physician or provider since your last visit?  no   Have you had any other diagnostic tests since your last visit? yes - Labs   Have you been seen in the emergency room and/or had an admission in a hospital since we last saw you?  yes - [facility]   Have you had your routine dental cleaning in the past 6 months?  no     Do you have an active MyChart account? If no, what is the barrier?   Yes    Patient Care Team:  Sade Salvador DO as PCP - General (Family Medicine)  Sade Salvador DO as PCP - [facility]  Monique Wylie MD as Consulting Physician (Gastroenterology)  Armando Green MD as Consulting Physician (Cardiology)  Lexie Nichols MD as Consulting Physician (Orthopedic Surgery)  Riley Hyatt RN as Nurse Navigator  Rad Gonzales RN as Care Transitions Nurse    Medical History Review  Past Medical, Family, and Social History reviewed and does not contribute to the patient presenting condition    Health Maintenance   Topic Date Due    DTaP/Tdap/Td vaccine (1 - Tdap) Never done    Annual Wellness Visit (AWV)  Never done    COVID-19 Vaccine (5 - Booster) 12/13/2022    Lipids  05/10/2023    Depression Monitoring  06/03/2023 A1C test (Diabetic or Prediabetic)  11/26/2023    Colorectal Cancer Screen  11/28/2032    Flu vaccine  Completed    Shingles vaccine  Completed    Pneumococcal 0-64 years Vaccine  Completed    Hepatitis C screen  Addressed    HIV screen  Addressed    Hepatitis A vaccine  Aged Out    Hib vaccine  Aged Out    Meningococcal (ACWY) vaccine  Aged Out

## 2022-12-06 ENCOUNTER — CARE COORDINATION (OUTPATIENT)
Dept: CASE MANAGEMENT | Age: 62
End: 2022-12-06

## 2022-12-06 NOTE — CARE COORDINATION
[facility] Care Transitions Follow Up Call      Patient: Nikita Luz  Patient : 1960   MRN: [id number]  Reason for Admission: Acute Blood loss anemia  Discharge Date: 22 RARS: Readmission Risk Score: 21.6      Needs to be reviewed by the provider   Additional needs identified to be addressed with provider: No  none             Method of communication with provider: none. 1st attempt to reach patient for Care Transitions. [facility] requesting return call. Contact information provided.   [phone]      Follow Up  Future Appointments   Date Time Provider Lucie Su   2022 10:00 AM Narinder Phillips DO [address]   2022  8:30 AM [facility] VASCULAR  ST[facility] VASCLAB [facility]   2022  9:15 AM [facility] RESP THERAPY  STCZ PFT [facility]   2022  9:45 AM Robert Fernandez MD SC HEART/VAS [facility]   2022  1:30 PM SCHEDULE, [facility] AWV LPN [facility]   2023  2:00 PM Zach Meeks DO [address] Transitions Subsequent and Final Call    Subsequent and Final Calls  Care Transitions Interventions  Other Interventions:             Singh Sotelo RN

## 2022-12-07 ENCOUNTER — CARE COORDINATION (OUTPATIENT)
Dept: CASE MANAGEMENT | Age: 62
End: 2022-12-07

## 2022-12-07 NOTE — CARE COORDINATION
Care Transitions Outreach Attempt      Attempted to reach patient for transitions of care follow up. Unable to reach patient. Unidentified female stated pt just went back to bed for a nap. Requested call back when available. Patient: Jaden Rollins Patient : 1960 MRN: [id number]    Last Discharge  Street       Date Complaint Diagnosis Description Type Department Provider    22 Chest Pain Acute blood loss anemia . .. ED to Hosp-Admission (Discharged) (ADMITTED)  4[address] Estrella Dockery DO; Camacho Mendenhall. ..               Noted following upcoming appointments from discharge chart review:   [facility] follow up appointment(s):   Future Appointments   Date Time Provider Lucie Su   2022 10:00 AM Josefa Grey DO [address]   2022  8:30 AM [facility] VASCULAR [address]   2022  9:15 AM STC RESP THERAPY  [facility] P[facility]   2022  9:45 AM Juan J Webber MD SC HEART/VAS [facility]   2022  1:30 PM SCHEDULE, [facility]V LPN [facility]   2023  2:00 PM DO Nette Marie RN BSN   Care Transitions Nurse  [phone]

## 2022-12-08 ENCOUNTER — CARE COORDINATION (OUTPATIENT)
Dept: CASE MANAGEMENT | Age: 62
End: 2022-12-08

## 2022-12-08 NOTE — CARE COORDINATION
Care Transitions Outreach Attempt #3    Attempt #3 to reach patient for transitions of care follow up. Unable to reach patient. Left message requesting call back. CTN sign off if no return call received. Patient: Laurence Sleeper Patient : 1960 MRN: [id number]    Last Discharge  [address]       Date Complaint Diagnosis Description Type Department Provider    22 Chest Pain Acute blood loss anemia . .. ED to Hosp-Admission (Discharged) (ADMITTED) STVZ 4A Beverly Pulido DO; [place]. ..               Noted following upcoming appointments from discharge chart review:   [facility] follow up appointment(s):   Future Appointments   Date Time Provider Lucie Su   2022 10:00 AM Tanesha Bull DO [address]   2022  8:30  [facility] VASCULAR [address]   2022  9:15 AM STC RESP THERAPY  [facility]   2022  9:45 AM Evon Pulliam MD SC HEART/VAS [facility]   2022  1:30 PM SCHEDULE, [facility] AWV LPN [facility]   2023  2:00 PM DO Nette Brian RN BSN   Care Transitions Nurse  [phone]

## 2022-12-12 ENCOUNTER — HOSPITAL ENCOUNTER (OUTPATIENT)
Age: 62
Discharge: HOME | End: 2022-12-12
Payer: MEDICARE

## 2022-12-12 DIAGNOSIS — D50.0 IRON DEFICIENCY ANEMIA DUE TO CHRONIC BLOOD LOSS: ICD-10-CM

## 2022-12-12 LAB
ABSOLUTE EOS #: 0.1 K/UL (ref 0–0.4)
ABSOLUTE LYMPH #: 1.6 K/UL (ref 1–4.8)
ABSOLUTE MONO #: 0.4 K/UL (ref 0.1–1.3)
BASOPHILS # BLD: 1 % (ref 0–2)
BASOPHILS ABSOLUTE: 0.1 K/UL (ref 0–0.2)
EOSINOPHILS RELATIVE PERCENT: 2 % (ref 0–4)
HCT VFR BLD CALC: 34.7 % (ref 41–53)
HEMOGLOBIN: 11.6 G/DL (ref 13.5–17.5)
LYMPHOCYTES # BLD: 22 % (ref 24–44)
MCH RBC QN AUTO: 28.3 PG (ref 26–34)
MCHC RBC AUTO-ENTMCNC: 33.3 G/DL (ref 31–37)
MCV RBC AUTO: 85 FL (ref 80–100)
MONOCYTES # BLD: 6 % (ref 1–7)
PDW BLD-RTO: 18.4 % (ref 11.5–14.9)
PLATELET # BLD: 284 K/UL (ref 150–450)
PMV BLD AUTO: 6.4 FL (ref 6–12)
RBC # BLD: 4.09 M/UL (ref 4.5–5.9)
SEG NEUTROPHILS: 69 % (ref 36–66)
SEGMENTED NEUTROPHILS ABSOLUTE COUNT: 4.9 K/UL (ref 1.3–9.1)
WBC # BLD: 7 K/UL (ref 3.5–11)

## 2022-12-12 PROCEDURE — 85025 COMPLETE CBC W/AUTO DIFF WBC: CPT

## 2022-12-12 PROCEDURE — 36415 COLL VENOUS BLD VENIPUNCTURE: CPT

## 2022-12-13 ENCOUNTER — TELEPHONE (OUTPATIENT)
Dept: FAMILY MEDICINE CLINIC | Age: 62
End: 2022-12-13

## 2022-12-14 ENCOUNTER — HOSPITAL ENCOUNTER (OUTPATIENT)
Dept: PAIN MANAGEMENT | Age: 62
Discharge: HOME | End: 2022-12-14
Payer: MEDICARE

## 2022-12-14 VITALS
SYSTOLIC BLOOD PRESSURE: 135 MMHG | BODY MASS INDEX: 39.4 KG/M2 | HEIGHT: 69 IN | WEIGHT: 266 LBS | OXYGEN SATURATION: 98 % | TEMPERATURE: 97.3 F | DIASTOLIC BLOOD PRESSURE: 64 MMHG | HEART RATE: 75 BPM

## 2022-12-14 DIAGNOSIS — M47.812 CERVICAL SPONDYLOSIS: Primary | ICD-10-CM

## 2022-12-14 DIAGNOSIS — M47.816 LUMBAR SPONDYLOSIS: ICD-10-CM

## 2022-12-14 DIAGNOSIS — M96.1 CERVICAL POST-LAMINECTOMY SYNDROME: ICD-10-CM

## 2022-12-14 DIAGNOSIS — E66.9 CLASS 2 OBESITY WITH BODY MASS INDEX (BMI) OF 39.0 TO 39.9 IN ADULT, UNSPECIFIED OBESITY TYPE, UNSPECIFIED WHETHER SERIOUS COMORBIDITY PRESENT: ICD-10-CM

## 2022-12-14 PROBLEM — E66.812 CLASS 2 OBESITY WITH BODY MASS INDEX (BMI) OF 39.0 TO 39.9 IN ADULT: Status: ACTIVE | Noted: 2020-10-30

## 2022-12-14 PROCEDURE — 99213 OFFICE O/P EST LOW 20 MIN: CPT

## 2022-12-14 ASSESSMENT — PAIN SCALES - GENERAL: PAINLEVEL_OUTOF10: 6

## 2022-12-14 NOTE — PROGRESS NOTES
Chronic Pain Clinic Note     Encounter Date: 12/14/2022     SUBJECTIVE:  Chief Complaint   Patient presents with    Back Pain    Neck Pain       History of Present Illness:   Rosemary Ribeiro is a 57 y.o. male who presents with back and neck pain    Medication Refill: n/a    Current Complaints of Pain:   Location: back and neck  Radiation: both leg and both arms, Left side is worse  Severity:  Moderate  Pain Numerical Score -    Average: 6/7     Highest: 8/9  Lowest: 4  Character/Quality: Complains of pain that is aching, burning, cramping, shooting, stabbing  Timing: Constant  Associated symptoms: none  Numbness: yes  Weakness: yes  Exacerbating factors:  walking, bending, sitting, standing  Alleviating factors:    Length of time pain has been present: Started about 20+ years ago  Inciting event/injury:  fall  Bowel/Bladder incontinence:  no  Falls: no  Physical Therapy: yes, has tried    History of Interventions:   Surgery: 1 - Neck  Injections: RFA, SIJ    Imaging:    Xray Lumbar and cervical 9/27/2021    Impression   1. Postoperative and degenerative change cervical spine stable to previous. 2. Multilevel lumbar spine degenerative change. Consider MRI follow-up if   indicated. 3. Atherosclerosis.        Past Medical History:   Diagnosis Date    Abnormal stress ECG     7/28/2022 per Dr. Alvin Yeager, [facility] will need cardiac cath in future due to cp    Angina pectoris [facility])     Asthma     BPH (benign prostatic hyperplasia)     CAD (coronary artery disease)     Dr. Alvin Yeager   7/28/22 cardiac clearance    Carotid stenosis     rt  Dr. Corbett [place] tunnel syndrome     left    Cervical stenosis of spine 2012    C2-5 laminectomy  Dr. Mina Newman    Cervicalgia     chronic pain    Chronic back pain     COPD (chronic obstructive pulmonary disease) ([address]     on inhalers pcp manages    DDD (degenerative disc disease)     Elbow fracture, right     Fractures     elbow    GERD (gastroesophageal reflux disease)     uses pepto bismol prn    Gout     Right great toe    Hyperlipidemia     Hypertension     Lung nodule     Mild depression 09/26/2013    2 suicide attempts by girlfriend related to depression.     Morbidly obese ([address] 10/30/2020    Occlusion of right carotid artery 07/14/2021    Osteoarthritis of right knee     Pre-diabetes     was taking metformin in past  no longer takes    Sinus headache 10/10/2018    Sleep apnea     snores   does not use cpap  pt did no follow up (per patient)    Sleep disturbance     Smoker     Snores     no cpap    SOB (shortness of breath) on exertion     Tendonitis of foot     right    Tooth loose 08/05/2022    lower front rt    Under care of team     Dr. Camelia Amaro  7/14/2022  will need cysto in future after heart cath and carotid sx    Under care of team     Dr. Nathaly Marcus for carotid stenosis    Wellness examination     [facility]  last visit June 2022       Past Surgical History:   Procedure Laterality Date    CARDIAC CATHETERIZATION  07/08/2014    Non Obstructive CAD     CARDIAC CATHETERIZATION  09/16/2022    CERVICAL SPINE SURGERY  07/13/2012     C2-3-4-5    COLONOSCOPY  11/28/2022    COLONOSCOPY DIAGNOSTIC    COLONOSCOPY N/A 11/28/2022    COLONOSCOPY DIAGNOSTIC performed by Deysi Mathews MD at [address] [place] [address]  11/28/2022    2 [facility],   ENTEROSCOPY PUSH CONTROL HEMORRHAGE    FRACTURE SURGERY      Rt elbow     HC INJECT OTHER PERPHRL NERV Bilateral 05/09/2019    INJECTION SPINAL performed by Hossein Nieves MD at [address] Bilateral 08/15/2019    SACROILIAC JOINT INJECTION performed by Hossein Nieves MD at [place] [address]  02/05/2016    [facility]    NERVE BLOCK Left 02/07/2020    RFA left side    NERVE BLOCK Left 02/07/2020     NERVE RADIOFREQUENCY ABLATION - SACROILIAC (Left )    NERVE BLOCK  02/12/2021    NERVE RADIOFREQUENCY ABLATION SI (Left     NERVE BLOCK Right 02/19/2021    Procedure: NERVE RADIOFREQUENCY ABLATION SI JOINT (Right )    OTHER SURGICAL HISTORY  08/15/2019    sacroiliac joint injection    PAIN MANAGEMENT PROCEDURE Left 02/07/2020    NERVE RADIOFREQUENCY ABLATION - SACROILIAC performed by Prema Mark MD at [facility] Left 02/12/2021    NERVE RADIOFREQUENCY ABLATION SI performed by Prema Mark MD at [facility] Right 02/19/2021    NERVE RADIOFREQUENCY ABLATION SI JOINT performed by Prema Mark MD at [address] ENDOSCOPY N/A 11/28/2022    ENTEROSCOPY PUSH CONTROL HEMORRHAGE performed by Magdiel Bermeo MD at [address] Right 09/28/2022    right trans-carotid artery revascularization w/stent performed by Evon Pulliam MD at [address] History   Problem Relation Age of Onset    Heart Disease Mother     COPD Father     Cancer Maternal Aunt 64        breast cancer     Cancer Maternal Uncle 60        prostrate cancer        Social History     Socioeconomic History    Marital status: Single     Spouse name: Not on file    Number of children: Not on file    Years of education: Not on file    Highest education level: Not on file   Occupational History     Employer: N/A   Tobacco Use    Smoking status: Former     Packs/day: 0.25     Years: 40.00     Pack years: 10.00     Types: Cigarettes     Start date: 1975    Smokeless tobacco: Former     Quit date: 3/12/2015    Tobacco comments:     Smokes 2-4 cigs/day as of 8/5/2022   Vaping Use    Vaping Use: Never used   Substance and Sexual Activity    Alcohol use: Not Currently     Alcohol/week: 0.0 standard drinks     Comment: 3 x year    Drug use: No    Sexual activity: Yes     Partners: Female   Other Topics Concern    Not on file   Social History Narrative    Not on file     Social Determinants of Health     Financial Resource Strain: Low Risk     Difficulty of Paying Living Expenses: Not hard at all   Food Insecurity: No Food Insecurity    Worried About Running Out of Food in the Last Year: Never true    Ran Out of Food in the Last Year: Never true   Transportation Needs: Not on file   Physical Activity: Not on file   Stress: Not on file   Social Connections: Not on file   Intimate Partner Violence: Not on file   Housing Stability: Not on file       Medications & Allergies:   Current Outpatient Medications   Medication Instructions    albuterol sulfate  (90 Base) MCG/ACT inhaler 2 puffs, Inhalation, EVERY 6 HOURS PRN    amiodarone (CORDARONE) 200 MG tablet TAKE ONE TABLET BY MOUTH DAILY    amLODIPine (NORVASC) 10 mg, Oral, DAILY    aspirin 81 mg, Oral, DAILY    budesonide-formoterol (SYMBICORT) 160-4.5 MCG/ACT AERO INHALE TWO PUFFS BY MOUTH TWICE A DAY    clopidogrel (PLAVIX) 75 mg, Oral, DAILY    ELIQUIS 5 MG TABS tablet TAKE ONE TABLET BY MOUTH TWICE A DAY    ferrous sulfate (IRON 325) 325 mg, Oral, EVERY OTHER DAY    gabapentin (NEURONTIN) 800 mg, Oral, EVERY 8 HOURS SCHEDULED    guaiFENesin (MUCINEX) 1,200 mg, Oral, 2 TIMES DAILY    isosorbide mononitrate (IMDUR) 60 MG extended release tablet 1 tablet, Oral, DAILY    metoprolol tartrate (LOPRESSOR) 25 mg, Oral, 2 TIMES DAILY    nitroGLYCERIN (NITROSTAT) 0.4 mg, SubLINGual, EVERY 5 MIN PRN    predniSONE (DELTASONE) 20 MG tablet Take 2 tablets by mouth daily for 5 days, THEN 1 tablet daily for 5 days, THEN 0.5 tablets daily for 5 days.     simvastatin (ZOCOR) 80 MG tablet TAKE ONE TABLET BY MOUTH EVERY EVENING    tiotropium (SPIRIVA RESPIMAT) 2.5 MCG/ACT AERS inhaler 2 puffs, Inhalation, DAILY    tiZANidine (ZANAFLEX) 4 mg, Oral, 2 TIMES DAILY PRN       No Known Allergies    Review of Systems:   Constitutional: negative for weight changes or fevers  Cardiovascular: negative for chest pain, palpitations, irregular heart beat  Respiratory: negative for dyspnea, cough, wheezing  Gastrointestinal: negative for constipation, diarrhea, nausea  Genitourinary: negative for bowel/bladder incontinence   Musculoskeletal: positive for low back pain, neck pain  Neurological: negative for radicular leg pain, leg weakness or numbness/tingling  Behavioral/Psych: negative for anxiety/depression   Hematological: negative for abnormal bleeding, anticoagulation use or antiplatelet use  All other systems reviewed and are negative    OBJECTIVE:    Vitals:    12/14/22 1006   BP: 135/64   Pulse: 75   Temp: 97.3 °F (36.3 °C)   SpO2: 98%       PHYSICAL EXAM    GENERAL: No acute distress, pleasant, well-appearing  HEENT: Normocephalic, atraumatic, Pupils equal and round  CARDIOVASCULAR: Well perfused, No peripheral cyanosis  PULMONARY: Good chest wall excursion, breathing unlabored  PSYCH: Appropriate affect and insight, non-pressured speech  SKIN: No rashes or lesions  MUSCULOSKELETAL:  Inspection: The back and extremities are symmetric and aligned. Muscle bulk is normal in appearance. Surgical scar present in cervical spine. Palpation: There is tenderness to palpation along the cervical lumbar paraspinal musculature bilaterally  Lumbar range of motion is full  NEUROMUSCULAR:  Patient ambulates unassisted  Gait is nonantalgic  Sensation to light touch is intact in upper and lower extremities  Strength is full in upper and lower extremities  No ankle clonus    DIAGNOSIS:    ICD-10-CM    1. Cervical spondylosis  M47.812       2. Cervical post-laminectomy syndrome  M96.1       3. Lumbar spondylosis  M47.816       4. Class 2 obesity with body mass index (BMI) of 39.0 to 39.9 in adult, unspecified obesity type, unspecified whether serious comorbidity present  E66.9     Z68.39            ASSESSMENT:    Jaden Rollins is a 57 y.o.male who presents with chronic neck pain and low back pain. To review, patient has history of previous cervical spine surgery with laminectomy and fusion from C3-C6.     The patient's history and physical examination are consistent with cervical spondylosis, lumbar spondylosis and cervical postlaminectomy syndrome. Patient presents today requesting opioid medication however he refused a urine drug screen at his last office visit on 4/27/2022, therefore his contract was voided. I offered a multimodal approach for his chronic pain however he refused everything that was offered. Fortunately, I do not have many options for treatment as he is not interested and other modalities for pain relief. Neurologically, it appears the patient has full strength and normal sensation. There is no evidence of radiculopathy or myelopathy on examination. PLAN:  Medications: For nonopioid therapy, the following medications were prescribed:    -Continue medication prescribed by primary care physician    Opioid therapy:  -Nonnarcotic care plan. Patient refused urine drug screen at last office visit on 4/27/2022. This was discussed with the patient and the contract was voided. Interventions:  -Offered interventional spine procedures, patient deferred    Imaging:  -Reviewed cervical and lumbar x-ray with patient in room  Offered updated cervical lumbar imaging, patient deferred    Behavioral Therapies:  -Continue daily stretching and home exercise program    Referrals:  -Offered physical therapy referral, patient deferred    Follow-up Plan:  -As needed    Patient was offered intervention where appropriate. Multi-modal Pain Therapy: The patient was explicitly considered for multimodal and interdisciplinary therapy. Non-opioid and non-pharmacological opportunities to enhance analgesia and quality of life have been and will continue to be pursued.     Nanci Yanes DO  Interventional Pain Management/PM&R   [facility]    32 minutes was utilized for this patient encounter including face-to-face time with patient, reviewing previous imaging, injections, and medical history while reviewing plan of care with patient which included injection therapy, physical therapy, and medication management. No orders of the defined types were placed in this encounter.

## 2022-12-15 ENCOUNTER — TELEPHONE (OUTPATIENT)
Dept: FAMILY MEDICINE CLINIC | Age: 62
End: 2022-12-15

## 2022-12-15 NOTE — TELEPHONE ENCOUNTER
----- Message from Avni Olmstead DO sent at 12/13/2022  4:17 PM EST -----  Please notify the patient . ... (see previous message).   TY

## 2022-12-15 NOTE — TELEPHONE ENCOUNTER
Notified patient of lab results and instructed him to continue with regular drawls. During call, patient informed writer that his Gabapentin was decreased to 300mg every 8 hours. Patient is asking if you could send a new script to his pharmacy for Gabapentin 300mg 1 tab every 8 hours. OARRS reviewed today - chart documented (no signs of abuse, misuse or multiple prescribers).

## 2022-12-16 ENCOUNTER — HOSPITAL ENCOUNTER (OUTPATIENT)
Dept: VASCULAR LAB | Age: 62
Discharge: HOME | End: 2022-12-16
Payer: MEDICARE

## 2022-12-16 ENCOUNTER — APPOINTMENT (OUTPATIENT)
Dept: PULMONOLOGY | Age: 62
End: 2022-12-16
Payer: MEDICARE

## 2022-12-16 DIAGNOSIS — M47.812 CERVICAL SPONDYLOSIS: Primary | ICD-10-CM

## 2022-12-16 DIAGNOSIS — M54.2 CERVICALGIA: ICD-10-CM

## 2022-12-16 DIAGNOSIS — I65.21 RIGHT-SIDED EXTRACRANIAL CAROTID ARTERY STENOSIS: ICD-10-CM

## 2022-12-16 DIAGNOSIS — M96.1 CERVICAL POST-LAMINECTOMY SYNDROME: ICD-10-CM

## 2022-12-16 PROCEDURE — 93880 EXTRACRANIAL BILAT STUDY: CPT

## 2022-12-16 RX ORDER — GABAPENTIN 300 MG/1
300 CAPSULE ORAL 2 TIMES DAILY
Qty: 60 CAPSULE | Refills: 0 | Status: SHIPPED | OUTPATIENT
Start: 2022-12-16 | End: 2023-01-15

## 2022-12-16 NOTE — TELEPHONE ENCOUNTER
Gabapention refill request. Patient reports he was decreased to 100 mg from 800 mg in the last few months. He reports that 100 mg isn't helping with the neuropathic pain at all - requesting a middle ground trial at 300 mg. - twice daily (30D) RF0    Orders placed. Will monitor efficacy / safety  DEBBIE patient in office in 4 weeks.

## 2022-12-19 ENCOUNTER — OFFICE VISIT (OUTPATIENT)
Dept: VASCULAR SURGERY | Age: 62
End: 2022-12-19
Payer: MEDICARE

## 2022-12-19 ENCOUNTER — HOSPITAL ENCOUNTER (OUTPATIENT)
Age: 62
Discharge: HOME | End: 2022-12-19
Payer: MEDICARE

## 2022-12-19 VITALS
SYSTOLIC BLOOD PRESSURE: 110 MMHG | HEART RATE: 86 BPM | RESPIRATION RATE: 16 BRPM | BODY MASS INDEX: 39.4 KG/M2 | DIASTOLIC BLOOD PRESSURE: 86 MMHG | HEIGHT: 69 IN | WEIGHT: 266 LBS | OXYGEN SATURATION: 98 %

## 2022-12-19 DIAGNOSIS — I65.23 BILATERAL CAROTID ARTERY STENOSIS: Primary | ICD-10-CM

## 2022-12-19 LAB
ABSOLUTE EOS #: 0.2 K/UL (ref 0–0.4)
ABSOLUTE LYMPH #: 1.6 K/UL (ref 1–4.8)
ABSOLUTE MONO #: 0.5 K/UL (ref 0.1–1.3)
BASOPHILS # BLD: 1 % (ref 0–2)
BASOPHILS ABSOLUTE: 0.1 K/UL (ref 0–0.2)
EOSINOPHILS RELATIVE PERCENT: 3 % (ref 0–4)
HCT VFR BLD CALC: 35.8 % (ref 41–53)
HEMOGLOBIN: 11.7 G/DL (ref 13.5–17.5)
LYMPHOCYTES # BLD: 24 % (ref 24–44)
MCH RBC QN AUTO: 28.2 PG (ref 26–34)
MCHC RBC AUTO-ENTMCNC: 32.7 G/DL (ref 31–37)
MCV RBC AUTO: 86.4 FL (ref 80–100)
MONOCYTES # BLD: 7 % (ref 1–7)
PDW BLD-RTO: 19.2 % (ref 11.5–14.9)
PLATELET # BLD: 263 K/UL (ref 150–450)
PMV BLD AUTO: 7 FL (ref 6–12)
RBC # BLD: 4.14 M/UL (ref 4.5–5.9)
SEG NEUTROPHILS: 65 % (ref 36–66)
SEGMENTED NEUTROPHILS ABSOLUTE COUNT: 4.5 K/UL (ref 1.3–9.1)
WBC # BLD: 6.8 K/UL (ref 3.5–11)

## 2022-12-19 PROCEDURE — G8417 CALC BMI ABV UP PARAM F/U: HCPCS | Performed by: SURGERY

## 2022-12-19 PROCEDURE — 3078F DIAST BP <80 MM HG: CPT | Performed by: SURGERY

## 2022-12-19 PROCEDURE — 1111F DSCHRG MED/CURRENT MED MERGE: CPT | Performed by: SURGERY

## 2022-12-19 PROCEDURE — 36415 COLL VENOUS BLD VENIPUNCTURE: CPT

## 2022-12-19 PROCEDURE — 3074F SYST BP LT 130 MM HG: CPT | Performed by: SURGERY

## 2022-12-19 PROCEDURE — 99024 POSTOP FOLLOW-UP VISIT: CPT | Performed by: SURGERY

## 2022-12-19 PROCEDURE — G8427 DOCREV CUR MEDS BY ELIG CLIN: HCPCS | Performed by: SURGERY

## 2022-12-19 PROCEDURE — 3017F COLORECTAL CA SCREEN DOC REV: CPT | Performed by: SURGERY

## 2022-12-19 PROCEDURE — G8482 FLU IMMUNIZE ORDER/ADMIN: HCPCS | Performed by: SURGERY

## 2022-12-19 PROCEDURE — 1036F TOBACCO NON-USER: CPT | Performed by: SURGERY

## 2022-12-19 PROCEDURE — 85025 COMPLETE CBC W/AUTO DIFF WBC: CPT

## 2022-12-19 NOTE — PROGRESS NOTES
[address] AND VASCULAR  [address] [facility] [address]  Dept: [phone]     Patient: Fidel Calhoun  : 1960  MRN: [id number]  DOS: 2022            HPI:  Fidel Calhoun is a 57 y.o. male who returns to the office regarding right carotid revascularization with a stent. Recall that we had performed this through a right carotid access at the proximal common carotid artery with the TCAR technique. He has done well after this and his duplex examination shows that he is between 50 and 69% stenosis on both sides. The stent is performing well. He continues on aspirin Plavix and Eliquis. He will remain on the Plavix for 1 year following the procedure and then discontinue it and use only aspirin and Eliquis. His Eliquis is for atrial fibrillation. He does experience bilateral lower extremity edema and is on amlodipine which is a culprit of that problem. He is not wearing his compression stockings currently. Review of Systems    Vitals:    22 0947   BP: 110/86   Site: Left Upper Arm   Position: Sitting   Cuff Size: Large Adult   Pulse: 86   Resp: 16   SpO2: 98%   Weight: 266 lb (120.7 kg)   Height: 5' 9\" (1.753 m)          Physical Exam  On examination he has bilateral carotid bruits. His neck is healed well. Neurologically he is intact without focal deficit. He has lower extremity edema bilaterally which is more significant on the right than the left. Assessment:  1. Bilateral carotid artery stenosis          Plan: At this point we can see him back at 6-month intervals with carotid duplex examination. He understands and agrees. I would continue all 3 of his anticoagulation/antiplatelet medications until 1 full year at which time Plavix can be discontinued. He understands this strategy as well. I would be a proponent of stopping his amlodipine if his swelling continues.   He needs to talk to his cardiology provider regarding that. We will see him back in the office in 6 months with carotid duplex.     Electronically signed by:  Francisca Hoffman MD

## 2022-12-23 ENCOUNTER — TELEMEDICINE (OUTPATIENT)
Dept: FAMILY MEDICINE CLINIC | Age: 62
End: 2022-12-23

## 2022-12-23 DIAGNOSIS — R06.81 APNEA: ICD-10-CM

## 2022-12-23 DIAGNOSIS — M79.89 LEFT LEG SWELLING: Primary | ICD-10-CM

## 2022-12-23 DIAGNOSIS — R06.00 PND (PAROXYSMAL NOCTURNAL DYSPNEA): ICD-10-CM

## 2022-12-23 RX ORDER — FUROSEMIDE 40 MG/1
40 TABLET ORAL DAILY
Qty: 30 TABLET | Refills: 0 | Status: SHIPPED | OUTPATIENT
Start: 2022-12-23

## 2022-12-23 NOTE — PATIENT INSTRUCTIONS
Thank you for letting us take care of you today. We hope all your questions were addressed. If a question was overlooked or something else comes to mind after you return home, please contact a member of your Care Team listed below. Your Care Team at [place] [address] is Team #5  Gray Owen MD (Faculty)  Nicole Orellana MD (Resident)  Mirna Sher MD (Resident)  Elan Marquis MD (Resident)  Eric Chaves MD, (Resident)  Brain Gale., [facility]  Eliseo Renteria., JEFERSON Diza.,  LPN  Dashawn Bautista., Marlyn Horton., [facility] office)  Quynh Pierce, [address] (Clinical Practice Manager)  Vibha Sorto [facility] (Clinical Pharmacist)       Office phone number: [phone]    If you need to get in right away due to illness, please be advised we have \"Same Day\" appointments available Monday-Friday. Please call us at [phone] option #3 to schedule your \"Same Day\" appointment.

## 2022-12-23 NOTE — PROGRESS NOTES
Dulce Oliveira is a 57 y.o. male evaluated via telephone on 12/23/2022 for Edema (Lower extremity edema - )      Documentation:  I communicated with the patient and/or health care decision maker about     Left lower extremity swelling      Details of this discussion including any medical advice provided: This is a 78-year-old male with a past medical history of hypertension, CAD, chronic A. fib on Eliquis who is calling for left lower extremity swelling. He was recently admitted to [facility] for acute anemia, he is currently on DAPT and Eliquis, he was found to have a bleeding AVM in the duodenum status post EGD. Patient was seen by vascular surgery at the hospital who recommended him to continue DAPT and Eliquis on discharge. He has been getting weekly hemoglobin checks every week since his discharge and his hemoglobin has been increasing. He states that in the hospital he had bilateral lower extremity edema, states since the discharge it has been worsening mainly in the left lower extremity. States his left foot has blown up and the swelling and erythema has gone up to his knee. He denies any redness or significant pain or warmth on palpation of the leg. I asked the patient to push down on the swelling, as he described it it does seem like pitting edema. He states he checks his blood pressure at home and has been within normal limits, his amlodipine was recently decreased from 10 mg to 5 mg at his cardiologist office recently he sees Dr. Antionette Madrigal. States his systolic blood pressure has been in the 1 teens. I advised the patient to discontinue and stop taking his amlodipine. Patient also complains of significant paroxysmal nocturnal dyspnea that has been ongoing for many months, has to use more pillows to sleep. He also complains of persistent apnea, inability to sleep well at night, daytime sleepiness, as well as complaints of apnea episodes from his wife.     Given the patient's history, will order bilateral Dopplers of lower extremities to rule out DVT although the patient is already on anticoagulation. We will start the patient on Lasix to take daily for 1 month. We will order an echo to reevaluate his cardiac function, echo and May 2022 was unremarkable. We will also order sleep study to evaluate for ROSY. Patient agreement with plan, advised the patient to go  Lasix today so he can start taking it today. Total Time: minutes: 21-30 minutes    aLne Ray was evaluated through a synchronous (real-time) audio encounter. Patient identification was verified at the start of the visit. He (or guardian if applicable) is aware that this is a billable service, which includes applicable co-pays. This visit was conducted with the patient's (and/or legal guardian's) verbal consent. He has not had a related appointment within my department in the past 7 days or scheduled within the next 24 hours. The patient was located at Home: [address] [place] [address]. The provider was located at [facility] (Appt Dept): [address] [place] [address].     Note: not billable if this call serves to triage the patient into an appointment for the relevant concern    Tricia Engel MD

## 2022-12-23 NOTE — PROGRESS NOTES
Attending Physician Statement  I have discussed the care of Ravi Arredondo, including pertinent history and exam findings,  with the resident. I have reviewed the key elements of all parts of the encounter with the resident. I agree with the assessment, plan and orders as documented by the resident.   (Afua Nelson)    Mavis Hernandez MD

## 2022-12-23 NOTE — PROGRESS NOTES
Visit Information    Have you changed or started any medications since your last visit including any over-the-counter medicines, vitamins, or herbal medicines? no   Have you stopped taking any of your medications? Is so, why? -  no  Are you having any side effects from any of your medications? - no    Have you seen any other physician or provider since your last visit? yes - vASCULAR sURG, pAIN MANAGEMENT,    Have you had any other diagnostic tests since your last visit? yes - Labs, VL  Have you been seen in the emergency room and/or had an admission in a hospital since we last saw you?  no   Have you had your routine dental cleaning in the past 6 months?  no     Do you have an active MyChart account? If no, what is the barrier?   Yes    Patient Care Team:  Tori Giles DO as PCP - General (Family Medicine)  Tori Giles DO as PCP - [facility] [place] [facility]  Alex Paige MD as Consulting Physician (Gastroenterology)  Velasquez Gonzales MD as Consulting Physician (Cardiology)  Miriam Foss MD as Consulting Physician (Orthopedic Surgery)  Dakota Faria RN as Nurse Navigator    Medical History Review  Past Medical, Family, and Social History reviewed and does not contribute to the patient presenting condition    Health Maintenance   Topic Date Due    DTaP/Tdap/Td vaccine (1 - Tdap) Never done    Annual Wellness Visit (AWV)  Never done    COVID-19 Vaccine (5 - Booster) 12/13/2022    Lipids  05/10/2023    Depression Monitoring  06/03/2023    A1C test (Diabetic or Prediabetic)  11/26/2023    Colorectal Cancer Screen  11/28/2032    Flu vaccine  Completed    Shingles vaccine  Completed    Pneumococcal 0-64 years Vaccine  Completed    Hepatitis C screen  Addressed    HIV screen  Addressed    Hepatitis A vaccine  Aged Out    Hib vaccine  Aged Out    Meningococcal (ACWY) vaccine  Aged Out

## 2022-12-26 ENCOUNTER — HOSPITAL ENCOUNTER (OUTPATIENT)
Age: 62
Discharge: HOME | End: 2022-12-26
Payer: MEDICARE

## 2022-12-26 DIAGNOSIS — D50.0 IRON DEFICIENCY ANEMIA DUE TO CHRONIC BLOOD LOSS: ICD-10-CM

## 2022-12-26 LAB
ABSOLUTE EOS #: 0.2 K/UL (ref 0–0.4)
ABSOLUTE LYMPH #: 1.5 K/UL (ref 1–4.8)
ABSOLUTE MONO #: 0.5 K/UL (ref 0.1–1.3)
BASOPHILS # BLD: 1 % (ref 0–2)
BASOPHILS ABSOLUTE: 0 K/UL (ref 0–0.2)
EOSINOPHILS RELATIVE PERCENT: 4 % (ref 0–4)
HCT VFR BLD CALC: 35.9 % (ref 41–53)
HEMOGLOBIN: 11.7 G/DL (ref 13.5–17.5)
LYMPHOCYTES # BLD: 27 % (ref 24–44)
MCH RBC QN AUTO: 28 PG (ref 26–34)
MCHC RBC AUTO-ENTMCNC: 32.4 G/DL (ref 31–37)
MCV RBC AUTO: 86.4 FL (ref 80–100)
MONOCYTES # BLD: 9 % (ref 1–7)
PDW BLD-RTO: 18.3 % (ref 11.5–14.9)
PLATELET # BLD: 188 K/UL (ref 150–450)
PMV BLD AUTO: 7.1 FL (ref 6–12)
RBC # BLD: 4.16 M/UL (ref 4.5–5.9)
SEG NEUTROPHILS: 59 % (ref 36–66)
SEGMENTED NEUTROPHILS ABSOLUTE COUNT: 3.3 K/UL (ref 1.3–9.1)
WBC # BLD: 5.5 K/UL (ref 3.5–11)

## 2022-12-26 PROCEDURE — 85025 COMPLETE CBC W/AUTO DIFF WBC: CPT

## 2022-12-26 PROCEDURE — 36415 COLL VENOUS BLD VENIPUNCTURE: CPT

## 2022-12-28 ENCOUNTER — OFFICE VISIT (OUTPATIENT)
Dept: FAMILY MEDICINE CLINIC | Age: 62
End: 2022-12-28
Payer: MEDICARE

## 2022-12-28 VITALS
SYSTOLIC BLOOD PRESSURE: 107 MMHG | BODY MASS INDEX: 39.96 KG/M2 | DIASTOLIC BLOOD PRESSURE: 57 MMHG | WEIGHT: 269.8 LBS | HEIGHT: 69 IN | HEART RATE: 80 BPM

## 2022-12-28 DIAGNOSIS — Z00.00 INITIAL MEDICARE ANNUAL WELLNESS VISIT: Primary | ICD-10-CM

## 2022-12-28 PROCEDURE — 3078F DIAST BP <80 MM HG: CPT | Performed by: FAMILY MEDICINE

## 2022-12-28 PROCEDURE — 3017F COLORECTAL CA SCREEN DOC REV: CPT | Performed by: FAMILY MEDICINE

## 2022-12-28 PROCEDURE — 3074F SYST BP LT 130 MM HG: CPT | Performed by: FAMILY MEDICINE

## 2022-12-28 PROCEDURE — G0438 PPPS, INITIAL VISIT: HCPCS | Performed by: FAMILY MEDICINE

## 2022-12-28 PROCEDURE — G8482 FLU IMMUNIZE ORDER/ADMIN: HCPCS | Performed by: FAMILY MEDICINE

## 2022-12-28 ASSESSMENT — LIFESTYLE VARIABLES
HOW MANY STANDARD DRINKS CONTAINING ALCOHOL DO YOU HAVE ON A TYPICAL DAY: PATIENT DOES NOT DRINK
HOW OFTEN DO YOU HAVE A DRINK CONTAINING ALCOHOL: NEVER

## 2022-12-28 ASSESSMENT — PATIENT HEALTH QUESTIONNAIRE - PHQ9
SUM OF ALL RESPONSES TO PHQ QUESTIONS 1-9: 2
SUM OF ALL RESPONSES TO PHQ9 QUESTIONS 1 & 2: 1
4. FEELING TIRED OR HAVING LITTLE ENERGY: 1
10. IF YOU CHECKED OFF ANY PROBLEMS, HOW DIFFICULT HAVE THESE PROBLEMS MADE IT FOR YOU TO DO YOUR WORK, TAKE CARE OF THINGS AT HOME, OR GET ALONG WITH OTHER PEOPLE: 0
SUM OF ALL RESPONSES TO PHQ QUESTIONS 1-9: 2
9. THOUGHTS THAT YOU WOULD BE BETTER OFF DEAD, OR OF HURTING YOURSELF: 0
SUM OF ALL RESPONSES TO PHQ QUESTIONS 1-9: 2
6. FEELING BAD ABOUT YOURSELF - OR THAT YOU ARE A FAILURE OR HAVE LET YOURSELF OR YOUR FAMILY DOWN: 0
8. MOVING OR SPEAKING SO SLOWLY THAT OTHER PEOPLE COULD HAVE NOTICED. OR THE OPPOSITE, BEING SO FIGETY OR RESTLESS THAT YOU HAVE BEEN MOVING AROUND A LOT MORE THAN USUAL: 0
SUM OF ALL RESPONSES TO PHQ QUESTIONS 1-9: 2
5. POOR APPETITE OR OVEREATING: 0
1. LITTLE INTEREST OR PLEASURE IN DOING THINGS: 0
2. FEELING DOWN, DEPRESSED OR HOPELESS: 1
3. TROUBLE FALLING OR STAYING ASLEEP: 0
7. TROUBLE CONCENTRATING ON THINGS, SUCH AS READING THE NEWSPAPER OR WATCHING TELEVISION: 0

## 2022-12-28 NOTE — PROGRESS NOTES
Medicare Annual Wellness Visit    Randall Parnell is here for Medicare AWV (Annual)    Assessment & Plan   Initial Medicare annual wellness visit      Recommendations for Preventive Services Due: see orders and patient instructions/AVS.  Recommended screening schedule for the next 5-10 years is provided to the patient in written form: see Patient Instructions/AVS.     No follow-ups on file. Subjective   The following acute and/or chronic problems were also addressed today:  hypertension    Patient's complete Health Risk Assessment and screening values have been reviewed and are found in Flowsheets. The following problems were reviewed today and where indicated follow up appointments were made and/or referrals ordered. Positive Risk Factor Screenings with Interventions:       Cognitive:       Clock Drawing Test (CDT): Normal       Total Score Interpretation: Abnormal Mini-Cog      Interventions:  Patient declines any further evaluation or treatment             Weight and Activity:  Physical Activity: Insufficiently Active    Days of Exercise per Week: 2 days    Minutes of Exercise per Session: 10 min     On average, how many days per week do you engage in moderate to strenuous exercise (like a brisk walk)?: 2 days  Have you lost any weight without trying in the past 3 months?: No  Body mass index: (!) 39.84    Obesity Interventions:  Patient advised to follow-up in this office for further evaluation and treatment          Dentist Screen:  Have you seen the dentist within the past year?: (!) No    Intervention:  Advised to schedule with their dentist     Vision Screen:  Do you have difficulty driving, watching TV, or doing any of your daily activities because of your eyesight?: No  Have you had an eye exam within the past year?: (!) No  No results found.     Interventions:   Patient encouraged to make appointment with their eye specialist    Safety:  Do you have any tripping hazards - loose or unsecured carpets or rugs?: (!) Yes  Do you have either shower bars, grab bars, non-slip mats or non-slip surfaces in your shower or bathtub?: (!) No  Interventions:  Patient declined any further interventions or treatment    ADL's:   Patient reports needing help with:  Select all that apply: (!) Dressing (girlfriend helps with socks)  Interventions:  Patient declined any further interventions or treatment    Advanced Directives:  Do you have a Living Will?: (!) No    Intervention:                         Objective   Vitals:    12/28/22 1327   BP: (!) 107/57   Site: Right Upper Arm   Position: Sitting   Cuff Size: Large Adult   Pulse: 80   Weight: 269 lb 12.8 oz (122.4 kg)   Height: 5' 9\" (1.753 m)      Body mass index is 39.84 kg/m². No Known Allergies  Prior to Visit Medications    Medication Sig Taking? Authorizing Provider   furosemide (LASIX) 40 MG tablet Take 1 tablet by mouth daily Yes Jose Singer MD   gabapentin (NEURONTIN) 300 MG capsule Take 1 capsule by mouth 2 times daily for 30 days.  Intended supply: 30 days Yes Pietro Borges,    tiotropium (SPIRIVA RESPIMAT) 2.5 MCG/ACT AERS inhaler Inhale 2 puffs into the lungs daily Yes Maciel Kaiser MD   ferrous sulfate (IRON 325) 325 (65 Fe) MG tablet Take 1 tablet by mouth every other day Yes Maciel Kaiser MD   tiZANidine (ZANAFLEX) 4 MG tablet Take 1 tablet by mouth 2 times daily as needed (neck and back spasms) Yes Pietro Borges DO   amiodarone (CORDARONE) 200 MG tablet TAKE ONE TABLET BY MOUTH DAILY Yes Alexandra Castro DO   ELIQUIS 5 MG TABS tablet TAKE ONE TABLET BY MOUTH TWICE A DAY Yes Jamie Castro,    budesonide-formoterol (SYMBICORT) 160-4.5 MCG/ACT AERO INHALE TWO PUFFS BY MOUTH TWICE A DAY Yes Chiqui Clarke MD   aspirin 81 MG EC tablet Take 1 tablet by mouth daily Yes Chiqui Clarke MD   metoprolol tartrate (LOPRESSOR) 25 MG tablet Take 1 tablet by mouth 2 times daily Yes Chiqui Clarke MD   isosorbide mononitrate (IMDUR) 60 MG extended release tablet Take 1 tablet by mouth in the morning. Yes Historical Provider, MD   clopidogrel (PLAVIX) 75 MG tablet Take 1 tablet by mouth in the morning. Yes Margaret Hagen MD   nitroGLYCERIN (NITROSTAT) 0.4 MG SL tablet Place 1 tablet under the tongue every 5 minutes as needed for Chest pain (not to exceed 3 tabs at a time) Yes Gilberto Garcias DO   albuterol sulfate  (90 Base) MCG/ACT inhaler Inhale 2 puffs into the lungs every 6 hours as needed for Wheezing Yes Gilberto Garcias DO   simvastatin (ZOCOR) 80 MG tablet TAKE ONE TABLET BY MOUTH EVERY EVENING Yes Gilberto Garcias DO   guaiFENesin (MUCINEX) 600 MG extended release tablet Take 2 tablets by mouth 2 times daily  Patient not taking: Reported on 12/28/2022  Mei Chappell MD   gabapentin (NEURONTIN) 800 MG tablet Take 1 tablet by mouth every 8 hours for 30 days. Gilberto Garcias DO       CareTeam (Including outside providers/suppliers regularly involved in providing care):   Patient Care Team:  Gilberto Garcias DO as PCP - General (Family Medicine)  Gilberto Garcias DO as PCP - [facility] Empaneled Provider  Dayanna Mendez MD as Consulting Physician (Gastroenterology)  Roland Rinne, MD as Consulting Physician (Cardiology)  Lis Dickinson MD as Consulting Physician (Orthopedic Surgery)  Gordo Shay RN as Nurse Navigator     Reviewed and updated this visit:  Tobacco  Allergies  Meds  Med Hx  Surg Hx  Soc Hx  Fam Hx        I, Sandra Santana LPN, 31/29/[birth year]11, performed the documented evaluation under the direct supervision of the attending physician.

## 2022-12-28 NOTE — PATIENT INSTRUCTIONS
Personalized Preventive Plan for Ruth Ann Kaur - 12/28/2022  Medicare offers a range of preventive health benefits. Some of the tests and screenings are paid in full while other may be subject to a deductible, co-insurance, and/or copay. Some of these benefits include a comprehensive review of your medical history including lifestyle, illnesses that may run in your family, and various assessments and screenings as appropriate. After reviewing your medical record and screening and assessments performed today your provider may have ordered immunizations, labs, imaging, and/or referrals for you. A list of these orders (if applicable) as well as your Preventive Care list are included within your After Visit Summary for your review. Other Preventive Recommendations:    A preventive eye exam performed by an eye specialist is recommended every 1-2 years to screen for glaucoma; cataracts, macular degeneration, and other eye disorders. A preventive dental visit is recommended every 6 months. Try to get at least 150 minutes of exercise per week or 10,000 steps per day on a pedometer . Order or download the FREE \"Exercise & Physical Activity: Your Everyday Guide\" from [employer]. Call 8-479.124.8800 or search [employer] online. You need 7754-3086 mg of calcium and 9756-5045 IU of vitamin D per day. It is possible to meet your calcium requirement with diet alone, but a vitamin D supplement is usually necessary to meet this goal.  When exposed to the sun, use a sunscreen that protects against both UVA and UVB radiation with an SPF of 30 or greater. Reapply every 2 to 3 hours or after sweating, drying off with a towel, or swimming. Always wear a seat belt when traveling in a car. Always wear a helmet when riding a bicycle or motorcycle.

## 2023-01-03 ENCOUNTER — HOSPITAL ENCOUNTER (OUTPATIENT)
Age: 63
Discharge: HOME | End: 2023-01-03
Payer: MEDICARE

## 2023-01-03 DIAGNOSIS — D50.0 IRON DEFICIENCY ANEMIA DUE TO CHRONIC BLOOD LOSS: ICD-10-CM

## 2023-01-03 LAB
ABSOLUTE EOS #: 0.2 K/UL (ref 0–0.4)
ABSOLUTE LYMPH #: 2.2 K/UL (ref 1–4.8)
ABSOLUTE MONO #: 0.5 K/UL (ref 0.1–1.3)
BASOPHILS # BLD: 1 % (ref 0–2)
BASOPHILS ABSOLUTE: 0 K/UL (ref 0–0.2)
EOSINOPHILS RELATIVE PERCENT: 2 % (ref 0–4)
HCT VFR BLD CALC: 37.7 % (ref 41–53)
HEMOGLOBIN: 12.1 G/DL (ref 13.5–17.5)
LYMPHOCYTES # BLD: 30 % (ref 24–44)
MCH RBC QN AUTO: 27.9 PG (ref 26–34)
MCHC RBC AUTO-ENTMCNC: 32.2 G/DL (ref 31–37)
MCV RBC AUTO: 86.5 FL (ref 80–100)
MONOCYTES # BLD: 7 % (ref 1–7)
PDW BLD-RTO: 17.7 % (ref 11.5–14.9)
PLATELET # BLD: 257 K/UL (ref 150–450)
PMV BLD AUTO: 7.1 FL (ref 6–12)
RBC # BLD: 4.36 M/UL (ref 4.5–5.9)
SEG NEUTROPHILS: 60 % (ref 36–66)
SEGMENTED NEUTROPHILS ABSOLUTE COUNT: 4.3 K/UL (ref 1.3–9.1)
WBC # BLD: 7.1 K/UL (ref 3.5–11)

## 2023-01-03 PROCEDURE — 85025 COMPLETE CBC W/AUTO DIFF WBC: CPT

## 2023-01-03 RX ORDER — TIZANIDINE HYDROCHLORIDE 4 MG/1
CAPSULE, GELATIN COATED ORAL
Qty: 60 CAPSULE | Refills: 2 | Status: SHIPPED | OUTPATIENT
Start: 2023-01-03

## 2023-01-05 ENCOUNTER — HOSPITAL ENCOUNTER (OUTPATIENT)
Dept: VASCULAR LAB | Age: 63
Discharge: HOME | End: 2023-01-05
Payer: MEDICARE

## 2023-01-05 ENCOUNTER — HOSPITAL ENCOUNTER (OUTPATIENT)
Dept: NON INVASIVE DIAGNOSTICS | Age: 63
Discharge: HOME | End: 2023-01-05
Payer: MEDICARE

## 2023-01-05 DIAGNOSIS — M79.89 LEFT LEG SWELLING: ICD-10-CM

## 2023-01-05 DIAGNOSIS — R06.00 PND (PAROXYSMAL NOCTURNAL DYSPNEA): ICD-10-CM

## 2023-01-05 PROCEDURE — 93306 TTE W/DOPPLER COMPLETE: CPT

## 2023-01-05 PROCEDURE — 93970 EXTREMITY STUDY: CPT

## 2023-01-11 ENCOUNTER — TELEPHONE (OUTPATIENT)
Dept: VASCULAR SURGERY | Age: 63
End: 2023-01-11

## 2023-01-11 NOTE — TELEPHONE ENCOUNTER
----- Message from Jeremy Whittaker MD sent at 1/3/2023  6:57 AM EST -----  If cardiology wants him to stop it I would  ----- Message -----  From: Nanci Peacock MA  Sent: 12/23/2022  10:07 AM EST  To: Jeremy Whittaker MD, #    Patient called in regards to his amlodipine and was wondering if he can stop it, he stated that he did not think that you wanted him to stop it but Dr. Jourdan Young would like him to due to the leg swelling, please let me know

## 2023-01-15 DIAGNOSIS — M47.812 CERVICAL SPONDYLOSIS: ICD-10-CM

## 2023-01-15 DIAGNOSIS — M96.1 CERVICAL POST-LAMINECTOMY SYNDROME: ICD-10-CM

## 2023-01-15 DIAGNOSIS — M54.2 CERVICALGIA: ICD-10-CM

## 2023-01-16 RX ORDER — ASPIRIN 81 MG/1
TABLET, COATED ORAL
Qty: 30 TABLET | Refills: 3 | Status: SHIPPED | OUTPATIENT
Start: 2023-01-16

## 2023-01-16 RX ORDER — MELOXICAM 15 MG/1
TABLET ORAL
Qty: 30 TABLET | Refills: 3 | OUTPATIENT
Start: 2023-01-16

## 2023-01-16 RX ORDER — GABAPENTIN 300 MG/1
CAPSULE ORAL
Qty: 60 CAPSULE | Refills: 0 | Status: SHIPPED | OUTPATIENT
Start: 2023-02-16 | End: 2023-03-18

## 2023-01-16 NOTE — TELEPHONE ENCOUNTER
E-scribe request for med refills. Please review and e-scribe if applicable. Last Visit Date:  12/23/22  Next Visit Date:  1/15/2023    Hemoglobin A1C (%)   Date Value   11/26/2022 5.4   11/26/2022 5.1   01/10/2019 4.5             ( goal A1C is < 7)   Microalb/Crt. Ratio (mcg/mg creat)   Date Value   05/03/2014 Unable to calculate or report.      LDL Cholesterol (mg/dL)   Date Value   05/10/2022 179 (H)       (goal LDL is <100)   AST (U/L)   Date Value   11/26/2022 22     ALT (U/L)   Date Value   11/26/2022 16     BUN (mg/dL)   Date Value   11/29/2022 18     BP Readings from Last 3 Encounters:   12/28/22 (!) 107/57   12/19/22 110/86   12/14/22 135/64          (goal 120/80)        Patient Active Problem List:     Cervical stenosis of spine     Cervicalgia     COPD (chronic obstructive pulmonary disease) ([facility])     Smoker     Mild depression     GERD (gastroesophageal reflux disease)     Mixed hyperlipidemia     Carpal tunnel syndrome of left wrist     Medication monitoring encounter     DDD (degenerative disc disease), lumbar     Displacement of lumbar intervertebral disc without myelopathy     Therapeutic opioid induced constipation     Radiculopathy of cervical spine     Essential hypertension     Major depression, single episode, in complete remission ([facility])     Prediabetes     Lung nodule     Allergic sinusitis     Arthritis     Spinal stenosis of lumbar region without neurogenic claudication     Class 2 obesity with body mass index (BMI) of 39.0 to 39.9 in adult     Lumbosacral spondylosis without myelopathy     Encounter for long-term opiate analgesic use     Occlusion of right carotid artery     Left carotid stenosis     Sacroiliitis, not elsewhere classified ([facility] [address]     Recurrent UTI     BPH with urinary obstruction     Other retention of urine     Pneumonia     Atrial fibrillation with RVR ([facility])     Angina of effort ([facility])     Stenosis of right carotid artery     Status post carotid surgery     Chest pain Acute blood loss anemia     Cervical spondylosis     Cervical post-laminectomy syndrome     Lumbar spondylosis     Left leg swelling     PND (paroxysmal nocturnal dyspnea)     Apnea      ----Darreld Caprice

## 2023-01-16 NOTE — TELEPHONE ENCOUNTER
E-scribe request for med refills. Please review and e-scribe if applicable. Last Visit Date:  12/23/22  Next Visit Date:  1/23/2023    Hemoglobin A1C (%)   Date Value   11/26/2022 5.4   11/26/2022 5.1   01/10/2019 4.5             ( goal A1C is < 7)   Microalb/Crt. Ratio (mcg/mg creat)   Date Value   05/03/2014 Unable to calculate or report.      LDL Cholesterol (mg/dL)   Date Value   05/10/2022 179 (H)       (goal LDL is <100)   AST (U/L)   Date Value   11/26/2022 22     ALT (U/L)   Date Value   11/26/2022 16     BUN (mg/dL)   Date Value   11/29/2022 18     BP Readings from Last 3 Encounters:   12/28/22 (!) 107/57   12/19/22 110/86   12/14/22 135/64          (goal 120/80)        Patient Active Problem List:     Cervical stenosis of spine     Cervicalgia     COPD (chronic obstructive pulmonary disease) ([facility])     Smoker     Mild depression     GERD (gastroesophageal reflux disease)     Mixed hyperlipidemia     Carpal tunnel syndrome of left wrist     Medication monitoring encounter     DDD (degenerative disc disease), lumbar     Displacement of lumbar intervertebral disc without myelopathy     Therapeutic opioid induced constipation     Radiculopathy of cervical spine     Essential hypertension     Major depression, single episode, in complete remission ([facility])     Prediabetes     Lung nodule     Allergic sinusitis     Arthritis     Spinal stenosis of lumbar region without neurogenic claudication     Class 2 obesity with body mass index (BMI) of 39.0 to 39.9 in adult     Lumbosacral spondylosis without myelopathy     Encounter for long-term opiate analgesic use     Occlusion of right carotid artery     Left carotid stenosis     Sacroiliitis, not elsewhere classified ([facility] [address]     Recurrent UTI     BPH with urinary obstruction     Other retention of urine     Pneumonia     Atrial fibrillation with RVR ([facility])     Angina of effort ([facility])     Stenosis of right carotid artery     Status post carotid surgery     Chest pain Acute blood loss anemia     Cervical spondylosis     Cervical post-laminectomy syndrome     Lumbar spondylosis     Left leg swelling     PND (paroxysmal nocturnal dyspnea)     Apnea      ----Joel Del Valle

## 2023-01-21 DIAGNOSIS — M79.89 LEFT LEG SWELLING: ICD-10-CM

## 2023-01-23 ENCOUNTER — TELEPHONE (OUTPATIENT)
Dept: FAMILY MEDICINE CLINIC | Age: 63
End: 2023-01-23

## 2023-01-23 ENCOUNTER — OFFICE VISIT (OUTPATIENT)
Dept: FAMILY MEDICINE CLINIC | Age: 63
End: 2023-01-23
Payer: MEDICARE

## 2023-01-23 VITALS
WEIGHT: 271.8 LBS | HEIGHT: 69 IN | OXYGEN SATURATION: 96 % | HEART RATE: 89 BPM | SYSTOLIC BLOOD PRESSURE: 110 MMHG | TEMPERATURE: 95.4 F | DIASTOLIC BLOOD PRESSURE: 60 MMHG | BODY MASS INDEX: 40.26 KG/M2

## 2023-01-23 DIAGNOSIS — F32.5 MAJOR DEPRESSION, SINGLE EPISODE, IN COMPLETE REMISSION (HCC): ICD-10-CM

## 2023-01-23 DIAGNOSIS — I10 ESSENTIAL HYPERTENSION: Primary | ICD-10-CM

## 2023-01-23 DIAGNOSIS — M79.89 LEG SWELLING: ICD-10-CM

## 2023-01-23 DIAGNOSIS — M79.89 LEFT LEG SWELLING: ICD-10-CM

## 2023-01-23 DIAGNOSIS — N40.1 BENIGN PROSTATIC HYPERPLASIA WITH INCOMPLETE BLADDER EMPTYING: ICD-10-CM

## 2023-01-23 DIAGNOSIS — I20.8 ANGINA OF EFFORT (HCC): ICD-10-CM

## 2023-01-23 DIAGNOSIS — M46.1 SACROILIITIS, NOT ELSEWHERE CLASSIFIED (HCC): ICD-10-CM

## 2023-01-23 DIAGNOSIS — E66.01 OBESITY, CLASS III, BMI 40-49.9 (MORBID OBESITY) (HCC): ICD-10-CM

## 2023-01-23 DIAGNOSIS — J41.0 SIMPLE CHRONIC BRONCHITIS (HCC): ICD-10-CM

## 2023-01-23 DIAGNOSIS — R39.14 BENIGN PROSTATIC HYPERPLASIA WITH INCOMPLETE BLADDER EMPTYING: ICD-10-CM

## 2023-01-23 DIAGNOSIS — D64.9 ANEMIA, UNSPECIFIED TYPE: ICD-10-CM

## 2023-01-23 DIAGNOSIS — I48.91 ATRIAL FIBRILLATION, UNSPECIFIED TYPE (HCC): ICD-10-CM

## 2023-01-23 PROCEDURE — 93922 UPR/L XTREMITY ART 2 LEVELS: CPT | Performed by: FAMILY MEDICINE

## 2023-01-23 PROCEDURE — 99211 OFF/OP EST MAY X REQ PHY/QHP: CPT | Performed by: FAMILY MEDICINE

## 2023-01-23 RX ORDER — AMLODIPINE BESYLATE 5 MG/1
TABLET ORAL
COMMUNITY
Start: 2022-12-10 | End: 2023-01-23

## 2023-01-23 RX ORDER — MELOXICAM 15 MG/1
TABLET ORAL
COMMUNITY
Start: 2022-12-17

## 2023-01-23 RX ORDER — FUROSEMIDE 40 MG/1
TABLET ORAL
Qty: 90 TABLET | Refills: 3 | Status: SHIPPED | OUTPATIENT
Start: 2023-01-23

## 2023-01-23 RX ORDER — LISINOPRIL 10 MG/1
10 TABLET ORAL DAILY
Qty: 90 TABLET | Refills: 3 | Status: SHIPPED | OUTPATIENT
Start: 2023-01-23

## 2023-01-23 RX ORDER — FUROSEMIDE 40 MG/1
TABLET ORAL
Qty: 30 TABLET | Refills: 0 | Status: SHIPPED | OUTPATIENT
Start: 2023-01-23 | End: 2023-01-23 | Stop reason: SDUPTHER

## 2023-01-23 NOTE — PROGRESS NOTES
Amlodipone -causing swelling in his legs. Discussed list of concerns  AF questions-is concerned he will have this rest of his life, we discussed ongoing treatment with anticoagulation Eliquis  Poor sleep-up-and-down at night, not sure if it is breathing related or urinary symptoms. Discussed need for PSA assessment and possible addition of Uroxatrol. Up and down all night long. Doppler interest on leg arteries-patient states he is anxious and wants to see if he has circulation problems in his legs. Negative for:     Worry / mood complaints  Headache  Dizziness  Visual Disturbance  Hearing Changes  Nasal / sinus Symptoms  Mouth / tooth symptom, pain  Throat pain  Difficulty swallowing  Neck pain  Chest discomfort  Cough  SOB  N/V/D/C  Pelvic area discomfort  Bladder / voiding discomfort  Bowel complaints  MS complaints   Numbness/tingling/abnormal sensations   Edema / Leg swelling  Dizziness  Fatigue  Bleeding   Skin    Pertinent Pos: See HPI -     Vitals:    01/23/23 1407   BP: 110/60   Pulse:    Temp:    SpO2:      Weight stable - 271, 255 in November    Alert and oriented to [facility]  NAD    HEENT - neg  Neck - no bruits, no lymphadenopathy  Chest  HRRR w/o murmer  LCTAB no wheezes / rhonchi  Extremities -1-2+ PTE    Gait / Station - stable, no dysequilibrium, uniform pace, no assist device, cane. Diagnosis Orders   1. Essential hypertension  furosemide (LASIX) 40 MG tablet    lisinopril (PRINIVIL;ZESTRIL) 10 MG tablet      2. Left leg swelling  furosemide (LASIX) 40 MG tablet      3. Obesity, Class III, BMI 40-49.9 (morbid obesity) ([address]        4. Sacroiliitis, not elsewhere classified ([address]        5. Simple chronic bronchitis ([address]        6. Major depression, single episode, in complete remission ([address]        7. Atrial fibrillation, unspecified type (HCC)  apixaban (ELIQUIS) 5 MG TABS tablet      8. Angina of effort ([address]        9.  Leg swelling  [address] - MI Non-Invas Physiologic STD Extremity ART 1-2 Level      10. Anemia, unspecified type  CBC with Auto Differential      11. Benign prostatic hyperplasia with incomplete bladder emptying  PSA Screening          Plan:  1.)  discussed sleep evaluation-patient not interested at this time  2.)  worry persists-admits to being a chronic worrier  3.)  monitor anemia-recent hemoglobin levels stable in 12 g/dL's area  4.)  consider uroxatrol after PSA test results return    Begin monitoring on a 6 to 12-week basis.

## 2023-01-23 NOTE — PATIENT INSTRUCTIONS
Thank you for letting us take care of you today. We hope all your questions were addressed. If a question was overlooked or something else comes to mind after you return home, please contact a member of your Care Team listed below. Your Care Team at [place] [address] is Team #2  Hernando Wellington DO (Faculty)  Erick Damon (Faculty)  Jonathan Castro MD (Resident)  Tricia Casper MD (Resident)  Placido Goldmann, MD (Resident)  Mariangel Gleason MD (Resident)  Feliberto Gitelman., TOBIN Pedraza.,  MA  Sariah Samaniego., ASHLEYN  Daphne Galina., [facility] office)  Santiago Ho, [address] [facility] [address] (Clinical Practice Manager)  Colette York, [facility] (Clinical Pharmacist)     Office phone number: [phone]    If you need to get in right away due to illness, please be advised we have \"Same Day\" appointments available Monday-Friday. Please call us at [phone] option #3 to schedule your \"Same Day\" appointment.

## 2023-01-23 NOTE — TELEPHONE ENCOUNTER
E-scribe request for med refill. Please review and e-scribe if applicable. Last Visit Date:  12/23/2022  Next Visit Date:  1/23/2023    Hemoglobin A1C (%)   Date Value   11/26/2022 5.4   11/26/2022 5.1   01/10/2019 4.5             ( goal A1C is < 7)   Microalb/Crt. Ratio (mcg/mg creat)   Date Value   05/03/2014 Unable to calculate or report.      LDL Cholesterol (mg/dL)   Date Value   05/10/2022 179 (H)       (goal LDL is <100)   AST (U/L)   Date Value   11/26/2022 22     ALT (U/L)   Date Value   11/26/2022 16     BUN (mg/dL)   Date Value   11/29/2022 18     BP Readings from Last 3 Encounters:   12/28/22 (!) 107/57   12/19/22 110/86   12/14/22 135/64          (goal 120/80)        Patient Active Problem List:     Cervical stenosis of spine     Cervicalgia     COPD (chronic obstructive pulmonary disease) (HCC)     Smoker     Mild depression     GERD (gastroesophageal reflux disease)     Mixed hyperlipidemia     Carpal tunnel syndrome of left wrist     Medication monitoring encounter     DDD (degenerative disc disease), lumbar     Displacement of lumbar intervertebral disc without myelopathy     Therapeutic opioid induced constipation     Radiculopathy of cervical spine     Essential hypertension     Major depression, single episode, in complete remission ([facility])     Prediabetes     Lung nodule     Allergic sinusitis     Arthritis     Spinal stenosis of lumbar region without neurogenic claudication     Class 2 obesity with body mass index (BMI) of 39.0 to 39.9 in adult     Lumbosacral spondylosis without myelopathy     Encounter for long-term opiate analgesic use     Occlusion of right carotid artery     Left carotid stenosis     Sacroiliitis, not elsewhere classified ([facility] [address]     Recurrent UTI     BPH with urinary obstruction     Other retention of urine     Pneumonia     Atrial fibrillation with RVR (HCC)     Angina of effort ([facility])     Stenosis of right carotid artery     Status post carotid surgery     Chest pain Acute blood loss anemia     Cervical spondylosis     Cervical post-laminectomy syndrome     Lumbar spondylosis     Left leg swelling     PND (paroxysmal nocturnal dyspnea)     Apnea      ----Juan Ion

## 2023-01-23 NOTE — PROGRESS NOTES
HYPERTENSION visit     BP Readings from Last 3 Encounters:   12/28/22 (!) 107/57   12/19/22 110/86   12/14/22 135/64       LDL Cholesterol (mg/dL)   Date Value   05/10/2022 179 (H)     HDL (mg/dL)   Date Value   05/10/2022 36 (L)     BUN (mg/dL)   Date Value   11/29/2022 18     Creatinine (mg/dL)   Date Value   11/29/2022 0.58 (L)     Glucose (mg/dL)   Date Value   11/29/2022 191 (H)   02/10/2012 99              Have you changed or started any medications since your last visit including any over-the-counter medicines, vitamins, or herbal medicines? no   Have you stopped taking any of your medications? Is so, why? -  yes - see list  Are you having any side effects from any of your medications? - no  How often do you miss doses of your medication? rare      Have you seen any other physician or provider since your last visit?  no   Have you had any other diagnostic tests since your last visit?  no   Have you been seen in the emergency room and/or had an admission in a hospital since we last saw you?  no   Have you had your routine dental cleaning in the past 6 months?  no     Do you have an active MyChart account? If no, what is the barrier?   Yes    Patient Care Team:  Arvin Willoughby DO as PCP - General (Family Medicine)  Arvin Willoughby DO as PCP - [facility]  Laure Johnson MD as Consulting Physician (Gastroenterology)  Jerardo Esparza MD as Consulting Physician (Cardiology)  Luigi Grimaldo MD as Consulting Physician (Orthopedic Surgery)  Renetta Olson RN as Nurse Navigator    Medical History Review  Past Medical, Family, and Social History reviewed and does not contribute to the patient presenting condition    Health Maintenance   Topic Date Due    DTaP/Tdap/Td vaccine (1 - Tdap) Never done    COVID-19 Vaccine (5 - Booster) 12/13/2022    Lipids  05/10/2023    A1C test (Diabetic or Prediabetic)  11/26/2023    Depression Monitoring  12/28/2023    Annual Wellness Visit (AWV)  12/29/2023 Colorectal Cancer Screen  11/28/2032    Flu vaccine  Completed    Shingles vaccine  Completed    Pneumococcal 0-64 years Vaccine  Completed    Hepatitis C screen  Addressed    HIV screen  Addressed    Hepatitis A vaccine  Aged Out    Hib vaccine  Aged Out    Meningococcal (ACWY) vaccine  Aged Out

## 2023-01-23 NOTE — PROGRESS NOTES
Visit Information    Have you changed or started any medications since your last visit including any over-the-counter medicines, vitamins, or herbal medicines? no   Have you stopped taking any of your medications? Is so, why? -  yes - see list  Are you having any side effects from any of your medications? - no    Have you seen any other physician or provider since your last visit?  no   Have you had any other diagnostic tests since your last visit?  no   Have you been seen in the emergency room and/or had an admission in a hospital since we last saw you?  no   Have you had your routine dental cleaning in the past 6 months?  no     Do you have an active MyChart account? If no, what is the barrier?   Yes    Patient Care Team:  Ananth Dubon DO as PCP - General (Family Medicine)  Ananth Dubon DO as PCP - [facility] Provider  Sabina Wilkerson MD as Consulting Physician (Gastroenterology)  Kuldeep Cole MD as Consulting Physician (Cardiology)  Drucella Meigs, MD as Consulting Physician (Orthopedic Surgery)  Hamzah Perkins RN as Nurse Navigator    Medical History Review  Past Medical, Family, and Social History reviewed and does not contribute to the patient presenting condition    Health Maintenance   Topic Date Due    DTaP/Tdap/Td vaccine (1 - Tdap) Never done    COVID-19 Vaccine (5 - Booster) 12/13/2022    Lipids  05/10/2023    A1C test (Diabetic or Prediabetic)  11/26/2023    Depression Monitoring  12/28/2023    Annual Wellness Visit (AWV)  12/29/2023    Colorectal Cancer Screen  11/28/2032    Flu vaccine  Completed    Shingles vaccine  Completed    Pneumococcal 0-64 years Vaccine  Completed    Hepatitis C screen  Addressed    HIV screen  Addressed    Hepatitis A vaccine  Aged Out    Hib vaccine  Aged Out    Meningococcal (ACWY) vaccine  Aged Out

## 2023-01-26 RX ORDER — AMIODARONE HYDROCHLORIDE 200 MG/1
TABLET ORAL
Qty: 30 TABLET | Refills: 2 | Status: SHIPPED | OUTPATIENT
Start: 2023-01-26

## 2023-01-26 NOTE — TELEPHONE ENCOUNTER
E-scribe request for med refill. Please review and e-scribe if applicable. Last Visit Date:  01/23/23  Next Visit Date:  3/10/2023    Hemoglobin A1C (%)   Date Value   11/26/2022 5.4   11/26/2022 5.1   01/10/2019 4.5             ( goal A1C is < 7)   Microalb/Crt. Ratio (mcg/mg creat)   Date Value   05/03/2014 Unable to calculate or report.      LDL Cholesterol (mg/dL)   Date Value   05/10/2022 179 (H)       (goal LDL is <100)   AST (U/L)   Date Value   11/26/2022 22     ALT (U/L)   Date Value   11/26/2022 16     BUN (mg/dL)   Date Value   11/29/2022 18     BP Readings from Last 3 Encounters:   01/23/23 110/60   12/28/22 (!) 107/57   12/19/22 110/86          (goal 120/80)        Patient Active Problem List:     Cervical stenosis of spine     Cervicalgia     COPD (chronic obstructive pulmonary disease) (HCC)     Smoker     Mild depression     GERD (gastroesophageal reflux disease)     Mixed hyperlipidemia     Carpal tunnel syndrome of left wrist     Medication monitoring encounter     DDD (degenerative disc disease), lumbar     Displacement of lumbar intervertebral disc without myelopathy     Therapeutic opioid induced constipation     Radiculopathy of cervical spine     Essential hypertension     Major depression, single episode, in complete remission ([facility])     Prediabetes     Lung nodule     Allergic sinusitis     Arthritis     Spinal stenosis of lumbar region without neurogenic claudication     Class 2 obesity with body mass index (BMI) of 39.0 to 39.9 in adult     Lumbosacral spondylosis without myelopathy     Encounter for long-term opiate analgesic use     Occlusion of right carotid artery     Left carotid stenosis     Sacroiliitis, not elsewhere classified ([facility] [address]     Recurrent UTI     BPH with urinary obstruction     Other retention of urine     Pneumonia     Atrial fibrillation with RVR ([facility])     Angina of effort ([facility])     Stenosis of right carotid artery     Status post carotid surgery     Chest pain Acute blood loss anemia     Cervical spondylosis     Cervical post-laminectomy syndrome     Lumbar spondylosis     Left leg swelling     PND (paroxysmal nocturnal dyspnea)     Apnea      ----Kd Meade

## 2023-01-28 DIAGNOSIS — I65.21 OCCLUSION OF RIGHT CAROTID ARTERY: ICD-10-CM

## 2023-01-30 RX ORDER — CLOPIDOGREL BISULFATE 75 MG/1
TABLET ORAL
Qty: 90 TABLET | Refills: 1 | OUTPATIENT
Start: 2023-01-30

## 2023-02-03 ENCOUNTER — TELEPHONE (OUTPATIENT)
Dept: FAMILY MEDICINE CLINIC | Age: 63
End: 2023-02-03

## 2023-02-03 ENCOUNTER — CLINICAL DOCUMENTATION (OUTPATIENT)
Dept: FAMILY MEDICINE CLINIC | Age: 63
End: 2023-02-03

## 2023-02-03 DIAGNOSIS — I73.9 CLAUDICATION OF LOWER EXTREMITY (HCC): Primary | ICD-10-CM

## 2023-02-03 DIAGNOSIS — N40.0 BENIGN PROSTATIC HYPERPLASIA, UNSPECIFIED WHETHER LOWER URINARY TRACT SYMPTOMS PRESENT: Primary | ICD-10-CM

## 2023-02-03 DIAGNOSIS — I65.21 OCCLUSION OF RIGHT CAROTID ARTERY: ICD-10-CM

## 2023-02-03 DIAGNOSIS — Z86.79 HX OF PERIPHERAL VASCULAR DISEASE: ICD-10-CM

## 2023-02-03 DIAGNOSIS — Z12.5 ENCOUNTER FOR SCREENING FOR MALIGNANT NEOPLASM OF PROSTATE: ICD-10-CM

## 2023-02-03 RX ORDER — CLOPIDOGREL BISULFATE 75 MG/1
TABLET ORAL
Qty: 90 TABLET | Refills: 1 | OUTPATIENT
Start: 2023-02-03

## 2023-02-03 NOTE — TELEPHONE ENCOUNTER
Patient called office asking about order to have test done on his arteries in his legs. He had the VL done of his veins, but needs order for arteries.

## 2023-02-09 ENCOUNTER — TELEPHONE (OUTPATIENT)
Dept: FAMILY MEDICINE CLINIC | Age: 63
End: 2023-02-09

## 2023-02-09 ENCOUNTER — HOSPITAL ENCOUNTER (OUTPATIENT)
Age: 63
Discharge: HOME | End: 2023-02-09
Payer: MEDICARE

## 2023-02-09 DIAGNOSIS — R39.14 BENIGN PROSTATIC HYPERPLASIA WITH INCOMPLETE BLADDER EMPTYING: ICD-10-CM

## 2023-02-09 DIAGNOSIS — I73.9 CLAUDICATION OF LOWER EXTREMITY (HCC): ICD-10-CM

## 2023-02-09 DIAGNOSIS — N40.1 BENIGN PROSTATIC HYPERPLASIA WITH INCOMPLETE BLADDER EMPTYING: ICD-10-CM

## 2023-02-09 DIAGNOSIS — Z86.79 HX OF PERIPHERAL VASCULAR DISEASE: Primary | ICD-10-CM

## 2023-02-09 DIAGNOSIS — D64.9 ANEMIA, UNSPECIFIED TYPE: ICD-10-CM

## 2023-02-09 LAB
ABSOLUTE EOS #: 0.21 K/UL (ref 0–0.4)
ABSOLUTE LYMPH #: 2.48 K/UL (ref 1–4.8)
ABSOLUTE MONO #: 0.62 K/UL (ref 0.1–1.3)
BASOPHILS # BLD: 1 % (ref 0–2)
BASOPHILS ABSOLUTE: 0.07 K/UL (ref 0–0.2)
EOSINOPHILS RELATIVE PERCENT: 3 % (ref 0–4)
HCT VFR BLD AUTO: 39.7 % (ref 41–53)
HGB BLD-MCNC: 13.2 G/DL (ref 13.5–17.5)
LYMPHOCYTES # BLD: 36 % (ref 24–44)
MCH RBC QN AUTO: 28.7 PG (ref 26–34)
MCHC RBC AUTO-ENTMCNC: 33.3 G/DL (ref 31–37)
MCV RBC AUTO: 86.3 FL (ref 80–100)
MONOCYTES # BLD: 9 % (ref 1–7)
MORPHOLOGY: ABNORMAL
PDW BLD-RTO: 15.9 % (ref 11.5–14.9)
PLATELET # BLD AUTO: 229 K/UL (ref 150–450)
PMV BLD AUTO: 7.4 FL (ref 6–12)
PROSTATE SPECIFIC ANTIGEN: 3.78 NG/ML
RBC # BLD: 4.6 M/UL (ref 4.5–5.9)
SEG NEUTROPHILS: 51 % (ref 36–66)
SEGMENTED NEUTROPHILS ABSOLUTE COUNT: 3.52 K/UL (ref 1.3–9.1)
WBC # BLD AUTO: 6.9 K/UL (ref 3.5–11)

## 2023-02-09 PROCEDURE — G0103 PSA SCREENING: HCPCS

## 2023-02-09 PROCEDURE — 85025 COMPLETE CBC W/AUTO DIFF WBC: CPT

## 2023-02-09 PROCEDURE — 36415 COLL VENOUS BLD VENIPUNCTURE: CPT

## 2023-02-09 NOTE — TELEPHONE ENCOUNTER
Hospital called stating they needed a correction to order. Writer was given the correct order with diagnosis. Please sign order and discontinue incorrect order.

## 2023-02-09 NOTE — TELEPHONE ENCOUNTER
Order placed for PSA again at lab and MA team request due to issues with diagnoses attached order. Attached BPH and screening for prostate malignancy to order. MA team informed writer that per lab order successfully changed.

## 2023-02-10 ENCOUNTER — HOSPITAL ENCOUNTER (OUTPATIENT)
Dept: VASCULAR LAB | Age: 63
Discharge: HOME | End: 2023-02-10
Payer: MEDICARE

## 2023-02-10 ENCOUNTER — TELEPHONE (OUTPATIENT)
Dept: FAMILY MEDICINE CLINIC | Age: 63
End: 2023-02-10

## 2023-02-10 DIAGNOSIS — I73.9 CLAUDICATION OF LOWER EXTREMITY (HCC): ICD-10-CM

## 2023-02-10 DIAGNOSIS — Z86.79 HX OF PERIPHERAL VASCULAR DISEASE: ICD-10-CM

## 2023-02-10 PROCEDURE — 93922 UPR/L XTREMITY ART 2 LEVELS: CPT

## 2023-02-10 NOTE — TELEPHONE ENCOUNTER
----- Message from Sadaf Bush DO sent at 2/10/2023  1:48 PM EST -----  Results reviewed are normal.    Please advise patient.

## 2023-02-13 DIAGNOSIS — M54.2 CERVICALGIA: ICD-10-CM

## 2023-02-13 DIAGNOSIS — M47.812 CERVICAL SPONDYLOSIS: ICD-10-CM

## 2023-02-13 DIAGNOSIS — M96.1 CERVICAL POST-LAMINECTOMY SYNDROME: ICD-10-CM

## 2023-02-13 RX ORDER — GABAPENTIN 300 MG/1
CAPSULE ORAL
Qty: 60 CAPSULE | Refills: 2 | Status: SHIPPED | OUTPATIENT
Start: 2023-02-13 | End: 2023-03-15

## 2023-02-13 NOTE — TELEPHONE ENCOUNTER
E-scribe request for med refills. Please review and e-scribe if applicable. Last Visit Date:  1/23/23  Next Visit Date:  3/10/2023    Hemoglobin A1C (%)   Date Value   11/26/2022 5.4   11/26/2022 5.1   01/10/2019 4.5             ( goal A1C is < 7)   Microalb/Crt. Ratio (mcg/mg creat)   Date Value   05/03/2014 Unable to calculate or report.      LDL Cholesterol (mg/dL)   Date Value   05/10/2022 179 (H)       (goal LDL is <100)   AST (U/L)   Date Value   11/26/2022 22     ALT (U/L)   Date Value   11/26/2022 16     BUN (mg/dL)   Date Value   11/29/2022 18     BP Readings from Last 3 Encounters:   01/23/23 110/60   12/28/22 (!) 107/57   12/19/22 110/86          (goal 120/80)        Patient Active Problem List:     Cervical stenosis of spine     Cervicalgia     COPD (chronic obstructive pulmonary disease) (HCC)     Smoker     Mild depression     GERD (gastroesophageal reflux disease)     Mixed hyperlipidemia     Carpal tunnel syndrome of left wrist     Medication monitoring encounter     DDD (degenerative disc disease), lumbar     Displacement of lumbar intervertebral disc without myelopathy     Therapeutic opioid induced constipation     Radiculopathy of cervical spine     Essential hypertension     Major depression, single episode, in complete remission ([facility])     Prediabetes     Lung nodule     Allergic sinusitis     Arthritis     Spinal stenosis of lumbar region without neurogenic claudication     Class 2 obesity with body mass index (BMI) of 39.0 to 39.9 in adult     Lumbosacral spondylosis without myelopathy     Encounter for long-term opiate analgesic use     Occlusion of right carotid artery     Left carotid stenosis     Sacroiliitis, not elsewhere classified ([facility] [address]     Recurrent UTI     BPH with urinary obstruction     Other retention of urine     Pneumonia     Atrial fibrillation with RVR ([facility])     Angina of effort ([facility])     Stenosis of right carotid artery     Status post carotid surgery     Chest pain Acute blood loss anemia     Cervical spondylosis     Cervical post-laminectomy syndrome     Lumbar spondylosis     Left leg swelling     PND (paroxysmal nocturnal dyspnea)     Apnea      ----Reymundo Ambrose

## 2023-02-15 DIAGNOSIS — I65.21 OCCLUSION OF RIGHT CAROTID ARTERY: ICD-10-CM

## 2023-02-16 DIAGNOSIS — M79.89 LEFT LEG SWELLING: ICD-10-CM

## 2023-02-16 DIAGNOSIS — I65.21 OCCLUSION OF RIGHT CAROTID ARTERY: ICD-10-CM

## 2023-02-16 DIAGNOSIS — I10 ESSENTIAL HYPERTENSION: ICD-10-CM

## 2023-02-16 RX ORDER — CLOPIDOGREL BISULFATE 75 MG/1
75 TABLET ORAL DAILY
Qty: 90 TABLET | Refills: 1 | Status: CANCELLED | OUTPATIENT
Start: 2023-02-16

## 2023-02-16 RX ORDER — CLOPIDOGREL BISULFATE 75 MG/1
TABLET ORAL
Qty: 90 TABLET | Refills: 1 | OUTPATIENT
Start: 2023-02-16

## 2023-02-16 NOTE — TELEPHONE ENCOUNTER
Last visit: 1/23/23  Last Med refill:   Does patient have enough medication for 72 hours: No: pt Cardiologist is out of town and he is requesting refill for Clopidogrel    Next Visit Date:  Future Appointments   Date Time Provider Lucie Sharlene   3/10/2023  9:30 AM DO [place]   6/19/2023  9:30 AM Raji Molina MD [place] [facility] [address] Maintenance   Topic Date Due    DTaP/Tdap/Td vaccine (1 - Tdap) Never done    COVID-19 Vaccine (5 - Booster) 12/13/2022    Lipids  05/10/2023    A1C test (Diabetic or Prediabetic)  11/26/2023    Depression Monitoring  12/28/2023    Annual Wellness Visit (AWV)  12/29/2023    Colorectal Cancer Screen  11/28/2032    Flu vaccine  Completed    Shingles vaccine  Completed    Pneumococcal 0-64 years Vaccine  Completed    Hepatitis C screen  Addressed    HIV screen  Addressed    Hepatitis A vaccine  Aged Out    Hib vaccine  Aged Out    Meningococcal (ACWY) vaccine  Aged Out       Hemoglobin A1C (%)   Date Value   11/26/2022 5.4   11/26/2022 5.1   01/10/2019 4.5             ( goal A1C is < 7)   Microalb/Crt. Ratio (mcg/mg creat)   Date Value   05/03/2014 Unable to calculate or report.      LDL Cholesterol (mg/dL)   Date Value   05/10/2022 179 (H)   11/17/2020 169 (H)       (goal LDL is <100)   AST (U/L)   Date Value   11/26/2022 22     ALT (U/L)   Date Value   11/26/2022 16     BUN (mg/dL)   Date Value   11/29/2022 18     BP Readings from Last 3 Encounters:   01/23/23 110/60   12/28/22 (!) 107/57   12/19/22 110/86          (goal 120/80)    All Future Testing planned in [facility]  Lab Frequency Next Occurrence   Low Dose Chest CT -Abnormal Lung Screen Follow up Once 04/22/2022   CT lung screen [Initial/Annual] Once 05/04/2022   Hemoglobin A1C Once 10/03/2022   Lipid Panel Once 10/03/2022   VL DUP CAROTID BILATERAL Once 06/19/2023   Baseline Diagnostic Sleep Study Once 12/30/2022   PSA Screening Once 02/09/2023   Nasal cannula oxygen PRN    Nasal cannula oxygen PRN    Nasal cannula oxygen PRN    CBC with Auto Differential Every 4 Weeks 2/17/2023, 3/17/2023, 4/14/2023               Patient Active Problem List:     Cervical stenosis of spine     Cervicalgia     COPD (chronic obstructive pulmonary disease) ([facility])     Smoker     Mild depression     GERD (gastroesophageal reflux disease)     Mixed hyperlipidemia     Carpal tunnel syndrome of left wrist     Medication monitoring encounter     DDD (degenerative disc disease), lumbar     Displacement of lumbar intervertebral disc without myelopathy     Therapeutic opioid induced constipation     Radiculopathy of cervical spine     Essential hypertension     Major depression, single episode, in complete remission ([address]     Prediabetes     Lung nodule     Allergic sinusitis     Arthritis     Spinal stenosis of lumbar region without neurogenic claudication     Class 2 obesity with body mass index (BMI) of 39.0 to 39.9 in adult     Lumbosacral spondylosis without myelopathy     Encounter for long-term opiate analgesic use     Occlusion of right carotid artery     Left carotid stenosis     Sacroiliitis, not elsewhere classified ([address]     Recurrent UTI     BPH with urinary obstruction     Other retention of urine     Pneumonia     Atrial fibrillation with RVR ([facility])     Angina of effort ([facility])     Stenosis of right carotid artery     Status post carotid surgery     Chest pain     Acute blood loss anemia     Cervical spondylosis     Cervical post-laminectomy syndrome     Lumbar spondylosis     Left leg swelling     PND (paroxysmal nocturnal dyspnea)     Apnea

## 2023-02-17 RX ORDER — FUROSEMIDE 40 MG/1
TABLET ORAL
Qty: 90 TABLET | Refills: 3 | OUTPATIENT
Start: 2023-02-17

## 2023-02-17 RX ORDER — CLOPIDOGREL BISULFATE 75 MG/1
75 TABLET ORAL DAILY
Qty: 90 TABLET | Refills: 1 | OUTPATIENT
Start: 2023-02-17

## 2023-02-20 DIAGNOSIS — I65.21 OCCLUSION OF RIGHT CAROTID ARTERY: ICD-10-CM

## 2023-02-21 RX ORDER — CLOPIDOGREL BISULFATE 75 MG/1
TABLET ORAL
Qty: 90 TABLET | Refills: 1 | OUTPATIENT
Start: 2023-02-21

## 2023-02-23 ENCOUNTER — TELEPHONE (OUTPATIENT)
Dept: VASCULAR SURGERY | Age: 63
End: 2023-02-23

## 2023-02-23 DIAGNOSIS — I65.23 CAROTID STENOSIS, ASYMPTOMATIC, BILATERAL: Primary | ICD-10-CM

## 2023-02-23 DIAGNOSIS — I65.21 OCCLUSION OF RIGHT CAROTID ARTERY: ICD-10-CM

## 2023-02-23 RX ORDER — CLOPIDOGREL BISULFATE 75 MG/1
TABLET ORAL
Qty: 90 TABLET | Refills: 1 | OUTPATIENT
Start: 2023-02-23

## 2023-02-23 RX ORDER — CLOPIDOGREL BISULFATE 75 MG/1
75 TABLET ORAL DAILY
Qty: 180 TABLET | Refills: 5 | Status: SHIPPED | OUTPATIENT
Start: 2023-02-23

## 2023-02-23 NOTE — TELEPHONE ENCOUNTER
----- Message from Wendi Langley, [address] sent at 2/22/2023  2:03 PM EST -----  Patient is out of Plavix can you please send in a refill? patient called today saying he is completely out

## 2023-03-07 ENCOUNTER — HOSPITAL ENCOUNTER (OUTPATIENT)
Age: 63
Discharge: HOME | End: 2023-03-07
Payer: MEDICARE

## 2023-03-07 DIAGNOSIS — D64.9 ANEMIA, UNSPECIFIED TYPE: ICD-10-CM

## 2023-03-07 LAB
ABSOLUTE EOS #: 0.2 K/UL (ref 0–0.4)
ABSOLUTE LYMPH #: 2.7 K/UL (ref 1–4.8)
ABSOLUTE MONO #: 0.6 K/UL (ref 0.1–1.3)
BASOPHILS # BLD: 1 % (ref 0–2)
BASOPHILS ABSOLUTE: 0.1 K/UL (ref 0–0.2)
EOSINOPHILS RELATIVE PERCENT: 3 % (ref 0–4)
HCT VFR BLD AUTO: 41.3 % (ref 41–53)
HGB BLD-MCNC: 13.9 G/DL (ref 13.5–17.5)
LYMPHOCYTES # BLD: 35 % (ref 24–44)
MCH RBC QN AUTO: 29.3 PG (ref 26–34)
MCHC RBC AUTO-ENTMCNC: 33.7 G/DL (ref 31–37)
MCV RBC AUTO: 86.9 FL (ref 80–100)
MONOCYTES # BLD: 8 % (ref 1–7)
PDW BLD-RTO: 14.7 % (ref 11.5–14.9)
PLATELET # BLD AUTO: 241 K/UL (ref 150–450)
PMV BLD AUTO: 7.3 FL (ref 6–12)
RBC # BLD: 4.75 M/UL (ref 4.5–5.9)
SEG NEUTROPHILS: 53 % (ref 36–66)
SEGMENTED NEUTROPHILS ABSOLUTE COUNT: 4.2 K/UL (ref 1.3–9.1)
WBC # BLD AUTO: 7.9 K/UL (ref 3.5–11)

## 2023-03-07 PROCEDURE — 36415 COLL VENOUS BLD VENIPUNCTURE: CPT

## 2023-03-07 PROCEDURE — 85025 COMPLETE CBC W/AUTO DIFF WBC: CPT

## 2023-03-09 ENCOUNTER — TELEPHONE (OUTPATIENT)
Dept: FAMILY MEDICINE CLINIC | Age: 63
End: 2023-03-09

## 2023-03-09 NOTE — TELEPHONE ENCOUNTER
----- Message from [employer] sent at 3/9/2023  8:42 AM EST -----  Subject: Message to Provider    QUESTIONS  Information for Provider? Patient rescheduled his 3/10 appt due to   prediction of heavy snowfall. He's now reschedule for 5/22/23 which is a   little further out than he'd like. Is there any way to get him in sooner? Patient is willing to be seen in [place], or by Dr. Eleanor Machuca if   that helps any. Please call the patient to advise/reschedule as able.   ---------------------------------------------------------------------------  --------------  Giovany Marie INFO  [phone]; OK to leave message on voicemail,OK to respond with electronic   message via [facility] portal (only for patients who have registered [facility]   account)  ---------------------------------------------------------------------------  --------------  SCRIPT ANSWERS  Relationship to Patient?  Self

## 2023-03-17 ENCOUNTER — TELEPHONE (OUTPATIENT)
Dept: FAMILY MEDICINE CLINIC | Age: 63
End: 2023-03-17

## 2023-03-17 NOTE — TELEPHONE ENCOUNTER
Pt was girma for VV today, per provider unable to do VV would need to be seen if office. Writer reviewed that pt BP during the call was 210/85 per provider pt is to go to ED to be evaluated. Writer informed patient, pt voiced understanding and stated will go to ED.

## 2023-03-19 ENCOUNTER — HOSPITAL ENCOUNTER (EMERGENCY)
Age: 63
Discharge: HOME | End: 2023-03-19
Attending: EMERGENCY MEDICINE
Payer: MEDICARE

## 2023-03-19 VITALS
SYSTOLIC BLOOD PRESSURE: 133 MMHG | TEMPERATURE: 98.1 F | RESPIRATION RATE: 20 BRPM | DIASTOLIC BLOOD PRESSURE: 64 MMHG | OXYGEN SATURATION: 97 % | WEIGHT: 270 LBS | HEIGHT: 69 IN | HEART RATE: 87 BPM | BODY MASS INDEX: 39.99 KG/M2

## 2023-03-19 DIAGNOSIS — Z01.30 BLOOD PRESSURE CHECK: Primary | ICD-10-CM

## 2023-03-19 LAB
ANION GAP SERPL CALCULATED.3IONS-SCNC: 14 MMOL/L (ref 9–17)
BUN SERPL-MCNC: 21 MG/DL (ref 8–23)
CALCIUM SERPL-MCNC: 9.2 MG/DL (ref 8.6–10.4)
CHLORIDE SERPL-SCNC: 102 MMOL/L (ref 98–107)
CO2 SERPL-SCNC: 24 MMOL/L (ref 20–31)
CREAT SERPL-MCNC: 0.99 MG/DL (ref 0.7–1.2)
GFR SERPL CREATININE-BSD FRML MDRD: >60 ML/MIN/1.73M2
GLUCOSE SERPL-MCNC: 200 MG/DL (ref 70–99)
POTASSIUM SERPL-SCNC: 5.6 MMOL/L (ref 3.7–5.3)
SODIUM SERPL-SCNC: 140 MMOL/L (ref 135–144)

## 2023-03-19 PROCEDURE — 80048 BASIC METABOLIC PNL TOTAL CA: CPT

## 2023-03-19 PROCEDURE — 93005 ELECTROCARDIOGRAM TRACING: CPT | Performed by: EMERGENCY MEDICINE

## 2023-03-19 PROCEDURE — 99284 EMERGENCY DEPT VISIT MOD MDM: CPT

## 2023-03-19 PROCEDURE — 36415 COLL VENOUS BLD VENIPUNCTURE: CPT

## 2023-03-19 ASSESSMENT — ENCOUNTER SYMPTOMS
EYE DISCHARGE: 0
BACK PAIN: 0
COLOR CHANGE: 0
COUGH: 0
SHORTNESS OF BREATH: 0
CHEST TIGHTNESS: 0
BLOOD IN STOOL: 0
DIARRHEA: 0
NAUSEA: 0
SINUS PRESSURE: 0
EYE PAIN: 0
EYE REDNESS: 0
RHINORRHEA: 0
SORE THROAT: 0
VOMITING: 0
FACIAL SWELLING: 0
CONSTIPATION: 0
ABDOMINAL PAIN: 0
WHEEZING: 0
TROUBLE SWALLOWING: 0

## 2023-03-19 NOTE — ED PROVIDER NOTES
Megan  7/14/2022  will need cysto in future after heart cath and carotid sx    Under care of team     Dr. Renny Martin for carotid stenosis    Wellness examination     Dee Dee Denney  last visit June 2022       SURGICAL HISTORY       Past Surgical History:   Procedure Laterality Date    CARDIAC CATHETERIZATION  07/08/2014    Non Obstructive CAD     CARDIAC CATHETERIZATION  09/16/2022    CERVICAL SPINE SURGERY  07/13/2012     C2-3-4-5    COLONOSCOPY  11/28/2022    COLONOSCOPY DIAGNOSTIC    COLONOSCOPY N/A 11/28/2022    COLONOSCOPY DIAGNOSTIC performed by Carlyle Marmolejo MD at [address] [facility]  11/28/2022    2 [facility],   ENTEROSCOPY PUSH CONTROL HEMORRHAGE    FRACTURE SURGERY      Rt elbow     HC INJECT OTHER PERPHRL NERV Bilateral 05/09/2019    INJECTION SPINAL performed by Edgar Cooper MD at [address] Bilateral 08/15/2019    SACROILIAC JOINT INJECTION performed by Edgar Cooper MD at [place] [address]  02/05/2016    [facility]    NERVE BLOCK Left 02/07/2020    RFA left side    NERVE BLOCK Left 02/07/2020     NERVE RADIOFREQUENCY ABLATION - SACROILIAC (Left )    NERVE BLOCK  02/12/2021    NERVE RADIOFREQUENCY ABLATION SI (Left     NERVE BLOCK Right 02/19/2021    Procedure: NERVE RADIOFREQUENCY ABLATION SI JOINT (Right )    OTHER SURGICAL HISTORY  08/15/2019    sacroiliac joint injection    PAIN MANAGEMENT PROCEDURE Left 02/07/2020    NERVE RADIOFREQUENCY ABLATION - SACROILIAC performed by Edgar Cooper MD at [address] [place] [address] Left 02/12/2021    NERVE RADIOFREQUENCY ABLATION SI performed by Edgar Cooper MD at [address] [place] [address] Right 02/19/2021    NERVE RADIOFREQUENCY ABLATION SI JOINT performed by Edgar Cooper MD at [address] [place] [address] N/A 11/28/2022

## 2023-03-19 NOTE — DISCHARGE INSTRUCTIONS
Thank you for visiting [address] ED. You need to call in morning to make appointment as directed with appropriate doctor. Should you have any questions regarding your care or further treatment, please call [facility] at [phone]. Take any medications as prescribed, if given any. Return to ED if symptoms worsen, do not improve, and unable to follow up with your physician, or other concerns arise.

## 2023-03-19 NOTE — ED TRIAGE NOTES
Mode of arrival (squad #, walk in, police, etc) : walk in        Chief complaint(s): SOB        Arrival Note (brief scenario, treatment PTA, etc). : Pt states he has had worsening SOB and is concerned he is going in and out of afib at home. C= \"Have you ever felt that you should Cut down on your drinking? \"  No  A= \"Have people Annoyed you by criticizing your drinking? \"  No  G= \"Have you ever felt bad or Guilty about your drinking? \"  No  E= \"Have you ever had a drink as an Eye-opener first thing in the morning to steady your nerves or to help a hangover? \"  No      Deferred []      Reason for deferring: N/A    *If yes to two or more: probable alcohol abuse. *

## 2023-03-20 LAB
EKG ATRIAL RATE: 81 BPM
EKG P AXIS: 45 DEGREES
EKG P-R INTERVAL: 132 MS
EKG Q-T INTERVAL: 408 MS
EKG QRS DURATION: 98 MS
EKG QTC CALCULATION (BAZETT): 473 MS
EKG R AXIS: 66 DEGREES
EKG T AXIS: 61 DEGREES
EKG VENTRICULAR RATE: 81 BPM

## 2023-03-27 ENCOUNTER — HOSPITAL ENCOUNTER (OUTPATIENT)
Age: 63
Setting detail: SPECIMEN
Discharge: HOME | End: 2023-03-27

## 2023-03-27 ENCOUNTER — OFFICE VISIT (OUTPATIENT)
Dept: FAMILY MEDICINE CLINIC | Age: 63
End: 2023-03-27
Payer: MEDICARE

## 2023-03-27 VITALS
WEIGHT: 272 LBS | SYSTOLIC BLOOD PRESSURE: 132 MMHG | OXYGEN SATURATION: 97 % | HEIGHT: 69 IN | HEART RATE: 79 BPM | BODY MASS INDEX: 40.29 KG/M2 | DIASTOLIC BLOOD PRESSURE: 65 MMHG

## 2023-03-27 DIAGNOSIS — N40.0 BENIGN PROSTATIC HYPERPLASIA WITHOUT LOWER URINARY TRACT SYMPTOMS: ICD-10-CM

## 2023-03-27 DIAGNOSIS — N50.89 ENLARGED TESTICLE: ICD-10-CM

## 2023-03-27 DIAGNOSIS — J41.8 MIXED SIMPLE AND MUCOPURULENT CHRONIC BRONCHITIS (HCC): ICD-10-CM

## 2023-03-27 DIAGNOSIS — R73.09 ELEVATED GLUCOSE LEVEL: Primary | ICD-10-CM

## 2023-03-27 DIAGNOSIS — J44.9 CHRONIC OBSTRUCTIVE PULMONARY DISEASE, UNSPECIFIED COPD TYPE (HCC): ICD-10-CM

## 2023-03-27 DIAGNOSIS — R73.09 ELEVATED GLUCOSE LEVEL: ICD-10-CM

## 2023-03-27 DIAGNOSIS — E87.6 HYPOKALEMIA: ICD-10-CM

## 2023-03-27 DIAGNOSIS — I51.9 LV DYSFUNCTION: ICD-10-CM

## 2023-03-27 LAB
BILIRUBIN URINE: NEGATIVE
CASTS UA: NORMAL /LPF (ref 0–8)
COLOR: YELLOW
EPITHELIAL CELLS UA: NORMAL /HPF (ref 0–5)
GLUCOSE UR STRIP.AUTO-MCNC: ABNORMAL MG/DL
KETONES UR STRIP.AUTO-MCNC: NEGATIVE MG/DL
LEUKOCYTE ESTERASE UR QL STRIP.AUTO: NEGATIVE
NITRITE UR QL STRIP.AUTO: NEGATIVE
PROT UR STRIP.AUTO-MCNC: 5.5 MG/DL (ref 5–8)
PROT UR STRIP.AUTO-MCNC: ABNORMAL MG/DL
RBC CLUMPS #/AREA URNS AUTO: NORMAL /HPF (ref 0–4)
SPECIFIC GRAVITY UA: 1.03 (ref 1–1.03)
TURBIDITY: ABNORMAL
URINE HGB: NEGATIVE
UROBILINOGEN, URINE: NORMAL
WBC UA: NORMAL /HPF (ref 0–5)

## 2023-03-27 PROCEDURE — 3078F DIAST BP <80 MM HG: CPT | Performed by: FAMILY MEDICINE

## 2023-03-27 PROCEDURE — 3075F SYST BP GE 130 - 139MM HG: CPT | Performed by: FAMILY MEDICINE

## 2023-03-27 PROCEDURE — 99213 OFFICE O/P EST LOW 20 MIN: CPT | Performed by: FAMILY MEDICINE

## 2023-03-27 PROCEDURE — 3023F SPIROM DOC REV: CPT | Performed by: FAMILY MEDICINE

## 2023-03-27 PROCEDURE — 1036F TOBACCO NON-USER: CPT | Performed by: FAMILY MEDICINE

## 2023-03-27 PROCEDURE — 3017F COLORECTAL CA SCREEN DOC REV: CPT | Performed by: FAMILY MEDICINE

## 2023-03-27 PROCEDURE — G8417 CALC BMI ABV UP PARAM F/U: HCPCS | Performed by: FAMILY MEDICINE

## 2023-03-27 PROCEDURE — G8482 FLU IMMUNIZE ORDER/ADMIN: HCPCS | Performed by: FAMILY MEDICINE

## 2023-03-27 PROCEDURE — G8427 DOCREV CUR MEDS BY ELIG CLIN: HCPCS | Performed by: FAMILY MEDICINE

## 2023-03-27 RX ORDER — AZITHROMYCIN 250 MG/1
250 TABLET, FILM COATED ORAL SEE ADMIN INSTRUCTIONS
Qty: 6 TABLET | Refills: 0 | Status: SHIPPED
Start: 2023-03-27 | End: 2023-03-28 | Stop reason: ALTCHOICE

## 2023-03-27 RX ORDER — BUDESONIDE AND FORMOTEROL FUMARATE DIHYDRATE 160; 4.5 UG/1; UG/1
AEROSOL RESPIRATORY (INHALATION)
Qty: 10.2 G | Refills: 3 | Status: SHIPPED | OUTPATIENT
Start: 2023-03-27 | End: 2023-03-28 | Stop reason: SDUPTHER

## 2023-03-27 NOTE — PROGRESS NOTES
Here for check up  Refills - MDI's  Heart Check concerns - no active pain syndrome, c/o sob with active exertion and prolonged walking. Right testicle are swollen - 1 month; painless  Lab updates    Negative for:     Worry / mood complaints  Headache  Dizziness  Visual Disturbance  Hearing Changes  Nasal / sinus Symptoms  Mouth / tooth symptom, pain  Throat pain  Difficulty swallowing  Neck pain  Chest discomfort  Cough  SOB  N/V/D/C  Pelvic area discomfort  Bladder / voiding discomfort  Bowel complaints  MS complaints   Numbness/tingling/abnormal sensations   Edema / Leg swelling  Dizziness  Fatigue  Bleeding   Skin    Pertinent Pos: See HPI - see above    Vitals:    03/27/23 0828   BP: 132/65   Pulse: 79   SpO2: 97%       Alert and oriented to PPT  NAD    HEENT - nasal congestion, no purulence, voice hoarse (usual)  Neck - no bruits, no lymphadenopathy  Chest  HRRR w/o murmer dist s1, s2  Lungs - scattered rhonchi, prolonged exp phase    Right scrotal area - enlarged, feels uniformly full suspect hydrocele versus direct inguinal hernia  Extremities - 1-2+ PTE    Gait / Station - stable, no dysequilibrium, uniform pace, no assist device, cane. Diagnosis Orders   1. Elevated glucose level  Hemoglobin A1C      2. Mixed simple and mucopurulent chronic bronchitis (HCC)  budesonide-formoterol (SYMBICORT) 160-4.5 MCG/ACT AERO    azithromycin (ZITHROMAX) 250 MG tablet      3. Hypokalemia  Basic Metabolic Panel      4. Benign prostatic hyperplasia without lower urinary tract symptoms  Urinalysis      5. LV dysfunction  AFL - Chelle Duckwotrh MD, Cardiology, [facility]      6. Chronic obstructive pulmonary disease, unspecified COPD type (HCC)  tiotropium (SPIRIVA RESPIMAT) 2.5 MCG/ACT AERS inhaler    azithromycin (ZITHROMAX) 250 MG tablet      7.  Enlarged testicle  US SCROTUM W LIMITED DUPLEX          Plan:  1.)  fei 6 w  2.)  labs, US scrotum  3.)  may need GS versus urology - await results  4.)  follow

## 2023-03-28 ENCOUNTER — TELEPHONE (OUTPATIENT)
Dept: FAMILY MEDICINE CLINIC | Age: 63
End: 2023-03-28

## 2023-03-28 DIAGNOSIS — E11.9 TYPE 2 DIABETES MELLITUS WITHOUT COMPLICATION, WITHOUT LONG-TERM CURRENT USE OF INSULIN (HCC): Primary | ICD-10-CM

## 2023-03-28 DIAGNOSIS — J42 CHRONIC BRONCHITIS, UNSPECIFIED CHRONIC BRONCHITIS TYPE (HCC): Primary | ICD-10-CM

## 2023-03-28 RX ORDER — AMOXICILLIN 500 MG/1
500 CAPSULE ORAL 2 TIMES DAILY
Qty: 14 CAPSULE | Refills: 0 | Status: SHIPPED | OUTPATIENT
Start: 2023-03-28 | End: 2023-04-04

## 2023-03-28 RX ORDER — METFORMIN HYDROCHLORIDE 500 MG/1
500 TABLET, EXTENDED RELEASE ORAL
Qty: 90 TABLET | Refills: 1 | Status: SHIPPED | OUTPATIENT
Start: 2023-03-28

## 2023-03-28 RX ORDER — BUDESONIDE AND FORMOTEROL FUMARATE DIHYDRATE 160; 4.5 UG/1; UG/1
AEROSOL RESPIRATORY (INHALATION)
Qty: 10.2 G | Refills: 3 | Status: SHIPPED | OUTPATIENT
Start: 2023-03-28

## 2023-03-28 NOTE — TELEPHONE ENCOUNTER
Called patient re: abnormal results for A1C (7, increased from 5.4) and BMP, Urinalysis showing glucosuria and mild proteinuria. Any confirmed with 2 separate identifiers. Patient states that he also noticed the elevated A1c at 7 and found it very concerning. Patient states that he has been eating a lot more sweets and drinking Coca-Cola nearly every day at least 1 to 2 cans. He also states he has not had much physical activity over the last few months as well and is putting on weight. We discussed at length possible lifestyle interventions however patient states he is not interested at this time. Patient states that he is interested in possibly starting medication and we discussed different treatment options including metformin, SGLT2, GLP-1 briefly and patient elects that he would like to restart metformin as he has used this in the past and possibly discontinue this after a few months and recheck his A1c. Patient encouraged to discuss this further on his in person visit on May 8th. Patient also encouraged to let writer know if any symptoms or side effects from the medication. Patient states understanding of the above plan, agreement with the above plan, and all questions answered in layman's terms.

## 2023-03-28 NOTE — TELEPHONE ENCOUNTER
Pharmacy calling due to interaction warning of z-alysa and amiodarone- wants to clarify if pt is to stop amiodarone while on z-alysa or continue both and monitor for reaction- please advise pharmacy will not fill z-alysa until hearing from us

## 2023-03-30 ENCOUNTER — HOSPITAL ENCOUNTER (OUTPATIENT)
Dept: ULTRASOUND IMAGING | Age: 63
Discharge: HOME | End: 2023-04-01
Payer: MEDICARE

## 2023-03-30 DIAGNOSIS — N50.89 ENLARGED TESTICLE: ICD-10-CM

## 2023-03-30 PROCEDURE — 93976 VASCULAR STUDY: CPT

## 2023-03-31 DIAGNOSIS — N43.3 HYDROCELE IN ADULT: Primary | ICD-10-CM

## 2023-04-03 ENCOUNTER — TELEPHONE (OUTPATIENT)
Dept: FAMILY MEDICINE CLINIC | Age: 63
End: 2023-04-03

## 2023-04-03 NOTE — TELEPHONE ENCOUNTER
Writer left  for patient informing was referred to Dr. Brittany Diaz and left contact number to call and schedule.  Also wanted to inform pt of the results of US as reasoning of the referral.       [address] MD   [address] [place] [address]   [phone]

## 2023-04-03 NOTE — TELEPHONE ENCOUNTER
----- Message from Yara Schuler DO sent at 3/31/2023  4:34 PM EDT -----  Notify that the US confirmed fluid build up and I would like to have him see a Urologist to assist in management. I have placed a referral to Dr Cody Arguello.

## 2023-04-05 NOTE — TELEPHONE ENCOUNTER
Writer left vm for patient to contact office to give US results. Writer left information of the referral place and provided contact number.

## 2023-04-05 NOTE — TELEPHONE ENCOUNTER
Patient returned call , Patient was informed of results and informed of referral. Patient stated he has scheduled appointment with Urology , appointment scheduled for May.

## 2023-04-19 RX ORDER — TIZANIDINE HYDROCHLORIDE 4 MG/1
CAPSULE, GELATIN COATED ORAL
Qty: 60 CAPSULE | Refills: 2 | Status: SHIPPED | OUTPATIENT
Start: 2023-04-19

## 2023-04-19 NOTE — TELEPHONE ENCOUNTER
Please Approve or Refuse.   Send to Pharmacy per Pt's Request:      Next Visit Date:  5/8/2023   Last Visit Date: 3/27/2023    Hemoglobin A1C (%)   Date Value   03/27/2023 7.0 (H)   11/26/2022 5.4   11/26/2022 5.1             ( goal A1C is < 7)   BP Readings from Last 3 Encounters:   03/27/23 132/65   03/19/23 133/64   01/23/23 110/60          (goal 120/80)  BUN   Date Value Ref Range Status   03/27/2023 29 (H) 8 - 23 mg/dL Final     Creatinine   Date Value Ref Range Status   03/27/2023 0.95 0.70 - 1.20 mg/dL Final     Potassium   Date Value Ref Range Status   03/27/2023 4.5 3.7 - 5.3 mmol/L Final

## 2023-04-20 DIAGNOSIS — I48.91 ATRIAL FIBRILLATION, UNSPECIFIED TYPE (HCC): ICD-10-CM

## 2023-04-20 NOTE — TELEPHONE ENCOUNTER
E-scribe request for ELIQUIS 5 MG TABLET. Please review and e-scribe if applicable. Last Visit Date:  3/27/2023  Next Visit Date:  5/22/2023    Hemoglobin A1C (%)   Date Value   03/27/2023 7.0 (H)   11/26/2022 5.4   11/26/2022 5.1             ( goal A1C is < 7)   Microalb/Crt. Ratio (mcg/mg creat)   Date Value   05/03/2014 Unable to calculate or report.      LDL Cholesterol (mg/dL)   Date Value   05/10/2022 179 (H)       (goal LDL is <100)   AST (U/L)   Date Value   11/26/2022 22     ALT (U/L)   Date Value   11/26/2022 16     BUN (mg/dL)   Date Value   03/27/2023 29 (H)     BP Readings from Last 3 Encounters:   03/27/23 132/65   03/19/23 133/64   01/23/23 110/60          (goal 120/80)        Patient Active Problem List:     Cervical stenosis of spine     Cervicalgia     COPD (chronic obstructive pulmonary disease) ([facility])     Smoker     Mild depression     GERD (gastroesophageal reflux disease)     Mixed hyperlipidemia     Carpal tunnel syndrome of left wrist     Medication monitoring encounter     DDD (degenerative disc disease), lumbar     Displacement of lumbar intervertebral disc without myelopathy     Therapeutic opioid induced constipation     Radiculopathy of cervical spine     Essential hypertension     Major depression, single episode, in complete remission ([facility])     Prediabetes     Lung nodule     Allergic sinusitis     Arthritis     Spinal stenosis of lumbar region without neurogenic claudication     Class 2 obesity with body mass index (BMI) of 39.0 to 39.9 in adult     Lumbosacral spondylosis without myelopathy     Encounter for long-term opiate analgesic use     Occlusion of right carotid artery     Left carotid stenosis     Sacroiliitis, not elsewhere classified ([facility] [address]     Recurrent UTI     BPH with urinary obstruction     Other retention of urine     Pneumonia     Atrial fibrillation with RVR ([facility])     Angina of effort ([facility])     Stenosis of right carotid artery     Status post carotid surgery

## 2023-04-24 NOTE — TELEPHONE ENCOUNTER
Last visit: [id number]  Last Med refill: [id number]  Does patient have enough medication for 72 hours: No:     Next Visit Date:  Future Appointments   Date Time Provider Lucie Sharlene   5/8/2023  8:30 AM Kyler Harris DO [facility]   5/11/2023  2:10 PM Graciella Severe, MD [facility]   5/22/2023  2:30 PM K[facility] Harris DO [facility]   5/24/2023  1:45 PM Earle Gustafson DO [facility]   6/19/2023  9:30 AM Daron Bocanegra MD [facility] HEART/VAS [address] Maintenance   Topic Date Due    DTaP/Tdap/Td vaccine (1 - Tdap) Never done    Lipids  05/10/2023    A1C test (Diabetic or Prediabetic)  06/27/2023    Annual Wellness Visit (AWV)  12/29/2023    Depression Monitoring  03/17/2024    Colorectal Cancer Screen  11/28/2032    Flu vaccine  Completed    Shingles vaccine  Completed    Pneumococcal 0-64 years Vaccine  Completed    COVID-19 Vaccine  Completed    Hepatitis C screen  Addressed    HIV screen  Addressed    Hepatitis A vaccine  Aged Out    Hib vaccine  Aged Out    Meningococcal (ACWY) vaccine  Aged Out    Low dose CT lung screening  Discontinued    Prostate Specific Antigen (PSA) Screening or Monitoring  Discontinued       Hemoglobin A1C (%)   Date Value   03/27/2023 7.0 (H)   11/26/2022 5.4   11/26/2022 5.1             ( goal A1C is < 7)   Microalb/Crt. Ratio (mcg/mg creat)   Date Value   05/03/2014 Unable to calculate or report.      LDL Cholesterol (mg/dL)   Date Value   05/10/2022 179 (H)   11/17/2020 169 (H)       (goal LDL is <100)   AST (U/L)   Date Value   11/26/2022 22     ALT (U/L)   Date Value   11/26/2022 16     BUN (mg/dL)   Date Value   03/27/2023 29 (H)     BP Readings from Last 3 Encounters:   03/27/23 132/65   03/19/23 133/64   01/23/23 110/60          (goal 120/80)    All Future Testing planned in [facility]  Lab Frequency Next Occurrence   CT lung screen [Initial/Annual] Once 05/04/2022   Lipid Panel Once 10/03/2022   VL DUP CAROTID BILATERAL Once

## 2023-04-24 NOTE — TELEPHONE ENCOUNTER
E-scribe request for amiodarone. Please review and e-scribe if applicable. Last Visit Date:  03/27  Next Visit Date:  5/22/2023    Hemoglobin A1C (%)   Date Value   03/27/2023 7.0 (H)   11/26/2022 5.4   11/26/2022 5.1             ( goal A1C is < 7)   Microalb/Crt. Ratio (mcg/mg creat)   Date Value   05/03/2014 Unable to calculate or report.      LDL Cholesterol (mg/dL)   Date Value   05/10/2022 179 (H)       (goal LDL is <100)   AST (U/L)   Date Value   11/26/2022 22     ALT (U/L)   Date Value   11/26/2022 16     BUN (mg/dL)   Date Value   03/27/2023 29 (H)     BP Readings from Last 3 Encounters:   03/27/23 132/65   03/19/23 133/64   01/23/23 110/60          (goal 120/80)        Patient Active Problem List:     Cervical stenosis of spine     Cervicalgia     COPD (chronic obstructive pulmonary disease) ([facility])     Smoker     Mild depression     GERD (gastroesophageal reflux disease)     Mixed hyperlipidemia     Carpal tunnel syndrome of left wrist     Medication monitoring encounter     DDD (degenerative disc disease), lumbar     Displacement of lumbar intervertebral disc without myelopathy     Therapeutic opioid induced constipation     Radiculopathy of cervical spine     Essential hypertension     Major depression, single episode, in complete remission ([facility])     Prediabetes     Lung nodule     Allergic sinusitis     Arthritis     Spinal stenosis of lumbar region without neurogenic claudication     Class 2 obesity with body mass index (BMI) of 39.0 to 39.9 in adult     Lumbosacral spondylosis without myelopathy     Encounter for long-term opiate analgesic use     Occlusion of right carotid artery     Left carotid stenosis     Sacroiliitis, not elsewhere classified ([facility] [address]     Recurrent UTI     BPH with urinary obstruction     Other retention of urine     Pneumonia     Atrial fibrillation with RVR ([facility])     Angina of effort ([facility])     Stenosis of right carotid artery     Status post carotid surgery     Chest pain

## 2023-04-26 RX ORDER — MELOXICAM 15 MG/1
TABLET ORAL
Qty: 30 TABLET | Refills: 3 | Status: SHIPPED | OUTPATIENT
Start: 2023-04-26

## 2023-04-26 RX ORDER — AMIODARONE HYDROCHLORIDE 200 MG/1
TABLET ORAL
Qty: 90 TABLET | Refills: 1 | Status: SHIPPED | OUTPATIENT
Start: 2023-04-26

## 2023-05-08 ENCOUNTER — OFFICE VISIT (OUTPATIENT)
Dept: FAMILY MEDICINE CLINIC | Age: 63
End: 2023-05-08
Payer: MEDICARE

## 2023-05-08 VITALS
OXYGEN SATURATION: 96 % | HEART RATE: 66 BPM | WEIGHT: 261 LBS | DIASTOLIC BLOOD PRESSURE: 54 MMHG | SYSTOLIC BLOOD PRESSURE: 88 MMHG | BODY MASS INDEX: 38.54 KG/M2

## 2023-05-08 DIAGNOSIS — I10 ESSENTIAL HYPERTENSION: Primary | ICD-10-CM

## 2023-05-08 DIAGNOSIS — R73.09 ELEVATED GLUCOSE LEVEL: ICD-10-CM

## 2023-05-08 DIAGNOSIS — M19.90 ARTHRITIS: ICD-10-CM

## 2023-05-08 PROCEDURE — 3017F COLORECTAL CA SCREEN DOC REV: CPT | Performed by: FAMILY MEDICINE

## 2023-05-08 PROCEDURE — 3074F SYST BP LT 130 MM HG: CPT | Performed by: FAMILY MEDICINE

## 2023-05-08 PROCEDURE — G8427 DOCREV CUR MEDS BY ELIG CLIN: HCPCS | Performed by: FAMILY MEDICINE

## 2023-05-08 PROCEDURE — G8417 CALC BMI ABV UP PARAM F/U: HCPCS | Performed by: FAMILY MEDICINE

## 2023-05-08 PROCEDURE — 99213 OFFICE O/P EST LOW 20 MIN: CPT | Performed by: FAMILY MEDICINE

## 2023-05-08 PROCEDURE — 1036F TOBACCO NON-USER: CPT | Performed by: FAMILY MEDICINE

## 2023-05-08 PROCEDURE — 3078F DIAST BP <80 MM HG: CPT | Performed by: FAMILY MEDICINE

## 2023-05-08 RX ORDER — TIZANIDINE 4 MG/1
4 TABLET ORAL 2 TIMES DAILY PRN
Qty: 30 TABLET | Refills: 5 | Status: SHIPPED | OUTPATIENT
Start: 2023-05-08 | End: 2023-05-08 | Stop reason: SDUPTHER

## 2023-05-08 RX ORDER — TIZANIDINE 4 MG/1
4 TABLET ORAL
Qty: 30 TABLET | Refills: 5 | Status: SHIPPED | OUTPATIENT
Start: 2023-05-08

## 2023-05-08 NOTE — PROGRESS NOTES
Ebony Gopal on May 11 - hydrocele  Card next month - Dr Maria R Shankar up  BP gets low - - spell - \"drops in blood pressure\"  Reviewed doppler scrotum results with patient  Chart reviewed with patient      Negative for:     Worry / mood complaints  Headache  Dizziness  Visual Disturbance  Hearing Changes  Nasal / sinus Symptoms  Mouth / tooth symptom, pain  Throat pain  Difficulty swallowing  Neck pain  Chest discomfort  Cough  SOB  N/V/D/C  Pelvic area discomfort  Bladder / voiding discomfort  Bowel complaints  MS complaints   Numbness/tingling/abnormal sensations   Edema / Leg swelling  Dizziness  Fatigue  Bleeding   Skin    Pertinent Pos: See HPI - low BP reports    Vitals:    05/08/23 0827   BP: (!) 88/54   Pulse: 66   SpO2: 96%       Alert and oriented to PPT  NAD    HEENT - neg  Neck - no bruits, no lymphadenopathy  Chest  HRRR w/o murmer  LCTAB no wheezes / rhonchi    10 lbs down    Gait / Station - stable, no dysequilibrium, uniform pace, no assist device, cane. Diagnosis Orders   1. Essential hypertension        2. Arthritis  tiZANidine (ZANAFLEX) 4 MG tablet    DISCONTINUED: tiZANidine (ZANAFLEX) 4 MG tablet      3. Elevated glucose level            Plan:  1.)  Hold Lopressor - d/t hypotension and symptoms - notices weakness and dizzy  2.)  Lasix - cut in half to 20 mg daily  3.)  Stop iron - CBC stable  4.)  Verify whether Eliquis is appropriate from cardiology - also taking ASA and Plavix  5.)   Cont.  Lisinopril 10 daily  6.)  fei in 3 m    Next visit - bring home meds to reconcile against chart PMD

## 2023-05-11 ENCOUNTER — OFFICE VISIT (OUTPATIENT)
Dept: UROLOGY | Age: 63
End: 2023-05-11
Payer: MEDICARE

## 2023-05-11 VITALS
HEART RATE: 78 BPM | HEIGHT: 69 IN | WEIGHT: 261 LBS | SYSTOLIC BLOOD PRESSURE: 136 MMHG | BODY MASS INDEX: 38.66 KG/M2 | DIASTOLIC BLOOD PRESSURE: 72 MMHG

## 2023-05-11 DIAGNOSIS — N50.811 PAIN IN RIGHT TESTICLE: Primary | ICD-10-CM

## 2023-05-11 DIAGNOSIS — N13.8 BPH WITH URINARY OBSTRUCTION: ICD-10-CM

## 2023-05-11 DIAGNOSIS — N40.1 BPH WITH URINARY OBSTRUCTION: ICD-10-CM

## 2023-05-11 DIAGNOSIS — N50.89 SWELLING OF RIGHT TESTICLE: ICD-10-CM

## 2023-05-11 LAB
BILIRUBIN, POC: NEGATIVE
BLOOD URINE, POC: NEGATIVE
CLARITY, POC: CLEAR
COLOR, POC: YELLOW
GLUCOSE URINE, POC: NEGATIVE
KETONES, POC: NEGATIVE
LEUKOCYTE EST, POC: NEGATIVE
NITRITE, POC: NEGATIVE
PH, POC: 5.5
PROTEIN, POC: 30
SPECIFIC GRAVITY, POC: 1.02
UROBILINOGEN, POC: 0.2

## 2023-05-11 PROCEDURE — 81002 URINALYSIS NONAUTO W/O SCOPE: CPT | Performed by: SPECIALIST

## 2023-05-11 PROCEDURE — 99212 OFFICE O/P EST SF 10 MIN: CPT | Performed by: SPECIALIST

## 2023-05-11 NOTE — PROGRESS NOTES
Akira Vicente MD, [facility]  [facility] [address] [facility] New Patient office note    Patient:  Oseas Randall  YOB: 1960  Date: 5/11/23    HISTORY OF PRESENT ILLNESS:   The patient is a 57 y.o. male who presents today for evaluation of the following problems:   Hydrocele:  Patient is here today for a hydrocele which was first noted several month(s) ago. The hydrocele is: right. Recently the size is increasing. Previous injury or surgery? no.  Any pain or discomfort associated with hydrocele?  Yes  Pain severity: moderate  Previous treatments for hydrocele: none     Today's AUA Symptom Score (QOL): 11 (3)  (Patient's old records have been requested, reviewed and summarized in today's note.)  Summary of old records:   Right >> left hydrocele on 3/30/23 scrotal U/S with doppler; wants surgery but is taking Eliquis, ASA, Plavix from [facility]  BPH, moderate; PVR = 37 mL    Procedures Today: Postvoid Residual:  Post-void Residual by bladder scanner: 37 mL (5/14/23)     Urinalysis today:  Results for POC orders placed in visit on 05/11/23   POCT Urinalysis no Micro   Result Value Ref Range    Color, UA yellow     Clarity, UA clear     Glucose, UA POC negative     Bilirubin, UA negative     Ketones, UA negative     Spec Grav, UA 1.025     Blood, UA POC negative     pH, UA 5.5     Protein, UA POC 30     Urobilinogen, UA 0.2     Leukocytes, UA negative     Nitrite, UA negative        Last several PSA's:  Lab Results   Component Value Date    PSA 3.78 02/09/2023    PSA 3.81 05/05/2018    PSA 2.02 11/27/2012       Last total testosterone:  No results found for: TESTOSTERONE    Last BUN and creatinine:  Lab Results   Component Value Date    BUN 29 (H) 03/27/2023     Lab Results   Component Value Date    CREATININE 0.95 03/27/2023       Last CBC:  Lab Results   Component Value Date    WBC 8.3 04/11/2023    HGB 13.7 04/11/2023    HCT 40.4 (L) 04/11/2023    MCV 85.5 04/11/2023     04/11/2023       Additional

## 2023-05-14 PROBLEM — N50.811 PAIN IN RIGHT TESTICLE: Status: ACTIVE | Noted: 2023-05-14

## 2023-05-17 DIAGNOSIS — M96.1 CERVICAL POST-LAMINECTOMY SYNDROME: ICD-10-CM

## 2023-05-17 DIAGNOSIS — M47.812 CERVICAL SPONDYLOSIS: ICD-10-CM

## 2023-05-17 DIAGNOSIS — M54.2 CERVICALGIA: ICD-10-CM

## 2023-05-17 RX ORDER — GABAPENTIN 300 MG/1
CAPSULE ORAL
Qty: 90 CAPSULE | Refills: 1 | Status: SHIPPED | OUTPATIENT
Start: 2023-05-17 | End: 2023-06-16

## 2023-05-24 PROBLEM — Z01.810 PREPROCEDURAL CARDIOVASCULAR EXAMINATION: Status: ACTIVE | Noted: 2023-05-24

## 2023-05-24 PROBLEM — R06.02 SHORTNESS OF BREATH: Status: ACTIVE | Noted: 2023-05-24

## 2023-05-24 PROBLEM — R42 DIZZINESS: Status: ACTIVE | Noted: 2023-05-24

## 2023-05-24 PROBLEM — R94.31 ABNORMAL ECG: Status: ACTIVE | Noted: 2023-05-24

## 2023-05-24 PROBLEM — I25.10 ATHEROSCLEROSIS OF NATIVE CORONARY ARTERY OF NATIVE HEART WITHOUT ANGINA PECTORIS: Status: ACTIVE | Noted: 2023-05-24

## 2023-05-24 PROBLEM — I95.1 ORTHOSTATIC HYPOTENSION: Status: ACTIVE | Noted: 2023-05-24

## 2023-05-26 ENCOUNTER — TELEPHONE (OUTPATIENT)
Dept: UROLOGY | Age: 63
End: 2023-05-26

## 2023-06-19 ENCOUNTER — TELEPHONE (OUTPATIENT)
Dept: UROLOGY | Age: 63
End: 2023-06-19

## 2023-06-19 NOTE — TELEPHONE ENCOUNTER
Pt would like to reschedule his procedure and has questions regarding the procedure. Please contact pt.

## 2023-06-21 ENCOUNTER — HOSPITAL ENCOUNTER (OUTPATIENT)
Dept: VASCULAR LAB | Age: 63
Discharge: HOME | End: 2023-06-21
Payer: MEDICARE

## 2023-06-21 DIAGNOSIS — I65.23 BILATERAL CAROTID ARTERY STENOSIS: ICD-10-CM

## 2023-06-21 PROCEDURE — 93880 EXTRACRANIAL BILAT STUDY: CPT

## 2023-06-23 ENCOUNTER — TELEPHONE (OUTPATIENT)
Dept: UROLOGY | Age: 63
End: 2023-06-23

## 2023-06-23 NOTE — TELEPHONE ENCOUNTER
Patient is scheduled for surgery on 8/1 at 9:10AM.  Talked to patient on 6/22. Patient wanted scheduled at later date then originally scheduled. Let patient know he would be scheduled on 8/1. Letter mailed out today.

## 2023-06-30 ENCOUNTER — OFFICE VISIT (OUTPATIENT)
Dept: FAMILY MEDICINE CLINIC | Age: 63
End: 2023-06-30

## 2023-06-30 VITALS
HEART RATE: 87 BPM | OXYGEN SATURATION: 96 % | DIASTOLIC BLOOD PRESSURE: 69 MMHG | BODY MASS INDEX: 37.71 KG/M2 | HEIGHT: 69 IN | WEIGHT: 254.6 LBS | SYSTOLIC BLOOD PRESSURE: 107 MMHG

## 2023-06-30 DIAGNOSIS — R53.81 PHYSICAL DECONDITIONING: ICD-10-CM

## 2023-06-30 DIAGNOSIS — J43.8 OTHER EMPHYSEMA (HCC): Primary | ICD-10-CM

## 2023-06-30 RX ORDER — TIZANIDINE HYDROCHLORIDE 4 MG/1
4 CAPSULE, GELATIN COATED ORAL
COMMUNITY
Start: 2023-06-28

## 2023-07-05 NOTE — TELEPHONE ENCOUNTER
Patient Active Problem List:     Cervical stenosis of spine     Cervicalgia     COPD (chronic obstructive pulmonary disease) (HCC)     Smoker     Mild depression     GERD (gastroesophageal reflux disease)     Mixed hyperlipidemia     Carpal tunnel syndrome of left wrist     Medication monitoring encounter     DDD (degenerative disc disease), lumbar     Displacement of lumbar intervertebral disc without myelopathy     Therapeutic opioid induced constipation     Radiculopathy of cervical spine     Essential hypertension     Major depression, single episode, in complete remission ([address])     Prediabetes     Lung nodule     Allergic sinusitis     Arthritis     Spinal stenosis of lumbar region without neurogenic claudication     Class 2 obesity with body mass index (BMI) of 39.0 to 39.9 in adult     Lumbosacral spondylosis without myelopathy     Encounter for long-term opiate analgesic use     Occlusion of right carotid artery     Left carotid stenosis     Sacroiliitis, not elsewhere classified ([address])     Recurrent UTI     BPH with urinary obstruction     Other retention of urine     Pneumonia     Atrial fibrillation ([facility])     Angina of effort ([facility])     Stenosis of right carotid artery     Status post carotid surgery     Chest pain     Acute blood loss anemia     Cervical spondylosis     Cervical post-laminectomy syndrome     Lumbar spondylosis     Left leg swelling     PND (paroxysmal nocturnal dyspnea)     Apnea     Pain in right testicle     Atherosclerosis of native coronary artery of native heart without angina pectoris     Dizziness     Orthostatic hypotension     Abnormal ECG     Shortness of breath     Preprocedural cardiovascular examination

## 2023-07-06 RX ORDER — ACETAMINOPHEN 325 MG/1
TABLET ORAL
Qty: 120 TABLET | Refills: 3 | Status: SHIPPED | OUTPATIENT
Start: 2023-07-06

## 2023-07-10 ENCOUNTER — TELEPHONE (OUTPATIENT)
Dept: OTHER | Age: 63
End: 2023-07-10

## 2023-07-14 ENCOUNTER — CARE COORDINATION (OUTPATIENT)
Dept: CARE COORDINATION | Age: 63
End: 2023-07-14

## 2023-07-14 NOTE — CARE COORDINATION
Patient is on Writer's \"Need to Enroll List\". Phoned Patient to introduce self and explain ACM. Writer plans to enroll Patient in ACM if he is in agreement. Plan to discuss RPM with Patient. Writer introduced self to Patient and explained RPM.  Patient states he has known Dr. Ramon Duckworth for about 25 years now. He speaks highly of Dr. Ramon Duckworth and Dr. Genet Murphy. Patient denies need for ACM Services at this time. He states he can schedule his own appointments and contact his doctors as needed. He will keep Writer's contact information in event that he needs future assistance. Writer text a photo of business card to Patient.

## 2023-07-17 ENCOUNTER — OFFICE VISIT (OUTPATIENT)
Dept: VASCULAR SURGERY | Age: 63
End: 2023-07-17
Payer: MEDICARE

## 2023-07-17 VITALS
HEART RATE: 68 BPM | DIASTOLIC BLOOD PRESSURE: 60 MMHG | SYSTOLIC BLOOD PRESSURE: 130 MMHG | HEIGHT: 69 IN | OXYGEN SATURATION: 98 % | BODY MASS INDEX: 37 KG/M2 | WEIGHT: 249.8 LBS | RESPIRATION RATE: 20 BRPM | TEMPERATURE: 98.2 F

## 2023-07-17 DIAGNOSIS — I65.23 BILATERAL CAROTID ARTERY STENOSIS: Primary | ICD-10-CM

## 2023-07-17 PROCEDURE — 1036F TOBACCO NON-USER: CPT | Performed by: SURGERY

## 2023-07-17 PROCEDURE — 99213 OFFICE O/P EST LOW 20 MIN: CPT | Performed by: SURGERY

## 2023-07-17 PROCEDURE — G8417 CALC BMI ABV UP PARAM F/U: HCPCS | Performed by: SURGERY

## 2023-07-17 PROCEDURE — 3017F COLORECTAL CA SCREEN DOC REV: CPT | Performed by: SURGERY

## 2023-07-17 PROCEDURE — G8427 DOCREV CUR MEDS BY ELIG CLIN: HCPCS | Performed by: SURGERY

## 2023-07-17 PROCEDURE — 3075F SYST BP GE 130 - 139MM HG: CPT | Performed by: SURGERY

## 2023-07-17 PROCEDURE — 3078F DIAST BP <80 MM HG: CPT | Performed by: SURGERY

## 2023-07-26 DIAGNOSIS — J41.8 MIXED SIMPLE AND MUCOPURULENT CHRONIC BRONCHITIS (HCC): ICD-10-CM

## 2023-07-26 DIAGNOSIS — J44.9 CHRONIC OBSTRUCTIVE PULMONARY DISEASE, UNSPECIFIED COPD TYPE (HCC): ICD-10-CM

## 2023-07-26 RX ORDER — BUDESONIDE AND FORMOTEROL FUMARATE DIHYDRATE 160; 4.5 UG/1; UG/1
AEROSOL RESPIRATORY (INHALATION)
Qty: 10.2 G | Refills: 3 | Status: SHIPPED | OUTPATIENT
Start: 2023-07-26

## 2023-07-26 RX ORDER — TIOTROPIUM BROMIDE INHALATION SPRAY 3.12 UG/1
SPRAY, METERED RESPIRATORY (INHALATION)
Qty: 4 G | Refills: 3 | Status: SHIPPED | OUTPATIENT
Start: 2023-07-26

## 2023-08-01 ENCOUNTER — ANESTHESIA (OUTPATIENT)
Dept: OPERATING ROOM | Age: 63
End: 2023-08-01
Payer: MEDICARE

## 2023-08-01 ENCOUNTER — ANESTHESIA EVENT (OUTPATIENT)
Dept: OPERATING ROOM | Age: 63
End: 2023-08-01
Payer: MEDICARE

## 2023-08-01 ENCOUNTER — HOSPITAL ENCOUNTER (OUTPATIENT)
Age: 63
Setting detail: OUTPATIENT SURGERY
Discharge: HOME | End: 2023-08-01
Attending: SPECIALIST | Admitting: SPECIALIST
Payer: MEDICARE

## 2023-08-01 VITALS
TEMPERATURE: 97.5 F | DIASTOLIC BLOOD PRESSURE: 61 MMHG | OXYGEN SATURATION: 94 % | RESPIRATION RATE: 16 BRPM | BODY MASS INDEX: 36.98 KG/M2 | HEART RATE: 80 BPM | HEIGHT: 68 IN | SYSTOLIC BLOOD PRESSURE: 121 MMHG | WEIGHT: 244 LBS

## 2023-08-01 DIAGNOSIS — I10 ESSENTIAL HYPERTENSION: ICD-10-CM

## 2023-08-01 DIAGNOSIS — I48.91 ATRIAL FIBRILLATION, UNSPECIFIED TYPE (HCC): ICD-10-CM

## 2023-08-01 DIAGNOSIS — G89.18 ACUTE POST-OPERATIVE PAIN: Primary | ICD-10-CM

## 2023-08-01 DIAGNOSIS — N43.3 LEFT HYDROCELE: ICD-10-CM

## 2023-08-01 LAB
BUN BLD-MCNC: 18 MG/DL (ref 8–26)
EGFR, POC: >60 ML/MIN/1.73M2
GLUCOSE BLD-MCNC: 104 MG/DL (ref 74–100)
METER GLUCOSE: 113 MG/DL (ref 75–110)
POC CREATININE: 0.8 MG/DL (ref 0.51–1.19)
POTASSIUM BLD-SCNC: 4.3 MMOL/L (ref 3.5–4.5)

## 2023-08-01 PROCEDURE — 82565 ASSAY OF CREATININE: CPT

## 2023-08-01 PROCEDURE — 84520 ASSAY OF UREA NITROGEN: CPT

## 2023-08-01 PROCEDURE — 2580000003 HC RX 258: Performed by: SPECIALIST

## 2023-08-01 PROCEDURE — 6370000000 HC RX 637 (ALT 250 FOR IP): Performed by: SPECIALIST

## 2023-08-01 PROCEDURE — 82947 ASSAY GLUCOSE BLOOD QUANT: CPT

## 2023-08-01 PROCEDURE — C1894 INTRO/SHEATH, NON-LASER: HCPCS | Performed by: SPECIALIST

## 2023-08-01 PROCEDURE — 6370000000 HC RX 637 (ALT 250 FOR IP): Performed by: NURSE ANESTHETIST, CERTIFIED REGISTERED

## 2023-08-01 PROCEDURE — 2500000003 HC RX 250 WO HCPCS: Performed by: NURSE ANESTHETIST, CERTIFIED REGISTERED

## 2023-08-01 PROCEDURE — 2580000003 HC RX 258: Performed by: ANESTHESIOLOGY

## 2023-08-01 PROCEDURE — 7100000001 HC PACU RECOVERY - ADDTL 15 MIN: Performed by: SPECIALIST

## 2023-08-01 PROCEDURE — 55040 REMOVAL OF HYDROCELE: CPT | Performed by: SPECIALIST

## 2023-08-01 PROCEDURE — 84132 ASSAY OF SERUM POTASSIUM: CPT

## 2023-08-01 PROCEDURE — 3700000000 HC ANESTHESIA ATTENDED CARE: Performed by: SPECIALIST

## 2023-08-01 PROCEDURE — 6360000002 HC RX W HCPCS: Performed by: NURSE ANESTHETIST, CERTIFIED REGISTERED

## 2023-08-01 PROCEDURE — 7100000000 HC PACU RECOVERY - FIRST 15 MIN: Performed by: SPECIALIST

## 2023-08-01 PROCEDURE — 6360000002 HC RX W HCPCS: Performed by: PHYSICIAN ASSISTANT

## 2023-08-01 PROCEDURE — 3600000014 HC SURGERY LEVEL 4 ADDTL 15MIN: Performed by: SPECIALIST

## 2023-08-01 PROCEDURE — 6360000002 HC RX W HCPCS: Performed by: SPECIALIST

## 2023-08-01 PROCEDURE — 88302 TISSUE EXAM BY PATHOLOGIST: CPT

## 2023-08-01 PROCEDURE — 7100000010 HC PHASE II RECOVERY - FIRST 15 MIN: Performed by: SPECIALIST

## 2023-08-01 PROCEDURE — C9399 UNCLASSIFIED DRUGS OR BIOLOG: HCPCS | Performed by: NURSE ANESTHETIST, CERTIFIED REGISTERED

## 2023-08-01 PROCEDURE — 3600000004 HC SURGERY LEVEL 4 BASE: Performed by: SPECIALIST

## 2023-08-01 PROCEDURE — 2709999900 HC NON-CHARGEABLE SUPPLY: Performed by: SPECIALIST

## 2023-08-01 PROCEDURE — 52000 CYSTOURETHROSCOPY: CPT | Performed by: SPECIALIST

## 2023-08-01 PROCEDURE — 3700000001 HC ADD 15 MINUTES (ANESTHESIA): Performed by: SPECIALIST

## 2023-08-01 RX ORDER — ALBUTEROL SULFATE 90 UG/1
AEROSOL, METERED RESPIRATORY (INHALATION) PRN
Status: DISCONTINUED | OUTPATIENT
Start: 2023-08-01 | End: 2023-08-01 | Stop reason: SDUPTHER

## 2023-08-01 RX ORDER — TAMSULOSIN HYDROCHLORIDE 0.4 MG/1
0.4 CAPSULE ORAL DAILY
Qty: 90 CAPSULE | Refills: 1 | Status: SHIPPED | OUTPATIENT
Start: 2023-08-01

## 2023-08-01 RX ORDER — FENTANYL CITRATE 50 UG/ML
50 INJECTION, SOLUTION INTRAMUSCULAR; INTRAVENOUS EVERY 5 MIN PRN
Status: DISCONTINUED | OUTPATIENT
Start: 2023-08-01 | End: 2023-08-01 | Stop reason: HOSPADM

## 2023-08-01 RX ORDER — MIDAZOLAM HYDROCHLORIDE 2 MG/2ML
1 INJECTION, SOLUTION INTRAMUSCULAR; INTRAVENOUS EVERY 10 MIN PRN
Status: DISCONTINUED | OUTPATIENT
Start: 2023-08-01 | End: 2023-08-01 | Stop reason: HOSPADM

## 2023-08-01 RX ORDER — SODIUM CHLORIDE 0.9 % (FLUSH) 0.9 %
5-40 SYRINGE (ML) INJECTION PRN
Status: DISCONTINUED | OUTPATIENT
Start: 2023-08-01 | End: 2023-08-01 | Stop reason: HOSPADM

## 2023-08-01 RX ORDER — PROPOFOL 10 MG/ML
INJECTION, EMULSION INTRAVENOUS PRN
Status: DISCONTINUED | OUTPATIENT
Start: 2023-08-01 | End: 2023-08-01 | Stop reason: SDUPTHER

## 2023-08-01 RX ORDER — FENTANYL CITRATE 50 UG/ML
25 INJECTION, SOLUTION INTRAMUSCULAR; INTRAVENOUS EVERY 5 MIN PRN
Status: DISCONTINUED | OUTPATIENT
Start: 2023-08-01 | End: 2023-08-01 | Stop reason: HOSPADM

## 2023-08-01 RX ORDER — SODIUM CHLORIDE 9 MG/ML
INJECTION, SOLUTION INTRAVENOUS PRN
Status: DISCONTINUED | OUTPATIENT
Start: 2023-08-01 | End: 2023-08-01 | Stop reason: HOSPADM

## 2023-08-01 RX ORDER — SODIUM CHLORIDE, SODIUM LACTATE, POTASSIUM CHLORIDE, CALCIUM CHLORIDE 600; 310; 30; 20 MG/100ML; MG/100ML; MG/100ML; MG/100ML
INJECTION, SOLUTION INTRAVENOUS CONTINUOUS
Status: DISCONTINUED | OUTPATIENT
Start: 2023-08-01 | End: 2023-08-01 | Stop reason: HOSPADM

## 2023-08-01 RX ORDER — GLYCOPYRROLATE 1 MG/5 ML
SYRINGE (ML) INTRAVENOUS PRN
Status: DISCONTINUED | OUTPATIENT
Start: 2023-08-01 | End: 2023-08-01 | Stop reason: SDUPTHER

## 2023-08-01 RX ORDER — KETOROLAC TROMETHAMINE 30 MG/ML
INJECTION, SOLUTION INTRAMUSCULAR; INTRAVENOUS PRN
Status: DISCONTINUED | OUTPATIENT
Start: 2023-08-01 | End: 2023-08-01 | Stop reason: SDUPTHER

## 2023-08-01 RX ORDER — ONDANSETRON 2 MG/ML
INJECTION INTRAMUSCULAR; INTRAVENOUS PRN
Status: DISCONTINUED | OUTPATIENT
Start: 2023-08-01 | End: 2023-08-01 | Stop reason: SDUPTHER

## 2023-08-01 RX ORDER — EPHEDRINE SULFATE/0.9% NACL/PF 50 MG/5 ML
SYRINGE (ML) INTRAVENOUS PRN
Status: DISCONTINUED | OUTPATIENT
Start: 2023-08-01 | End: 2023-08-01 | Stop reason: SDUPTHER

## 2023-08-01 RX ORDER — FENTANYL CITRATE 50 UG/ML
INJECTION, SOLUTION INTRAMUSCULAR; INTRAVENOUS PRN
Status: DISCONTINUED | OUTPATIENT
Start: 2023-08-01 | End: 2023-08-01 | Stop reason: SDUPTHER

## 2023-08-01 RX ORDER — DEXAMETHASONE SODIUM PHOSPHATE 10 MG/ML
INJECTION INTRAMUSCULAR; INTRAVENOUS PRN
Status: DISCONTINUED | OUTPATIENT
Start: 2023-08-01 | End: 2023-08-01 | Stop reason: SDUPTHER

## 2023-08-01 RX ORDER — MAGNESIUM CARB/ALUMINUM HYDROX 105-160MG
TABLET,CHEWABLE ORAL PRN
Status: DISCONTINUED | OUTPATIENT
Start: 2023-08-01 | End: 2023-08-01 | Stop reason: HOSPADM

## 2023-08-01 RX ORDER — BUPIVACAINE HYDROCHLORIDE 5 MG/ML
INJECTION, SOLUTION EPIDURAL; INTRACAUDAL PRN
Status: DISCONTINUED | OUTPATIENT
Start: 2023-08-01 | End: 2023-08-01 | Stop reason: HOSPADM

## 2023-08-01 RX ORDER — HYDROCODONE BITARTRATE AND ACETAMINOPHEN 5; 325 MG/1; MG/1
1 TABLET ORAL EVERY 4 HOURS PRN
Qty: 8 TABLET | Refills: 0 | Status: SHIPPED | OUTPATIENT
Start: 2023-08-01 | End: 2023-08-04

## 2023-08-01 RX ORDER — ALFUZOSIN HYDROCHLORIDE 10 MG/1
10 TABLET, EXTENDED RELEASE ORAL DAILY
Qty: 30 TABLET | Refills: 3 | Status: SHIPPED | OUTPATIENT
Start: 2023-08-01

## 2023-08-01 RX ORDER — SODIUM CHLORIDE 0.9 % (FLUSH) 0.9 %
5-40 SYRINGE (ML) INJECTION EVERY 12 HOURS SCHEDULED
Status: DISCONTINUED | OUTPATIENT
Start: 2023-08-01 | End: 2023-08-01 | Stop reason: HOSPADM

## 2023-08-01 RX ORDER — MEPERIDINE HYDROCHLORIDE 50 MG/ML
12.5 INJECTION INTRAMUSCULAR; INTRAVENOUS; SUBCUTANEOUS EVERY 5 MIN PRN
Status: DISCONTINUED | OUTPATIENT
Start: 2023-08-01 | End: 2023-08-01 | Stop reason: HOSPADM

## 2023-08-01 RX ORDER — LIDOCAINE HYDROCHLORIDE 10 MG/ML
INJECTION, SOLUTION EPIDURAL; INFILTRATION; INTRACAUDAL; PERINEURAL PRN
Status: DISCONTINUED | OUTPATIENT
Start: 2023-08-01 | End: 2023-08-01 | Stop reason: SDUPTHER

## 2023-08-01 RX ORDER — MAGNESIUM HYDROXIDE 1200 MG/15ML
LIQUID ORAL PRN
Status: DISCONTINUED | OUTPATIENT
Start: 2023-08-01 | End: 2023-08-01 | Stop reason: HOSPADM

## 2023-08-01 RX ORDER — CEFADROXIL 500 MG/1
500 CAPSULE ORAL 2 TIMES DAILY
Qty: 20 CAPSULE | Refills: 0 | Status: SHIPPED | OUTPATIENT
Start: 2023-08-01 | End: 2023-08-11

## 2023-08-01 RX ORDER — ASPIRIN 81 MG/1
TABLET ORAL
Qty: 90 TABLET | Refills: 1
Start: 2023-08-01

## 2023-08-01 RX ORDER — ONDANSETRON 2 MG/ML
4 INJECTION INTRAMUSCULAR; INTRAVENOUS
Status: DISCONTINUED | OUTPATIENT
Start: 2023-08-01 | End: 2023-08-01 | Stop reason: HOSPADM

## 2023-08-01 RX ORDER — ROCURONIUM BROMIDE 10 MG/ML
INJECTION, SOLUTION INTRAVENOUS PRN
Status: DISCONTINUED | OUTPATIENT
Start: 2023-08-01 | End: 2023-08-01 | Stop reason: SDUPTHER

## 2023-08-01 RX ORDER — LIDOCAINE HYDROCHLORIDE 10 MG/ML
1 INJECTION, SOLUTION INFILTRATION; PERINEURAL
Status: DISCONTINUED | OUTPATIENT
Start: 2023-08-01 | End: 2023-08-01 | Stop reason: HOSPADM

## 2023-08-01 RX ADMIN — FENTANYL CITRATE 25 MCG: 50 INJECTION, SOLUTION INTRAMUSCULAR; INTRAVENOUS at 10:32

## 2023-08-01 RX ADMIN — DEXAMETHASONE SODIUM PHOSPHATE 10 MG: 10 INJECTION INTRAMUSCULAR; INTRAVENOUS at 09:40

## 2023-08-01 RX ADMIN — ALBUTEROL SULFATE 2 PUFF: 90 AEROSOL, METERED RESPIRATORY (INHALATION) at 10:14

## 2023-08-01 RX ADMIN — PHENYLEPHRINE HYDROCHLORIDE 200 MCG: 10 INJECTION INTRAVENOUS at 09:40

## 2023-08-01 RX ADMIN — SODIUM CHLORIDE, POTASSIUM CHLORIDE, SODIUM LACTATE AND CALCIUM CHLORIDE: 600; 310; 30; 20 INJECTION, SOLUTION INTRAVENOUS at 10:08

## 2023-08-01 RX ADMIN — SUGAMMADEX 300 MG: 100 INJECTION, SOLUTION INTRAVENOUS at 10:16

## 2023-08-01 RX ADMIN — LIDOCAINE HYDROCHLORIDE 50 MG: 10 INJECTION, SOLUTION EPIDURAL; INFILTRATION; INTRACAUDAL; PERINEURAL at 09:20

## 2023-08-01 RX ADMIN — PHENYLEPHRINE HYDROCHLORIDE 100 MCG: 10 INJECTION INTRAVENOUS at 09:46

## 2023-08-01 RX ADMIN — Medication 0.2 MG: at 09:46

## 2023-08-01 RX ADMIN — ROCURONIUM BROMIDE 50 MG: 10 INJECTION INTRAVENOUS at 09:21

## 2023-08-01 RX ADMIN — ONDANSETRON 4 MG: 2 INJECTION INTRAMUSCULAR; INTRAVENOUS at 09:40

## 2023-08-01 RX ADMIN — Medication 2000 MG: at 09:31

## 2023-08-01 RX ADMIN — KETOROLAC TROMETHAMINE 30 MG: 30 INJECTION, SOLUTION INTRAMUSCULAR; INTRAVENOUS at 10:08

## 2023-08-01 RX ADMIN — Medication 20 MG: at 09:49

## 2023-08-01 RX ADMIN — SODIUM CHLORIDE, POTASSIUM CHLORIDE, SODIUM LACTATE AND CALCIUM CHLORIDE: 600; 310; 30; 20 INJECTION, SOLUTION INTRAVENOUS at 08:55

## 2023-08-01 RX ADMIN — ALBUTEROL SULFATE 2 PUFF: 90 AEROSOL, METERED RESPIRATORY (INHALATION) at 09:31

## 2023-08-01 RX ADMIN — FENTANYL CITRATE 25 MCG: 50 INJECTION, SOLUTION INTRAMUSCULAR; INTRAVENOUS at 10:16

## 2023-08-01 RX ADMIN — FENTANYL CITRATE 100 MCG: 50 INJECTION, SOLUTION INTRAMUSCULAR; INTRAVENOUS at 09:20

## 2023-08-01 RX ADMIN — PROPOFOL 200 MG: 10 INJECTION, EMULSION INTRAVENOUS at 09:20

## 2023-08-01 ASSESSMENT — PAIN DESCRIPTION - DESCRIPTORS
DESCRIPTORS: DISCOMFORT
DESCRIPTORS: DISCOMFORT

## 2023-08-01 ASSESSMENT — PAIN SCALES - GENERAL
PAINLEVEL_OUTOF10: 5
PAINLEVEL_OUTOF10: 6
PAINLEVEL_OUTOF10: 5

## 2023-08-01 ASSESSMENT — PAIN - FUNCTIONAL ASSESSMENT: PAIN_FUNCTIONAL_ASSESSMENT: 0-10

## 2023-08-01 ASSESSMENT — ENCOUNTER SYMPTOMS
DYSPNEA ACTIVITY LEVEL: NO INTERVAL CHANGE
SHORTNESS OF BREATH: 1

## 2023-08-01 ASSESSMENT — COPD QUESTIONNAIRES: CAT_SEVERITY: NO INTERVAL CHANGE

## 2023-08-01 ASSESSMENT — PAIN DESCRIPTION - LOCATION: LOCATION: PENIS

## 2023-08-01 NOTE — ANESTHESIA POSTPROCEDURE EVALUATION
Department of Anesthesiology  Postprocedure Note    Patient: Lavinia Del Rosario  MRN: [id number]  YOB: 1960  Date of evaluation: 8/1/2023      Procedure Summary     Date: 08/01/23 Room / Location: [address]    Anesthesia Start: 7247 Anesthesia Stop: 1034    Procedures:       RIGHT HYDROCELECTOMY (Right)      CYSTOSCOPY Diagnosis:       Left hydrocele      (LEFT HYDROCELE)    Surgeons: Melany Muniz MD Responsible Provider: Kelli Vanegas MD    Anesthesia Type: general ASA Status: 3          Anesthesia Type: No value filed.     Hilary Phase I: Hilary Score: 10    Hilary Phase II: Hilary Score: 10      Anesthesia Post Evaluation    Patient location during evaluation: PACU  Patient participation: complete - patient participated  Level of consciousness: awake and alert  Pain score: 3  Airway patency: patent  Nausea & Vomiting: no vomiting and no nausea  Complications: no  Cardiovascular status: hemodynamically stable  Respiratory status: acceptable  Hydration status: stable  Pain management: adequate

## 2023-08-01 NOTE — H&P
[facility]  Jin Loja. Aleah Burkett MD FACS    Pre-op History and Physical  Yosvany Gibson PA-C    Patient:  Padilla Marquis  MRN: [id number]  YOB: 1960    HISTORY OF PRESENT ILLNESS:     The patient is a 57 y.o. male who presents with RIGHT hydrocele. Also has BPH with LUTS. Here for RIGHT hydrocelectomy as well as cystoscopy. Patient's old records, notes and chart reviewed and summarized above. Yosvany Gibson PA-C independently reviewed the images and verified the radiology reports from:    No results found.       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    x 3 ; not bleeding, ablated    Arthritis     neck and back the worst, but \" all over \"    Asthma     BPH (benign prostatic hyperplasia)     CAD (coronary artery disease) 2014    Carotid stenosis     Bilat, right worse    Chronic pain     no longer in pain management    COPD (chronic obstructive pulmonary disease) ([address])     on inhalers pcp manages    COVID-19 vaccine series completed     Diabetes mellitus ([address])     does not check blood suagr at home, last HGB A1C 7.0    Diaphragmatic hernia 2015    left side    Diverticulosis     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    HGB less than 5 : bleeding AVM in duodenum    Gout     Right great toe    History of blood transfusion 11/26/2022    5 units PRBC's during admission    Hydrocele in adult 2023    Bilat, Right worse/ painful    Hyperlipidemia     Hypertension     Lung nodule 03/2015    Right upper lobe , remains stable on last CT scan from 9/2022    Mild depression 09/26/2013    h/o suicide attempts    Morbidly obese ([address]) 10/30/2020    Neuropathy     Ext's x 4, left arm worse    New onset a-fib ([address]) 09/14/2022    with RVR : converted with amiodarone    NSTEMI (non-ST elevated myocardial infarction) Displacement of lumbar intervertebral disc without myelopathy    Therapeutic opioid induced constipation    Radiculopathy of cervical spine    Essential hypertension    Major depression, single episode, in complete remission (HCC)    Prediabetes    Lung nodule    Allergic sinusitis    Arthritis    Spinal stenosis of lumbar region without neurogenic claudication    Class 2 obesity with body mass index (BMI) of 39.0 to 39.9 in adult    Lumbosacral spondylosis without myelopathy    Encounter for long-term opiate analgesic use    Occlusion of right carotid artery    Left carotid stenosis    Sacroiliitis, not elsewhere classified ([address])    Recurrent UTI    BPH with urinary obstruction    Other retention of urine    Pneumonia    Atrial fibrillation (HCC)    Angina of effort (HCC)    Stenosis of right carotid artery    Status post carotid surgery    Chest pain    Acute blood loss anemia    Cervical spondylosis    Cervical post-laminectomy syndrome    Lumbar spondylosis    Left leg swelling    PND (paroxysmal nocturnal dyspnea)    Apnea    Pain in right testicle    Atherosclerosis of native coronary artery of native heart without angina pectoris    Dizziness    Orthostatic hypotension    Abnormal ECG    Shortness of breath    Preprocedural cardiovascular examination       Plan: RIGHT Hydrocelectomy and in OR today. Consent obtained      Milan Tavarez PA-C  7:45 AM 8/1/2023    I have reviewed the history above and agree. I have repeated the key portions of the physical exam and concur with the resident's findings. I have reviewed all laboratory findings and imaging reports/films. I agree with the plan as noted above.     Electronically signed by Oli Heath MD on 8/1/2023 at 8:29 AM

## 2023-08-01 NOTE — OP NOTE
Operative Note      Patient: Sandip Mcdonough  YOB: 1960  MRN: [id number]    Date of Procedure: 8/1/2023    Preoperative diagnosis: Right hydrocele, benign prostatic hypertrophy with obstruction    Post-Op Diagnosis: Same       Procedure(s):  RIGHT HYDROCELECTOMY  CYSTOSCOPY    Surgeon(s):  London Montague MD    Assistant:   Resident: Brigido Corona DO, PGY-4    Anesthesia: General    Estimated Blood Loss (mL): Minimal    Complications: None    Specimens:   ID Type Source Tests Collected by Time Destination   A : RIGHT HYDROCELE Tissue Scrotum SURGICAL PATHOLOGY London Montague MD 8/1/2023 3350        Implants:  None      Drains: None    Findings: Right hydrocele excised and hydrocele sac sent for pathological analysis, 150 cc straw-colored fluid drained. Cystourethroscopy revealed no bladder masses or lesions, bilateral ureteral orifices in orthotopic location, prostatic urethra with mildly obstructing bilateral lobes, no median lobe. Indications: This is a 47-year-old male with a right hydrocele as well as BPH and lower urinary tract symptoms. He presents today for right hydrocelectomy and cystoscopic evaluation. Risk, benefits, alternatives to the procedure were discussed with the patient in detail and elected proceed. Informed consent was obtained. Detailed Description of Procedure:   Patient was brought back to the operating room placed on table supine position. Anesthesia was induced and preoperative antibiotics, 2 g Ancef, were given. He was prepped and draped in normal sterile fashion. We first began by making a transverse incision over the right hemiscrotum. Scalpel was used to incise the skin and first layer of dartos. Bovie electrocautery was then used to excise down to the hydrocele sac. Once we are the hydrocele sac Bovie electrocautery was used to make a small incision and hemostat was placed inside the incision and dilated.   Suction was placed within the hydrocele sac

## 2023-08-01 NOTE — DISCHARGE INSTRUCTIONS
Discharge instructions: Cystoscopy  You may experience pain and/or burning with urination and see blood in the urine after your procedure. This should resolve over the next few days. Ok to discharge home in good condition  No heavy lifting, >10 lbs for today  Patient should avoid strenuous activity for today  Patient should walk moderately at home   Hyacinth Gemma to shower   Patient may resume diet as tolerated  Please call attending physician or hospital  with questions  Call or go to ED if fever (> 101F), intractable nausea vomiting or pain, inability to urinate  Please take prescriptions as directed if prescribed      Patient should follow up with  ***, in *** weeks, call to confirm appointment    Hydrocelectomy Discharge Instructions:     Patient ok to discharge home in good condition  No heavy lifting, >10 lbs for 4-6 week, or until cleared by attending physician  Patient should avoid strenuous activity for 4-6 weeks  Patient should walk moderately at home 8-10x per day or every hour   Patient may resume diet as tolerated: Wean off of narcotic pain medication to plain tylenol or ibuprofen if able to take. Please take all of antibiotic if prescribed. Patient should take prescriptions as directed  No driving while on narcotics  Patient ok to shower in 24 hrs after discharge while keeping incision clean / dry  No submerging incisions for 2 weeks  Gentle ice and scrotal elevation will help with swelling    Please call attending physician or hospital  with questions  Call or Present to ED if fever (> 101F), intractable nausea/vomiting or pain, if incisions become red/swollen or drain pus/fluid, or if calves become red swollen and tender. Patient should follow up with Dr. Kike Crockett, in 1-2 weeks. Call office to confirm appointment.

## 2023-08-01 NOTE — ANESTHESIA PRE PROCEDURE
ASA 3       Induction: intravenous. Anesthetic plan and risks discussed with patient. Use of blood products discussed with patient whom consented to blood products. Plan discussed with CRNA.                     Laila Sutherland MD   8/1/2023

## 2023-08-02 ENCOUNTER — TELEPHONE (OUTPATIENT)
Dept: UROLOGY | Age: 63
End: 2023-08-02

## 2023-08-03 LAB — SURGICAL PATHOLOGY REPORT: NORMAL

## 2023-08-16 DIAGNOSIS — M96.1 CERVICAL POST-LAMINECTOMY SYNDROME: ICD-10-CM

## 2023-08-16 DIAGNOSIS — M47.812 CERVICAL SPONDYLOSIS: ICD-10-CM

## 2023-08-16 DIAGNOSIS — M54.2 CERVICALGIA: ICD-10-CM

## 2023-08-16 NOTE — TELEPHONE ENCOUNTER
Please Approve or Refuse.   Send to Pharmacy per Pt's Request:      Next Visit Date:  Visit date not found   Last Visit Date: 6/30/2023    Hemoglobin A1C (%)   Date Value   03/27/2023 7.0 (H)   11/26/2022 5.4   11/26/2022 5.1             ( goal A1C is < 7)   BP Readings from Last 3 Encounters:   08/01/23 121/61   07/17/23 130/60   06/30/23 107/69          (goal 120/80)  BUN   Date Value Ref Range Status   03/27/2023 29 (H) 8 - 23 mg/dL Final     Creatinine   Date Value Ref Range Status   03/27/2023 0.95 0.70 - 1.20 mg/dL Final     POC Creatinine   Date Value Ref Range Status   08/01/2023 0.8 0.51 - 1.19 mg/dL Final     Potassium   Date Value Ref Range Status   03/27/2023 4.5 3.7 - 5.3 mmol/L Final

## 2023-08-17 RX ORDER — GABAPENTIN 300 MG/1
CAPSULE ORAL
Qty: 180 CAPSULE | Refills: 1 | Status: SHIPPED | OUTPATIENT
Start: 2023-08-17 | End: 2023-11-15

## 2023-08-23 ENCOUNTER — TELEPHONE (OUTPATIENT)
Dept: ONCOLOGY | Age: 63
End: 2023-08-23

## 2023-08-23 DIAGNOSIS — Z87.891 PERSONAL HISTORY OF NICOTINE DEPENDENCE: Primary | ICD-10-CM

## 2023-08-23 NOTE — TELEPHONE ENCOUNTER
Our records indicate that your patient is coming due for their annual lung cancer screening follow up testing. For your convenience, we have pended the order for the scan for you. If you do not agree with the need for the test, please cancel the order and let us know. Sincerely,    [address] Screening Program    Auto printed reminder letter sent to patient.

## 2023-08-24 ENCOUNTER — OFFICE VISIT (OUTPATIENT)
Dept: UROLOGY | Age: 63
End: 2023-08-24
Payer: MEDICARE

## 2023-08-24 VITALS
HEIGHT: 68 IN | SYSTOLIC BLOOD PRESSURE: 96 MMHG | DIASTOLIC BLOOD PRESSURE: 68 MMHG | HEART RATE: 87 BPM | BODY MASS INDEX: 39.4 KG/M2 | WEIGHT: 260 LBS

## 2023-08-24 DIAGNOSIS — N13.8 BPH WITH URINARY OBSTRUCTION: ICD-10-CM

## 2023-08-24 DIAGNOSIS — N43.3 RIGHT HYDROCELE: Primary | ICD-10-CM

## 2023-08-24 DIAGNOSIS — N40.1 BPH WITH URINARY OBSTRUCTION: ICD-10-CM

## 2023-08-24 PROCEDURE — 99212 OFFICE O/P EST SF 10 MIN: CPT | Performed by: SPECIALIST

## 2023-08-24 PROCEDURE — 99024 POSTOP FOLLOW-UP VISIT: CPT | Performed by: SPECIALIST

## 2023-09-07 LAB
ANION GAP SERPL CALCULATED.3IONS-SCNC: 13 MMOL/L (ref 9–17)
BUN SERPL-MCNC: 29 MG/DL (ref 8–23)
CALCIUM SERPL-MCNC: 10.2 MG/DL (ref 8.6–10.4)
CHLORIDE SERPL-SCNC: 102 MMOL/L (ref 98–107)
CO2 SERPL-SCNC: 23 MMOL/L (ref 20–31)
CREAT SERPL-MCNC: 0.95 MG/DL (ref 0.7–1.2)
EST. AVERAGE GLUCOSE BLD GHB EST-MCNC: 154 MG/DL
GFR SERPL CREATININE-BSD FRML MDRD: >60 ML/MIN/1.73M2
GLUCOSE SERPL-MCNC: 137 MG/DL (ref 70–99)
HBA1C MFR BLD: 7 % (ref 4–6)
POTASSIUM SERPL-SCNC: 4.5 MMOL/L (ref 3.7–5.3)
SODIUM SERPL-SCNC: 138 MMOL/L (ref 135–144)

## 2023-09-13 ENCOUNTER — HOSPITAL ENCOUNTER (OUTPATIENT)
Dept: CT IMAGING | Age: 63
Discharge: HOME | End: 2023-09-15
Payer: MEDICARE

## 2023-09-13 VITALS — WEIGHT: 260 LBS | HEIGHT: 68 IN | BODY MASS INDEX: 39.4 KG/M2

## 2023-09-13 DIAGNOSIS — Z87.891 PERSONAL HISTORY OF NICOTINE DEPENDENCE: ICD-10-CM

## 2023-09-13 PROCEDURE — 71271 CT THORAX LUNG CANCER SCR C-: CPT

## 2023-10-23 RX ORDER — AMIODARONE HYDROCHLORIDE 200 MG/1
200 TABLET ORAL DAILY
Qty: 30 TABLET | Refills: 0 | OUTPATIENT
Start: 2023-10-23

## 2023-10-26 ENCOUNTER — OFFICE VISIT (OUTPATIENT)
Dept: UROLOGY | Age: 63
End: 2023-10-26
Payer: MEDICARE

## 2023-10-26 VITALS
WEIGHT: 260 LBS | BODY MASS INDEX: 39.4 KG/M2 | SYSTOLIC BLOOD PRESSURE: 120 MMHG | HEIGHT: 68 IN | HEART RATE: 89 BPM | DIASTOLIC BLOOD PRESSURE: 62 MMHG

## 2023-10-26 DIAGNOSIS — N13.8 BPH WITH URINARY OBSTRUCTION: ICD-10-CM

## 2023-10-26 DIAGNOSIS — N43.3 RIGHT HYDROCELE: Primary | ICD-10-CM

## 2023-10-26 DIAGNOSIS — N40.1 BPH WITH URINARY OBSTRUCTION: ICD-10-CM

## 2023-10-26 PROCEDURE — 99212 OFFICE O/P EST SF 10 MIN: CPT | Performed by: SPECIALIST

## 2023-10-26 PROCEDURE — 99024 POSTOP FOLLOW-UP VISIT: CPT | Performed by: SPECIALIST

## 2023-11-08 ENCOUNTER — HOSPITAL ENCOUNTER (OUTPATIENT)
Age: 63
Setting detail: SPECIMEN
Discharge: HOME | End: 2023-11-08

## 2023-11-08 ENCOUNTER — OFFICE VISIT (OUTPATIENT)
Dept: FAMILY MEDICINE CLINIC | Age: 63
End: 2023-11-08
Payer: MEDICARE

## 2023-11-08 VITALS
HEART RATE: 95 BPM | BODY MASS INDEX: 36.53 KG/M2 | SYSTOLIC BLOOD PRESSURE: 120 MMHG | WEIGHT: 241 LBS | DIASTOLIC BLOOD PRESSURE: 70 MMHG | OXYGEN SATURATION: 100 % | HEIGHT: 68 IN

## 2023-11-08 DIAGNOSIS — R73.9 BLOOD GLUCOSE ELEVATED: ICD-10-CM

## 2023-11-08 DIAGNOSIS — E03.9 HYPOTHYROIDISM, UNSPECIFIED TYPE: ICD-10-CM

## 2023-11-08 DIAGNOSIS — F41.9 ANXIETY: Primary | ICD-10-CM

## 2023-11-08 DIAGNOSIS — E78.2 MIXED HYPERLIPIDEMIA: ICD-10-CM

## 2023-11-08 DIAGNOSIS — I10 ESSENTIAL HYPERTENSION: ICD-10-CM

## 2023-11-08 DIAGNOSIS — R73.09 ELEVATED RANDOM BLOOD GLUCOSE LEVEL: ICD-10-CM

## 2023-11-08 DIAGNOSIS — R73.09 ELEVATED HEMOGLOBIN A1C MEASUREMENT: ICD-10-CM

## 2023-11-08 LAB — HBA1C MFR BLD: 5.9 %

## 2023-11-08 PROCEDURE — G8484 FLU IMMUNIZE NO ADMIN: HCPCS | Performed by: FAMILY MEDICINE

## 2023-11-08 PROCEDURE — 99213 OFFICE O/P EST LOW 20 MIN: CPT | Performed by: FAMILY MEDICINE

## 2023-11-08 PROCEDURE — 4004F PT TOBACCO SCREEN RCVD TLK: CPT | Performed by: FAMILY MEDICINE

## 2023-11-08 PROCEDURE — 83036 HEMOGLOBIN GLYCOSYLATED A1C: CPT | Performed by: FAMILY MEDICINE

## 2023-11-08 PROCEDURE — 3017F COLORECTAL CA SCREEN DOC REV: CPT | Performed by: FAMILY MEDICINE

## 2023-11-08 PROCEDURE — 1123F ACP DISCUSS/DSCN MKR DOCD: CPT | Performed by: FAMILY MEDICINE

## 2023-11-08 PROCEDURE — G8417 CALC BMI ABV UP PARAM F/U: HCPCS | Performed by: FAMILY MEDICINE

## 2023-11-08 PROCEDURE — 3078F DIAST BP <80 MM HG: CPT | Performed by: FAMILY MEDICINE

## 2023-11-08 PROCEDURE — G8427 DOCREV CUR MEDS BY ELIG CLIN: HCPCS | Performed by: FAMILY MEDICINE

## 2023-11-08 PROCEDURE — 3074F SYST BP LT 130 MM HG: CPT | Performed by: FAMILY MEDICINE

## 2023-11-08 RX ORDER — BUSPIRONE HYDROCHLORIDE 10 MG/1
10 TABLET ORAL 2 TIMES DAILY
Qty: 60 TABLET | Refills: 2 | Status: SHIPPED | OUTPATIENT
Start: 2023-11-08 | End: 2024-02-06

## 2023-11-08 NOTE — PROGRESS NOTES
Patient presents today for checkup. Significant emotional stress with his live-in partner, sister and loss of her niece last year. Patient is tearful at this visit. Chart review and CT low-dose lung scan results reviewed. Patient is anxious and requesting help with anxiety willing to sought  and discussed trial of BuSpar with him for anxiolytic medication treatment. Patient is open to blood testing. We will repeat standardize labs. Negative for:     Worry / mood complaints  Headache  Dizziness  Visual Disturbance  Hearing Changes  Nasal / sinus Symptoms  Mouth / tooth symptom, pain  Throat pain  Difficulty swallowing  Neck pain  Chest discomfort  Cough  SOB  N/V/D/C  Pelvic area discomfort  Bladder / voiding discomfort  Bowel complaints  MSk complaints   Numbness/tingling/abnormal sensations   Edema / Leg swelling  Dizziness  Fatigue  Bleeding   Skin    Pertinent Pos: See HPI -emotional, tearful, does not exhibit any intent of self-harm or others    Vitals:    11/08/23 1320   BP: 120/70   Pulse: 95   SpO2: 100%       Alert and oriented to PPT  NAD    HEENT - neg  Neck - no bruits, no lymphadenopathy  Chest  HRRR w/o murmer  LCTAB no wheezes / rhonchi  Extremities -0+ PTE    Gait / Station - stable, no dysequilibrium, uniform pace, no assist device, cane. Diagnosis Orders   1. Anxiety  busPIRone (BUSPAR) 10 MG tablet      2. Elevated hemoglobin A1c measurement  POCT glycosylated hemoglobin (Hb A1C)      3. Essential hypertension  Basic Metabolic Panel    TSH With Reflex Ft4    Lipid Panel      4. Mixed hyperlipidemia  Basic Metabolic Panel    TSH With Reflex Ft4    Lipid Panel      5. Hypothyroidism, unspecified type  TSH With Reflex Ft4      6. Elevated random blood glucose level  Basic Metabolic Panel      7.  Blood glucose elevated  Basic Metabolic Panel          Plan:  1.)  Patient will need to follow-up 1-3 visits approximately 3 to 4 weeks placing over the next 3 months.  2.)  Trial BuSpar
Aged Out    Hib vaccine  Aged Out    Meningococcal (ACWY) vaccine  Aged Out    Pneumococcal 0-64 years Vaccine  Discontinued    Prostate Specific Antigen (PSA) Screening or Monitoring  Discontinued

## 2023-11-10 LAB
ANION GAP SERPL CALCULATED.3IONS-SCNC: 17 MMOL/L (ref 9–17)
BUN SERPL-MCNC: 14 MG/DL (ref 8–23)
CALCIUM SERPL-MCNC: 9.6 MG/DL (ref 8.6–10.4)
CHLORIDE SERPL-SCNC: 100 MMOL/L (ref 98–107)
CHOLEST SERPL-MCNC: 130 MG/DL
CHOLESTEROL/HDL RATIO: 3.2
CO2 SERPL-SCNC: 20 MMOL/L (ref 20–31)
CREAT SERPL-MCNC: 0.9 MG/DL (ref 0.7–1.2)
GFR SERPL CREATININE-BSD FRML MDRD: >60 ML/MIN/1.73M2
GLUCOSE SERPL-MCNC: 93 MG/DL (ref 70–99)
HDLC SERPL-MCNC: 41 MG/DL
LDLC SERPL CALC-MCNC: 60 MG/DL (ref 0–130)
POTASSIUM SERPL-SCNC: 4.1 MMOL/L (ref 3.7–5.3)
SODIUM SERPL-SCNC: 137 MMOL/L (ref 135–144)
TRIGL SERPL-MCNC: 145 MG/DL
TSH SERPL DL<=0.05 MIU/L-ACNC: 1.04 UIU/ML (ref 0.3–5)

## 2023-11-20 DIAGNOSIS — I10 ESSENTIAL HYPERTENSION: ICD-10-CM

## 2023-11-20 RX ORDER — ASPIRIN 81 MG/1
TABLET ORAL
Qty: 90 TABLET | Refills: 1 | Status: SHIPPED | OUTPATIENT
Start: 2023-11-20

## 2023-11-20 NOTE — TELEPHONE ENCOUNTER
Last visit: 11/8/23  Last Med refill: 8/1/23  Does patient have enough medication for 72 hours: no    Next Visit Date:  Future Appointments   Date Time Provider [address]   11/29/2023  1:30 PM Dona Gustafson, DO [facility]   1/5/2024 10:00 AM DO Jayde Flowersy [id number]   1/15/2024  8:45 AM Shante Castillo MD [facility]ART/ [address] Maintenance   Topic Date Due    Pneumococcal 65+ years Vaccine (2 - PCV) 04/25/2018    Flu vaccine (1) 08/01/2023    AAA screen  08/03/2023    COVID-19 Vaccine (9 - 2023-24 season) 09/01/2023    DTaP/Tdap/Td vaccine (1 - Tdap) 05/08/2024 (Originally 8/3/1979)    Annual Wellness Visit (AWV)  12/29/2023    Depression Monitoring  03/17/2024    Low dose CT lung screening &/or counseling  09/13/2024    A1C test (Diabetic or Prediabetic)  11/08/2024    Lipids  11/08/2024    Colorectal Cancer Screen  11/28/2032    Shingles vaccine  Completed    Hepatitis C screen  Addressed    HIV screen  Addressed    Hepatitis A vaccine  Aged Out    Hepatitis B vaccine  Aged Out    Hib vaccine  Aged Out    Meningococcal (ACWY) vaccine  Aged Out    Pneumococcal 0-64 years Vaccine  Discontinued    Prostate Specific Antigen (PSA) Screening or Monitoring  Discontinued       Hemoglobin A1C (%)   Date Value   11/08/2023 5.9   03/27/2023 7.0 (H)   11/26/2022 5.4             ( goal A1C is < 7)   No components found for: \"LABMICR\"  LDL Cholesterol (mg/dL)   Date Value   11/08/2023 60   05/10/2022 179 (H)       (goal LDL is <100)   AST (U/L)   Date Value   11/26/2022 22     ALT (U/L)   Date Value   11/26/2022 16     BUN (mg/dL)   Date Value   11/08/2023 14     BP Readings from Last 3 Encounters:   11/08/23 120/70   10/26/23 120/62   08/24/23 96/68          (goal 120/80)    All Future Testing planned in [facility]  Lab Frequency Next Occurrence   Baseline Diagnostic Sleep Study Once 12/30/2022   PSA Screening Once 02/09/2023   Vascular duplex carotid bilateral Once 01/17/2024

## 2023-11-21 DIAGNOSIS — I10 ESSENTIAL HYPERTENSION: ICD-10-CM

## 2023-11-21 RX ORDER — ASPIRIN 81 MG/1
TABLET ORAL
Qty: 90 TABLET | Refills: 1 | OUTPATIENT
Start: 2023-11-21

## 2023-11-21 NOTE — TELEPHONE ENCOUNTER
Last visit: 11/08/2023  Last Med refill: 11/20/2023  Does patient have enough medication for 72 hours: No:     Pharmacy called asking for clarification on medication after patients procedure. They would like to know if its 1 tablet daily. Please advise.      Next Visit Date:  Future Appointments   Date Time Provider [address]   11/29/2023  1:30 PM Earle Gustafson DO [facility]   1/5/2024 10:00 AM DO Nette Garcia [facility]   1/15/2024  8:45 AM Radha Castillo MD [facility] HEART/ [address] Maintenance   Topic Date Due    Pneumococcal 65+ years Vaccine (2 - PCV) 04/25/2018    Flu vaccine (1) 08/01/2023    AAA screen  08/03/2023    COVID-19 Vaccine (9 - 2023-24 season) 09/01/2023    DTaP/Tdap/Td vaccine (1 - Tdap) 05/08/2024 (Originally 8/3/1979)    Annual Wellness Visit (AWV)  12/29/2023    Depression Monitoring  03/17/2024    Low dose CT lung screening &/or counseling  09/13/2024    A1C test (Diabetic or Prediabetic)  11/08/2024    Lipids  11/08/2024    Colorectal Cancer Screen  11/28/2032    Shingles vaccine  Completed    Hepatitis C screen  Addressed    HIV screen  Addressed    Hepatitis A vaccine  Aged Out    Hepatitis B vaccine  Aged Out    Hib vaccine  Aged Out    Meningococcal (ACWY) vaccine  Aged Out    Pneumococcal 0-64 years Vaccine  Discontinued    Prostate Specific Antigen (PSA) Screening or Monitoring  Discontinued       Hemoglobin A1C (%)   Date Value   11/08/2023 5.9   03/27/2023 7.0 (H)   11/26/2022 5.4             ( goal A1C is < 7)   No components found for: \"LABMICR\"  LDL Cholesterol (mg/dL)   Date Value   11/08/2023 60   05/10/2022 179 (H)       (goal LDL is <100)   AST (U/L)   Date Value   11/26/2022 22     ALT (U/L)   Date Value   11/26/2022 16     BUN (mg/dL)   Date Value   11/08/2023 14     BP Readings from Last 3 Encounters:   11/08/23 120/70   10/26/23 120/62   08/24/23 96/68          (goal 120/80)    All Future Testing planned in [facility]

## 2023-11-29 PROBLEM — D68.69 SECONDARY HYPERCOAGULABLE STATE (HCC): Status: ACTIVE | Noted: 2023-11-29

## 2023-12-13 ENCOUNTER — TELEPHONE (OUTPATIENT)
Dept: FAMILY MEDICINE CLINIC | Age: 63
End: 2023-12-13

## 2023-12-13 DIAGNOSIS — I10 ESSENTIAL HYPERTENSION: ICD-10-CM

## 2023-12-13 RX ORDER — ASPIRIN 81 MG/1
81 TABLET ORAL DAILY
Qty: 90 TABLET | Refills: 3 | Status: SHIPPED | OUTPATIENT
Start: 2023-12-13

## 2023-12-13 NOTE — TELEPHONE ENCOUNTER
Pharmacy calling to get a clarification on Aspirin - she states they need clarification on the SIG but did not give me any specifics

## 2024-01-03 ENCOUNTER — HOSPITAL ENCOUNTER (OUTPATIENT)
Dept: VASCULAR LAB | Age: 64
Discharge: HOME | End: 2024-01-05
Attending: SURGERY
Payer: MEDICAID

## 2024-01-03 DIAGNOSIS — I65.23 BILATERAL CAROTID ARTERY STENOSIS: ICD-10-CM

## 2024-01-03 LAB
VAS LEFT BULB EDV: 23.5 CM/S
VAS LEFT BULB PSV: 104.5 CM/S
VAS LEFT CCA DIST EDV: 20.7 CM/S
VAS LEFT CCA DIST PSV: 136.9 CM/S
VAS LEFT CCA MID EDV: 25.3 CM/S
VAS LEFT CCA MID PSV: 106.6 CM/S
VAS LEFT CCA PROX EDV: 30 CM/S
VAS LEFT CCA PROX PSV: 134.5 CM/S
VAS LEFT ECA EDV: 8.08 CM/S
VAS LEFT ECA PSV: 186.4 CM/S
VAS LEFT ICA DIST EDV: 37.3 CM/S
VAS LEFT ICA DIST PSV: 114.3 CM/S
VAS LEFT ICA MID EDV: 50.2 CM/S
VAS LEFT ICA MID PSV: 198.9 CM/S
VAS LEFT ICA PROX EDV: 20.8 CM/S
VAS LEFT ICA PROX PSV: 83.9 CM/S
VAS LEFT ICA/CCA PSV: 1.86 NO UNITS
VAS LEFT VERTEBRAL EDV: 20.61 CM/S
VAS LEFT VERTEBRAL PSV: 73.8 CM/S
VAS RIGHT BULB EDV: 23 CM/S
VAS RIGHT BULB PSV: 122.9 CM/S
VAS RIGHT CCA DIST EDV: 17.5 CM/S
VAS RIGHT CCA DIST PSV: 57.1 CM/S
VAS RIGHT CCA MID EDV: 16.4 CM/S
VAS RIGHT CCA MID PSV: 75.8 CM/S
VAS RIGHT CCA PROX EDV: 12 CM/S
VAS RIGHT CCA PROX PSV: 59.3 CM/S
VAS RIGHT ECA EDV: 34.6 CM/S
VAS RIGHT ECA PSV: 389.4 CM/S
VAS RIGHT ICA DIST EDV: 27.3 CM/S
VAS RIGHT ICA DIST PSV: 95.2 CM/S
VAS RIGHT ICA MID EDV: 33.9 CM/S
VAS RIGHT ICA MID PSV: 157.1 CM/S
VAS RIGHT ICA PROX EDV: 98.3 CM/S
VAS RIGHT ICA PROX PSV: 371.2 CM/S
VAS RIGHT ICA/CCA PSV: 4.9 NO UNITS
VAS RIGHT VERTEBRAL EDV: 18.59 CM/S
VAS RIGHT VERTEBRAL PSV: 57.1 CM/S

## 2024-01-03 PROCEDURE — 93880 EXTRACRANIAL BILAT STUDY: CPT | Performed by: STUDENT IN AN ORGANIZED HEALTH CARE EDUCATION/TRAINING PROGRAM

## 2024-01-03 PROCEDURE — 93880 EXTRACRANIAL BILAT STUDY: CPT

## 2024-01-15 ENCOUNTER — OFFICE VISIT (OUTPATIENT)
Dept: VASCULAR SURGERY | Age: 64
End: 2024-01-15
Payer: MEDICAID

## 2024-01-15 VITALS
SYSTOLIC BLOOD PRESSURE: 129 MMHG | WEIGHT: 245 LBS | HEIGHT: 69 IN | DIASTOLIC BLOOD PRESSURE: 74 MMHG | OXYGEN SATURATION: 95 % | BODY MASS INDEX: 36.29 KG/M2 | RESPIRATION RATE: 16 BRPM | HEART RATE: 75 BPM

## 2024-01-15 DIAGNOSIS — I65.23 BILATERAL CAROTID ARTERY STENOSIS: Primary | ICD-10-CM

## 2024-01-15 PROCEDURE — G8417 CALC BMI ABV UP PARAM F/U: HCPCS | Performed by: SURGERY

## 2024-01-15 PROCEDURE — 1123F ACP DISCUSS/DSCN MKR DOCD: CPT | Performed by: SURGERY

## 2024-01-15 PROCEDURE — G8484 FLU IMMUNIZE NO ADMIN: HCPCS | Performed by: SURGERY

## 2024-01-15 PROCEDURE — 3017F COLORECTAL CA SCREEN DOC REV: CPT | Performed by: SURGERY

## 2024-01-15 PROCEDURE — G8427 DOCREV CUR MEDS BY ELIG CLIN: HCPCS | Performed by: SURGERY

## 2024-01-15 PROCEDURE — 3074F SYST BP LT 130 MM HG: CPT | Performed by: SURGERY

## 2024-01-15 PROCEDURE — 4004F PT TOBACCO SCREEN RCVD TLK: CPT | Performed by: SURGERY

## 2024-01-15 PROCEDURE — 99213 OFFICE O/P EST LOW 20 MIN: CPT | Performed by: SURGERY

## 2024-01-15 PROCEDURE — 3078F DIAST BP <80 MM HG: CPT | Performed by: SURGERY

## 2024-01-15 NOTE — PROGRESS NOTES
[facility], [facility] HEART AND VASCULAR  [address], [facility] [address]  Dept: [phone]     Patient: Lane Ray  : 1960  MRN: [id number]  DOS: 1/15/2024            HPI:  Lane Ray is a 63 y.o. male who returns to the office regarding his right internal carotid stent.  Recall that this was transcarotid artery revascularization with a stent through the base neck incision.  He is doing well and his velocities really have not changed.  He continues to have significant velocity elevations at peak systole but is around 90 to 100 cm/s during end diastole.  This corresponds to a 70% stenosis.  The left side has a velocity corresponding to a 50 to 69% stenosis.  He has not had stroke, TIA or amaurosis fugax.  Unfortunately he continues to smoke.  He has back problems which is limiting his walking distance.  He does not complain of claudication.    Review of Systems    Vitals:    01/15/24 1304   BP: 129/74   Site: Left Upper Arm   Position: Sitting   Cuff Size: Large Adult   Pulse: 75   Resp: 16   SpO2: 95%   Weight: 111.1 kg (245 lb)   Height: 1.753 m (5' 9\")          Physical Exam  On examination he has an audible bruit on the left and right.  The lower extremities are warm and well-perfused with good posterior tibial pulses bilaterally.  He has a dorsalis pedis pulse on the right but not the left.  He has no ulceration.  He has no gangrene.  Assessment:  1. Bilateral carotid artery stenosis          Plan:  At this point we can have him back at 6-month intervals with carotid duplex examination to investigate the stent and the contralateral side.  He understands and agrees and we will see him at that time.    Electronically signed by:  J Luis Castillo MD

## 2024-01-25 RX ORDER — TAMSULOSIN HYDROCHLORIDE 0.4 MG/1
0.4 CAPSULE ORAL DAILY
Qty: 90 CAPSULE | Refills: 1 | Status: SHIPPED | OUTPATIENT
Start: 2024-01-25

## 2024-01-29 ENCOUNTER — TELEPHONE (OUTPATIENT)
Dept: FAMILY MEDICINE CLINIC | Age: 64
End: 2024-01-29

## 2024-01-29 NOTE — TELEPHONE ENCOUNTER
LVM for a return call to reschedule appointment on 02/05/2024 due to provider not in clinic that day.

## 2024-02-02 DIAGNOSIS — F41.9 ANXIETY: ICD-10-CM

## 2024-02-02 NOTE — TELEPHONE ENCOUNTER
oxygen PRN    Nasal cannula oxygen PRN    Nasal cannula oxygen PRN                Patient Active Problem List:     Cervical stenosis of spine     Cervicalgia     COPD (chronic obstructive pulmonary disease) ([facility])     Smoker     Mild depression     GERD (gastroesophageal reflux disease)     Mixed hyperlipidemia     Carpal tunnel syndrome of left wrist     Medication monitoring encounter     DDD (degenerative disc disease), lumbar     Displacement of lumbar intervertebral disc without myelopathy     Therapeutic opioid induced constipation     Radiculopathy of cervical spine     Essential hypertension     Major depression, single episode, in complete remission ([facility])     Prediabetes     Lung nodule     Allergic sinusitis     Arthritis     Spinal stenosis of lumbar region without neurogenic claudication     Class 2 obesity with body mass index (BMI) of 39.0 to 39.9 in adult     Lumbosacral spondylosis without myelopathy     Encounter for long-term opiate analgesic use     Occlusion of right carotid artery     Left carotid stenosis     Sacroiliitis, not elsewhere classified ([facility])     Recurrent UTI     BPH with urinary obstruction     Other retention of urine     Pneumonia     Atrial fibrillation ([facility])     Angina of effort     Stenosis of right carotid artery     Status post carotid surgery     Chest pain     Acute blood loss anemia     Cervical spondylosis     Cervical post-laminectomy syndrome     Lumbar spondylosis     Left leg swelling     PND (paroxysmal nocturnal dyspnea)     Apnea     Pain in right testicle     Atherosclerosis of native coronary artery of native heart without angina pectoris     Dizziness     Orthostatic hypotension     Abnormal ECG     Shortness of breath     Preprocedural cardiovascular examination     Secondary hypercoagulable state ([facility])

## 2024-02-08 RX ORDER — BUSPIRONE HYDROCHLORIDE 10 MG/1
10 TABLET ORAL 2 TIMES DAILY
Qty: 60 TABLET | Refills: 2 | Status: SHIPPED | OUTPATIENT
Start: 2024-02-08

## 2024-02-10 DIAGNOSIS — M47.812 CERVICAL SPONDYLOSIS: ICD-10-CM

## 2024-02-10 DIAGNOSIS — M96.1 CERVICAL POST-LAMINECTOMY SYNDROME: ICD-10-CM

## 2024-02-10 DIAGNOSIS — M54.2 CERVICALGIA: ICD-10-CM

## 2024-02-12 ENCOUNTER — OFFICE VISIT (OUTPATIENT)
Dept: FAMILY MEDICINE CLINIC | Age: 64
End: 2024-02-12
Payer: MEDICARE

## 2024-02-12 VITALS
BODY MASS INDEX: 34.78 KG/M2 | HEIGHT: 69 IN | DIASTOLIC BLOOD PRESSURE: 64 MMHG | SYSTOLIC BLOOD PRESSURE: 123 MMHG | HEART RATE: 72 BPM | WEIGHT: 234.8 LBS

## 2024-02-12 DIAGNOSIS — Z00.00 MEDICARE ANNUAL WELLNESS VISIT, SUBSEQUENT: Primary | ICD-10-CM

## 2024-02-12 DIAGNOSIS — E11.40 TYPE 2 DIABETES MELLITUS WITH DIABETIC NEUROPATHY, WITHOUT LONG-TERM CURRENT USE OF INSULIN (HCC): ICD-10-CM

## 2024-02-12 DIAGNOSIS — I95.1 ORTHOSTATIC HYPOTENSION: ICD-10-CM

## 2024-02-12 DIAGNOSIS — Z13.6 ENCOUNTER FOR ABDOMINAL AORTIC ANEURYSM (AAA) SCREENING: ICD-10-CM

## 2024-02-12 LAB — HBA1C MFR BLD: 6.2 %

## 2024-02-12 PROCEDURE — 83036 HEMOGLOBIN GLYCOSYLATED A1C: CPT

## 2024-02-12 PROCEDURE — G0439 PPPS, SUBSEQ VISIT: HCPCS

## 2024-02-12 PROCEDURE — G8484 FLU IMMUNIZE NO ADMIN: HCPCS

## 2024-02-12 PROCEDURE — 3044F HG A1C LEVEL LT 7.0%: CPT

## 2024-02-12 PROCEDURE — 3074F SYST BP LT 130 MM HG: CPT

## 2024-02-12 PROCEDURE — 1123F ACP DISCUSS/DSCN MKR DOCD: CPT

## 2024-02-12 PROCEDURE — 3017F COLORECTAL CA SCREEN DOC REV: CPT

## 2024-02-12 PROCEDURE — 3078F DIAST BP <80 MM HG: CPT

## 2024-02-12 RX ORDER — METOPROLOL SUCCINATE 25 MG/1
25 TABLET, EXTENDED RELEASE ORAL DAILY
Qty: 30 TABLET | Refills: 1 | Status: SHIPPED | OUTPATIENT
Start: 2024-02-12 | End: 2024-02-12 | Stop reason: SDUPTHER

## 2024-02-12 RX ORDER — GABAPENTIN 300 MG/1
CAPSULE ORAL
Qty: 180 CAPSULE | Refills: 1 | Status: SHIPPED | OUTPATIENT
Start: 2024-02-12 | End: 2024-05-12

## 2024-02-12 NOTE — PROGRESS NOTES
Medicare Annual Wellness Visit    Lane Ray is here for Medicare AWV    Assessment & Plan   Medicare annual wellness visit, subsequent  Type 2 diabetes mellitus with diabetic neuropathy, without long-term current use of insulin ([facility])  Encounter for abdominal aortic aneurysm (AAA) screening  -     US ABDOMINAL AORTA LIMITED; Future  Orthostatic hypotension  -will decrease toprol xl to 25 mg. I told the patient to let his cardiologist know. He agreed and voiced understanding   Recommendations for Preventive Services Due: see orders and patient instructions/AVS.  Recommended screening schedule for the next 5-10 years is provided to the patient in written form: see Patient Instructions/AVS.     Return in about 4 weeks (around 3/11/2024).     Subjective   The following acute and/or chronic problems were also addressed today:  -Hypotension: BP is low in the office.  Patient reported episodes of dizziness/lightheadedness at home specially when going from sitting to standing position.  Patient is on Toprol-XL 50 mg and Imdur.        Patient's complete Health Risk Assessment and screening values have been reviewed and are found in Flowsheets. The following problems were reviewed today and where indicated follow up appointments were made and/or referrals ordered.    Positive Risk Factor Screenings with Interventions:    Fall Risk:  Do you feel unsteady or are you worried about falling? : (!) yes  2 or more falls in past year?: no  Fall with injury in past year?: no     Interventions:    Patient comments: jeramy  Reviewed medications, home hazards, visual acuity, and co-morbidities that can increase risk for falls     Depression:  PHQ-2 Score: 2  PHQ-9 Total Score: 5    Interpretation:  5-9 mild   10-14 moderate   15-19 moderately severe   20-27 severe           General HRA Questions:  Select all that apply: (!) Loneliness, New or Increased Pain, New or Increased Fatigue (fatigue in the morning hard to get up and

## 2024-02-12 NOTE — PROGRESS NOTES
Attending Physician Statement  I  have discussed the care of Lane Ray including pertinent history and exam findings with the resident. I agree with the assessment, plan and orders as documented by the resident.      /64 (Site: Right Upper Arm, Position: Standing)   Pulse 72   Ht 1.753 m (5' 9.02\")   Wt 106.5 kg (234 lb 12.8 oz)   BMI 34.66 kg/m²    BP Readings from Last 3 Encounters:   02/12/24 123/64   01/15/24 129/74   11/29/23 118/70     Wt Readings from Last 3 Encounters:   02/12/24 106.5 kg (234 lb 12.8 oz)   01/15/24 111.1 kg (245 lb)   11/29/23 110.2 kg (243 lb)     MWV  Amio was changed to Toprol recently  Concern for hypotension with low BP readings as well as symptoms  Plan to decrease Toprol dose  And have him follow up with cardio     Diagnosis Orders   1. Medicare annual wellness visit, subsequent        2. Type 2 diabetes mellitus with diabetic neuropathy, without long-term current use of insulin ([facility])        3. Encounter for abdominal aortic aneurysm (AAA) screening  US ABDOMINAL AORTA LIMITED      4. Orthostatic hypotension                Shelly Gonzalez MD 2/12/2024 4:18 PM

## 2024-02-12 NOTE — PATIENT INSTRUCTIONS
meeting the recommendations, it's better to be more active than less active. All activity done in each category counts toward your weekly total. You'd be surprised how daily things like carrying groceries, keeping up with grandchildren, and taking the stairs can add up.  What keeps you from being active?  If you've had a hard time being more active, you're not alone. Maybe you remember being able to do more. Or maybe you've never thought of yourself as being active. It's frustrating when you can't do the things you want. Being more active can help. What's holding you back?  Getting started.  Have a goal, but break it into easy tasks. Small steps build into big accomplishments.  Staying motivated.  If you feel like skipping your activity, remember your goal. Maybe you want to move better and stay independent. Every activity gets you one step closer.  Not feeling your best.  Start with 5 minutes of an activity you enjoy. Prove to yourself you can do it. As you get comfortable, increase your time.  You may not be where you want to be. But you're in the process of getting there. Everyone starts somewhere.  How can you find safe ways to stay active?  Talk with your doctor about any physical challenges you're facing. Make a plan with your doctor if you have a health problem or aren't sure how to get started with activity.  If you're already active, ask your doctor if there is anything you should change to stay safe as your body and health change.  If you tend to feel dizzy after you take medicine, avoid activity at that time. Try being active before you take your medicine. This will reduce your risk of falls.  If you plan to be active at home, make sure to clear your space before you get started. Remove things like TV cords, coffee tables, and throw rugs. It's safest to have plenty of space to move freely.  The key to getting more active is to take it slow and steady. Try to improve only a little bit at a time. Pick just

## 2024-02-12 NOTE — PROGRESS NOTES
Visit Information    Have you changed or started any medications since your last visit including any over-the-counter medicines, vitamins, or herbal medicines? no   Are you having any side effects from any of your medications? -  no  Have you stopped taking any of your medications? Is so, why? -  no    Have you seen any other physician or provider since your last visit? No  Have you had any other diagnostic tests since your last visit? No  Have you been seen in the emergency room and/or had an admission to a hospital since we last saw you? No  Have you had your routine dental cleaning in the past 6 months? no    Have you activated your [employer] account? If not, what are your barriers? Yes     Patient Care Team:  Jamie Castro DO as PCP - General (Family Medicine)  Jamie Castro DO as PCP - Empaneled Provider  Isidro Ayon MD as Consulting Physician (Gastroenterology)  Vivienne Hall MD as Consulting Physician (Cardiology)  Sb Amos MD as Consulting Physician (Orthopedic Surgery)  Alanna Trevino RN as Nurse Navigator    Medical History Review  Past Medical, Family, and Social History reviewed and does not contribute to the patient presenting condition    Health Maintenance   Topic Date Due    Pneumococcal 65+ years Vaccine (2 - PCV) 04/25/2018    Respiratory Syncytial Virus (RSV) Pregnant or age 60 yrs+ (1 - 1-dose 60+ series) Never done    AAA screen  08/03/2023    DTaP/Tdap/Td vaccine (1 - Tdap) 05/08/2024 (Originally 8/3/1979)    Depression Monitoring  03/17/2024    Low dose CT lung screening &/or counseling  09/13/2024    A1C test (Diabetic or Prediabetic)  11/08/2024    Lipids  11/08/2024    Colorectal Cancer Screen  11/28/2032    Flu vaccine  Completed    Shingles vaccine  Completed    COVID-19 Vaccine  Completed    Hepatitis C screen  Addressed    HIV screen  Addressed    Hepatitis A vaccine  Aged Out    Hepatitis B vaccine  Aged Out    Hib vaccine  Aged Out    Polio vaccine  Aged

## 2024-02-21 ENCOUNTER — HOSPITAL ENCOUNTER (OUTPATIENT)
Dept: VASCULAR LAB | Age: 64
Discharge: HOME | End: 2024-02-23
Payer: MEDICARE

## 2024-02-21 DIAGNOSIS — Z13.6 ENCOUNTER FOR ABDOMINAL AORTIC ANEURYSM (AAA) SCREENING: ICD-10-CM

## 2024-02-21 LAB
VAS AORTA DIST AP: 1.4 CM
VAS AORTA DIST PSV: 90.8 CM/S
VAS AORTA DIST TR: 1.2 CM
VAS AORTA MID AP: 1.9 CM
VAS AORTA MID PSV: 75.7 CM/S
VAS AORTA MID TRANS: 2.1 CM
VAS AORTA PROX AP: 2 CM
VAS AORTA PROX PSV: 42 CM/S
VAS AORTA PROX TR: 2.1 CM
VAS LEFT COM ILIAC AP: 0.69 CM
VAS LEFT COM ILIAC PROX PSV: 180 CM/S
VAS LEFT COM ILIAC TRANS: 0.66 CM
VAS RIGHT COM ILIAC AP: 0.76 CM
VAS RIGHT COM ILIAC PROX PSV: 256 CM/S
VAS RIGHT COM ILIAC TRANS: 0.79 CM

## 2024-02-21 PROCEDURE — 76706 US ABDL AORTA SCREEN AAA: CPT

## 2024-02-21 PROCEDURE — 76706 US ABDL AORTA SCREEN AAA: CPT | Performed by: SURGERY

## 2024-04-09 ENCOUNTER — HOSPITAL ENCOUNTER (OUTPATIENT)
Age: 64
Discharge: HOME | End: 2024-04-09
Payer: COMMERCIAL

## 2024-04-09 LAB — PSA SERPL-MCNC: 4.6 NG/ML (ref 0–4)

## 2024-04-09 PROCEDURE — 36415 COLL VENOUS BLD VENIPUNCTURE: CPT

## 2024-04-09 PROCEDURE — 84153 ASSAY OF PSA TOTAL: CPT

## 2024-04-11 ENCOUNTER — OFFICE VISIT (OUTPATIENT)
Dept: UROLOGY | Age: 64
End: 2024-04-11
Payer: COMMERCIAL

## 2024-04-11 VITALS
BODY MASS INDEX: 34.66 KG/M2 | WEIGHT: 234 LBS | HEIGHT: 69 IN | HEART RATE: 74 BPM | SYSTOLIC BLOOD PRESSURE: 122 MMHG | DIASTOLIC BLOOD PRESSURE: 68 MMHG

## 2024-04-11 DIAGNOSIS — R97.20 ELEVATED PSA: Primary | ICD-10-CM

## 2024-04-11 DIAGNOSIS — N13.8 BPH WITH URINARY OBSTRUCTION: ICD-10-CM

## 2024-04-11 DIAGNOSIS — N40.1 BPH WITH URINARY OBSTRUCTION: ICD-10-CM

## 2024-04-11 DIAGNOSIS — N43.3 RIGHT HYDROCELE: ICD-10-CM

## 2024-04-11 PROCEDURE — 3017F COLORECTAL CA SCREEN DOC REV: CPT | Performed by: SPECIALIST

## 2024-04-11 PROCEDURE — G8427 DOCREV CUR MEDS BY ELIG CLIN: HCPCS | Performed by: SPECIALIST

## 2024-04-11 PROCEDURE — G8417 CALC BMI ABV UP PARAM F/U: HCPCS | Performed by: SPECIALIST

## 2024-04-11 PROCEDURE — 99214 OFFICE O/P EST MOD 30 MIN: CPT | Performed by: SPECIALIST

## 2024-04-11 PROCEDURE — 4004F PT TOBACCO SCREEN RCVD TLK: CPT | Performed by: SPECIALIST

## 2024-04-11 PROCEDURE — 3074F SYST BP LT 130 MM HG: CPT | Performed by: SPECIALIST

## 2024-04-11 PROCEDURE — 3078F DIAST BP <80 MM HG: CPT | Performed by: SPECIALIST

## 2024-04-11 PROCEDURE — 1123F ACP DISCUSS/DSCN MKR DOCD: CPT | Performed by: SPECIALIST

## 2024-04-11 NOTE — PROGRESS NOTES
Dejon Guzman MD, FACS  [facility] Established Patient office note      Patient:  Lane Ray  YOB: 1960  Date: 4/11/2024    HISTORY OF PRESENT ILLNESS:   The patient is a 63 y.o. male  Patient's lower urinary tract symptoms are stable on Flomax/tamsulosin 0.4 mg po qd for BPH symptoms.   Patient told to take this 1/2 hour after evening meal instead of qAM and to limit fluid intake 2-3 hours prior to bedtime to minimize nocturia or nocturnal incontinence.   Patient has a minimal amount of residual fluid in right iram-scrotum s/p 8/1/23 Right hydrocelectomy.  Patient's PSA is now elevated.  Will check a prostate MRI to evaluate for clinically significant prostate cancer.  If abnormal, he will need a Prostate U/S and MR fusion guided biopsy of prostate under MAC.  If normal, will repeat a free and total PSA in 6 months.     Today's AUA Symptom Score (QOL): 17 (4)    Summary of old records:   Right >> left hydrocele on 3/30/23 scrotal U/S with doppler; wants surgery but is taking Eliquis, ASA, Plavix from [facility]; Right hydrocelectomy 8/1/23  BPH, moderate; PVR = 37 mL; Flomax/tamsulosin 0.4 mg po qd    Additional History: none    Procedures Today: N/A    Urinalysis today:  No results found for this visit on 04/11/24.    Last several PSA's:  Lab Results   Component Value Date    PSA 4.60 (H) 04/09/2024    PSA 3.78 02/09/2023    PSA 3.81 05/05/2018       Last total testosterone:  No results found for: \"TESTOSTERONE\"    Last BUN and creatinine:  Lab Results   Component Value Date    BUN 14 11/08/2023     Lab Results   Component Value Date    CREATININE 0.9 11/08/2023       Last CBC:  Lab Results   Component Value Date    WBC 8.3 04/11/2023    HGB 13.7 04/11/2023    HCT 40.4 (L) 04/11/2023    MCV 85.5 04/11/2023     04/11/2023       Additional Lab/Culture results: none  Imaging Reviewed during this Office Visit: none  (results were independently reviewed by physician and

## 2024-04-17 ENCOUNTER — TELEPHONE (OUTPATIENT)
Dept: UROLOGY | Age: 64
End: 2024-04-17

## 2024-04-17 NOTE — TELEPHONE ENCOUNTER
Received call from patient about upcoming Prostate MRI. Pt stated he is very claustrophobic and would like to know if there is something that can be called in for him to take prior to the testing to help relax him. Patient has MRI scheduled for 4/27/2024.    Please call into: Leta Rae.    -------------------------    Pt would also like to know if he can go into the MRI machine feet first instead of head first?      Please advise - call back#: [phone]

## 2024-04-18 ENCOUNTER — TELEPHONE (OUTPATIENT)
Dept: FAMILY MEDICINE CLINIC | Age: 64
End: 2024-04-18

## 2024-04-23 ENCOUNTER — TELEPHONE (OUTPATIENT)
Dept: FAMILY MEDICINE CLINIC | Age: 64
End: 2024-04-23

## 2024-04-23 DIAGNOSIS — F40.240 CLAUSTROPHOBIA: Primary | ICD-10-CM

## 2024-04-23 RX ORDER — ALPRAZOLAM 0.5 MG/1
0.5 TABLET ORAL NIGHTLY PRN
Qty: 1 TABLET | Refills: 0 | Status: SHIPPED | OUTPATIENT
Start: 2024-04-23 | End: 2024-05-23

## 2024-04-23 NOTE — TELEPHONE ENCOUNTER
Received call from patient about upcoming Prostate MRI. Pt stated he is very claustrophobic and would like to know if there is something that can be called in for him to take prior to the testing to help relax him. Patient has MRI scheduled for 4/27/2024. Patient tried reaching out to Urology the doctor will not be in the office.

## 2024-04-24 DIAGNOSIS — F41.9 ANXIETY DUE TO INVASIVE PROCEDURE: Primary | ICD-10-CM

## 2024-04-24 RX ORDER — LORAZEPAM 1 MG/1
1 TABLET ORAL EVERY 8 HOURS PRN
Qty: 2 TABLET | Refills: 0 | Status: SHIPPED | OUTPATIENT
Start: 2024-04-24 | End: 2024-05-22

## 2024-05-07 ENCOUNTER — OFFICE VISIT (OUTPATIENT)
Dept: PRIMARY CARE CLINIC | Age: 64
End: 2024-05-07
Payer: COMMERCIAL

## 2024-05-07 VITALS
DIASTOLIC BLOOD PRESSURE: 60 MMHG | SYSTOLIC BLOOD PRESSURE: 108 MMHG | OXYGEN SATURATION: 96 % | WEIGHT: 234 LBS | HEART RATE: 65 BPM | BODY MASS INDEX: 34.56 KG/M2

## 2024-05-07 DIAGNOSIS — J43.8 OTHER EMPHYSEMA (HCC): ICD-10-CM

## 2024-05-07 DIAGNOSIS — I48.91 ATRIAL FIBRILLATION, UNSPECIFIED TYPE (HCC): ICD-10-CM

## 2024-05-07 DIAGNOSIS — D68.69 SECONDARY HYPERCOAGULABLE STATE (HCC): ICD-10-CM

## 2024-05-07 DIAGNOSIS — F32.5 MAJOR DEPRESSION, SINGLE EPISODE, IN COMPLETE REMISSION (HCC): ICD-10-CM

## 2024-05-07 DIAGNOSIS — E78.2 MIXED HYPERLIPIDEMIA: ICD-10-CM

## 2024-05-07 DIAGNOSIS — I25.10 ATHEROSCLEROSIS OF NATIVE CORONARY ARTERY OF NATIVE HEART WITHOUT ANGINA PECTORIS: Primary | ICD-10-CM

## 2024-05-07 DIAGNOSIS — M46.1 SACROILIITIS, NOT ELSEWHERE CLASSIFIED (HCC): ICD-10-CM

## 2024-05-07 PROCEDURE — 3017F COLORECTAL CA SCREEN DOC REV: CPT | Performed by: FAMILY MEDICINE

## 2024-05-07 PROCEDURE — G8427 DOCREV CUR MEDS BY ELIG CLIN: HCPCS | Performed by: FAMILY MEDICINE

## 2024-05-07 PROCEDURE — 4004F PT TOBACCO SCREEN RCVD TLK: CPT | Performed by: FAMILY MEDICINE

## 2024-05-07 PROCEDURE — G8417 CALC BMI ABV UP PARAM F/U: HCPCS | Performed by: FAMILY MEDICINE

## 2024-05-07 PROCEDURE — 3078F DIAST BP <80 MM HG: CPT | Performed by: FAMILY MEDICINE

## 2024-05-07 PROCEDURE — 99213 OFFICE O/P EST LOW 20 MIN: CPT | Performed by: FAMILY MEDICINE

## 2024-05-07 PROCEDURE — 3074F SYST BP LT 130 MM HG: CPT | Performed by: FAMILY MEDICINE

## 2024-05-07 PROCEDURE — 1123F ACP DISCUSS/DSCN MKR DOCD: CPT | Performed by: FAMILY MEDICINE

## 2024-05-07 PROCEDURE — 3023F SPIROM DOC REV: CPT | Performed by: FAMILY MEDICINE

## 2024-05-07 SDOH — ECONOMIC STABILITY: FOOD INSECURITY: WITHIN THE PAST 12 MONTHS, YOU WORRIED THAT YOUR FOOD WOULD RUN OUT BEFORE YOU GOT MONEY TO BUY MORE.: NEVER TRUE

## 2024-05-07 SDOH — ECONOMIC STABILITY: HOUSING INSECURITY
IN THE LAST 12 MONTHS, WAS THERE A TIME WHEN YOU DID NOT HAVE A STEADY PLACE TO SLEEP OR SLEPT IN A SHELTER (INCLUDING NOW)?: NO

## 2024-05-07 SDOH — ECONOMIC STABILITY: FOOD INSECURITY: WITHIN THE PAST 12 MONTHS, THE FOOD YOU BOUGHT JUST DIDN'T LAST AND YOU DIDN'T HAVE MONEY TO GET MORE.: NEVER TRUE

## 2024-05-07 SDOH — ECONOMIC STABILITY: INCOME INSECURITY: HOW HARD IS IT FOR YOU TO PAY FOR THE VERY BASICS LIKE FOOD, HOUSING, MEDICAL CARE, AND HEATING?: NOT HARD AT ALL

## 2024-05-07 NOTE — PROGRESS NOTES
Discussed Urology visit  Update    Stopped:  Neurontin  Uses [facility] now (legal)    ASA  Eliquis  Metoprolol  Crestor    Negative for:     Worry / mood complaints  Headache  Dizziness  Visual Disturbance  Hearing Changes  Nasal / sinus Symptoms  Mouth / tooth symptom, pain  Throat pain  Difficulty swallowing  Neck pain  Chest discomfort  Cough  SOB  N/V/D/C  Pelvic area discomfort  Bladder / voiding discomfort  Bowel complaints  MSk complaints   Numbness/tingling/abnormal sensations   Edema / Leg swelling  Dizziness  Fatigue  Bleeding   Skin    Pertinent Pos: See HPI -     Vitals:    05/07/24 1025   BP: 108/60   Pulse: 65   SpO2: 96%       Alert and oriented to PPT  NAD    HEENT - neg  Neck - no bruits, no lymphadenopathy  Chest  HRRR w/o murmer  LCTAB no wheezes / rhonchi  Abdomen - soft, non-tender, BS  Extremities - + PTE    Gait / Station - stable, no dysequilibrium, uniform pace, no assist device, cane.     Diagnosis Orders   1. Atherosclerosis of native coronary artery of native heart without angina pectoris  Lipid Panel      2. Mixed hyperlipidemia  Lipid Panel      3. Sacroiliitis, not elsewhere classified (HCC)        4. Other emphysema (HCC)        5. Major depression, single episode, in complete remission (HCC)        6. Atrial fibrillation, unspecified type (HCC)        7. Secondary hypercoagulable state (HCC)            Plan:  1.)  see orders

## 2024-05-10 ENCOUNTER — HOSPITAL ENCOUNTER (OUTPATIENT)
Dept: MRI IMAGING | Age: 64
End: 2024-05-10
Attending: SPECIALIST
Payer: COMMERCIAL

## 2024-05-10 DIAGNOSIS — R97.20 ELEVATED PSA: ICD-10-CM

## 2024-05-10 LAB
EGFR, POC: >90 ML/MIN/1.73M2
POC CREATININE: 0.5 MG/DL (ref 0.51–1.19)

## 2024-05-10 PROCEDURE — 72197 MRI PELVIS W/O & W/DYE: CPT

## 2024-05-10 PROCEDURE — 6360000004 HC RX CONTRAST MEDICATION: Performed by: SPECIALIST

## 2024-05-10 PROCEDURE — 82565 ASSAY OF CREATININE: CPT

## 2024-05-10 PROCEDURE — 2580000003 HC RX 258: Performed by: SPECIALIST

## 2024-05-10 PROCEDURE — A9579 GAD-BASE MR CONTRAST NOS,1ML: HCPCS | Performed by: SPECIALIST

## 2024-05-10 RX ORDER — 0.9 % SODIUM CHLORIDE 0.9 %
40 INTRAVENOUS SOLUTION INTRAVENOUS ONCE
Status: COMPLETED | OUTPATIENT
Start: 2024-05-10 | End: 2024-05-10

## 2024-05-10 RX ORDER — SODIUM CHLORIDE 0.9 % (FLUSH) 0.9 %
10 SYRINGE (ML) INJECTION PRN
Status: ACTIVE | OUTPATIENT
Start: 2024-05-10

## 2024-05-10 RX ADMIN — SODIUM CHLORIDE 40 ML: 9 INJECTION, SOLUTION INTRAVENOUS at 08:16

## 2024-05-10 RX ADMIN — SODIUM CHLORIDE, PRESERVATIVE FREE 10 ML: 5 INJECTION INTRAVENOUS at 08:16

## 2024-05-10 RX ADMIN — GADOTERIDOL 20 ML: 279.3 INJECTION, SOLUTION INTRAVENOUS at 08:16

## 2024-05-13 DIAGNOSIS — R97.20 ELEVATED PSA: Primary | ICD-10-CM

## 2024-07-22 RX ORDER — TAMSULOSIN HYDROCHLORIDE 0.4 MG/1
0.4 CAPSULE ORAL DAILY
Qty: 90 CAPSULE | Refills: 1 | Status: SHIPPED | OUTPATIENT
Start: 2024-07-22

## 2024-07-24 ENCOUNTER — HOSPITAL ENCOUNTER (OUTPATIENT)
Dept: VASCULAR LAB | Age: 64
Discharge: HOME | End: 2024-07-26
Attending: SURGERY
Payer: COMMERCIAL

## 2024-07-24 DIAGNOSIS — I65.23 BILATERAL CAROTID ARTERY STENOSIS: ICD-10-CM

## 2024-07-24 LAB
VAS LEFT BULB EDV: 12.4 CM/S
VAS LEFT BULB PSV: 63.5 CM/S
VAS LEFT CCA DIST EDV: 16.2 CM/S
VAS LEFT CCA DIST PSV: 115 CM/S
VAS LEFT CCA MID EDV: 20.7 CM/S
VAS LEFT CCA MID PSV: 116 CM/S
VAS LEFT CCA PROX EDV: 15.3 CM/S
VAS LEFT CCA PROX PSV: 127 CM/S
VAS LEFT ECA EDV: 0 CM/S
VAS LEFT ECA PSV: 79 CM/S
VAS LEFT ICA MID EDV: 40.3 CM/S
VAS LEFT ICA MID PSV: 179 CM/S
VAS LEFT ICA PROX EDV: 21.5 CM/S
VAS LEFT ICA PROX PSV: 102 CM/S
VAS RIGHT BULB EDV: 9.3 CM/S
VAS RIGHT BULB PSV: 47 CM/S
VAS RIGHT CCA DIST EDV: 9.9 CM/S
VAS RIGHT CCA DIST PSV: 51 CM/S
VAS RIGHT CCA MID EDV: 0 CM/S
VAS RIGHT CCA MID PSV: 57.4 CM/S
VAS RIGHT CCA PROX EDV: 0 CM/S
VAS RIGHT CCA PROX PSV: 62.6 CM/S
VAS RIGHT ECA EDV: 0 CM/S
VAS RIGHT ECA PSV: 277 CM/S
VAS RIGHT ICA DIST EDV: 20.6 CM/S
VAS RIGHT ICA DIST PSV: 118 CM/S
VAS RIGHT ICA MID EDV: 38.7 CM/S
VAS RIGHT ICA MID PSV: 215 CM/S
VAS RIGHT ICA PROX EDV: 60 CM/S
VAS RIGHT ICA PROX PSV: 261 CM/S
VAS RIGHT VERTEBRAL EDV: 9.19 CM/S
VAS RIGHT VERTEBRAL PSV: 44.8 CM/S

## 2024-07-24 PROCEDURE — 93880 EXTRACRANIAL BILAT STUDY: CPT

## 2024-07-24 PROCEDURE — 93880 EXTRACRANIAL BILAT STUDY: CPT | Performed by: SURGERY

## 2024-07-29 ENCOUNTER — OFFICE VISIT (OUTPATIENT)
Dept: VASCULAR SURGERY | Age: 64
End: 2024-07-29
Payer: COMMERCIAL

## 2024-07-29 VITALS
SYSTOLIC BLOOD PRESSURE: 146 MMHG | BODY MASS INDEX: 32.14 KG/M2 | HEART RATE: 78 BPM | HEIGHT: 69 IN | RESPIRATION RATE: 16 BRPM | OXYGEN SATURATION: 98 % | DIASTOLIC BLOOD PRESSURE: 78 MMHG | WEIGHT: 217 LBS

## 2024-07-29 DIAGNOSIS — I65.23 BILATERAL CAROTID ARTERY STENOSIS: Primary | ICD-10-CM

## 2024-07-29 PROCEDURE — 4004F PT TOBACCO SCREEN RCVD TLK: CPT | Performed by: SURGERY

## 2024-07-29 PROCEDURE — G8427 DOCREV CUR MEDS BY ELIG CLIN: HCPCS | Performed by: SURGERY

## 2024-07-29 PROCEDURE — 99213 OFFICE O/P EST LOW 20 MIN: CPT | Performed by: SURGERY

## 2024-07-29 PROCEDURE — 1123F ACP DISCUSS/DSCN MKR DOCD: CPT | Performed by: SURGERY

## 2024-07-29 PROCEDURE — 3017F COLORECTAL CA SCREEN DOC REV: CPT | Performed by: SURGERY

## 2024-07-29 PROCEDURE — 3077F SYST BP >= 140 MM HG: CPT | Performed by: SURGERY

## 2024-07-29 PROCEDURE — 3078F DIAST BP <80 MM HG: CPT | Performed by: SURGERY

## 2024-07-29 PROCEDURE — G8417 CALC BMI ABV UP PARAM F/U: HCPCS | Performed by: SURGERY

## 2024-07-29 NOTE — PROGRESS NOTES
[facility], [facility] HEART AND VASCULAR  [address] [facility] [address]  Dept: [phone]     Patient: Lane Ray  : 1960  MRN: [id number]  DOS: 2024            HPI:  Lane Ray is a 63 y.o. male who returns to the office regarding his carotid stent on the right.  Recall that we had placed a carotid stent using the transcarotid artery revascularization technique.  He has relatively frozen cervical vertebrae and was unable to extend or rotate his neck with a slightly high bifurcation.  The stent did show some velocity elevations 6 months ago at 370 cm/s and peak systole.  This has reduced to approximately 250 cm/s during peak systole.  He has not had stroke, TIA or amaurosis fugax.  He is on aspirin Eliquis and a statin.  He is doing well and has no complaints.  He smokes approximately 5 to 6 cigarettes/day.  This is drastically reduced from previous.  He has lost a significant amount of weight intentionally.    Review of Systems    Vitals:    24 0858   BP: (!) 146/78   Site: Right Upper Arm   Position: Sitting   Cuff Size: Medium Adult   Pulse: 78   Resp: 16   SpO2: 98%   Weight: 98.4 kg (217 lb)   Height: 1.753 m (5' 9\")          Physical Exam  On examination he has a staccato type of bruit on the right and no bruit on the left.  His neck is soft and supple.  His feet are warm and well-perfused without ulceration or gangrene.  Assessment:  1. Bilateral carotid artery stenosis          Plan:  At this point we can lengthen his interval follow-up to approximately 1 year with carotid duplex at that year.  He understands and agrees and we will see him in a year.    Electronically signed by:  J Luis Castillo MD

## 2024-08-05 DIAGNOSIS — M54.2 CERVICALGIA: ICD-10-CM

## 2024-08-05 DIAGNOSIS — M96.1 CERVICAL POST-LAMINECTOMY SYNDROME: ICD-10-CM

## 2024-08-05 DIAGNOSIS — M47.812 CERVICAL SPONDYLOSIS: ICD-10-CM

## 2024-08-05 RX ORDER — GABAPENTIN 300 MG/1
CAPSULE ORAL
Qty: 180 CAPSULE | Refills: 1 | OUTPATIENT
Start: 2024-08-05 | End: 2024-11-03

## 2024-08-08 DIAGNOSIS — M96.1 CERVICAL POST-LAMINECTOMY SYNDROME: ICD-10-CM

## 2024-08-08 DIAGNOSIS — M47.812 CERVICAL SPONDYLOSIS: ICD-10-CM

## 2024-08-08 DIAGNOSIS — M54.2 CERVICALGIA: ICD-10-CM

## 2024-08-08 RX ORDER — GABAPENTIN 300 MG/1
CAPSULE ORAL
Qty: 180 CAPSULE | Refills: 0 | Status: SHIPPED | OUTPATIENT
Start: 2024-08-08 | End: 2024-11-06

## 2024-08-15 ENCOUNTER — OFFICE VISIT (OUTPATIENT)
Dept: PRIMARY CARE CLINIC | Age: 64
End: 2024-08-15

## 2024-08-15 VITALS
BODY MASS INDEX: 31.84 KG/M2 | RESPIRATION RATE: 14 BRPM | HEART RATE: 77 BPM | DIASTOLIC BLOOD PRESSURE: 60 MMHG | SYSTOLIC BLOOD PRESSURE: 122 MMHG | WEIGHT: 215.6 LBS | OXYGEN SATURATION: 96 %

## 2024-08-15 DIAGNOSIS — R60.9 LIPEDEMA: ICD-10-CM

## 2024-08-15 DIAGNOSIS — F41.9 ANXIETY: ICD-10-CM

## 2024-08-15 DIAGNOSIS — E03.9 HYPOTHYROIDISM, UNSPECIFIED TYPE: ICD-10-CM

## 2024-08-15 DIAGNOSIS — J44.9 CHRONIC OBSTRUCTIVE PULMONARY DISEASE, UNSPECIFIED COPD TYPE (HCC): ICD-10-CM

## 2024-08-15 DIAGNOSIS — M47.816 LUMBAR SPONDYLOSIS: ICD-10-CM

## 2024-08-15 DIAGNOSIS — I20.89 ANGINA OF EFFORT (HCC): ICD-10-CM

## 2024-08-15 DIAGNOSIS — J43.8 OTHER EMPHYSEMA (HCC): Primary | ICD-10-CM

## 2024-08-15 DIAGNOSIS — I95.1 ORTHOSTATIC HYPOTENSION: ICD-10-CM

## 2024-08-15 RX ORDER — CITALOPRAM HYDROBROMIDE 10 MG/1
10 TABLET ORAL DAILY
Qty: 30 TABLET | Refills: 2 | Status: SHIPPED | OUTPATIENT
Start: 2024-08-15

## 2024-08-15 SDOH — ECONOMIC STABILITY: INCOME INSECURITY: HOW HARD IS IT FOR YOU TO PAY FOR THE VERY BASICS LIKE FOOD, HOUSING, MEDICAL CARE, AND HEATING?: NOT HARD AT ALL

## 2024-08-15 SDOH — ECONOMIC STABILITY: FOOD INSECURITY: WITHIN THE PAST 12 MONTHS, YOU WORRIED THAT YOUR FOOD WOULD RUN OUT BEFORE YOU GOT MONEY TO BUY MORE.: NEVER TRUE

## 2024-08-15 SDOH — ECONOMIC STABILITY: FOOD INSECURITY: WITHIN THE PAST 12 MONTHS, THE FOOD YOU BOUGHT JUST DIDN'T LAST AND YOU DIDN'T HAVE MONEY TO GET MORE.: NEVER TRUE

## 2024-08-15 ASSESSMENT — PATIENT HEALTH QUESTIONNAIRE - PHQ9
SUM OF ALL RESPONSES TO PHQ QUESTIONS 1-9: 0
5. POOR APPETITE OR OVEREATING: NOT AT ALL
6. FEELING BAD ABOUT YOURSELF - OR THAT YOU ARE A FAILURE OR HAVE LET YOURSELF OR YOUR FAMILY DOWN: NOT AT ALL
2. FEELING DOWN, DEPRESSED OR HOPELESS: NOT AT ALL
1. LITTLE INTEREST OR PLEASURE IN DOING THINGS: NOT AT ALL
SUM OF ALL RESPONSES TO PHQ9 QUESTIONS 1 & 2: 0
10. IF YOU CHECKED OFF ANY PROBLEMS, HOW DIFFICULT HAVE THESE PROBLEMS MADE IT FOR YOU TO DO YOUR WORK, TAKE CARE OF THINGS AT HOME, OR GET ALONG WITH OTHER PEOPLE: NOT DIFFICULT AT ALL
9. THOUGHTS THAT YOU WOULD BE BETTER OFF DEAD, OR OF HURTING YOURSELF: NOT AT ALL
SUM OF ALL RESPONSES TO PHQ QUESTIONS 1-9: 0
8. MOVING OR SPEAKING SO SLOWLY THAT OTHER PEOPLE COULD HAVE NOTICED. OR THE OPPOSITE, BEING SO FIGETY OR RESTLESS THAT YOU HAVE BEEN MOVING AROUND A LOT MORE THAN USUAL: NOT AT ALL
4. FEELING TIRED OR HAVING LITTLE ENERGY: NOT AT ALL
7. TROUBLE CONCENTRATING ON THINGS, SUCH AS READING THE NEWSPAPER OR WATCHING TELEVISION: NOT AT ALL
3. TROUBLE FALLING OR STAYING ASLEEP: NOT AT ALL
SUM OF ALL RESPONSES TO PHQ QUESTIONS 1-9: 0
SUM OF ALL RESPONSES TO PHQ QUESTIONS 1-9: 0

## 2024-08-15 NOTE — PROGRESS NOTES
Ck up  Discussed Dr Castillo care plan/progress note reviewed  Occasionally uses Neurontin, continues to complain of neck and back pain.  Discussed opportunity to consider formal pain management program-patient states he has no interest at this time.  He states he was released from Dr. Antony because he refused to provide urine testing.    Sees Cardiology - Afib, labile BP  Metoprolol reduced in half through cardiology office    Negative for:     Worry / mood complaints  Headache  Dizziness  Visual Disturbance  Hearing Changes  Nasal / sinus Symptoms  Mouth / tooth symptom, pain  Throat pain  Difficulty swallowing  Neck pain  Chest discomfort  Cough  SOB  N/V/D/C  Pelvic area discomfort  Bladder / voiding discomfort  Bowel complaints  MSk complaints   Numbness/tingling/abnormal sensations   Edema / Leg swelling  Dizziness  Fatigue  Bleeding   Skin    Pertinent Pos: See HPI - as above    Vitals:    08/15/24 1012   BP: 122/60   Pulse: 77   Resp: 14   SpO2: 96%       Alert and oriented to PPT  NAD    HEENT - neg  Neck - no bruits, no lymphadenopathy  Chest  HRRR w/o murmer  LCTAB no wheezes / rhonchi  Abdomen -male pattern obese, soft, non-tender, BS  Extremities -0-1+ PTE    Gait / Station - stable, no dysequilibrium, uniform pace, no assist device, cane.     Diagnosis Orders   1. Other emphysema (HCC)        2. Angina of effort (HCC)        3. Orthostatic hypotension        4. Chronic obstructive pulmonary disease, unspecified COPD type (HCC)  tiotropium (SPIRIVA RESPIMAT) 2.5 MCG/ACT AERS inhaler      5. Lumbar spondylosis  Naheed White MD, Physical Medicine and Rehabilitation, [place]    Comprehensive Metabolic Panel      6. Hypothyroidism, unspecified type  Comprehensive Metabolic Panel    TSH    T4, Free      7. Lipedema  Comprehensive Metabolic Panel    Lipid, Fasting      8. Anxiety  citalopram (CELEXA) 10 MG tablet          Plan:  1.)  Chart reviewed and updated  2.)  Long discussion held regarding

## 2024-08-23 ENCOUNTER — HOSPITAL ENCOUNTER (OUTPATIENT)
Age: 64
Discharge: HOME | End: 2024-08-23
Payer: COMMERCIAL

## 2024-08-23 DIAGNOSIS — R60.9 LIPEDEMA: ICD-10-CM

## 2024-08-23 DIAGNOSIS — E03.9 HYPOTHYROIDISM, UNSPECIFIED TYPE: ICD-10-CM

## 2024-08-23 DIAGNOSIS — M47.816 LUMBAR SPONDYLOSIS: ICD-10-CM

## 2024-08-23 LAB
ALBUMIN SERPL-MCNC: 4.3 G/DL (ref 3.5–5.2)
ALP SERPL-CCNC: 129 U/L (ref 40–129)
ALT SERPL-CCNC: 24 U/L (ref 5–41)
ANION GAP SERPL CALCULATED.3IONS-SCNC: 12 MMOL/L (ref 9–17)
AST SERPL-CCNC: 20 U/L
BILIRUB SERPL-MCNC: 0.6 MG/DL (ref 0.3–1.2)
BUN SERPL-MCNC: 12 MG/DL (ref 8–23)
CALCIUM SERPL-MCNC: 9.8 MG/DL (ref 8.6–10.4)
CHLORIDE SERPL-SCNC: 102 MMOL/L (ref 98–107)
CHOLEST SERPL-MCNC: 132 MG/DL
CHOLESTEROL/HDL RATIO: 3.1
CO2 SERPL-SCNC: 24 MMOL/L (ref 20–31)
CREAT SERPL-MCNC: 0.8 MG/DL (ref 0.7–1.2)
GFR, ESTIMATED: >90 ML/MIN/1.73M2
GLUCOSE SERPL-MCNC: 104 MG/DL (ref 70–99)
HDLC SERPL-MCNC: 43 MG/DL
LDLC SERPL CALC-MCNC: 61 MG/DL (ref 0–130)
POTASSIUM SERPL-SCNC: 4.1 MMOL/L (ref 3.7–5.3)
PROT SERPL-MCNC: 7.4 G/DL (ref 6.4–8.3)
SODIUM SERPL-SCNC: 138 MMOL/L (ref 135–144)
T4 FREE SERPL-MCNC: 1.2 NG/DL (ref 0.9–1.7)
TRIGL SERPL-MCNC: 138 MG/DL
TSH SERPL DL<=0.05 MIU/L-ACNC: 1.06 UIU/ML (ref 0.3–5)

## 2024-08-23 PROCEDURE — 84443 ASSAY THYROID STIM HORMONE: CPT

## 2024-08-23 PROCEDURE — 80061 LIPID PANEL: CPT

## 2024-08-23 PROCEDURE — 84439 ASSAY OF FREE THYROXINE: CPT

## 2024-08-23 PROCEDURE — 80053 COMPREHEN METABOLIC PANEL: CPT

## 2024-08-23 PROCEDURE — 36415 COLL VENOUS BLD VENIPUNCTURE: CPT

## 2024-09-22 ENCOUNTER — HOSPITAL ENCOUNTER (EMERGENCY)
Age: 64
Discharge: LEFT BEFORE DISCHARGE | End: 2024-09-22
Attending: EMERGENCY MEDICINE
Payer: COMMERCIAL

## 2024-09-22 ENCOUNTER — APPOINTMENT (OUTPATIENT)
Dept: GENERAL RADIOLOGY | Age: 64
End: 2024-09-22
Payer: COMMERCIAL

## 2024-09-22 VITALS
DIASTOLIC BLOOD PRESSURE: 54 MMHG | HEIGHT: 69 IN | SYSTOLIC BLOOD PRESSURE: 144 MMHG | TEMPERATURE: 97.8 F | OXYGEN SATURATION: 97 % | RESPIRATION RATE: 18 BRPM | HEART RATE: 77 BPM | BODY MASS INDEX: 31.84 KG/M2 | WEIGHT: 215 LBS

## 2024-09-22 DIAGNOSIS — R07.9 CHEST PAIN, UNSPECIFIED TYPE: Primary | ICD-10-CM

## 2024-09-22 LAB
ANION GAP SERPL CALCULATED.3IONS-SCNC: 12 MMOL/L (ref 9–17)
BASOPHILS # BLD: 0 K/UL (ref 0–0.2)
BASOPHILS NFR BLD: 1 % (ref 0–2)
BUN SERPL-MCNC: 11 MG/DL (ref 8–23)
CALCIUM SERPL-MCNC: 9.6 MG/DL (ref 8.6–10.4)
CHLORIDE SERPL-SCNC: 106 MMOL/L (ref 98–107)
CO2 SERPL-SCNC: 25 MMOL/L (ref 20–31)
CREAT SERPL-MCNC: 1.1 MG/DL (ref 0.7–1.2)
EOSINOPHIL # BLD: 0.2 K/UL (ref 0–0.4)
EOSINOPHILS RELATIVE PERCENT: 4 % (ref 0–4)
ERYTHROCYTE [DISTWIDTH] IN BLOOD BY AUTOMATED COUNT: 15.5 % (ref 11.5–14.9)
GFR, ESTIMATED: 74 ML/MIN/1.73M2
GLUCOSE SERPL-MCNC: 114 MG/DL (ref 70–99)
HCT VFR BLD AUTO: 43.6 % (ref 41–53)
HGB BLD-MCNC: 14.8 G/DL (ref 13.5–17.5)
INR PPP: 1
LYMPHOCYTES NFR BLD: 2.6 K/UL (ref 1–4.8)
LYMPHOCYTES RELATIVE PERCENT: 40 % (ref 24–44)
MCH RBC QN AUTO: 29.2 PG (ref 26–34)
MCHC RBC AUTO-ENTMCNC: 33.9 G/DL (ref 31–37)
MCV RBC AUTO: 86.1 FL (ref 80–100)
MONOCYTES NFR BLD: 0.5 K/UL (ref 0.1–1.3)
MONOCYTES NFR BLD: 7 % (ref 1–7)
MYOGLOBIN SERPL-MCNC: 38 NG/ML (ref 28–72)
NEUTROPHILS NFR BLD: 48 % (ref 36–66)
NEUTS SEG NFR BLD: 3.2 K/UL (ref 1.3–9.1)
PARTIAL THROMBOPLASTIN TIME: 31.9 SEC (ref 24–36)
PLATELET # BLD AUTO: 227 K/UL (ref 150–450)
PMV BLD AUTO: 6.9 FL (ref 6–12)
POTASSIUM SERPL-SCNC: 4.1 MMOL/L (ref 3.7–5.3)
PROTHROMBIN TIME: 13.5 SEC (ref 11.8–14.6)
RBC # BLD AUTO: 5.07 M/UL (ref 4.5–5.9)
SODIUM SERPL-SCNC: 143 MMOL/L (ref 135–144)
T4 FREE SERPL-MCNC: 1 NG/DL (ref 0.9–1.7)
TROPONIN I SERPL HS-MCNC: 15 NG/L (ref 0–22)
TSH SERPL DL<=0.05 MIU/L-ACNC: 2.34 UIU/ML (ref 0.3–5)
WBC OTHER # BLD: 6.6 K/UL (ref 3.5–11)

## 2024-09-22 PROCEDURE — 85025 COMPLETE CBC W/AUTO DIFF WBC: CPT

## 2024-09-22 PROCEDURE — 6370000000 HC RX 637 (ALT 250 FOR IP): Performed by: EMERGENCY MEDICINE

## 2024-09-22 PROCEDURE — 85730 THROMBOPLASTIN TIME PARTIAL: CPT

## 2024-09-22 PROCEDURE — 85610 PROTHROMBIN TIME: CPT

## 2024-09-22 PROCEDURE — 36415 COLL VENOUS BLD VENIPUNCTURE: CPT

## 2024-09-22 PROCEDURE — 84484 ASSAY OF TROPONIN QUANT: CPT

## 2024-09-22 PROCEDURE — 93005 ELECTROCARDIOGRAM TRACING: CPT | Performed by: EMERGENCY MEDICINE

## 2024-09-22 PROCEDURE — 83874 ASSAY OF MYOGLOBIN: CPT

## 2024-09-22 PROCEDURE — 80048 BASIC METABOLIC PNL TOTAL CA: CPT

## 2024-09-22 PROCEDURE — 71045 X-RAY EXAM CHEST 1 VIEW: CPT

## 2024-09-22 PROCEDURE — 84439 ASSAY OF FREE THYROXINE: CPT

## 2024-09-22 PROCEDURE — 84443 ASSAY THYROID STIM HORMONE: CPT

## 2024-09-22 PROCEDURE — 99285 EMERGENCY DEPT VISIT HI MDM: CPT

## 2024-09-22 RX ORDER — NITROGLYCERIN 0.4 MG/1
0.4 TABLET SUBLINGUAL EVERY 5 MIN PRN
Status: DISCONTINUED | OUTPATIENT
Start: 2024-09-22 | End: 2024-09-22 | Stop reason: HOSPADM

## 2024-09-22 RX ORDER — ASPIRIN 81 MG/1
324 TABLET, CHEWABLE ORAL ONCE
Status: COMPLETED | OUTPATIENT
Start: 2024-09-22 | End: 2024-09-22

## 2024-09-22 RX ADMIN — ASPIRIN 81 MG 324 MG: 81 TABLET ORAL at 16:11

## 2024-09-22 ASSESSMENT — HEART SCORE: ECG: NORMAL

## 2024-09-22 ASSESSMENT — PAIN SCALES - GENERAL: PAINLEVEL_OUTOF10: 3

## 2024-09-22 ASSESSMENT — PAIN - FUNCTIONAL ASSESSMENT: PAIN_FUNCTIONAL_ASSESSMENT: 0-10

## 2024-09-24 LAB
EKG ATRIAL RATE: 78 BPM
EKG P AXIS: 70 DEGREES
EKG P-R INTERVAL: 150 MS
EKG Q-T INTERVAL: 378 MS
EKG QRS DURATION: 98 MS
EKG QTC CALCULATION (BAZETT): 430 MS
EKG R AXIS: 55 DEGREES
EKG T AXIS: 67 DEGREES
EKG VENTRICULAR RATE: 78 BPM

## 2024-09-24 PROCEDURE — 93010 ELECTROCARDIOGRAM REPORT: CPT | Performed by: INTERNAL MEDICINE

## 2024-11-03 DIAGNOSIS — M96.1 CERVICAL POST-LAMINECTOMY SYNDROME: ICD-10-CM

## 2024-11-03 DIAGNOSIS — M54.2 CERVICALGIA: ICD-10-CM

## 2024-11-03 DIAGNOSIS — M47.812 CERVICAL SPONDYLOSIS: ICD-10-CM

## 2024-11-04 RX ORDER — GABAPENTIN 300 MG/1
300 CAPSULE ORAL 2 TIMES DAILY
Qty: 180 CAPSULE | Refills: 1 | Status: SHIPPED | OUTPATIENT
Start: 2024-11-04 | End: 2025-05-03

## 2024-11-10 DIAGNOSIS — F41.9 ANXIETY: ICD-10-CM

## 2024-11-11 ENCOUNTER — HOSPITAL ENCOUNTER (OUTPATIENT)
Age: 64
Discharge: HOME | End: 2024-11-11
Payer: COMMERCIAL

## 2024-11-11 DIAGNOSIS — R97.20 ELEVATED PSA: ICD-10-CM

## 2024-11-11 LAB — PSA SERPL-MCNC: 5.4 NG/ML (ref 0–4)

## 2024-11-11 PROCEDURE — 36415 COLL VENOUS BLD VENIPUNCTURE: CPT

## 2024-11-11 PROCEDURE — 84153 ASSAY OF PSA TOTAL: CPT

## 2024-11-11 RX ORDER — CITALOPRAM HYDROBROMIDE 10 MG/1
10 TABLET ORAL DAILY
Qty: 30 TABLET | Refills: 2 | Status: SHIPPED | OUTPATIENT
Start: 2024-11-11

## 2024-11-14 ENCOUNTER — OFFICE VISIT (OUTPATIENT)
Dept: UROLOGY | Age: 64
End: 2024-11-14
Payer: COMMERCIAL

## 2024-11-14 VITALS
HEIGHT: 69 IN | BODY MASS INDEX: 31.84 KG/M2 | DIASTOLIC BLOOD PRESSURE: 55 MMHG | WEIGHT: 215 LBS | HEART RATE: 80 BPM | SYSTOLIC BLOOD PRESSURE: 104 MMHG

## 2024-11-14 DIAGNOSIS — N13.8 BPH WITH URINARY OBSTRUCTION: ICD-10-CM

## 2024-11-14 DIAGNOSIS — N40.1 BPH WITH URINARY OBSTRUCTION: ICD-10-CM

## 2024-11-14 DIAGNOSIS — R97.20 ELEVATED PSA: Primary | ICD-10-CM

## 2024-11-14 PROCEDURE — 99214 OFFICE O/P EST MOD 30 MIN: CPT | Performed by: SPECIALIST

## 2024-11-14 PROCEDURE — G8417 CALC BMI ABV UP PARAM F/U: HCPCS | Performed by: SPECIALIST

## 2024-11-14 PROCEDURE — 3017F COLORECTAL CA SCREEN DOC REV: CPT | Performed by: SPECIALIST

## 2024-11-14 PROCEDURE — G8484 FLU IMMUNIZE NO ADMIN: HCPCS | Performed by: SPECIALIST

## 2024-11-14 PROCEDURE — 1123F ACP DISCUSS/DSCN MKR DOCD: CPT | Performed by: SPECIALIST

## 2024-11-14 PROCEDURE — 3074F SYST BP LT 130 MM HG: CPT | Performed by: SPECIALIST

## 2024-11-14 PROCEDURE — 1159F MED LIST DOCD IN RCRD: CPT | Performed by: SPECIALIST

## 2024-11-14 PROCEDURE — G8427 DOCREV CUR MEDS BY ELIG CLIN: HCPCS | Performed by: SPECIALIST

## 2024-11-14 PROCEDURE — 3078F DIAST BP <80 MM HG: CPT | Performed by: SPECIALIST

## 2024-11-14 PROCEDURE — 4004F PT TOBACCO SCREEN RCVD TLK: CPT | Performed by: SPECIALIST

## 2024-11-14 RX ORDER — TAMSULOSIN HYDROCHLORIDE 0.4 MG/1
0.4 CAPSULE ORAL DAILY
Qty: 90 CAPSULE | Refills: 3 | Status: SHIPPED | OUTPATIENT
Start: 2024-11-14

## 2024-11-14 NOTE — PROGRESS NOTES
Dejon Guzman MD, FACS  [facility] Established Patient office note      Patient:  Lane Ray  YOB: 1960  Date: 11/14/24    HISTORY OF PRESENT ILLNESS:   The patient is a 64 y.o. male  Patient's lower urinary tract symptoms are stable on Flomax/tamsulosin 0.4 mg po qd for BPH symptoms.    Patient has nocturia x 3.  Patient instructed to limit fluid intake 2-3 hours prior to bedtime to minimize nocturia or nocturnal incontinence.   Patient found to have an Elevated PSA of 4.6 on 4/9/24.  Patient's 5/10/24 prostate MRI was negative and his volume was 43.3 mL.  Patient's PSA is now 5.4 but his PSA density (PSA divided by prostate volume) = 0.13 is still OK (less than 0.15).  Will repeat a free and total PSA in 6 months.     5/10/24 prostate MRI:  BPH nodules.  Estimated prostate gland volume 43.3 mL.  No suspicious finding to suggest prostatic malignancy.   PI-RADS 2    Today's AUA Symptom Score (QOL): 15 (3)    Summary of old records:   Right >> left hydrocele on 3/30/23 scrotal U/S with doppler; wants surgery but is taking Eliquis, ASA, Plavix from [facility]; Right hydrocelectomy 8/1/23  BPH, moderate; PVR = 37 mL; Flomax/tamsulosin 0.4 mg po qd  Nocturia x 3: nocturnal fluid restriction   Elevated PSA: 4.6 on 4/9/24, 5.4 on 11/11/24; 5/10/24 prostate MRI (43.3 mL; PIRADS 2)    Additional History: none    Procedures Today: N/A    Urinalysis today:  No results found for this visit on 11/14/24.    Last several PSA's:  Lab Results   Component Value Date    PSA 5.40 (H) 11/11/2024    PSA 4.60 (H) 04/09/2024    PSA 3.78 02/09/2023       Last total testosterone:  No results found for: \"TESTOSTERONE\"    Last BUN and creatinine:  Lab Results   Component Value Date    BUN 11 09/22/2024     Lab Results   Component Value Date    CREATININE 1.1 09/22/2024       Last CBC:  Lab Results   Component Value Date    WBC 6.6 09/22/2024    HGB 14.8 09/22/2024    HCT 43.6 09/22/2024    MCV 86.1

## 2024-11-19 ENCOUNTER — OFFICE VISIT (OUTPATIENT)
Dept: PRIMARY CARE CLINIC | Age: 64
End: 2024-11-19

## 2024-11-19 VITALS
RESPIRATION RATE: 16 BRPM | SYSTOLIC BLOOD PRESSURE: 122 MMHG | DIASTOLIC BLOOD PRESSURE: 60 MMHG | OXYGEN SATURATION: 98 % | HEART RATE: 83 BPM | WEIGHT: 242.6 LBS | BODY MASS INDEX: 35.81 KG/M2

## 2024-11-19 DIAGNOSIS — Z23 NEED FOR VACCINE FOR DT (DIPHTHERIA-TETANUS): ICD-10-CM

## 2024-11-19 DIAGNOSIS — Z87.891 PERSONAL HISTORY OF TOBACCO USE: Primary | ICD-10-CM

## 2024-11-19 DIAGNOSIS — Z12.12 SCREENING FOR COLORECTAL CANCER: ICD-10-CM

## 2024-11-19 DIAGNOSIS — Z12.11 SCREENING FOR COLORECTAL CANCER: ICD-10-CM

## 2024-11-19 DIAGNOSIS — Z29.11 NEED FOR RSV VACCINATION: ICD-10-CM

## 2024-11-19 SDOH — ECONOMIC STABILITY: FOOD INSECURITY: WITHIN THE PAST 12 MONTHS, YOU WORRIED THAT YOUR FOOD WOULD RUN OUT BEFORE YOU GOT MONEY TO BUY MORE.: NEVER TRUE

## 2024-11-19 SDOH — ECONOMIC STABILITY: INCOME INSECURITY: HOW HARD IS IT FOR YOU TO PAY FOR THE VERY BASICS LIKE FOOD, HOUSING, MEDICAL CARE, AND HEATING?: NOT HARD AT ALL

## 2024-11-19 SDOH — ECONOMIC STABILITY: FOOD INSECURITY: WITHIN THE PAST 12 MONTHS, THE FOOD YOU BOUGHT JUST DIDN'T LAST AND YOU DIDN'T HAVE MONEY TO GET MORE.: NEVER TRUE

## 2024-11-19 ASSESSMENT — PATIENT HEALTH QUESTIONNAIRE - PHQ9
SUM OF ALL RESPONSES TO PHQ QUESTIONS 1-9: 0
SUM OF ALL RESPONSES TO PHQ QUESTIONS 1-9: 0
SUM OF ALL RESPONSES TO PHQ9 QUESTIONS 1 & 2: 0
SUM OF ALL RESPONSES TO PHQ QUESTIONS 1-9: 0
10. IF YOU CHECKED OFF ANY PROBLEMS, HOW DIFFICULT HAVE THESE PROBLEMS MADE IT FOR YOU TO DO YOUR WORK, TAKE CARE OF THINGS AT HOME, OR GET ALONG WITH OTHER PEOPLE: NOT DIFFICULT AT ALL
6. FEELING BAD ABOUT YOURSELF - OR THAT YOU ARE A FAILURE OR HAVE LET YOURSELF OR YOUR FAMILY DOWN: NOT AT ALL
8. MOVING OR SPEAKING SO SLOWLY THAT OTHER PEOPLE COULD HAVE NOTICED. OR THE OPPOSITE, BEING SO FIGETY OR RESTLESS THAT YOU HAVE BEEN MOVING AROUND A LOT MORE THAN USUAL: NOT AT ALL
9. THOUGHTS THAT YOU WOULD BE BETTER OFF DEAD, OR OF HURTING YOURSELF: NOT AT ALL
7. TROUBLE CONCENTRATING ON THINGS, SUCH AS READING THE NEWSPAPER OR WATCHING TELEVISION: NOT AT ALL
4. FEELING TIRED OR HAVING LITTLE ENERGY: NOT AT ALL
1. LITTLE INTEREST OR PLEASURE IN DOING THINGS: NOT AT ALL
5. POOR APPETITE OR OVEREATING: NOT AT ALL
SUM OF ALL RESPONSES TO PHQ QUESTIONS 1-9: 0
3. TROUBLE FALLING OR STAYING ASLEEP: NOT AT ALL
2. FEELING DOWN, DEPRESSED OR HOPELESS: NOT AT ALL

## 2024-11-19 NOTE — PROGRESS NOTES
Lane presents today for routine follow-up.  Interested in getting the RSV vaccine and tetanus update.  Discussed his significant prior tobacco usage history and qualification for CT lung screening. He is open and willing to do it at this time.  Interest and colorectal cancer screening. Believes he had a Cologuard done several years ago but cannot located on the chart.  Lane continues to follow-up routinely with me on a somewhat quarterly basis. He has ongoing neck and spinal complaints for which he he was under pain management care but at this time no longer using opiate medication for pain control. Med list is reviewed and his other care gaps have been reviewed with him. Overall Lane has really stepped up his interesting compliance in primary care follow-up as noted by him stating he knows he needs to pay better attention to this.    As per above    See orders  Follow-up in 3 months

## 2024-11-19 NOTE — PROGRESS NOTES
Lane presents today for routine follow-up.  Interested in getting the RSV vaccine and tetanus update.    Discussed his significant prior tobacco usage history and qualification for CT lung screening.  He is open and willing to do it at this time.    Interest and colorectal cancer screening.  Believes he had a Cologuard done several years ago but cannot located on the chart.  Lane continues to follow-up routinely with me on a somewhat quarterly basis.  He has ongoing neck and spinal complaints for which he he was under pain management care but at this time no longer using opiate medication for pain control.  Med list is reviewed and his other care gaps have been reviewed with him.     Overall Lane has really stepped up his interesting compliance in primary care follow-up as noted by him stating he knows he needs to pay better attention to this.     Negative for:     Worry / mood complaints  Headache  Dizziness  Visual Disturbance  Hearing Changes  Nasal / sinus Symptoms  Mouth / tooth symptom, pain  Throat pain  Difficulty swallowing  Neck pain  Chest discomfort  Cough  SOB  N/V/D/C  Pelvic area discomfort  Bladder / voiding discomfort  Bowel complaints  MSk complaints   Numbness/tingling/abnormal sensations   Edema / Leg swelling  Dizziness  Fatigue  Bleeding   Skin    Pertinent Pos: See HPI - As per above      Vitals:    11/19/24 1026   BP: 122/60   Pulse: 83   Resp: 16   SpO2: 98%       Alert and oriented to PPT  NAD    HEENT - neg  Neck - no bruits, no lymphadenopathy  Chest  HRRR w/o murmer  LCTAB no wheezes / rhonchi  Extremities - 0+ PTE    Gait / Station - stable, no dysequilibrium, uniform pace, no assist device, cane.     Diagnosis Orders   1. Personal history of tobacco use  AK VISIT TO DISCUSS LUNG CA SCREEN W LDCT    CT Lung Screen (Initial/Annual/Baseline)      2. Need for vaccine for DT (diphtheria-tetanus)  Tdap, ADACEL, (age 10y-64y), IM      3. Screening for colorectal cancer  Fecal DNA

## 2024-11-23 ENCOUNTER — HOSPITAL ENCOUNTER (OUTPATIENT)
Age: 64
Discharge: HOME | End: 2024-11-23
Payer: COMMERCIAL

## 2024-11-23 DIAGNOSIS — R97.20 ELEVATED PSA: ICD-10-CM

## 2024-11-23 LAB
PSA FREE MFR SERPL: 25.6 %
PSA FREE SERPL-MCNC: 1 UG/L
PSA SERPL-MCNC: 3.9 NG/ML (ref 0–4)

## 2024-11-23 PROCEDURE — 36415 COLL VENOUS BLD VENIPUNCTURE: CPT

## 2024-11-23 PROCEDURE — 84154 ASSAY OF PSA FREE: CPT

## 2024-11-27 ENCOUNTER — HOSPITAL ENCOUNTER (OUTPATIENT)
Dept: CT IMAGING | Age: 64
Discharge: HOME | End: 2024-11-29
Attending: FAMILY MEDICINE
Payer: COMMERCIAL

## 2024-11-27 ENCOUNTER — TELEPHONE (OUTPATIENT)
Dept: UROLOGY | Age: 64
End: 2024-11-27

## 2024-11-27 VITALS — HEIGHT: 69 IN | WEIGHT: 242 LBS | BODY MASS INDEX: 35.84 KG/M2

## 2024-11-27 DIAGNOSIS — Z87.891 PERSONAL HISTORY OF TOBACCO USE: ICD-10-CM

## 2024-11-27 PROCEDURE — 71271 CT THORAX LUNG CANCER SCR C-: CPT

## 2024-11-27 NOTE — TELEPHONE ENCOUNTER
Pt called back regarding call from Adriana about PSA. Also had a question about a Cologuard test      Please call pt back @ [phone]

## 2024-12-03 ENCOUNTER — CLINICAL DOCUMENTATION (OUTPATIENT)
Dept: FAMILY MEDICINE CLINIC | Age: 64
End: 2024-12-03

## 2024-12-03 DIAGNOSIS — J21.9 ACUTE BRONCHIOLITIS DUE TO UNSPECIFIED ORGANISM: Primary | ICD-10-CM

## 2024-12-03 RX ORDER — AZITHROMYCIN 250 MG/1
TABLET, FILM COATED ORAL
Qty: 6 TABLET | Refills: 0 | Status: SHIPPED | OUTPATIENT
Start: 2024-12-03 | End: 2024-12-13

## 2024-12-03 RX ORDER — METHYLPREDNISOLONE 4 MG/1
TABLET ORAL
Qty: 1 KIT | Refills: 0 | Status: SHIPPED | OUTPATIENT
Start: 2024-12-03 | End: 2024-12-09

## 2024-12-03 NOTE — PROGRESS NOTES
Patient call back.  Indicates he is having cough with lung congestion. Known Hx of COPD and heavy previous tobacco usage ( pk year).  Denies fever.    Call back made to review and discuss plan.    Medrol / azithromycin pack. Sent to pharmacy.

## 2024-12-19 LAB — NONINV COLON CA DNA+OCC BLD SCRN STL QL: NEGATIVE

## 2025-02-06 ENCOUNTER — INITIAL CONSULT (OUTPATIENT)
Dept: PHYSICAL MEDICINE AND REHAB | Age: 65
End: 2025-02-06

## 2025-02-06 VITALS
WEIGHT: 255.6 LBS | TEMPERATURE: 97.6 F | DIASTOLIC BLOOD PRESSURE: 68 MMHG | HEART RATE: 80 BPM | BODY MASS INDEX: 37.75 KG/M2 | SYSTOLIC BLOOD PRESSURE: 138 MMHG

## 2025-02-06 DIAGNOSIS — M25.562 CHRONIC PAIN OF BOTH KNEES: ICD-10-CM

## 2025-02-06 DIAGNOSIS — M25.552 BILATERAL HIP PAIN: ICD-10-CM

## 2025-02-06 DIAGNOSIS — M47.817 LUMBOSACRAL SPONDYLOSIS WITHOUT MYELOPATHY: Primary | ICD-10-CM

## 2025-02-06 DIAGNOSIS — M25.561 CHRONIC PAIN OF BOTH KNEES: ICD-10-CM

## 2025-02-06 DIAGNOSIS — M47.812 CERVICAL SPONDYLOSIS: ICD-10-CM

## 2025-02-06 DIAGNOSIS — M25.551 BILATERAL HIP PAIN: ICD-10-CM

## 2025-02-06 DIAGNOSIS — M96.1 CERVICAL POST-LAMINECTOMY SYNDROME: ICD-10-CM

## 2025-02-06 DIAGNOSIS — G89.29 CHRONIC PAIN OF BOTH KNEES: ICD-10-CM

## 2025-02-06 RX ORDER — TRIAMCINOLONE ACETONIDE 40 MG/ML
80 INJECTION, SUSPENSION INTRA-ARTICULAR; INTRAMUSCULAR ONCE
Status: COMPLETED | OUTPATIENT
Start: 2025-02-06 | End: 2025-02-06

## 2025-02-06 RX ORDER — LIDOCAINE HYDROCHLORIDE 10 MG/ML
4 INJECTION, SOLUTION INFILTRATION; PERINEURAL ONCE
Status: DISCONTINUED | OUTPATIENT
Start: 2025-02-06 | End: 2025-02-06

## 2025-02-06 RX ORDER — LIDOCAINE HYDROCHLORIDE 10 MG/ML
8 INJECTION, SOLUTION INFILTRATION; PERINEURAL ONCE
Status: COMPLETED | OUTPATIENT
Start: 2025-02-06 | End: 2025-02-06

## 2025-02-06 RX ADMIN — LIDOCAINE HYDROCHLORIDE 8 ML: 10 INJECTION, SOLUTION INFILTRATION; PERINEURAL at 16:04

## 2025-02-06 RX ADMIN — TRIAMCINOLONE ACETONIDE 80 MG: 40 INJECTION, SUSPENSION INTRA-ARTICULAR; INTRAMUSCULAR at 16:00

## 2025-02-06 NOTE — PROGRESS NOTES
XR HIP LEFT (2-3 VIEWS)     Standing Status:   Future     Standing Expiration Date:   2/6/2026     Order Specific Question:   Reason for exam:     Answer:   Chronic hip pain    XR HIP RIGHT (2-3 VIEWS)     Standing Status:   Future     Standing Expiration Date:   2/6/2026     Order Specific Question:   Reason for exam:     Answer:   Chronic hip pain    XR KNEE LEFT (3 VIEWS)     AP, lateral, sunrise     Standing Status:   Future     Standing Expiration Date:   2/6/2026     Order Specific Question:   Reason for exam:     Answer:   Bilateral knee pain    XR KNEE RIGHT (3 VIEWS)     AP, lateral, sunrise     Standing Status:   Future     Standing Expiration Date:   2/6/2026     Order Specific Question:   Reason for exam:     Answer:   chronic knee pain    [facility] Physical [facility]     Referral Priority:   Routine     Referral Type:   Eval and Treat     Referral Reason:   Specialty Services Required     Requested Specialty:   Physical Therapy     Number of Visits Requested:   1    [facility] Occupational [facility]     Referral Priority:   Routine     Referral Type:   Eval and Treat     Referral Reason:   Specialty Services Required     Requested Specialty:   Occupational Therapy     Number of Visits Requested:   1     Orders Placed This Encounter   Medications    triamcinolone acetonide (KENALOG-40) injection 80 mg    DISCONTD: lidocaine 1 % injection 4 mL    lidocaine 1 % injection 8 mL       No follow-ups on file.         Minutes spent on [id number] today during this appointment.      Electronically signed by Main Todd MD on 2/9/2025 at 4:49 PM.     Please note that this chart was generated using voice recognition Dragon dictation software.  Although every effort was made to ensure the accuracy of this automated transcription, some errors in transcriptionmay have occurred.

## 2025-02-12 ENCOUNTER — HOSPITAL ENCOUNTER (OUTPATIENT)
Age: 65
Discharge: HOME | End: 2025-02-14
Payer: MEDICARE

## 2025-02-12 ENCOUNTER — HOSPITAL ENCOUNTER (OUTPATIENT)
Dept: GENERAL RADIOLOGY | Age: 65
Discharge: HOME | End: 2025-02-14
Payer: MEDICARE

## 2025-02-12 DIAGNOSIS — G89.29 CHRONIC PAIN OF BOTH KNEES: ICD-10-CM

## 2025-02-12 DIAGNOSIS — M25.561 CHRONIC PAIN OF BOTH KNEES: ICD-10-CM

## 2025-02-12 DIAGNOSIS — M11.861 CALCIUM PYROPHOSPHATE DEPOSITION DISEASE (CPDD) OF RIGHT KNEE: Primary | ICD-10-CM

## 2025-02-12 DIAGNOSIS — M47.817 LUMBOSACRAL SPONDYLOSIS WITHOUT MYELOPATHY: ICD-10-CM

## 2025-02-12 DIAGNOSIS — M25.562 CHRONIC PAIN OF BOTH KNEES: ICD-10-CM

## 2025-02-12 DIAGNOSIS — M11.862 CALCIUM PYROPHOSPHATE DEPOSITION DISEASE (CPDD) OF LEFT KNEE: ICD-10-CM

## 2025-02-12 DIAGNOSIS — M25.551 BILATERAL HIP PAIN: ICD-10-CM

## 2025-02-12 DIAGNOSIS — M25.552 BILATERAL HIP PAIN: ICD-10-CM

## 2025-02-12 DIAGNOSIS — M47.812 CERVICAL SPONDYLOSIS: ICD-10-CM

## 2025-02-12 PROCEDURE — 73502 X-RAY EXAM HIP UNI 2-3 VIEWS: CPT

## 2025-02-12 PROCEDURE — 72050 X-RAY EXAM NECK SPINE 4/5VWS: CPT

## 2025-02-12 PROCEDURE — 72100 X-RAY EXAM L-S SPINE 2/3 VWS: CPT

## 2025-02-12 PROCEDURE — 73562 X-RAY EXAM OF KNEE 3: CPT

## 2025-02-12 RX ORDER — NAPROXEN 500 MG/1
500 TABLET ORAL 2 TIMES DAILY WITH MEALS
Qty: 20 TABLET | Refills: 0 | Status: SHIPPED | OUTPATIENT
Start: 2025-02-12 | End: 2025-02-22

## 2025-02-12 NOTE — PROGRESS NOTES
Imaging reports resulted today, identifying chondrocalcinosis/pseudogout in both knees.     EXAMINATION:  THREE XRAY VIEWS OF THE LEFT KNEE; THREE XRAY VIEWS OF THE RIGHT KNEE    Right knee:  No sizable joint effusion is identified.  Osseous alignment is normal.  Joint spaces are well maintained.  Chondrocalcinosis.  No acute fracture or gross dislocation is seen.  No  suspicious osteolytic or osteoblastic lesions are identified.     Left knee:  No sizable joint effusion is identified.  Osseous alignment is normal.  Joint spaces are well maintained.  Chondrocalcinosis.  No acute fracture or gross dislocation is seen.  No  suspicious osteolytic or osteoblastic lesions are identified.      Assessment:   Bilateral knee calcium pyrophosphate deposition disease    Plan  -Naproxen 500 mg BID x10 days   -Will instruct patient to call after medication course to report improvement or not. Can consider knee arthrocentesis with intraarticular glucocorticoid injections.

## 2025-02-14 ENCOUNTER — TELEPHONE (OUTPATIENT)
Dept: PHYSICAL MEDICINE AND REHAB | Age: 65
End: 2025-02-14

## 2025-02-14 NOTE — TELEPHONE ENCOUNTER
I discussed the xrays with the patient and informed him of the medication sent to the pharmacy.  He understood but wanted to let you know that he did start taking the naproxen yesterday but after he discussed with his cardiologist and the urging of the pharmacist who was concerned because he is also on Eliquis.    Pt spoke with his cardiologist who said it was ok to take the medication but keep a watch for any abnormal bleeding or bruising.      I informed patient to call us if any of those symptoms happen and to stop the medication right away and let us know also.   Iv started,blood sent to lab, updated pt poc

## 2025-02-18 ENCOUNTER — HOSPITAL ENCOUNTER (OUTPATIENT)
Dept: PHYSICAL THERAPY | Age: 65
Setting detail: THERAPIES SERIES
Discharge: HOME | End: 2025-02-18
Attending: GENERAL PRACTICE
Payer: MEDICARE

## 2025-02-18 PROCEDURE — 97110 THERAPEUTIC EXERCISES: CPT

## 2025-02-18 PROCEDURE — 97162 PT EVAL MOD COMPLEX 30 MIN: CPT

## 2025-02-18 NOTE — CONSULTS
[] [facility]   Outpatient Rehabilitation & Therapy  [address] [facility] [place] [address]  P: [phone]   F: [phone]  [facility] Outpatient Physical Therapy  [address]         Phone: [phone]       Fax: [phone]    Physical Therapy Cervical Spine Evaluation    Date:  2025  Patient: Lane Ray  : 1960  MRN: [id number]  Physician: Main Todd MD    Medical Diagnosis: Cervical spondylosis (M47.812), Cervical post-laminectomy syndrome (M96.1)    Clinical Diagnosis: Neck pain with mobility deficits (M54.2, Z74.09)  Onset date: 2025   Next 's appt.: TBD  PT Visit Information  PT Insurance Information: Medicare- follow medicare guidelines and [employer]    Total # of Visits Approved: 12  Total # of Visits to Date: 1  No Show: 0  Canceled Appointment: 0     Subjective  CC: Neck pain   HPI: (2025) Patient is 64 year old male who presented with chronic neck pain. He underwent a cervical laminectomy and fusion in  and has been having abnormal symptoms throughout the neck and upper back. He recently followed up with PM&R per a referral from his PCP. Based on clinical findings, an outpatient physical therapy referral was provided to further address the physical impairments and activity limitations.     History of pain management without success.       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

## 2025-02-20 NOTE — FLOWSHEET NOTE
[facility] Fall Risk Assessment    Risk Factor Scale  Score   History of Falls [x] Yes  [] No 25  0 25   Secondary Diagnosis [x] Yes  [] No 15  0 15   Ambulatory Aid [] Furniture  [] Crutches/cane/walker  [x] None/bedrest/wheelchair/nurse 30  15  0 0   IV/Heparin Lock [] Yes  [x] No 20  0 0   Gait/Transferring [x] Impaired  [] Weak  [] Normal/bedrest/immobile 20  10  0 20   Mental Status [] Forgets limitations  [x] Oriented to own ability 15  0 0      Total: 60     Based on the Assessment score: check the appropriate box.    []  No intervention needed   Low =   Score of 0-24    []  Use standard prevention interventions Moderate =  Score of 24-44   [] Give patient handout and discuss fall prevention strategies   [] Establish goal of education for patient/family RE: fall prevention strategies    [x]  Use high risk prevention interventions High = Score of 45 and higher   [x] Give patient handout and discuss fall prevention strategies   [x] Establish goal of education for patient/family Re: fall prevention strategies   [x] Discuss lifeline / other resources    Electronically signed by:   Sb Anderson PT DPT  Date: 2/18/2025

## 2025-02-25 ENCOUNTER — HOSPITAL ENCOUNTER (OUTPATIENT)
Dept: PHYSICAL THERAPY | Age: 65
Setting detail: THERAPIES SERIES
Discharge: HOME | End: 2025-02-25
Attending: GENERAL PRACTICE
Payer: MEDICARE

## 2025-02-25 PROCEDURE — 97110 THERAPEUTIC EXERCISES: CPT

## 2025-02-25 NOTE — FLOWSHEET NOTE
[facility]   Outpatient Rehabilitation & Therapy  [address]  P: [phone]   F: [phone]    Physical Therapy Daily Treatment Note    Date:  2025  Patient Name:  Lane Ray    :  1960  MRN: [id number]  Physician: Main Todd MD               Medical Diagnosis: Cervical spondylosis (M47.812), Cervical post-laminectomy syndrome (M96.1)                    Clinical Diagnosis: Neck pain with mobility deficits (M54.2, Z74.09)  Onset date: 2025           Next Dr's appt.: TBD  PT Visit Information  PT Insurance Information: Medicare- follow medicare guidelines and [employer]    Total # of Visits Approved: 12  Total # of Visits to Date: 2  No Show: 0  Canceled Appointment: 0    Subjective:    Patient stated that his pain levels are relatively unchanged from his evaluation.     Over 24 hours   Pain  Pain:  [x] Yes   [] No       Location: (B) base of the skull to the base of the neck with radicular symptoms down into the (B) scapula region and (B) UEs in the hands/fingers.   Pain Rating: (0-10 scale) 7/10   Worst: 9/10  Best: 7/10  Descriptors: Constant, shooting, numbness and tingling down both UEs sharp, aching and stabbing.     Objective:  Modalities: Hot pack with the passive stretches  Precautions: Asthma, COPD, DM, Depression, Sleep Apnea and Neuropathy    Exercises:  Exercise Reps/ Time Weight/ Level Comments   Supine Scapular Depression  20 reps   Following passive stretch    Supine Shoulder Horizontal Abduction   10 reps      Supine Shoulder Scaption  10 reps      Seated Thoracic Extension  5 reps      Seated Thoracic Rotation  5 reps      Seated Thoracic Abduction  5 reps      Passive Stretching   Supine Upper Trap Stretch 30 sec hold x 3 reps with each side   Supine  Levator Scapulae Stretch 30 sec hold x 3 reps with each side   Supine Pect Major Stretch @ 90 and 135 30 sec hold x 3 reps with each angle/each side   Supine Neck Flexion Stretch

## 2025-02-27 ENCOUNTER — HOSPITAL ENCOUNTER (OUTPATIENT)
Dept: PHYSICAL THERAPY | Age: 65
Setting detail: THERAPIES SERIES
Discharge: HOME | End: 2025-02-27
Attending: GENERAL PRACTICE
Payer: MEDICARE

## 2025-02-27 PROCEDURE — 97110 THERAPEUTIC EXERCISES: CPT

## 2025-02-27 NOTE — FLOWSHEET NOTE
[facility]   Outpatient Rehabilitation & Therapy  [address]  P: [phone]   F: [phone]    Physical Therapy Daily Treatment Note    Date:  2025  Patient Name:  Lane Ray    :  1960  MRN: [id number]  Physician: Main Todd MD               Medical Diagnosis: Cervical spondylosis (M47.812), Cervical post-laminectomy syndrome (M96.1)                    Clinical Diagnosis: Neck pain with mobility deficits (M54.2, Z74.09)  Onset date: 2025           Next Dr's appt.: TBD  PT Visit Information  PT Insurance Information: Medicare- follow medicare guidelines and [employer]    Total # of Visits Approved: 12  Total # of Visits to Date: 3  No Show: 0  Canceled Appointment: 0    Subjective:    Patient stated that his pain levels have improved since his last treatment session. He felt the stretches and heat during the treatment session helped significantly.     Over 24 hours   Pain  Pain:  [x] Yes   [] No       Location: (B) base of the skull to the base of the neck with radicular symptoms down into the (B) scapula region and (B) UEs in the hands/fingers.   Pain Rating: (0-10 scale) 7/10   Worst: 8/10  Best: 5/10  Descriptors: Constant, shooting, numbness and tingling down both UEs sharp, aching and stabbing.     Objective:  Modalities: Hot pack with the passive stretches  Precautions: Asthma, COPD, DM, Depression, Sleep Apnea and Neuropathy    Exercises:  Exercise Reps/ Time Weight/ Level Comments   Supine Scapular Depression  20 reps   Following passive stretch    Supine Shoulder Horizontal Abduction   10 reps      Supine Shoulder Scaption  10 reps      Seated Thoracic Extension  5 reps      Seated Thoracic Rotation  5 reps      Seated Thoracic Abduction  5 reps      Passive Stretching   Supine Upper Trap Stretch 30 sec hold x 3 reps with each side   Supine  Levator Scapulae Stretch 30 sec hold x 3 reps with each side   Supine Pect Major Stretch @ 90

## 2025-03-03 ENCOUNTER — HOSPITAL ENCOUNTER (OUTPATIENT)
Dept: PHYSICAL THERAPY | Age: 65
Setting detail: THERAPIES SERIES
Discharge: HOME | End: 2025-03-03
Attending: GENERAL PRACTICE
Payer: MEDICARE

## 2025-03-03 PROCEDURE — 97110 THERAPEUTIC EXERCISES: CPT

## 2025-03-03 NOTE — FLOWSHEET NOTE
[facility]   Outpatient Rehabilitation & Therapy  [address]  P: [phone]   F: [phone]    Physical Therapy Daily Treatment Note    Date:  3/3/2025  Patient Name:  Lane Ray    :  1960  MRN: [id number]  Physician: Main Todd MD               Medical Diagnosis: Cervical spondylosis (M47.812), Cervical post-laminectomy syndrome (M96.1)                    Clinical Diagnosis: Neck pain with mobility deficits (M54.2, Z74.09)  Onset date: 2025           Next Dr's appt.: TBD  PT Visit Information  PT Insurance Information: Medicare- follow medicare guidelines and [employer]    Total # of Visits Approved: 12  Total # of Visits to Date: 4  No Show: 0  Canceled Appointment: 0    Subjective:    Patient stated that his pain levels have increased over the last 24 hours/cause unknown. He is still performing his home exercises with little difficulty.     Over 24 hours   Pain  Pain:  [x] Yes   [] No       Location: (B) base of the skull to the base of the neck with radicular symptoms down into the (B) scapula region and (B) UEs in the hands/fingers.   Pain Rating: (0-10 scale) 8/10 with pain medication   Worst: 8/10 with pain medication   Best: 8/10 with pain medication   Descriptors: Constant, shooting, numbness and tingling down both UEs sharp, aching and stabbing.     Objective:  Modalities: Hot pack with the passive stretches  Precautions: Asthma, COPD, DM, Depression, Sleep Apnea and Neuropathy    Exercises:  Exercise Reps/ Time Weight/ Level Comments   Supine Scapular Depression  20 reps   Following passive stretch    Supine Shoulder Horizontal Abduction   10 reps      Supine Shoulder Scaption  10 reps      Seated Thoracic Extension  5 reps      Seated Thoracic Rotation  5 reps      Seated Thoracic Abduction  5 reps      Passive Stretching   Supine Upper Trap Stretch 30 sec hold x 3 reps with each side   Supine  Levator Scapulae Stretch 30 sec hold x 3

## 2025-03-04 ENCOUNTER — OFFICE VISIT (OUTPATIENT)
Dept: PRIMARY CARE CLINIC | Age: 65
End: 2025-03-04
Payer: MEDICARE

## 2025-03-04 VITALS
OXYGEN SATURATION: 96 % | WEIGHT: 256 LBS | BODY MASS INDEX: 37.8 KG/M2 | SYSTOLIC BLOOD PRESSURE: 132 MMHG | HEART RATE: 82 BPM | DIASTOLIC BLOOD PRESSURE: 62 MMHG

## 2025-03-04 DIAGNOSIS — M47.816 LUMBAR SPONDYLOSIS: Primary | ICD-10-CM

## 2025-03-04 DIAGNOSIS — E11.40 TYPE 2 DIABETES MELLITUS WITH DIABETIC NEUROPATHY, WITHOUT LONG-TERM CURRENT USE OF INSULIN (HCC): ICD-10-CM

## 2025-03-04 DIAGNOSIS — J43.8 OTHER EMPHYSEMA (HCC): ICD-10-CM

## 2025-03-04 DIAGNOSIS — M50.30 CERVICAL DISCOGENIC PAIN SYNDROME: ICD-10-CM

## 2025-03-04 DIAGNOSIS — I48.0 PAROXYSMAL ATRIAL FIBRILLATION (HCC): ICD-10-CM

## 2025-03-04 PROCEDURE — 4004F PT TOBACCO SCREEN RCVD TLK: CPT | Performed by: FAMILY MEDICINE

## 2025-03-04 PROCEDURE — G8417 CALC BMI ABV UP PARAM F/U: HCPCS | Performed by: FAMILY MEDICINE

## 2025-03-04 PROCEDURE — 3078F DIAST BP <80 MM HG: CPT | Performed by: FAMILY MEDICINE

## 2025-03-04 PROCEDURE — 2022F DILAT RTA XM EVC RTNOPTHY: CPT | Performed by: FAMILY MEDICINE

## 2025-03-04 PROCEDURE — 3023F SPIROM DOC REV: CPT | Performed by: FAMILY MEDICINE

## 2025-03-04 PROCEDURE — 3046F HEMOGLOBIN A1C LEVEL >9.0%: CPT | Performed by: FAMILY MEDICINE

## 2025-03-04 PROCEDURE — G8427 DOCREV CUR MEDS BY ELIG CLIN: HCPCS | Performed by: FAMILY MEDICINE

## 2025-03-04 PROCEDURE — 1123F ACP DISCUSS/DSCN MKR DOCD: CPT | Performed by: FAMILY MEDICINE

## 2025-03-04 PROCEDURE — 3075F SYST BP GE 130 - 139MM HG: CPT | Performed by: FAMILY MEDICINE

## 2025-03-04 PROCEDURE — 3017F COLORECTAL CA SCREEN DOC REV: CPT | Performed by: FAMILY MEDICINE

## 2025-03-04 PROCEDURE — 99213 OFFICE O/P EST LOW 20 MIN: CPT | Performed by: FAMILY MEDICINE

## 2025-03-04 PROCEDURE — 1159F MED LIST DOCD IN RCRD: CPT | Performed by: FAMILY MEDICINE

## 2025-03-04 RX ORDER — ASPIRIN 81 MG/1
81 TABLET ORAL DAILY
Qty: 90 TABLET | Refills: 3 | Status: SHIPPED | OUTPATIENT
Start: 2025-03-04

## 2025-03-04 RX ORDER — ACETAMINOPHEN 325 MG/1
TABLET ORAL
Qty: 120 TABLET | Refills: 3 | Status: CANCELLED | OUTPATIENT
Start: 2025-03-04

## 2025-03-04 RX ORDER — ACETAMINOPHEN 500 MG
1000 TABLET ORAL
Qty: 180 TABLET | Refills: 5 | Status: SHIPPED | OUTPATIENT
Start: 2025-03-04

## 2025-03-04 SDOH — ECONOMIC STABILITY: FOOD INSECURITY: WITHIN THE PAST 12 MONTHS, THE FOOD YOU BOUGHT JUST DIDN'T LAST AND YOU DIDN'T HAVE MONEY TO GET MORE.: NEVER TRUE

## 2025-03-04 SDOH — ECONOMIC STABILITY: FOOD INSECURITY: WITHIN THE PAST 12 MONTHS, YOU WORRIED THAT YOUR FOOD WOULD RUN OUT BEFORE YOU GOT MONEY TO BUY MORE.: NEVER TRUE

## 2025-03-04 ASSESSMENT — PATIENT HEALTH QUESTIONNAIRE - PHQ9
3. TROUBLE FALLING OR STAYING ASLEEP: NOT AT ALL
2. FEELING DOWN, DEPRESSED OR HOPELESS: SEVERAL DAYS
7. TROUBLE CONCENTRATING ON THINGS, SUCH AS READING THE NEWSPAPER OR WATCHING TELEVISION: NOT AT ALL
4. FEELING TIRED OR HAVING LITTLE ENERGY: NOT AT ALL
SUM OF ALL RESPONSES TO PHQ QUESTIONS 1-9: 1
6. FEELING BAD ABOUT YOURSELF - OR THAT YOU ARE A FAILURE OR HAVE LET YOURSELF OR YOUR FAMILY DOWN: NOT AT ALL
SUM OF ALL RESPONSES TO PHQ QUESTIONS 1-9: 1
1. LITTLE INTEREST OR PLEASURE IN DOING THINGS: NOT AT ALL
5. POOR APPETITE OR OVEREATING: NOT AT ALL
8. MOVING OR SPEAKING SO SLOWLY THAT OTHER PEOPLE COULD HAVE NOTICED. OR THE OPPOSITE, BEING SO FIGETY OR RESTLESS THAT YOU HAVE BEEN MOVING AROUND A LOT MORE THAN USUAL: NOT AT ALL
SUM OF ALL RESPONSES TO PHQ QUESTIONS 1-9: 1
SUM OF ALL RESPONSES TO PHQ QUESTIONS 1-9: 1
9. THOUGHTS THAT YOU WOULD BE BETTER OFF DEAD, OR OF HURTING YOURSELF: NOT AT ALL
10. IF YOU CHECKED OFF ANY PROBLEMS, HOW DIFFICULT HAVE THESE PROBLEMS MADE IT FOR YOU TO DO YOUR WORK, TAKE CARE OF THINGS AT HOME, OR GET ALONG WITH OTHER PEOPLE: NOT DIFFICULT AT ALL

## 2025-03-04 NOTE — PROGRESS NOTES
days and then pause for holiday.  He agrees.  2.)  Risk factors including cancer screenings reviewed-negative lung CT and colorectal.  3.)  Recheck in 3 months.

## 2025-03-06 ENCOUNTER — HOSPITAL ENCOUNTER (OUTPATIENT)
Dept: PHYSICAL THERAPY | Age: 65
Setting detail: THERAPIES SERIES
Discharge: HOME | End: 2025-03-06
Attending: GENERAL PRACTICE
Payer: MEDICARE

## 2025-03-06 NOTE — FLOWSHEET NOTE
[facility]   Outpatient Rehabilitation & Therapy  [address] [facility] [address]  P: [phone]   F: [phone]     Physical Therapy Cancel/No Show note    Date: 3/6/2025  Patient: Lane Ray  : 1960  MRN: [id number]    Visit Count:   Cancels/No Shows to date:     For today's appointment patient:    [x]  Cancelled    [] Rescheduled appointment    [] No-show     Reason given by patient:    [x]  Patient ill    []  Conflicting appointment    [] No transportation      [] Conflict with work    [] No reason given    [] Weather related    [] COVID-19    [] Other:      Comments:        [] Next appointment was confirmed    Electronically signed by: Sb Anderson PT DPT

## 2025-03-07 NOTE — PATIENT INSTRUCTIONS
Subjective     Jl Ulrich is a 13 y.o. female who is here for this well-child visit.    History was provided by the patient and aunt.    Immunization History   Administered Date(s) Administered    COVID-19 (PFIZER) Purple Cap Monovalent 08/30/2021, 09/20/2021    Covid-19 (Pfizer) Gray Cap Monovalent 03/09/2022    DTaP / IPV 07/23/2014    DTaP, Unspecified 2009, 02/04/2010, 08/16/2010, 11/10/2010, 08/05/2013    Fluzone (or Fluarix & Flulaval for VFC) >6mos 10/16/2023    Hep A, 2 Dose 07/23/2014, 08/13/2018    Hep B, Adolescent or Pediatric 2009, 2009, 02/04/2010    HiB 2009, 02/04/2010, 08/16/2010, 11/10/2010    Hpv9 05/18/2022    IPV 2009, 02/04/2010, 08/16/2010    Influenza, Unspecified 10/18/2024    MMR 11/10/2010, 08/05/2013, 07/23/2014    Meningococcal MCV4P (Menactra) 05/18/2022    Pneumococcal Conjugate 13-Valent (PCV13) 2009, 02/04/2010, 08/16/2010, 11/10/2010    Polio, Unspecified 08/05/2013    Rotavirus Pentavalent 2009, 08/16/2010    Tdap 05/18/2022    Varicella 11/10/2010, 08/05/2013, 07/23/2014     The following portions of the patient's history were reviewed and updated as appropriate: allergies, current medications, past family history, past medical history, past social history, past surgical history, and problem list.    Current Issues:  Current concerns include need for sports physical.  She plans to participate in track and field.  She does continue to take Zyrtec for allergies as well as use of clindamycin wash for acne with improvement in symptoms.  Currently menstruating? Yes, heavy and crampy       Sexually active? no   Does patient snore? yes - per chaloer      Review of Nutrition:  Current diet: Fruits, veggies, meat, dairy, water  Balanced diet? yes    Social Screening:   Parental relations: Lives with aunt  Sibling relations: sisters: 1 and brother: 1  Discipline concerns? no  Concerns regarding behavior with peers? no  School performance: doing  Thank you for letting us take care of you today. We hope all your questions were addressed. If a question was overlooked or something else comes to mind after you return home, please contact a member of your Care Team listed below. Your Care Team at [place] [address] is Team #2  Herminio Lancaster DO (Faculty)  Mookie Allen (Faculty)  Chase Isaacs MD (Resident)  Jericho Adams MD (Resident)  Yury Rodriguez MD (Resident)  Yoanna Foster MD (Resident)  Tru Harris MD (Resident)  FERNIE Wilkinson. ,JEFERSON MAHER [facility] ([address] office)  Tory Foster, [address] (Clinical Practice Manager)  Jacob Duran [facility] (Clinical Pharmacist)     Office phone number: [phone]    If you need to get in right away due to illness, please be advised we have \"Same Day\" appointments available Monday-Friday. Please call us at [phone] option #3 to schedule your \"Same Day\" appointment. "well; no concerns  Secondhand smoke exposure?  No    During the past three months, have you thought of killing yourself?  no  Have you ever tried to kill yourself?  no    Review of Systems   Constitutional:  Negative for chills, fatigue and fever.   HENT:  Negative for congestion, postnasal drip and sore throat.    Eyes:  Negative for blurred vision and visual disturbance.   Respiratory:  Negative for cough, shortness of breath and wheezing.    Cardiovascular:  Negative for chest pain and leg swelling.   Gastrointestinal:  Negative for abdominal pain, constipation, diarrhea, nausea and vomiting.   Endocrine: Negative for cold intolerance and heat intolerance.   Genitourinary:  Positive for frequency and menstrual problem.   Musculoskeletal:  Negative for arthralgias and back pain.   Skin:  Negative for color change and rash.   Allergic/Immunologic: Negative for environmental allergies.   Neurological:  Negative for weakness, numbness and headache.   Hematological:  Does not bruise/bleed easily.   Psychiatric/Behavioral:  Negative for depressed mood. The patient is not nervous/anxious.        Objective      Vitals:    03/07/25 1353   BP: 110/60   Pulse: 78   Temp: 97 °F (36.1 °C)   SpO2: 100%   Weight: 47.2 kg (104 lb)   Height: 154.9 cm (61\")   PainSc: 0-No pain     Body mass index is 19.65 kg/m².    Growth parameters are noted and are appropriate for age.    Physical Exam  Constitutional:       General: She is not in acute distress.     Appearance: Normal appearance. She is well-developed.   HENT:      Head: Normocephalic and atraumatic.      Right Ear: Tympanic membrane and external ear normal.      Left Ear: Tympanic membrane and external ear normal.      Mouth/Throat:      Pharynx: No posterior oropharyngeal erythema.   Eyes:      Extraocular Movements: Extraocular movements intact.      Conjunctiva/sclera: Conjunctivae normal.      Pupils: Pupils are equal, round, and reactive to light.   Cardiovascular:      " Rate and Rhythm: Normal rate and regular rhythm.      Heart sounds: No murmur heard.     Comments: Heart auscultated in 4 positions: Lying, sitting, standing, and squatting.  Pulmonary:      Effort: Pulmonary effort is normal. No respiratory distress.      Breath sounds: Normal breath sounds. No wheezing.   Abdominal:      General: Bowel sounds are normal. There is no distension.      Palpations: Abdomen is soft.      Tenderness: There is no abdominal tenderness.   Musculoskeletal:         General: No deformity (No scoliosis).      Cervical back: Neck supple.      Right lower leg: No edema.      Left lower leg: No edema.      Comments: Able to perform duck walk   Lymphadenopathy:      Cervical: No cervical adenopathy.   Skin:     General: Skin is warm and dry.   Neurological:      Mental Status: She is alert and oriented to person, place, and time.      Cranial Nerves: No cranial nerve deficit.      Deep Tendon Reflexes: Reflexes normal.   Psychiatric:         Mood and Affect: Mood normal.         Behavior: Behavior normal.         Assessment & Plan     Diagnoses and all orders for this visit:    1. Encounter for well child visit at 13 years of age (Primary)    2. Allergic rhinitis, unspecified seasonality, unspecified trigger    3. Acne, unspecified acne type    4. Menometrorrhagia    Other orders  -     Norethin-Eth Estrad-Fe Biphas (Lo Loestrin Fe) 1 MG-10 MCG / 10 MCG tablet; Take 1 tablet by mouth Daily.  Dispense: 28 tablet; Refill: 11  -     clindamycin (CLEOCIN T) 1 % external solution; Apply  topically to the appropriate area as directed Daily.  Dispense: 60 mL; Refill: 5  -     Discontinue: cetirizine (ZyrTEC Allergy) 10 MG tablet; Take 1 tablet by mouth Daily.  Dispense: 30 tablet; Refill: 5  -     cetirizine (ZyrTEC Allergy) 10 MG tablet; Take 1 tablet by mouth Daily.  Dispense: 30 tablet; Refill: 5      She is being started on a low estrogen birth control to help manage heavy, crampy periods.  She has  been advised to potential side effects of the medication.    Clear to participate in sports.       1. Anticipatory guidance discussed.  Specific topics reviewed: drugs, ETOH, and tobacco, importance of regular dental care, importance of regular exercise, importance of varied diet, limit TV, media violence, minimize junk food, puberty, and sex; STD and pregnancy prevention.    2.  Weight management:  The patient was counseled regarding nutrition and physical activity.    3. Development: appropriate for age    4. Immunizations today: none    5. Return in about 6 months (around 9/7/2025) for birth control, acne.         Ruby Randall, DO

## 2025-03-10 ENCOUNTER — HOSPITAL ENCOUNTER (OUTPATIENT)
Dept: PHYSICAL THERAPY | Age: 65
Setting detail: THERAPIES SERIES
Discharge: HOME | End: 2025-03-10
Attending: GENERAL PRACTICE
Payer: MEDICARE

## 2025-03-10 NOTE — FLOWSHEET NOTE
[facility]   Outpatient Rehabilitation & Therapy  [address]  P: [phone]   F: [phone]     Physical Therapy Cancel/No Show note    Date: 3/10/2025  Patient: Lane Ray  : 1960  MRN: [id number]    Visit Count:   Cancels/No Shows to date:     For today's appointment patient:    [x]  Cancelled    [] Rescheduled appointment    [] No-show     Reason given by patient:    [x]  Patient ill    []  Conflicting appointment    [] No transportation      [] Conflict with work    [] No reason given    [] Weather related    [] COVID-19    [] Other:      Comments:        [x] Next appointment was confirmed    Electronically signed by: Sb Anderson PT DPT

## 2025-03-13 ENCOUNTER — HOSPITAL ENCOUNTER (OUTPATIENT)
Dept: PHYSICAL THERAPY | Age: 65
Setting detail: THERAPIES SERIES
Discharge: HOME | End: 2025-03-13
Attending: GENERAL PRACTICE
Payer: MEDICARE

## 2025-03-13 PROCEDURE — 97110 THERAPEUTIC EXERCISES: CPT

## 2025-03-13 NOTE — FLOWSHEET NOTE
[facility]   Outpatient Rehabilitation & Therapy  [address]  P: [phone]   F: [phone]    Physical Therapy Daily Treatment Note    Date:  3/13/2025  Patient Name:  Lane Ray    :  1960  MRN: [id number]  Physician: Main Todd MD               Medical Diagnosis: Cervical spondylosis (M47.812), Cervical post-laminectomy syndrome (M96.1)                    Clinical Diagnosis: Neck pain with mobility deficits (M54.2, Z74.09)  Onset date: 2025           Next Dr's appt.: TBD  PT Visit Information  PT Insurance Information: Medicare- follow medicare guidelines and [employer]    Total # of Visits Approved: 12  Total # of Visits to Date: 5  No Show: 0  Canceled Appointment: 2    Subjective:    Patient stated that his pain levels have remained in the severe range since his last treatment session. He had been ill for numerous days and was relatively inactive/unable to perform his home exercise program. Increased stiffness felt throughout the neck and upper back upon arrival/felt it was due to his inactivity.     Over 24 hours   Pain  Pain:  [x] Yes   [] No       Location: (B) base of the skull to the base of the neck with radicular symptoms down into the (B) scapula region and (B) UEs in the hands/fingers.   Pain Rating: (0-10 scale) 8/10 with pain medication   Worst: 8/10 with pain medication   Best: 8/10 with pain medication   Descriptors: Constant, shooting, numbness and tingling down both UEs sharp, aching and stabbing.     Objective:  Modalities: Hot pack with the passive stretches  Precautions: Asthma, COPD, DM, Depression, Sleep Apnea and Neuropathy    Exercises:  Exercise Reps/ Time Weight/ Level Comments   Supine Scapular Depression  20 reps   Following passive stretch    Supine Shoulder Horizontal Abduction   10 reps      Supine Shoulder Scaption  10 reps      Seated Thoracic Extension  5 reps      Seated Thoracic Rotation  5 reps      Seated

## 2025-03-18 ENCOUNTER — HOSPITAL ENCOUNTER (OUTPATIENT)
Dept: PHYSICAL THERAPY | Age: 65
Setting detail: THERAPIES SERIES
Discharge: HOME | End: 2025-03-18
Attending: GENERAL PRACTICE
Payer: MEDICARE

## 2025-03-18 NOTE — FLOWSHEET NOTE
[facility]   Outpatient Rehabilitation & Therapy  [address]  P: [phone]   F: [phone]     Physical Therapy Cancel/No Show note    Date: 3/18/2025  Patient: Lane Ray  : 1960  MRN: [id number]    Visit Count:   Cancels/No Shows to date: 3/0    For today's appointment patient:    [x]  Cancelled    [] Rescheduled appointment    [] No-show     Reason given by patient:    []  Patient ill    []  Conflicting appointment    [] No transportation      [] Conflict with work    [] No reason given    [] Weather related    [] COVID-19    [x] Other:      Comments: Unable to attend due to a fall in his bathtub.       [] Next appointment was confirmed    Electronically signed by: Sb Anderson, PT DPT

## 2025-03-21 ENCOUNTER — HOSPITAL ENCOUNTER (OUTPATIENT)
Dept: PHYSICAL THERAPY | Age: 65
Setting detail: THERAPIES SERIES
Discharge: HOME | End: 2025-03-21
Attending: GENERAL PRACTICE
Payer: MEDICARE

## 2025-03-21 PROCEDURE — 97110 THERAPEUTIC EXERCISES: CPT

## 2025-03-21 NOTE — PROGRESS NOTES
[] [facility]   Outpatient Rehabilitation & Therapy  [address] [facility] [address]  P: [phone]   F: [phone]  [facility] Outpatient Physical Therapy  [address]         Phone: [phone]       Fax: [phone]    Physical Therapy Progress Note Update     Date:  3/21/2025  Patient: Lane Ray  : 1960  MRN: [id number]  Physician: Main Todd MD    Medical Diagnosis: Cervical spondylosis (M47.812), Cervical post-laminectomy syndrome (M96.1)    Clinical Diagnosis: Neck pain with mobility deficits (M54.2, Z74.09)  Onset date: 2025   Next 's appt.: TBD  PT Visit Information  PT Insurance Information: Medicare- follow medicare guidelines and [employer]    Total # of Visits Approved: 12  Total # of Visits to Date: 6  No Show: 0  Canceled Appointment: 3     Subjective  Patient stated that his pain levels have remained unchanged. Increased stiffness felt throughout the neck and upper back regions. However, he stated that he had been ill for several days causing a lack of movement during this time. In addition, he fell in his bath tub since his last treatment session. Functional unchanged, again, due to his illness/fall in the tub. He felt he was still recovering from them upon arrival.     Pain  Pain:  [x] Yes   [] No      Location: (B) base of the skull to the base of the neck with radicular symptoms down into the (B) scapula region and (B) UEs in the hands/fingers.   Pain Rating: (0-10 scale) 7/10   Worst: 9/10  Best: 7/10  Symptoms:  [] Improving [] Worsening       [x] Same  Descriptors: Constant, shooting, numbness and tingling down both UEs sharp, aching and stabbing.   Aggravating factors: ADLs involving all planes of neck, (B) GH and scapulothoracic motions.   Relieving factors: Rest, repositioning  Sleep: Disturbed  Other:     ADL/IADL Previous level of function Current level of function Who currently assists the patient

## 2025-03-27 ENCOUNTER — HOSPITAL ENCOUNTER (OUTPATIENT)
Dept: PHYSICAL THERAPY | Age: 65
Setting detail: THERAPIES SERIES
Discharge: HOME | End: 2025-03-27
Attending: GENERAL PRACTICE
Payer: MEDICARE

## 2025-03-27 PROCEDURE — 97110 THERAPEUTIC EXERCISES: CPT

## 2025-03-27 NOTE — FLOWSHEET NOTE
[facility]   Outpatient Rehabilitation & Therapy  [address]  P: [phone]   F: [phone]    Physical Therapy Daily Treatment Note    Date:  3/27/2025  Patient Name:  Lane Ray    :  1960  MRN: [id number]  Physician: Main Todd MD               Medical Diagnosis: Cervical spondylosis (M47.812), Cervical post-laminectomy syndrome (M96.1)                    Clinical Diagnosis: Neck pain with mobility deficits (M54.2, Z74.09)  Onset date: 2025           Next Dr's appt.: TBD  PT Visit Information  PT Insurance Information: Medicare- follow medicare guidelines and [employer]    Total # of Visits Approved: 12  Total # of Visits to Date: 7  No Show: 0  Canceled Appointment: 3    Subjective:    Patient stated that his pain levels are slightly less compared to last treatment session. However, he stated that he took a hot shower prior to arrival/felt it helped significantly.     Over 24 hours   Pain  Pain:  [x] Yes   [] No       Location: (B) base of the skull to the base of the neck with radicular symptoms down into the (B) scapula region and (B) UEs in the hands/fingers.   Pain Rating: (0-10 scale) 6/10 with pain medication   Worst: 8/10 with pain medication   Best: 6/10 with pain medication   Descriptors: Constant, shooting, numbness and tingling down both UEs sharp, aching and stabbing.     Objective:  Modalities: Hot pack with the passive stretches  Precautions: Asthma, COPD, DM, Depression, Sleep Apnea and Neuropathy    Exercises:  Exercise Reps/ Time Weight/ Level Comments   Supine Scapular Depression  20 reps   Following passive stretch    Supine Shoulder Horizontal Abduction   20 reps      Supine Shoulder Scaption  10 reps      Supine Cervical  Flexion  10 reps   AAROM    Supine Cervical Side Bend  10 reps   AAROM   Supine Cervical Rotation  10 reps   AAROM    Seated Thoracic Extension  10 reps      Seated Thoracic Rotation  10 reps      Passive

## 2025-03-31 ENCOUNTER — OFFICE VISIT (OUTPATIENT)
Dept: PHYSICAL MEDICINE AND REHAB | Age: 65
End: 2025-03-31
Payer: MEDICARE

## 2025-03-31 VITALS
BODY MASS INDEX: 38.4 KG/M2 | HEART RATE: 93 BPM | DIASTOLIC BLOOD PRESSURE: 62 MMHG | WEIGHT: 260 LBS | SYSTOLIC BLOOD PRESSURE: 138 MMHG | TEMPERATURE: 97.3 F

## 2025-03-31 DIAGNOSIS — M96.1 CERVICAL POST-LAMINECTOMY SYNDROME: ICD-10-CM

## 2025-03-31 DIAGNOSIS — M47.812 CERVICAL SPONDYLOSIS: Primary | ICD-10-CM

## 2025-03-31 DIAGNOSIS — M25.561 CHRONIC PAIN OF BOTH KNEES: ICD-10-CM

## 2025-03-31 DIAGNOSIS — G89.29 CHRONIC PAIN OF BOTH KNEES: ICD-10-CM

## 2025-03-31 DIAGNOSIS — M47.817 LUMBOSACRAL SPONDYLOSIS WITHOUT MYELOPATHY: ICD-10-CM

## 2025-03-31 DIAGNOSIS — M25.562 CHRONIC PAIN OF BOTH KNEES: ICD-10-CM

## 2025-03-31 DIAGNOSIS — M16.0 PRIMARY OSTEOARTHRITIS OF BOTH HIPS: ICD-10-CM

## 2025-03-31 PROCEDURE — 4004F PT TOBACCO SCREEN RCVD TLK: CPT | Performed by: GENERAL PRACTICE

## 2025-03-31 PROCEDURE — 3017F COLORECTAL CA SCREEN DOC REV: CPT | Performed by: GENERAL PRACTICE

## 2025-03-31 PROCEDURE — G8417 CALC BMI ABV UP PARAM F/U: HCPCS | Performed by: GENERAL PRACTICE

## 2025-03-31 PROCEDURE — 99213 OFFICE O/P EST LOW 20 MIN: CPT | Performed by: GENERAL PRACTICE

## 2025-03-31 PROCEDURE — G8427 DOCREV CUR MEDS BY ELIG CLIN: HCPCS | Performed by: GENERAL PRACTICE

## 2025-03-31 PROCEDURE — 3078F DIAST BP <80 MM HG: CPT | Performed by: GENERAL PRACTICE

## 2025-03-31 PROCEDURE — 3075F SYST BP GE 130 - 139MM HG: CPT | Performed by: GENERAL PRACTICE

## 2025-03-31 PROCEDURE — 1123F ACP DISCUSS/DSCN MKR DOCD: CPT | Performed by: GENERAL PRACTICE

## 2025-03-31 PROCEDURE — 1159F MED LIST DOCD IN RCRD: CPT | Performed by: GENERAL PRACTICE

## 2025-03-31 NOTE — PROGRESS NOTES
[facility] PHYSICAL MEDICINE & REHABILITATION  [address] [place] [address]  Dept: [phone]  Dept Fax: [phone]    Outpatient Followup Note    Lane Ray, 64 y.o., male, presents for follow up c/o of Neurologic Problem (Lumbosacral spondylosis without myelopathy/)  .     HPI:        HPI  64 year old male presenting for follow up of chronic neck and low back pain, chronic bilateral hip pain, chronic bilateral knee pain. Patient was last seen 2/09/2025 at which time he had bilateral GTB injections. Today he reports excellent pain relief for about 2 days, after which pain returned to his baseline. Does not feel these are worth repeating. Also obtained updated imaging of neck, low back, both hips, and both knees. Knee XR demonstrated CPPD bilaterally, so patient was ordered course of naproxen - after discussion with his cardiologist he was cleared to take a course of NSAIDs in conjunction with Eliquis. Today denies any abnormal bleeding. Imaging also showed moderate bilateral hip OA and moderate to severe bilateral foraminal narrowing in the cervical spine.    At that time patient was also ordered PT, which he says offered some modest relief for a day or two but he felt incredibly sore after the fact and overall doesn't feel that his pain has been substantially improved. He is currently on gabapentin 300 mg BID and Tylenol 2-3 g daily per his PCP.    Today discussed updating MRI of neck and back - he states that he has had relatively recent neck and back imaging. Most recent cervical MRI August 2021, and Lumbar MRI June 2019. He reports that he has bad claustrophobia and does not want new imaging. Also discussed evaluation by Pain Medicine to determine if interventional procedure would benefit him. Patient reports he used to follow with Pain (Dr. Antony) and has had epidurals in the past. He thinks he may have also had RFAs. Would like a second

## 2025-04-01 ENCOUNTER — HOSPITAL ENCOUNTER (OUTPATIENT)
Dept: PHYSICAL THERAPY | Age: 65
Setting detail: THERAPIES SERIES
Discharge: HOME | End: 2025-04-01
Attending: GENERAL PRACTICE
Payer: MEDICARE

## 2025-04-01 NOTE — FLOWSHEET NOTE
[facility]   Outpatient Rehabilitation & Therapy  [address] [facility] [address]  P: [phone]   F: [phone]     Physical Therapy Cancel/No Show note    Date: 2025  Patient: Lane Ray  : 1960  MRN: [id number]    Visit Count:   Cancels/No Shows to date:     For today's appointment patient:    [x]  Cancelled    [] Rescheduled appointment    [] No-show     Reason given by patient:    []  Patient ill    []  Conflicting appointment    [] No transportation      [] Conflict with work    [x] No reason given    [] Weather related    [] COVID-19    [] Other:      Comments:        [] Next appointment was confirmed    Electronically signed by: Sb Anderson PT DPT

## 2025-04-04 ENCOUNTER — HOSPITAL ENCOUNTER (OUTPATIENT)
Dept: PHYSICAL THERAPY | Age: 65
Setting detail: THERAPIES SERIES
Discharge: HOME | End: 2025-04-04
Attending: GENERAL PRACTICE
Payer: MEDICARE

## 2025-04-04 PROCEDURE — 97110 THERAPEUTIC EXERCISES: CPT

## 2025-04-04 NOTE — FLOWSHEET NOTE
[facility]   Outpatient Rehabilitation & Therapy  [address]  P: [phone]   F: [phone]    Physical Therapy Daily Treatment Note    Date:  2025  Patient Name:  Lane Ray    :  1960  MRN: [id number]  Physician: Main Todd MD               Medical Diagnosis: Cervical spondylosis (M47.812), Cervical post-laminectomy syndrome (M96.1)                    Clinical Diagnosis: Neck pain with mobility deficits (M54.2, Z74.09)  Onset date: 2025           Next Dr's appt.: TBD  PT Visit Information  PT Insurance Information: Medicare- follow medicare guidelines and [employer]    Total # of Visits Approved: 12  Total # of Visits to Date: 8  No Show: 0  Canceled Appointment: 4    Subjective:    Patient stated that he followed up with his referring physician. Pain management and CT scan of the spine were recommended. Pain levels are relatively unchanged, However, he felt the stretches performed during his treatment session helped/less pain and slightly more motion felt. However, \"short lived\" at times. Sleeping is still problematic due to the pain/unable to find a comfortable position.     Over 24 hours   Pain  Pain:  [x] Yes   [] No       Location: (B) base of the skull to the base of the neck with radicular symptoms down into the (B) scapula region and (B) UEs in the hands/fingers.   Pain Rating: (0-10 scale) 6/10 with pain medication   Worst: 8/10 with pain medication   Best: 6/10 with pain medication   Descriptors: Constant, shooting, numbness and tingling down both UEs sharp, aching and stabbing.     Objective:  Modalities: Hot pack with the passive stretches  Precautions: Asthma, COPD, DM, Depression, Sleep Apnea and Neuropathy    Exercises:  Exercise Reps/ Time Weight/ Level Comments   Supine Scapular Depression  20 reps   Following passive stretch    Supine Shoulder Horizontal Abduction   20 reps      Supine Shoulder Scaption  10 reps      Supine

## 2025-04-10 ENCOUNTER — HOSPITAL ENCOUNTER (OUTPATIENT)
Dept: PHYSICAL THERAPY | Age: 65
Setting detail: THERAPIES SERIES
Discharge: HOME | End: 2025-04-10
Attending: GENERAL PRACTICE
Payer: MEDICARE

## 2025-04-10 PROCEDURE — 97110 THERAPEUTIC EXERCISES: CPT

## 2025-04-10 NOTE — FLOWSHEET NOTE
[facility]   Outpatient Rehabilitation & Therapy  [address]  P: [phone]   F: [phone]    Physical Therapy Daily Treatment Note    Date:  4/10/2025  Patient Name:  Lane Ray    :  1960  MRN: [id number]  Physician: Main Todd MD               Medical Diagnosis: Cervical spondylosis (M47.812), Cervical post-laminectomy syndrome (M96.1)                    Clinical Diagnosis: Neck pain with mobility deficits (M54.2, Z74.09)  Onset date: 2025           Next Dr's appt.: TBD  PT Visit Information  PT Insurance Information: Medicare- follow medicare guidelines and [employer]    Total # of Visits Approved: 12  Total # of Visits to Date: 9  No Show: 0  Canceled Appointment: 4    Subjective:    Patient stated his pain levels are slightly improved compared to his previous treatment session. He stated that he felt the standing pect major stretches(s) helped but he has increased back pain upon completion due to the prolonged standing position with them. Sleeping is still difficult/only able to sleep 3 hours due to pain/headache. Throbbing headache upon arrival.     Over 24 hours   Pain  Pain:  [x] Yes   [] No       Location: (B) base of the skull to the base of the neck with radicular symptoms down into the (B) scapula region and (B) UEs in the hands/fingers.   Pain Rating: (0-10 scale) 5/10 with pain medication   Worst: 6/10 with pain medication   Best: 5/10 with pain medication   Descriptors: Constant, shooting, numbness and tingling down both UEs sharp, aching and stabbing.     Objective:  Modalities: Hot pack with the passive stretches  Precautions: Asthma, COPD, DM, Depression, Sleep Apnea and Neuropathy    Exercises:  Exercise Reps/ Time Weight/ Level Comments   Supine Scapular Depression  20 reps   Following passive stretch    Supine Shoulder Horizontal Abduction   20 reps      Supine Shoulder Scaption  10 reps      Seated Cervical  Flexion  10 reps

## 2025-04-15 ENCOUNTER — HOSPITAL ENCOUNTER (OUTPATIENT)
Dept: PHYSICAL THERAPY | Age: 65
Setting detail: THERAPIES SERIES
Discharge: HOME | End: 2025-04-15
Attending: GENERAL PRACTICE
Payer: MEDICARE

## 2025-04-15 NOTE — FLOWSHEET NOTE
[facility]   Outpatient Rehabilitation & Therapy  [address]  P: [phone]   F: [phone]     Physical Therapy Cancel/No Show note    Date: 4/15/2025  Patient: Lane Ray  : 1960  MRN: [id number]    Visit Count:   Cancels/No Shows to date:     For today's appointment patient:    [x]  Cancelled    [] Rescheduled appointment    [] No-show     Reason given by patient:    []  Patient ill    []  Conflicting appointment    [] No transportation      [] Conflict with work    [] No reason given    [] Weather related    [] COVID-19    [x] Other:      Comments:  Patient is out of town       [x] Next appointment was confirmed    Electronically signed by: Sb Anderson, PT DPT

## 2025-04-17 ENCOUNTER — HOSPITAL ENCOUNTER (OUTPATIENT)
Dept: PHYSICAL THERAPY | Age: 65
Setting detail: THERAPIES SERIES
Discharge: HOME | End: 2025-04-17
Attending: GENERAL PRACTICE
Payer: MEDICARE

## 2025-04-17 PROCEDURE — 97110 THERAPEUTIC EXERCISES: CPT

## 2025-04-17 NOTE — FLOWSHEET NOTE
[facility]   Outpatient Rehabilitation & Therapy  [address]  P: [phone]   F: [phone]    Physical Therapy Daily Treatment Note    Date:  2025  Patient Name:  Lane Ray    :  1960  MRN: [id number]  Physician: Main Todd MD               Medical Diagnosis: Cervical spondylosis (M47.812), Cervical post-laminectomy syndrome (M96.1)                    Clinical Diagnosis: Neck pain with mobility deficits (M54.2, Z74.09)  Onset date: 2025           Next Dr's appt.: TBD  PT Visit Information  PT Insurance Information: Medicare- follow medicare guidelines and [employer]    Total # of Visits Approved: 12  Total # of Visits to Date: 10  No Show: 0  Canceled Appointment: 5    Subjective:    Patient stated his pain levels are slightly improved compared to his previous treatment session. He stated that he felt the standing pect major stretches(s) helped but he has increased back pain upon completion due to the prolonged standing position with them. Sleeping is still difficult/only able to sleep 3 hours due to pain/headache. Throbbing headache upon arrival.     Over 24 hours   Pain  Pain:  [x] Yes   [] No       Location: (B) base of the skull to the base of the neck with radicular symptoms down into the (B) scapula region and (B) UEs in the hands/fingers.   Pain Rating: (0-10 scale) 5/10 with pain medication   Worst: 6/10 with pain medication   Best: 5/10 with pain medication   Descriptors: Constant, shooting, numbness and tingling down both UEs sharp, aching and stabbing.     Objective:  Modalities: Hot pack with the passive stretches  Precautions: Asthma, COPD, DM, Depression, Sleep Apnea and Neuropathy    Exercises:  Exercise Reps/ Time Weight/ Level Comments   Supine Scapular Depression  20 reps   Following passive stretch    Supine Shoulder Horizontal Abduction   20 reps      Supine Shoulder Scaption  10 reps      Seated Cervical  Flexion  10 reps

## 2025-04-22 ENCOUNTER — HOSPITAL ENCOUNTER (OUTPATIENT)
Dept: PHYSICAL THERAPY | Age: 65
Setting detail: THERAPIES SERIES
Discharge: HOME | End: 2025-04-22
Attending: GENERAL PRACTICE
Payer: MEDICARE

## 2025-04-22 PROCEDURE — 97110 THERAPEUTIC EXERCISES: CPT

## 2025-04-22 NOTE — FLOWSHEET NOTE
[facility]   Outpatient Rehabilitation & Therapy  [address]  P: [phone]   F: [phone]    Physical Therapy Daily Treatment Note    Date:  2025  Patient Name:  Lane Ray    :  1960  MRN: [id number]  Physician: Main Todd MD               Medical Diagnosis: Cervical spondylosis (M47.812), Cervical post-laminectomy syndrome (M96.1)                    Clinical Diagnosis: Neck pain with mobility deficits (M54.2, Z74.09)  Onset date: 2025           Next Dr's appt.: TBD  PT Visit Information  PT Insurance Information: Medicare- follow medicare guidelines and [employer]    Total # of Visits Approved: 12  Total # of Visits to Date: 11  No Show: 0  Canceled Appointment: 5    Subjective:    Patient stated that he felt his neck motions are slightly improving given the daily stretches. However, the (L) side still feels tighter than the (R) side. However on the NPRS, pain levels are unchanged.     Over 24 hours   Pain  Pain:  [x] Yes   [] No       Location: (B) base of the skull to the base of the neck with radicular symptoms down into the (B) scapula region and (B) UEs in the hands/fingers.   Pain Rating: (0-10 scale) 5/10 with pain medication   Worst: 6/10 with pain medication   Best: 5/10 with pain medication   Descriptors: Constant, shooting, numbness and tingling down both UEs sharp, aching and stabbing.     Objective:  Modalities: Hot pack with the passive stretches  Precautions: Asthma, COPD, DM, Depression, Sleep Apnea and Neuropathy    Exercises:  Exercise Reps/ Time Weight/ Level Comments   Supine Scapular Depression  20 reps   Following passive stretch    Supine Shoulder Horizontal Abduction   20 reps      Supine Shoulder Scaption  10 reps      Seated Cervical  Flexion  10 reps   AAROM    Seated Cervical Side Bend  10 reps   AAROM    Seated Cervical Rotation  10 reps   AAROM    Seated Thoracic Extension  10 reps   AAROM    Seated Thoracic

## 2025-04-24 ENCOUNTER — APPOINTMENT (OUTPATIENT)
Dept: PHYSICAL THERAPY | Age: 65
End: 2025-04-24
Attending: GENERAL PRACTICE
Payer: MEDICARE

## 2025-05-02 ENCOUNTER — HOSPITAL ENCOUNTER (OUTPATIENT)
Dept: PHYSICAL THERAPY | Age: 65
Setting detail: THERAPIES SERIES
Discharge: HOME | End: 2025-05-02
Attending: GENERAL PRACTICE
Payer: MEDICARE

## 2025-05-02 DIAGNOSIS — M54.2 CERVICALGIA: ICD-10-CM

## 2025-05-02 DIAGNOSIS — M47.812 CERVICAL SPONDYLOSIS: ICD-10-CM

## 2025-05-02 DIAGNOSIS — M96.1 CERVICAL POST-LAMINECTOMY SYNDROME: ICD-10-CM

## 2025-05-02 RX ORDER — GABAPENTIN 300 MG/1
300 CAPSULE ORAL 2 TIMES DAILY
Qty: 180 CAPSULE | Refills: 1 | OUTPATIENT
Start: 2025-05-02

## 2025-05-02 NOTE — FLOWSHEET NOTE
[facility]   Outpatient Rehabilitation & Therapy  [address]  P: [phone]   F: [phone]     Physical Therapy Cancel/No Show note    Date: 2025  Patient: Lane Ray  : 1960  MRN: [id number]    Visit Count:   Cancels/No Shows to date:     For today's appointment patient:    [x]  Cancelled    [] Rescheduled appointment    [] No-show     Reason given by patient:    [x]  Patient ill    []  Conflicting appointment    [] No transportation      [] Conflict with work    [] No reason given    [] Weather related    [] COVID-19    [] Other:      Comments:        [x] Next appointment was confirmed    Electronically signed by: Sb Anderson PT DPT

## 2025-05-06 ENCOUNTER — HOSPITAL ENCOUNTER (OUTPATIENT)
Dept: PHYSICAL THERAPY | Age: 65
Setting detail: THERAPIES SERIES
Discharge: HOME | End: 2025-05-06
Attending: GENERAL PRACTICE
Payer: MEDICARE

## 2025-05-06 PROCEDURE — 97110 THERAPEUTIC EXERCISES: CPT

## 2025-05-06 NOTE — PLAN OF CARE
[] [facility]   Outpatient Rehabilitation & Ther  [address]  P: [phone]   F: [phone]  [facility] Outpatient Physical Therapy  [address]         Phone: [phone]       Fax: [phone]    Physical Therapy Progress Note Update    Date:  2025  Patient: Lane Ray  : 1960  MRN: [id number]  Physician: Main Todd MD    Medical Diagnosis: Cervical spondylosis (M47.812), Cervical post-laminectomy syndrome (M96.1)    Clinical Diagnosis: Neck pain with mobility deficits (M54.2, Z74.09)  Onset date: 2025   Next 's appt.: TBD  PT Visit Information  PT Insurance Information: Medicare- follow medicare guidelines and [employer]    Total # of Visits Approved: 12  Total # of Visits to Date: 12  No Show: 0  Canceled Appointment: 6     Subjective  Patient stated that the treatment sessions have helped. Pain levels have improved. Pain reduction is apparent throughout the cervical region(s) following a treatment session but not long lasting/1-2 days before returning to baseline. Slightly more active motions felt at times throughout the neck, upper back and UEs. However, significant tightness is still present along with the pain remaining constant.     Pain  Pain:  [x] Yes   [] No      Location: (B) base of the skull to the base of the neck with radicular symptoms down into the (B) scapula region and (B) UEs in the hands/fingers.   Pain Rating: (0-10 scale) 7-8/10   Worst: 9/10  Best: 7/10  Symptoms:  [] Improving [] Worsening       [x] Same  Descriptors: Constant, shooting, numbness and tingling down both UEs sharp, aching and stabbing.   Aggravating factors: ADLs involving all planes of neck, (B) GH and scapulothoracic motions.   Relieving factors: Rest, repositioning  Sleep: Disturbed  Other:     Function:  Hand Dominance  [x] Right  [] Left  Patient lives with: Alone    In what type of home []  One story   []

## 2025-05-08 ENCOUNTER — OFFICE VISIT (OUTPATIENT)
Age: 65
End: 2025-05-08
Payer: MEDICARE

## 2025-05-08 VITALS
SYSTOLIC BLOOD PRESSURE: 155 MMHG | WEIGHT: 268 LBS | BODY MASS INDEX: 39.69 KG/M2 | DIASTOLIC BLOOD PRESSURE: 68 MMHG | HEIGHT: 69 IN | HEART RATE: 91 BPM

## 2025-05-08 DIAGNOSIS — N52.8 OTHER MALE ERECTILE DYSFUNCTION: ICD-10-CM

## 2025-05-08 DIAGNOSIS — Z87.898 HISTORY OF ELEVATED PSA: ICD-10-CM

## 2025-05-08 DIAGNOSIS — N13.8 BPH WITH URINARY OBSTRUCTION: Primary | ICD-10-CM

## 2025-05-08 DIAGNOSIS — N40.1 BPH WITH URINARY OBSTRUCTION: Primary | ICD-10-CM

## 2025-05-08 PROCEDURE — G8417 CALC BMI ABV UP PARAM F/U: HCPCS | Performed by: SPECIALIST

## 2025-05-08 PROCEDURE — 4004F PT TOBACCO SCREEN RCVD TLK: CPT | Performed by: SPECIALIST

## 2025-05-08 PROCEDURE — 99214 OFFICE O/P EST MOD 30 MIN: CPT | Performed by: SPECIALIST

## 2025-05-08 PROCEDURE — 3077F SYST BP >= 140 MM HG: CPT | Performed by: SPECIALIST

## 2025-05-08 PROCEDURE — 99213 OFFICE O/P EST LOW 20 MIN: CPT | Performed by: SPECIALIST

## 2025-05-08 PROCEDURE — 3017F COLORECTAL CA SCREEN DOC REV: CPT | Performed by: SPECIALIST

## 2025-05-08 PROCEDURE — 3078F DIAST BP <80 MM HG: CPT | Performed by: SPECIALIST

## 2025-05-08 PROCEDURE — G8427 DOCREV CUR MEDS BY ELIG CLIN: HCPCS | Performed by: SPECIALIST

## 2025-05-08 PROCEDURE — 1159F MED LIST DOCD IN RCRD: CPT | Performed by: SPECIALIST

## 2025-05-08 PROCEDURE — 1123F ACP DISCUSS/DSCN MKR DOCD: CPT | Performed by: SPECIALIST

## 2025-05-08 RX ORDER — SILDENAFIL 100 MG/1
100 TABLET, FILM COATED ORAL PRN
Qty: 30 TABLET | Refills: 11 | Status: SHIPPED | OUTPATIENT
Start: 2025-05-08

## 2025-05-08 NOTE — PATIENT INSTRUCTIONS
Viagra / sildenafil Instructions:  I have discussed in great detail with the patient the treatment of erectile dysfunction using Viagra.  I have explained my rationale for using Viagra, the fact that Viagra absolutely cannot be taken if he is taking or plans to take any type of medication that contains nitrates such as Nitroglycerin with 48 hours of each other.   I explained the various side-effects of Viagra, including headache, flushing, blue-tinged vision, and indigestion.  I advised him to take Viagra approximately 1/2 to 1 hour before his desire for an erection, and informed him that the effects of Viagra should last about 4-6 hours.  I did inform him that he should not have food just before or after taking Viagra.   Generic Viagra (sildenafil) comes in a 100 mg dose.  Try half first and increase to full dose if needed.   The patient understands the treatment, possible reactions, and possible prognosis.

## 2025-05-08 NOTE — PROGRESS NOTES
Dejon Guzman MD, FACS  [facility] Established Patient office note      Patient:  Lane Ray  YOB: 1960  Date: 5/8/2025    HISTORY OF PRESENT ILLNESS:   The patient is a 64 y.o. male  Patient's lower urinary tract symptoms are stable on Flomax/tamsulosin 0.4 mg po qd for BPH symptoms.    The patient presents with a history of an Elevated PSA but his last PSA was high normal at 3.90 on 11/23/24 but his freePSA% was 25.6% which means this is likely due to benign enlargement of the prostate.  Will repeat a PSA in one year.  Patient's complains of ED and was given an Rx for generic sildenafil 100 mg (1/2-1 tab) prn ED.  Patient told to never take NTG and sildenafil within 48 hours of each other.  Today's AUA Symptom Score (QOL): 10 (0)    Summary of old records:   Right >> left hydrocele on 3/30/23 scrotal U/S with doppler; wants surgery but is taking Eliquis, ASA, Plavix from [facility]; Right hydrocelectomy 8/1/23  BPH, moderate; PVR = 37 mL; Flomax/tamsulosin 0.4 mg po qd  Nocturia x 3: nocturnal fluid restriction   Elevated PSA: 4.6 on 4/9/24, 5.4 on 11/11/24; 5/10/24 prostate MRI (43.3 mL; PIRADS 2)  ED: generic sildenafil 100 mg (1/2-1 tab) prn ED; told to never use within 48 hours of NTG    Additional History: none    Procedures Today: N/A    Urinalysis today:  No results found for this visit on 05/08/25.    Last several PSA's:  Lab Results   Component Value Date    PSA 3.90 11/23/2024    PSA 5.40 (H) 11/11/2024    PSA 4.60 (H) 04/09/2024       Last total testosterone:  No results found for: \"TESTOSTERONE\"    Last BUN and creatinine:  Lab Results   Component Value Date    BUN 11 09/22/2024     Lab Results   Component Value Date    CREATININE 1.1 09/22/2024       Last CBC:  Lab Results   Component Value Date    WBC 6.6 09/22/2024    HGB 14.8 09/22/2024    HCT 43.6 09/22/2024    MCV 86.1 09/22/2024     09/22/2024       Additional Lab/Culture results: none    Imaging

## 2025-05-22 ENCOUNTER — HOSPITAL ENCOUNTER (OUTPATIENT)
Age: 65
Discharge: HOME | End: 2025-05-22
Payer: MEDICARE

## 2025-05-22 DIAGNOSIS — R97.20 ELEVATED PSA: ICD-10-CM

## 2025-05-22 LAB — PSA SERPL-MCNC: 4.71 NG/ML (ref 0–4)

## 2025-05-22 PROCEDURE — 36415 COLL VENOUS BLD VENIPUNCTURE: CPT

## 2025-05-22 PROCEDURE — 84153 ASSAY OF PSA TOTAL: CPT

## 2025-05-23 ENCOUNTER — RESULTS FOLLOW-UP (OUTPATIENT)
Age: 65
End: 2025-05-23

## 2025-05-30 ENCOUNTER — HOSPITAL ENCOUNTER (OUTPATIENT)
Dept: PHYSICAL THERAPY | Age: 65
Setting detail: THERAPIES SERIES
End: 2025-05-30
Attending: GENERAL PRACTICE
Payer: MEDICARE

## 2025-05-30 NOTE — FLOWSHEET NOTE
[facility]   Outpatient Rehabilitation & Therapy  [address] [facility] [address]  P: [phone]   F: [phone]     Physical Therapy Cancel/No Show note    Date: 2025  Patient: Lane Ray  : 1960  MRN: [id number]    Visit Count:   Cancels/No Shows to date:     For today's appointment patient:    [x]  Cancelled    [] Rescheduled appointment    [] No-show     Reason given by patient:    [x]  Patient ill    []  Conflicting appointment    [] No transportation      [] Conflict with work    [] No reason given    [] Weather related    [] COVID-19    [] Other:      Comments:        [] Next appointment was confirmed    Electronically signed by: Sb Anderson PT DPT

## 2025-06-03 ENCOUNTER — HOSPITAL ENCOUNTER (OUTPATIENT)
Dept: PHYSICAL THERAPY | Age: 65
Setting detail: THERAPIES SERIES
Discharge: HOME | End: 2025-06-03
Attending: GENERAL PRACTICE
Payer: COMMERCIAL

## 2025-06-03 PROCEDURE — 97162 PT EVAL MOD COMPLEX 30 MIN: CPT

## 2025-06-03 PROCEDURE — 97110 THERAPEUTIC EXERCISES: CPT

## 2025-06-03 NOTE — FLOWSHEET NOTE
[facility] Fall Risk Assessment    Risk Factor Scale  Score   History of Falls [x] Yes  [] No 25  0 25   Secondary Diagnosis [x] Yes  [] No 15  0 15   Ambulatory Aid [] Furniture  [] Crutches/cane/walker  [x] None/bedrest/wheelchair/nurse 30  15  0 0   IV/Heparin Lock [] Yes  [x] No 20  0 0   Gait/Transferring [x] Impaired  [] Weak  [] Normal/bedrest/immobile 20  10  0 20   Mental Status [] Forgets limitations  [x] Oriented to own ability 15  0 0      Total: 60     Based on the Assessment score: check the appropriate box.    []  No intervention needed   Low =   Score of 0-24    []  Use standard prevention interventions Moderate =  Score of 24-44   [] Give patient handout and discuss fall prevention strategies   [] Establish goal of education for patient/family RE: fall prevention strategies    [x]  Use high risk prevention interventions High = Score of 45 and higher   [x] Give patient handout and discuss fall prevention strategies   [x] Establish goal of education for patient/family Re: fall prevention strategies   [x] Discuss lifeline / other resources    Electronically signed by:   Sb Anderson PT DPT   Date: 6/3/2025

## 2025-06-03 NOTE — THERAPY EVALUATION
[facility] Outpatient Physical Therapy  [address]         Phone: [phone]       Fax: [phone]    Physical Therapy Lumbar Spine Evaluation    Date:  6/3/2025  Patient: Lane Ray  : 1960  MRN: [id number]  Physician: Main Todd MD    Medical Diagnosis: Lumbosacral  spondylosis without myelopathy (M47.817)  Clinical Diagnosis: Low back pain (M54.5) with walking difficulty (R26.2)  Onset date: 2025    Next Dr's appt.: TBD  PT Visit Information  PT Insurance Information:Medicare- follow medicare guidelines and [employer]    Total # of Visits Approved: 12  Total # of Visits to Date: 1  No Show: 0  Canceled Appointment: 0     Subjective  CC: Low back pain   HPI: (2025) Patient is a 64 year old male who presented with chronic low back pain/ongoing for years. No trauma or isolated incident reported. History of multiple nerve blocks without success. History of pain management. He followed up with PM&R per a referral from his PCP. Based on clinical findings, an outpatient physical therapy referral was provided to further address the physical impairments and activity limitations.     Comment   Patient was provided an outpatient physical therapy for his cervical region along this referral for his lumbar region. He elected to address the cervical region/outpatient physical therapy for the last several months and wished to hold initiating any physical therapy treatment for his lumbar region until today.     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

## 2025-06-05 ENCOUNTER — OFFICE VISIT (OUTPATIENT)
Dept: PHYSICAL MEDICINE AND REHAB | Age: 65
End: 2025-06-05
Payer: COMMERCIAL

## 2025-06-05 ENCOUNTER — OFFICE VISIT (OUTPATIENT)
Dept: PRIMARY CARE CLINIC | Age: 65
End: 2025-06-05

## 2025-06-05 VITALS
HEIGHT: 69 IN | HEART RATE: 78 BPM | WEIGHT: 265.8 LBS | SYSTOLIC BLOOD PRESSURE: 128 MMHG | DIASTOLIC BLOOD PRESSURE: 62 MMHG | BODY MASS INDEX: 39.37 KG/M2 | OXYGEN SATURATION: 96 %

## 2025-06-05 VITALS
HEIGHT: 69 IN | HEART RATE: 72 BPM | WEIGHT: 263 LBS | DIASTOLIC BLOOD PRESSURE: 70 MMHG | BODY MASS INDEX: 38.95 KG/M2 | SYSTOLIC BLOOD PRESSURE: 138 MMHG

## 2025-06-05 DIAGNOSIS — M96.1 CERVICAL POST-LAMINECTOMY SYNDROME: ICD-10-CM

## 2025-06-05 DIAGNOSIS — Z00.00 MEDICARE ANNUAL WELLNESS VISIT, SUBSEQUENT: ICD-10-CM

## 2025-06-05 DIAGNOSIS — M54.50 CHRONIC BILATERAL LOW BACK PAIN WITHOUT SCIATICA: Primary | ICD-10-CM

## 2025-06-05 DIAGNOSIS — G89.29 CHRONIC BILATERAL LOW BACK PAIN WITHOUT SCIATICA: Primary | ICD-10-CM

## 2025-06-05 DIAGNOSIS — E11.40 TYPE 2 DIABETES MELLITUS WITH DIABETIC NEUROPATHY, WITHOUT LONG-TERM CURRENT USE OF INSULIN (HCC): ICD-10-CM

## 2025-06-05 DIAGNOSIS — E03.9 HYPOTHYROIDISM, UNSPECIFIED TYPE: ICD-10-CM

## 2025-06-05 DIAGNOSIS — M54.2 CERVICALGIA: ICD-10-CM

## 2025-06-05 DIAGNOSIS — M47.812 CERVICAL SPONDYLOSIS: ICD-10-CM

## 2025-06-05 DIAGNOSIS — R79.89 LOW VITAMIN B12 LEVEL: ICD-10-CM

## 2025-06-05 DIAGNOSIS — E66.01 MORBID (SEVERE) OBESITY DUE TO EXCESS CALORIES (HCC): ICD-10-CM

## 2025-06-05 DIAGNOSIS — E78.5 HYPERLIPIDEMIA, UNSPECIFIED HYPERLIPIDEMIA TYPE: ICD-10-CM

## 2025-06-05 DIAGNOSIS — M50.30 CERVICAL DISCOGENIC PAIN SYNDROME: ICD-10-CM

## 2025-06-05 DIAGNOSIS — M47.816 LUMBAR SPONDYLOSIS: ICD-10-CM

## 2025-06-05 DIAGNOSIS — Z00.00 ENCOUNTER FOR SUBSEQUENT ANNUAL WELLNESS VISIT (AWV) IN MEDICARE PATIENT: Primary | ICD-10-CM

## 2025-06-05 DIAGNOSIS — D64.9 ANEMIA, UNSPECIFIED TYPE: ICD-10-CM

## 2025-06-05 LAB — HBA1C MFR BLD: 6.5 %

## 2025-06-05 PROCEDURE — 1159F MED LIST DOCD IN RCRD: CPT | Performed by: GENERAL PRACTICE

## 2025-06-05 PROCEDURE — 3017F COLORECTAL CA SCREEN DOC REV: CPT | Performed by: GENERAL PRACTICE

## 2025-06-05 PROCEDURE — 1123F ACP DISCUSS/DSCN MKR DOCD: CPT | Performed by: GENERAL PRACTICE

## 2025-06-05 PROCEDURE — 4004F PT TOBACCO SCREEN RCVD TLK: CPT | Performed by: GENERAL PRACTICE

## 2025-06-05 PROCEDURE — 3075F SYST BP GE 130 - 139MM HG: CPT | Performed by: GENERAL PRACTICE

## 2025-06-05 PROCEDURE — 3078F DIAST BP <80 MM HG: CPT | Performed by: GENERAL PRACTICE

## 2025-06-05 PROCEDURE — 99213 OFFICE O/P EST LOW 20 MIN: CPT | Performed by: GENERAL PRACTICE

## 2025-06-05 PROCEDURE — G8427 DOCREV CUR MEDS BY ELIG CLIN: HCPCS | Performed by: GENERAL PRACTICE

## 2025-06-05 PROCEDURE — G8417 CALC BMI ABV UP PARAM F/U: HCPCS | Performed by: GENERAL PRACTICE

## 2025-06-05 RX ORDER — GABAPENTIN 600 MG/1
600 TABLET ORAL 3 TIMES DAILY
Qty: 270 TABLET | Refills: 0 | Status: SHIPPED | OUTPATIENT
Start: 2025-06-05 | End: 2025-09-03

## 2025-06-05 RX ORDER — ACETAMINOPHEN 500 MG
1000 TABLET ORAL
Qty: 180 TABLET | Refills: 1 | Status: SHIPPED | OUTPATIENT
Start: 2025-06-05

## 2025-06-05 ASSESSMENT — PATIENT HEALTH QUESTIONNAIRE - PHQ9
SUM OF ALL RESPONSES TO PHQ QUESTIONS 1-9: 9
6. FEELING BAD ABOUT YOURSELF - OR THAT YOU ARE A FAILURE OR HAVE LET YOURSELF OR YOUR FAMILY DOWN: SEVERAL DAYS
10. IF YOU CHECKED OFF ANY PROBLEMS, HOW DIFFICULT HAVE THESE PROBLEMS MADE IT FOR YOU TO DO YOUR WORK, TAKE CARE OF THINGS AT HOME, OR GET ALONG WITH OTHER PEOPLE: SOMEWHAT DIFFICULT
2. FEELING DOWN, DEPRESSED OR HOPELESS: NOT AT ALL
SUM OF ALL RESPONSES TO PHQ QUESTIONS 1-9: 9
5. POOR APPETITE OR OVEREATING: MORE THAN HALF THE DAYS
8. MOVING OR SPEAKING SO SLOWLY THAT OTHER PEOPLE COULD HAVE NOTICED. OR THE OPPOSITE, BEING SO FIGETY OR RESTLESS THAT YOU HAVE BEEN MOVING AROUND A LOT MORE THAN USUAL: NOT AT ALL
7. TROUBLE CONCENTRATING ON THINGS, SUCH AS READING THE NEWSPAPER OR WATCHING TELEVISION: NOT AT ALL
4. FEELING TIRED OR HAVING LITTLE ENERGY: NEARLY EVERY DAY
3. TROUBLE FALLING OR STAYING ASLEEP: NEARLY EVERY DAY
9. THOUGHTS THAT YOU WOULD BE BETTER OFF DEAD, OR OF HURTING YOURSELF: NOT AT ALL
1. LITTLE INTEREST OR PLEASURE IN DOING THINGS: NOT AT ALL
SUM OF ALL RESPONSES TO PHQ QUESTIONS 1-9: 9
SUM OF ALL RESPONSES TO PHQ QUESTIONS 1-9: 9

## 2025-06-05 ASSESSMENT — LIFESTYLE VARIABLES
HOW OFTEN DO YOU HAVE A DRINK CONTAINING ALCOHOL: MONTHLY OR LESS
HOW MANY STANDARD DRINKS CONTAINING ALCOHOL DO YOU HAVE ON A TYPICAL DAY: PATIENT DOES NOT DRINK

## 2025-06-07 ENCOUNTER — HOSPITAL ENCOUNTER (OUTPATIENT)
Age: 65
Discharge: HOME | End: 2025-06-07
Payer: COMMERCIAL

## 2025-06-07 DIAGNOSIS — E03.9 HYPOTHYROIDISM, UNSPECIFIED TYPE: ICD-10-CM

## 2025-06-07 DIAGNOSIS — R79.89 LOW VITAMIN B12 LEVEL: ICD-10-CM

## 2025-06-07 DIAGNOSIS — D64.9 ANEMIA, UNSPECIFIED TYPE: ICD-10-CM

## 2025-06-07 DIAGNOSIS — E11.40 TYPE 2 DIABETES MELLITUS WITH DIABETIC NEUROPATHY, WITHOUT LONG-TERM CURRENT USE OF INSULIN (HCC): ICD-10-CM

## 2025-06-07 DIAGNOSIS — E78.5 HYPERLIPIDEMIA, UNSPECIFIED HYPERLIPIDEMIA TYPE: ICD-10-CM

## 2025-06-07 LAB
ALBUMIN SERPL-MCNC: 4.3 G/DL (ref 3.5–5.2)
ALP SERPL-CCNC: 88 U/L (ref 40–129)
ALT SERPL-CCNC: 28 U/L (ref 10–50)
ANION GAP SERPL CALCULATED.3IONS-SCNC: 12 MMOL/L (ref 9–16)
AST SERPL-CCNC: 24 U/L (ref 10–50)
BASOPHILS # BLD: 0.05 K/UL (ref 0–0.2)
BASOPHILS NFR BLD: 1 % (ref 0–2)
BILIRUB DIRECT SERPL-MCNC: 0.1 MG/DL (ref 0–0.3)
BILIRUB INDIRECT SERPL-MCNC: 0.2 MG/DL (ref 0–1)
BILIRUB SERPL-MCNC: 0.3 MG/DL (ref 0–1.2)
BUN SERPL-MCNC: 15 MG/DL (ref 8–23)
CALCIUM SERPL-MCNC: 9.5 MG/DL (ref 8.6–10.4)
CHLORIDE SERPL-SCNC: 104 MMOL/L (ref 98–107)
CHOLEST SERPL-MCNC: 134 MG/DL (ref 0–199)
CHOLESTEROL/HDL RATIO: 3.4
CO2 SERPL-SCNC: 25 MMOL/L (ref 20–31)
CREAT SERPL-MCNC: 0.9 MG/DL (ref 0.7–1.2)
CREAT UR-MCNC: 152 MG/DL (ref 39–259)
EOSINOPHIL # BLD: 0.15 K/UL (ref 0–0.44)
EOSINOPHILS RELATIVE PERCENT: 2 % (ref 0–4)
ERYTHROCYTE [DISTWIDTH] IN BLOOD BY AUTOMATED COUNT: 13.6 % (ref 11.5–14.9)
FOLATE SERPL-MCNC: 11 NG/ML (ref 4.8–24.2)
GFR, ESTIMATED: >90 ML/MIN/1.73M2
GLUCOSE SERPL-MCNC: 134 MG/DL (ref 74–99)
HCT VFR BLD AUTO: 43.5 % (ref 41–53)
HDLC SERPL-MCNC: 40 MG/DL
HGB BLD-MCNC: 13.8 G/DL (ref 13.5–17.5)
IMM GRANULOCYTES # BLD AUTO: <0.03 K/UL (ref 0–0.3)
IMM GRANULOCYTES NFR BLD: 0 %
LDLC SERPL CALC-MCNC: 66 MG/DL (ref 0–100)
LYMPHOCYTES NFR BLD: 2.9 K/UL (ref 1.1–3.7)
LYMPHOCYTES RELATIVE PERCENT: 43 % (ref 24–44)
MCH RBC QN AUTO: 28 PG (ref 26–34)
MCHC RBC AUTO-ENTMCNC: 31.7 G/DL (ref 31–37)
MCV RBC AUTO: 88.2 FL (ref 80–100)
MICROALBUMIN UR-MCNC: 78 MG/L (ref 0–20)
MICROALBUMIN/CREAT UR-RTO: 51 MCG/MG CREAT (ref 0–17)
MONOCYTES NFR BLD: 0.54 K/UL (ref 0.1–1.2)
MONOCYTES NFR BLD: 8 % (ref 3–12)
NEUTROPHILS NFR BLD: 46 % (ref 36–66)
NEUTS SEG NFR BLD: 3.03 K/UL (ref 1.5–8.1)
NRBC BLD-RTO: 0 PER 100 WBC
PLATELET # BLD AUTO: 227 K/UL (ref 150–450)
PMV BLD AUTO: 9.3 FL (ref 8–13.5)
POTASSIUM SERPL-SCNC: 4.1 MMOL/L (ref 3.7–5.3)
PROT SERPL-MCNC: 6.8 G/DL (ref 6.6–8.7)
RBC # BLD AUTO: 4.93 M/UL (ref 4.21–5.77)
SODIUM SERPL-SCNC: 141 MMOL/L (ref 136–145)
TRIGL SERPL-MCNC: 138 MG/DL (ref 0–149)
TSH SERPL DL<=0.05 MIU/L-ACNC: 2.74 UIU/ML (ref 0.27–4.2)
VIT B12 SERPL-MCNC: 506 PG/ML (ref 232–1245)
WBC OTHER # BLD: 6.7 K/UL (ref 3.5–11)

## 2025-06-07 PROCEDURE — 85025 COMPLETE CBC W/AUTO DIFF WBC: CPT

## 2025-06-07 PROCEDURE — 82746 ASSAY OF FOLIC ACID SERUM: CPT

## 2025-06-07 PROCEDURE — 80061 LIPID PANEL: CPT

## 2025-06-07 PROCEDURE — 80076 HEPATIC FUNCTION PANEL: CPT

## 2025-06-07 PROCEDURE — 82607 VITAMIN B-12: CPT

## 2025-06-07 PROCEDURE — 80048 BASIC METABOLIC PNL TOTAL CA: CPT

## 2025-06-07 PROCEDURE — 84443 ASSAY THYROID STIM HORMONE: CPT

## 2025-06-07 PROCEDURE — 82043 UR ALBUMIN QUANTITATIVE: CPT

## 2025-06-07 PROCEDURE — 36415 COLL VENOUS BLD VENIPUNCTURE: CPT

## 2025-06-07 PROCEDURE — 82570 ASSAY OF URINE CREATININE: CPT

## 2025-06-09 NOTE — PROGRESS NOTES
[facility] PHYSICAL MEDICINE & REHABILITATION  [address] [address]  Dept: [phone]  Dept Fax: [phone]    Outpatient Followup Note    Lane Ray, 64 y.o., male, presents for follow up c/o of Neurologic Problem  .     HPI:     History of Present Illness               HPI  64 year old male presenting for follow up on chronic neck and low back pain, chronic bilateral hip pain, chronic bilateral knee pain. Last seen 3/31/2025 at which time gabapentin was increased from twice to three times daily, patient was to continue physical therapy, and patient was given referral to Pain Medicine to discuss interventional options. In the interim, patient reports he strained his back and took a break from PT while he recovered, but is now back working with them. He does feel this is offer some improvement but he does still have quite a bit of pain. He never did schedule consult with Pain Medicine as he was under the impression they would just put him back on pain medications. He feels that increased frequency of gabapentin helped some, but is also interested in trialing increased dose.      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    x 3 ; not bleeding, ablated    Arthritis     neck and back the worst, but \" all over \"    Asthma     BPH (benign prostatic hyperplasia)     CAD (coronary artery disease) 2014    Carotid stenosis     Bilat, right worse    Chronic pain     no longer in pain management    COPD (chronic obstructive pulmonary disease) ([facility])     on inhalers pcp manages    COVID-19 vaccine series completed     Diabetes mellitus ([facility])     does not check blood suagr at home, last HGB A1C 7.0    Diaphragmatic hernia 2015    left side    Diverticulosis     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

## 2025-06-10 ENCOUNTER — HOSPITAL ENCOUNTER (OUTPATIENT)
Dept: PHYSICAL THERAPY | Age: 65
Setting detail: THERAPIES SERIES
Discharge: HOME | End: 2025-06-10
Attending: GENERAL PRACTICE
Payer: MEDICARE

## 2025-06-10 PROCEDURE — 97110 THERAPEUTIC EXERCISES: CPT

## 2025-06-10 NOTE — FLOWSHEET NOTE
Activities     []  Aquatics     []  Neuromuscular     [] Vasocompression     [] Gait Training     [] Dry needling        [] 1 or 2 muscles        [] 3 or more muscles     []  Other     Total Billable time 45 3     Time In: 1030          Time Out: 1115    Electronically signed by:  Sb Anderson PT  DPT

## 2025-06-12 ENCOUNTER — TELEPHONE (OUTPATIENT)
Dept: PRIMARY CARE CLINIC | Age: 65
End: 2025-06-12

## 2025-06-12 ENCOUNTER — HOSPITAL ENCOUNTER (OUTPATIENT)
Dept: PHYSICAL THERAPY | Age: 65
Setting detail: THERAPIES SERIES
Discharge: HOME | End: 2025-06-12
Attending: GENERAL PRACTICE
Payer: MEDICARE

## 2025-06-12 ENCOUNTER — TELEPHONE (OUTPATIENT)
Age: 65
End: 2025-06-12

## 2025-06-12 PROCEDURE — 97110 THERAPEUTIC EXERCISES: CPT

## 2025-06-12 NOTE — FLOWSHEET NOTE
[facility]   Outpatient Rehabilitation & Therapy  [address]  P: [phone]   F: [phone]    Physical Therapy Daily Treatment Note      Date:  2025  Patient Name:  aLne Ray    :  1960  MRN: [id number]  Physician: Main Todd MD               Medical Diagnosis: Lumbosacral  spondylosis without myelopathy (M47.817)  Clinical Diagnosis: Low back pain (M54.5) with walking difficulty (R26.2)  Onset date: 2025           Next Dr's appt.: TBD  PT Visit Information  PT Insurance Information:Medicare- follow medicare guidelines and [employer]    Total # of Visits Approved: 12  Total # of Visits to Date: 3  No Show: 0  Canceled Appointment: 0    Subjective:    Patient stated that his symptoms are still relatively unchanged compared to his last treatment session. He stated that he woke up early today and put a heating pad on his lumbar region/20 minutes. Heat appears to be helping but not long lasting.     Over 24 hours  Pain  Pain:  [x] Yes   [] No       Location: (R) and (L) lumbar with radicular symptoms down the LEs to the feet/toes.   Pain Rating: (0-10 scale) 6/10 with pain medication   Worst: 8/10 with pain medication   Best: 6/10 with pain medication   Descriptors: constant, dull, achy, stabbing, numbness, tingling   Other:     Objective:  Modalities: Hot pack with passive stretches   Precautions: Asthma, COPD, DM, Depression, Sleep Apnea and Neuropathy   Exercises:  Exercise Reps/ Time Weight/ Level Comments   Semi-reclined Knee Extension with Hip @ 90 degrees 5 reps   Following passive stretch    Semi-reclined Hip Abduction/Adduction  5 reps   Following passive stretch    Semi-reclined Knee Flexion  5 reps   Following passive stretch                Passive Stretching   Semi-reclined Hamstring Stretch 30 sec hold x 3 reps with each leg   Semi-reclined IT Band Stretch 30 sec hold x 3 reps with each leg   Semi-reclined Piriformis Stretch 30 sec

## 2025-06-12 NOTE — TELEPHONE ENCOUNTER
Received call from patient stating his PCP ordered some lab work that came back a little elevated and he would like Dr. Tierney to take a look at the results and see if there is anything he should be concerned about from a urology standpoint. Stated the last time his creatine was this high he got an infection and he doesn't want to go down that road again.    Please advise.    Call back #: [phone]

## 2025-06-12 NOTE — TELEPHONE ENCOUNTER
Patient's PSA slightly elevated but lower than previous levels.  No further urologic workup or intervention required.  Repeat free and total PSA in 6 months.

## 2025-06-26 ENCOUNTER — HOSPITAL ENCOUNTER (OUTPATIENT)
Dept: PHYSICAL THERAPY | Age: 65
Setting detail: THERAPIES SERIES
Discharge: HOME | End: 2025-06-26
Attending: GENERAL PRACTICE
Payer: MEDICARE

## 2025-06-26 PROCEDURE — 97110 THERAPEUTIC EXERCISES: CPT

## 2025-06-26 NOTE — FLOWSHEET NOTE
[facility]   Outpatient Rehabilitation & Therapy  [address]  P: [phone]   F: [phone]    Physical Therapy Daily Treatment Note      Date:  2025  Patient Name:  Lane Ray    :  1960  MRN: [id number]  Physician: Main Todd MD               Medical Diagnosis: Lumbosacral  spondylosis without myelopathy (M47.817)  Clinical Diagnosis: Low back pain (M54.5) with walking difficulty (R26.2)  Onset date: 2025           Next Dr's appt.: TBD  PT Visit Information  PT Insurance Information:Medicare- follow medicare guidelines and [employer]    Total # of Visits Approved: 12  Total # of Visits to Date: 4  No Show: 0  Canceled Appointment: 0    Subjective:    Patient stated that his symptoms are still relatively unchanged compared to his last treatment session. Pain has become more pronounced in the hips. (L) hip slightly worse than the (R) LE. He stated that he was has had to walk more over the last several days/prolonged distances for numerous activities. Upon completion of the walks, increased symptoms are present and can last for several hours/heating pad helps.     Over 24 hours  Pain  Pain:  [x] Yes   [] No       Location: (R) and (L) lumbar with radicular symptoms down the LEs to the feet/toes.   Pain Rating: (0-10 scale) 6/10 with pain medication   Worst: 8/10 with pain medication   Best: 6/10 with pain medication   Descriptors: constant, dull, achy, stabbing, numbness, tingling   Other:     Objective:  Modalities: Hot pack with passive stretches   Precautions: Asthma, COPD, DM, Depression, Sleep Apnea and Neuropathy   Exercises:  Exercise Reps/ Time Weight/ Level Comments   Semi-reclined Knee Extension with Hip @ 90 degrees 5 reps   Following passive stretch    Semi-reclined Hip Abduction/Adduction  5 reps   Following passive stretch    Semi-reclined Knee Flexion  5 reps   Following passive stretch                Passive Stretching

## 2025-07-01 ENCOUNTER — HOSPITAL ENCOUNTER (OUTPATIENT)
Dept: PHYSICAL THERAPY | Age: 65
Setting detail: THERAPIES SERIES
Discharge: HOME | End: 2025-07-01
Attending: GENERAL PRACTICE
Payer: MEDICARE

## 2025-07-01 PROCEDURE — 97110 THERAPEUTIC EXERCISES: CPT

## 2025-07-01 NOTE — FLOWSHEET NOTE
[facility]   Outpatient Rehabilitation & Therapy  [address]  P: [phone]   F: [phone]    Physical Therapy Daily Treatment Note      Date:  2025  Patient Name:  Lane Ray    :  1960  MRN: [id number]  Physician: Main Todd MD               Medical Diagnosis: Lumbosacral  spondylosis without myelopathy (M47.817)  Clinical Diagnosis: Low back pain (M54.5) with walking difficulty (R26.2)  Onset date: 2025           Next Dr's appt.: TBD  PT Visit Information  PT Insurance Information:Medicare- follow medicare guidelines and [employer]    Total # of Visits Approved: 12  Total # of Visits to Date: 5  No Show: 0  Canceled Appointment: 0    Subjective:    Patient stated that he has noticed slight improvement in his pain levels. Pain is still across the lumbar region with referred pain his buttocks/hips. Stiffness is present upon waking but able to relieve it with ambulation at times. \"Giving out\" episodes within hips have decreased.     Over 24 hours  Pain  Pain:  [x] Yes   [] No       Location: (R) and (L) lumbar with radicular symptoms down the LEs to the feet/toes.   Pain Rating: (0-10 scale) 6/10 with pain medication   Worst: 7/10 with pain medication   Best: 6/10 with pain medication   Descriptors: constant, dull, achy, stabbing, numbness, tingling   Other:     Objective:  Modalities: Hot pack with passive stretches   Precautions: Asthma, COPD, DM, Depression, Sleep Apnea and Neuropathy   Exercises:  Exercise Reps/ Time Weight/ Level Comments   Semi-reclined Knee Extension with Hip @ 90 degrees 5 reps   Following passive stretch    Semi-reclined Hip Abduction/Adduction  5 reps   Following passive stretch    Semi-reclined Knee Flexion  5 reps   Following passive stretch                Passive Stretching   Semi-reclined Hamstring Stretch 30 sec hold x 3 reps with each leg   Semi-reclined IT Band Stretch 30 sec hold x 3 reps with each leg

## 2025-07-03 ENCOUNTER — HOSPITAL ENCOUNTER (OUTPATIENT)
Dept: PHYSICAL THERAPY | Age: 65
Setting detail: THERAPIES SERIES
Discharge: HOME | End: 2025-07-03
Attending: GENERAL PRACTICE
Payer: MEDICARE

## 2025-07-03 PROCEDURE — 97110 THERAPEUTIC EXERCISES: CPT

## 2025-07-03 NOTE — PROGRESS NOTES
[facility] Outpatient Physical Therapy  [address]         Phone: [phone]       Fax: [phone]    Physical Therapy Progress Note Update    Date:  7/3/2025  Patient: Lane Ray  : 1960  MRN: [id number]  Physician: Main Todd MD    Medical Diagnosis: Lumbosacral  spondylosis without myelopathy (M47.817)  Clinical Diagnosis: Low back pain (M54.5) with walking difficulty (R26.2)  Onset date: 2025    Next Dr's appt.: TBD  PT Visit Information  PT Insurance Information:Medicare- follow medicare guidelines and [employer]    Total # of Visits Approved: 12  Total # of Visits to Date: 6  No Show: 0  Canceled Appointment: 0     Subjective  Patient stated that he felt the treatment sessions have helped. Pain levels have slightly improved. Furthermore, he stated that he followed up with the pain management per his referral from Dr. Todd. CT scans ordered. Patient stated that he declined all the other recommendations due to them being unsuccessful in the past.      Pain  Pain:  [x] Yes   [] No      Location: (R) and (L) lumbar with radicular symptoms down the LEs to the feet/toes.   Pain Rating: (0-10 scale) 7/10 with pain medication   Worst: 8/10 with pain medication   Best: 7/10 with pain medication   Symptoms:  [x] Improving [] Worsening       [] Same  Descriptors: constant, dull, achy, stabbing, numbness, tingling   Aggravating factors: ADLs that involve trunk extension   Relieving factors: ADLs that involve trunk flexion  Sleep: Still Disturbed  Other:     Objective   Palpation [x] [] [] No tenderness to the touch was noted throughout the involved region(s).    Flexibility [] [x] [] (B) Quad Tightness was still present   (B) Hamstring Tightness was still present   (B) Gastroc Tightness was still present   (B) Piriformis Tightness was still present   (B) IT Band Tightness was still present      Range of Motion  Spine Range of

## 2025-07-09 ENCOUNTER — HOSPITAL ENCOUNTER (OUTPATIENT)
Dept: PHYSICAL THERAPY | Age: 65
Setting detail: THERAPIES SERIES
Discharge: HOME | End: 2025-07-09
Attending: GENERAL PRACTICE
Payer: MEDICARE

## 2025-07-09 NOTE — FLOWSHEET NOTE
[facility]   Outpatient Rehabilitation & Therapy  [address] [facility] [address]  P: [phone]   F: [phone]     Physical Therapy Cancel/No Show note    Date: 2025  Patient: Lane Ray  : 1960  MRN: [id number]    Visit Count:   Cancels/No Shows to date:     For today's appointment patient:    [x]  Cancelled    [] Rescheduled appointment    [] No-show     Reason given by patient:    []  Patient ill    [x]  Conflicting appointment    [] No transportation      [] Conflict with work    [] No reason given    [] Weather related    [] COVID-19    [] Other:      Comments:        [] Next appointment was confirmed    Electronically signed by: Sb Anderson PT DPT

## 2025-07-11 ENCOUNTER — HOSPITAL ENCOUNTER (OUTPATIENT)
Dept: PHYSICAL THERAPY | Age: 65
Setting detail: THERAPIES SERIES
Discharge: HOME | End: 2025-07-11
Attending: GENERAL PRACTICE
Payer: MEDICARE

## 2025-07-11 PROCEDURE — 97110 THERAPEUTIC EXERCISES: CPT

## 2025-07-11 NOTE — FLOWSHEET NOTE
[facility]   Outpatient Rehabilitation & Therapy  [address]  P: [phone]   F: [phone]    Physical Therapy Daily Treatment Note      Date:  2025  Patient Name:  Lane Ray    :  1960  MRN: [id number]  Physician: Main Todd MD               Medical Diagnosis: Lumbosacral  spondylosis without myelopathy (M47.817)  Clinical Diagnosis: Low back pain (M54.5) with walking difficulty (R26.2)  Onset date: 2025           Next Dr's appt.: TBD  PT Visit Information  PT Insurance Information:Medicare- follow medicare guidelines and [employer]    Total # of Visits Approved: 12  Total # of Visits to Date: 7  No Show: 0  Canceled Appointment: 1    Subjective:    Patient stated that he has noticed slight relief regarding his symptoms since his last treatment session/cause unknown. He stated that he continues to notice less \"giving out\" episodes within the hips. However, prolonged standing is still painful and numbness/tingling and fatigue occurs fairly quickly with WB ADLs.     Over 24 hours  Pain  Pain:  [x] Yes   [] No       Location: (R) and (L) lumbar with radicular symptoms down the LEs to the feet/toes.   Pain Rating: (0-10 scale) 6/10 with pain medication   Worst: 8/10 with pain medication   Best: 5/10 with pain medication   Descriptors: constant, dull, achy, stabbing, numbness, tingling   Other:     Objective:  Modalities: Hot pack with passive stretches   Precautions: Asthma, COPD, DM, Depression, Sleep Apnea and Neuropathy   Exercises:  Exercise Reps/ Time Weight/ Level Comments   Semi-reclined Knee Extension with Hip @ 90 degrees 5 reps   Following passive stretch    Semi-reclined Hip Abduction/Adduction  5 reps   Following passive stretch    Semi-reclined Knee Flexion  5 reps   Following passive stretch                Passive Stretching   Semi-reclined Hamstring Stretch 30 sec hold x 3 reps with each leg   Semi-reclined IT Band Stretch 30 sec

## 2025-07-14 ENCOUNTER — HOSPITAL ENCOUNTER (OUTPATIENT)
Dept: PHYSICAL THERAPY | Age: 65
Setting detail: THERAPIES SERIES
Discharge: HOME | End: 2025-07-14
Attending: GENERAL PRACTICE
Payer: MEDICARE

## 2025-07-14 NOTE — FLOWSHEET NOTE
[facility]   Outpatient Rehabilitation & Therapy  [address] [facility] [address]  P: [phone]   F: [phone]     Physical Therapy Cancel/No Show note    Date: 2025  Patient: Lane Ray  : 1960  MRN: [id number]    Visit Count:   Cancels/No Shows to date: 20    For today's appointment patient:    [x]  Cancelled    [] Rescheduled appointment    [] No-show     Reason given by patient:    []  Patient ill    []  Conflicting appointment    [] No transportation      [] Conflict with work    [x] No reason given    [] Weather related    [] COVID-19    [] Other:      Comments:        [] Next appointment was confirmed    Electronically signed by: Sb Anderson PT DPT

## 2025-07-15 ENCOUNTER — TELEPHONE (OUTPATIENT)
Dept: PHYSICAL MEDICINE AND REHAB | Age: 65
End: 2025-07-15

## 2025-07-15 ENCOUNTER — TELEPHONE (OUTPATIENT)
Dept: VASCULAR SURGERY | Age: 65
End: 2025-07-15

## 2025-07-15 NOTE — TELEPHONE ENCOUNTER
Lane called in stating he's been attending therapies as requested. 7 of 12 to be exact. Although it provides some relief he still has complaints. He's wondering if you could order CT imaging of Cervical, Lumbar, Hips, and Right Knee. Patient states therapist thinks imaging would be beneficial. Please advise. Thank you.   Last visit 6/5/25  Next visit 8/7/25

## 2025-07-17 ENCOUNTER — HOSPITAL ENCOUNTER (OUTPATIENT)
Dept: PHYSICAL THERAPY | Age: 65
Setting detail: THERAPIES SERIES
Discharge: HOME | End: 2025-07-17
Attending: GENERAL PRACTICE
Payer: MEDICARE

## 2025-07-17 PROCEDURE — 97110 THERAPEUTIC EXERCISES: CPT

## 2025-07-17 NOTE — FLOWSHEET NOTE
[facility]   Outpatient Rehabilitation & Therapy  [address]  P: [phone]   F: [phone]    Physical Therapy Daily Treatment Note      Date:  2025  Patient Name:  Lane Ray    :  1960  MRN: [id number]  Physician: Main Todd MD               Medical Diagnosis: Lumbosacral  spondylosis without myelopathy (M47.817)  Clinical Diagnosis: Low back pain (M54.5) with walking difficulty (R26.2)  Onset date: 2025           Next Dr's appt.: TBD  PT Visit Information  PT Insurance Information:Medicare- follow medicare guidelines and [employer]    Total # of Visits Approved: 12  Total # of Visits to Date: 8  No Show: 0  Canceled Appointment: 2    Subjective:    Patient stated that he \"slipped off\" the last step while walking down his steps/cancelled his last treatment session due to the severity of his symptoms. He feels his symptoms are getting better but still \"abnormal\" compared to his previous treatment session.     Over 24 hours  Pain  Pain:  [x] Yes   [] No       Location: (R) and (L) lumbar with radicular symptoms down the LEs to the feet/toes.   Pain Rating: (0-10 scale) 6/10 with pain medication   Worst: 9/10 with pain medication   Best: 6/10 with pain medication   Descriptors: constant, dull, achy, stabbing, numbness, tingling   Other:     Objective:  Modalities: Hot pack with passive stretches   Precautions: Asthma, COPD, DM, Depression, Sleep Apnea and Neuropathy   Exercises:  Exercise Reps/ Time Weight/ Level Comments   Semi-reclined Knee Extension with Hip @ 90 degrees 5 reps   Following passive stretch    Semi-reclined Hip Abduction/Adduction  5 reps   Following passive stretch    Semi-reclined Knee Flexion  5 reps   Following passive stretch                Passive Stretching   Semi-reclined Hamstring Stretch 30 sec hold x 3 reps with each leg   Semi-reclined IT Band Stretch 30 sec hold x 3 reps with each leg   Semi-reclined Piriformis

## 2025-07-21 ENCOUNTER — HOSPITAL ENCOUNTER (OUTPATIENT)
Dept: PHYSICAL THERAPY | Age: 65
Setting detail: THERAPIES SERIES
Discharge: HOME | End: 2025-07-21
Attending: GENERAL PRACTICE
Payer: MEDICARE

## 2025-07-21 PROCEDURE — 97110 THERAPEUTIC EXERCISES: CPT

## 2025-07-21 NOTE — FLOWSHEET NOTE
[facility]   Outpatient Rehabilitation & Therapy  [address]  P: [phone]   F: [phone]    Physical Therapy Daily Treatment Note      Date:  2025  Patient Name:  Lane Ray    :  1960  MRN: [id number]  Physician: Main Todd MD               Medical Diagnosis: Lumbosacral  spondylosis without myelopathy (M47.817)  Clinical Diagnosis: Low back pain (M54.5) with walking difficulty (R26.2)  Onset date: 2025           Next Dr's appt.: TBD  PT Visit Information  PT Insurance Information:Medicare- follow medicare guidelines and [employer]    Total # of Visits Approved: 12  Total # of Visits to Date: 9  No Show: 0  Canceled Appointment: 2    Subjective:    Patient stated that his symptoms continue to improve from his episode last week when he slipped. Pain is still constant but subsiding into the moderate range. Complaints of significant stiffness across the lumbar region.     Over 24 hours  Pain  Pain:  [x] Yes   [] No       Location: (R) and (L) lumbar with radicular symptoms down the LEs to the feet/toes.   Pain Rating: (0-10 scale) 6/10 with pain medication   Worst: 7/10 with pain medication   Best: 6/10 with pain medication   Descriptors: constant, dull, achy, stabbing, numbness, tingling   Other:     Objective:  Modalities: Hot pack with passive stretches   Precautions: Asthma, COPD, DM, Depression, Sleep Apnea and Neuropathy   Exercises:  Exercise Reps/ Time Weight/ Level Comments   Semi-reclined Knee Extension with Hip @ 90 degrees 5 reps   Following passive stretch    Semi-reclined Hip Abduction/Adduction  5 reps   Following passive stretch    Semi-reclined Knee Flexion  5 reps   Following passive stretch                Passive Stretching   Semi-reclined Hamstring Stretch 30 sec hold x 3 reps with each leg   Semi-reclined IT Band Stretch 30 sec hold x 3 reps with each leg   Semi-reclined Piriformis Stretch 30 sec hold x 3 reps with each leg

## 2025-07-24 ENCOUNTER — HOSPITAL ENCOUNTER (OUTPATIENT)
Dept: PHYSICAL THERAPY | Age: 65
Setting detail: THERAPIES SERIES
Discharge: HOME | End: 2025-07-24
Attending: GENERAL PRACTICE
Payer: MEDICARE

## 2025-07-24 PROCEDURE — 97110 THERAPEUTIC EXERCISES: CPT

## 2025-07-24 NOTE — FLOWSHEET NOTE
[facility]   Outpatient Rehabilitation & Therapy  [address]  P: [phone]   F: [phone]    Physical Therapy Daily Treatment Note      Date:  2025  Patient Name:  Lane Ray    :  1960  MRN: [id number]  Physician: Main Todd MD               Medical Diagnosis: Lumbosacral  spondylosis without myelopathy (M47.817)  Clinical Diagnosis: Low back pain (M54.5) with walking difficulty (R26.2)  Onset date: 2025           Next Dr's appt.: TBD  PT Visit Information  PT Insurance Information:Medicare- follow medicare guidelines and [employer]    Total # of Visits Approved: 12  Total # of Visits to Date: 10  No Show: 0  Canceled Appointment: 2    Subjective:    Patient stated that his symptoms continue to improve since his last treatment session. However, he stated that he is concerned about how his body is going to feel following the numerous activities he has to perform later today.     Over 24 hours  Pain  Pain:  [x] Yes   [] No       Location: (R) and (L) lumbar with radicular symptoms down the LEs to the feet/toes.   Pain Rating: (0-10 scale) 5/10 with pain medication   Worst: 7/10 with pain medication   Best: 5/10 with pain medication   Descriptors: constant, dull, achy, stabbing, numbness, tingling   Other:     Objective:  Modalities: Hot pack with passive stretches   Precautions: Asthma, COPD, DM, Depression, Sleep Apnea and Neuropathy   Exercises:  Exercise Reps/ Time Weight/ Level Comments   Semi-reclined Knee Extension with Hip @ 90 degrees 5 reps   Following passive stretch    Semi-reclined Hip Abduction/Adduction  5 reps   Following passive stretch    Semi-reclined Knee Flexion  5 reps   Following passive stretch                Passive Stretching   Semi-reclined Hamstring Stretch 30 sec hold x 3 reps with each leg   Semi-reclined IT Band Stretch 30 sec hold x 3 reps with each leg   Semi-reclined Piriformis Stretch 30 sec hold x 3 reps with

## 2025-07-28 ENCOUNTER — HOSPITAL ENCOUNTER (OUTPATIENT)
Dept: PHYSICAL THERAPY | Age: 65
Setting detail: THERAPIES SERIES
Discharge: HOME | End: 2025-07-28
Attending: GENERAL PRACTICE
Payer: MEDICARE

## 2025-07-28 ENCOUNTER — HOSPITAL ENCOUNTER (OUTPATIENT)
Dept: VASCULAR LAB | Age: 65
Discharge: HOME | End: 2025-07-30
Attending: SURGERY
Payer: MEDICAID

## 2025-07-28 DIAGNOSIS — I65.23 BILATERAL CAROTID ARTERY STENOSIS: ICD-10-CM

## 2025-07-28 LAB
VAS LEFT CCA DIST EDV: 21.6 CM/S
VAS LEFT CCA DIST PSV: 106.9 CM/S
VAS LEFT CCA MID EDV: 18.2 CM/S
VAS LEFT CCA MID PSV: 95.4 CM/S
VAS LEFT CCA PROX EDV: 20.8 CM/S
VAS LEFT CCA PROX PSV: 95.5 CM/S
VAS LEFT ECA EDV: 12.6 CM/S
VAS LEFT ECA PSV: 143.8 CM/S
VAS LEFT ICA DIST EDV: 27.9 CM/S
VAS LEFT ICA DIST PSV: 105.7 CM/S
VAS LEFT ICA MID EDV: 36.6 CM/S
VAS LEFT ICA MID PSV: 125.4 CM/S
VAS LEFT ICA PROX EDV: 15 CM/S
VAS LEFT ICA PROX PSV: 62.1 CM/S
VAS LEFT ICA/CCA PSV: 1.2 NO UNITS
VAS RIGHT CCA DIST EDV: 14.9 CM/S
VAS RIGHT CCA DIST PSV: 57 CM/S
VAS RIGHT CCA MID EDV: 15 CM/S
VAS RIGHT CCA MID PSV: 47 CM/S
VAS RIGHT CCA PROX EDV: 14.2 CM/S
VAS RIGHT CCA PROX PSV: 68.7 CM/S
VAS RIGHT ECA EDV: 13.3 CM/S
VAS RIGHT ECA PSV: 256.7 CM/S
VAS RIGHT ICA DIST EDV: 23.6 CM/S
VAS RIGHT ICA DIST PSV: 79.7 CM/S
VAS RIGHT ICA MID EDV: 25.8 CM/S
VAS RIGHT ICA MID PSV: 138.4 CM/S
VAS RIGHT ICA PROX EDV: 54 CM/S
VAS RIGHT ICA PROX PSV: 194.6 CM/S
VAS RIGHT ICA/CCA PSV: 3.4 NO UNITS
VAS RIGHT VERTEBRAL EDV: 12.7 CM/S
VAS RIGHT VERTEBRAL PSV: 38.3 CM/S

## 2025-07-28 PROCEDURE — 93880 EXTRACRANIAL BILAT STUDY: CPT | Performed by: SURGERY

## 2025-07-28 PROCEDURE — 93880 EXTRACRANIAL BILAT STUDY: CPT

## 2025-07-28 NOTE — FLOWSHEET NOTE
[facility]   Outpatient Rehabilitation & Therapy  [address] [facility] [address]  P: [phone]   F: [phone]     Physical Therapy Cancel/No Show note    Date: 2025  Patient: Lane Ray  : 1960  MRN: [id number]    Visit Count: 10/12  Cancels/No Shows to date: 3/0    For today's appointment patient:    [x]  Cancelled    [] Rescheduled appointment    [] No-show     Reason given by patient:    [x]  Patient ill    []  Conflicting appointment    [] No transportation      [] Conflict with work    [] No reason given    [] Weather related    [] COVID-19    [] Other:      Comments:        [] Next appointment was confirmed    Electronically signed by: Sb Anderson PT DPT

## 2025-07-31 ENCOUNTER — HOSPITAL ENCOUNTER (OUTPATIENT)
Dept: PHYSICAL THERAPY | Age: 65
Setting detail: THERAPIES SERIES
Discharge: HOME | End: 2025-07-31
Attending: GENERAL PRACTICE
Payer: MEDICARE

## 2025-07-31 PROCEDURE — 97110 THERAPEUTIC EXERCISES: CPT

## 2025-07-31 NOTE — FLOWSHEET NOTE
[facility]   Outpatient Rehabilitation & Therapy  [address]  P: [phone]   F: [phone]    Physical Therapy Daily Treatment Note      Date:  2025  Patient Name:  Lane Ray    :  1960  MRN: [id number]  Physician: Main Todd MD               Medical Diagnosis: Lumbosacral  spondylosis without myelopathy (M47.817)  Clinical Diagnosis: Low back pain (M54.5) with walking difficulty (R26.2)  Onset date: 2025           Next Dr's appt.: TBD  PT Visit Information  PT Insurance Information:Medicare- follow medicare guidelines and [employer]    Total # of Visits Approved: 12  Total # of Visits to Date: 11  No Show: 0  Canceled Appointment: 3    Subjective:    Patient stated that his symptoms are relatively unchanged since his last treatment session. He has had a fairly uneventful morning.     Over 24 hours  Pain  Pain:  [x] Yes   [] No       Location: (R) and (L) lumbar with radicular symptoms down the LEs to the feet/toes.   Pain Rating: (0-10 scale) 5/10 with pain medication   Worst: 7/10 with pain medication   Best: 5/10 with pain medication   Descriptors: constant, dull, achy, stabbing, numbness, tingling   Other:     Objective:  Modalities: Hot pack with passive stretches   Precautions: Asthma, COPD, DM, Depression, Sleep Apnea and Neuropathy   Exercises:  Exercise Reps/ Time Weight/ Level Comments   Semi-reclined Knee Extension with Hip @ 90 degrees 5 reps   Following passive stretch    Semi-reclined Hip Abduction/Adduction  5 reps   Following passive stretch    Semi-reclined Knee Flexion  5 reps   Following passive stretch                Passive Stretching   Semi-reclined Hamstring Stretch 30 sec hold x 3 reps with each leg   Semi-reclined IT Band Stretch 30 sec hold x 3 reps with each leg   Semi-reclined Piriformis Stretch 30 sec hold x 3 reps with each leg   Semi-reclined Hip Adductor Stretch 30 sec hold x 3 reps with each leg   Semi-reclined

## 2025-08-07 ENCOUNTER — OFFICE VISIT (OUTPATIENT)
Dept: PHYSICAL MEDICINE AND REHAB | Age: 65
End: 2025-08-07
Payer: MEDICARE

## 2025-08-07 VITALS
BODY MASS INDEX: 39.4 KG/M2 | WEIGHT: 266 LBS | SYSTOLIC BLOOD PRESSURE: 142 MMHG | DIASTOLIC BLOOD PRESSURE: 58 MMHG | TEMPERATURE: 98.2 F | HEART RATE: 95 BPM

## 2025-08-07 DIAGNOSIS — M47.817 LUMBOSACRAL SPONDYLOSIS WITHOUT MYELOPATHY: ICD-10-CM

## 2025-08-07 DIAGNOSIS — G89.29 CHRONIC PAIN OF BOTH KNEES: Primary | ICD-10-CM

## 2025-08-07 DIAGNOSIS — G89.29 CHRONIC BILATERAL LOW BACK PAIN WITHOUT SCIATICA: ICD-10-CM

## 2025-08-07 DIAGNOSIS — M25.562 CHRONIC PAIN OF BOTH KNEES: Primary | ICD-10-CM

## 2025-08-07 DIAGNOSIS — M54.50 CHRONIC BILATERAL LOW BACK PAIN WITHOUT SCIATICA: ICD-10-CM

## 2025-08-07 DIAGNOSIS — M25.552 BILATERAL HIP PAIN: ICD-10-CM

## 2025-08-07 DIAGNOSIS — M16.0 PRIMARY OSTEOARTHRITIS OF BOTH HIPS: ICD-10-CM

## 2025-08-07 DIAGNOSIS — M25.551 BILATERAL HIP PAIN: ICD-10-CM

## 2025-08-07 DIAGNOSIS — M25.561 CHRONIC PAIN OF BOTH KNEES: Primary | ICD-10-CM

## 2025-08-07 DIAGNOSIS — M54.2 CERVICALGIA: ICD-10-CM

## 2025-08-07 PROCEDURE — 3077F SYST BP >= 140 MM HG: CPT | Performed by: GENERAL PRACTICE

## 2025-08-07 PROCEDURE — 3078F DIAST BP <80 MM HG: CPT | Performed by: GENERAL PRACTICE

## 2025-08-07 PROCEDURE — 3017F COLORECTAL CA SCREEN DOC REV: CPT | Performed by: GENERAL PRACTICE

## 2025-08-07 PROCEDURE — 1159F MED LIST DOCD IN RCRD: CPT | Performed by: GENERAL PRACTICE

## 2025-08-07 PROCEDURE — G8417 CALC BMI ABV UP PARAM F/U: HCPCS | Performed by: GENERAL PRACTICE

## 2025-08-07 PROCEDURE — 1123F ACP DISCUSS/DSCN MKR DOCD: CPT | Performed by: GENERAL PRACTICE

## 2025-08-07 PROCEDURE — 99213 OFFICE O/P EST LOW 20 MIN: CPT | Performed by: GENERAL PRACTICE

## 2025-08-07 PROCEDURE — 4004F PT TOBACCO SCREEN RCVD TLK: CPT | Performed by: GENERAL PRACTICE

## 2025-08-07 PROCEDURE — G8427 DOCREV CUR MEDS BY ELIG CLIN: HCPCS | Performed by: GENERAL PRACTICE

## 2025-08-07 RX ORDER — TIOTROPIUM BROMIDE 18 UG/1
CAPSULE ORAL; RESPIRATORY (INHALATION)
COMMUNITY

## 2025-08-11 ENCOUNTER — OFFICE VISIT (OUTPATIENT)
Dept: VASCULAR SURGERY | Age: 65
End: 2025-08-11
Payer: MEDICARE

## 2025-08-11 VITALS
OXYGEN SATURATION: 97 % | BODY MASS INDEX: 39.49 KG/M2 | SYSTOLIC BLOOD PRESSURE: 177 MMHG | HEART RATE: 86 BPM | HEIGHT: 69 IN | WEIGHT: 266.6 LBS | DIASTOLIC BLOOD PRESSURE: 76 MMHG

## 2025-08-11 DIAGNOSIS — Z95.828 INTERNAL CAROTID ARTERY STENT PRESENT: Primary | ICD-10-CM

## 2025-08-11 DIAGNOSIS — I65.21 STENOSIS OF RIGHT CAROTID ARTERY: ICD-10-CM

## 2025-08-11 PROCEDURE — G8428 CUR MEDS NOT DOCUMENT: HCPCS | Performed by: SURGERY

## 2025-08-11 PROCEDURE — 3077F SYST BP >= 140 MM HG: CPT | Performed by: SURGERY

## 2025-08-11 PROCEDURE — 3078F DIAST BP <80 MM HG: CPT | Performed by: SURGERY

## 2025-08-11 PROCEDURE — 1123F ACP DISCUSS/DSCN MKR DOCD: CPT | Performed by: SURGERY

## 2025-08-11 PROCEDURE — G8417 CALC BMI ABV UP PARAM F/U: HCPCS | Performed by: SURGERY

## 2025-08-11 PROCEDURE — 4004F PT TOBACCO SCREEN RCVD TLK: CPT | Performed by: SURGERY

## 2025-08-11 PROCEDURE — 99213 OFFICE O/P EST LOW 20 MIN: CPT | Performed by: SURGERY

## 2025-08-11 PROCEDURE — 3017F COLORECTAL CA SCREEN DOC REV: CPT | Performed by: SURGERY

## 2025-08-13 ENCOUNTER — APPOINTMENT (OUTPATIENT)
Dept: PHYSICAL THERAPY | Age: 65
End: 2025-08-13
Attending: GENERAL PRACTICE
Payer: MEDICARE

## 2025-08-14 ENCOUNTER — HOSPITAL ENCOUNTER (OUTPATIENT)
Dept: PHYSICAL THERAPY | Age: 65
Setting detail: THERAPIES SERIES
Discharge: HOME | End: 2025-08-14
Attending: GENERAL PRACTICE
Payer: MEDICARE

## 2025-08-14 PROCEDURE — 97110 THERAPEUTIC EXERCISES: CPT

## 2025-08-19 DIAGNOSIS — J44.9 CHRONIC OBSTRUCTIVE PULMONARY DISEASE, UNSPECIFIED COPD TYPE (HCC): ICD-10-CM

## 2025-08-19 RX ORDER — TIOTROPIUM BROMIDE INHALATION SPRAY 3.12 UG/1
SPRAY, METERED RESPIRATORY (INHALATION)
Qty: 4 G | Refills: 11 | Status: SHIPPED | OUTPATIENT
Start: 2025-08-19

## 2025-08-28 DIAGNOSIS — G89.29 CHRONIC BILATERAL LOW BACK PAIN WITHOUT SCIATICA: ICD-10-CM

## 2025-08-28 DIAGNOSIS — M54.2 CERVICALGIA: ICD-10-CM

## 2025-08-28 DIAGNOSIS — M54.50 CHRONIC BILATERAL LOW BACK PAIN WITHOUT SCIATICA: ICD-10-CM

## 2025-08-28 RX ORDER — GABAPENTIN 600 MG/1
600 TABLET ORAL 3 TIMES DAILY
Qty: 270 TABLET | Refills: 0 | Status: SHIPPED | OUTPATIENT
Start: 2025-08-28 | End: 2025-11-26

## 2025-08-29 ENCOUNTER — HOSPITAL ENCOUNTER (OUTPATIENT)
Dept: GENERAL RADIOLOGY | Age: 65
Discharge: HOME | End: 2025-08-31
Payer: MEDICARE

## 2025-08-29 DIAGNOSIS — M25.561 CHRONIC PAIN OF BOTH KNEES: ICD-10-CM

## 2025-08-29 DIAGNOSIS — M25.552 BILATERAL HIP PAIN: ICD-10-CM

## 2025-08-29 DIAGNOSIS — M25.551 BILATERAL HIP PAIN: ICD-10-CM

## 2025-08-29 DIAGNOSIS — M25.562 CHRONIC PAIN OF BOTH KNEES: ICD-10-CM

## 2025-08-29 DIAGNOSIS — G89.29 CHRONIC PAIN OF BOTH KNEES: ICD-10-CM

## 2025-08-29 PROCEDURE — 73502 X-RAY EXAM HIP UNI 2-3 VIEWS: CPT

## 2025-08-29 PROCEDURE — 73562 X-RAY EXAM OF KNEE 3: CPT

## (undated) DEVICE — VIATRAC 14 PLUS PERIPHERAL DILATATION CATHETER 5.0 MM X 15 MM X 135 CM: Brand: VIATRAC

## (undated) DEVICE — GAUZE,SPONGE,FLUFF,6"X6.75",STRL,5/TRAY: Brand: MEDLINE

## (undated) DEVICE — PREMIUM DRY TRAY LF: Brand: MEDLINE INDUSTRIES, INC.

## (undated) DEVICE — SUTURE PERMAHAND SZ 4-0 L18IN NONABSORBABLE BLK SILK BRAID A183H

## (undated) DEVICE — PAD GRND FOR RF PAIN MGMT DISP

## (undated) DEVICE — CANNULA NSL AD L7FT O2 PRSS CRUSH RESIST TBNG M CONN AIRLFE

## (undated) DEVICE — PACK SURG ABD SVMMC

## (undated) DEVICE — ADHESIVE SKIN CLOSURE TOP 36 CC HI VISC DERMBND MINI

## (undated) DEVICE — INFLATION KIT CUST REV

## (undated) DEVICE — MARKER,SKIN,WI/RULER AND LABELS: Brand: MEDLINE

## (undated) DEVICE — BLADE CLIPPER GEN PURP NS

## (undated) DEVICE — SHEET, T, LAPAROTOMY, STERILE: Brand: MEDLINE

## (undated) DEVICE — SYRINGE MED 10ML TRNSLUC BRL PLUNG BLK MRK POLYPR CTRL

## (undated) DEVICE — UNDERPANTS MAT L XL SEAMLESS CLR CODE WAISTBAND KNIT

## (undated) DEVICE — TOWEL,OR,DSP,ST,BLUE,STD,6/PK,12PK/CS: Brand: MEDLINE

## (undated) DEVICE — CONTAINER,SPECIMEN,4OZ,OR STRL: Brand: MEDLINE

## (undated) DEVICE — NEEDLE ANGIO 18GA L6.98CM ART ENTRY W/O STYL 1 PART PERC

## (undated) DEVICE — SET PEN AND LBL AND L BWL LBL PAL PEN AND LBLS PAL700]

## (undated) DEVICE — ELECTRODE PT RET AD L9FT HI MOIST COND ADH HYDRGEL CORDED

## (undated) DEVICE — GLOVE ORANGE PI 7   MSG9070

## (undated) DEVICE — NEEDLE SPNL 22GA L3.5IN BLK HUB S STL REG WALL FIT STYL W/

## (undated) DEVICE — PROTECTOR ULN NRV PUR FOAM HK LOOP STRP ANATOMICALLY

## (undated) DEVICE — NEEDLE FLTR 18GA L1.5IN MEM THK5UM BLNT DISP

## (undated) DEVICE — GLOVE ORANGE PI 8   MSG9080

## (undated) DEVICE — SPONGE LAP W18XL18IN WHT COT 4 PLY FLD STRUNG RADPQ DISP ST

## (undated) DEVICE — MITT SURG PREP L ADH DISPOSABLE

## (undated) DEVICE — INTENDED FOR TISSUE SEPARATION, AND OTHER PROCEDURES THAT REQUIRE A SHARP SURGICAL BLADE TO PUNCTURE OR CUT.: Brand: BARD-PARKER ® CARBON RIB-BACK BLADES

## (undated) DEVICE — NEEDLE HYPO 25GA L1.5IN BLU POLYPR HUB S STL REG BVL STR

## (undated) DEVICE — FOGARTY - HYDRAGRIP SURGICAL - CLAMP INSERTS: Brand: FOGARTY SOFTJAW

## (undated) DEVICE — GLOVE SURG SZ 7 CRM LTX FREE POLYISOPRENE POLYMER BEAD ANTI

## (undated) DEVICE — LIEBERMAN INTRODUCER: Brand: LIEBERMAN

## (undated) DEVICE — SUTURE VCRL + SZ 4-0 L27IN ABSRB UD L26MM SH 1/2 CIR VCP415H

## (undated) DEVICE — DRAPE,REIN 53X77,STERILE: Brand: MEDLINE

## (undated) DEVICE — SVMMC PEDS/UROLOGY MINOR PACK: Brand: MEDLINE INDUSTRIES, INC.

## (undated) DEVICE — PACK PROCEDURE SURG CYSTO SVMMC LF

## (undated) DEVICE — TRAY NRV BLK SUPP CUST

## (undated) DEVICE — TOWEL,OR,DSP,ST,NATURAL,DLX,4/PK,20PK/CS: Brand: MEDLINE

## (undated) DEVICE — GLOVE SURG SZ 65 CRM LTX FREE POLYISOPRENE POLYMER BEAD ANTI

## (undated) DEVICE — EVACUATOR URO BLDR W/ ADPT UROVAC

## (undated) DEVICE — APPLICATOR MEDICATED 26 CC SOLUTION HI LT ORNG CHLORAPREP

## (undated) DEVICE — SURGICAL PROCEDURE TRAY CRD CATH SVMMC

## (undated) DEVICE — BLADE,CARBON-STEEL,15,STRL,DISPOSABLE,TB: Brand: MEDLINE

## (undated) DEVICE — Device

## (undated) DEVICE — SHEET,DRAPE,40X58,STERILE: Brand: MEDLINE

## (undated) DEVICE — SOLUTION SCRB 4OZ 4% CHG H2O AIDED FOR PREOPERATIVE SKIN

## (undated) DEVICE — ANGLED-TIP ARTERIAL SHEATH CONFIGURATION: Brand: ENROUTE TRANSCAROTID NEUROPROTECTION SYSTEM

## (undated) DEVICE — TUBING SUCT 12FR MAL ALUM SHFT FN CAP VENT UNIV CONN W/ OBT

## (undated) DEVICE — STRAP,POSITIONING,KNEE/BODY,FOAM,4X60": Brand: MEDLINE

## (undated) DEVICE — SUTURE ETHLN SZ 4-0 L18IN NONABSORBABLE BLK L19MM PS-2 3/8 1667H

## (undated) DEVICE — LIQUIBAND RAPID ADHESIVE 36/CS 0.8ML: Brand: MEDLINE

## (undated) DEVICE — BANDAGE ADH W0.75XL3IN NAT PLAS CURAD

## (undated) DEVICE — CYSTO/BLADDER IRRIGATION SET, REGULATING CLAMP

## (undated) DEVICE — SKIN MARKER,FINE TIP: Brand: DEVON

## (undated) DEVICE — INTRODUCER KIT ST MICRO GALT 7CM

## (undated) DEVICE — DRAPE,SPLIT,CV,CLR ANES,STERILE: Brand: MEDLINE

## (undated) DEVICE — SUTURE NONABSORBABLE MONOFILAMENT 6-0 C-1 1X30 IN PROLENE 8706H

## (undated) DEVICE — SUTURE MCRYL SZ 4-0 L18IN ABSRB UD L19MM PS-2 3/8 CIR PRIM Y496G

## (undated) DEVICE — SUTURE PERMA-HAND SZ 2-0 L30IN NONABSORBABLE BLK L26MM SH K833H

## (undated) DEVICE — TUBING, SUCTION, 9/32" X 20', STRAIGHT: Brand: MEDLINE INDUSTRIES, INC.

## (undated) DEVICE — SUTURE PERMAHAND SZ 2-0 L18IN NONABSORBABLE BLK L26MM FS 685G

## (undated) DEVICE — DECANTER BAG 9": Brand: MEDLINE INDUSTRIES, INC.

## (undated) DEVICE — FIAPC® PROBE W/ FILTER 2200 C OD 2.3MM/6.9FR; L 2.2M/7.2FT: Brand: ERBE

## (undated) DEVICE — GLOVE SURG SZ 75 L12IN FNGR THK79MIL GRN LTX FREE

## (undated) DEVICE — THE STERILE LIGHT HANDLE COVER IS USED WITH STERIS SURGICAL LIGHTING AND VISUALIZATION SYSTEMS.

## (undated) DEVICE — GOWN,SIRUS,NONRNF,SETINSLV,XL,20/CS: Brand: MEDLINE

## (undated) DEVICE — GOWN,AURORA,NONREINFORCED,LARGE: Brand: MEDLINE

## (undated) DEVICE — GUIDEWIRE VASCULAR L95CM DIA0.014IN ENROUTE

## (undated) DEVICE — SUTURE CHROMIC GUT SZ 3-0 L27IN ABSRB BRN L26MM SH 1/2 CIR G122H

## (undated) DEVICE — Z DISCONTINUED APPLICATOR SURG PREP 0.35OZ 2% CHG 70% ISO ALC W/ HI LT

## (undated) DEVICE — GLOVE SURG SZ 7.5 L11.73IN FNGR THK9.8MIL STRW LTX POLYMER

## (undated) DEVICE — SYRINGE, LUER LOCK, 10ML: Brand: MEDLINE

## (undated) DEVICE — GLOVE SURG SZ 6 THK91MIL LTX FREE SYN POLYISOPRENE ANTI

## (undated) DEVICE — TTL1LYR 16FR10ML 100%SIL TMPST TR: Brand: MEDLINE

## (undated) DEVICE — ZINACTIVE USE 2537982 PAD GRND FOR RF PAIN MGMT DISP

## (undated) DEVICE — YANKAUER,FLEXIBLE HANDLE,REGLR CAPACITY: Brand: MEDLINE INDUSTRIES, INC.

## (undated) DEVICE — GAUZE,SPONGE,4"X4",16PLY,XRAY,STRL,LF: Brand: MEDLINE

## (undated) DEVICE — APPLICATOR MEDICATED 10.5 CC SOLUTION HI LT ORNG CHLORAPREP

## (undated) DEVICE — TOWEL,OR,DSP,ST,BLUE,DLX,XR,4/PK,20PK/CS: Brand: MEDLINE

## (undated) DEVICE — PROVE COVER: Brand: UNBRANDED

## (undated) DEVICE — POSITIONER HD REST FOAM CMFRT TCH

## (undated) DEVICE — GLOVE SURG SZ 8 CRM LTX FREE POLYISOPRENE POLYMER BEAD ANTI

## (undated) DEVICE — SYRINGE 20ML LL S/C 50